# Patient Record
Sex: MALE | Race: BLACK OR AFRICAN AMERICAN | HISPANIC OR LATINO | Employment: UNEMPLOYED | URBAN - METROPOLITAN AREA
[De-identification: names, ages, dates, MRNs, and addresses within clinical notes are randomized per-mention and may not be internally consistent; named-entity substitution may affect disease eponyms.]

---

## 2014-05-24 LAB — HEP C VIRUS AB: NEGATIVE

## 2020-01-30 ENCOUNTER — OFFICE VISIT (OUTPATIENT)
Dept: FAMILY MEDICINE | Facility: CLINIC | Age: 45
End: 2020-01-30
Payer: COMMERCIAL

## 2020-01-30 VITALS
WEIGHT: 200.63 LBS | RESPIRATION RATE: 16 BRPM | SYSTOLIC BLOOD PRESSURE: 145 MMHG | HEART RATE: 90 BPM | BODY MASS INDEX: 30.41 KG/M2 | HEIGHT: 68 IN | OXYGEN SATURATION: 97 % | DIASTOLIC BLOOD PRESSURE: 82 MMHG

## 2020-01-30 DIAGNOSIS — E66.09 CLASS 1 OBESITY DUE TO EXCESS CALORIES WITH BODY MASS INDEX (BMI) OF 30.0 TO 30.9 IN ADULT, UNSPECIFIED WHETHER SERIOUS COMORBIDITY PRESENT: ICD-10-CM

## 2020-01-30 DIAGNOSIS — Z13.6 SCREENING FOR CARDIOVASCULAR CONDITION: ICD-10-CM

## 2020-01-30 DIAGNOSIS — K21.9 GASTROESOPHAGEAL REFLUX DISEASE, ESOPHAGITIS PRESENCE NOT SPECIFIED: ICD-10-CM

## 2020-01-30 DIAGNOSIS — Z00.01 ENCOUNTER FOR GENERAL ADULT MEDICAL EXAMINATION WITH ABNORMAL FINDINGS: Primary | ICD-10-CM

## 2020-01-30 DIAGNOSIS — Z11.4 SCREENING FOR HIV (HUMAN IMMUNODEFICIENCY VIRUS): ICD-10-CM

## 2020-01-30 DIAGNOSIS — N41.9 PROSTATITIS, UNSPECIFIED PROSTATITIS TYPE: ICD-10-CM

## 2020-01-30 DIAGNOSIS — M54.16 LUMBAR RADICULOPATHY: ICD-10-CM

## 2020-01-30 DIAGNOSIS — R03.0 TRANSIENT ELEVATED BLOOD PRESSURE: ICD-10-CM

## 2020-01-30 DIAGNOSIS — Z13.1 SCREENING FOR DIABETES MELLITUS: ICD-10-CM

## 2020-01-30 DIAGNOSIS — N50.89 TESTICULAR MASS: ICD-10-CM

## 2020-01-30 DIAGNOSIS — Z11.3 ROUTINE SCREENING FOR STI (SEXUALLY TRANSMITTED INFECTION): ICD-10-CM

## 2020-01-30 PROCEDURE — 99386 PREV VISIT NEW AGE 40-64: CPT | Mod: S$GLB,,, | Performed by: FAMILY MEDICINE

## 2020-01-30 PROCEDURE — 99999 PR PBB SHADOW E&M-EST. PATIENT-LVL III: CPT | Mod: PBBFAC,,, | Performed by: FAMILY MEDICINE

## 2020-01-30 PROCEDURE — 99386 PR PREVENTIVE VISIT,NEW,40-64: ICD-10-PCS | Mod: S$GLB,,, | Performed by: FAMILY MEDICINE

## 2020-01-30 PROCEDURE — 99999 PR PBB SHADOW E&M-EST. PATIENT-LVL III: ICD-10-PCS | Mod: PBBFAC,,, | Performed by: FAMILY MEDICINE

## 2020-01-30 RX ORDER — SULFAMETHOXAZOLE AND TRIMETHOPRIM 800; 160 MG/1; MG/1
1 TABLET ORAL 2 TIMES DAILY
Qty: 28 TABLET | Refills: 0 | Status: SHIPPED | OUTPATIENT
Start: 2020-01-30 | End: 2020-02-13

## 2020-01-30 RX ORDER — PANTOPRAZOLE SODIUM 40 MG/1
40 TABLET, DELAYED RELEASE ORAL DAILY
Qty: 30 TABLET | Refills: 1 | Status: SHIPPED | OUTPATIENT
Start: 2020-01-30 | End: 2020-02-17

## 2020-02-05 ENCOUNTER — HOSPITAL ENCOUNTER (OUTPATIENT)
Dept: RADIOLOGY | Facility: HOSPITAL | Age: 45
Discharge: HOME | End: 2020-02-05
Attending: FAMILY MEDICINE
Payer: COMMERCIAL

## 2020-02-05 DIAGNOSIS — N50.89 TESTICULAR MASS: ICD-10-CM

## 2020-02-05 PROCEDURE — 76870 US SCROTUM AND TESTICLES: ICD-10-PCS | Mod: 26,,, | Performed by: RADIOLOGY

## 2020-02-05 PROCEDURE — 76870 US EXAM SCROTUM: CPT | Mod: TC

## 2020-02-05 PROCEDURE — 76870 US EXAM SCROTUM: CPT | Mod: 26,,, | Performed by: RADIOLOGY

## 2020-02-11 NOTE — PROGRESS NOTES
Subjective:       Patient ID: Emeka Caballero is a 44 y.o. male.    Chief Complaint: Annual Exam    HPI   44 year old male originally comes in for wellness check and states it just for routine check up and then changes that he has multiple concerns. He is aware that the multiple concerns are not part of a routine check up and may cause a bill.  He is present with his wife who interrupts questioning and patient's answers throughout the interview. He states that for some time now, a few, he gets abdominal pain after eating. It is epigastric and feels like a burning sensation. The wife reports it has actually been going on for years and that she knows it is ulcers despite never having had an EGD done. He also reports a weaker urinary stream for the last few weeks as well as a perianal pain. There has been no trauma. Next, he also reports that for months, he has had low back pain. It radiates into the legs. He also reports weakness in legs. He recently had MRIs done according to him but did not follow up with the provider who ordered them. He states he was told it was fine.    Review of Systems   Constitutional: Negative for unexpected weight change.   HENT: Negative for ear pain and sore throat.    Eyes: Negative for visual disturbance.   Respiratory: Negative for shortness of breath.    Cardiovascular: Negative for chest pain.   Gastrointestinal: Positive for abdominal pain. Negative for blood in stool, diarrhea, nausea and rectal pain.   Endocrine: Negative for cold intolerance and heat intolerance.   Genitourinary: Positive for difficulty urinating, testicular pain and urgency. Negative for dysuria, frequency and genital sores.   Musculoskeletal: Positive for back pain.   Skin: Negative for rash.   Neurological: Positive for weakness and numbness (legs). Negative for headaches.   Hematological: Negative for adenopathy.   Psychiatric/Behavioral: Negative for suicidal ideas.       Objective:      Physical Exam    Constitutional: He is oriented to person, place, and time. He appears well-developed and well-nourished. No distress.   HENT:   Head: Normocephalic and atraumatic.   Right Ear: Tympanic membrane, external ear and ear canal normal.   Left Ear: Tympanic membrane, external ear and ear canal normal.   Nose: Nose normal.   Mouth/Throat: Oropharynx is clear and moist.   Eyes: Pupils are equal, round, and reactive to light. Conjunctivae and EOM are normal. Right eye exhibits no discharge. Left eye exhibits no discharge.   Neck: Normal range of motion. Neck supple. No tracheal deviation present. No thyromegaly present.   Cardiovascular: Normal rate, regular rhythm, S1 normal, S2 normal, normal heart sounds and intact distal pulses.   No murmur heard.  Pulses:       Radial pulses are 2+ on the right side, and 2+ on the left side.   Pulmonary/Chest: Effort normal and breath sounds normal. No respiratory distress. He has no wheezes. He has no rhonchi. He has no rales.   Abdominal: Soft. Bowel sounds are normal. He exhibits no distension and no mass. There is no tenderness. There is no rigidity, no guarding and no CVA tenderness.   Genitourinary: Penis normal. Rectal exam shows no fissure. Prostate is tender. Right testis shows mass (in area of vas deferans). Right testis shows no tenderness. Left testis shows no tenderness. No penile erythema.   Lymphadenopathy:     He has no cervical adenopathy.   Neurological: He is alert and oriented to person, place, and time. He has normal strength. He displays no atrophy. No cranial nerve deficit or sensory deficit. He exhibits normal muscle tone.   Reflex Scores:       Patellar reflexes are 2+ on the right side and 2+ on the left side.  Skin: Skin is warm and dry. Capillary refill takes less than 2 seconds. No rash noted. He is not diaphoretic.   Psychiatric: He has a normal mood and affect. His behavior is normal.   Vitals reviewed.      Assessment:       1. Encounter for general  adult medical examination with abnormal findings    2. Gastroesophageal reflux disease, esophagitis presence not specified    3. Prostatitis, unspecified prostatitis type    4. Testicular mass    5. Screening for HIV (human immunodeficiency virus)    6. Routine screening for STI (sexually transmitted infection)    7. Screening for cardiovascular condition    8. Screening for diabetes mellitus    9. Transient elevated blood pressure    10. Class 1 obesity due to excess calories with body mass index (BMI) of 30.0 to 30.9 in adult, unspecified whether serious comorbidity present    11. Lumbar radiculopathy        Plan:       Emeka was seen today for annual exam.    Diagnoses and all orders for this visit:    Encounter for general adult medical examination with abnormal findings  -     Comprehensive metabolic panel; Future  -     CBC auto differential; Future  -     Lipid panel; Future  -     Hemoglobin A1c; Future  -     HIV 1/2 Ag/Ab (4th Gen); Future  -     RPR; Future  -     Cancel: C. trachomatis/N. gonorrhoeae by AMP DNA; Future  -     TSH; Future  Age appropriate recommendations reviewed.  Screening labs ordered.    Gastroesophageal reflux disease, esophagitis presence not specified  -     pantoprazole (PROTONIX) 40 MG tablet; Take 1 tablet (40 mg total) by mouth once daily.  Discussed GERD and causes, as well as dietary changes.  Will start on Protonix.    Prostatitis, unspecified prostatitis type  -     PSA, total and free; Future  -     Urinalysis, Reflex to Urine Culture Urine, Clean Catch  -     C. trachomatis/N. gonorrhoeae by AMP DNA; Future  -     sulfamethoxazole-trimethoprim 800-160mg (BACTRIM DS) 800-160 mg Tab; Take 1 tablet by mouth 2 (two) times daily. for 14 days  Course of bactrim prescribed for prostatitis.  Check PSA    Testicular mass  -     US Scrotum And Testicles; Future  Will check US    Screening for HIV (human immunodeficiency virus)  -     HIV 1/2 Ag/Ab (4th Gen); Future    Routine  screening for STI (sexually transmitted infection)  -     HIV 1/2 Ag/Ab (4th Gen); Future  -     RPR; Future  -     Cancel: C. trachomatis/N. gonorrhoeae by AMP DNA; Future    Screening for cardiovascular condition  -     Lipid panel; Future    Screening for diabetes mellitus  -     Hemoglobin A1c; Future    Transient elevated blood pressure  Importance of blood pressure control discussed.   If still elevated on follow up, may need medication    Class 1 obesity due to excess calories with body mass index (BMI) of 30.0 to 30.9 in adult, unspecified whether serious comorbidity present  -     Comprehensive metabolic panel; Future  -     CBC auto differential; Future  -     Lipid panel; Future  -     Hemoglobin A1c; Future  -     TSH; Future    Lumbar radiculopathy  -     X-Ray Lumbar Complete With Flex And Ext; Future

## 2020-02-12 ENCOUNTER — OFFICE VISIT (OUTPATIENT)
Dept: FAMILY MEDICINE | Facility: CLINIC | Age: 45
End: 2020-02-12
Payer: COMMERCIAL

## 2020-02-12 VITALS
WEIGHT: 204.38 LBS | BODY MASS INDEX: 31.07 KG/M2 | TEMPERATURE: 98 F | SYSTOLIC BLOOD PRESSURE: 118 MMHG | RESPIRATION RATE: 16 BRPM | OXYGEN SATURATION: 96 % | DIASTOLIC BLOOD PRESSURE: 72 MMHG | HEART RATE: 97 BPM

## 2020-02-12 DIAGNOSIS — M43.17 SPONDYLOLISTHESIS OF LUMBOSACRAL REGION: ICD-10-CM

## 2020-02-12 DIAGNOSIS — N52.9 ERECTILE DYSFUNCTION, UNSPECIFIED ERECTILE DYSFUNCTION TYPE: ICD-10-CM

## 2020-02-12 DIAGNOSIS — I86.1 VARICOCELE: Primary | ICD-10-CM

## 2020-02-12 DIAGNOSIS — N41.9 PROSTATITIS, UNSPECIFIED PROSTATITIS TYPE: ICD-10-CM

## 2020-02-12 DIAGNOSIS — E78.2 MIXED DYSLIPIDEMIA: ICD-10-CM

## 2020-02-12 PROCEDURE — 99214 OFFICE O/P EST MOD 30 MIN: CPT | Mod: S$GLB,,, | Performed by: FAMILY MEDICINE

## 2020-02-12 PROCEDURE — 3008F BODY MASS INDEX DOCD: CPT | Mod: CPTII,S$GLB,, | Performed by: FAMILY MEDICINE

## 2020-02-12 PROCEDURE — 99214 PR OFFICE/OUTPT VISIT, EST, LEVL IV, 30-39 MIN: ICD-10-PCS | Mod: S$GLB,,, | Performed by: FAMILY MEDICINE

## 2020-02-12 PROCEDURE — 99999 PR PBB SHADOW E&M-EST. PATIENT-LVL IV: CPT | Mod: PBBFAC,,, | Performed by: FAMILY MEDICINE

## 2020-02-12 PROCEDURE — 3008F PR BODY MASS INDEX (BMI) DOCUMENTED: ICD-10-PCS | Mod: CPTII,S$GLB,, | Performed by: FAMILY MEDICINE

## 2020-02-12 PROCEDURE — 99999 PR PBB SHADOW E&M-EST. PATIENT-LVL IV: ICD-10-PCS | Mod: PBBFAC,,, | Performed by: FAMILY MEDICINE

## 2020-02-12 RX ORDER — TADALAFIL 20 MG/1
TABLET ORAL
Qty: 10 TABLET | Refills: 11 | Status: SHIPPED | OUTPATIENT
Start: 2020-02-12 | End: 2020-02-14 | Stop reason: SDUPTHER

## 2020-02-12 NOTE — PROGRESS NOTES
"Subjective:       Emeka Caballero is a 44 y.o. male who is a new patient who was referred by Dr Zarate for evaluation of "varicocele".      Referred for varicocele. This was noted on recent SANDEEP (b/l varicocele R>L on US report). SANDEEP was done because of b/l testicular pain and increased swelling on R. Noted for 1-2 years. Swelling impedes on walking per report.     Reported weak stream to PCP. Also recently treated for prostatitis by PCP with Bactrim x 14d. Not on BPH medication. Intermittent stream with hesitancy. Nocturia x 5. +straining.     AUASS - 29, 6    PVR (bladder scan) today - 98cc (not true PVR, last void 2.5 hours ago)    Known back issues - seeing pain mgmt and planned for scans.     Also reports issue with ED. Viagra 100mg on MAR.       The following portions of the patient's history were reviewed and updated as appropriate: allergies, current medications, past family history, past medical history, past social history, past surgical history and problem list.    Review of Systems  Constitutional: no fever or chills  ENT: no nasal congestion or sore throat  Respiratory: no cough or shortness of breath  Cardiovascular: no chest pain or palpitations  Gastrointestinal: no nausea or vomiting, tolerating diet  Genitourinary: as per HPI  Hematologic/Lymphatic: no easy bruising or lymphadenopathy  Musculoskeletal: no arthralgias or myalgias  Skin: no rashes or lesions  Neurological: no seizures or tremors  Behavioral/Psych: no auditory or visual hallucinations       Objective:    Vitals: /64   Ht 5' 8" (1.727 m)   Wt 93.5 kg (206 lb 0.3 oz)   BMI 31.33 kg/m²     Physical Exam   General: well developed, well nourished in no acute distress  Head: normocephalic, atraumatic  Neck: supple, trachea midline, no obvious enlargement of thyroid  HEENT: EOMI, mucus membranes moist, sclera anicteric, no hearing impairment  Lungs: symmetric expansion, non-labored breathing  Cardiovascular: regular rate and " rhythm, normal pulses  Abdomen: soft, non tender, non distended, no palpable masses, no hepatosplenomegaly, no hernias, bladder nonpalpable. No CVA tenderness.  Musculoskeletal: no peripheral edema, normal ROM in bilateral upper and lower extremities  Lymphatics: no cervical or inguinal lymphadenopathy  Skin: no rashes or lesions  Neuro: alert and oriented x 3, no gross deficits  Psych: normal judgment and insight, normal mood/affect and non-anxious  Genitourinary:   Penis -  circumcised penis without plaques, lesions, or scarring.  Urethra - orthotopic location without stenosis.  Scrotum  - no lesions or rashes, no hydrocele or hernia.  Testes - descended bilaterally, symmetric without masses, non tender.  Epididymides - no cysts or lesions, no spermatocele, symmetric  ++varicocele on L, minimal varicocele R. R tender to palpation in superior testicle.    GUILLERMINA: patient declined exam      Lab Review   Urine analysis today in clinic shows - no urine     Lab Results   Component Value Date    WBC 7.66 01/30/2020    HGB 15.0 01/30/2020    HCT 46.7 01/30/2020    MCV 87 01/30/2020     01/30/2020     Lab Results   Component Value Date    CREATININE 1.0 01/30/2020    BUN 16 01/30/2020     No results found for: PSA  No results found for: PSADIAG    Imaging  SANDEEP reviewed       Assessment/Plan:      1. Varicocele    - L>>R on exam. L not bothersome.   - Discussed possible surgical intervention - he is not interested. Would not expect R varicocele to be cause of pain.      2. Erectile dysfunction, unspecified erectile dysfunction type   - Viagra given by PCP a few days ago   - Will trial      3. Weak urinary stream    - Significant LUTS. AUASS high   - Start Flomax now   - May need further workup (cysto)     Scans pending for w/u of back pain    Follow up in 6 weeks

## 2020-02-14 ENCOUNTER — TELEPHONE (OUTPATIENT)
Dept: FAMILY MEDICINE | Facility: CLINIC | Age: 45
End: 2020-02-14

## 2020-02-14 DIAGNOSIS — N52.9 ERECTILE DYSFUNCTION, UNSPECIFIED ERECTILE DYSFUNCTION TYPE: ICD-10-CM

## 2020-02-14 DIAGNOSIS — N52.9 ERECTILE DYSFUNCTION, UNSPECIFIED ERECTILE DYSFUNCTION TYPE: Primary | ICD-10-CM

## 2020-02-14 RX ORDER — TADALAFIL 20 MG/1
TABLET ORAL
Qty: 8 TABLET | Refills: 11 | Status: SHIPPED | OUTPATIENT
Start: 2020-02-14 | End: 2020-02-14

## 2020-02-14 RX ORDER — SILDENAFIL 100 MG/1
TABLET, FILM COATED ORAL
Qty: 10 TABLET | Refills: 11 | Status: SHIPPED | OUTPATIENT
Start: 2020-02-14 | End: 2022-01-11

## 2020-02-14 NOTE — TELEPHONE ENCOUNTER
I spoke to the pt wife and he would like the prescription sent to [facility] for Viagra.

## 2020-02-14 NOTE — TELEPHONE ENCOUNTER
"----- Message from Beth Cordoba MA sent at 2/14/2020  1:19 PM CST -----  Contact: Alana "wife"  [phone]      ----- Message -----  From: Tarah Galeas  Sent: 2/14/2020  12:43 PM CST  To: [facility] Whatron Staff    .Type: Patient Call Back    Who called alana "wife"    What is the request in detail: Requesting for the tadalafil (CIALIS) 20 MG Tab to be changed to 8 pills and reasoning for why the pt needs the medication    Can the clinic reply by [facility]? Call back     Would the patient rather a call back or a response via [facility]? Call back     Best call back number: [phone]        "

## 2020-02-14 NOTE — TELEPHONE ENCOUNTER
----- Message from Sherri Roman sent at 2/14/2020  9:19 AM CST -----  Contact: Self :[phone]  Type: Patient Call Back    Who called:Self    What is the request in detail: calling in regards to getting a PA done for tadalafil (CIALIS) 20 MG Tab, and the insurance will only cover 8 tablets instead of 10    Can the clinic reply by [facility]? Call back    Would the patient rather a call back or a response via [facility]? Call back    Best call back number:[phone]

## 2020-02-14 NOTE — TELEPHONE ENCOUNTER
I spoke to pharmacist directly. The medication is not covered by patient's insurance plan. They can pay out of pocket if they'd like.     I can also send to mail order [facility] pharmacy. 8 pills of Cialis is $40, or 10 pills of Viagra is $20. With [employer] I can send a prescription electronically and they call to set up payment and delivery.

## 2020-02-14 NOTE — TELEPHONE ENCOUNTER
"----- Message from Tarah Galeas sent at 2/14/2020  2:55 PM CST -----  Contact: emily "wife"  [phone]  .Type:  Patient Returning Call    Who Called: emily "wife"    Who Left Message for Patient: Kirstie Myersws    Does the patient know what this is regarding?:med     Would the patient rather a call back or a response via [facility]? Call back     Best Call Back Number: [phone]        "

## 2020-02-17 ENCOUNTER — PATIENT OUTREACH (OUTPATIENT)
Dept: ADMINISTRATIVE | Facility: OTHER | Age: 45
End: 2020-02-17

## 2020-02-17 ENCOUNTER — CLINICAL SUPPORT (OUTPATIENT)
Dept: FAMILY MEDICINE | Facility: CLINIC | Age: 45
End: 2020-02-17
Payer: COMMERCIAL

## 2020-02-17 ENCOUNTER — OFFICE VISIT (OUTPATIENT)
Dept: PAIN MEDICINE | Facility: CLINIC | Age: 45
End: 2020-02-17
Payer: COMMERCIAL

## 2020-02-17 ENCOUNTER — OFFICE VISIT (OUTPATIENT)
Dept: UROLOGY | Facility: CLINIC | Age: 45
End: 2020-02-17
Payer: COMMERCIAL

## 2020-02-17 VITALS
DIASTOLIC BLOOD PRESSURE: 64 MMHG | BODY MASS INDEX: 31.22 KG/M2 | WEIGHT: 206 LBS | SYSTOLIC BLOOD PRESSURE: 112 MMHG | HEIGHT: 68 IN

## 2020-02-17 VITALS
WEIGHT: 203.19 LBS | HEIGHT: 68 IN | OXYGEN SATURATION: 98 % | SYSTOLIC BLOOD PRESSURE: 123 MMHG | BODY MASS INDEX: 30.8 KG/M2 | DIASTOLIC BLOOD PRESSURE: 75 MMHG | TEMPERATURE: 99 F | HEART RATE: 88 BPM

## 2020-02-17 DIAGNOSIS — M48.061 SPINAL STENOSIS OF LUMBAR REGION, UNSPECIFIED WHETHER NEUROGENIC CLAUDICATION PRESENT: ICD-10-CM

## 2020-02-17 DIAGNOSIS — M43.17 SPONDYLOLISTHESIS OF LUMBOSACRAL REGION: ICD-10-CM

## 2020-02-17 DIAGNOSIS — M47.816 LUMBAR SPONDYLOSIS: ICD-10-CM

## 2020-02-17 DIAGNOSIS — Z00.00 HEALTHCARE MAINTENANCE: ICD-10-CM

## 2020-02-17 DIAGNOSIS — M54.16 LUMBAR RADICULOPATHY: ICD-10-CM

## 2020-02-17 DIAGNOSIS — Z00.00 HEALTHCARE MAINTENANCE: Primary | ICD-10-CM

## 2020-02-17 DIAGNOSIS — M51.36 DDD (DEGENERATIVE DISC DISEASE), LUMBAR: Primary | ICD-10-CM

## 2020-02-17 DIAGNOSIS — N52.9 ERECTILE DYSFUNCTION, UNSPECIFIED ERECTILE DYSFUNCTION TYPE: ICD-10-CM

## 2020-02-17 DIAGNOSIS — I86.1 VARICOCELE: ICD-10-CM

## 2020-02-17 DIAGNOSIS — R39.12 WEAK URINARY STREAM: Primary | ICD-10-CM

## 2020-02-17 PROBLEM — M51.369 DDD (DEGENERATIVE DISC DISEASE), LUMBAR: Status: ACTIVE | Noted: 2020-02-17

## 2020-02-17 PROCEDURE — 99204 PR OFFICE/OUTPT VISIT, NEW, LEVL IV, 45-59 MIN: ICD-10-PCS | Mod: 25,S$GLB,, | Performed by: PAIN MEDICINE

## 2020-02-17 PROCEDURE — 90686 FLU VACCINE (QUAD) GREATER THAN OR EQUAL TO 3YO PRESERVATIVE FREE IM: ICD-10-PCS | Mod: S$GLB,,, | Performed by: PAIN MEDICINE

## 2020-02-17 PROCEDURE — 99999 PR PBB SHADOW E&M-EST. PATIENT-LVL IV: CPT | Mod: PBBFAC,,, | Performed by: PAIN MEDICINE

## 2020-02-17 PROCEDURE — 99499 NO LOS: ICD-10-PCS | Mod: S$GLB,,, | Performed by: PAIN MEDICINE

## 2020-02-17 PROCEDURE — 99999 PR PBB SHADOW E&M-EST. PATIENT-LVL III: CPT | Mod: PBBFAC,,, | Performed by: UROLOGY

## 2020-02-17 PROCEDURE — 90471 FLU VACCINE (QUAD) GREATER THAN OR EQUAL TO 3YO PRESERVATIVE FREE IM: ICD-10-PCS | Mod: S$GLB,,, | Performed by: PAIN MEDICINE

## 2020-02-17 PROCEDURE — 99499 UNLISTED E&M SERVICE: CPT | Mod: S$GLB,,, | Performed by: PAIN MEDICINE

## 2020-02-17 PROCEDURE — 99204 OFFICE O/P NEW MOD 45 MIN: CPT | Mod: 25,S$GLB,, | Performed by: PAIN MEDICINE

## 2020-02-17 PROCEDURE — 51798 POCT BLADDER SCAN: ICD-10-PCS | Mod: S$GLB,,, | Performed by: UROLOGY

## 2020-02-17 PROCEDURE — 99999 PR PBB SHADOW E&M-EST. PATIENT-LVL III: ICD-10-PCS | Mod: PBBFAC,,, | Performed by: UROLOGY

## 2020-02-17 PROCEDURE — 99204 PR OFFICE/OUTPT VISIT, NEW, LEVL IV, 45-59 MIN: ICD-10-PCS | Mod: 25,S$GLB,, | Performed by: UROLOGY

## 2020-02-17 PROCEDURE — 90471 IMMUNIZATION ADMIN: CPT | Mod: S$GLB,,, | Performed by: PAIN MEDICINE

## 2020-02-17 PROCEDURE — 51798 US URINE CAPACITY MEASURE: CPT | Mod: S$GLB,,, | Performed by: UROLOGY

## 2020-02-17 PROCEDURE — 90686 IIV4 VACC NO PRSV 0.5 ML IM: CPT | Mod: S$GLB,,, | Performed by: PAIN MEDICINE

## 2020-02-17 PROCEDURE — 99204 OFFICE O/P NEW MOD 45 MIN: CPT | Mod: 25,S$GLB,, | Performed by: UROLOGY

## 2020-02-17 PROCEDURE — 99999 PR PBB SHADOW E&M-EST. PATIENT-LVL IV: ICD-10-PCS | Mod: PBBFAC,,, | Performed by: PAIN MEDICINE

## 2020-02-17 RX ORDER — CYCLOBENZAPRINE HCL 10 MG
10 TABLET ORAL
COMMUNITY
End: 2020-02-17

## 2020-02-17 RX ORDER — TAMSULOSIN HYDROCHLORIDE 0.4 MG/1
0.4 CAPSULE ORAL DAILY
Qty: 30 CAPSULE | Refills: 11 | Status: SHIPPED | OUTPATIENT
Start: 2020-02-17 | End: 2021-03-03

## 2020-02-17 RX ORDER — NAPROXEN SODIUM 550 MG/1
550 TABLET ORAL
COMMUNITY
End: 2020-02-17

## 2020-02-17 NOTE — LETTER
February 17, 2020      Dio Zarate Jr., MD  [address] [place] [address]           [facility]  [address] [place] [address]  Phone: [phone]  Fax: [phone]          Patient: Emeka Caballeor   MR Number: [id number]   YOB: 1975   Date of Visit: 2/17/2020       Dear Dr. Dio Zarate Jr.:    Thank you for referring Emeka Caballero to me for evaluation. Attached you will find relevant portions of my assessment and plan of care.    If you have questions, please do not hesitate to call me. I look forward to following Emeka Caballero along with you.    Sincerely,    Vipul Nixon Jr., MD    Enclosure  CC:  No Recipients    If you would like to receive this communication electronically, please contact [email or web address] or [phone] to request more information on [facility] access.    For providers and/or their staff who would like to refer a patient to [facility], please contact us through our one-stop-shop provider referral line, [facility] [place], at [phone].    If you feel you have received this communication in error or would no longer like to receive these types of communications, please e-mail [email or web address]

## 2020-02-17 NOTE — LETTER
February 17, 2020      Dio Zarate Jr., MD  [address] [facility]  [place] [address]           [facility]  [address]  [facility] [address]  Phone: [phone]  Fax: [phone]          Patient: Emeka Caballero   MR Number: [id number]   YOB: 1975   Date of Visit: 2/17/2020       Dear Dr. Dio Zarate Jr.:    Thank you for referring Emeka Caballero to me for evaluation. Attached you will find relevant portions of my assessment and plan of care.    If you have questions, please do not hesitate to call me. I look forward to following Emeka Caballero along with you.    Sincerely,    Cydney Rosas MD    Enclosure  CC:  No Recipients    If you would like to receive this communication electronically, please contact [email or web address] or [phone] to request more information on [facility] access.    For providers and/or their staff who would like to refer a patient to [facility], please contact us through our one-stop-shop provider referral line, [facility], at [phone].    If you feel you have received this communication in error or would no longer like to receive these types of communications, please e-mail [email or web address]

## 2020-02-17 NOTE — PROGRESS NOTES
Subjective:     Patient ID: Emeka Caballero is a 44 y.o. male    Chief Complaint: Low-back Pain (pt takes OTC medication and no PT . No injections ) and Leg Pain      Referred by: Dio Zarate Jr., MD      HPI:    Initial Encounter (2/17/20):  Emeka Caballero is a 44 y.o. male who presents today with chronic midline low back pain. This pain has been present for 2-3 years.  No specific inciting event or injury noted.  Patient localizes pain to the midline lower lumbar region.  The pain will radiate to the bilateral lower extremities in a nondermatomal pattern.  Patient does report numbness and tingling also in a nondermatomal distribution that includes his toes bilaterally.  He denies any focal weakness but does feel that his legs will give out while walking.  He also states that the pain limits his ability to walk for prolonged distances/times.  He does have some difficulty urinating but states that this is from a issue unrelated to his low back.  He denies any bowel dysfunction.  He states that he takes Aleve for pills at a time and that this helps his pain somewhat.   This pain is described in detail below.    Physical Therapy:  No.    Non-pharmacologic Treatment:  Rest helps         · TENS?  No    Pain Medications:         · Currently taking:  OTC NSAIDs, Flexeril    · Has tried in the past:      · Has not tried:  Opioids, TCAs, SNRIs, anticonvulsants, topical creams    Blood thinners:  None    Interventional Therapies:  None    Relevant Surgeries:  None    Affecting sleep?  Yes    Affecting daily activities? yes    Depressive symptoms? no          · SI/HI? No    Work status: Employed    Pain Scores:    Best:       7/10  Worst:     10/10  Usually:   7/10  Today:    9/10    Review of Systems   Constitutional: Negative for activity change, appetite change, chills, fatigue, fever and unexpected weight change.   HENT: Negative for hearing loss.    Eyes: Negative for visual disturbance.    Respiratory: Negative for chest tightness and shortness of breath.    Cardiovascular: Negative for chest pain.   Gastrointestinal: Negative for abdominal pain, constipation, diarrhea, nausea and vomiting.   Genitourinary: Positive for difficulty urinating.   Musculoskeletal: Positive for back pain, gait problem and myalgias. Negative for neck pain.   Skin: Negative for rash.   Neurological: Positive for weakness and numbness. Negative for dizziness, light-headedness and headaches.   Psychiatric/Behavioral: Positive for sleep disturbance. Negative for hallucinations and suicidal ideas. The patient is not nervous/anxious.        No past medical history on file.    No past surgical history on file.    Social History     Socioeconomic History    Marital status: Single     Spouse name: Not on file    Number of children: Not on file    Years of education: Not on file    Highest education level: Not on file   Occupational History    Not on file   Social Needs    Financial resource strain: Not on file    Food insecurity:     Worry: Not on file     Inability: Not on file    Transportation needs:     Medical: Not on file     Non-medical: Not on file   Tobacco Use    Smoking status: Former Smoker   Substance and Sexual Activity    Alcohol use: Yes     Comment: socially    Drug use: No    Sexual activity: Not on file   Lifestyle    Physical activity:     Days per week: Not on file     Minutes per session: Not on file    Stress: Not on file   Relationships    Social connections:     Talks on phone: Not on file     Gets together: Not on file     Attends Confucianism service: Not on file     Active member of club or organization: Not on file     Attends meetings of clubs or organizations: Not on file     Relationship status: Not on file   Other Topics Concern    Not on file   Social History Narrative    Not on file       Review of patient's allergies indicates:  No Known Allergies    Current Outpatient Medications  "on File Prior to Visit   Medication Sig Dispense Refill    cyclobenzaprine (FLEXERIL) 10 MG tablet Take 10 mg by mouth.      naproxen sodium (ANAPROX) 550 MG tablet Take 550 mg by mouth.      pantoprazole (PROTONIX) 40 MG tablet Take 1 tablet (40 mg total) by mouth once daily. 30 tablet 1    sildenafil (VIAGRA) 100 MG tablet 1 tablet PO when needed, 1 hour prior to sex, maximum of 1 tablet in 24 hours 10 tablet 11    triamcinolone acetonide 0.1% (KENALOG) 0.1 % cream Apply topically 2 (two) times daily. Apply to rash. DO NOT USE ON FACE. 45 g 1     No current facility-administered medications on file prior to visit.        Objective:      Ht 5' 8" (1.727 m)   Wt 92.2 kg (203 lb 3.2 oz)   BMI 30.90 kg/m²     Exam:  GEN:  Well developed, well nourished.  No acute distress.  Normal pain behavior.  HEENT:  No trauma.  Mucous membranes moist.  Nares patent bilaterally.  PSYCH: Normal affect. Thought content appropriate.  CHEST:  Breathing symmetric.  No audible wheezing.  ABD: Soft, non-distended.  SKIN:  Warm, pink, dry.  No rash on exposed areas.    EXT:  No cyanosis, clubbing, or edema.  No color change or changes in nail or hair growth.  NEURO/MUSCULOSKELETAL:  Fully alert, oriented, and appropriate. Speech normal taz. No cranial nerve deficits.   Gait:  Normal.  No trendelenburg sign bilaterally.   Motor Strength: 5/5 motor strength throughout lower extremities.   Sensory:  No sensory deficit in the lower extremities.   Reflexes:  2 + and symmetric throughout.  Downgoing Babinski's bilaterally.  No clonus or spasticity.  L-Spine:  Slightly ROM with pain on flexion and extension. Positive pain with axial/facet loading bilaterally.  Positive SLR on the left.   Positive TTP over lumbar paraspinals, midline lower lumbar spine          Imaging:  Narrative     EXAMINATION:  XR LUMBAR SPINE 5 VIEW WITH FLEX AND EXT    CLINICAL HISTORY:  Low back pain, >6wks conservative tx, persistent-progressive sx, surgical " candidate;  Radiculopathy, lumbar region    TECHNIQUE:  Five views of the lumbar spine plus flexion extension views were performed.    COMPARISON:  None.    FINDINGS:  Vertebral bodies are intact.  There is a spondylolisthesis probably second-degree at the L5-S1 level with forward subluxation of L5 on S1.  Minimal narrowing of the L3-L4 disc space is noted no bony collapse or destruction is seen.   Impression       See above      Electronically signed by: Vipul Posadas MD  Date: 01/30/2020  Time: 12:46    Encounter     View Encounter                   Assessment:       Encounter Diagnosis   Name Primary?    Spondylolisthesis of lumbosacral region          Plan:       Emeka was seen today for low-back pain and leg pain.    Diagnoses and all orders for this visit:    Spondylolisthesis of lumbosacral region  -     Ambulatory referral/consult to Pain Clinic        Emeka Caballero is a 44 y.o. male with chronic midline low back pain that radiates the bilateral lower extremities left worse than right.  Pain associated with nondermatomal numbness and nonfocal weakness.  Also difficulty urinating.  Patient states that this is from unknown issue unrelated to his low back however may be related to insult to the cauda equina.  Lumbar x-rays do show grade 2 spondylolisthesis of L5 on S1.  Likely has axial pain directly related to this deformity but also may have pain from neurogenic claudication or radiculopathy as well.    1.  Pertinent imaging studies reviewed by me. Imaging results were discussed with patient.  2.  Lumbar MRI to evaluate for spinal stenosis, neural foraminal stenosis, radiculopathy.  3.  Return to clinic after MRI to discuss results.  May consider epidural steroid injection versus physical therapy referral versus neurosurgery referral.

## 2020-02-17 NOTE — PROGRESS NOTES
Chart reviewed.   Requested updates from Care Everywhere.  Immunizations reconciled.   HM updated.    Patient not in links

## 2020-02-17 NOTE — PROGRESS NOTES
Subjective:       Patient ID: Emeka Caballero is a 44 y.o. male.    Chief Complaint: Results    HPI   44 year old male presents for follow up on proatatitis and for review of abnormal labs and testicular ultrasound. He is present with his wife. He reports that since starting antibiotic, pain in testicles and perineum is much improved, and almost all gone. He wife is requesting medication for erectile dysfunction. Patient reports inability to get an erection. This has been for over a year.    Review of Systems   Constitutional: Negative for unexpected weight change.   HENT: Negative for ear pain and sore throat.    Eyes: Negative for visual disturbance.   Respiratory: Negative for shortness of breath.    Cardiovascular: Negative for chest pain.   Gastrointestinal: Negative for abdominal pain, blood in stool, diarrhea, nausea and rectal pain.   Genitourinary: Negative for difficulty urinating, dysuria, frequency, genital sores, testicular pain and urgency.   Musculoskeletal: Positive for back pain.   Skin: Negative for rash.   Neurological: Positive for weakness and numbness (legs). Negative for headaches.   Hematological: Negative for adenopathy.   Psychiatric/Behavioral: Negative for suicidal ideas.       Objective:     /72 (BP Location: Left arm, Patient Position: Sitting, BP Method: Medium (Automatic))   Pulse 97   Temp 97.8 °F (36.6 °C) (Oral)   Resp 16   Wt 92.7 kg (204 lb 5.9 oz)   SpO2 96%   BMI 31.07 kg/m²     Physical Exam   Constitutional: He is oriented to person, place, and time. He appears well-developed and well-nourished. No distress.   HENT:   Head: Normocephalic and atraumatic.   Right Ear: Tympanic membrane, external ear and ear canal normal.   Left Ear: Tympanic membrane, external ear and ear canal normal.   Nose: Nose normal.   Mouth/Throat: Oropharynx is clear and moist.   Eyes: Pupils are equal, round, and reactive to light. Conjunctivae and EOM are normal. Right eye exhibits  no discharge. Left eye exhibits no discharge.   Neck: Normal range of motion. Neck supple. No tracheal deviation present. No thyromegaly present.   Cardiovascular: Normal rate, regular rhythm, S1 normal, S2 normal, normal heart sounds and intact distal pulses.   No murmur heard.  Pulses:       Radial pulses are 2+ on the right side, and 2+ on the left side.   Pulmonary/Chest: Effort normal and breath sounds normal. No respiratory distress. He has no wheezes. He has no rhonchi. He has no rales.   Abdominal: Soft. Bowel sounds are normal. He exhibits no distension and no mass. There is no tenderness. There is no rigidity, no guarding and no CVA tenderness.   Lymphadenopathy:     He has no cervical adenopathy.   Neurological: He is alert and oriented to person, place, and time.   Reflex Scores:       Patellar reflexes are 2+ on the left side.  Skin: Skin is warm and dry. Capillary refill takes less than 2 seconds. No rash noted. He is not diaphoretic.   Psychiatric: He has a normal mood and affect. His behavior is normal.   Vitals reviewed.      EXAMINATION:  US SCROTUM AND TESTICLES    CLINICAL HISTORY:  Other specified disorders of the male genital organs    TECHNIQUE:  Sonography of the scrotum and testes.    COMPARISON:  None.    FINDINGS:  Right Testicle:    *Size: 4.5 x 2.7 x 3 cm  *Appearance: Normal.  *Flow: Normal arterial and venous flow  *Epididymis: Normal  *Hydrocele: Small.  *Varicocele: Asymmetrical prominence of right testicular cord veins on color Doppler, largest diameter 0.26 cm..  .    Left Testicle:    *Size: 4.3 x 2.5 x 2.9 cm  *Appearance: Normal.  *Flow: Normal arterial and venous flow  *Epididymis: Normal.  *Hydrocele: None.  *Varicocele: Less overall prominence of left testicular cord veins on color Doppler with veins measuring up to 0.32 cm diameter  .    Other findings: None.      Impression       Small right hydrocele.    Evidence of bilateral varicoceles more prominent on right discussed  above.  Normal diameter testicular vein 0.5-1.5 mm and main draining vein 2 mm.      Electronically signed by: Jg Bose MD  Date: 02/05/2020  Time: 16:03          EXAMINATION:  XR LUMBAR SPINE 5 VIEW WITH FLEX AND EXT    CLINICAL HISTORY:  Low back pain, >6wks conservative tx, persistent-progressive sx, surgical candidate;  Radiculopathy, lumbar region    TECHNIQUE:  Five views of the lumbar spine plus flexion extension views were performed.    COMPARISON:  None.    FINDINGS:  Vertebral bodies are intact.  There is a spondylolisthesis probably second-degree at the L5-S1 level with forward subluxation of L5 on S1.  Minimal narrowing of the L3-L4 disc space is noted no bony collapse or destruction is seen.      Impression       See above      Electronically signed by: Vipul Posadas MD  Date: 01/30/2020  Time: 12:46       Lab Visit on 01/30/2020   Component Date Value Ref Range Status    Sodium 01/30/2020 140  136 - 145 mmol/L Final    Potassium 01/30/2020 4.0  3.5 - 5.1 mmol/L Final    Chloride 01/30/2020 107  95 - 110 mmol/L Final    CO2 01/30/2020 24  23 - 29 mmol/L Final    Glucose 01/30/2020 90  70 - 110 mg/dL Final    BUN, Bld 01/30/2020 16  6 - 20 mg/dL Final    Creatinine 01/30/2020 1.0  0.5 - 1.4 mg/dL Final    Calcium 01/30/2020 9.7  8.7 - 10.5 mg/dL Final    Total Protein 01/30/2020 7.7  6.0 - 8.4 g/dL Final    Albumin 01/30/2020 3.9  3.5 - 5.2 g/dL Final    Total Bilirubin 01/30/2020 0.3  0.1 - 1.0 mg/dL Final    Comment: For infants and newborns, interpretation of results should be based  on gestational age, weight and in agreement with clinical  observations.  Premature Infant recommended reference ranges:  Up to 24 hours.............<8.0 mg/dL  Up to 48 hours............<12.0 mg/dL  3-5 days..................<15.0 mg/dL  6-29 days.................<15.0 mg/dL      Alkaline Phosphatase 01/30/2020 78  55 - 135 U/L Final    AST 01/30/2020 24  10 - 40 U/L Final    ALT 01/30/2020 33   10 - 44 U/L Final    Anion Gap 01/30/2020 9  8 - 16 mmol/L Final    eGFR if African American 01/30/2020 >60  >60 mL/min/1.73 m^2 Final    eGFR if non African American 01/30/2020 >60  >60 mL/min/1.73 m^2 Final    Comment: Calculation used to obtain the estimated glomerular filtration  rate (eGFR) is the CKD-EPI equation.       WBC 01/30/2020 7.66  3.90 - 12.70 K/uL Final    RBC 01/30/2020 5.39  4.60 - 6.20 M/uL Final    Hemoglobin 01/30/2020 15.0  14.0 - 18.0 g/dL Final    Hematocrit 01/30/2020 46.7  40.0 - 54.0 % Final    Mean Corpuscular Volume 01/30/2020 87  82 - 98 fL Final    Mean Corpuscular Hemoglobin 01/30/2020 27.8  27.0 - 31.0 pg Final    Mean Corpuscular Hemoglobin Conc 01/30/2020 32.1  32.0 - 36.0 g/dL Final    RDW 01/30/2020 14.4  11.5 - 14.5 % Final    Platelets 01/30/2020 275  150 - 350 K/uL Final    MPV 01/30/2020 10.6  9.2 - 12.9 fL Final    Immature Granulocytes 01/30/2020 0.4  0.0 - 0.5 % Final    Gran # (ANC) 01/30/2020 3.1  1.8 - 7.7 K/uL Final    Immature Grans (Abs) 01/30/2020 0.03  0.00 - 0.04 K/uL Final    Comment: Mild elevation in immature granulocytes is non specific and   can be seen in a variety of conditions including stress response,   acute inflammation, trauma and pregnancy. Correlation with other   laboratory and clinical findings is essential.      Lymph # 01/30/2020 3.7  1.0 - 4.8 K/uL Final    Mono # 01/30/2020 0.5  0.3 - 1.0 K/uL Final    Eos # 01/30/2020 0.2  0.0 - 0.5 K/uL Final    Baso # 01/30/2020 0.05  0.00 - 0.20 K/uL Final    nRBC 01/30/2020 0  0 /100 WBC Final    Gran% 01/30/2020 40.7  38.0 - 73.0 % Final    Lymph% 01/30/2020 48.2* 18.0 - 48.0 % Final    Mono% 01/30/2020 6.9  4.0 - 15.0 % Final    Eosinophil% 01/30/2020 3.1  0.0 - 8.0 % Final    Basophil% 01/30/2020 0.7  0.0 - 1.9 % Final    Differential Method 01/30/2020 Automated   Final    Cholesterol 01/30/2020 214* 120 - 199 mg/dL Final    Comment: The National Cholesterol Education  Program (NCEP) has set the  following guidelines (reference ranges) for Cholesterol:  Optimal.....................<200 mg/dL  Borderline High.............200-239 mg/dL  High........................> or = 240 mg/dL      Triglycerides 01/30/2020 405* 30 - 150 mg/dL Final    Comment: The National Cholesterol Education Program (NCEP) has set the  following guidelines (reference values) for triglycerides:  Normal......................<150 mg/dL  Borderline High.............150-199 mg/dL  High........................200-499 mg/dL      HDL 01/30/2020 33* 40 - 75 mg/dL Final    Comment: The National Cholesterol Education Program (NCEP) has set the  following guidelines (reference values) for HDL Cholesterol:  Low...............<40 mg/dL  Optimal...........>60 mg/dL      LDL Cholesterol 01/30/2020 Invalid, Trig>400.0  63.0 - 159.0 mg/dL Final    Comment: The National Cholesterol Education Program (NCEP) has set the  following guidelines (reference values) for LDL Cholesterol:  Optimal.......................<130 mg/dL  Borderline High...............130-159 mg/dL  High..........................160-189 mg/dL  Very High.....................>190 mg/dL      Hdl/Cholesterol Ratio 01/30/2020 15.4* 20.0 - 50.0 % Final    Total Cholesterol/HDL Ratio 01/30/2020 6.5* 2.0 - 5.0 Final    Non-HDL Cholesterol 01/30/2020 181  mg/dL Final    Comment: Risk category and Non-HDL cholesterol goals:  Coronary heart disease (CHD)or equivalent (10-year risk of CHD >20%):  Non-HDL cholesterol goal     <130 mg/dL  Two or more CHD risk factors and 10-year risk of CHD <= 20%:  Non-HDL cholesterol goal     <160 mg/dL  0 to 1 CHD risk factor:  Non-HDL cholesterol goal     <190 mg/dL      Hemoglobin A1C 01/30/2020 5.9* 4.0 - 5.6 % Final    Comment: ADA Screening Guidelines:  5.7-6.4%  Consistent with prediabetes  >or=6.5%  Consistent with diabetes  High levels of fetal hemoglobin interfere with the HbA1C  assay. Heterozygous hemoglobin variants  (HbS, HgC, etc)do  not significantly interfere with this assay.   However, presence of multiple variants may affect accuracy.      Estimated Avg Glucose 01/30/2020 123  68 - 131 mg/dL Final    PSA Total 01/30/2020 0.75  0.00 - 4.00 ng/mL Final    Comment: PSA Expected levels:  Hormonal Therapy: <0.05 ng/ml  Prostatectomy: <0.01 ng/ml  Radiation Therapy: <1.00 ng/ml      PSA, Free 01/30/2020 0.23  0.01 - 1.50 ng/mL Final    PSA, Free Pct 01/30/2020 30.67  Not established % Final    HIV 1/2 Ag/Ab 01/30/2020 Negative  Negative Final    RPR 01/30/2020 Non-reactive  Non-reactive Final    TSH 01/30/2020 0.867  0.400 - 4.000 uIU/mL Final     The 10-year ASCVD risk score (Pedro DAVIS Jr., et al., 2013) is: 4.4%    Values used to calculate the score:      Age: 44 years      Sex: Male      Is Non- : Yes      Diabetic: No      Tobacco smoker: No      Systolic Blood Pressure: 118 mmHg      Is BP treated: No      HDL Cholesterol: 33 mg/dL      Total Cholesterol: 214 mg/dL    Assessment:       1. Varicocele    2. Mixed dyslipidemia    3. Erectile dysfunction, unspecified erectile dysfunction type    4. Spondylolisthesis of lumbosacral region    5. Prostatitis, unspecified prostatitis type        Plan:       Emeka was seen today for results.    Diagnoses and all orders for this visit:    Varicocele  -     Ambulatory referral/consult to Urology; Future  Will refer for urology consult.    Mixed dyslipidemia  -     Lipid panel; Future  Will schedule for a 12 hour fasting lipid panel.    Erectile dysfunction, unspecified erectile dysfunction type  -     Testosterone Panel; Future  -     tadalafil (CIALIS) 20 MG Tab; 1 tablet PO 1 hour prior to sex, maximum of 1 tablet in 3 days  Will check testosterone panel.  Discussed viagra vs cialis and patient would like to try Cialis.  Discussed it may not be covered by insurance and mail order pharmacies where it may be more affordable, however would like it sent  to regular pharmacy.    Spondylolisthesis of lumbosacral region  -     Ambulatory referral/consult to Pain Clinic; Future  Will refer to pain management provider.    Prostatitis, unspecified prostatitis type  Much improved. Finish antibiotic course.

## 2020-02-19 LAB — POC RESIDUAL URINE VOLUME: 98 ML (ref 0–100)

## 2020-02-22 ENCOUNTER — LAB VISIT (OUTPATIENT)
Dept: LAB | Facility: HOSPITAL | Age: 45
End: 2020-02-22
Attending: FAMILY MEDICINE
Payer: COMMERCIAL

## 2020-02-22 DIAGNOSIS — I86.1 VARICOCELE: ICD-10-CM

## 2020-02-22 DIAGNOSIS — E78.2 MIXED DYSLIPIDEMIA: ICD-10-CM

## 2020-02-22 DIAGNOSIS — N52.9 ERECTILE DYSFUNCTION, UNSPECIFIED ERECTILE DYSFUNCTION TYPE: ICD-10-CM

## 2020-02-22 LAB
CHOLEST SERPL-MCNC: 179 MG/DL (ref 120–199)
CHOLEST/HDLC SERPL: 5.6 {RATIO} (ref 2–5)
HDLC SERPL-MCNC: 32 MG/DL (ref 40–75)
HDLC SERPL: 17.9 % (ref 20–50)
LDLC SERPL CALC-MCNC: 109 MG/DL (ref 63–159)
NONHDLC SERPL-MCNC: 147 MG/DL
TRIGL SERPL-MCNC: 190 MG/DL (ref 30–150)

## 2020-02-22 PROCEDURE — 80061 LIPID PANEL: CPT

## 2020-02-22 PROCEDURE — 36415 COLL VENOUS BLD VENIPUNCTURE: CPT

## 2020-02-22 PROCEDURE — 82040 ASSAY OF SERUM ALBUMIN: CPT

## 2020-02-27 ENCOUNTER — PATIENT OUTREACH (OUTPATIENT)
Dept: ADMINISTRATIVE | Facility: OTHER | Age: 45
End: 2020-02-27

## 2020-02-28 LAB
ALBUMIN SERPL-MCNC: 4.3 G/DL (ref 3.6–5.1)
SHBG SERPL-SCNC: 16 NMOL/L (ref 10–50)
TESTOST FREE SERPL-MCNC: 59 PG/ML (ref 46–224)
TESTOST SERPL-MCNC: 273 NG/DL (ref 250–1100)
TESTOSTERONE.FREE+WB SERPL-MCNC: 116.2 NG/DL (ref 110–575)

## 2020-03-06 ENCOUNTER — OFFICE VISIT (OUTPATIENT)
Dept: FAMILY MEDICINE | Facility: CLINIC | Age: 45
End: 2020-03-06
Payer: COMMERCIAL

## 2020-03-06 VITALS
DIASTOLIC BLOOD PRESSURE: 82 MMHG | WEIGHT: 201.5 LBS | SYSTOLIC BLOOD PRESSURE: 126 MMHG | RESPIRATION RATE: 18 BRPM | OXYGEN SATURATION: 98 % | TEMPERATURE: 98 F | HEART RATE: 90 BPM | BODY MASS INDEX: 30.64 KG/M2

## 2020-03-06 DIAGNOSIS — N52.9 ERECTILE DYSFUNCTION, UNSPECIFIED ERECTILE DYSFUNCTION TYPE: ICD-10-CM

## 2020-03-06 DIAGNOSIS — I86.1 VARICOCELE: ICD-10-CM

## 2020-03-06 DIAGNOSIS — R73.03 PREDIABETES: ICD-10-CM

## 2020-03-06 DIAGNOSIS — E78.2 MIXED DYSLIPIDEMIA: Primary | ICD-10-CM

## 2020-03-06 PROCEDURE — 99214 PR OFFICE/OUTPT VISIT, EST, LEVL IV, 30-39 MIN: ICD-10-PCS | Mod: S$GLB,,, | Performed by: FAMILY MEDICINE

## 2020-03-06 PROCEDURE — 3008F BODY MASS INDEX DOCD: CPT | Mod: CPTII,S$GLB,, | Performed by: FAMILY MEDICINE

## 2020-03-06 PROCEDURE — 99999 PR PBB SHADOW E&M-EST. PATIENT-LVL III: ICD-10-PCS | Mod: PBBFAC,,, | Performed by: FAMILY MEDICINE

## 2020-03-06 PROCEDURE — 3008F PR BODY MASS INDEX (BMI) DOCUMENTED: ICD-10-PCS | Mod: CPTII,S$GLB,, | Performed by: FAMILY MEDICINE

## 2020-03-06 PROCEDURE — 99999 PR PBB SHADOW E&M-EST. PATIENT-LVL III: CPT | Mod: PBBFAC,,, | Performed by: FAMILY MEDICINE

## 2020-03-06 PROCEDURE — 99214 OFFICE O/P EST MOD 30 MIN: CPT | Mod: S$GLB,,, | Performed by: FAMILY MEDICINE

## 2020-03-06 NOTE — PROGRESS NOTES
The 10-year ASCVD risk score (Pedro DAVIS Jr., et al., 2013) is: 4.7%    Values used to calculate the score:      Age: 44 years      Sex: Male      Is Non- : Yes      Diabetic: No      Tobacco smoker: No      Systolic Blood Pressure: 126 mmHg      Is BP treated: No      HDL Cholesterol: 32 mg/dL      Total Cholesterol: 179 mg/dL

## 2020-03-16 NOTE — PROGRESS NOTES
Subjective:       Patient ID: Emeka Caballero is a 44 y.o. male.    Chief Complaint: Hyperlipidemia    HPI   44 year old male comes in for follow up on dyslipidemia. He had previously done non-fasting labs showsing triglycerides greater than 400, and LDL could not be calculated. He re-took tests after 12 hours fasting.   Patient has seen urology for erection issues and varicocele.    Review of Systems   Constitutional: Negative for chills and fever.   Respiratory: Negative for shortness of breath and wheezing.    Cardiovascular: Negative for chest pain and palpitations.   Gastrointestinal: Negative for abdominal pain.   Genitourinary: Negative for dysuria.       Objective:     /82 (BP Location: Left arm, Patient Position: Sitting, BP Method: Medium (Manual))   Pulse 90   Temp 98 °F (36.7 °C) (Oral)   Resp 18   Wt 91.4 kg (201 lb 8 oz)   SpO2 98%   BMI 30.64 kg/m²     Physical Exam   Constitutional: He is oriented to person, place, and time. He appears well-developed and well-nourished.   HENT:   Head: Normocephalic.   Right Ear: External ear normal.   Left Ear: External ear normal.   Nose: Nose normal.   Mouth/Throat: No oropharyngeal exudate.   Neck: Normal range of motion. Neck supple. No tracheal deviation present.   Cardiovascular: Normal rate, regular rhythm, normal heart sounds and intact distal pulses.   No murmur heard.  Pulmonary/Chest: Effort normal and breath sounds normal. He has no wheezes. He has no rales.   Abdominal: Soft. Bowel sounds are normal. He exhibits no mass. There is no tenderness. There is no rigidity, no rebound, no guarding and no CVA tenderness.   Musculoskeletal: He exhibits no edema.   Lymphadenopathy:     He has no cervical adenopathy.   Neurological: He is oriented to person, place, and time. He has normal strength. He displays no atrophy. No sensory deficit. Gait normal.   Reflex Scores:       Patellar reflexes are 2+ on the right side and 2+ on the left  side.  Skin: He is not diaphoretic.   Vitals reviewed.      Lab Visit on 02/22/2020   Component Date Value Ref Range Status    Cholesterol 02/22/2020 179  120 - 199 mg/dL Final    Comment: The National Cholesterol Education Program (NCEP) has set the  following guidelines (reference ranges) for Cholesterol:  Optimal.....................<200 mg/dL  Borderline High.............200-239 mg/dL  High........................> or = 240 mg/dL      Triglycerides 02/22/2020 190* 30 - 150 mg/dL Final    Comment: The National Cholesterol Education Program (NCEP) has set the  following guidelines (reference values) for triglycerides:  Normal......................<150 mg/dL  Borderline High.............150-199 mg/dL  High........................200-499 mg/dL      HDL 02/22/2020 32* 40 - 75 mg/dL Final    Comment: The National Cholesterol Education Program (NCEP) has set the  following guidelines (reference values) for HDL Cholesterol:  Low...............<40 mg/dL  Optimal...........>60 mg/dL      LDL Cholesterol 02/22/2020 109.0  63.0 - 159.0 mg/dL Final    Comment: The National Cholesterol Education Program (NCEP) has set the  following guidelines (reference values) for LDL Cholesterol:  Optimal.......................<130 mg/dL  Borderline High...............130-159 mg/dL  High..........................160-189 mg/dL  Very High.....................>190 mg/dL      Hdl/Cholesterol Ratio 02/22/2020 17.9* 20.0 - 50.0 % Final    Total Cholesterol/HDL Ratio 02/22/2020 5.6* 2.0 - 5.0 Final    Non-HDL Cholesterol 02/22/2020 147  mg/dL Final    Comment: Risk category and Non-HDL cholesterol goals:  Coronary heart disease (CHD)or equivalent (10-year risk of CHD >20%):  Non-HDL cholesterol goal     <130 mg/dL  Two or more CHD risk factors and 10-year risk of CHD <= 20%:  Non-HDL cholesterol goal     <160 mg/dL  0 to 1 CHD risk factor:  Non-HDL cholesterol goal     <190 mg/dL      Testosterone 02/22/2020 273  250 - 1100 ng/dL Final     Testosterone, Free 02/22/2020 59.0  46.0 - 224.0 pg/mL Final    Testosterone, Bioavailable 02/22/2020 116.2  110.0 - 575.0 ng/dL Final    Sex Hormone Binding Globulin 02/22/2020 16  10 - 50 nmol/L Final    Albumin 02/22/2020 4.3  3.6 - 5.1 g/dL Final    Comment: For additional information, please refer to   http://education.[employer].ServiceRelated/faq/DHS821 (This link is   being provided for informational/ educational purposes only.)  This test was developed and its analytical performance   characteristics have been determined by [employer]. It has not been cleared or approved by the [place]  Food and Drug Administration. This assay has been validated   pursuant to the CLIA regulations and is used for clinical   purposes.  @ Test Performed By:  [facility]  Pete Noble M.D., Ph.D.,   [address] [place] [id number]  [facility]  [id number]     Office Visit on 02/17/2020   Component Date Value Ref Range Status    POC Residual Urine Volume 02/19/2020 98  0 - 100 mL Final     The 10-year ASCVD risk score (Pedro DC Jr., et al., 2013) is: 4.7%    Values used to calculate the score:      Age: 44 years      Sex: Male      Is Non- : Yes      Diabetic: No      Tobacco smoker: No      Systolic Blood Pressure: 126 mmHg      Is BP treated: No      HDL Cholesterol: 32 mg/dL      Total Cholesterol: 179 mg/dL    Assessment:       1. Mixed dyslipidemia    2. Prediabetes    3. Erectile dysfunction, unspecified erectile dysfunction type    4. Varicocele        Plan:       Emeka was seen today for hyperlipidemia.    Diagnoses and all orders for this visit:    Mixed dyslipidemia  -     Lipid panel; Future  Dietary and lifestyle changes reviewed.  Increase fiber- oatmeal, fruits/vegetables.  Oily fish once a week or every other week.    Prediabetes  -     Hemoglobin A1c; Future  Discussed implications on prediabetes and  risk of progression to diabetes.  Dietary changes discussed and encouraged- decreased carb intake- sweets, breads, rice, pasta, potato. Also advised to stop soft drinks.  Also encouraged lifestyle changes- increased physical activity    Erectile dysfunction, unspecified erectile dysfunction type  Prescribed Viagra previously    Varicocele  Management per urology recommendations.

## 2020-05-13 ENCOUNTER — PATIENT OUTREACH (OUTPATIENT)
Dept: ADMINISTRATIVE | Facility: OTHER | Age: 45
End: 2020-05-13

## 2020-08-10 ENCOUNTER — OFFICE VISIT (OUTPATIENT)
Dept: FAMILY MEDICINE | Facility: CLINIC | Age: 45
End: 2020-08-10

## 2020-08-10 VITALS
RESPIRATION RATE: 18 BRPM | DIASTOLIC BLOOD PRESSURE: 74 MMHG | WEIGHT: 214.5 LBS | TEMPERATURE: 98 F | HEIGHT: 68 IN | SYSTOLIC BLOOD PRESSURE: 132 MMHG | BODY MASS INDEX: 32.51 KG/M2 | HEART RATE: 88 BPM | OXYGEN SATURATION: 97 %

## 2020-08-10 DIAGNOSIS — M54.16 LUMBAR RADICULOPATHY: ICD-10-CM

## 2020-08-10 DIAGNOSIS — M51.36 DDD (DEGENERATIVE DISC DISEASE), LUMBAR: Primary | ICD-10-CM

## 2020-08-10 DIAGNOSIS — M47.816 LUMBAR SPONDYLOSIS: ICD-10-CM

## 2020-08-10 PROCEDURE — 99214 PR OFFICE/OUTPT VISIT, EST, LEVL IV, 30-39 MIN: ICD-10-PCS | Mod: S$GLB,,, | Performed by: FAMILY MEDICINE

## 2020-08-10 PROCEDURE — 99999 PR PBB SHADOW E&M-EST. PATIENT-LVL IV: CPT | Mod: PBBFAC,,, | Performed by: FAMILY MEDICINE

## 2020-08-10 PROCEDURE — 3008F PR BODY MASS INDEX (BMI) DOCUMENTED: ICD-10-PCS | Mod: CPTII,S$GLB,, | Performed by: FAMILY MEDICINE

## 2020-08-10 PROCEDURE — 99999 PR PBB SHADOW E&M-EST. PATIENT-LVL IV: ICD-10-PCS | Mod: PBBFAC,,, | Performed by: FAMILY MEDICINE

## 2020-08-10 PROCEDURE — 99214 OFFICE O/P EST MOD 30 MIN: CPT | Mod: S$GLB,,, | Performed by: FAMILY MEDICINE

## 2020-08-10 PROCEDURE — 3008F BODY MASS INDEX DOCD: CPT | Mod: CPTII,S$GLB,, | Performed by: FAMILY MEDICINE

## 2020-08-10 RX ORDER — MELOXICAM 15 MG/1
15 TABLET ORAL DAILY
Qty: 30 TABLET | Refills: 1 | Status: SHIPPED | OUTPATIENT
Start: 2020-08-10 | End: 2021-11-03 | Stop reason: SDUPTHER

## 2020-08-10 RX ORDER — GABAPENTIN 300 MG/1
CAPSULE ORAL
Qty: 60 CAPSULE | Refills: 0 | Status: SHIPPED | OUTPATIENT
Start: 2020-08-10 | End: 2021-11-03

## 2020-08-10 NOTE — PATIENT INSTRUCTIONS
Please call [phone] to schedule MRI requested by Dr. Nixon in February, then schedule follow up with him as he suggested

## 2020-08-10 NOTE — PROGRESS NOTES
"Subjective:       Patient ID: Emeka Caballero is a 45 y.o. male.    Chief Complaint: Back Pain (ongoing for the last 1 week .Pt states he taking OTC meds for the pain)    HPI   45 year old male with chronic low back pain from DDD, previously seen by pain management comes in because pain has been worsening. He is not taking anything for the pain. The patient apparently did not follow up with recommended imaging studies ordered by pain management provider. He had forgotten this was ordered, and his wife, who is present did not know.   He states that the pain is low back and is daily. It shoots down mostly into the right leg.     Review of Systems   Respiratory: Negative for shortness of breath and wheezing.    Cardiovascular: Negative for chest pain, palpitations and leg swelling.   Gastrointestinal: Negative for abdominal pain, blood in stool, change in bowel habit, constipation, diarrhea and change in bowel habit.   Musculoskeletal: Positive for back pain. Negative for gait problem.         Objective:     /74 (BP Location: Left arm, Patient Position: Sitting, BP Method: Medium (Manual))   Pulse 88   Temp 97.8 °F (36.6 °C) (Oral)   Resp 18   Ht 5' 8" (1.727 m)   Wt 97.3 kg (214 lb 8.1 oz)   SpO2 97%   BMI 32.62 kg/m²     Physical Exam  Vitals signs reviewed.   Constitutional:       General: He is not in acute distress.     Appearance: He is well-developed. He is not diaphoretic.   HENT:      Head: Normocephalic and atraumatic.      Right Ear: Tympanic membrane, ear canal and external ear normal.      Left Ear: Tympanic membrane, ear canal and external ear normal.      Nose: Nose normal.   Eyes:      General:         Right eye: No discharge.         Left eye: No discharge.      Conjunctiva/sclera: Conjunctivae normal.      Pupils: Pupils are equal, round, and reactive to light.   Neck:      Musculoskeletal: Normal range of motion and neck supple.      Thyroid: No thyromegaly.      Trachea: No " tracheal deviation.   Cardiovascular:      Rate and Rhythm: Normal rate and regular rhythm.      Pulses:           Radial pulses are 2+ on the right side and 2+ on the left side.      Heart sounds: Normal heart sounds, S1 normal and S2 normal. No murmur.   Pulmonary:      Effort: Pulmonary effort is normal. No respiratory distress.      Breath sounds: Normal breath sounds. No wheezing, rhonchi or rales.   Abdominal:      General: Bowel sounds are normal. There is no distension.      Palpations: Abdomen is soft. Abdomen is not rigid. There is no mass.      Tenderness: There is no abdominal tenderness. There is no guarding.   Musculoskeletal:      Lumbar back: He exhibits tenderness. He exhibits no swelling, no deformity, no laceration and no spasm.   Lymphadenopathy:      Cervical: No cervical adenopathy.   Skin:     General: Skin is warm and dry.      Capillary Refill: Capillary refill takes less than 2 seconds.      Findings: No rash.   Neurological:      Mental Status: He is alert and oriented to person, place, and time.      Deep Tendon Reflexes:      Reflex Scores:       Patellar reflexes are 2+ on the left side.  Psychiatric:         Behavior: Behavior normal.         Assessment:       1. DDD (degenerative disc disease), lumbar    2. Lumbar radiculopathy    3. Lumbar spondylosis        Plan:       Emeka was seen today for back pain.    Diagnoses and all orders for this visit:    DDD (degenerative disc disease), lumbar  -     gabapentin (NEURONTIN) 300 MG capsule; Start 1 capsule PO QHS x 3 nights then increase to 1 capsule BID  -     meloxicam (MOBIC) 15 MG tablet; Take 1 tablet (15 mg total) by mouth once daily.    Lumbar radiculopathy  -     gabapentin (NEURONTIN) 300 MG capsule; Start 1 capsule PO QHS x 3 nights then increase to 1 capsule BID  -     meloxicam (MOBIC) 15 MG tablet; Take 1 tablet (15 mg total) by mouth once daily.    Lumbar spondylosis  -     gabapentin (NEURONTIN) 300 MG capsule; Start 1  capsule PO QHS x 3 nights then increase to 1 capsule BID  -     meloxicam (MOBIC) 15 MG tablet; Take 1 tablet (15 mg total) by mouth once daily.      Pain management note reviewed.  Explained findings to patient  Recommended getting MRI ordered by pain management scheduled and then follow up with provider.  In the meantime will start a short course of gabapentin and Mobic.  Side effects discussed.  The patient was advised that NSAID-type medications have two very important potential side effects: gastrointestinal irritation including hemorrhage and renal injuries. He was asked to take the medication with food and to stop if he experiences any GI upset. I asked him to call for vomiting, abdominal pain or black/bloody stools. The patient expresses understanding of these issues and questions were answered.

## 2020-08-14 DIAGNOSIS — Z11.59 NEED FOR HEPATITIS C SCREENING TEST: ICD-10-CM

## 2020-10-07 ENCOUNTER — TELEPHONE (OUTPATIENT)
Dept: RADIOLOGY | Facility: HOSPITAL | Age: 45
End: 2020-10-07

## 2020-10-09 ENCOUNTER — HOSPITAL ENCOUNTER (OUTPATIENT)
Dept: RADIOLOGY | Facility: HOSPITAL | Age: 45
Discharge: HOME | End: 2020-10-09
Attending: PAIN MEDICINE
Payer: COMMERCIAL

## 2020-10-09 DIAGNOSIS — M54.16 LUMBAR RADICULOPATHY: ICD-10-CM

## 2020-10-09 DIAGNOSIS — M51.36 DDD (DEGENERATIVE DISC DISEASE), LUMBAR: ICD-10-CM

## 2020-10-09 DIAGNOSIS — M47.816 LUMBAR SPONDYLOSIS: ICD-10-CM

## 2020-10-09 DIAGNOSIS — M43.17 SPONDYLOLISTHESIS OF LUMBOSACRAL REGION: ICD-10-CM

## 2020-10-09 DIAGNOSIS — M48.061 SPINAL STENOSIS OF LUMBAR REGION, UNSPECIFIED WHETHER NEUROGENIC CLAUDICATION PRESENT: ICD-10-CM

## 2020-10-09 NOTE — PROGRESS NOTES
Patient at [facility] for MRI exam ordered. Patient unable to tolerate machine at [facility]. Patient was given the number to rescheduled MRI exam at [facility] on the [facility].

## 2020-10-13 ENCOUNTER — TELEPHONE (OUTPATIENT)
Dept: PAIN MEDICINE | Facility: CLINIC | Age: 45
End: 2020-10-13

## 2020-10-13 ENCOUNTER — HOSPITAL ENCOUNTER (OUTPATIENT)
Dept: RADIOLOGY | Facility: HOSPITAL | Age: 45
Discharge: HOME | End: 2020-10-13
Attending: PAIN MEDICINE
Payer: COMMERCIAL

## 2020-10-13 PROCEDURE — 72148 MRI LUMBAR SPINE W/O DYE: CPT | Mod: TC

## 2020-10-13 PROCEDURE — 72148 MRI LUMBAR SPINE WITHOUT CONTRAST: ICD-10-PCS | Mod: 26,,, | Performed by: RADIOLOGY

## 2020-10-13 PROCEDURE — 72148 MRI LUMBAR SPINE W/O DYE: CPT | Mod: 26,,, | Performed by: RADIOLOGY

## 2020-10-13 NOTE — TELEPHONE ENCOUNTER
----- Message from Zaria Devante sent at 10/13/2020  2:02 PM CDT -----  Regarding: pt  Type: Patient Call Back    Who called:pt    What is the request in detail:pt calling to get an appt schedule before 10/31 for f/u with [facility]. Call pt    Can the clinic reply by [facility]?    Would the patient rather a call back or a response via [facility]? call    Best call back number: [phone] (fovg) [phone] wife emily        Additional Information:

## 2020-10-14 ENCOUNTER — TELEPHONE (OUTPATIENT)
Dept: PAIN MEDICINE | Facility: CLINIC | Age: 45
End: 2020-10-14

## 2020-10-14 ENCOUNTER — PATIENT OUTREACH (OUTPATIENT)
Dept: ADMINISTRATIVE | Facility: OTHER | Age: 45
End: 2020-10-14

## 2020-10-14 NOTE — PROGRESS NOTES
Health Maintenance Due   Topic Date Due    TETANUS VACCINE  [birth year]20/1993    Influenza Vaccine (1) 08/01/2020     Updates were requested from care everywhere.  Chart was reviewed for overdue [facility] Encounters ([facility]) topics (CRS, Breast Cancer Screening, Eye exam)  Health Maintenance has been updated.  LINKS immunization registry triggered.  Immunizations were reconciled.

## 2020-10-14 NOTE — TELEPHONE ENCOUNTER
Pt wife called and wanted apt with NP Shyanne to discuss MRI results . Offered 10/15/2020 , pt wife preferred 2:40 pm.

## 2020-10-14 NOTE — TELEPHONE ENCOUNTER
Called pt to schedule f/u , explained that Dr. Nixon doesn't have Saturday clinic . Offered 11/2/2020 , pt accpeted and preferred 1 pm.

## 2020-10-15 ENCOUNTER — OFFICE VISIT (OUTPATIENT)
Dept: PAIN MEDICINE | Facility: CLINIC | Age: 45
End: 2020-10-15
Payer: COMMERCIAL

## 2020-10-15 VITALS
DIASTOLIC BLOOD PRESSURE: 78 MMHG | HEART RATE: 91 BPM | WEIGHT: 205.69 LBS | BODY MASS INDEX: 31.17 KG/M2 | OXYGEN SATURATION: 97 % | SYSTOLIC BLOOD PRESSURE: 120 MMHG | HEIGHT: 68 IN

## 2020-10-15 DIAGNOSIS — Z01.818 PRE-OP TESTING: ICD-10-CM

## 2020-10-15 DIAGNOSIS — M54.16 LUMBAR RADICULOPATHY: ICD-10-CM

## 2020-10-15 DIAGNOSIS — M51.36 DDD (DEGENERATIVE DISC DISEASE), LUMBAR: ICD-10-CM

## 2020-10-15 DIAGNOSIS — M43.17 SPONDYLOLISTHESIS OF LUMBOSACRAL REGION: Primary | ICD-10-CM

## 2020-10-15 DIAGNOSIS — M47.816 LUMBAR SPONDYLOSIS: ICD-10-CM

## 2020-10-15 PROCEDURE — 99214 OFFICE O/P EST MOD 30 MIN: CPT | Mod: S$GLB,,, | Performed by: REGISTERED NURSE

## 2020-10-15 PROCEDURE — 3008F BODY MASS INDEX DOCD: CPT | Mod: CPTII,S$GLB,, | Performed by: REGISTERED NURSE

## 2020-10-15 PROCEDURE — 99214 PR OFFICE/OUTPT VISIT, EST, LEVL IV, 30-39 MIN: ICD-10-PCS | Mod: S$GLB,,, | Performed by: REGISTERED NURSE

## 2020-10-15 PROCEDURE — 99999 PR PBB SHADOW E&M-EST. PATIENT-LVL III: ICD-10-PCS | Mod: PBBFAC,,, | Performed by: REGISTERED NURSE

## 2020-10-15 PROCEDURE — 99999 PR PBB SHADOW E&M-EST. PATIENT-LVL III: CPT | Mod: PBBFAC,,, | Performed by: REGISTERED NURSE

## 2020-10-15 PROCEDURE — 3008F PR BODY MASS INDEX (BMI) DOCUMENTED: ICD-10-PCS | Mod: CPTII,S$GLB,, | Performed by: REGISTERED NURSE

## 2020-10-15 RX ORDER — TIZANIDINE 4 MG/1
16 TABLET ORAL NIGHTLY PRN
Qty: 120 TABLET | Refills: 1 | Status: SHIPPED | OUTPATIENT
Start: 2020-10-15 | End: 2021-01-13

## 2020-10-15 NOTE — PROGRESS NOTES
"Subjective:     Patient ID: Emeka Lau is a 45 y.o. male    Chief Complaint: Results      Referred by: No ref. provider found      HPI:    Interval History NP (10/16/20):    Pt returns today for follow up and MRI review. He is in a lot of pain today; reports 10/10. His wife accompanies him at visit. He states that the low back pain radiating to the bilateral lower extremities has gotten worse since his last visit. He reports not being able to walk very far without having to hold on to a wall or object; his legs do "give out". He reports numbness, tingling, and weakness to bilateral legs and feet. Denies bowel or bladder dysfunction. He takes Mobic which does not help. He is taking gabapentin 300 mg daily, but does not find this to be effective. He is having trouble sleeping at night. Has tried Flexeril in the past but was not effective.     Initial Encounter (2/17/20):  Emeka Lau is a 45 y.o. male who presents today with chronic midline low back pain. This pain has been present for 2-3 years.  No specific inciting event or injury noted.  Patient localizes pain to the midline lower lumbar region.  The pain will radiate to the bilateral lower extremities in a nondermatomal pattern.  Patient does report numbness and tingling also in a nondermatomal distribution that includes his toes bilaterally.  He denies any focal weakness but does feel that his legs will give out while walking.  He also states that the pain limits his ability to walk for prolonged distances/times.  He does have some difficulty urinating but states that this is from a issue unrelated to his low back.  He denies any bowel dysfunction.  He states that he takes Aleve for pills at a time and that this helps his pain somewhat.   This pain is described in detail below.    Physical Therapy:  No.    Non-pharmacologic Treatment:  Rest helps         · TENS?  No    Pain Medications:         · Currently taking:  OTC NSAIDs, Mobic, gabapentin    · Has " tried in the past:  flexeril    · Has not tried:  Opioids, TCAs, SNRIs, anticonvulsants, topical creams    Blood thinners:  None    Interventional Therapies:  None    Relevant Surgeries:  None    Affecting sleep?  Yes    Affecting daily activities? yes    Depressive symptoms? no          · SI/HI? No    Work status: Employed    Pain Scores:    Best:       7/10  Worst:     10/10  Usually:   7/10  Today:    10/10    Review of Systems   Constitutional: Negative for activity change, appetite change, chills, fatigue, fever and unexpected weight change.   HENT: Negative for hearing loss.    Eyes: Negative for visual disturbance.   Respiratory: Negative for chest tightness and shortness of breath.    Cardiovascular: Negative for chest pain.   Gastrointestinal: Negative for abdominal pain, constipation, diarrhea, nausea and vomiting.   Genitourinary: Negative for difficulty urinating.   Musculoskeletal: Positive for back pain, gait problem and myalgias. Negative for neck pain.   Skin: Negative for rash.   Neurological: Positive for weakness and numbness. Negative for dizziness, light-headedness and headaches.   Psychiatric/Behavioral: Positive for sleep disturbance. Negative for hallucinations and suicidal ideas. The patient is not nervous/anxious.        No past medical history on file.    No past surgical history on file.    Social History     Socioeconomic History    Marital status: Single     Spouse name: Not on file    Number of children: Not on file    Years of education: Not on file    Highest education level: Not on file   Occupational History    Not on file   Social Needs    Financial resource strain: Not on file    Food insecurity     Worry: Not on file     Inability: Not on file    Transportation needs     Medical: Not on file     Non-medical: Not on file   Tobacco Use    Smoking status: Former Smoker   Substance and Sexual Activity    Alcohol use: Yes     Comment: socially    Drug use: No    Sexual  "activity: Not on file   Lifestyle    Physical activity     Days per week: Not on file     Minutes per session: Not on file    Stress: Not on file   Relationships    Social connections     Talks on phone: Not on file     Gets together: Not on file     Attends Yarsani service: Not on file     Active member of club or organization: Not on file     Attends meetings of clubs or organizations: Not on file     Relationship status: Not on file   Other Topics Concern    Not on file   Social History Narrative    Not on file       Review of patient's allergies indicates:  No Known Allergies    Current Outpatient Medications on File Prior to Visit   Medication Sig Dispense Refill    gabapentin (NEURONTIN) 300 MG capsule Start 1 capsule PO QHS x 3 nights then increase to 1 capsule BID 60 capsule 0    meloxicam (MOBIC) 15 MG tablet Take 1 tablet (15 mg total) by mouth once daily. 30 tablet 1    sildenafil (VIAGRA) 100 MG tablet 1 tablet PO when needed, 1 hour prior to sex, maximum of 1 tablet in 24 hours 10 tablet 11    tamsulosin (FLOMAX) 0.4 mg Cap Take 1 capsule (0.4 mg total) by mouth once daily. 30 capsule 11    triamcinolone acetonide 0.1% (KENALOG) 0.1 % cream Apply topically 2 (two) times daily. Apply to rash. DO NOT USE ON FACE. 45 g 1     No current facility-administered medications on file prior to visit.        Objective:      /78 (BP Location: Left arm, Patient Position: Sitting, BP Method: Medium (Automatic))   Pulse 91   Ht 5' 8" (1.727 m)   Wt 93.3 kg (205 lb 11 oz)   SpO2 97%   BMI 31.27 kg/m²     Exam:  GEN:  Well developed, well nourished.  No acute distress.  Normal pain behavior.  HEENT:  No trauma.  Mucous membranes moist.  Nares patent bilaterally.  PSYCH: Normal affect. Thought content appropriate.  CHEST:  Breathing symmetric.  No audible wheezing.  ABD: Soft, non-distended.  SKIN:  Warm, pink, dry.  No rash on exposed areas.    EXT:  No cyanosis, clubbing, or edema.  No color " change or changes in nail or hair growth.  NEURO/MUSCULOSKELETAL:  Fully alert, oriented, and appropriate. Speech normal taz. No cranial nerve deficits.   Gait:  Antalgic.  No trendelenburg sign bilaterally.   Motor Strength: 4/5 motor strength throughout lower extremities.   Sensory:  No sensory deficit in the lower extremities.   Reflexes:  2 + and symmetric throughout. Downgoing Babinski's bilaterally.  No clonus or spasticity.  L-Spine: Significant pain on attempting flexion and extension; unable to perform. Unable to assess axial/facet loading bilaterally. Positive SLR bilaterally.   Positive TTP over lumbar paraspinals, midline lower lumbar spine.         Imaging:    Narrative & Impression     EXAMINATION:  MRI LUMBAR SPINE WITHOUT CONTRAST     CLINICAL HISTORY:  spondylolisthesis L5-S1; Spondylolisthesis, lumbosacral region     TECHNIQUE:  Multiplanar, multisequence MR images were acquired from the thoracolumbar junction to the sacrum without contrast.     COMPARISON:  Lumbar radiograph 01/30/2020     FINDINGS:  Grade 2 anterolisthesis of L5 on S1 with bilateral L5 pars defect.     Vertebral body heights are within normal limits.  No acute fracture.  Normal marrow signal.  No marrow replacement process.     Disc space height loss and disc desiccation at L4-L5 and L5-S1.     Conus ends at L1-L2.     Degenerative findings:     T12-L1: No disc herniation, spinal canal stenosis, or neural foraminal narrowing.     L1-L2: No disc herniation, spinal canal stenosis, or neural foraminal narrowing.     L2-L3: No disc herniation, spinal canal stenosis, or neural foraminal narrowing     L3-L4: No disc herniation, spinal canal stenosis, or neural foraminal narrowing.     L4-L5: Right eccentric circumferential disc bulge.  Narrowing of the right lateral recess with possible abutment of the descending right L5 nerve root.  Moderate right neural foraminal narrowing     L5-S1: Grade 2 anterolisthesis with uncovered disc  and facet arthropathy.  Severe bilateral neural foraminal narrowing.  No spinal canal stenosis.     Paraspinal muscles and soft tissues are unremarkable.     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     Impression:     1. Lumbar spondylosis most prominent at L4-L5 and L5-S1 with moderate and severe neural foraminal narrowing.  L4-L5 right eccentric disc bulge narrows the right lateral recess and likely abuts the descending right L5 nerve root.  2. Grade 2 anterolisthesis of L5 on S1 with bilateral L5 pars defects.     Electronically signed by resident: José Manuel Rogers  Date:                                            10/13/2020  Time:                                           13:32       Narrative     EXAMINATION:  XR LUMBAR SPINE 5 VIEW WITH FLEX AND EXT    CLINICAL HISTORY:  Low back pain, >6wks conservative tx, persistent-progressive sx, surgical candidate;  Radiculopathy, lumbar region    TECHNIQUE:  Five views of the lumbar spine plus flexion extension views were performed.    COMPARISON:  None.    FINDINGS:  Vertebral bodies are intact.  There is a spondylolisthesis probably second-degree at the L5-S1 level with forward subluxation of L5 on S1.  Minimal narrowing of the L3-L4 disc space is noted no bony collapse or destruction is seen.   Impression       See above      Electronically signed by: Vipul Posadas MD  Date: 01/30/2020  Time: 12:46    Encounter     View Encounter                   Assessment:       Encounter Diagnoses   Name Primary?    Spondylolisthesis of lumbosacral region Yes    DDD (degenerative disc disease), lumbar     Lumbar radiculopathy     Pre-op testing     Lumbar spondylosis          Plan:       Emeka was seen today for results.    Diagnoses and all orders for this visit:    Spondylolisthesis of lumbosacral region  -     Ambulatory referral/consult to Neurosurgery; Future  -     tiZANidine (ZANAFLEX) 4 MG tablet; Take 4 tablets (16 mg total) by mouth nightly as  needed.    DDD (degenerative disc disease), lumbar  -     Ambulatory referral/consult to Neurosurgery; Future  -     tiZANidine (ZANAFLEX) 4 MG tablet; Take 4 tablets (16 mg total) by mouth nightly as needed.  -     Case request GI: Injection, Steroid, Epidural Caudal    Lumbar radiculopathy  -     Ambulatory referral/consult to Neurosurgery; Future  -     tiZANidine (ZANAFLEX) 4 MG tablet; Take 4 tablets (16 mg total) by mouth nightly as needed.  -     Case request GI: Injection, Steroid, Epidural Caudal    Pre-op testing  -     COVID-19 Routine Screening; Future    Lumbar spondylosis        Emeka Lau is a 45 y.o. male with chronic midline low back pain that radiates the bilateral lower extremities left worse than right.  Pain associated with nondermatomal numbness and nonfocal weakness.  Also difficulty urinating.  Patient states that this is from unknown issue unrelated to his low back however may be related to insult to the cauda equina.  Lumbar x-rays do show grade 2 spondylolisthesis of L5 on S1.  Likely has axial pain directly related to this deformity but also may have pain from neurogenic claudication or radiculopathy as well.    1.  Lumbar MRI results reviewed with pt .Grade 2 anterolisthesis with uncovered disc and facet arthropathy noted at L5/S1.  Severe bilateral neural foraminal narrowing at L4-L5 and L5-S1. Large disk bulge at L4-5 with possible right nerve impingement. Results consistent with pt symptoms   2.  Schedule for Caudal KATHY. Pt does not feel he would be able to complete PT at this time; possibly in future.   3. Start Tizanidine 4mg nightly and increase as tolerated. Pt was given instructions on how to do this.   4. Message sent to Dr. Zarate about increasing gabapentin. Pt currently on 300 mg daily.   5.  Referral placed to Neurosurgery for possible surgery options.   6.  Follow-up after caudal kathy to evaluate results.     The above plan and management options were discussed at length  with patient. Patient is in agreement with the above and verbalized understanding.    MAGDALENA Shah, FNP-C  [facility]  Interventional Pain Management

## 2020-10-15 NOTE — H&P (VIEW-ONLY)
"Subjective:     Patient ID: Emeka Lau is a 45 y.o. male    Chief Complaint: Results      Referred by: No ref. provider found      HPI:    Interval History NP (10/16/20):    Pt returns today for follow up and MRI review. He is in a lot of pain today; reports 10/10. His wife accompanies him at visit. He states that the low back pain radiating to the bilateral lower extremities has gotten worse since his last visit. He reports not being able to walk very far without having to hold on to a wall or object; his legs do "give out". He reports numbness, tingling, and weakness to bilateral legs and feet. Denies bowel or bladder dysfunction. He takes Mobic which does not help. He is taking gabapentin 300 mg daily, but does not find this to be effective. He is having trouble sleeping at night. Has tried Flexeril in the past but was not effective.     Initial Encounter (2/17/20):  Emeka Lau is a 45 y.o. male who presents today with chronic midline low back pain. This pain has been present for 2-3 years.  No specific inciting event or injury noted.  Patient localizes pain to the midline lower lumbar region.  The pain will radiate to the bilateral lower extremities in a nondermatomal pattern.  Patient does report numbness and tingling also in a nondermatomal distribution that includes his toes bilaterally.  He denies any focal weakness but does feel that his legs will give out while walking.  He also states that the pain limits his ability to walk for prolonged distances/times.  He does have some difficulty urinating but states that this is from a issue unrelated to his low back.  He denies any bowel dysfunction.  He states that he takes Aleve for pills at a time and that this helps his pain somewhat.   This pain is described in detail below.    Physical Therapy:  No.    Non-pharmacologic Treatment:  Rest helps         · TENS?  No    Pain Medications:         · Currently taking:  OTC NSAIDs, Mobic, gabapentin    · Has " tried in the past:  flexeril    · Has not tried:  Opioids, TCAs, SNRIs, anticonvulsants, topical creams    Blood thinners:  None    Interventional Therapies:  None    Relevant Surgeries:  None    Affecting sleep?  Yes    Affecting daily activities? yes    Depressive symptoms? no          · SI/HI? No    Work status: Employed    Pain Scores:    Best:       7/10  Worst:     10/10  Usually:   7/10  Today:    10/10    Review of Systems   Constitutional: Negative for activity change, appetite change, chills, fatigue, fever and unexpected weight change.   HENT: Negative for hearing loss.    Eyes: Negative for visual disturbance.   Respiratory: Negative for chest tightness and shortness of breath.    Cardiovascular: Negative for chest pain.   Gastrointestinal: Negative for abdominal pain, constipation, diarrhea, nausea and vomiting.   Genitourinary: Negative for difficulty urinating.   Musculoskeletal: Positive for back pain, gait problem and myalgias. Negative for neck pain.   Skin: Negative for rash.   Neurological: Positive for weakness and numbness. Negative for dizziness, light-headedness and headaches.   Psychiatric/Behavioral: Positive for sleep disturbance. Negative for hallucinations and suicidal ideas. The patient is not nervous/anxious.        No past medical history on file.    No past surgical history on file.    Social History     Socioeconomic History    Marital status: Single     Spouse name: Not on file    Number of children: Not on file    Years of education: Not on file    Highest education level: Not on file   Occupational History    Not on file   Social Needs    Financial resource strain: Not on file    Food insecurity     Worry: Not on file     Inability: Not on file    Transportation needs     Medical: Not on file     Non-medical: Not on file   Tobacco Use    Smoking status: Former Smoker   Substance and Sexual Activity    Alcohol use: Yes     Comment: socially    Drug use: No    Sexual  "activity: Not on file   Lifestyle    Physical activity     Days per week: Not on file     Minutes per session: Not on file    Stress: Not on file   Relationships    Social connections     Talks on phone: Not on file     Gets together: Not on file     Attends Zoroastrianism service: Not on file     Active member of club or organization: Not on file     Attends meetings of clubs or organizations: Not on file     Relationship status: Not on file   Other Topics Concern    Not on file   Social History Narrative    Not on file       Review of patient's allergies indicates:  No Known Allergies    Current Outpatient Medications on File Prior to Visit   Medication Sig Dispense Refill    gabapentin (NEURONTIN) 300 MG capsule Start 1 capsule PO QHS x 3 nights then increase to 1 capsule BID 60 capsule 0    meloxicam (MOBIC) 15 MG tablet Take 1 tablet (15 mg total) by mouth once daily. 30 tablet 1    sildenafil (VIAGRA) 100 MG tablet 1 tablet PO when needed, 1 hour prior to sex, maximum of 1 tablet in 24 hours 10 tablet 11    tamsulosin (FLOMAX) 0.4 mg Cap Take 1 capsule (0.4 mg total) by mouth once daily. 30 capsule 11    triamcinolone acetonide 0.1% (KENALOG) 0.1 % cream Apply topically 2 (two) times daily. Apply to rash. DO NOT USE ON FACE. 45 g 1     No current facility-administered medications on file prior to visit.        Objective:      /78 (BP Location: Left arm, Patient Position: Sitting, BP Method: Medium (Automatic))   Pulse 91   Ht 5' 8" (1.727 m)   Wt 93.3 kg (205 lb 11 oz)   SpO2 97%   BMI 31.27 kg/m²     Exam:  GEN:  Well developed, well nourished.  No acute distress.  Normal pain behavior.  HEENT:  No trauma.  Mucous membranes moist.  Nares patent bilaterally.  PSYCH: Normal affect. Thought content appropriate.  CHEST:  Breathing symmetric.  No audible wheezing.  ABD: Soft, non-distended.  SKIN:  Warm, pink, dry.  No rash on exposed areas.    EXT:  No cyanosis, clubbing, or edema.  No color " change or changes in nail or hair growth.  NEURO/MUSCULOSKELETAL:  Fully alert, oriented, and appropriate. Speech normal taz. No cranial nerve deficits.   Gait:  Antalgic.  No trendelenburg sign bilaterally.   Motor Strength: 4/5 motor strength throughout lower extremities.   Sensory:  No sensory deficit in the lower extremities.   Reflexes:  2 + and symmetric throughout. Downgoing Babinski's bilaterally.  No clonus or spasticity.  L-Spine: Significant pain on attempting flexion and extension; unable to perform. Unable to assess axial/facet loading bilaterally. Positive SLR bilaterally.   Positive TTP over lumbar paraspinals, midline lower lumbar spine.         Imaging:    Narrative & Impression     EXAMINATION:  MRI LUMBAR SPINE WITHOUT CONTRAST     CLINICAL HISTORY:  spondylolisthesis L5-S1; Spondylolisthesis, lumbosacral region     TECHNIQUE:  Multiplanar, multisequence MR images were acquired from the thoracolumbar junction to the sacrum without contrast.     COMPARISON:  Lumbar radiograph 01/30/2020     FINDINGS:  Grade 2 anterolisthesis of L5 on S1 with bilateral L5 pars defect.     Vertebral body heights are within normal limits.  No acute fracture.  Normal marrow signal.  No marrow replacement process.     Disc space height loss and disc desiccation at L4-L5 and L5-S1.     Conus ends at L1-L2.     Degenerative findings:     T12-L1: No disc herniation, spinal canal stenosis, or neural foraminal narrowing.     L1-L2: No disc herniation, spinal canal stenosis, or neural foraminal narrowing.     L2-L3: No disc herniation, spinal canal stenosis, or neural foraminal narrowing     L3-L4: No disc herniation, spinal canal stenosis, or neural foraminal narrowing.     L4-L5: Right eccentric circumferential disc bulge.  Narrowing of the right lateral recess with possible abutment of the descending right L5 nerve root.  Moderate right neural foraminal narrowing     L5-S1: Grade 2 anterolisthesis with uncovered disc  and facet arthropathy.  Severe bilateral neural foraminal narrowing.  No spinal canal stenosis.     Paraspinal muscles and soft tissues are unremarkable.     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     Impression:     1. Lumbar spondylosis most prominent at L4-L5 and L5-S1 with moderate and severe neural foraminal narrowing.  L4-L5 right eccentric disc bulge narrows the right lateral recess and likely abuts the descending right L5 nerve root.  2. Grade 2 anterolisthesis of L5 on S1 with bilateral L5 pars defects.     Electronically signed by resident: José Manuel Rogers  Date:                                            10/13/2020  Time:                                           13:32       Narrative     EXAMINATION:  XR LUMBAR SPINE 5 VIEW WITH FLEX AND EXT    CLINICAL HISTORY:  Low back pain, >6wks conservative tx, persistent-progressive sx, surgical candidate;  Radiculopathy, lumbar region    TECHNIQUE:  Five views of the lumbar spine plus flexion extension views were performed.    COMPARISON:  None.    FINDINGS:  Vertebral bodies are intact.  There is a spondylolisthesis probably second-degree at the L5-S1 level with forward subluxation of L5 on S1.  Minimal narrowing of the L3-L4 disc space is noted no bony collapse or destruction is seen.   Impression       See above      Electronically signed by: Vipul Posadas MD  Date: 01/30/2020  Time: 12:46    Encounter     View Encounter                   Assessment:       Encounter Diagnoses   Name Primary?    Spondylolisthesis of lumbosacral region Yes    DDD (degenerative disc disease), lumbar     Lumbar radiculopathy     Pre-op testing     Lumbar spondylosis          Plan:       Emeka was seen today for results.    Diagnoses and all orders for this visit:    Spondylolisthesis of lumbosacral region  -     Ambulatory referral/consult to Neurosurgery; Future  -     tiZANidine (ZANAFLEX) 4 MG tablet; Take 4 tablets (16 mg total) by mouth nightly as  needed.    DDD (degenerative disc disease), lumbar  -     Ambulatory referral/consult to Neurosurgery; Future  -     tiZANidine (ZANAFLEX) 4 MG tablet; Take 4 tablets (16 mg total) by mouth nightly as needed.  -     Case request GI: Injection, Steroid, Epidural Caudal    Lumbar radiculopathy  -     Ambulatory referral/consult to Neurosurgery; Future  -     tiZANidine (ZANAFLEX) 4 MG tablet; Take 4 tablets (16 mg total) by mouth nightly as needed.  -     Case request GI: Injection, Steroid, Epidural Caudal    Pre-op testing  -     COVID-19 Routine Screening; Future    Lumbar spondylosis        Emeka Lau is a 45 y.o. male with chronic midline low back pain that radiates the bilateral lower extremities left worse than right.  Pain associated with nondermatomal numbness and nonfocal weakness.  Also difficulty urinating.  Patient states that this is from unknown issue unrelated to his low back however may be related to insult to the cauda equina.  Lumbar x-rays do show grade 2 spondylolisthesis of L5 on S1.  Likely has axial pain directly related to this deformity but also may have pain from neurogenic claudication or radiculopathy as well.    1.  Lumbar MRI results reviewed with pt .Grade 2 anterolisthesis with uncovered disc and facet arthropathy noted at L5/S1.  Severe bilateral neural foraminal narrowing at L4-L5 and L5-S1. Large disk bulge at L4-5 with possible right nerve impingement. Results consistent with pt symptoms   2.  Schedule for Caudal KATHY. Pt does not feel he would be able to complete PT at this time; possibly in future.   3. Start Tizanidine 4mg nightly and increase as tolerated. Pt was given instructions on how to do this.   4. Message sent to Dr. Zarate about increasing gabapentin. Pt currently on 300 mg daily.   5.  Referral placed to Neurosurgery for possible surgery options.   6.  Follow-up after caudal kathy to evaluate results.     The above plan and management options were discussed at length  with patient. Patient is in agreement with the above and verbalized understanding.    MAGDALENA Shah, FNP-C  [facility]  Interventional Pain Management

## 2020-10-15 NOTE — PATIENT INSTRUCTIONS
Start Tizanidine 4mg qhs. He is to increase dose by 4mg per night every week until taking 16mg nightly. Patient advised to stop increasing dose if side effects are too problematic. This will hopefully help as a muscle relaxer, but also to help patient sleep as this medication can be sedating.

## 2020-10-15 NOTE — PROGRESS NOTES
Mr. Lau will be scheduled for Caudal KURT on 10/28/2020.  Reviewed the pre-procedure instructions listed below with the patient- copy also provided.  Instructed patient to notify clinic if he begins feeling ill, runs a fever, is prescribed antibiotics, and/or has any outpatient procedures within the two weeks leading up to this procedure.  Instructed patient to report to [facility], 2nd Floor Endoscopy Registration Desk.  All questions answered- patient verbalized understanding.     Day of Procedure  - Ensure you have obtained an arrival time from the Pain Management Staff  o Procedure Area will call 1-3 days in advance with your arrival time.  Please check any voicemails received.  o If you arrive past your scheduled procedure time, you may be asked to reschedule your procedure.  - Ensure you have a  with you to remain present throughout your procedure for your safety.  o If you arrive without a responsible adult to stay with you and drive you home, you may be asked to reschedule your procedure.  - Take all of your prescribed medications (exceptions noted below) with a small amount of water  - This IS a fasting procedure: Please do not eat for six (6) hours before your scheduled arrival time.  o You may have clear liquids for up to three (3) hours before your arrival time.    - Clear liquids include: water, fruit juices without pulp, clear tea, and black coffee (no sugar and/or creamer/milk)  - Wear comfortable clothing (loose fitting pants).  - You may wear glasses, dentures, contact lenses, and/or hearing aids.   - Notify the nurse during the intake process if you are allergic to any medications, if you are diabetic, or if you are not feeling well  - Contact the Pain Management Clinic with the following:  o A fever greater than 100° (degrees)   o Feel ill, have any type of infection, or are taking antibiotics now or within the two (2) weeks leading up to this procedure  o Have any outpatient  procedures within the two (2) weeks leading up to this procedure (colonoscopy, major dental work, etc.)    Diabetic Patients  - Please check your blood sugar prior to coming in for your procedure.  If the value is greater than 200, contact the clinic [phone] as the procedure may need to be rescheduled.  The procedure may not be performed if your blood sugar is above 200 even after checking into the hospital.      IF you are taking blood thinners: Only upon receiving clearance and notification from the Pain Management Department  7 Days Prior to Your Procedure:  - Stop taking Plavix/Clopridogrel, Effient/Prasugel, ASA  5 Days Prior to Your Procedure:  - Stop taking Coumadin/Warfarin.  An INR may be drawn prior to your procedure.  If labs are required, you will need to arrive earlier than your scheduled arrival time.  - Stop taking Pradaxa/Dabigatra,  Brilinta/ Ticagrelor  3 Days Prior to Your Procedure:  - Stop taking Xarelto/Rivaroxaban, Eliquis/Apixaban, Aggrenox/Dipyridamole, Reopro/Abciximab

## 2020-10-16 ENCOUNTER — TELEPHONE (OUTPATIENT)
Dept: PAIN MEDICINE | Facility: CLINIC | Age: 45
End: 2020-10-16

## 2020-10-16 NOTE — TELEPHONE ENCOUNTER
Offered to move Mr. Lau to 10/21/2020 as we had a cancellation- he accepted.  Reviewed pre-procedure instructions with him, as well as provided arrival time of 1:45pm.  COVID test scheduled on 10/19/2020 at 2:20pm at the [facility].  All questions answered- patient verbalized understanding.    Neurosurgery consult scheduled on 10/19/2020 at 9:00am.

## 2020-10-19 ENCOUNTER — LAB VISIT (OUTPATIENT)
Dept: FAMILY MEDICINE | Facility: CLINIC | Age: 45
End: 2020-10-19
Payer: COMMERCIAL

## 2020-10-19 ENCOUNTER — OFFICE VISIT (OUTPATIENT)
Dept: NEUROSURGERY | Facility: CLINIC | Age: 45
End: 2020-10-19
Payer: COMMERCIAL

## 2020-10-19 VITALS
HEIGHT: 68 IN | HEART RATE: 94 BPM | SYSTOLIC BLOOD PRESSURE: 118 MMHG | WEIGHT: 203.69 LBS | OXYGEN SATURATION: 97 % | TEMPERATURE: 97 F | BODY MASS INDEX: 30.87 KG/M2 | DIASTOLIC BLOOD PRESSURE: 79 MMHG

## 2020-10-19 DIAGNOSIS — M51.36 DDD (DEGENERATIVE DISC DISEASE), LUMBAR: ICD-10-CM

## 2020-10-19 DIAGNOSIS — R25.8 CLONUS: ICD-10-CM

## 2020-10-19 DIAGNOSIS — M54.16 LUMBAR RADICULOPATHY: ICD-10-CM

## 2020-10-19 DIAGNOSIS — Z01.818 PRE-OP TESTING: ICD-10-CM

## 2020-10-19 DIAGNOSIS — Z79.899 MEDICATION DOSE INCREASED: ICD-10-CM

## 2020-10-19 DIAGNOSIS — M48.061 SPINAL STENOSIS OF LUMBAR REGION, UNSPECIFIED WHETHER NEUROGENIC CLAUDICATION PRESENT: ICD-10-CM

## 2020-10-19 DIAGNOSIS — M48.07 SPINAL STENOSIS, LUMBOSACRAL REGION: ICD-10-CM

## 2020-10-19 DIAGNOSIS — Z00.00 HEALTHCARE MAINTENANCE: Primary | ICD-10-CM

## 2020-10-19 DIAGNOSIS — M54.9 DORSALGIA, UNSPECIFIED: ICD-10-CM

## 2020-10-19 DIAGNOSIS — R26.9 GAIT DISTURBANCE: Primary | ICD-10-CM

## 2020-10-19 DIAGNOSIS — M47.816 LUMBAR SPONDYLOSIS: ICD-10-CM

## 2020-10-19 DIAGNOSIS — M43.17 SPONDYLOLISTHESIS OF LUMBOSACRAL REGION: ICD-10-CM

## 2020-10-19 DIAGNOSIS — M54.2 CERVICALGIA: ICD-10-CM

## 2020-10-19 LAB — SARS-COV-2 RNA RESP QL NAA+PROBE: NOT DETECTED

## 2020-10-19 PROCEDURE — 99999 PR PBB SHADOW E&M-EST. PATIENT-LVL IV: ICD-10-PCS | Mod: PBBFAC,,, | Performed by: PHYSICIAN ASSISTANT

## 2020-10-19 PROCEDURE — U0003 INFECTIOUS AGENT DETECTION BY NUCLEIC ACID (DNA OR RNA); SEVERE ACUTE RESPIRATORY SYNDROME CORONAVIRUS 2 (SARS-COV-2) (CORONAVIRUS DISEASE [COVID-19]), AMPLIFIED PROBE TECHNIQUE, MAKING USE OF HIGH THROUGHPUT TECHNOLOGIES AS DESCRIBED BY CMS-2020-01-R: HCPCS

## 2020-10-19 PROCEDURE — 3008F PR BODY MASS INDEX (BMI) DOCUMENTED: ICD-10-PCS | Mod: CPTII,S$GLB,, | Performed by: PHYSICIAN ASSISTANT

## 2020-10-19 PROCEDURE — 3008F BODY MASS INDEX DOCD: CPT | Mod: CPTII,S$GLB,, | Performed by: PHYSICIAN ASSISTANT

## 2020-10-19 PROCEDURE — 99205 OFFICE O/P NEW HI 60 MIN: CPT | Mod: S$GLB,,, | Performed by: PHYSICIAN ASSISTANT

## 2020-10-19 PROCEDURE — 99999 PR PBB SHADOW E&M-EST. PATIENT-LVL IV: CPT | Mod: PBBFAC,,, | Performed by: PHYSICIAN ASSISTANT

## 2020-10-19 PROCEDURE — 99205 PR OFFICE/OUTPT VISIT, NEW, LEVL V, 60-74 MIN: ICD-10-PCS | Mod: S$GLB,,, | Performed by: PHYSICIAN ASSISTANT

## 2020-10-19 RX ORDER — GABAPENTIN 300 MG/1
300 CAPSULE ORAL 3 TIMES DAILY
Qty: 90 CAPSULE | Refills: 11 | Status: SHIPPED | OUTPATIENT
Start: 2020-10-19 | End: 2021-10-19

## 2020-10-19 NOTE — PROGRESS NOTES
[facility]  Neurosurgery    SUBJECTIVE:     *  offered to patient and his wife.  Both declined for this visit but understand that  will need to be present when discussing surgical options.    History of Present Illness:  Emeka Lau is a 45 y.o. male who presents with 1 year h/o low back and right leg pain.  Low back pain radiates down his lateral right leg to his ankle.  Pain is 10/10 today.  He has numbness in the same distribution as well as numbness across all 10 toes.  He is referred by Dr. Nixno after lumbar MRI revealed grade 2 anterolisthesis of L5 on S1 with b/l L5 pars defects.  The right L5 nerve root is compressed in lateral recess at L4-5.  He and his wife understand that he will likely need surgery, but they are curious as to how urgently this needs to occur as he is scheduled to go out of town in 2 weeks for the month of November.    He also reports neck pain over the past 2 months with corresponding left arm pain and weakness.  Upon further investigation, he reports gait disturbance with several near falls, constipation, and significant urinary retention.  Further endorses erectile dysfunction.    Treatments tried:  -PT:  Has not tried  -KURT:  Scheduled for tomorrow  -Gabapentin:  Currently taking  -Muscle relaxer:  Tizanidine  -Rx pain medications:  Mobic  -Other: Tamsulosin for urinary retention, Viagra for erectile dysfunction  -Spine surgery:  Never    Blood thinners:  None per chart review    (Not in a hospital admission)      Review of patient's allergies indicates:  No Known Allergies    History reviewed. No pertinent past medical history.  History reviewed. No pertinent surgical history.  History reviewed. No pertinent family history.  Social History     Tobacco Use    Smoking status: Former Smoker   Substance Use Topics    Alcohol use: Yes     Comment: socially    Drug use: No        Review of Systems:  As noted in HPI    OBJECTIVE:      Vital Signs (Most Recent):  Temp: 97.1 °F (36.2 °C) (10/19/20 0856)  Pulse: 94 (10/19/20 0856)  BP: 118/79 (10/19/20 0856)  SpO2: 97 % (10/19/20 0856)    Physical Exam:  General: well developed, well nourished, no distress  Head: normocephalic, atraumatic  Neurologic: Alert and oriented. Thought content appropriate  GCS: Motor: 6/Verbal: 5/Eyes: 4 GCS Total: 15  Language: No aphasia  Speech: No dysarthria  Cranial nerves: face symmetric, tongue midline, CN II-XII grossly intact.   Eyes: pupils equal, round, reactive to light with accommodation, EOMI.   Pulmonary: normal respirations, not labored, no accessory muscles used  Sensory: intact to light touch throughout  Motor Strength: Moves all extremities spontaneously with good tone.  Full strength upper and lower extremities. No abnormal movements seen.     Strength  Deltoids Triceps Biceps Wrist Extension Wrist Flexion Hand    Upper: R 5/5 5/5 5/5 5/5 5/5 5/5    L 5/5 5/5 5/5 5/5 5/5 5/5     Iliopsoas Quadriceps Knee  Flexion Tibialis  anterior Gastro- cnemius EHL   Lower: R 5/5 5/5 5/5 5/5 5/5 5/5    L 5/5 5/5 5/5 5/5 5/5 5/5     DTR's - 3 + and symmetric patellar & achilles; 2+ b/l brachioradialis  Lyons: absent  Clonus:  Present bilaterally  Babinski: absent  Pulses: 2+ and symmetric radial and dorsalis pedis  Skin: warm, dry and intact, no rashes  Gait:  Antalgic and unsteady  Tandem Gait:  Unable to perform    Cervical ROM: full  Lumbar ROM: full   SI Joint tenderness: Negative     Midline Bony Tenderness:  Negative throughout   Paraspinous muscle tenderness:  Negative throughout  Spurling's:  Negative bilaterally    Diagnostic Results:  I have personally reviewed imaging and agree with the findings.     Lumbar MRI  1. Lumbar spondylosis most prominent at L4-L5 and L5-S1 with moderate and severe neural foraminal narrowing.  L4-L5 right eccentric disc bulge narrows the right lateral recess and likely abuts the descending right L5 nerve root.  2. Grade  2 anterolisthesis of L5 on S1 with bilateral L5 pars defects.    X-ray lumbar spine with flexion and extension, standing 01/30/2020  Vertebral bodies are intact.  There is a spondylolisthesis probably second-degree at the L5-S1 level with forward subluxation of L5 on S1.  Minimal narrowing of the L3-L4 disc space is noted no bony collapse or destruction is seen.  ASSESSMENT/PLAN:     Emeka Lau is a 45 y.o. male who presents with chronic low back pain and lumbar radiculopathy.  He is scheduled to undergo KURT with Dr. Nixon tomorrow.  MRI reveals compression of the right L5 nerve root at L4-5, consistent with his pain.  Also reveals unstable grade 2 anterolisthesis at L5-S1 with b/l pars defects.  I will order lumbar CT for further evaluation.    Furthermore, patient's history and exam is suggestive of cervical myelopathy.  I will order cervical MRI to be completed prior to his next follow-up.  I explained to the patient and his wife that if there is significant cervical stenosis, it will need to be addressed prior to the lumbar spine due to risk of spinal cord injury during prone lumbar spine surgery.    CT an MRI scheduled for next week.  Will call patient to discuss results, as they are planning to go out of town very shortly after new imaging.  Follow-up with Dr. Mares is currently scheduled 4 weeks from now (next available opening).  However this may be adjusted pending imaging results.      Please feel free to call with any further questions    Disclaimer: This note was dictated by speech recognition. Minor errors in transcription may be present.  Please call with any questions.      Jennifer Sol PA-C  [facility]  Department of Neurosurgery  [phone]

## 2020-10-19 NOTE — LETTER
October 19, 2020      Vipul Nixon Jr., MD  [address]  [address]  [facility] [address]           [facility]  [address]  [facility] [address]  Phone: [phone]  Fax: [phone]          Patient: Emeka Lau   MR Number: [id number]   YOB: 1975   Date of Visit: 10/19/2020       Dear Dr. Vipul Nixon Jr.:    Thank you for referring Emeka Lau to me for evaluation. Attached you will find relevant portions of my assessment and plan of care.    If you have questions, please do not hesitate to call me. I look forward to following Emeka Lau along with you.    Sincerely,    Jennifer Sol PA-C    Enclosure  CC:  No Recipients    If you would like to receive this communication electronically, please contact [email or web address] or [phone] to request more information on [employer] access.    For providers and/or their staff who would like to refer a patient to [facility], please contact us through our one-stop-shop provider referral line, [facility], at [phone].    If you feel you have received this communication in error or would no longer like to receive these types of communications, please e-mail [email or web address]

## 2020-10-20 ENCOUNTER — LAB VISIT (OUTPATIENT)
Dept: LAB | Facility: HOSPITAL | Age: 45
End: 2020-10-20
Attending: PAIN MEDICINE
Payer: COMMERCIAL

## 2020-10-20 DIAGNOSIS — Z00.00 HEALTHCARE MAINTENANCE: ICD-10-CM

## 2020-10-20 DIAGNOSIS — Z79.899 MEDICATION DOSE INCREASED: ICD-10-CM

## 2020-10-20 LAB
ALBUMIN SERPL BCP-MCNC: 3.6 G/DL (ref 3.5–5.2)
ALP SERPL-CCNC: 71 U/L (ref 55–135)
ALT SERPL W/O P-5'-P-CCNC: 25 U/L (ref 10–44)
ANION GAP SERPL CALC-SCNC: 10 MMOL/L (ref 8–16)
AST SERPL-CCNC: 16 U/L (ref 10–40)
BILIRUB SERPL-MCNC: 0.3 MG/DL (ref 0.1–1)
BUN SERPL-MCNC: 14 MG/DL (ref 6–20)
CALCIUM SERPL-MCNC: 8.8 MG/DL (ref 8.7–10.5)
CHLORIDE SERPL-SCNC: 107 MMOL/L (ref 95–110)
CO2 SERPL-SCNC: 24 MMOL/L (ref 23–29)
CREAT SERPL-MCNC: 1 MG/DL (ref 0.5–1.4)
EST. GFR  (AFRICAN AMERICAN): >60 ML/MIN/1.73 M^2
EST. GFR  (NON AFRICAN AMERICAN): >60 ML/MIN/1.73 M^2
GLUCOSE SERPL-MCNC: 166 MG/DL (ref 70–110)
POTASSIUM SERPL-SCNC: 3.8 MMOL/L (ref 3.5–5.1)
PROT SERPL-MCNC: 6.9 G/DL (ref 6–8.4)
SODIUM SERPL-SCNC: 141 MMOL/L (ref 136–145)

## 2020-10-20 PROCEDURE — 36415 COLL VENOUS BLD VENIPUNCTURE: CPT | Mod: PN

## 2020-10-20 PROCEDURE — 80053 COMPREHEN METABOLIC PANEL: CPT

## 2020-10-21 ENCOUNTER — HOSPITAL ENCOUNTER (OUTPATIENT)
Facility: HOSPITAL | Age: 45
Discharge: HOME | End: 2020-10-21
Attending: PAIN MEDICINE | Admitting: PAIN MEDICINE
Payer: COMMERCIAL

## 2020-10-21 VITALS
DIASTOLIC BLOOD PRESSURE: 86 MMHG | HEART RATE: 82 BPM | OXYGEN SATURATION: 98 % | TEMPERATURE: 98 F | SYSTOLIC BLOOD PRESSURE: 150 MMHG | RESPIRATION RATE: 18 BRPM

## 2020-10-21 DIAGNOSIS — M51.36 DDD (DEGENERATIVE DISC DISEASE), LUMBAR: Primary | ICD-10-CM

## 2020-10-21 PROCEDURE — 99152 MOD SED SAME PHYS/QHP 5/>YRS: CPT | Performed by: PAIN MEDICINE

## 2020-10-21 PROCEDURE — 25000003 PHARM REV CODE 250: Performed by: PAIN MEDICINE

## 2020-10-21 PROCEDURE — 63600175 PHARM REV CODE 636 W HCPCS: Performed by: PAIN MEDICINE

## 2020-10-21 PROCEDURE — 62327 PR INJ/INFUS, LUMBAR/SACRAL INDWELL CATH, W/GUIDANCE: ICD-10-PCS | Mod: ,,, | Performed by: PAIN MEDICINE

## 2020-10-21 PROCEDURE — 25500020 PHARM REV CODE 255: Performed by: PAIN MEDICINE

## 2020-10-21 PROCEDURE — 77003 FLUOROGUIDE FOR SPINE INJECT: CPT | Performed by: PAIN MEDICINE

## 2020-10-21 PROCEDURE — 62323 NJX INTERLAMINAR LMBR/SAC: CPT | Performed by: PAIN MEDICINE

## 2020-10-21 PROCEDURE — 62322 NJX INTERLAMINAR LMBR/SAC: CPT | Performed by: PAIN MEDICINE

## 2020-10-21 PROCEDURE — 62327 NJX INTERLAMINAR LMBR/SAC: CPT | Mod: ,,, | Performed by: PAIN MEDICINE

## 2020-10-21 RX ORDER — SODIUM CHLORIDE 9 MG/ML
INJECTION, SOLUTION INTRAVENOUS CONTINUOUS
Status: DISCONTINUED | OUTPATIENT
Start: 2020-10-21 | End: 2020-10-21 | Stop reason: HOSPADM

## 2020-10-21 RX ORDER — LIDOCAINE HYDROCHLORIDE 10 MG/ML
1 INJECTION, SOLUTION EPIDURAL; INFILTRATION; INTRACAUDAL; PERINEURAL ONCE
Status: COMPLETED | OUTPATIENT
Start: 2020-10-21 | End: 2020-10-21

## 2020-10-21 RX ORDER — LIDOCAINE HYDROCHLORIDE 20 MG/ML
10 INJECTION, SOLUTION INFILTRATION; PERINEURAL ONCE
Status: COMPLETED | OUTPATIENT
Start: 2020-10-21 | End: 2020-10-21

## 2020-10-21 RX ORDER — LIDOCAINE HYDROCHLORIDE 10 MG/ML
INJECTION, SOLUTION EPIDURAL; INFILTRATION; INTRACAUDAL; PERINEURAL
Status: DISCONTINUED
Start: 2020-10-21 | End: 2020-10-21 | Stop reason: HOSPADM

## 2020-10-21 RX ORDER — FENTANYL CITRATE 50 UG/ML
INJECTION, SOLUTION INTRAMUSCULAR; INTRAVENOUS
Status: DISCONTINUED
Start: 2020-10-21 | End: 2020-10-21 | Stop reason: HOSPADM

## 2020-10-21 RX ORDER — MIDAZOLAM HYDROCHLORIDE 1 MG/ML
5 INJECTION INTRAMUSCULAR; INTRAVENOUS
Status: DISCONTINUED | OUTPATIENT
Start: 2020-10-21 | End: 2020-10-21 | Stop reason: HOSPADM

## 2020-10-21 RX ORDER — LIDOCAINE HYDROCHLORIDE 20 MG/ML
INJECTION, SOLUTION INFILTRATION; PERINEURAL
Status: DISCONTINUED
Start: 2020-10-21 | End: 2020-10-21 | Stop reason: HOSPADM

## 2020-10-21 RX ORDER — FENTANYL CITRATE 50 UG/ML
100 INJECTION, SOLUTION INTRAMUSCULAR; INTRAVENOUS
Status: DISCONTINUED | OUTPATIENT
Start: 2020-10-21 | End: 2020-10-21 | Stop reason: HOSPADM

## 2020-10-21 RX ORDER — MIDAZOLAM HYDROCHLORIDE 1 MG/ML
INJECTION INTRAMUSCULAR; INTRAVENOUS
Status: DISCONTINUED
Start: 2020-10-21 | End: 2020-10-21 | Stop reason: HOSPADM

## 2020-10-21 RX ORDER — DEXAMETHASONE SODIUM PHOSPHATE 10 MG/ML
10 INJECTION INTRAMUSCULAR; INTRAVENOUS ONCE
Status: COMPLETED | OUTPATIENT
Start: 2020-10-21 | End: 2020-10-21

## 2020-10-21 RX ADMIN — SODIUM CHLORIDE: 0.9 INJECTION, SOLUTION INTRAVENOUS at 01:10

## 2020-10-21 NOTE — INTERVAL H&P NOTE
The patient has been examined and the H&P has been reviewed:    I concur with the findings and no changes have occurred since H&P was written.    Surgery risks, benefits and alternative options discussed and understood by patient/family.    The procedure that I am ordering/performing has been deemed time sensitive given patient's degree of pain and limitations that this pain composed this upon his/her daily life.    AAOx3 NAD  Mood and affect normal  Memory and language intact  RRR  CTAB        Active Hospital Problems    Diagnosis  POA    DDD (degenerative disc disease), lumbar [M51.36]  Yes      Resolved Hospital Problems   No resolved problems to display.

## 2020-10-21 NOTE — DISCHARGE SUMMARY
[facility]  Discharge Note  Short Stay    Procedure(s) (LRB):  Injection, Steroid, Epidural Caudal (N/A)    OUTCOME: Patient tolerated treatment/procedure well without complication and is now ready for discharge.    DISPOSITION: Home or Self Care    FINAL DIAGNOSIS:  <principal problem not specified>    FOLLOWUP: In clinic       DISCHARGE INSTRUCTIONS:  No discharge procedures on file.     Clinical Reference Documents Added to Patient Instructions       Document    INJECTION, LUMBAR EPIDURAL: RECOVERY AT HOME (ENGLISH)    INJECTION, LUMBAR EPIDURAL: RECOVERY AT HOME (Comoran)        Discharge Diagnosis:DDD (degenerative disc disease), lumbar [M51.36]  Lumbar radiculopathy [M54.16]  Condition on Discharge: Stable.  Diet on Discharge: Same as before.  Activity: as per instruction sheet.  Discharge to: Home with a responsible adult.  Follow up: as per Discharge instructions

## 2020-10-21 NOTE — DISCHARGE INSTRUCTIONS
Home Care Instructions Pain Management:    1. DIET:   You may resume your normal diet today.   2. BATHING:   You may shower with luke warm water.  3. DRESSING:   You may remove your bandage today.   4. ACTIVITY LEVEL:   You may resume your normal activities 24 hrs after your procedure.  5. MEDICATIONS:   You may resume your normal medications today.   6. SPECIAL INSTRUCTIONS:   No heat to the injection site for 24 hrs including, bath or shower, heating pad, moist heat, or hot tubs.    Use ice pack to injection site for any pain or discomfort.  Apply ice packs for 20 minute intervals as needed.   If you have received any sedatives by mouth today you may not drive for 12 hours.    If you have received any sedation through your IV, you may not drive for 24 hrs.     PLEASE CALL YOUR DOCTOR IF:  1. Redness or swelling around the injection site.  2. Fever of 101 degrees  3. Drainage (pus) from the injection site.  4. For any continuous bleeding (some dried blood over the incision is normal.)    FOR EMERGENCIES:   If any unusual problems or difficulties occur during clinic hours, call [phone] or [phone].   Fall Prevention  Millions of people fall every year and injure themselves. You may have had anesthesia or sedation which may increase your risk of falling. You may have health issues that put you at an increased risk of falling.     Here are ways to reduce your risk of falling.  ·   · Make your home safe by keeping walkways clear of objects you may trip over.  · Use non-slip pads under rugs. Do not use area rugs or small throw rugs.  · Use non-slip mats in bathtubs and showers.  · Install handrails and lights on staircases.  · Do not walk in poorly lit areas.  · Do not stand on chairs or wobbly ladders.  · Use caution when reaching overhead or looking upward. This position can cause a loss of balance.  · Be sure your shoes fit properly, have non-slip bottoms and are in good condition.   · Wear shoes both inside and  out. Avoid going barefoot or wearing slippers.  · Be cautious when going up and down stairs, curbs, and when walking on uneven sidewalks.  · If your balance is poor, consider using a cane or walker.  · If your fall was related to alcohol use, stop or limit alcohol intake.   · If your fall was related to use of sleeping medicines, talk to your doctor about this. You may need to reduce your dosage at bedtime if you awaken during the night to go to the bathroom.    · To reduce the need for nighttime bathroom trips:  ¨ Avoid drinking fluids for several hours before going to bed  ¨ Empty your bladder before going to bed  ¨ Men can keep a urinal at the bedside  · Stay as active as you can. Balance, flexibility, strength, and endurance all come from exercise. They all play a role in preventing falls. Ask your healthcare provider which types of activity are right for you.  · Get your vision checked on a regular basis.  · If you have pets, know where they are before you stand up or walk so you don't trip over them.  · Use night lights.

## 2020-10-21 NOTE — OP NOTE
Caudal Epidural Steroid Injection under Fluoroscopy  Current medications reviewed. Time-out taken to identify patient and procedure prior to starting the procedure.      Date of Service: 10/21/2020    PCP: Dio Zarate Jr, MD    Referring Physician:    I attest that I have reviewed the patient's home medications prior to the procedure and no contraindication have been identified. I  re-evaluated the patient after the patient was positioned for the procedure in the procedure room immediately before the procedural time-out. The vital signs are current and represent the current state of the patient which has not significantly changed since the preprocedure assessment.                                                   PROCEDURE:  Caudal epidural steroid injection under fluoroscopy with insertion of flexible catheter    REASON FOR PROCEDURE:  DDD (degenerative disc disease), lumbar [M51.36]  Lumbar radiculopathy [M54.16]    PHYSICIAN: Vipul Nixon MD  ASSISTANTS: None    MEDICATIONS INJECTED:  Preservative-free dexamethasone 10mg, sterile preservative free normal saline 2-4ml and preservative free sterile Lidocaine 1% 5ml.    LOCAL ANESTHETIC GIVEN:  Xylocaine 2% 2ml.   SEDATION MEDICATIONS: Versed 1 mg and fentanyl 50 mcg IV.  Conscious sedation provided by MD and monitored by RN.   (See nurse documentation and case log for sedation time)      ESTIMATED BLOOD LOSS:  None.    COMPLICATIONS:  None.    TECHNIQUE:   Laying in the prone position, the patient was prepped and draped in the usual sterile fashion using ChloraPrep and fenestrated drape.  Appropriate anatomic landmarks were determined including the superior LS-spine and sacral hiatus.  Local anesthetic was given by raising a wheel and going down to the periosteum.  A 3.5in 16-gauge spinal needle was introduced thru the sacral hiatus.  Omnipaque was injected to confirm placement in the appropriate area and that there was no vascular runoff.  The flexible  catheter threaded through to the desired levels. Omnipaque was reinjected to confirm placement in the appropriate area. The medication was then injected slowly.  The patient tolerated the procedure well.      The patient was monitored after the procedure.  Patient was given post procedure and discharge instructions to follow at home.  We will see the patient back in two weeks or the patient may call to inform of status. The patient was discharged in a stable condition.

## 2020-11-03 ENCOUNTER — TELEPHONE (OUTPATIENT)
Dept: RADIOLOGY | Facility: HOSPITAL | Age: 45
End: 2020-11-03

## 2020-11-03 ENCOUNTER — HOSPITAL ENCOUNTER (OUTPATIENT)
Dept: RADIOLOGY | Facility: HOSPITAL | Age: 45
Discharge: HOME | End: 2020-11-03
Attending: PHYSICIAN ASSISTANT
Payer: COMMERCIAL

## 2020-11-03 DIAGNOSIS — M48.07 SPINAL STENOSIS, LUMBOSACRAL REGION: ICD-10-CM

## 2020-11-03 DIAGNOSIS — M54.9 DORSALGIA, UNSPECIFIED: ICD-10-CM

## 2020-11-03 DIAGNOSIS — M51.36 DDD (DEGENERATIVE DISC DISEASE), LUMBAR: ICD-10-CM

## 2020-11-03 DIAGNOSIS — M43.17 SPONDYLOLISTHESIS OF LUMBOSACRAL REGION: ICD-10-CM

## 2020-11-03 DIAGNOSIS — M54.16 LUMBAR RADICULOPATHY: ICD-10-CM

## 2020-11-03 PROCEDURE — 72131 CT LUMBAR SPINE WITHOUT CONTRAST: ICD-10-PCS | Mod: 26,,, | Performed by: RADIOLOGY

## 2020-11-03 PROCEDURE — 72131 CT LUMBAR SPINE W/O DYE: CPT | Mod: TC

## 2020-11-03 PROCEDURE — 72131 CT LUMBAR SPINE W/O DYE: CPT | Mod: 26,,, | Performed by: RADIOLOGY

## 2020-11-03 NOTE — TELEPHONE ENCOUNTER
Patient arrived for his MRI at [facility]. Patient informed technologist that he was unable to have his MRI here for the cervical spine because he is claustrophobic and attempted his MRI of the lumbar spine and was unable to have the exam done at this location. Spoke with patient who requested that all future MRI appointments be scheduled at [facility] for the wide bore MRI at the outpatient imaging center due to his claustrophobia.

## 2020-11-13 ENCOUNTER — TELEPHONE (OUTPATIENT)
Dept: NEUROSURGERY | Facility: CLINIC | Age: 45
End: 2020-11-13

## 2020-11-13 NOTE — TELEPHONE ENCOUNTER
----- Message from Jennifer Sol PA-C sent at 11/9/2020  4:31 PM CST -----  Please contact patient to reschedule cervical MRI. Looks like they requested the wide bore MRI at main campus.     Jennifer

## 2021-11-03 ENCOUNTER — OFFICE VISIT (OUTPATIENT)
Dept: FAMILY MEDICINE | Facility: CLINIC | Age: 46
End: 2021-11-03
Payer: MEDICAID

## 2021-11-03 ENCOUNTER — LAB VISIT (OUTPATIENT)
Dept: LAB | Facility: HOSPITAL | Age: 46
End: 2021-11-03
Attending: INTERNAL MEDICINE
Payer: MEDICAID

## 2021-11-03 VITALS
SYSTOLIC BLOOD PRESSURE: 110 MMHG | BODY MASS INDEX: 31.74 KG/M2 | OXYGEN SATURATION: 98 % | TEMPERATURE: 98 F | HEIGHT: 68 IN | DIASTOLIC BLOOD PRESSURE: 74 MMHG | WEIGHT: 209.44 LBS | HEART RATE: 88 BPM

## 2021-11-03 DIAGNOSIS — M48.061 SPINAL STENOSIS OF LUMBAR REGION WITHOUT NEUROGENIC CLAUDICATION: ICD-10-CM

## 2021-11-03 DIAGNOSIS — N40.1 BENIGN PROSTATIC HYPERPLASIA (BPH) WITH STRAINING ON URINATION: ICD-10-CM

## 2021-11-03 DIAGNOSIS — R39.16 BENIGN PROSTATIC HYPERPLASIA (BPH) WITH STRAINING ON URINATION: ICD-10-CM

## 2021-11-03 DIAGNOSIS — M48.061 SPINAL STENOSIS OF LUMBAR REGION WITHOUT NEUROGENIC CLAUDICATION: Primary | ICD-10-CM

## 2021-11-03 DIAGNOSIS — M51.36 DDD (DEGENERATIVE DISC DISEASE), LUMBAR: ICD-10-CM

## 2021-11-03 DIAGNOSIS — M47.816 LUMBAR SPONDYLOSIS: ICD-10-CM

## 2021-11-03 DIAGNOSIS — M54.16 LUMBAR RADICULOPATHY: ICD-10-CM

## 2021-11-03 LAB
ALBUMIN SERPL BCP-MCNC: 3.7 G/DL (ref 3.5–5.2)
ALP SERPL-CCNC: 85 U/L (ref 55–135)
ALT SERPL W/O P-5'-P-CCNC: 31 U/L (ref 10–44)
ANION GAP SERPL CALC-SCNC: 8 MMOL/L (ref 8–16)
AST SERPL-CCNC: 19 U/L (ref 10–40)
BASOPHILS # BLD AUTO: 0.06 K/UL (ref 0–0.2)
BASOPHILS NFR BLD: 0.9 % (ref 0–1.9)
BILIRUB SERPL-MCNC: 0.4 MG/DL (ref 0.1–1)
BUN SERPL-MCNC: 13 MG/DL (ref 6–20)
CALCIUM SERPL-MCNC: 9.6 MG/DL (ref 8.7–10.5)
CHLORIDE SERPL-SCNC: 106 MMOL/L (ref 95–110)
CO2 SERPL-SCNC: 25 MMOL/L (ref 23–29)
CREAT SERPL-MCNC: 1.3 MG/DL (ref 0.5–1.4)
DIFFERENTIAL METHOD: ABNORMAL
EOSINOPHIL # BLD AUTO: 0.1 K/UL (ref 0–0.5)
EOSINOPHIL NFR BLD: 1.5 % (ref 0–8)
ERYTHROCYTE [DISTWIDTH] IN BLOOD BY AUTOMATED COUNT: 13.7 % (ref 11.5–14.5)
EST. GFR  (AFRICAN AMERICAN): >60 ML/MIN/1.73 M^2
EST. GFR  (NON AFRICAN AMERICAN): >60 ML/MIN/1.73 M^2
GLUCOSE SERPL-MCNC: 177 MG/DL (ref 70–110)
HCT VFR BLD AUTO: 45 % (ref 40–54)
HGB BLD-MCNC: 14.9 G/DL (ref 14–18)
IMM GRANULOCYTES # BLD AUTO: 0.03 K/UL (ref 0–0.04)
IMM GRANULOCYTES NFR BLD AUTO: 0.5 % (ref 0–0.5)
LYMPHOCYTES # BLD AUTO: 3.2 K/UL (ref 1–4.8)
LYMPHOCYTES NFR BLD: 48.5 % (ref 18–48)
MCH RBC QN AUTO: 28.2 PG (ref 27–31)
MCHC RBC AUTO-ENTMCNC: 33.1 G/DL (ref 32–36)
MCV RBC AUTO: 85 FL (ref 82–98)
MONOCYTES # BLD AUTO: 0.4 K/UL (ref 0.3–1)
MONOCYTES NFR BLD: 5.6 % (ref 4–15)
NEUTROPHILS # BLD AUTO: 2.8 K/UL (ref 1.8–7.7)
NEUTROPHILS NFR BLD: 43 % (ref 38–73)
NRBC BLD-RTO: 0 /100 WBC
PLATELET # BLD AUTO: 249 K/UL (ref 150–450)
PMV BLD AUTO: 10.5 FL (ref 9.2–12.9)
POTASSIUM SERPL-SCNC: 4.3 MMOL/L (ref 3.5–5.1)
PROT SERPL-MCNC: 7.7 G/DL (ref 6–8.4)
RBC # BLD AUTO: 5.28 M/UL (ref 4.6–6.2)
SODIUM SERPL-SCNC: 139 MMOL/L (ref 136–145)
WBC # BLD AUTO: 6.56 K/UL (ref 3.9–12.7)

## 2021-11-03 PROCEDURE — 80053 COMPREHEN METABOLIC PANEL: CPT | Performed by: INTERNAL MEDICINE

## 2021-11-03 PROCEDURE — 99214 OFFICE O/P EST MOD 30 MIN: CPT | Mod: PBBFAC,PN | Performed by: INTERNAL MEDICINE

## 2021-11-03 PROCEDURE — 85025 COMPLETE CBC W/AUTO DIFF WBC: CPT | Performed by: INTERNAL MEDICINE

## 2021-11-03 PROCEDURE — 99999 PR PBB SHADOW E&M-EST. PATIENT-LVL IV: CPT | Mod: PBBFAC,,, | Performed by: INTERNAL MEDICINE

## 2021-11-03 PROCEDURE — 99214 PR OFFICE/OUTPT VISIT, EST, LEVL IV, 30-39 MIN: ICD-10-PCS | Mod: S$PBB,,, | Performed by: INTERNAL MEDICINE

## 2021-11-03 PROCEDURE — 99999 PR PBB SHADOW E&M-EST. PATIENT-LVL IV: ICD-10-PCS | Mod: PBBFAC,,, | Performed by: INTERNAL MEDICINE

## 2021-11-03 PROCEDURE — 84153 ASSAY OF PSA TOTAL: CPT | Performed by: INTERNAL MEDICINE

## 2021-11-03 PROCEDURE — 99214 OFFICE O/P EST MOD 30 MIN: CPT | Mod: S$PBB,,, | Performed by: INTERNAL MEDICINE

## 2021-11-03 PROCEDURE — 36415 COLL VENOUS BLD VENIPUNCTURE: CPT | Mod: PN | Performed by: INTERNAL MEDICINE

## 2021-11-03 RX ORDER — GABAPENTIN 300 MG/1
300 CAPSULE ORAL NIGHTLY
Qty: 90 CAPSULE | Refills: 1 | Status: SHIPPED | OUTPATIENT
Start: 2021-11-03 | End: 2022-01-11

## 2021-11-03 RX ORDER — MELOXICAM 15 MG/1
15 TABLET ORAL DAILY
Qty: 30 TABLET | Refills: 1 | Status: ON HOLD | OUTPATIENT
Start: 2021-11-03 | End: 2022-02-14 | Stop reason: HOSPADM

## 2021-11-04 ENCOUNTER — OFFICE VISIT (OUTPATIENT)
Dept: NEUROSURGERY | Facility: CLINIC | Age: 46
End: 2021-11-04
Payer: MEDICAID

## 2021-11-04 VITALS — SYSTOLIC BLOOD PRESSURE: 136 MMHG | DIASTOLIC BLOOD PRESSURE: 87 MMHG | HEART RATE: 93 BPM

## 2021-11-04 DIAGNOSIS — M54.2 CERVICALGIA: ICD-10-CM

## 2021-11-04 DIAGNOSIS — M48.061 SPINAL STENOSIS OF LUMBAR REGION WITHOUT NEUROGENIC CLAUDICATION: ICD-10-CM

## 2021-11-04 DIAGNOSIS — M54.16 LUMBAR RADICULOPATHY, CHRONIC: ICD-10-CM

## 2021-11-04 DIAGNOSIS — M54.12 CERVICAL RADICULOPATHY: ICD-10-CM

## 2021-11-04 DIAGNOSIS — R25.8 CLONUS: ICD-10-CM

## 2021-11-04 DIAGNOSIS — M54.2 NECK PAIN: Primary | ICD-10-CM

## 2021-11-04 LAB — COMPLEXED PSA SERPL-MCNC: 0.79 NG/ML (ref 0–4)

## 2021-11-04 PROCEDURE — 99213 OFFICE O/P EST LOW 20 MIN: CPT | Mod: PBBFAC | Performed by: NURSE PRACTITIONER

## 2021-11-04 PROCEDURE — 99999 PR PBB SHADOW E&M-EST. PATIENT-LVL III: CPT | Mod: PBBFAC,,, | Performed by: NURSE PRACTITIONER

## 2021-11-04 PROCEDURE — 99213 OFFICE O/P EST LOW 20 MIN: CPT | Mod: S$PBB,,, | Performed by: NURSE PRACTITIONER

## 2021-11-04 PROCEDURE — 99213 PR OFFICE/OUTPT VISIT, EST, LEVL III, 20-29 MIN: ICD-10-PCS | Mod: S$PBB,,, | Performed by: NURSE PRACTITIONER

## 2021-11-04 PROCEDURE — 99999 PR PBB SHADOW E&M-EST. PATIENT-LVL III: ICD-10-PCS | Mod: PBBFAC,,, | Performed by: NURSE PRACTITIONER

## 2021-11-19 ENCOUNTER — HOSPITAL ENCOUNTER (OUTPATIENT)
Dept: RADIOLOGY | Facility: HOSPITAL | Age: 46
Discharge: HOME | End: 2021-11-19
Attending: NURSE PRACTITIONER
Payer: MEDICAID

## 2021-11-19 DIAGNOSIS — R25.8 CLONUS: ICD-10-CM

## 2021-11-19 DIAGNOSIS — M54.2 NECK PAIN: ICD-10-CM

## 2021-11-19 DIAGNOSIS — M54.16 LUMBAR RADICULOPATHY, CHRONIC: ICD-10-CM

## 2021-11-19 DIAGNOSIS — M48.061 SPINAL STENOSIS OF LUMBAR REGION WITHOUT NEUROGENIC CLAUDICATION: ICD-10-CM

## 2021-11-19 DIAGNOSIS — M54.2 CERVICALGIA: ICD-10-CM

## 2021-11-19 PROCEDURE — 72141 MRI CERVICAL SPINE WITHOUT CONTRAST: ICD-10-PCS | Mod: 26,,, | Performed by: INTERNAL MEDICINE

## 2021-11-19 PROCEDURE — 72082 X-RAY EXAM ENTIRE SPI 2/3 VW: CPT | Mod: 26,,, | Performed by: RADIOLOGY

## 2021-11-19 PROCEDURE — 72082 XR SCOLIOSIS COMPLETE: ICD-10-PCS | Mod: 26,,, | Performed by: RADIOLOGY

## 2021-11-19 PROCEDURE — 72148 MRI LUMBAR SPINE W/O DYE: CPT | Mod: 26,,, | Performed by: INTERNAL MEDICINE

## 2021-11-19 PROCEDURE — 72148 MRI LUMBAR SPINE WITHOUT CONTRAST: ICD-10-PCS | Mod: 26,,, | Performed by: INTERNAL MEDICINE

## 2021-11-19 PROCEDURE — 72148 MRI LUMBAR SPINE W/O DYE: CPT | Mod: TC

## 2021-11-19 PROCEDURE — 72110 X-RAY EXAM L-2 SPINE 4/>VWS: CPT | Mod: 26,,, | Performed by: RADIOLOGY

## 2021-11-19 PROCEDURE — 72110 X-RAY EXAM L-2 SPINE 4/>VWS: CPT | Mod: 59,TC

## 2021-11-19 PROCEDURE — 72141 MRI NECK SPINE W/O DYE: CPT | Mod: 26,,, | Performed by: INTERNAL MEDICINE

## 2021-11-19 PROCEDURE — 72110 XR LUMBAR SPINE 5 VIEW WITH FLEX AND EXT: ICD-10-PCS | Mod: 26,,, | Performed by: RADIOLOGY

## 2021-11-19 PROCEDURE — 72082 X-RAY EXAM ENTIRE SPI 2/3 VW: CPT | Mod: TC

## 2021-11-19 PROCEDURE — 72141 MRI NECK SPINE W/O DYE: CPT | Mod: TC

## 2021-11-24 ENCOUNTER — TELEPHONE (OUTPATIENT)
Dept: NEUROSURGERY | Facility: CLINIC | Age: 46
End: 2021-11-24
Payer: MEDICAID

## 2021-12-02 ENCOUNTER — OFFICE VISIT (OUTPATIENT)
Dept: NEUROSURGERY | Facility: CLINIC | Age: 46
End: 2021-12-02
Payer: MEDICAID

## 2021-12-02 VITALS — SYSTOLIC BLOOD PRESSURE: 112 MMHG | DIASTOLIC BLOOD PRESSURE: 76 MMHG | HEART RATE: 94 BPM

## 2021-12-02 DIAGNOSIS — M48.061 SPINAL STENOSIS OF LUMBAR REGION WITHOUT NEUROGENIC CLAUDICATION: ICD-10-CM

## 2021-12-02 DIAGNOSIS — R26.9 GAIT DISTURBANCE: Primary | ICD-10-CM

## 2021-12-02 DIAGNOSIS — M54.6 PAIN IN THORACIC SPINE: ICD-10-CM

## 2021-12-02 DIAGNOSIS — M43.17 SPONDYLOLISTHESIS OF LUMBOSACRAL REGION: ICD-10-CM

## 2021-12-02 DIAGNOSIS — R25.8 CLONUS: ICD-10-CM

## 2021-12-02 PROCEDURE — 99213 OFFICE O/P EST LOW 20 MIN: CPT | Mod: PBBFAC | Performed by: NURSE PRACTITIONER

## 2021-12-02 PROCEDURE — 99213 PR OFFICE/OUTPT VISIT, EST, LEVL III, 20-29 MIN: ICD-10-PCS | Mod: S$PBB,,, | Performed by: NURSE PRACTITIONER

## 2021-12-02 PROCEDURE — 99999 PR PBB SHADOW E&M-EST. PATIENT-LVL III: CPT | Mod: PBBFAC,,, | Performed by: NURSE PRACTITIONER

## 2021-12-02 PROCEDURE — 99213 OFFICE O/P EST LOW 20 MIN: CPT | Mod: S$PBB,,, | Performed by: NURSE PRACTITIONER

## 2021-12-02 PROCEDURE — 99999 PR PBB SHADOW E&M-EST. PATIENT-LVL III: ICD-10-PCS | Mod: PBBFAC,,, | Performed by: NURSE PRACTITIONER

## 2021-12-07 ENCOUNTER — TELEPHONE (OUTPATIENT)
Dept: NEUROSURGERY | Facility: CLINIC | Age: 46
End: 2021-12-07
Payer: MEDICAID

## 2021-12-07 DIAGNOSIS — M43.17 ACQUIRED SPONDYLOLISTHESIS OF LUMBOSACRAL REGION: Primary | ICD-10-CM

## 2021-12-14 ENCOUNTER — TELEPHONE (OUTPATIENT)
Dept: NEUROSURGERY | Facility: CLINIC | Age: 46
End: 2021-12-14
Payer: MEDICAID

## 2021-12-14 ENCOUNTER — TELEPHONE (OUTPATIENT)
Dept: PREADMISSION TESTING | Facility: HOSPITAL | Age: 46
End: 2021-12-14
Payer: MEDICAID

## 2021-12-14 DIAGNOSIS — Z01.818 PREOPERATIVE TESTING: Primary | ICD-10-CM

## 2021-12-14 NOTE — PRE-PROCEDURE INSTRUCTIONS
Spoke with patient with the , Shelli [phone] through the language line. He said this will be his 1st surgery. Will need medical clearance from your PCP,Dr. Dio Zarate. He will make an appt. He has a preop testing form from  to give his PCP.Will need poc appt and t&s. Our  will call to set up these appts. Preop instructions given. Hold aspirin, aspirin containing products, nsaids(aleve, advil, motrin, ibuprofen, naprosyn, naproxen, voltaren, diclofenac, mobic, meloxicam), vitamins and supplements one week prior to surgery. May take Tylenol. Verbalizes understanding.

## 2021-12-14 NOTE — ANESTHESIA PAT ROS NOTE
12/14/2021  Emeka Lau is a 46 y.o., male.      Pre-op Assessment          Review of Systems         Anesthesia Assessment: Preoperative EQUATION    Planned Procedure: Procedure(s) (LRB):  *AIRO* FUSION, SPINE, LUMBAR L4-Pelvis (N/A)  Requested Anesthesia Type:General  Surgeon: Alphonse Reed MD  Service: Neurosurgery  Known or anticipated Date of Surgery:1/18/2022, Date change 2/8/2022    Surgeon notes: reviewed    Electronic QUestionnaire Assessment completed via nurse interview with patient.        Triage considerations:         Previous anesthesia records:None   ** THIS WILL BE HIS FIRST SURGERY**    Last PCP note: within 3 months , within [facility]   Subspecialty notes: Neurosurgery    Other important co-morbidities:PER Epic:  Obesity and LUMBAR SPINAL STENOSIS, BPH      Tests already available:  Available tests,  within 3 months , within [facility] .11/19/2021 XRAY LUMBAR COMPLETE INCLUDING FLEX& EXT, MRI LUMBAR SPINE W/O CONTRAST , 11/3/2021 CMP, CBC, CT LUMBAR SPINE W/O CONTRAST, 10/28/2021 UA            Instructions given. (See in Nurse's note)    Optimization:  Anesthesia Preop Clinic Assessment  Indicated    Medical Opinion Indicated       Plan:    Testing:  PT/INR, PTT and T&S   Pre-anesthesia  visit       Visit focus: concerns in complex and/or prolonged anesthesia, position other than supine     Consultation:Patient's PCP for re-evaluation     Patient  has previously scheduled Medical Appointment:1/12 DR. VASQUEZ, 1/13 MRI, DEANNE CARPENTER( [place])    Navigation: Tests Scheduled. TBD             Consults scheduled.TBD             Results will be tracked by Preop Clinic.  1/13 Labs resulted and noted.  1/20 Medical clearance given by Dr. Dio Zarate on 1/17.  Bindu Talbert RN BSN

## 2021-12-15 ENCOUNTER — TELEPHONE (OUTPATIENT)
Dept: PREADMISSION TESTING | Facility: HOSPITAL | Age: 46
End: 2021-12-15
Payer: MEDICAID

## 2022-01-06 ENCOUNTER — OFFICE VISIT (OUTPATIENT)
Dept: FAMILY MEDICINE | Facility: CLINIC | Age: 47
End: 2022-01-06
Payer: MEDICAID

## 2022-01-06 ENCOUNTER — LAB VISIT (OUTPATIENT)
Dept: LAB | Facility: HOSPITAL | Age: 47
End: 2022-01-06
Attending: FAMILY MEDICINE
Payer: MEDICAID

## 2022-01-06 VITALS
WEIGHT: 211.88 LBS | BODY MASS INDEX: 32.11 KG/M2 | HEART RATE: 91 BPM | SYSTOLIC BLOOD PRESSURE: 124 MMHG | OXYGEN SATURATION: 99 % | DIASTOLIC BLOOD PRESSURE: 76 MMHG | RESPIRATION RATE: 18 BRPM | HEIGHT: 68 IN | TEMPERATURE: 98 F

## 2022-01-06 DIAGNOSIS — R05.9 COUGH: ICD-10-CM

## 2022-01-06 DIAGNOSIS — Z12.11 SCREENING FOR COLORECTAL CANCER: ICD-10-CM

## 2022-01-06 DIAGNOSIS — Z00.00 GENERAL MEDICAL EXAM: Primary | ICD-10-CM

## 2022-01-06 DIAGNOSIS — E78.1 HYPERTRIGLYCERIDEMIA: ICD-10-CM

## 2022-01-06 DIAGNOSIS — Z12.12 SCREENING FOR COLORECTAL CANCER: ICD-10-CM

## 2022-01-06 DIAGNOSIS — Z01.818 PREOP EXAMINATION: ICD-10-CM

## 2022-01-06 DIAGNOSIS — M48.061 SPINAL STENOSIS, LUMBAR REGION, WITHOUT NEUROGENIC CLAUDICATION: ICD-10-CM

## 2022-01-06 DIAGNOSIS — R73.03 PREDIABETES: ICD-10-CM

## 2022-01-06 DIAGNOSIS — R39.12 WEAK URINARY STREAM: ICD-10-CM

## 2022-01-06 LAB
CHOLEST SERPL-MCNC: 190 MG/DL (ref 120–199)
CHOLEST/HDLC SERPL: 7.6 {RATIO} (ref 2–5)
HDLC SERPL-MCNC: 25 MG/DL (ref 40–75)
HDLC SERPL: 13.2 % (ref 20–50)
LDLC SERPL CALC-MCNC: ABNORMAL MG/DL (ref 63–159)
NONHDLC SERPL-MCNC: 165 MG/DL
TRIGL SERPL-MCNC: 690 MG/DL (ref 30–150)

## 2022-01-06 PROCEDURE — 3078F DIAST BP <80 MM HG: CPT | Mod: CPTII,,, | Performed by: FAMILY MEDICINE

## 2022-01-06 PROCEDURE — 99213 OFFICE O/P EST LOW 20 MIN: CPT | Mod: 25,S$PBB,, | Performed by: FAMILY MEDICINE

## 2022-01-06 PROCEDURE — 3051F PR MOST RECENT HEMOGLOBIN A1C LEVEL 7.0 - < 8.0%: ICD-10-PCS | Mod: CPTII,,, | Performed by: FAMILY MEDICINE

## 2022-01-06 PROCEDURE — 83036 HEMOGLOBIN GLYCOSYLATED A1C: CPT | Performed by: FAMILY MEDICINE

## 2022-01-06 PROCEDURE — 3008F BODY MASS INDEX DOCD: CPT | Mod: CPTII,,, | Performed by: FAMILY MEDICINE

## 2022-01-06 PROCEDURE — 36415 COLL VENOUS BLD VENIPUNCTURE: CPT | Mod: PN | Performed by: FAMILY MEDICINE

## 2022-01-06 PROCEDURE — 3078F PR MOST RECENT DIASTOLIC BLOOD PRESSURE < 80 MM HG: ICD-10-PCS | Mod: CPTII,,, | Performed by: FAMILY MEDICINE

## 2022-01-06 PROCEDURE — 99999 PR PBB SHADOW E&M-EST. PATIENT-LVL IV: CPT | Mod: PBBFAC,,, | Performed by: FAMILY MEDICINE

## 2022-01-06 PROCEDURE — 99396 PREV VISIT EST AGE 40-64: CPT | Mod: S$PBB,,, | Performed by: FAMILY MEDICINE

## 2022-01-06 PROCEDURE — 80061 LIPID PANEL: CPT | Performed by: FAMILY MEDICINE

## 2022-01-06 PROCEDURE — 3008F PR BODY MASS INDEX (BMI) DOCUMENTED: ICD-10-PCS | Mod: CPTII,,, | Performed by: FAMILY MEDICINE

## 2022-01-06 PROCEDURE — 99396 PR PREVENTIVE VISIT,EST,40-64: ICD-10-PCS | Mod: S$PBB,,, | Performed by: FAMILY MEDICINE

## 2022-01-06 PROCEDURE — 99999 PR PBB SHADOW E&M-EST. PATIENT-LVL IV: ICD-10-PCS | Mod: PBBFAC,,, | Performed by: FAMILY MEDICINE

## 2022-01-06 PROCEDURE — 3051F HG A1C>EQUAL 7.0%<8.0%: CPT | Mod: CPTII,,, | Performed by: FAMILY MEDICINE

## 2022-01-06 PROCEDURE — 99213 PR OFFICE/OUTPT VISIT, EST, LEVL III, 20-29 MIN: ICD-10-PCS | Mod: 25,S$PBB,, | Performed by: FAMILY MEDICINE

## 2022-01-06 PROCEDURE — 3074F SYST BP LT 130 MM HG: CPT | Mod: CPTII,,, | Performed by: FAMILY MEDICINE

## 2022-01-06 PROCEDURE — 3074F PR MOST RECENT SYSTOLIC BLOOD PRESSURE < 130 MM HG: ICD-10-PCS | Mod: CPTII,,, | Performed by: FAMILY MEDICINE

## 2022-01-06 PROCEDURE — 99214 OFFICE O/P EST MOD 30 MIN: CPT | Mod: PBBFAC,25,PN | Performed by: FAMILY MEDICINE

## 2022-01-06 RX ORDER — TAMSULOSIN HYDROCHLORIDE 0.4 MG/1
1 CAPSULE ORAL DAILY
Qty: 90 CAPSULE | Refills: 1 | Status: SHIPPED | OUTPATIENT
Start: 2022-01-06 | End: 2022-04-07

## 2022-01-06 RX ORDER — DEXAMETHASONE SODIUM PHOSPHATE 4 MG/ML
8 INJECTION, SOLUTION INTRA-ARTICULAR; INTRALESIONAL; INTRAMUSCULAR; INTRAVENOUS; SOFT TISSUE
Status: COMPLETED | OUTPATIENT
Start: 2022-01-06 | End: 2022-01-06

## 2022-01-06 RX ADMIN — DEXAMETHASONE SODIUM PHOSPHATE 8 MG: 4 INJECTION INTRA-ARTICULAR; INTRALESIONAL; INTRAMUSCULAR; INTRAVENOUS; SOFT TISSUE at 11:01

## 2022-01-06 NOTE — PROGRESS NOTES
Subjective:       Patient ID: Emeka Lau is a 46 y.o. male.    Chief Complaint: Annual Exam    HPI   46 year old male comes in with wife for routine check up. He is requesting a fill on tamsulosin prescribed by urology for weak urine stream. States that it helps. He also has  A history of prediabetes and hypertriglyceridemia.    Review of Systems   Constitutional: Negative for unexpected weight change.   HENT: Negative for ear pain and sore throat.    Eyes: Negative for visual disturbance.   Gastrointestinal: Negative for abdominal pain and blood in stool.   Endocrine: Negative for cold intolerance and heat intolerance.   Genitourinary: Negative for dysuria and frequency.   Integumentary:  Negative for rash.   Neurological: Negative for weakness, numbness and headaches.   Hematological: Negative for adenopathy.   Psychiatric/Behavioral: Negative for suicidal ideas.         Objective:      Physical Exam  Vitals reviewed.   Constitutional:       General: He is not in acute distress.  HENT:      Head: Normocephalic and atraumatic.      Right Ear: Ear canal and external ear normal.      Left Ear: Ear canal and external ear normal.      Nose: Nose normal.      Mouth/Throat:      Mouth: Mucous membranes are moist.      Pharynx: No oropharyngeal exudate or posterior oropharyngeal erythema.   Eyes:      Extraocular Movements: Extraocular movements intact.      Conjunctiva/sclera: Conjunctivae normal.      Pupils: Pupils are equal, round, and reactive to light.   Abdominal:      General: Abdomen is flat. Bowel sounds are normal. There is no distension.      Palpations: Abdomen is soft.      Tenderness: There is no abdominal tenderness. There is no guarding.   Skin:     General: Skin is warm.      Capillary Refill: Capillary refill takes less than 2 seconds.   Neurological:      Mental Status: He is alert and oriented to person, place, and time.      Cranial Nerves: No cranial nerve deficit.      Sensory: No sensory  deficit.   Psychiatric:         Mood and Affect: Mood normal.         Behavior: Behavior normal.             Assessment:       Problem List Items Addressed This Visit    None     Visit Diagnoses     General medical exam    -  Primary    Prediabetes        Relevant Orders    Hemoglobin A1C (Completed)    Hypertriglyceridemia        Relevant Orders    Lipid Panel (Completed)    Weak urinary stream        Relevant Medications    tamsulosin (FLOMAX) 0.4 mg Cap    Screening for colorectal cancer        Relevant Orders    Fecal Immunochemical Test (iFOBT)    Cough        Relevant Medications    dexamethasone injection 8 mg (Completed)    Other Relevant Orders    X-Ray Chest PA And Lateral (Completed)    Spinal stenosis, lumbar region, without neurogenic claudication        Preop examination              Plan:       Emeka was seen today for annual exam.    Diagnoses and all orders for this visit:    General medical exam  Age appropriate recommendations reviewed.  Screening labs ordered.    Prediabetes  -     Hemoglobin A1C; Future  Discussed implications on prediabetes and risk of progression to diabetes.  Dietary changes discussed and encouraged- decreased carb intake- sweets, breads, rice, pasta, potato. Also advised to stop soft drinks.  Also encouraged lifestyle changes- increased physical activity    Hypertriglyceridemia  -     Lipid Panel; Future  Check lipids    Weak urinary stream  -     tamsulosin (FLOMAX) 0.4 mg Cap; Take 1 capsule (0.4 mg total) by mouth once daily.  Flomax refilled    Screening for colorectal cancer  -     Fecal Immunochemical Test (iFOBT); Future          _____________________________________________________________    In addition to above preventative exam, patient had a separate concern requiring further evaluation and management beyond the scope of a preventative exam.      CC: Cough; Pre-Op    HPI  Patient's wife states that patient was recently treated for an URI. However, patient  still has a dry cough. No shortness of breath or wheezing. Wife is requesting a chest xray.    Patient is scheduled for lumbar spinal fusion for spinal stenosis and lumbar radiculopathy. He is able to walk up a full flight of stairs without chest pain.    ROS  Cardio- negative for chest pain at rest or with exertion  Pulmonary-  Negative for shortness of breath, positive for cough    Physical-  Caridio- RRR, normal s1s2, no murmur  Pulmonary- CTA B/L no wheezing.       Assessment/Plan  Cough  -     X-Ray Chest PA And Lateral; Future  -     dexamethasone injection 8 mg  Advised that cxr not needed but wife insisted on it.  Will do a 1 time IM steroid injection for cough control    Spinal stenosis, lumbar region, without neurogenic claudication/ Preop examination  Patient is a low risk patient for an intermediate risk procedure and is stable to proceed with surgery as scheduled.

## 2022-01-07 DIAGNOSIS — Z01.818 PREOPERATIVE TESTING: Primary | ICD-10-CM

## 2022-01-07 LAB
ESTIMATED AVG GLUCOSE: 163 MG/DL (ref 68–131)
HBA1C MFR BLD: 7.3 % (ref 4–5.6)

## 2022-01-10 ENCOUNTER — TELEPHONE (OUTPATIENT)
Dept: NEUROSURGERY | Facility: CLINIC | Age: 47
End: 2022-01-10
Payer: MEDICAID

## 2022-01-11 ENCOUNTER — ANESTHESIA EVENT (OUTPATIENT)
Dept: SURGERY | Facility: HOSPITAL | Age: 47
DRG: 453 | End: 2022-01-11
Payer: MEDICAID

## 2022-01-11 ENCOUNTER — HOSPITAL ENCOUNTER (OUTPATIENT)
Dept: PREADMISSION TESTING | Facility: HOSPITAL | Age: 47
Discharge: HOME | End: 2022-01-11
Attending: NEUROLOGICAL SURGERY
Payer: MEDICAID

## 2022-01-11 ENCOUNTER — TELEPHONE (OUTPATIENT)
Dept: NEUROSURGERY | Facility: CLINIC | Age: 47
End: 2022-01-11
Payer: MEDICAID

## 2022-01-11 VITALS
HEIGHT: 68 IN | SYSTOLIC BLOOD PRESSURE: 127 MMHG | OXYGEN SATURATION: 96 % | BODY MASS INDEX: 31.09 KG/M2 | DIASTOLIC BLOOD PRESSURE: 83 MMHG | WEIGHT: 205.13 LBS | HEART RATE: 90 BPM | TEMPERATURE: 99 F

## 2022-01-11 NOTE — PRE-PROCEDURE INSTRUCTIONS
PreOp Instructions given:    -- Medication information (what to hold and what to take)   -- NPO guidelines as follows: (or as per your Surgeon)  1. Stop ALL solid food, gum, candy (including vitamins) 8 hours before surgery/procedure time.  2. Stop all CLOUDY liquids: coffee with creamer, cloudy juices, 6 hours prior to surgery/procedure  time.  3. The patient should be ENCOURAGED to drink carbohydrate-rich clear liquids (sports drinks, clear juices) until 2 hours prior to surgery/procedure  time.  4. CLEAR liquids include only water, black coffee NO creamer, clear oral rehydration drinks, clear sports drinks or clear fruit juices (no orange juice, no pulpy juices, no apple cider).   5. IF IN DOUBT, drink water instead.   6. NOTHING TO DRINK 2 hours before to surgery/procedure  time. If you are told to take medication on the morning of surgery, it may be taken with a sip of water.   -- Arrival place and directions given; time to be given the day before procedure by the Surgeon's Office   -- Bathing with antibacterial soap   -- Don't wear any jewelry or bring any valuables AM of surgery   -- No makeup or moisturizer to face   -- No perfume/cologne, powder, lotions or aftershave     Pt verbalized understanding via  Jose

## 2022-01-11 NOTE — ANESTHESIA PREPROCEDURE EVALUATION
[facility]  Anesthesia Pre-Operative Evaluation         Patient Name: Emeka Lau  YOB: 1975  MRN: [id number]    SUBJECTIVE:     Pre-operative evaluation for Procedure(s) (LRB):  *AIRO* FUSION, SPINE, LUMBAR L4-Pelvis (N/A)     02/07/2022    Emeka Lau is a 46 y.o. male w/ a significant PMHx of DM2 (A1c 7.3), BPH, cervical radiculopathy and low back pain secondary to spinal stenosis and DDD. He has tried PT but symptoms continue to worsen. Endorses weakness in the right leg associated with numbness and tingling    MRI 11/4/2021  Multilevel degenerative change, most advanced at L5-S1 where there is grade 1 spondylolytic spondylolisthesis contributing to severe bilateral foraminal steno    presents for the above procedure(s).      Patient Active Problem List   Diagnosis    Viral syndrome    Lumbar radiculopathy    Spinal stenosis of lumbar region    Lumbar spondylosis    DDD (degenerative disc disease), lumbar    Spondylolisthesis of lumbosacral region       Review of patient's allergies indicates:  No Known Allergies    Current Medications:  Current Outpatient Medications   Medication Instructions    blood sugar diagnostic Strp To check BG 3 times daily, to use with insurance preferred meter    blood-glucose meter kit To check BG 3 times daily, to use with insurance preferred meter    gemfibroziL (LOPID) 600 mg, Oral, 2 times daily before meals    lancets Misc To check BG 3 times daily, to use with insurance preferred meter    meloxicam (MOBIC) 15 mg, Oral, Daily    metFORMIN (GLUCOPHAGE-XR) 1,000 mg, Oral, 2 times daily with meals    tamsulosin (FLOMAX) 0.4 mg Cap Take 1 capsule by mouth once daily    tamsulosin (FLOMAX) 0.4 mg, Oral, Daily    triamcinolone acetonide 0.1% (KENALOG) 0.1 % cream Topical, 2 times daily, Apply to rash. DO NOT USE ON FACE.    UNABLE TO FIND Bleaching cream       Past Surgical History:   Procedure Laterality Date    EPIDURAL STEROID  INJECTION N/A 10/21/2020    Procedure: Injection, Steroid, Epidural Caudal;  Surgeon: Vipul Nixon Jr., MD;  Location: [facility] ENDO;  Service: Pain Management;  Laterality: N/A;  Caudal KURT  Arrive @ 1315; No ATC or DM; Needs MD Signature     Social History  -Former smoker      OBJECTIVE:     Vital Signs Range (Last 24H):         Significant Labs:  Lab Results   Component Value Date    WBC 6.56 11/03/2021    HGB 14.9 11/03/2021    HCT 45.0 11/03/2021     11/03/2021    CHOL 190 01/06/2022    TRIG 690 (H) 01/06/2022    HDL 25 (L) 01/06/2022    ALT 31 11/03/2021    AST 19 11/03/2021     11/03/2021    K 4.3 11/03/2021     11/03/2021    CREATININE 1.3 11/03/2021    BUN 13 11/03/2021    CO2 25 11/03/2021    TSH 0.867 01/30/2020    INR 0.9 01/11/2022    HGBA1C 7.3 (H) 01/06/2022         EKG:   Results for orders placed or performed during the hospital encounter of 02/13/15   EKG 12-lead (Reason: Chest Pain)    Collection Time: 02/13/15 11:17 AM    Narrative    Test Reason : Chest Pain 786.50  Vent. Rate : 098 BPM     Atrial Rate : 098 BPM     P-R Int : 152 ms          QRS Dur : 096 ms      QT Int : 340 ms       P-R-T Axes : 056 -33 054 degrees     QTc Int : 434 ms    Normal sinus rhythm  Possible Left atrial enlargement  Left axis deviation  LVH  Abnormal ECG  No previous ECGs available  Confirmed by Inder Bustamante MD ([id number]) on 2/16/2015 3:45:45 PM    Referred By: SELF REFERRAL           Confirmed By:Inder Bustamante MD     ASSESSMENT/PLAN:       Anesthesia Evaluation    I have reviewed the Patient Summary Reports.    I have reviewed the Nursing Notes. I have reviewed the NPO Status.   I have reviewed the Medications.     Review of Systems  Anesthesia Hx:  No previous Anesthesia  Denies Family Hx of Anesthesia complications.   Denies Personal Hx of Anesthesia complications.   Social:  Former Smoker    Hematology/Oncology:  Hematology Normal   Oncology Normal     EENT/Dental:EENT/Dental Normal    Cardiovascular:  Cardiovascular Normal     Pulmonary:  Pulmonary Normal    Renal/:   BPH    Hepatic/GI:  Hepatic/GI Normal    Musculoskeletal:   Arthritis     Neurological:   Neuromuscular Disease,    Endocrine:  Endocrine Normal    Dermatological:  Skin Normal    Psych:  Psychiatric Normal           Physical Exam  General:  Well nourished    Airway/Jaw/Neck:  Airway Findings: Mouth Opening: Normal General Airway Assessment: Adult  Mallampati: II  Improves to II, I with phonation.  Jaw/Neck Findings:  Limited Ability to Prognath  Neck ROM: Normal ROM     Eyes/Ears/Nose:  Eyes/Ears/Nose Findings:    Dental:  Dental Findings: In tact   Chest/Lungs:  Chest/Lungs Findings: Clear to auscultation, Normal Respiratory Rate     Heart/Vascular:  Heart Findings: Rate: Normal  Rhythm: Regular Rhythm  Sounds: Normal     Abdomen:  Abdomen Findings:  Normal     Musculoskeletal:  Musculoskeletal Findings:    Skin:  Skin Findings:     Mental Status:  Mental Status Findings:  Cooperative, Alert and Oriented         Anesthesia Plan  Type of Anesthesia, risks & benefits discussed:  Anesthesia Type:  general    Patient's Preference: General  Plan Factors:          Intra-op Monitoring Plan: standard ASA monitors and arterial line  Intra-op Monitoring Plan Comments:   Post Op Pain Control Plan: IV/PO Opioids PRN, multimodal analgesia and per primary service following discharge from PACU  Post Op Pain Control Plan Comments:     Induction:   IV  Beta Blocker:         Informed Consent: Patient understands risks and agrees with Anesthesia plan.  Questions answered. Anesthesia consent signed with patient.  ASA Score: 2     Day of Surgery Review of History & Physical:    H&P update referred to the surgeon.     Anesthesia Plan Notes: Discussed plan for general endotracheal anesthesia, pt understands and agrees with plan        Ready For Surgery From Anesthesia Perspective.

## 2022-01-11 NOTE — TELEPHONE ENCOUNTER
----- Message from Charlotte Gibson sent at 1/11/2022 11:33 AM CST -----  Regarding: Justine Wife [phone]  Type: Patient Call Back    Who called: Wife Justine    What is the request in detail: Patients wife called in wanting the results to the surgery    Can the clinic reply by [facility]? no    Would the patient rather a call back or a response via [facility]? Call back    Best call back number: [phone]

## 2022-01-12 DIAGNOSIS — Z01.818 PREOPERATIVE TESTING: Primary | ICD-10-CM

## 2022-01-13 ENCOUNTER — OFFICE VISIT (OUTPATIENT)
Dept: NEUROSURGERY | Facility: CLINIC | Age: 47
End: 2022-01-13
Payer: MEDICAID

## 2022-01-13 ENCOUNTER — HOSPITAL ENCOUNTER (OUTPATIENT)
Dept: RADIOLOGY | Facility: HOSPITAL | Age: 47
Discharge: HOME | End: 2022-01-13
Attending: NURSE PRACTITIONER
Payer: MEDICAID

## 2022-01-13 VITALS — HEART RATE: 91 BPM | SYSTOLIC BLOOD PRESSURE: 127 MMHG | DIASTOLIC BLOOD PRESSURE: 78 MMHG

## 2022-01-13 DIAGNOSIS — R26.9 GAIT DISTURBANCE: ICD-10-CM

## 2022-01-13 DIAGNOSIS — M48.061 SPINAL STENOSIS OF LUMBAR REGION WITHOUT NEUROGENIC CLAUDICATION: ICD-10-CM

## 2022-01-13 DIAGNOSIS — M54.6 PAIN IN THORACIC SPINE: ICD-10-CM

## 2022-01-13 DIAGNOSIS — M43.17 ACQUIRED SPONDYLOLISTHESIS OF LUMBOSACRAL REGION: Primary | ICD-10-CM

## 2022-01-13 PROCEDURE — 3074F PR MOST RECENT SYSTOLIC BLOOD PRESSURE < 130 MM HG: ICD-10-PCS | Mod: CPTII,,, | Performed by: NURSE PRACTITIONER

## 2022-01-13 PROCEDURE — 1159F MED LIST DOCD IN RCRD: CPT | Mod: CPTII,,, | Performed by: NURSE PRACTITIONER

## 2022-01-13 PROCEDURE — 99212 OFFICE O/P EST SF 10 MIN: CPT | Mod: PBBFAC,25 | Performed by: NURSE PRACTITIONER

## 2022-01-13 PROCEDURE — 72146 MRI CHEST SPINE W/O DYE: CPT | Mod: 26,,, | Performed by: RADIOLOGY

## 2022-01-13 PROCEDURE — 99213 PR OFFICE/OUTPT VISIT, EST, LEVL III, 20-29 MIN: ICD-10-PCS | Mod: S$PBB,,, | Performed by: NURSE PRACTITIONER

## 2022-01-13 PROCEDURE — 99999 PR PBB SHADOW E&M-EST. PATIENT-LVL II: ICD-10-PCS | Mod: PBBFAC,,, | Performed by: NURSE PRACTITIONER

## 2022-01-13 PROCEDURE — 99213 OFFICE O/P EST LOW 20 MIN: CPT | Mod: S$PBB,,, | Performed by: NURSE PRACTITIONER

## 2022-01-13 PROCEDURE — 1159F PR MEDICATION LIST DOCUMENTED IN MEDICAL RECORD: ICD-10-PCS | Mod: CPTII,,, | Performed by: NURSE PRACTITIONER

## 2022-01-13 PROCEDURE — 1160F PR REVIEW ALL MEDS BY PRESCRIBER/CLIN PHARMACIST DOCUMENTED: ICD-10-PCS | Mod: CPTII,,, | Performed by: NURSE PRACTITIONER

## 2022-01-13 PROCEDURE — 99999 PR PBB SHADOW E&M-EST. PATIENT-LVL II: CPT | Mod: PBBFAC,,, | Performed by: NURSE PRACTITIONER

## 2022-01-13 PROCEDURE — 3078F DIAST BP <80 MM HG: CPT | Mod: CPTII,,, | Performed by: NURSE PRACTITIONER

## 2022-01-13 PROCEDURE — 3051F HG A1C>EQUAL 7.0%<8.0%: CPT | Mod: CPTII,,, | Performed by: NURSE PRACTITIONER

## 2022-01-13 PROCEDURE — 3074F SYST BP LT 130 MM HG: CPT | Mod: CPTII,,, | Performed by: NURSE PRACTITIONER

## 2022-01-13 PROCEDURE — 1160F RVW MEDS BY RX/DR IN RCRD: CPT | Mod: CPTII,,, | Performed by: NURSE PRACTITIONER

## 2022-01-13 PROCEDURE — 3078F PR MOST RECENT DIASTOLIC BLOOD PRESSURE < 80 MM HG: ICD-10-PCS | Mod: CPTII,,, | Performed by: NURSE PRACTITIONER

## 2022-01-13 PROCEDURE — 72146 MRI CHEST SPINE W/O DYE: CPT | Mod: TC

## 2022-01-13 PROCEDURE — 3051F PR MOST RECENT HEMOGLOBIN A1C LEVEL 7.0 - < 8.0%: ICD-10-PCS | Mod: CPTII,,, | Performed by: NURSE PRACTITIONER

## 2022-01-13 PROCEDURE — 72146 MRI THORACIC SPINE WITHOUT CONTRAST: ICD-10-PCS | Mod: 26,,, | Performed by: RADIOLOGY

## 2022-01-18 NOTE — PROGRESS NOTES
Neurosurgery  Established Patient    SUBJECTIVE:     History of Present Illness: Emeka Lau is a 46 y.o. male with PMH of BPH.  offered, but patient declined as he and his wife are English speaking. The patient was last seen in clinic on 10/19/20 with Jennifer Sol PA-C to discuss concerns with worsening low back pain despite conservative therapy. At that appointment, it was discussed that the patient could benefit from surgery. The patient was apprehensive about proceeding with surgery and wanted to obtain more conservative therapy.     He is being seen today to discuss worsening of his symptoms despite additional PT that was obtained in [facility]. Describes the pain as constant, aching, and stabbing across the low back with radiation down the R> L leg. Rates the pain as a 9/10. Aggravating factors include walking and prolonged standing. Alleviating factors include laying down. Denies b/b dysfunction, saddle anesthesia, or gait instability. Endorses weakness in the right leg associated with numbness and tingling.      Regarding his neck pain, the patient states that the pain is midline and radiates down the right arm stopping at the shoulder. Describes the pain as constant aching with numbness and tingling. Rates the pain as 5-6/10. Reports balance difficulties and weakness in his hands when grabbing objects.      Interval Hx 12/2/21: After the last appointment, it was recommended that the patient obtain updated imaging to evaluate his radicular complaints. He is being seen in clinic today with his wife to discuss the image findings. States that his symptoms have not changed since his last appointment. He continues to struggle with low back and right leg pain affecting his ambulation. The patient is interested in surgery.     Interval Hx 1/13/22: The patient is being seen in clinic today to review his recent MRI and answer any additional questions regarding his upcoming surgery. States  that his symptoms have not changed since his last appointment. He is eager to proceed with surgery. Dr. Reed is recommending a posterior fusion L4-pelvis due to the grade 2 isthmic spondylolisthesis contributing to severe bilateral foraminal stenosis.    Review of patient's allergies indicates:  No Known Allergies    Current Outpatient Medications   Medication Sig Dispense Refill    meloxicam (MOBIC) 15 MG tablet Take 1 tablet (15 mg total) by mouth once daily. 30 tablet 1    tamsulosin (FLOMAX) 0.4 mg Cap Take 1 capsule by mouth once daily 30 capsule 11    tamsulosin (FLOMAX) 0.4 mg Cap Take 1 capsule (0.4 mg total) by mouth once daily. 90 capsule 1    UNABLE TO FIND Bleaching cream      triamcinolone acetonide 0.1% (KENALOG) 0.1 % cream Apply topically 2 (two) times daily. Apply to rash. DO NOT USE ON FACE. 45 g 1     No current facility-administered medications for this visit.       No past medical history on file.  Past Surgical History:   Procedure Laterality Date    EPIDURAL STEROID INJECTION N/A 10/21/2020    Procedure: Injection, Steroid, Epidural Caudal;  Surgeon: Vipul Nixon Jr., MD;  Location: [facility];  Service: Pain Management;  Laterality: N/A;  Caudal KURT  Arrive @ 1315; No ATC or DM; Needs MD Signature     Family History    None       Social History     Socioeconomic History    Marital status: Single   Tobacco Use    Smoking status: Former Smoker    Smokeless tobacco: Never Used   Substance and Sexual Activity    Alcohol use: Yes     Comment: socially    Drug use: No       Review of Systems   Constitutional: Negative for activity change, appetite change and fever.   HENT: Negative for hearing loss and postnasal drip.    Eyes: Negative for pain and visual disturbance.   Respiratory: Negative for shortness of breath.    Cardiovascular: Negative for chest pain and palpitations.   Gastrointestinal: Negative for abdominal pain.   Endocrine: Negative for polydipsia, polyphagia and  polyuria.   Genitourinary: Negative for difficulty urinating, frequency and urgency.   Musculoskeletal: Positive for arthralgias, back pain and myalgias. Negative for gait problem.   Skin: Negative for color change and wound.   Neurological: Positive for weakness and numbness. Negative for dizziness and headaches.   Psychiatric/Behavioral: Negative for confusion and dysphoric mood.       OBJECTIVE:     Vital Signs  Pulse: 91  BP: 127/78  Pain Score:   8  There is no height or weight on file to calculate BMI.    Neurosurgery Physical Exam  General: well developed, well nourished, no distress.   Head: normocephalic, atraumatic  Neurologic: Alert and oriented. Thought content appropriate.  GCS: Motor: 6/Verbal: 5/Eyes: 4 GCS Total: 15  Mental Status: Awake, Alert, Oriented x 4  Language: No aphasia  Speech: No dysarthria  Cranial nerves: face symmetric, tongue midline, CN II-XII grossly intact.   Eyes: pupils equal, round, reactive to light with accomodation, EOMI.   Pulmonary: normal respirations, no signs of respiratory distress  Abdomen: soft, non-distended  Skin: Skin is warm, dry and intact.  Sensory: intact to light touch throughout  Motor Strength:Moves all extremities spontaneously with good tone.  No abnormal movements seen.     Strength  Deltoids Triceps Biceps Wrist Extension Wrist Flexion Hand    Upper: R 5/5 5/5 5/5 5/5 5/5 5/5    L 5/5 5/5 5/5 5/5 5/5 5/5     HF KE KF DF PF EHL   Lower: R 5/5 5/5 5/5 5/5 4/5 4/5    L 5/5 5/5 5/5 5/5 5/5 5/5     Reflexes:   DTR: 2+ symmetrically throughout.  Lyons's: Negative.  Babinski's: Negative.  Clonus: Negative.     Cerebellar:   Finger-to-nose: intact bilaterally   Pronator drift: absent bilaterally  Gait stable, antalgic     Cervical:   ROM: Full with flexion, extension, lateral rotation and ear-to-shoulder bend.   Midline TTP: Negative.     Thoracic:  Midline TTP: Negative.     Lumbar:  Midline TTP: Negative.  Straight Leg Test: Negative.    Other:  SI joint  TTP: Negative.  Greater trochanter TTP: Negative.  Tenderness with external/internal hip rotation: Negative.    Diagnostic Results:  I have personally reviewed the thoracic MRI dated 1/13/22 with Dr. Reed. No spinal canal stenosis or significant neural foraminal narrowing. T2 hyperintense lesion in the right posterior T5 vertebral body, possibly atypical hemangioma.    ASSESSMENT/PLAN:   Emeka Lau is a 46 y.o. male with PMH of BPH and smoker. The patient was seen in clinic today to review his recent MRI and answer any additional questions regarding his upcoming surgery. He is eager to proceed with surgery and denies additional questions or concerns. Dr. Reed is recommending a posterior fusion L4-pelvis due to the grade 2 isthmic spondylolisthesis contributing to severe bilateral foraminal stenosis. I have encouraged him to contact the clinic with any questions, concerns, or adverse clinical changes. He verbalized understanding.     KELLY David-AMELIA  Neurosurgery  [facility] [place].     Note dictated with voice recognition software, please excuse any grammatical errors.

## 2022-01-24 ENCOUNTER — TELEPHONE (OUTPATIENT)
Dept: OPHTHALMOLOGY | Facility: CLINIC | Age: 47
End: 2022-01-24
Payer: MEDICAID

## 2022-01-24 ENCOUNTER — OFFICE VISIT (OUTPATIENT)
Dept: FAMILY MEDICINE | Facility: CLINIC | Age: 47
End: 2022-01-24
Payer: MEDICAID

## 2022-01-24 VITALS
SYSTOLIC BLOOD PRESSURE: 136 MMHG | HEIGHT: 68 IN | BODY MASS INDEX: 32.71 KG/M2 | DIASTOLIC BLOOD PRESSURE: 84 MMHG | OXYGEN SATURATION: 99 % | HEART RATE: 92 BPM | RESPIRATION RATE: 18 BRPM | TEMPERATURE: 98 F | WEIGHT: 215.81 LBS

## 2022-01-24 DIAGNOSIS — E78.2 MIXED DYSLIPIDEMIA: ICD-10-CM

## 2022-01-24 DIAGNOSIS — Z13.5 SCREENING FOR DIABETIC RETINOPATHY: ICD-10-CM

## 2022-01-24 DIAGNOSIS — M48.061 SPINAL STENOSIS, LUMBAR REGION, WITHOUT NEUROGENIC CLAUDICATION: ICD-10-CM

## 2022-01-24 DIAGNOSIS — E11.9 NEWLY DIAGNOSED DIABETES: Primary | ICD-10-CM

## 2022-01-24 PROCEDURE — 1159F MED LIST DOCD IN RCRD: CPT | Mod: CPTII,,, | Performed by: FAMILY MEDICINE

## 2022-01-24 PROCEDURE — 3008F PR BODY MASS INDEX (BMI) DOCUMENTED: ICD-10-PCS | Mod: CPTII,,, | Performed by: FAMILY MEDICINE

## 2022-01-24 PROCEDURE — 1159F PR MEDICATION LIST DOCUMENTED IN MEDICAL RECORD: ICD-10-PCS | Mod: CPTII,,, | Performed by: FAMILY MEDICINE

## 2022-01-24 PROCEDURE — 99999 PR PBB SHADOW E&M-EST. PATIENT-LVL V: ICD-10-PCS | Mod: PBBFAC,,, | Performed by: FAMILY MEDICINE

## 2022-01-24 PROCEDURE — 3075F SYST BP GE 130 - 139MM HG: CPT | Mod: CPTII,,, | Performed by: FAMILY MEDICINE

## 2022-01-24 PROCEDURE — 3079F DIAST BP 80-89 MM HG: CPT | Mod: CPTII,,, | Performed by: FAMILY MEDICINE

## 2022-01-24 PROCEDURE — 3075F PR MOST RECENT SYSTOLIC BLOOD PRESS GE 130-139MM HG: ICD-10-PCS | Mod: CPTII,,, | Performed by: FAMILY MEDICINE

## 2022-01-24 PROCEDURE — 1160F PR REVIEW ALL MEDS BY PRESCRIBER/CLIN PHARMACIST DOCUMENTED: ICD-10-PCS | Mod: CPTII,,, | Performed by: FAMILY MEDICINE

## 2022-01-24 PROCEDURE — 1160F RVW MEDS BY RX/DR IN RCRD: CPT | Mod: CPTII,,, | Performed by: FAMILY MEDICINE

## 2022-01-24 PROCEDURE — 99999 PR PBB SHADOW E&M-EST. PATIENT-LVL V: CPT | Mod: PBBFAC,,, | Performed by: FAMILY MEDICINE

## 2022-01-24 PROCEDURE — 3051F HG A1C>EQUAL 7.0%<8.0%: CPT | Mod: CPTII,,, | Performed by: FAMILY MEDICINE

## 2022-01-24 PROCEDURE — 99215 OFFICE O/P EST HI 40 MIN: CPT | Mod: PBBFAC,PN | Performed by: FAMILY MEDICINE

## 2022-01-24 PROCEDURE — 99214 OFFICE O/P EST MOD 30 MIN: CPT | Mod: S$PBB,,, | Performed by: FAMILY MEDICINE

## 2022-01-24 PROCEDURE — 3008F BODY MASS INDEX DOCD: CPT | Mod: CPTII,,, | Performed by: FAMILY MEDICINE

## 2022-01-24 PROCEDURE — 3051F PR MOST RECENT HEMOGLOBIN A1C LEVEL 7.0 - < 8.0%: ICD-10-PCS | Mod: CPTII,,, | Performed by: FAMILY MEDICINE

## 2022-01-24 PROCEDURE — 99214 PR OFFICE/OUTPT VISIT, EST, LEVL IV, 30-39 MIN: ICD-10-PCS | Mod: S$PBB,,, | Performed by: FAMILY MEDICINE

## 2022-01-24 PROCEDURE — 3079F PR MOST RECENT DIASTOLIC BLOOD PRESSURE 80-89 MM HG: ICD-10-PCS | Mod: CPTII,,, | Performed by: FAMILY MEDICINE

## 2022-01-24 RX ORDER — INSULIN PUMP SYRINGE, 3 ML
EACH MISCELLANEOUS
Qty: 1 EACH | Refills: 0 | Status: SHIPPED | OUTPATIENT
Start: 2022-01-24 | End: 2023-10-24

## 2022-01-24 RX ORDER — LANCETS
EACH MISCELLANEOUS
Qty: 100 EACH | Refills: 11 | Status: SHIPPED | OUTPATIENT
Start: 2022-01-24

## 2022-01-24 RX ORDER — METFORMIN HYDROCHLORIDE 500 MG/1
1000 TABLET, EXTENDED RELEASE ORAL 2 TIMES DAILY WITH MEALS
Qty: 360 TABLET | Refills: 3 | Status: SHIPPED | OUTPATIENT
Start: 2022-01-24 | End: 2022-05-10 | Stop reason: SDUPTHER

## 2022-01-24 RX ORDER — OMEGA-3-ACID ETHYL ESTERS 1 G/1
2 CAPSULE, LIQUID FILLED ORAL 2 TIMES DAILY
Qty: 360 CAPSULE | Refills: 3 | Status: SHIPPED | OUTPATIENT
Start: 2022-01-24 | End: 2022-01-26

## 2022-01-24 NOTE — PATIENT INSTRUCTIONS
Metformin 500mg  Week one- 1 tablet with breakfast and 1 tablet with dinner  Week two- 2 tablets with breakfast and 1 tablet with dinner  Week three- 2 tablets with breakfast and 2 tablets with dinner    Lovaza (fish oil)  2 pills with breakfast and 2 pills with dinner    Check sugars at least every morning prior to eating    Bring glucometer to next office visit

## 2022-01-24 NOTE — TELEPHONE ENCOUNTER
----- Message from Jet Velez sent at 1/24/2022  2:08 PM CST -----  Regarding: Appt  Contact: Emeka  Calling to schedule appt from referral on file for diabetes.    [phone]

## 2022-01-24 NOTE — PROGRESS NOTES
01/24/22 1311   Depression Patient Health Questionnaire (PHQ-2)   Over the last two weeks how often have you been bothered by little interest or pleasure in doing things 0   Over the last two weeks how often have you been bothered by feeling down, depressed or hopeless 0   PHQ-2 Total Score 0

## 2022-01-25 NOTE — PROGRESS NOTES
Subjective:       Patient ID: Emeka Lau is a 46 y.o. male.    Chief Complaint: No chief complaint on file.    HPI   46-year-old male comes in to start care for diabetes.  His recent lab testing has shown an A1c of 7.3.  Last year his A1c was in prediabetic range.  Patient reports an extensive family history of diabetes on both sides of his family.  The patient is present with his wife.  The wife reports that patient does eating very poorly including a lot of bread products and sweets.  He also drinks a ton of juice and as sweeping activated as well.  His labs also showed a triglyceride over 600. He is not taking any antihyperlipidemic medications.    Wife reports that patient is scheduled for spine surgery in a few weeks and surgeon wanted patient to start treatment for diabetes prior to surgery.  The patient's wife is diabetic and knows how to do fingersticks.    Review of Systems   Constitutional: Negative for unexpected weight change.   HENT: Negative for ear pain and sore throat.    Eyes: Negative for visual disturbance.   Gastrointestinal: Negative for abdominal pain and blood in stool.   Genitourinary: Negative for dysuria and frequency.   Integumentary:  Negative for rash.   Neurological: Negative for weakness, numbness and headaches.   Hematological: Negative for adenopathy.   Psychiatric/Behavioral: Negative for suicidal ideas.         Objective:      Physical Exam  Vitals reviewed.   Constitutional:       General: He is not in acute distress.  HENT:      Head: Normocephalic and atraumatic.      Right Ear: Ear canal and external ear normal.      Left Ear: Ear canal and external ear normal.      Nose: Nose normal.      Mouth/Throat:      Mouth: Mucous membranes are moist.      Pharynx: No oropharyngeal exudate or posterior oropharyngeal erythema.   Eyes:      Extraocular Movements: Extraocular movements intact.      Conjunctiva/sclera: Conjunctivae normal.      Pupils: Pupils are equal, round, and  reactive to light.   Cardiovascular:      Rate and Rhythm: Normal rate and regular rhythm.      Pulses: Normal pulses.   Abdominal:      General: Abdomen is flat. Bowel sounds are normal. There is no distension.      Palpations: Abdomen is soft.      Tenderness: There is no abdominal tenderness. There is no guarding.   Skin:     General: Skin is warm.      Capillary Refill: Capillary refill takes less than 2 seconds.   Neurological:      Mental Status: He is alert and oriented to person, place, and time.      Cranial Nerves: No cranial nerve deficit.      Sensory: No sensory deficit.   Psychiatric:         Mood and Affect: Mood normal.         Behavior: Behavior normal.       Protective Sensation (w/ 10 gram monofilament):  Right: Intact  Left: Intact    Visual Inspection:  Normal -  Bilateral    Pedal Pulses:   Right: Present  Left: Present    Posterior tibialis:   Right:Present  Left: Present      Assessment:       Problem List Items Addressed This Visit    None     Visit Diagnoses     Newly diagnosed diabetes    -  Primary    Relevant Medications    metFORMIN (GLUCOPHAGE-XR) 500 MG ER 24hr tablet    blood-glucose meter kit    lancets Misc    blood sugar diagnostic Strp    Other Relevant Orders    Ambulatory referral/consult to Optometry    Ambulatory referral/consult to Diabetes Education    Comprehensive Metabolic Panel    Lipid Panel    Hemoglobin A1C    Microalbumin/Creatinine Ratio, Urine    Mixed dyslipidemia        Relevant Medications    omega-3 acid ethyl esters (LOVAZA) 1 gram capsule    Other Relevant Orders    Lipid Panel    Spinal stenosis, lumbar region, without neurogenic claudication        Screening for diabetic retinopathy        Relevant Orders    Ambulatory referral/consult to Optometry          Plan:       Diagnoses and all orders for this visit:    Newly diagnosed diabetes  -     metFORMIN (GLUCOPHAGE-XR) 500 MG ER 24hr tablet; Take 2 tablets (1,000 mg total) by mouth 2 (two) times daily with  meals.  -     Ambulatory referral/consult to Optometry; Future  -     blood-glucose meter kit; To check BG 3 times daily, to use with insurance preferred meter  -     lancets Misc; To check BG 3 times daily, to use with insurance preferred meter  -     blood sugar diagnostic Strp; To check BG 3 times daily, to use with insurance preferred meter  -     Ambulatory referral/consult to Diabetes Education; Future  -     Comprehensive Metabolic Panel; Future  -     Lipid Panel; Future  -     Hemoglobin A1C; Future  -     Microalbumin/Creatinine Ratio, Urine; Future  New diagnosis diabetes discussed with patient and wife.  Encouraged to start checking fingerstick glucose.  Will start patient on metformin.  Instructions on how to titrate given to patient.  Follow-up in 4 to 8 weeks.  Importance of yearly eye exam and routine home foot checks explained.  Foot exam done in office today was normal.  Encourage patient to also schedule appointment with diabetic Education and referral for such was placed.    Mixed dyslipidemia  -     omega-3 acid ethyl esters (LOVAZA) 1 gram capsule; Take 2 capsules (2 g total) by mouth 2 (two) times daily.  -     Lipid Panel; Future  Will need to decrease triglycerides and recheck lipids.  Will start patient elevated maximum dose of 4 grams daily.  Advised patient and wife that if medication is not covered they are to let me know so we can send in for an alternate treatment.    Spinal stenosis, lumbar region, without neurogenic claudication  Management as per spine surgeon    Screening for diabetic retinopathy  -     Ambulatory referral/consult to Optometry; Future

## 2022-01-26 ENCOUNTER — TELEPHONE (OUTPATIENT)
Dept: FAMILY MEDICINE | Facility: CLINIC | Age: 47
End: 2022-01-26
Payer: MEDICAID

## 2022-01-26 DIAGNOSIS — E78.2 MIXED DYSLIPIDEMIA: Primary | ICD-10-CM

## 2022-01-26 RX ORDER — GEMFIBROZIL 600 MG/1
600 TABLET, FILM COATED ORAL
Qty: 180 TABLET | Refills: 3 | Status: SHIPPED | OUTPATIENT
Start: 2022-01-26 | End: 2022-05-10 | Stop reason: SDUPTHER

## 2022-01-26 NOTE — TELEPHONE ENCOUNTER
"----- Message from Kasey Chaudhry MA sent at 1/25/2022  2:21 PM CST -----  Regarding: FW: Alana "wife" [phone]  Please Advise   ----- Message -----  From: Tarah Galeas  Sent: 1/25/2022   2:20 PM CST  To: [facility] Staff  Subject: Alana "wife" [phone]                        .Type: Patient Call Back    Who called: Alana "wife"    What is the request in detail: Requesting a change in omega-3 acid ethyl esters (LOVAZA) 1 gram capsule due to the high cost. Please send a new rx to .  [id number]  [address] [address] - [address] [address] [facility]  [place] [address]  Phone: [phone] Fax: [phone]    Can the clinic reply by [facility]? Call back    Would the patient rather a call back or a response via [facility]? Call back    Best call back number: .[phone]            "

## 2022-01-26 NOTE — TELEPHONE ENCOUNTER
Please let them know I sent a prescription for a different called gemfibrozil. He takes it 30 minutes before breakfast and 30 minutes before dinner

## 2022-02-08 ENCOUNTER — ANESTHESIA (OUTPATIENT)
Dept: SURGERY | Facility: HOSPITAL | Age: 47
DRG: 453 | End: 2022-02-08
Payer: MEDICAID

## 2022-02-08 ENCOUNTER — HOSPITAL ENCOUNTER (INPATIENT)
Facility: HOSPITAL | Age: 47
LOS: 6 days | Discharge: HOME | DRG: 453 | End: 2022-02-14
Attending: NEUROLOGICAL SURGERY | Admitting: NEUROLOGICAL SURGERY
Payer: MEDICAID

## 2022-02-08 DIAGNOSIS — Z01.818 PREOPERATIVE TESTING: ICD-10-CM

## 2022-02-08 DIAGNOSIS — M43.17 SPONDYLOLISTHESIS OF LUMBOSACRAL REGION: Primary | ICD-10-CM

## 2022-02-08 DIAGNOSIS — R00.0 TACHYCARDIA: ICD-10-CM

## 2022-02-08 DIAGNOSIS — M43.16 SPONDYLOLISTHESIS OF LUMBAR REGION: ICD-10-CM

## 2022-02-08 LAB
ABO + RH BLD: NORMAL
ABO + RH BLD: NORMAL
ANION GAP SERPL CALC-SCNC: 3 MMOL/L (ref 8–16)
ANION GAP SERPL CALC-SCNC: 7 MMOL/L (ref 8–16)
APTT BLDCRRT: 25.2 SEC (ref 21–32)
BASOPHILS # BLD AUTO: 0.02 K/UL (ref 0–0.2)
BASOPHILS # BLD AUTO: 0.04 K/UL (ref 0–0.2)
BASOPHILS NFR BLD: 0.3 % (ref 0–1.9)
BASOPHILS NFR BLD: 0.7 % (ref 0–1.9)
BLD GP AB SCN CELLS X3 SERPL QL: NORMAL
BLD GP AB SCN CELLS X3 SERPL QL: NORMAL
BUN SERPL-MCNC: 13 MG/DL (ref 6–20)
BUN SERPL-MCNC: 13 MG/DL (ref 6–20)
CALCIUM SERPL-MCNC: 8.3 MG/DL (ref 8.7–10.5)
CALCIUM SERPL-MCNC: 9.6 MG/DL (ref 8.7–10.5)
CHLORIDE SERPL-SCNC: 107 MMOL/L (ref 95–110)
CHLORIDE SERPL-SCNC: 111 MMOL/L (ref 95–110)
CO2 SERPL-SCNC: 25 MMOL/L (ref 23–29)
CO2 SERPL-SCNC: 26 MMOL/L (ref 23–29)
CREAT SERPL-MCNC: 0.8 MG/DL (ref 0.5–1.4)
CREAT SERPL-MCNC: 1 MG/DL (ref 0.5–1.4)
CTP QC/QA: YES
DIFFERENTIAL METHOD: ABNORMAL
DIFFERENTIAL METHOD: ABNORMAL
EOSINOPHIL # BLD AUTO: 0 K/UL (ref 0–0.5)
EOSINOPHIL # BLD AUTO: 0.1 K/UL (ref 0–0.5)
EOSINOPHIL NFR BLD: 0.1 % (ref 0–8)
EOSINOPHIL NFR BLD: 2.5 % (ref 0–8)
ERYTHROCYTE [DISTWIDTH] IN BLOOD BY AUTOMATED COUNT: 13.5 % (ref 11.5–14.5)
ERYTHROCYTE [DISTWIDTH] IN BLOOD BY AUTOMATED COUNT: 13.8 % (ref 11.5–14.5)
EST. GFR  (AFRICAN AMERICAN): >60 ML/MIN/1.73 M^2
EST. GFR  (AFRICAN AMERICAN): >60 ML/MIN/1.73 M^2
EST. GFR  (NON AFRICAN AMERICAN): >60 ML/MIN/1.73 M^2
EST. GFR  (NON AFRICAN AMERICAN): >60 ML/MIN/1.73 M^2
GLUCOSE SERPL-MCNC: 142 MG/DL (ref 70–110)
GLUCOSE SERPL-MCNC: 189 MG/DL (ref 70–110)
HCT VFR BLD AUTO: 36 % (ref 40–54)
HCT VFR BLD AUTO: 46.9 % (ref 40–54)
HGB BLD-MCNC: 11.7 G/DL (ref 14–18)
HGB BLD-MCNC: 15.1 G/DL (ref 14–18)
IMM GRANULOCYTES # BLD AUTO: 0.01 K/UL (ref 0–0.04)
IMM GRANULOCYTES # BLD AUTO: 0.11 K/UL (ref 0–0.04)
IMM GRANULOCYTES NFR BLD AUTO: 0.2 % (ref 0–0.5)
IMM GRANULOCYTES NFR BLD AUTO: 1.6 % (ref 0–0.5)
INR PPP: 1 (ref 0.8–1.2)
LYMPHOCYTES # BLD AUTO: 1.2 K/UL (ref 1–4.8)
LYMPHOCYTES # BLD AUTO: 3.1 K/UL (ref 1–4.8)
LYMPHOCYTES NFR BLD: 17.5 % (ref 18–48)
LYMPHOCYTES NFR BLD: 54.2 % (ref 18–48)
MCH RBC QN AUTO: 27.4 PG (ref 27–31)
MCH RBC QN AUTO: 27.8 PG (ref 27–31)
MCHC RBC AUTO-ENTMCNC: 32.2 G/DL (ref 32–36)
MCHC RBC AUTO-ENTMCNC: 32.5 G/DL (ref 32–36)
MCV RBC AUTO: 84 FL (ref 82–98)
MCV RBC AUTO: 86 FL (ref 82–98)
MONOCYTES # BLD AUTO: 0.1 K/UL (ref 0.3–1)
MONOCYTES # BLD AUTO: 0.5 K/UL (ref 0.3–1)
MONOCYTES NFR BLD: 1.9 % (ref 4–15)
MONOCYTES NFR BLD: 8 % (ref 4–15)
NEUTROPHILS # BLD AUTO: 1.9 K/UL (ref 1.8–7.7)
NEUTROPHILS # BLD AUTO: 5.4 K/UL (ref 1.8–7.7)
NEUTROPHILS NFR BLD: 34.4 % (ref 38–73)
NEUTROPHILS NFR BLD: 78.6 % (ref 38–73)
NRBC BLD-RTO: 0 /100 WBC
NRBC BLD-RTO: 0 /100 WBC
PLATELET # BLD AUTO: 239 K/UL (ref 150–450)
PLATELET # BLD AUTO: 242 K/UL (ref 150–450)
PMV BLD AUTO: 10.4 FL (ref 9.2–12.9)
PMV BLD AUTO: 10.7 FL (ref 9.2–12.9)
POCT GLUCOSE: 152 MG/DL (ref 70–110)
POCT GLUCOSE: 172 MG/DL (ref 70–110)
POCT GLUCOSE: 203 MG/DL (ref 70–110)
POTASSIUM SERPL-SCNC: 4 MMOL/L (ref 3.5–5.1)
POTASSIUM SERPL-SCNC: 4.3 MMOL/L (ref 3.5–5.1)
PROTHROMBIN TIME: 10.2 SEC (ref 9–12.5)
RBC # BLD AUTO: 4.27 M/UL (ref 4.6–6.2)
RBC # BLD AUTO: 5.43 M/UL (ref 4.6–6.2)
SARS-COV-2 AG RESP QL IA.RAPID: NEGATIVE
SODIUM SERPL-SCNC: 139 MMOL/L (ref 136–145)
SODIUM SERPL-SCNC: 140 MMOL/L (ref 136–145)
WBC # BLD AUTO: 5.63 K/UL (ref 3.9–12.7)
WBC # BLD AUTO: 6.81 K/UL (ref 3.9–12.7)

## 2022-02-08 PROCEDURE — 63600175 PHARM REV CODE 636 W HCPCS: Performed by: STUDENT IN AN ORGANIZED HEALTH CARE EDUCATION/TRAINING PROGRAM

## 2022-02-08 PROCEDURE — 37000009 HC ANESTHESIA EA ADD 15 MINS: Performed by: NEUROLOGICAL SURGERY

## 2022-02-08 PROCEDURE — 22633 ARTHRD CMBN 1NTRSPC LUMBAR: CPT | Mod: 62,,, | Performed by: ORTHOPAEDIC SURGERY

## 2022-02-08 PROCEDURE — 20936 SP BONE AGRFT LOCAL ADD-ON: CPT | Mod: ,,, | Performed by: NEUROLOGICAL SURGERY

## 2022-02-08 PROCEDURE — 11000001 HC ACUTE MED/SURG PRIVATE ROOM

## 2022-02-08 PROCEDURE — 80048 BASIC METABOLIC PNL TOTAL CA: CPT | Mod: 91 | Performed by: NEUROLOGICAL SURGERY

## 2022-02-08 PROCEDURE — 25000003 PHARM REV CODE 250: Performed by: NEUROLOGICAL SURGERY

## 2022-02-08 PROCEDURE — 82962 GLUCOSE BLOOD TEST: CPT | Performed by: NEUROLOGICAL SURGERY

## 2022-02-08 PROCEDURE — 22842 PR POSTERIOR SEGMENTAL INSTRUMENTATION 3-6 VRT SEG: ICD-10-PCS | Mod: ,,, | Performed by: NEUROLOGICAL SURGERY

## 2022-02-08 PROCEDURE — 22842 INSERT SPINE FIXATION DEVICE: CPT | Mod: 80,,, | Performed by: ORTHOPAEDIC SURGERY

## 2022-02-08 PROCEDURE — C1713 ANCHOR/SCREW BN/BN,TIS/BN: HCPCS | Performed by: NEUROLOGICAL SURGERY

## 2022-02-08 PROCEDURE — 25000003 PHARM REV CODE 250: Performed by: STUDENT IN AN ORGANIZED HEALTH CARE EDUCATION/TRAINING PROGRAM

## 2022-02-08 PROCEDURE — 22853 PR INSERT BIOMECH DEV W/INTERBODY ARTHRODESIS, EA CONTIGUOUS DEFECT: ICD-10-PCS | Mod: 80,,, | Performed by: ORTHOPAEDIC SURGERY

## 2022-02-08 PROCEDURE — 61783 SCAN PROC SPINAL: CPT | Mod: ,,, | Performed by: NEUROLOGICAL SURGERY

## 2022-02-08 PROCEDURE — 22634 PR ARTHRODESIS, COMBINED TECHN, SNGL INTERSPACE, EA ADDTL: ICD-10-PCS | Mod: 62,,, | Performed by: ORTHOPAEDIC SURGERY

## 2022-02-08 PROCEDURE — 94761 N-INVAS EAR/PLS OXIMETRY MLT: CPT

## 2022-02-08 PROCEDURE — 85730 THROMBOPLASTIN TIME PARTIAL: CPT | Performed by: STUDENT IN AN ORGANIZED HEALTH CARE EDUCATION/TRAINING PROGRAM

## 2022-02-08 PROCEDURE — D9220A PRA ANESTHESIA: Mod: ,,, | Performed by: ANESTHESIOLOGY

## 2022-02-08 PROCEDURE — 22848 PR PELVIC FIXATION OTHER THAN SACRUM: ICD-10-PCS | Mod: 80,,, | Performed by: ORTHOPAEDIC SURGERY

## 2022-02-08 PROCEDURE — 63600175 PHARM REV CODE 636 W HCPCS: Performed by: NEUROLOGICAL SURGERY

## 2022-02-08 PROCEDURE — 27800903 OPTIME MED/SURG SUP & DEVICES OTHER IMPLANTS: Performed by: NEUROLOGICAL SURGERY

## 2022-02-08 PROCEDURE — 71000016 HC POSTOP RECOV ADDL HR: Performed by: NEUROLOGICAL SURGERY

## 2022-02-08 PROCEDURE — 22634 ARTHRD CMBN 1NTRSPC EA ADDL: CPT | Mod: 62,,, | Performed by: NEUROLOGICAL SURGERY

## 2022-02-08 PROCEDURE — 20936 PR AUTOGRAFT SPINE SURGERY LOCAL FROM SAME INCISION: ICD-10-PCS | Mod: ,,, | Performed by: NEUROLOGICAL SURGERY

## 2022-02-08 PROCEDURE — 36000711: Performed by: NEUROLOGICAL SURGERY

## 2022-02-08 PROCEDURE — D9220A PRA ANESTHESIA: ICD-10-PCS | Mod: ,,, | Performed by: ANESTHESIOLOGY

## 2022-02-08 PROCEDURE — 22842 PR POSTERIOR SEGMENTAL INSTRUMENTATION 3-6 VRT SEG: ICD-10-PCS | Mod: 80,,, | Performed by: ORTHOPAEDIC SURGERY

## 2022-02-08 PROCEDURE — 86850 RBC ANTIBODY SCREEN: CPT | Mod: 91 | Performed by: STUDENT IN AN ORGANIZED HEALTH CARE EDUCATION/TRAINING PROGRAM

## 2022-02-08 PROCEDURE — 36415 COLL VENOUS BLD VENIPUNCTURE: CPT | Performed by: STUDENT IN AN ORGANIZED HEALTH CARE EDUCATION/TRAINING PROGRAM

## 2022-02-08 PROCEDURE — C1729 CATH, DRAINAGE: HCPCS | Performed by: NEUROLOGICAL SURGERY

## 2022-02-08 PROCEDURE — 71000039 HC RECOVERY, EACH ADD'L HOUR: Performed by: NEUROLOGICAL SURGERY

## 2022-02-08 PROCEDURE — 22633 PR ARTHRODESIS, COMBINED TECHN, SNGL INTERSPACE, LUMBAR: ICD-10-PCS | Mod: 62,,, | Performed by: ORTHOPAEDIC SURGERY

## 2022-02-08 PROCEDURE — 22842 INSERT SPINE FIXATION DEVICE: CPT | Mod: ,,, | Performed by: NEUROLOGICAL SURGERY

## 2022-02-08 PROCEDURE — 22633 ARTHRD CMBN 1NTRSPC LUMBAR: CPT | Mod: 62,,, | Performed by: NEUROLOGICAL SURGERY

## 2022-02-08 PROCEDURE — 71000015 HC POSTOP RECOV 1ST HR: Performed by: NEUROLOGICAL SURGERY

## 2022-02-08 PROCEDURE — 22853 PR INSERT BIOMECH DEV W/INTERBODY ARTHRODESIS, EA CONTIGUOUS DEFECT: ICD-10-PCS | Mod: ,,, | Performed by: NEUROLOGICAL SURGERY

## 2022-02-08 PROCEDURE — 61783 PR STEREOTACTIC COMP ASSIST PROC,SPINAL: ICD-10-PCS | Mod: ,,, | Performed by: NEUROLOGICAL SURGERY

## 2022-02-08 PROCEDURE — 27201423 OPTIME MED/SURG SUP & DEVICES STERILE SUPPLY: Performed by: NEUROLOGICAL SURGERY

## 2022-02-08 PROCEDURE — 22848 INSERT PELV FIXATION DEVICE: CPT | Mod: ,,, | Performed by: NEUROLOGICAL SURGERY

## 2022-02-08 PROCEDURE — 85610 PROTHROMBIN TIME: CPT | Performed by: STUDENT IN AN ORGANIZED HEALTH CARE EDUCATION/TRAINING PROGRAM

## 2022-02-08 PROCEDURE — 22633 PR ARTHRODESIS, COMBINED TECHN, SNGL INTERSPACE, LUMBAR: ICD-10-PCS | Mod: 62,,, | Performed by: NEUROLOGICAL SURGERY

## 2022-02-08 PROCEDURE — 22848 INSERT PELV FIXATION DEVICE: CPT | Mod: 80,,, | Performed by: ORTHOPAEDIC SURGERY

## 2022-02-08 PROCEDURE — 37000008 HC ANESTHESIA 1ST 15 MINUTES: Performed by: NEUROLOGICAL SURGERY

## 2022-02-08 PROCEDURE — 22853 INSJ BIOMECHANICAL DEVICE: CPT | Mod: 80,,, | Performed by: ORTHOPAEDIC SURGERY

## 2022-02-08 PROCEDURE — 86901 BLOOD TYPING SEROLOGIC RH(D): CPT | Performed by: ANESTHESIOLOGY

## 2022-02-08 PROCEDURE — 22853 INSJ BIOMECHANICAL DEVICE: CPT | Mod: ,,, | Performed by: NEUROLOGICAL SURGERY

## 2022-02-08 PROCEDURE — 22634 ARTHRD CMBN 1NTRSPC EA ADDL: CPT | Mod: 62,,, | Performed by: ORTHOPAEDIC SURGERY

## 2022-02-08 PROCEDURE — 85025 COMPLETE CBC W/AUTO DIFF WBC: CPT | Mod: 91 | Performed by: STUDENT IN AN ORGANIZED HEALTH CARE EDUCATION/TRAINING PROGRAM

## 2022-02-08 PROCEDURE — 80048 BASIC METABOLIC PNL TOTAL CA: CPT | Performed by: STUDENT IN AN ORGANIZED HEALTH CARE EDUCATION/TRAINING PROGRAM

## 2022-02-08 PROCEDURE — 94799 UNLISTED PULMONARY SVC/PX: CPT

## 2022-02-08 PROCEDURE — 71000033 HC RECOVERY, INTIAL HOUR: Performed by: NEUROLOGICAL SURGERY

## 2022-02-08 PROCEDURE — 22634 PR ARTHRODESIS, COMBINED TECHN, SNGL INTERSPACE, EA ADDTL: ICD-10-PCS | Mod: 62,,, | Performed by: NEUROLOGICAL SURGERY

## 2022-02-08 PROCEDURE — 27201037 HC PRESSURE MONITORING SET UP

## 2022-02-08 PROCEDURE — 36000710: Performed by: NEUROLOGICAL SURGERY

## 2022-02-08 PROCEDURE — 99900035 HC TECH TIME PER 15 MIN (STAT)

## 2022-02-08 PROCEDURE — 63600175 PHARM REV CODE 636 W HCPCS

## 2022-02-08 PROCEDURE — 22848 PR PELVIC FIXATION OTHER THAN SACRUM: ICD-10-PCS | Mod: ,,, | Performed by: NEUROLOGICAL SURGERY

## 2022-02-08 DEVICE — CAGE POST LUM LORDTC 9MM 9/22: Type: IMPLANTABLE DEVICE | Site: SPINE LUMBAR | Status: FUNCTIONAL

## 2022-02-08 DEVICE — SCREW EXPEDIUM VERSE PA 8X80MM: Type: IMPLANTABLE DEVICE | Site: SPINE LUMBAR | Status: FUNCTIONAL

## 2022-02-08 DEVICE — SCREW EXPEDIUM FEN STRL 6X40MM: Type: IMPLANTABLE DEVICE | Site: SPINE LUMBAR | Status: FUNCTIONAL

## 2022-02-08 DEVICE — SET SCREW EXPEDIUM VERSE UNITZ: Type: IMPLANTABLE DEVICE | Site: SPINE LUMBAR | Status: FUNCTIONAL

## 2022-02-08 DEVICE — SCREW INNER SINGLE SET TITANIU: Type: IMPLANTABLE DEVICE | Site: SPINE LUMBAR | Status: FUNCTIONAL

## 2022-02-08 DEVICE — SCREW BONE SPINAL 5.5 6 X 40MM: Type: IMPLANTABLE DEVICE | Site: SPINE LUMBAR | Status: FUNCTIONAL

## 2022-02-08 DEVICE — SCREW BONE SPINAL 5.5 6 X 45MM: Type: IMPLANTABLE DEVICE | Site: SPINE LUMBAR | Status: FUNCTIONAL

## 2022-02-08 DEVICE — IMPLANTABLE DEVICE: Type: IMPLANTABLE DEVICE | Site: SPINE LUMBAR | Status: FUNCTIONAL

## 2022-02-08 DEVICE — CAGE POST LUM LORDTC 12MM 9X26: Type: IMPLANTABLE DEVICE | Site: SPINE LUMBAR | Status: FUNCTIONAL

## 2022-02-08 DEVICE — BONE CHIP CANC 1.7-10MM 15ML: Type: IMPLANTABLE DEVICE | Site: SPINE LUMBAR | Status: FUNCTIONAL

## 2022-02-08 DEVICE — KIT BONE GRFT BMP SM: Type: IMPLANTABLE DEVICE | Site: SPINE LUMBAR | Status: FUNCTIONAL

## 2022-02-08 RX ORDER — DEXAMETHASONE SODIUM PHOSPHATE 4 MG/ML
INJECTION, SOLUTION INTRA-ARTICULAR; INTRALESIONAL; INTRAMUSCULAR; INTRAVENOUS; SOFT TISSUE
Status: DISCONTINUED | OUTPATIENT
Start: 2022-02-08 | End: 2022-02-08

## 2022-02-08 RX ORDER — PROPOFOL 10 MG/ML
VIAL (ML) INTRAVENOUS CONTINUOUS PRN
Status: DISCONTINUED | OUTPATIENT
Start: 2022-02-08 | End: 2022-02-08

## 2022-02-08 RX ORDER — BISACODYL 10 MG
10 SUPPOSITORY, RECTAL RECTAL DAILY
Status: DISCONTINUED | OUTPATIENT
Start: 2022-02-08 | End: 2022-02-11

## 2022-02-08 RX ORDER — OXYCODONE HYDROCHLORIDE 10 MG/1
10 TABLET ORAL EVERY 4 HOURS PRN
Status: DISCONTINUED | OUTPATIENT
Start: 2022-02-08 | End: 2022-02-14 | Stop reason: HOSPADM

## 2022-02-08 RX ORDER — MIDAZOLAM HYDROCHLORIDE 1 MG/ML
INJECTION, SOLUTION INTRAMUSCULAR; INTRAVENOUS
Status: DISCONTINUED | OUTPATIENT
Start: 2022-02-08 | End: 2022-02-08

## 2022-02-08 RX ORDER — HYDROMORPHONE HYDROCHLORIDE 2 MG/ML
INJECTION, SOLUTION INTRAMUSCULAR; INTRAVENOUS; SUBCUTANEOUS
Status: DISCONTINUED | OUTPATIENT
Start: 2022-02-08 | End: 2022-02-08

## 2022-02-08 RX ORDER — CEFAZOLIN SODIUM 1 G/3ML
INJECTION, POWDER, FOR SOLUTION INTRAMUSCULAR; INTRAVENOUS
Status: DISCONTINUED | OUTPATIENT
Start: 2022-02-08 | End: 2022-02-08

## 2022-02-08 RX ORDER — ROCURONIUM BROMIDE 10 MG/ML
INJECTION, SOLUTION INTRAVENOUS
Status: DISCONTINUED | OUTPATIENT
Start: 2022-02-08 | End: 2022-02-08

## 2022-02-08 RX ORDER — PROCHLORPERAZINE EDISYLATE 5 MG/ML
5 INJECTION INTRAMUSCULAR; INTRAVENOUS EVERY 6 HOURS PRN
Status: DISCONTINUED | OUTPATIENT
Start: 2022-02-08 | End: 2022-02-14 | Stop reason: HOSPADM

## 2022-02-08 RX ORDER — MAG HYDROX/ALUMINUM HYD/SIMETH 200-200-20
30 SUSPENSION, ORAL (FINAL DOSE FORM) ORAL EVERY 4 HOURS PRN
Status: DISCONTINUED | OUTPATIENT
Start: 2022-02-08 | End: 2022-02-14 | Stop reason: HOSPADM

## 2022-02-08 RX ORDER — ACETAMINOPHEN 10 MG/ML
INJECTION, SOLUTION INTRAVENOUS
Status: DISCONTINUED | OUTPATIENT
Start: 2022-02-08 | End: 2022-02-08

## 2022-02-08 RX ORDER — INSULIN ASPART 100 [IU]/ML
1-10 INJECTION, SOLUTION INTRAVENOUS; SUBCUTANEOUS
Status: DISCONTINUED | OUTPATIENT
Start: 2022-02-08 | End: 2022-02-14 | Stop reason: HOSPADM

## 2022-02-08 RX ORDER — FENTANYL CITRATE 50 UG/ML
25 INJECTION, SOLUTION INTRAMUSCULAR; INTRAVENOUS EVERY 5 MIN PRN
Status: DISCONTINUED | OUTPATIENT
Start: 2022-02-08 | End: 2022-02-08 | Stop reason: HOSPADM

## 2022-02-08 RX ORDER — MUPIROCIN 20 MG/G
OINTMENT TOPICAL
Status: DISCONTINUED | OUTPATIENT
Start: 2022-02-08 | End: 2022-02-08

## 2022-02-08 RX ORDER — PHENYLEPHRINE HCL IN 0.9% NACL 1 MG/10 ML
SYRINGE (ML) INTRAVENOUS
Status: DISPENSED
Start: 2022-02-08 | End: 2022-02-09

## 2022-02-08 RX ORDER — CEFAZOLIN SODIUM/WATER 2 G/20 ML
2 SYRINGE (ML) INTRAVENOUS
Status: DISCONTINUED | OUTPATIENT
Start: 2022-02-08 | End: 2022-02-08

## 2022-02-08 RX ORDER — HYDROMORPHONE HYDROCHLORIDE 1 MG/ML
INJECTION, SOLUTION INTRAMUSCULAR; INTRAVENOUS; SUBCUTANEOUS
Status: COMPLETED
Start: 2022-02-08 | End: 2022-02-08

## 2022-02-08 RX ORDER — AMOXICILLIN 250 MG
2 CAPSULE ORAL NIGHTLY PRN
Status: DISCONTINUED | OUTPATIENT
Start: 2022-02-08 | End: 2022-02-12

## 2022-02-08 RX ORDER — PROCHLORPERAZINE EDISYLATE 5 MG/ML
5 INJECTION INTRAMUSCULAR; INTRAVENOUS EVERY 30 MIN PRN
Status: DISCONTINUED | OUTPATIENT
Start: 2022-02-08 | End: 2022-02-08 | Stop reason: HOSPADM

## 2022-02-08 RX ORDER — PHENYLEPHRINE HYDROCHLORIDE 10 MG/ML
INJECTION INTRAVENOUS
Status: DISCONTINUED | OUTPATIENT
Start: 2022-02-08 | End: 2022-02-08

## 2022-02-08 RX ORDER — ONDANSETRON 2 MG/ML
4 INJECTION INTRAMUSCULAR; INTRAVENOUS DAILY PRN
Status: DISCONTINUED | OUTPATIENT
Start: 2022-02-08 | End: 2022-02-08 | Stop reason: HOSPADM

## 2022-02-08 RX ORDER — MUPIROCIN 20 MG/G
1 OINTMENT TOPICAL 2 TIMES DAILY
Status: DISCONTINUED | OUTPATIENT
Start: 2022-02-08 | End: 2022-02-08

## 2022-02-08 RX ORDER — IBUPROFEN 200 MG
24 TABLET ORAL
Status: DISCONTINUED | OUTPATIENT
Start: 2022-02-08 | End: 2022-02-14 | Stop reason: HOSPADM

## 2022-02-08 RX ORDER — VANCOMYCIN HYDROCHLORIDE 1 G/20ML
INJECTION, POWDER, LYOPHILIZED, FOR SOLUTION INTRAVENOUS
Status: DISCONTINUED | OUTPATIENT
Start: 2022-02-08 | End: 2022-02-08 | Stop reason: HOSPADM

## 2022-02-08 RX ORDER — ONDANSETRON 8 MG/1
8 TABLET, ORALLY DISINTEGRATING ORAL EVERY 6 HOURS PRN
Status: DISCONTINUED | OUTPATIENT
Start: 2022-02-08 | End: 2022-02-14 | Stop reason: HOSPADM

## 2022-02-08 RX ORDER — SUCCINYLCHOLINE CHLORIDE 20 MG/ML
INJECTION INTRAMUSCULAR; INTRAVENOUS
Status: DISCONTINUED | OUTPATIENT
Start: 2022-02-08 | End: 2022-02-08

## 2022-02-08 RX ORDER — KETAMINE HCL IN 0.9 % NACL 50 MG/5 ML
SYRINGE (ML) INTRAVENOUS
Status: DISCONTINUED | OUTPATIENT
Start: 2022-02-08 | End: 2022-02-08

## 2022-02-08 RX ORDER — LIDOCAINE HYDROCHLORIDE 20 MG/ML
INJECTION INTRAVENOUS
Status: DISCONTINUED | OUTPATIENT
Start: 2022-02-08 | End: 2022-02-08

## 2022-02-08 RX ORDER — ONDANSETRON 2 MG/ML
INJECTION INTRAMUSCULAR; INTRAVENOUS
Status: DISCONTINUED | OUTPATIENT
Start: 2022-02-08 | End: 2022-02-08

## 2022-02-08 RX ORDER — TAMSULOSIN HYDROCHLORIDE 0.4 MG/1
1 CAPSULE ORAL DAILY
Status: DISCONTINUED | OUTPATIENT
Start: 2022-02-08 | End: 2022-02-14 | Stop reason: HOSPADM

## 2022-02-08 RX ORDER — HYDROMORPHONE HYDROCHLORIDE 1 MG/ML
1 INJECTION, SOLUTION INTRAMUSCULAR; INTRAVENOUS; SUBCUTANEOUS
Status: DISCONTINUED | OUTPATIENT
Start: 2022-02-08 | End: 2022-02-12

## 2022-02-08 RX ORDER — GLUCAGON 1 MG
1 KIT INJECTION
Status: DISCONTINUED | OUTPATIENT
Start: 2022-02-08 | End: 2022-02-14 | Stop reason: HOSPADM

## 2022-02-08 RX ORDER — HYDROMORPHONE HYDROCHLORIDE 1 MG/ML
0.2 INJECTION, SOLUTION INTRAMUSCULAR; INTRAVENOUS; SUBCUTANEOUS EVERY 5 MIN PRN
Status: DISCONTINUED | OUTPATIENT
Start: 2022-02-08 | End: 2022-02-08 | Stop reason: HOSPADM

## 2022-02-08 RX ORDER — DEXMEDETOMIDINE HYDROCHLORIDE 100 UG/ML
INJECTION, SOLUTION INTRAVENOUS
Status: DISCONTINUED | OUTPATIENT
Start: 2022-02-08 | End: 2022-02-08

## 2022-02-08 RX ORDER — FENTANYL CITRATE 50 UG/ML
INJECTION, SOLUTION INTRAMUSCULAR; INTRAVENOUS
Status: DISCONTINUED | OUTPATIENT
Start: 2022-02-08 | End: 2022-02-08

## 2022-02-08 RX ORDER — MUPIROCIN 20 MG/G
OINTMENT TOPICAL 2 TIMES DAILY
Status: DISPENSED | OUTPATIENT
Start: 2022-02-08 | End: 2022-02-13

## 2022-02-08 RX ORDER — IBUPROFEN 200 MG
16 TABLET ORAL
Status: DISCONTINUED | OUTPATIENT
Start: 2022-02-08 | End: 2022-02-14 | Stop reason: HOSPADM

## 2022-02-08 RX ORDER — DIPHENHYDRAMINE HYDROCHLORIDE 50 MG/ML
25 INJECTION INTRAMUSCULAR; INTRAVENOUS EVERY 6 HOURS PRN
Status: DISCONTINUED | OUTPATIENT
Start: 2022-02-08 | End: 2022-02-08 | Stop reason: HOSPADM

## 2022-02-08 RX ORDER — SODIUM CHLORIDE 0.9 % (FLUSH) 0.9 %
10 SYRINGE (ML) INJECTION
Status: DISCONTINUED | OUTPATIENT
Start: 2022-02-08 | End: 2022-02-14 | Stop reason: HOSPADM

## 2022-02-08 RX ADMIN — PHENYLEPHRINE HYDROCHLORIDE 100 MCG: 10 INJECTION INTRAVENOUS at 12:02

## 2022-02-08 RX ADMIN — DEXMEDETOMIDINE HYDROCHLORIDE 12 MCG: 100 INJECTION, SOLUTION INTRAVENOUS at 11:02

## 2022-02-08 RX ADMIN — ROCURONIUM BROMIDE 5 MG: 10 INJECTION, SOLUTION INTRAVENOUS at 07:02

## 2022-02-08 RX ADMIN — KETAMINE HYDROCHLORIDE 2 MCG/KG/MIN: 50 INJECTION INTRAMUSCULAR; INTRAVENOUS at 07:02

## 2022-02-08 RX ADMIN — ACETAMINOPHEN 1000 MG: 10 INJECTION INTRAVENOUS at 10:02

## 2022-02-08 RX ADMIN — REMIFENTANIL HYDROCHLORIDE 0.2 MCG/KG/MIN: 1 INJECTION, POWDER, LYOPHILIZED, FOR SOLUTION INTRAVENOUS at 07:02

## 2022-02-08 RX ADMIN — PROPOFOL 150 MG: 10 INJECTION, EMULSION INTRAVENOUS at 07:02

## 2022-02-08 RX ADMIN — DEXMEDETOMIDINE HYDROCHLORIDE 8 MCG: 100 INJECTION, SOLUTION INTRAVENOUS at 11:02

## 2022-02-08 RX ADMIN — PROPOFOL 125 MCG/KG/MIN: 10 INJECTION, EMULSION INTRAVENOUS at 10:02

## 2022-02-08 RX ADMIN — OXYCODONE HYDROCHLORIDE 10 MG: 10 TABLET ORAL at 01:02

## 2022-02-08 RX ADMIN — HYDROMORPHONE HYDROCHLORIDE 0.2 MG: 1 INJECTION, SOLUTION INTRAMUSCULAR; INTRAVENOUS; SUBCUTANEOUS at 01:02

## 2022-02-08 RX ADMIN — CEFAZOLIN 2 G: 330 INJECTION, POWDER, FOR SOLUTION INTRAMUSCULAR; INTRAVENOUS at 11:02

## 2022-02-08 RX ADMIN — ROCURONIUM BROMIDE 30 MG: 10 INJECTION, SOLUTION INTRAVENOUS at 08:02

## 2022-02-08 RX ADMIN — HYDROMORPHONE HYDROCHLORIDE 0.2 MG: 1 INJECTION, SOLUTION INTRAMUSCULAR; INTRAVENOUS; SUBCUTANEOUS at 12:02

## 2022-02-08 RX ADMIN — HYDROMORPHONE HYDROCHLORIDE 0.4 MG: 2 INJECTION INTRAMUSCULAR; INTRAVENOUS; SUBCUTANEOUS at 11:02

## 2022-02-08 RX ADMIN — PHENYLEPHRINE HYDROCHLORIDE 100 MCG: 10 INJECTION INTRAVENOUS at 07:02

## 2022-02-08 RX ADMIN — PHENYLEPHRINE HYDROCHLORIDE 100 MCG: 10 INJECTION INTRAVENOUS at 08:02

## 2022-02-08 RX ADMIN — SODIUM CHLORIDE: 0.9 INJECTION, SOLUTION INTRAVENOUS at 06:02

## 2022-02-08 RX ADMIN — LIDOCAINE HYDROCHLORIDE 100 MG: 20 INJECTION, SOLUTION INTRAVENOUS at 07:02

## 2022-02-08 RX ADMIN — MUPIROCIN: 20 OINTMENT TOPICAL at 06:02

## 2022-02-08 RX ADMIN — TAMSULOSIN HYDROCHLORIDE 0.4 MG: 0.4 CAPSULE ORAL at 03:02

## 2022-02-08 RX ADMIN — FENTANYL CITRATE 25 MCG: 50 INJECTION INTRAMUSCULAR; INTRAVENOUS at 11:02

## 2022-02-08 RX ADMIN — DEXAMETHASONE SODIUM PHOSPHATE 8 MG: 4 INJECTION, SOLUTION INTRAMUSCULAR; INTRAVENOUS at 07:02

## 2022-02-08 RX ADMIN — CEFAZOLIN 2 G: 330 INJECTION, POWDER, FOR SOLUTION INTRAMUSCULAR; INTRAVENOUS at 07:02

## 2022-02-08 RX ADMIN — ONDANSETRON HYDROCHLORIDE 4 MG: 2 INJECTION INTRAMUSCULAR; INTRAVENOUS at 11:02

## 2022-02-08 RX ADMIN — FENTANYL CITRATE 100 MCG: 50 INJECTION INTRAMUSCULAR; INTRAVENOUS at 07:02

## 2022-02-08 RX ADMIN — MUPIROCIN: 20 OINTMENT TOPICAL at 08:02

## 2022-02-08 RX ADMIN — HYDROMORPHONE HYDROCHLORIDE 0.4 MG: 2 INJECTION INTRAMUSCULAR; INTRAVENOUS; SUBCUTANEOUS at 10:02

## 2022-02-08 RX ADMIN — SUCCINYLCHOLINE CHLORIDE 120 MG: 20 INJECTION, SOLUTION INTRAMUSCULAR; INTRAVENOUS at 07:02

## 2022-02-08 RX ADMIN — Medication 35 MG: at 08:02

## 2022-02-08 RX ADMIN — OXYCODONE HYDROCHLORIDE 10 MG: 10 TABLET ORAL at 08:02

## 2022-02-08 RX ADMIN — MIDAZOLAM HYDROCHLORIDE 2 MG: 1 INJECTION, SOLUTION INTRAMUSCULAR; INTRAVENOUS at 07:02

## 2022-02-08 RX ADMIN — PHENYLEPHRINE HYDROCHLORIDE 0.2 MCG/KG/MIN: 10 INJECTION INTRAVENOUS at 08:02

## 2022-02-08 NOTE — TRANSFER OF CARE
"Anesthesia Transfer of Care Note    Patient: Emeka Caballero    Procedure(s) Performed: Procedure(s) (LRB):  FUSION, SPINE, LUMBAR (N/A)    Patient location: PACU    Anesthesia Type: general    Transport from OR: Transported from OR on 6-10 L/min O2 by face mask with adequate spontaneous ventilation    Post pain: adequate analgesia    Post assessment: no apparent anesthetic complications    Post vital signs: unstable (MAP 58 to be addressed by PACU resident)    Level of consciousness: awake and sedated    Nausea/Vomiting: no nausea/vomiting    Complications: none    Transfer of care protocol was followed      Last vitals:   Visit Vitals  BP (!) 76/44 (BP Location: Right arm, Patient Position: Lying)   Pulse 92   Temp 36.4 °C (97.5 °F) (Temporal)   Resp 18   Ht 5' 8" (1.727 m)   Wt 95.3 kg (210 lb)   SpO2 96%   BMI 31.93 kg/m²     "

## 2022-02-08 NOTE — BRIEF OP NOTE
[facility] - Surgery (2nd Fl)  Brief Operative Note    SUMMARY     Surgery Date: 2/8/2022     Surgeon(s) and Role:     * Alphonse Reed MD - Primary     * Tello Cunningham MD - Assisting     * Mateusz Lunsford MD - Resident - Assisting        Pre-op Diagnosis:  Acquired spondylolisthesis of lumbosacral region [M43.17]    Post-op Diagnosis:  Post-Op Diagnosis Codes:     * Acquired spondylolisthesis of lumbosacral region [M43.17]    Procedure(s) (LRB):  FUSION, SPINE, LUMBAR (N/A)    Anesthesia: General    Operative Findings: L4-iliac fusion, L4-5, L5-S1 TLIF    Estimated Blood Loss: 500 mL    Estimated Blood Loss has been documented.         Specimens:   Specimen (24h ago, onward)            None          [id number]

## 2022-02-08 NOTE — NURSING TRANSFER
Nursing Transfer Note      2/8/2022     Reason patient is being transferred: post procedure     Transfer To: [address]    Transfer via stretcher    Transfer with home wound vac supplies     Transported by PCTx1    Medicines sent: none     Any special needs or follow-up needed: routine     Chart send with patient: Yes    Notified: spouse    Patient reassessed at: on 2/8 at 1610

## 2022-02-08 NOTE — ANESTHESIA PROCEDURE NOTES
Intubation    Date/Time: 2/8/2022 7:33 AM  Performed by: Deejay Castillo DO  Authorized by: Rahul Wellington MD     Intubation:     Intubated:  Postinduction    Mask Ventilation:  Easy with oral airway    Attempts:  1    Attempted By:  Resident anesthesiologist    Method of Intubation:  Direct    Blade:  Jaylon 3    Laryngeal View Grade: Grade I - full view of cords      Difficult Airway Encountered?: No      Complications:  None    Airway Device Size:  8.0    Style/Cuff Inflation:  Cuffed (inflated to minimal occlusive pressure)    Tube secured:  24    Secured at:  The lips    Placement Verified By:  Capnometry    Complicating Factors:  None    Findings Post-Intubation:  BS equal bilateral and atraumatic/condition of teeth unchanged

## 2022-02-08 NOTE — SUBJECTIVE & OBJECTIVE
Medications Prior to Admission   Medication Sig Dispense Refill Last Dose    blood sugar diagnostic Strp To check BG 3 times daily, to use with insurance preferred meter 100 each 11     blood-glucose meter kit To check BG 3 times daily, to use with insurance preferred meter 1 each 0     gemfibroziL (LOPID) 600 MG tablet Take 1 tablet (600 mg total) by mouth 2 (two) times daily before meals. 180 tablet 3     lancets Misc To check BG 3 times daily, to use with insurance preferred meter 100 each 11     meloxicam (MOBIC) 15 MG tablet Take 1 tablet (15 mg total) by mouth once daily. 30 tablet 1     metFORMIN (GLUCOPHAGE-XR) 500 MG ER 24hr tablet Take 2 tablets (1,000 mg total) by mouth 2 (two) times daily with meals. 360 tablet 3     tamsulosin (FLOMAX) 0.4 mg Cap Take 1 capsule by mouth once daily 30 capsule 11     tamsulosin (FLOMAX) 0.4 mg Cap Take 1 capsule (0.4 mg total) by mouth once daily. 90 capsule 1     triamcinolone acetonide 0.1% (KENALOG) 0.1 % cream Apply topically 2 (two) times daily. Apply to rash. DO NOT USE ON FACE. 45 g 1     UNABLE TO FIND Bleaching cream          Review of patient's allergies indicates:  No Known Allergies    No past medical history on file.  Past Surgical History:   Procedure Laterality Date    EPIDURAL STEROID INJECTION N/A 10/21/2020    Procedure: Injection, Steroid, Epidural Caudal;  Surgeon: Vipul Nixon Jr., MD;  Location: [facility];  Service: Pain Management;  Laterality: N/A;  Caudal KURT  Arrive @ 1315; No ATC or DM; Needs MD Signature     Family History    None       Tobacco Use    Smoking status: Former Smoker    Smokeless tobacco: Never Used   Substance and Sexual Activity    Alcohol use: Yes     Comment: socially    Drug use: No    Sexual activity: Not on file     Review of Systems   Constitutional: Negative for activity change and appetite change.   HENT: Negative for congestion and dental problem.    Eyes: Negative for discharge and itching.    Respiratory: Negative for apnea and chest tightness.    Cardiovascular: Negative for chest pain and leg swelling.   Gastrointestinal: Negative for abdominal distention and abdominal pain.   Endocrine: Negative for cold intolerance and heat intolerance.   Genitourinary: Negative for difficulty urinating and dysuria.   Musculoskeletal: Negative for arthralgias and gait problem.   Neurological: Negative for dizziness and facial asymmetry.   Hematological: Negative for adenopathy.   Psychiatric/Behavioral: Negative for agitation and behavioral problems.     Objective:        There is no height or weight on file to calculate BMI.  Vital Signs (Most Recent):    Vital Signs (24h Range):                             Physical Exam  Neurosurgery Physical Exam  General: well developed, well nourished, no distress.   Head: normocephalic, atraumatic  Neurologic: Alert and oriented. Thought content appropriate.  GCS: Motor: 6/Verbal: 5/Eyes: 4 GCS Total: 15  Mental Status: Awake, Alert, Oriented x 4  Language: No aphasia  Speech: No dysarthria  Cranial nerves: face symmetric, tongue midline, CN II-XII grossly intact.   Eyes: pupils equal, round, reactive to light with accomodation, EOMI.   Pulmonary: normal respirations, no signs of respiratory distress  Abdomen: soft, non-distended  Skin: Skin is warm, dry and intact.  Sensory: intact to light touch throughout  Motor Strength:Moves all extremities spontaneously with good tone.  No abnormal movements seen.      Strength   Deltoids Triceps Biceps Wrist Extension Wrist Flexion Hand    Upper: R 5/5 5/5 5/5 5/5 5/5 5/5     L 5/5 5/5 5/5 5/5 5/5 5/5       HF KE KF DF PF EHL   Lower: R 5/5 5/5 5/5 5/5 4/5 4/5     L 5/5 5/5 5/5 5/5 5/5 5/5      Reflexes:   DTR: 2+ symmetrically throughout.  Lyons's: Negative.  Babinski's: Negative.  Clonus: Negative.     Cerebellar:   Finger-to-nose: intact bilaterally   Pronator drift: absent bilaterally  Gait stable, antalgic     Cervical:    ROM: Full with flexion, extension, lateral rotation and ear-to-shoulder bend.   Midline TTP: Negative.     Thoracic:  Midline TTP: Negative.     Lumbar:  Midline TTP: Negative.  Straight Leg Test: Negative.     Other:  SI joint TTP: Negative.  Greater trochanter TTP: Negative.  Tenderness with external/internal hip rotation: Negative    Neurosurgery Physical Exam    Significant Labs:  No results for input(s): GLU, NA, K, CL, CO2, BUN, CREATININE, CALCIUM, MG in the last 48 hours.  No results for input(s): WBC, HGB, HCT, PLT in the last 48 hours.  No results for input(s): LABPT, INR, APTT in the last 48 hours.  Microbiology Results (last 7 days)     ** No results found for the last 168 hours. **        All pertinent labs from the last 24 hours have been reviewed.    Significant Diagnostics:  I have reviewed all pertinent imaging results/findings within the past 24 hours.

## 2022-02-08 NOTE — H&P
[facility] - Surgery ([facility])  Neuorsurgery  History and Physical     Patient Name: Emeka Caballero  MRN: [id number]  Admission Date: 2/8/2022  Attending Physician: Alphonse Reed MD   Primary Care Physician: Dio Zarate Jr, MD    Patient information was obtained from patient.     Subjective:     Chief Complaint/Reason for Admission: lumbar fusion     HPI:  Emeka Lau is a 46 y.o. male with PMH of BPH.  offered, but patient declined as he and his wife are English speaking. The patient was last seen in clinic on 10/19/20 with Jennifer Sol PA-C to discuss concerns with worsening low back pain despite conservative therapy. At that appointment, it was discussed that the patient could benefit from surgery. The patient was apprehensive about proceeding with surgery and wanted to obtain more conservative therapy.     He is being seen today to discuss worsening of his symptoms despite additional PT that was obtained in [facility]. Describes the pain as constant, aching, and stabbing across the low back with radiation down the R> L leg. Rates the pain as a 9/10. Aggravating factors include walking and prolonged standing. Alleviating factors include laying down. Denies b/b dysfunction, saddle anesthesia, or gait instability. Endorses weakness in the right leg associated with numbness and tingling.      Regarding his neck pain, the patient states that the pain is midline and radiates down the right arm stopping at the shoulder. Describes the pain as constant aching with numbness and tingling. Rates the pain as 5-6/10. Reports balance difficulties and weakness in his hands when grabbing objects.      Interval Hx 12/2/21: After the last appointment, it was recommended that the patient obtain updated imaging to evaluate his radicular complaints. He is being seen in clinic today with his wife to discuss the image findings. States that his symptoms have not changed since his last appointment. He  continues to struggle with low back and right leg pain affecting his ambulation. The patient is interested in surgery.      Interval Hx 1/13/22: The patient is being seen in clinic today to review his recent MRI and answer any additional questions regarding his upcoming surgery. States that his symptoms have not changed since his last appointment. He is eager to proceed with surgery. Dr. Reed is recommending a posterior fusion L4-pelvis due to the grade 2 isthmic spondylolisthesis contributing to severe bilateral foraminal stenosis.      Medications Prior to Admission   Medication Sig Dispense Refill Last Dose    blood sugar diagnostic Strp To check BG 3 times daily, to use with insurance preferred meter 100 each 11     blood-glucose meter kit To check BG 3 times daily, to use with insurance preferred meter 1 each 0     gemfibroziL (LOPID) 600 MG tablet Take 1 tablet (600 mg total) by mouth 2 (two) times daily before meals. 180 tablet 3     lancets Misc To check BG 3 times daily, to use with insurance preferred meter 100 each 11     meloxicam (MOBIC) 15 MG tablet Take 1 tablet (15 mg total) by mouth once daily. 30 tablet 1     metFORMIN (GLUCOPHAGE-XR) 500 MG ER 24hr tablet Take 2 tablets (1,000 mg total) by mouth 2 (two) times daily with meals. 360 tablet 3     tamsulosin (FLOMAX) 0.4 mg Cap Take 1 capsule by mouth once daily 30 capsule 11     tamsulosin (FLOMAX) 0.4 mg Cap Take 1 capsule (0.4 mg total) by mouth once daily. 90 capsule 1     triamcinolone acetonide 0.1% (KENALOG) 0.1 % cream Apply topically 2 (two) times daily. Apply to rash. DO NOT USE ON FACE. 45 g 1     UNABLE TO FIND Bleaching cream          Review of patient's allergies indicates:  No Known Allergies    No past medical history on file.  Past Surgical History:   Procedure Laterality Date    EPIDURAL STEROID INJECTION N/A 10/21/2020    Procedure: Injection, Steroid, Epidural Caudal;  Surgeon: Vipul Nixon Jr., MD;  Location:  [facility] [place] ENDO;  Service: Pain Management;  Laterality: N/A;  Caudal KURT  Arrive @ 1315; No ATC or DM; Needs MD Signature     Family History    None       Tobacco Use    Smoking status: Former Smoker    Smokeless tobacco: Never Used   Substance and Sexual Activity    Alcohol use: Yes     Comment: socially    Drug use: No    Sexual activity: Not on file     Review of Systems   Constitutional: Negative for activity change and appetite change.   HENT: Negative for congestion and dental problem.    Eyes: Negative for discharge and itching.   Respiratory: Negative for apnea and chest tightness.    Cardiovascular: Negative for chest pain and leg swelling.   Gastrointestinal: Negative for abdominal distention and abdominal pain.   Endocrine: Negative for cold intolerance and heat intolerance.   Genitourinary: Negative for difficulty urinating and dysuria.   Musculoskeletal: Negative for arthralgias and gait problem.   Neurological: Negative for dizziness and facial asymmetry.   Hematological: Negative for adenopathy.   Psychiatric/Behavioral: Negative for agitation and behavioral problems.     Objective:        There is no height or weight on file to calculate BMI.  Vital Signs (Most Recent):    Vital Signs (24h Range):                             Physical Exam  Neurosurgery Physical Exam  General: well developed, well nourished, no distress.   Head: normocephalic, atraumatic  Neurologic: Alert and oriented. Thought content appropriate.  GCS: Motor: 6/Verbal: 5/Eyes: 4 GCS Total: 15  Mental Status: Awake, Alert, Oriented x 4  Language: No aphasia  Speech: No dysarthria  Cranial nerves: face symmetric, tongue midline, CN II-XII grossly intact.   Eyes: pupils equal, round, reactive to light with accomodation, EOMI.   Pulmonary: normal respirations, no signs of respiratory distress  Abdomen: soft, non-distended  Skin: Skin is warm, dry and intact.  Sensory: intact to light touch throughout  Motor Strength:Moves all  extremities spontaneously with good tone.  No abnormal movements seen.      Strength   Deltoids Triceps Biceps Wrist Extension Wrist Flexion Hand    Upper: R 5/5 5/5 5/5 5/5 5/5 5/5     L 5/5 5/5 5/5 5/5 5/5 5/5       HF KE KF DF PF EHL   Lower: R 5/5 5/5 5/5 5/5 4/5 4/5     L 5/5 5/5 5/5 5/5 5/5 5/5      Reflexes:   DTR: 2+ symmetrically throughout.  Lyons's: Negative.  Babinski's: Negative.  Clonus: Negative.     Cerebellar:   Finger-to-nose: intact bilaterally   Pronator drift: absent bilaterally  Gait stable, antalgic     Cervical:   ROM: Full with flexion, extension, lateral rotation and ear-to-shoulder bend.   Midline TTP: Negative.     Thoracic:  Midline TTP: Negative.     Lumbar:  Midline TTP: Negative.  Straight Leg Test: Negative.     Other:  SI joint TTP: Negative.  Greater trochanter TTP: Negative.  Tenderness with external/internal hip rotation: Negative    Neurosurgery Physical Exam    Significant Labs:  No results for input(s): GLU, NA, K, CL, CO2, BUN, CREATININE, CALCIUM, MG in the last 48 hours.  No results for input(s): WBC, HGB, HCT, PLT in the last 48 hours.  No results for input(s): LABPT, INR, APTT in the last 48 hours.  Microbiology Results (last 7 days)     ** No results found for the last 168 hours. **        All pertinent labs from the last 24 hours have been reviewed.    Significant Diagnostics:  I have reviewed all pertinent imaging results/findings within the past 24 hours.    Assessment and Plan:     Spondylolisthesis of lumbosacral region  Emeka Lau is a 46 y.o. male with PMH of BPH and smoker. The patient was seen in clinic today to review his recent MRI and answer any additional questions regarding his upcoming surgery. He is eager to proceed with surgery and denies additional questions or concerns. Dr. Reed is recommending a posterior fusion L4-pelvis due to the grade 2 isthmic spondylolisthesis contributing to severe bilateral foraminal stenosis. I have encouraged  him to contact the clinic with any questions, concerns, or adverse clinical changes. He verbalized understanding.         Mateusz Lunsford MD  Neurosurgery  [facility] - Surgery ([facility])

## 2022-02-08 NOTE — HPI
Emeka Lau is a 46 y.o. male with PMH of BPH.  offered, but patient declined as he and his wife are English speaking. The patient was last seen in clinic on 10/19/20 with Jennifer Sol PA-C to discuss concerns with worsening low back pain despite conservative therapy. At that appointment, it was discussed that the patient could benefit from surgery. The patient was apprehensive about proceeding with surgery and wanted to obtain more conservative therapy.     He is being seen today to discuss worsening of his symptoms despite additional PT that was obtained in [facility]. Describes the pain as constant, aching, and stabbing across the low back with radiation down the R> L leg. Rates the pain as a 9/10. Aggravating factors include walking and prolonged standing. Alleviating factors include laying down. Denies b/b dysfunction, saddle anesthesia, or gait instability. Endorses weakness in the right leg associated with numbness and tingling.      Regarding his neck pain, the patient states that the pain is midline and radiates down the right arm stopping at the shoulder. Describes the pain as constant aching with numbness and tingling. Rates the pain as 5-6/10. Reports balance difficulties and weakness in his hands when grabbing objects.      Interval Hx 12/2/21: After the last appointment, it was recommended that the patient obtain updated imaging to evaluate his radicular complaints. He is being seen in clinic today with his wife to discuss the image findings. States that his symptoms have not changed since his last appointment. He continues to struggle with low back and right leg pain affecting his ambulation. The patient is interested in surgery.      Interval Hx 1/13/22: The patient is being seen in clinic today to review his recent MRI and answer any additional questions regarding his upcoming surgery. States that his symptoms have not changed since his last appointment. He is eager to  proceed with surgery. Dr. Reed is recommending a posterior fusion L4-pelvis due to the grade 2 isthmic spondylolisthesis contributing to severe bilateral foraminal stenosis.

## 2022-02-08 NOTE — ASSESSMENT & PLAN NOTE
Emeka Lau is a 46 y.o. male with PMH of BPH and smoker. The patient was seen in clinic today to review his recent MRI and answer any additional questions regarding his upcoming surgery. He is eager to proceed with surgery and denies additional questions or concerns. Dr. Reed is recommending a posterior fusion L4-pelvis due to the grade 2 isthmic spondylolisthesis contributing to severe bilateral foraminal stenosis. I have encouraged him to contact the clinic with any questions, concerns, or adverse clinical changes. He verbalized understanding.

## 2022-02-09 LAB
BASOPHILS # BLD AUTO: 0.03 K/UL (ref 0–0.2)
BASOPHILS NFR BLD: 0.2 % (ref 0–1.9)
DIFFERENTIAL METHOD: ABNORMAL
EOSINOPHIL # BLD AUTO: 0 K/UL (ref 0–0.5)
EOSINOPHIL NFR BLD: 0.3 % (ref 0–8)
ERYTHROCYTE [DISTWIDTH] IN BLOOD BY AUTOMATED COUNT: 13.4 % (ref 11.5–14.5)
HCT VFR BLD AUTO: 33.9 % (ref 40–54)
HGB BLD-MCNC: 11.1 G/DL (ref 14–18)
IMM GRANULOCYTES # BLD AUTO: 0.06 K/UL (ref 0–0.04)
IMM GRANULOCYTES NFR BLD AUTO: 0.4 % (ref 0–0.5)
LYMPHOCYTES # BLD AUTO: 4.8 K/UL (ref 1–4.8)
LYMPHOCYTES NFR BLD: 34.7 % (ref 18–48)
MCH RBC QN AUTO: 27.3 PG (ref 27–31)
MCHC RBC AUTO-ENTMCNC: 32.7 G/DL (ref 32–36)
MCV RBC AUTO: 84 FL (ref 82–98)
MONOCYTES # BLD AUTO: 0.8 K/UL (ref 0.3–1)
MONOCYTES NFR BLD: 6.1 % (ref 4–15)
NEUTROPHILS # BLD AUTO: 8.1 K/UL (ref 1.8–7.7)
NEUTROPHILS NFR BLD: 58.3 % (ref 38–73)
NRBC BLD-RTO: 0 /100 WBC
PLATELET # BLD AUTO: 214 K/UL (ref 150–450)
PMV BLD AUTO: 10.4 FL (ref 9.2–12.9)
POCT GLUCOSE: 149 MG/DL (ref 70–110)
POCT GLUCOSE: 152 MG/DL (ref 70–110)
POCT GLUCOSE: 175 MG/DL (ref 70–110)
RBC # BLD AUTO: 4.06 M/UL (ref 4.6–6.2)
WBC # BLD AUTO: 13.86 K/UL (ref 3.9–12.7)

## 2022-02-09 PROCEDURE — 25000003 PHARM REV CODE 250: Performed by: PHYSICIAN ASSISTANT

## 2022-02-09 PROCEDURE — 85025 COMPLETE CBC W/AUTO DIFF WBC: CPT | Performed by: PHYSICIAN ASSISTANT

## 2022-02-09 PROCEDURE — 81001 URINALYSIS AUTO W/SCOPE: CPT | Performed by: NEUROLOGICAL SURGERY

## 2022-02-09 PROCEDURE — 97161 PT EVAL LOW COMPLEX 20 MIN: CPT

## 2022-02-09 PROCEDURE — 11000001 HC ACUTE MED/SURG PRIVATE ROOM

## 2022-02-09 PROCEDURE — 97116 GAIT TRAINING THERAPY: CPT

## 2022-02-09 PROCEDURE — 36415 COLL VENOUS BLD VENIPUNCTURE: CPT | Performed by: PHYSICIAN ASSISTANT

## 2022-02-09 PROCEDURE — 63600175 PHARM REV CODE 636 W HCPCS: Performed by: STUDENT IN AN ORGANIZED HEALTH CARE EDUCATION/TRAINING PROGRAM

## 2022-02-09 PROCEDURE — 25000003 PHARM REV CODE 250: Performed by: STUDENT IN AN ORGANIZED HEALTH CARE EDUCATION/TRAINING PROGRAM

## 2022-02-09 PROCEDURE — 63600175 PHARM REV CODE 636 W HCPCS: Performed by: PHYSICIAN ASSISTANT

## 2022-02-09 PROCEDURE — 97530 THERAPEUTIC ACTIVITIES: CPT

## 2022-02-09 PROCEDURE — 97165 OT EVAL LOW COMPLEX 30 MIN: CPT

## 2022-02-09 RX ORDER — HEPARIN SODIUM 5000 [USP'U]/ML
5000 INJECTION, SOLUTION INTRAVENOUS; SUBCUTANEOUS EVERY 8 HOURS
Status: DISCONTINUED | OUTPATIENT
Start: 2022-02-09 | End: 2022-02-14 | Stop reason: HOSPADM

## 2022-02-09 RX ORDER — CEFAZOLIN SODIUM 1 G/50ML
1 SOLUTION INTRAVENOUS
Status: DISCONTINUED | OUTPATIENT
Start: 2022-02-09 | End: 2022-02-11

## 2022-02-09 RX ORDER — METHOCARBAMOL 750 MG/1
750 TABLET, FILM COATED ORAL 4 TIMES DAILY
Status: DISCONTINUED | OUTPATIENT
Start: 2022-02-09 | End: 2022-02-14 | Stop reason: HOSPADM

## 2022-02-09 RX ORDER — METHOCARBAMOL 500 MG/1
500 TABLET, FILM COATED ORAL 4 TIMES DAILY PRN
Status: DISCONTINUED | OUTPATIENT
Start: 2022-02-09 | End: 2022-02-09

## 2022-02-09 RX ORDER — ACETAMINOPHEN 325 MG/1
650 TABLET ORAL EVERY 6 HOURS
Status: DISCONTINUED | OUTPATIENT
Start: 2022-02-09 | End: 2022-02-14 | Stop reason: HOSPADM

## 2022-02-09 RX ADMIN — METHOCARBAMOL 750 MG: 750 TABLET ORAL at 08:02

## 2022-02-09 RX ADMIN — OXYCODONE HYDROCHLORIDE 10 MG: 10 TABLET ORAL at 12:02

## 2022-02-09 RX ADMIN — INSULIN ASPART 2 UNITS: 100 INJECTION, SOLUTION INTRAVENOUS; SUBCUTANEOUS at 12:02

## 2022-02-09 RX ADMIN — MUPIROCIN: 20 OINTMENT TOPICAL at 09:02

## 2022-02-09 RX ADMIN — OXYCODONE HYDROCHLORIDE 10 MG: 10 TABLET ORAL at 04:02

## 2022-02-09 RX ADMIN — METHOCARBAMOL 750 MG: 750 TABLET ORAL at 02:02

## 2022-02-09 RX ADMIN — CEFAZOLIN SODIUM 1 G: 1 SOLUTION INTRAVENOUS at 10:02

## 2022-02-09 RX ADMIN — ACETAMINOPHEN 650 MG: 325 TABLET ORAL at 11:02

## 2022-02-09 RX ADMIN — METHOCARBAMOL 750 MG: 750 TABLET ORAL at 10:02

## 2022-02-09 RX ADMIN — OXYCODONE HYDROCHLORIDE 10 MG: 10 TABLET ORAL at 05:02

## 2022-02-09 RX ADMIN — CEFAZOLIN SODIUM 1 G: 1 SOLUTION INTRAVENOUS at 05:02

## 2022-02-09 RX ADMIN — ACETAMINOPHEN 650 MG: 325 TABLET ORAL at 05:02

## 2022-02-09 RX ADMIN — METHOCARBAMOL 750 MG: 750 TABLET ORAL at 05:02

## 2022-02-09 RX ADMIN — OXYCODONE HYDROCHLORIDE 10 MG: 10 TABLET ORAL at 08:02

## 2022-02-09 RX ADMIN — HEPARIN SODIUM 5000 UNITS: 5000 INJECTION INTRAVENOUS; SUBCUTANEOUS at 02:02

## 2022-02-09 RX ADMIN — MUPIROCIN: 20 OINTMENT TOPICAL at 08:02

## 2022-02-09 RX ADMIN — TAMSULOSIN HYDROCHLORIDE 0.4 MG: 0.4 CAPSULE ORAL at 08:02

## 2022-02-09 RX ADMIN — HEPARIN SODIUM 5000 UNITS: 5000 INJECTION INTRAVENOUS; SUBCUTANEOUS at 08:02

## 2022-02-09 RX ADMIN — SENNOSIDES AND DOCUSATE SODIUM 2 TABLET: 50; 8.6 TABLET ORAL at 08:02

## 2022-02-09 RX ADMIN — HYDROMORPHONE HYDROCHLORIDE 1 MG: 1 INJECTION, SOLUTION INTRAMUSCULAR; INTRAVENOUS; SUBCUTANEOUS at 08:02

## 2022-02-09 NOTE — ANESTHESIA POSTPROCEDURE EVALUATION
Anesthesia Post Evaluation    Patient: Emeka Caballero    Procedure(s) Performed: Procedure(s) (LRB):  FUSION, SPINE, LUMBAR (N/A)    Final Anesthesia Type: general      Patient location during evaluation: PACU  Patient participation: Yes- Able to Participate  Level of consciousness: awake and alert  Post-procedure vital signs: reviewed and stable  Pain management: adequate  Airway patency: patent    PONV status at discharge: No PONV  Anesthetic complications: no      Cardiovascular status: blood pressure returned to baseline  Respiratory status: unassisted  Hydration status: euvolemic  Follow-up not needed.          Vitals Value Taken Time   /60 02/09/22 1135   Temp 35.7 °C (96.2 °F) 02/09/22 1135   Pulse 106 02/09/22 1135   Resp 18 02/09/22 0816   SpO2 98 % 02/09/22 1135         Event Time   Out of Recovery 13:45:00         Pain/[facility] Score: Pain Rating Prior to Med Admin: 9 (2/9/2022 11:58 AM)  Pain Rating Post Med Admin: 4 (2/9/2022  1:12 AM)  [facility] Score: 9 (2/8/2022  1:45 PM)

## 2022-02-09 NOTE — HOSPITAL COURSE
2/9: NAEON. AFVSS. Reports low back pain and RLE pain. Noonan removed this morning. Also c/o lower abdominal and scrotal pain. Pain medication adjusted. HV intact with high output, H/H stable. Denies headache, weakness, worsening numbness. PT/OT today, XR today.  2/10: Fever to 100.7 and tachycardic to 124 last night. T 101.4 and  this am. CXR negative, BLE US negative, urinalysis pending. EKG pending. Patient denies shortness of breath, chest pain, infectious symptoms, nausea, vomiting. He denies abdominal and scrotal pain today and states he was able to urinate a lot more. 700mL in last 3 hours. He denies dysuria. Scrotal US negative. His back pain is also well controlled. HV to full suction with output of 20 & 25 cc. WV lost its seal overnight, will attempt to fortify today with tegaderm.  2/11: NAEON. Febrile overnight, Tmax 102.2. Continued tachycardia. Patient sitting in the chair in no acute distress. Pain better controlled. Reports RLE pain has improved. Denies groin pain today. Voiding spontaneously but reports dysuria. + flatus, mild abdominal tenderness. Denies chest pain, SOB, n/v. Tolerating PO. Fever workup so far unrevealing, additional studies ordered. HV drains removed for low output.  consulted for assistance with fever workup.  2/12: NAEON. Continued fevers overnight, Tmax 101.8. Patient reports diaphoresis overnight. Sitting on the side of the bed in no acute distress. Intermittently requiring oxygen via nasal cannula. Denies chest pain, SOB. Endorses nasal congestion and increased back pain after ambulating more yesterday. Reports abdominal pain has improved some. + flatus, small BM yesterday. Voiding spontaneously.   2/13: NAEON. No fevers. Eating, drinking, voiding, had BM yesterday, has been out of bed and cleared for home health. Medicine eval and CT Chest without PE and no known source of fever. Will repeat UA due to concern for prostatitis but will consider discharge today vs  tomorrow after infectious work-up complete.   2/14: NATACHA. AFVSS. Afebrile > 48 hours. Significant other at bedside, reports patient has not had a large BM since admission. Patient reports many small BMs 2/11 and one this morning. Will order enema today. Reports + flatus, denies abdominal pain. Ambulating around the room. Plan for discharge home today on levofloxacin and follow-up with his urologist in 1 week.

## 2022-02-09 NOTE — PLAN OF CARE
Problem: Physical Therapy Goal  Goal: Physical Therapy Goal  Description: Goals to be met by: 2022     Patient will increase functional independence with mobility by performin. Supine to sit with Set-up [place]  2. Sit to supine with Set-up [place]  3. Sit to stand transfer with Supervision  4. Bed to chair transfer with Supervision using Rolling Walker  5. Gait  x 150 feet with Supervision using Rolling Walker.   6. Lower extremity exercise program x15 reps per handout, with supervision    Outcome: Ongoing, Progressing

## 2022-02-09 NOTE — PLAN OF CARE
[facility] - Neurosurgery (Hospital)  Discharge Assessment    Primary Care Provider: Dio Zarate Jr, MD     Discharge Assessment (most recent)     BRIEF DISCHARGE ASSESSMENT - 02/09/22 1254        Discharge Planning    Assessment Type Discharge Planning Brief Assessment     Resource/Environmental Concerns none     Support Systems Spouse/significant other     Current Living Arrangements home/apartment/condo     Patient/Family Anticipates Transition to home     Patient/Family Anticipated Services at Transition none     DME Needed Upon Discharge  none     Discharge Plan A Home     Discharge Plan B Home

## 2022-02-09 NOTE — ASSESSMENT & PLAN NOTE
Emeka Lau is a 46 y.o. male with PMH of BPH and smoker with grade 2 isthmic spondylolisthesis contributing to severe bilateral foraminal stenosis. Now s/p L4-pelvis fusion with L4-5, L5-S1 TLIF on 2/8.     - Neurologically stable post-op  - US scrotum/testes for scrotal pain  - Pain control: scheduled tylenol 650 mg q6h, robaxin 750 qid. Oxycodone 10 mg q4h prn, IV morphine for breakthrough pain.   - Continue WV to incision.   - Drains: Continue HV x 2 to full suction. Empty and record drain output every 6 hours or when full. Abx while drains in place.  - Post op imaging pending.   - LSO brace when up/OOB.  - Continue PT/OT/OOB. OOB > 6hrs/day  - GI: Diet as tolerated. Continue bowel regimen.   - Noonan removed this morning. F/u voiding. Bladder scan q6h, last scan <350cc.   - DVT ppx: SQH, Compression stockings, SCDs  - Atelectasis ppx: IS at bedside. Encourage use every hour while awake.  - BPH: continue home dose flomax    - Discussed with Dr. Reed.     - Dispo: Pending PT/OT eval, drain removal, pain control

## 2022-02-09 NOTE — PLAN OF CARE
Problem: Adult Inpatient Plan of Care  Goal: Plan of Care Review  Outcome: Ongoing, Progressing  Goal: Optimal Comfort and Wellbeing  Outcome: Ongoing, Progressing     Plan of care discussed with patient and son. All questions answered. Noonan draining clear yellow urine. Hemovac drains self suctioning. Wound vac continuous suction at 125. Pain controlled with PRN medications.

## 2022-02-09 NOTE — PROGRESS NOTES
[facility] - Neurosurgery ([facility])  Neurosurgery  Progress Note    Subjective:     History of Present Illness: Emeka Lau is a 46 y.o. male with PMH of BPH.  offered, but patient declined as he and his wife are English speaking. The patient was last seen in clinic on 10/19/20 with Jennifer Sol PA-C to discuss concerns with worsening low back pain despite conservative therapy. At that appointment, it was discussed that the patient could benefit from surgery. The patient was apprehensive about proceeding with surgery and wanted to obtain more conservative therapy.     He is being seen today to discuss worsening of his symptoms despite additional PT that was obtained in [facility]. Describes the pain as constant, aching, and stabbing across the low back with radiation down the R> L leg. Rates the pain as a 9/10. Aggravating factors include walking and prolonged standing. Alleviating factors include laying down. Denies b/b dysfunction, saddle anesthesia, or gait instability. Endorses weakness in the right leg associated with numbness and tingling.      Regarding his neck pain, the patient states that the pain is midline and radiates down the right arm stopping at the shoulder. Describes the pain as constant aching with numbness and tingling. Rates the pain as 5-6/10. Reports balance difficulties and weakness in his hands when grabbing objects.      Interval Hx 12/2/21: After the last appointment, it was recommended that the patient obtain updated imaging to evaluate his radicular complaints. He is being seen in clinic today with his wife to discuss the image findings. States that his symptoms have not changed since his last appointment. He continues to struggle with low back and right leg pain affecting his ambulation. The patient is interested in surgery.      Interval Hx 1/13/22: The patient is being seen in clinic today to review his recent MRI and answer any additional questions regarding  his upcoming surgery. States that his symptoms have not changed since his last appointment. He is eager to proceed with surgery. Dr. Reed is recommending a posterior fusion L4-pelvis due to the grade 2 isthmic spondylolisthesis contributing to severe bilateral foraminal stenosis.      Post-Op Info:  Procedure(s) (LRB):  FUSION, SPINE, LUMBAR (N/A)   1 Day Post-Op     Interval History: NAEON. AFVSS. Reports low back pain and RLE pain. Noonan removed this morning. Also c/o lower abdominal and scrotal pain. Pain medication adjusted. HV intact with high output, H/H stable. Denies headache, weakness, worsening numbness. PT/OT today, XR today.    Medications:  Continuous Infusions:  Scheduled Meds:   acetaminophen  650 mg Oral Q6H    bisacodyL  10 mg Rectal Daily    ceFAZolin (ANCEF) IVPB  1 g Intravenous Q8H    heparin (porcine)  5,000 Units Subcutaneous Q8H    methocarbamoL  750 mg Oral QID    mupirocin   Nasal BID    tamsulosin  1 capsule Oral Daily     PRN Meds:aluminum-magnesium hydroxide-simethicone, dextrose 10%, dextrose 10%, glucagon (human recombinant), glucose, glucose, HYDROmorphone, insulin aspart U-100, ondansetron, oxyCODONE, prochlorperazine, senna-docusate 8.6-50 mg, sodium chloride 0.9%       Objective:     Weight: 95.2 kg (209 lb 15.8 oz)  Body mass index is 31.93 kg/m².  Vital Signs (Most Recent):  Temp: 96.2 °F (35.7 °C) (02/09/22 1135)  Pulse: 106 (02/09/22 1135)  Resp: 18 (02/09/22 1247)  BP: (!) 141/60 (02/09/22 1135)  SpO2: 98 % (02/09/22 1135) Vital Signs (24h Range):  Temp:  [96.2 °F (35.7 °C)-99.2 °F (37.3 °C)] 96.2 °F (35.7 °C)  Pulse:  [] 106  Resp:  [14-19] 18  SpO2:  [92 %-99 %] 98 %  BP: (105-141)/(60-74) 141/60     Date 02/09/22 0700 - 02/10/22 0659   Shift 7974-9615 2589-2985 8614-2758 24 Hour Total   INTAKE   Shift Total(mL/kg)       OUTPUT   Drains 75   75   Shift Total(mL/kg) 75(0.8)   75(0.8)   Weight (kg) 95.2 95.2 95.2 95.2                        Closed/Suction Drain  02/08/22 1130 Left Back Accordion 10 Fr. (Active)   Site Description Healing 02/08/22 2030   Dressing Type Transparent (Tegaderm) 02/08/22 2030   Dressing Status Clean;Intact;Dry 02/08/22 2030   Dressing Intervention Integrity maintained 02/08/22 2030   Drainage Bloody 02/09/22 0751   Status To bulb suction 02/09/22 0751   Output (mL) 45 mL 02/09/22 0751            Closed/Suction Drain 02/08/22 1131 Right Back Accordion 10 Fr. (Active)   Site Description Healing 02/08/22 2030   Dressing Type Transparent (Tegaderm) 02/08/22 2030   Dressing Status Clean;Dry;Intact 02/08/22 2030   Dressing Intervention Integrity maintained 02/08/22 2030   Drainage Bloody 02/09/22 0751   Status To bulb suction 02/09/22 0751   Output (mL) 30 mL 02/09/22 0751     Neurosurgery Physical Exam    General: well developed, well nourished, no distress.   Head: normocephalic, atraumatic  Neck: No tracheal deviation.   Neurologic: Alert and oriented. Thought content appropriate.  GCS: Motor: 6/Verbal: 5/Eyes: 4 GCS Total: 15  Mental Status: Awake, Alert, Oriented x 4  Language: No aphasia  Speech: No dysarthria  Cranial nerves: face symmetric, tongue midline, CN II-XII grossly intact.   Eyes: pupils equal, round, reactive to light with accomodation, EOMI.   Pulmonary: normal respirations, no signs of respiratory distress  Abdomen: soft, non-distended, not tender to palpation  Sensory: intact to light touch throughout  Motor Strength: Moves all extremities spontaneously with good tone.  Proximal LE pain limited weakness otherwise full strength upper and lower extremities. No abnormal movements seen.     Strength  Deltoids Triceps Biceps Wrist Extension Wrist Flexion Hand    Upper: R 5/5 5/5 5/5 5/5 5/5 5/5    L 5/5 5/5 5/5 5/5 5/5 5/5     Iliopsoas Quadriceps Knee  Flexion Tibialis  anterior Gastro- cnemius EHL   Lower: R 4+/5 4+/5 4+/5 5/5 5/5 5/5    L 4+/5 4+/5 4+/5 5/5 5/5 5/5     Vascular: No LE edema.   Skin: Skin is warm, dry and  intact.    Incisional WV in place with good seal. HV x 2 to full suction.     Significant Labs:  Recent Labs   Lab 02/08/22  0650 02/08/22  1508   * 189*    139   K 4.0 4.3    111*   CO2 26 25   BUN 13 13   CREATININE 1.0 0.8   CALCIUM 9.6 8.3*     Recent Labs   Lab 02/08/22  0650 02/08/22  1257 02/09/22  0927   WBC 5.63 6.81 13.86*   HGB 15.1 11.7* 11.1*   HCT 46.9 36.0* 33.9*    242 214     Recent Labs   Lab 02/08/22  0650   INR 1.0   APTT 25.2     Microbiology Results (last 7 days)     ** No results found for the last 168 hours. **        Recent Lab Results       02/09/22  1204   02/09/22  0927   02/08/22  2041   02/08/22  1715        Baso #   0.03           Basophil %   0.2           Differential Method   Automated           Eos #   0.0           Eosinophil %   0.3           Gran # (ANC)   8.1           Gran %   58.3           Group & Rh       B POS       Hematocrit   33.9           Hemoglobin   11.1           Immature Grans (Abs)   0.06  Comment: Mild elevation in immature granulocytes is non specific and   can be seen in a variety of conditions including stress response,   acute inflammation, trauma and pregnancy. Correlation with other   laboratory and clinical findings is essential.             Immature Granulocytes   0.4           INDIRECT ROBERTO       NEG       Lymph #   4.8           Lymph %   34.7           MCH   27.3           MCHC   32.7           MCV   84           Mono #   0.8           Mono %   6.1           MPV   10.4           nRBC   0           Platelets   214           POCT Glucose 175     203         RBC   4.06           RDW   13.4           WBC   13.86               All pertinent labs from the last 24 hours have been reviewed.    Significant Diagnostics:  I have reviewed all pertinent imaging results/findings within the past 24 hours.    Assessment/Plan:     * Spondylolisthesis of lumbosacral region  Emeka Lau is a 46 y.o. male with PMH of BPH and smoker with  grade 2 isthmic spondylolisthesis contributing to severe bilateral foraminal stenosis. Now s/p L4-pelvis fusion with L4-5, L5-S1 TLIF on 2/8.     - Neurologically stable post-op  - US scrotum/testes for scrotal pain  - Pain control: scheduled tylenol 650 mg q6h, robaxin 750 qid. Oxycodone 10 mg q4h prn, IV morphine for breakthrough pain.   - Continue WV to incision.   - Drains: Continue HV x 2 to full suction. Empty and record drain output every 6 hours or when full. Abx while drains in place.  - Post op imaging pending.   - LSO brace when up/OOB.  - Continue PT/OT/OOB. OOB > 6hrs/day  - GI: Diet as tolerated. Continue bowel regimen.   - Noonan removed this morning. F/u voiding. Bladder scan q6h, last scan <350cc.   - DVT ppx: SQH, Compression stockings, SCDs  - Atelectasis ppx: IS at bedside. Encourage use every hour while awake.  - BPH: continue home dose flomax    - Discussed with Dr. Reed.     - Dispo: Pending PT/OT eval, drain removal, pain control         Ling Malloy PA-C  Neurosurgery  [facility] - Neurosurgery ([facility])

## 2022-02-09 NOTE — SUBJECTIVE & OBJECTIVE
Interval History: NAEON. AFVSS. Reports low back pain and RLE pain. Noonan removed this morning. Also c/o lower abdominal and scrotal pain. Pain medication adjusted. HV intact with high output, H/H stable. Denies headache, weakness, worsening numbness. PT/OT today, XR today.    Medications:  Continuous Infusions:  Scheduled Meds:   acetaminophen  650 mg Oral Q6H    bisacodyL  10 mg Rectal Daily    ceFAZolin (ANCEF) IVPB  1 g Intravenous Q8H    heparin (porcine)  5,000 Units Subcutaneous Q8H    methocarbamoL  750 mg Oral QID    mupirocin   Nasal BID    tamsulosin  1 capsule Oral Daily     PRN Meds:aluminum-magnesium hydroxide-simethicone, dextrose 10%, dextrose 10%, glucagon (human recombinant), glucose, glucose, HYDROmorphone, insulin aspart U-100, ondansetron, oxyCODONE, prochlorperazine, senna-docusate 8.6-50 mg, sodium chloride 0.9%       Objective:     Weight: 95.2 kg (209 lb 15.8 oz)  Body mass index is 31.93 kg/m².  Vital Signs (Most Recent):  Temp: 96.2 °F (35.7 °C) (02/09/22 1135)  Pulse: 106 (02/09/22 1135)  Resp: 18 (02/09/22 1247)  BP: (!) 141/60 (02/09/22 1135)  SpO2: 98 % (02/09/22 1135) Vital Signs (24h Range):  Temp:  [96.2 °F (35.7 °C)-99.2 °F (37.3 °C)] 96.2 °F (35.7 °C)  Pulse:  [] 106  Resp:  [14-19] 18  SpO2:  [92 %-99 %] 98 %  BP: (105-141)/(60-74) 141/60     Date 02/09/22 0700 - 02/10/22 0659   Shift 7789-2549 5470-4357 5325-5922 24 Hour Total   INTAKE   Shift Total(mL/kg)       OUTPUT   Drains 75   75   Shift Total(mL/kg) 75(0.8)   75(0.8)   Weight (kg) 95.2 95.2 95.2 95.2                        Closed/Suction Drain 02/08/22 1130 Left Back Accordion 10 Fr. (Active)   Site Description Healing 02/08/22 2030   Dressing Type Transparent (Tegaderm) 02/08/22 2030   Dressing Status Clean;Intact;Dry 02/08/22 2030   Dressing Intervention Integrity maintained 02/08/22 2030   Drainage Bloody 02/09/22 0751   Status To bulb suction 02/09/22 0751   Output (mL) 45 mL 02/09/22 0751             Closed/Suction Drain 02/08/22 1131 Right Back Accordion 10 Fr. (Active)   Site Description Healing 02/08/22 2030   Dressing Type Transparent (Tegaderm) 02/08/22 2030   Dressing Status Clean;Dry;Intact 02/08/22 2030   Dressing Intervention Integrity maintained 02/08/22 2030   Drainage Bloody 02/09/22 0751   Status To bulb suction 02/09/22 0751   Output (mL) 30 mL 02/09/22 0751     Neurosurgery Physical Exam    General: well developed, well nourished, no distress.   Head: normocephalic, atraumatic  Neck: No tracheal deviation.   Neurologic: Alert and oriented. Thought content appropriate.  GCS: Motor: 6/Verbal: 5/Eyes: 4 GCS Total: 15  Mental Status: Awake, Alert, Oriented x 4  Language: No aphasia  Speech: No dysarthria  Cranial nerves: face symmetric, tongue midline, CN II-XII grossly intact.   Eyes: pupils equal, round, reactive to light with accomodation, EOMI.   Pulmonary: normal respirations, no signs of respiratory distress  Abdomen: soft, non-distended, not tender to palpation  Sensory: intact to light touch throughout  Motor Strength: Moves all extremities spontaneously with good tone.  Proximal LE pain limited weakness otherwise full strength upper and lower extremities. No abnormal movements seen.     Strength  Deltoids Triceps Biceps Wrist Extension Wrist Flexion Hand    Upper: R 5/5 5/5 5/5 5/5 5/5 5/5    L 5/5 5/5 5/5 5/5 5/5 5/5     Iliopsoas Quadriceps Knee  Flexion Tibialis  anterior Gastro- cnemius EHL   Lower: R 4+/5 4+/5 4+/5 5/5 5/5 5/5    L 4+/5 4+/5 4+/5 5/5 5/5 5/5     Vascular: No LE edema.   Skin: Skin is warm, dry and intact.    Incisional WV in place with good seal. HV x 2 to full suction.     Significant Labs:  Recent Labs   Lab 02/08/22  0650 02/08/22  1508   * 189*    139   K 4.0 4.3    111*   CO2 26 25   BUN 13 13   CREATININE 1.0 0.8   CALCIUM 9.6 8.3*     Recent Labs   Lab 02/08/22  0650 02/08/22  1257 02/09/22  0927   WBC 5.63 6.81 13.86*   HGB 15.1 11.7* 11.1*    HCT 46.9 36.0* 33.9*    242 214     Recent Labs   Lab 02/08/22  0650   INR 1.0   APTT 25.2     Microbiology Results (last 7 days)     ** No results found for the last 168 hours. **        Recent Lab Results       02/09/22  1204   02/09/22  0927   02/08/22  2041   02/08/22  1715        Baso #   0.03           Basophil %   0.2           Differential Method   Automated           Eos #   0.0           Eosinophil %   0.3           Gran # (ANC)   8.1           Gran %   58.3           Group & Rh       B POS       Hematocrit   33.9           Hemoglobin   11.1           Immature Grans (Abs)   0.06  Comment: Mild elevation in immature granulocytes is non specific and   can be seen in a variety of conditions including stress response,   acute inflammation, trauma and pregnancy. Correlation with other   laboratory and clinical findings is essential.             Immature Granulocytes   0.4           INDIRECT ROBERTO       NEG       Lymph #   4.8           Lymph %   34.7           MCH   27.3           MCHC   32.7           MCV   84           Mono #   0.8           Mono %   6.1           MPV   10.4           nRBC   0           Platelets   214           POCT Glucose 175     203         RBC   4.06           RDW   13.4           WBC   13.86               All pertinent labs from the last 24 hours have been reviewed.    Significant Diagnostics:  I have reviewed all pertinent imaging results/findings within the past 24 hours.

## 2022-02-09 NOTE — PT/OT/SLP EVAL
Occupational Therapy   Co-Evaluation    Name: Emeka Caballero  MRN: [id number]  Admitting Diagnosis:  Spondylolisthesis of lumbosacral region  Recent Surgery: Procedure(s) (LRB):  FUSION, SPINE, LUMBAR (N/A) 1 Day Post-Op    Recommendations:     Discharge Recommendations: home health OT  Discharge Equipment Recommendations:  walker, rolling,bedside commode  Barriers to discharge:  None    Assessment:     Emeka Caballero is a 46 y.o. male with a medical diagnosis of Spondylolisthesis of lumbosacral region.  He presents with decreased independence with ADL's. Performance deficits affecting function: weakness,impaired endurance,impaired self care skills,impaired functional mobilty,impaired balance,pain,decreased safety awareness,decreased upper extremity function,decreased lower extremity function.  Co-evaluation/treatment performed due to patient's multiple deficits requiring two skilled therapists to safely assess patient's ability to complete ADLs and functional mobility in addition to accommodating for patient's activity tolerance while in acute care setting.    Rehab Prognosis: Good; patient would benefit from acute skilled OT services to address these deficits and reach maximum level of function.       Plan:     Patient to be seen 4 x/week to address the above listed problems via self-care/home management,therapeutic activities,therapeutic exercises,neuromuscular re-education  · Plan of Care Expires: 03/11/22  · Plan of Care Reviewed with: patient,spouse    Subjective   Pt reported that his pain medication was not working well for him.  Chief Complaint: pain  Patient/Family Comments/goals: to have decreased pain    Occupational Profile:  Living Environment: Pt resides with spouse & children (ages 3, 5, 7 & 12) in 1 story house with no steps to enter.  Pt was independent with ADL's & ambulation PTA.  Pt has a walk-in shower for bathing. Pt is an active  & is unable to work due to back pain.  Pt  enjoys landscaping.  Equipment Used at Home:   (owns however does not use: TTB)  Assistance upon Discharge: unknown    Pain/Comfort:  · Pain Rating 1:  (did not rate however appeared to be significant pain)  · Location 1: back  · Pain Addressed 1: Reposition,Distraction,Cessation of Activity,Nurse notified  · Pain Rating Post-Intervention 1:  (did not rate)    Patients cultural, spiritual, [employer] conflicts given the current situation: no    Objective:     Communicated with: RN prior to session.  Patient found supine with telemetry,hemovac,wound vac (spouse present in room) upon OT entry to room.    General Precautions: Standard, fall   Orthopedic Precautions:spinal precautions   Braces: TLSO (no orders in chart however per secure chat with Jonn pt does need TLSO for OOB activities)    Occupational Performance:    Bed Mobility:    · Patient completed Supine to Sit with minimum assistance    Functional Mobility/Transfers:  · Patient completed Sit <> Stand Transfer with minimum assistance  with  rolling walker   · Functional Mobility: Min (A) 1-2 person assist with taking steps in room using RW (~ 10 ft total) including pivoting 3 times with mobility. Min (A) with stand to sit to chair using RW    Activities of Daily Living:  · Upper Body Dressing: maximal assistance donning gown around back while seated EOB; Total (A) donning brace while seated EOB  · Lower Body Dressing: total assistance donning socks seated EOB    Cognitive/Visual Perceptual:  Cognitive/Psychosocial Skills:     -       Oriented to: Person, Place, Time and Situation   -       Follows Commands/attention:Follows multistep  commands  -       Safety awareness/insight to disability: intact     Physical Exam:  Sensation:    -       Intact  Dominant hand:    -       right  Upper Extremity Range of Motion:  BUE WFL except ~ 80* shoulder flexion  Upper Extremity Strength: NT due to spinal precaution   Strength: BUE WFL    [facility] 6 Click ADL:  [facility]  Total Score: 13    Treatment & Education:  Provided education regarding spinal precautions during all mobility.  Discussed limited weight lifting at this time in regards to lifting his young children with pt verbalizing understanding. Provided cues for step taking process during mobility.  Provided education regarding role of OT, POC, & discharge recommendations with pt & family verbalizing understanding.  Pt had no further questions & when asked whether there were any concerns pt reported none.    Education:  Patient left up in chair with all lines intact, call button in reach, RN notified and spouse present    GOALS:   Multidisciplinary Problems     Occupational Therapy Goals        Problem: Occupational Therapy Goal    Goal Priority Disciplines Outcome Interventions   Occupational Therapy Goal     OT, PT/OT Ongoing, Progressing    Description: Goals to be met by: 2/19     Patient will increase functional independence with ADLs by performing:    UE Dressing with Minimal Assistance.  LE Dressing with Moderate Assistance.  Grooming while standing with Stand-by Assistance.  Toileting from bedside commode with Minimal Assistance for hygiene and clothing management.   Rolling to Bilateral with Stand-by Assistance.   Supine to sit with Stand-by Assistance.  Step transfer with Stand-by Assistance using RW                     History:     History reviewed. No pertinent past medical history.    Past Surgical History:   Procedure Laterality Date    EPIDURAL STEROID INJECTION N/A 10/21/2020    Procedure: Injection, Steroid, Epidural Caudal;  Surgeon: Vipul Nixon Jr., MD;  Location: [facility];  Service: Pain Management;  Laterality: N/A;  Caudal KURT  Arrive @ 1315; No ATC or DM; Needs MD Signature    LUMBAR FUSION N/A 2/8/2022    Procedure: FUSION, SPINE, LUMBAR;  Surgeon: Alphonse Reed MD;  Location: [address];  Service: Neurosurgery;  Laterality: N/A;  AIRO, L4-S1       Time Tracking:     OT Date of  Treatment: 02/09/22  OT Start Time: 1350  OT Stop Time: 1418  OT Total Time (min): 28 min    Billable Minutes:Evaluation 10  Therapeutic Activity 10    2/9/2022

## 2022-02-09 NOTE — PT/OT/SLP EVAL
Physical Therapy Co-Evaluation    Patient Name:  Emeka Caballero   MRN:  [id number]     OT for cotx due to pt's multiple medical comorbidities and functional deficits req'ing two skilled therapists to appropriately maximize functional potential by progressing pt's musculoskeletal strength and endurance, facilitating neuromuscular postural balance and control ,and accommodating for patient's impaired cardiopulmonary tolerance to activities.     Recommendations:     Discharge Recommendations:  home health PT   Discharge Equipment Recommendations: bedside commode,walker, rolling   Barriers to discharge: None    Assessment:     Emeka Caballero is a 46 y.o. male admitted with a medical diagnosis of Spondylolisthesis of lumbosacral region.  He presents with the following impairments/functional limitations:  weakness,impaired endurance,impaired self care skills,impaired functional mobilty,gait instability,impaired balance,pain,decreased ROM Pt. cooperative and limited tolerance to treatment due to back pain. Pt. progressing with mobility despite severe back pain.    Rehab Prognosis: Good; patient would benefit from acute skilled PT services to address these deficits and reach maximum level of function.    Recent Surgery: Procedure(s) (LRB):  FUSION, SPINE, LUMBAR (N/A) 1 Day Post-Op    Plan:     During this hospitalization, patient to be seen 4 x/week to address the identified rehab impairments via gait training,therapeutic activities,therapeutic exercises and progress toward the following goals:    · Plan of Care Expires:  03/11/22    Subjective     Chief Complaint: back discomfort with mobility  Patient/Family Comments/goals: pt. Agreeable to PT  Pain/Comfort:  · Pain Rating 1:  (pt. did not rate, but appeared to have severe back pain)  · Location 1: back  · Pain Addressed 1: Pre-medicate for activity,Reposition,Distraction,Cessation of Activity    Patients cultural, spiritual, Judaism conflicts given the  current situation: no    Living Environment:  Pt. Lives with spouse and children in [facility] with no [place]  Prior to admission, patients level of function was indep.  Equipment used at home:  none.  Upon discharge, patient will have assistance from spouse.    Objective:     Communicated with nursing prior to session.  Patient found supine with hemovac,wound vac,telemetry,peripheral IV  upon PT entry to room.    General Precautions: Standard, fall   Orthopedic Precautions:spinal precautions   Braces: TLSO (when OOB per MD)  Respiratory Status: Room air    Exams:  · RLE ROM: WFL  · RLE Strength: WFL  · LLE ROM: WFL  · LLE Strength: WFL    Functional Mobility:  · Bed Mobility:     · Rolling Left:  minimum assistance  · Scooting: minimum assistance  · Supine to Sit: minimum assistance  · Transfers:     · Sit to Stand:  minimum assistance with rolling walker  · Gait: 10' with RW and Min A for weight shifting/RW management. Pt. amb. with decreased step length/taz/floor clearance  · Balance: fair    Therapeutic Activities and Exercises:   Discussed therapy needs, goals, safety with mobility, and POC.    AM-PAC 6 CLICK MOBILITY  Total Score:17     Patient left up in chair with all lines intact and call button in reach.    GOALS:   Multidisciplinary Problems     Physical Therapy Goals        Problem: Physical Therapy Goal    Goal Priority Disciplines Outcome Goal Variances Interventions   Physical Therapy Goal     PT, PT/OT Ongoing, Progressing     Description: Goals to be met by: 2022     Patient will increase functional independence with mobility by performin. Supine to sit with Set-up [place]  2. Sit to supine with Set-up [place]  3. Sit to stand transfer with Supervision  4. Bed to chair transfer with Supervision using Rolling Walker  5. Gait  x 150 feet with Supervision using Rolling Walker.   6. Lower extremity exercise program x15 reps per handout, with supervision                     History:      History reviewed. No pertinent past medical history.    Past Surgical History:   Procedure Laterality Date    EPIDURAL STEROID INJECTION N/A 10/21/2020    Procedure: Injection, Steroid, Epidural Caudal;  Surgeon: Vipul Nixon Jr., MD;  Location: [facility];  Service: Pain Management;  Laterality: N/A;  Caudal KURT  Arrive @ 1315; No ATC or DM; Needs MD Signature    LUMBAR FUSION N/A 2/8/2022    Procedure: FUSION, SPINE, LUMBAR;  Surgeon: Alphonse Reed MD;  Location: [address];  Service: Neurosurgery;  Laterality: N/A;  AIRO, L4-S1       Time Tracking:     PT Received On: 02/09/22  PT Start Time: 1350     PT Stop Time: 1419  PT Total Time (min): 29 min     Billable Minutes: Evaluation 15 and Gait Training 14      02/09/2022

## 2022-02-09 NOTE — PLAN OF CARE
POC reviewed and updated with the patient/caregiver. Questions regarding POC were encouraged and addressed with the patient/caregiver.  VSS, see flow-sheets. Patient is AO X 4 at this time. 2 hemovacs remain in place to full suction, see flowsheets for output. Noonan removed at 0500 per provider order. Pt DTV by 1100. Pain management.  Fall/safety precautions maintained, no signs of injury noted during shift. Patient was repositioned for comfort, bed locked in low position with side rails X 2, bed alarm set, with call light within reach. Patient instructed to call staff for mobility, verbalized understanding.  No acute signs of distress noted at this time.       Problem: Adult Inpatient Plan of Care  Goal: Plan of Care Review  Outcome: Ongoing, Progressing  Flowsheets (Taken 2/9/2022 0521)  Plan of Care Reviewed With:   patient   son  Goal: Optimal Comfort and Wellbeing  Outcome: Ongoing, Progressing     Problem: Fall Injury Risk  Goal: Absence of Fall and Fall-Related Injury  Outcome: Ongoing, Progressing     Problem: Skin Injury Risk Increased  Goal: Skin Health and Integrity  Outcome: Ongoing, Progressing

## 2022-02-09 NOTE — PLAN OF CARE
Problem: Occupational Therapy Goal  Goal: Occupational Therapy Goal  Description: Goals to be met by: 2/19     Patient will increase functional independence with ADLs by performing:    UE Dressing with Minimal Assistance.  LE Dressing with Moderate Assistance.  Grooming while standing with Stand-by Assistance.  Toileting from bedside commode with Minimal Assistance for hygiene and clothing management.   Rolling to Bilateral with Stand-by Assistance.   Supine to sit with Stand-by Assistance.  Step transfer with Stand-by Assistance using RW    Outcome: Ongoing, Progressing     OT eval completed.

## 2022-02-10 LAB
BACTERIA #/AREA URNS AUTO: ABNORMAL /HPF
BACTERIA #/AREA URNS AUTO: ABNORMAL /HPF
BASOPHILS # BLD AUTO: 0.04 K/UL (ref 0–0.2)
BASOPHILS NFR BLD: 0.3 % (ref 0–1.9)
BILIRUB UR QL STRIP: NEGATIVE
BILIRUB UR QL STRIP: NEGATIVE
CLARITY UR REFRACT.AUTO: CLEAR
CLARITY UR REFRACT.AUTO: CLEAR
COLOR UR AUTO: ABNORMAL
COLOR UR AUTO: YELLOW
D DIMER PPP IA.FEU-MCNC: 1.72 MG/L FEU
DIFFERENTIAL METHOD: ABNORMAL
EOSINOPHIL # BLD AUTO: 0 K/UL (ref 0–0.5)
EOSINOPHIL NFR BLD: 0.3 % (ref 0–8)
ERYTHROCYTE [DISTWIDTH] IN BLOOD BY AUTOMATED COUNT: 13.4 % (ref 11.5–14.5)
GLUCOSE UR QL STRIP: NEGATIVE
GLUCOSE UR QL STRIP: NEGATIVE
HCT VFR BLD AUTO: 31.4 % (ref 40–54)
HGB BLD-MCNC: 10.4 G/DL (ref 14–18)
HGB UR QL STRIP: ABNORMAL
HGB UR QL STRIP: ABNORMAL
HYALINE CASTS UR QL AUTO: 3 /LPF
IMM GRANULOCYTES # BLD AUTO: 0.07 K/UL (ref 0–0.04)
IMM GRANULOCYTES NFR BLD AUTO: 0.6 % (ref 0–0.5)
KETONES UR QL STRIP: ABNORMAL
KETONES UR QL STRIP: NEGATIVE
LEUKOCYTE ESTERASE UR QL STRIP: NEGATIVE
LEUKOCYTE ESTERASE UR QL STRIP: NEGATIVE
LYMPHOCYTES # BLD AUTO: 3.8 K/UL (ref 1–4.8)
LYMPHOCYTES NFR BLD: 30.4 % (ref 18–48)
MCH RBC QN AUTO: 27.9 PG (ref 27–31)
MCHC RBC AUTO-ENTMCNC: 33.1 G/DL (ref 32–36)
MCV RBC AUTO: 84 FL (ref 82–98)
MICROSCOPIC COMMENT: ABNORMAL
MICROSCOPIC COMMENT: ABNORMAL
MONOCYTES # BLD AUTO: 1 K/UL (ref 0.3–1)
MONOCYTES NFR BLD: 8.1 % (ref 4–15)
NEUTROPHILS # BLD AUTO: 7.6 K/UL (ref 1.8–7.7)
NEUTROPHILS NFR BLD: 60.3 % (ref 38–73)
NITRITE UR QL STRIP: NEGATIVE
NITRITE UR QL STRIP: NEGATIVE
NRBC BLD-RTO: 0 /100 WBC
PH UR STRIP: 5 [PH] (ref 5–8)
PH UR STRIP: 6 [PH] (ref 5–8)
PLATELET # BLD AUTO: 211 K/UL (ref 150–450)
PMV BLD AUTO: 10.4 FL (ref 9.2–12.9)
POCT GLUCOSE: 145 MG/DL (ref 70–110)
POCT GLUCOSE: 148 MG/DL (ref 70–110)
PROT UR QL STRIP: NEGATIVE
PROT UR QL STRIP: NEGATIVE
RBC # BLD AUTO: 3.73 M/UL (ref 4.6–6.2)
RBC #/AREA URNS AUTO: 25 /HPF (ref 0–4)
RBC #/AREA URNS AUTO: 5 /HPF (ref 0–4)
SARS-COV-2 RNA RESP QL NAA+PROBE: NOT DETECTED
SP GR UR STRIP: 1 (ref 1–1.03)
SP GR UR STRIP: 1.03 (ref 1–1.03)
SQUAMOUS #/AREA URNS AUTO: 0 /HPF
SQUAMOUS #/AREA URNS AUTO: 1 /HPF
URN SPEC COLLECT METH UR: ABNORMAL
URN SPEC COLLECT METH UR: ABNORMAL
WBC # BLD AUTO: 12.63 K/UL (ref 3.9–12.7)
WBC #/AREA URNS AUTO: 2 /HPF (ref 0–5)
WBC #/AREA URNS AUTO: 3 /HPF (ref 0–5)

## 2022-02-10 PROCEDURE — U0005 INFEC AGEN DETEC AMPLI PROBE: HCPCS

## 2022-02-10 PROCEDURE — 25000003 PHARM REV CODE 250: Performed by: PHYSICIAN ASSISTANT

## 2022-02-10 PROCEDURE — 36415 COLL VENOUS BLD VENIPUNCTURE: CPT

## 2022-02-10 PROCEDURE — 11000001 HC ACUTE MED/SURG PRIVATE ROOM

## 2022-02-10 PROCEDURE — 63600175 PHARM REV CODE 636 W HCPCS: Performed by: STUDENT IN AN ORGANIZED HEALTH CARE EDUCATION/TRAINING PROGRAM

## 2022-02-10 PROCEDURE — 25000003 PHARM REV CODE 250: Performed by: STUDENT IN AN ORGANIZED HEALTH CARE EDUCATION/TRAINING PROGRAM

## 2022-02-10 PROCEDURE — 85379 FIBRIN DEGRADATION QUANT: CPT

## 2022-02-10 PROCEDURE — 93010 EKG 12-LEAD: ICD-10-PCS | Mod: ,,, | Performed by: INTERNAL MEDICINE

## 2022-02-10 PROCEDURE — 85025 COMPLETE CBC W/AUTO DIFF WBC: CPT

## 2022-02-10 PROCEDURE — 81001 URINALYSIS AUTO W/SCOPE: CPT

## 2022-02-10 PROCEDURE — 93005 ELECTROCARDIOGRAM TRACING: CPT

## 2022-02-10 PROCEDURE — 25000003 PHARM REV CODE 250

## 2022-02-10 PROCEDURE — 93010 ELECTROCARDIOGRAM REPORT: CPT | Mod: ,,, | Performed by: INTERNAL MEDICINE

## 2022-02-10 PROCEDURE — 63600175 PHARM REV CODE 636 W HCPCS: Performed by: PHYSICIAN ASSISTANT

## 2022-02-10 PROCEDURE — U0003 INFECTIOUS AGENT DETECTION BY NUCLEIC ACID (DNA OR RNA); SEVERE ACUTE RESPIRATORY SYNDROME CORONAVIRUS 2 (SARS-COV-2) (CORONAVIRUS DISEASE [COVID-19]), AMPLIFIED PROBE TECHNIQUE, MAKING USE OF HIGH THROUGHPUT TECHNOLOGIES AS DESCRIBED BY CMS-2020-01-R: HCPCS

## 2022-02-10 RX ORDER — SYRING-NEEDL,DISP,INSUL,0.3 ML 29 G X1/2"
296 SYRINGE, EMPTY DISPOSABLE MISCELLANEOUS ONCE
Status: COMPLETED | OUTPATIENT
Start: 2022-02-10 | End: 2022-02-10

## 2022-02-10 RX ADMIN — HEPARIN SODIUM 5000 UNITS: 5000 INJECTION INTRAVENOUS; SUBCUTANEOUS at 06:02

## 2022-02-10 RX ADMIN — HEPARIN SODIUM 5000 UNITS: 5000 INJECTION INTRAVENOUS; SUBCUTANEOUS at 02:02

## 2022-02-10 RX ADMIN — OXYCODONE HYDROCHLORIDE 10 MG: 10 TABLET ORAL at 10:02

## 2022-02-10 RX ADMIN — ACETAMINOPHEN 650 MG: 325 TABLET ORAL at 12:02

## 2022-02-10 RX ADMIN — CEFAZOLIN SODIUM 1 G: 1 SOLUTION INTRAVENOUS at 08:02

## 2022-02-10 RX ADMIN — OXYCODONE HYDROCHLORIDE 10 MG: 10 TABLET ORAL at 06:02

## 2022-02-10 RX ADMIN — MAGNESIUM CITRATE 296 ML: 1.75 LIQUID ORAL at 12:02

## 2022-02-10 RX ADMIN — HYDROMORPHONE HYDROCHLORIDE 1 MG: 1 INJECTION, SOLUTION INTRAMUSCULAR; INTRAVENOUS; SUBCUTANEOUS at 02:02

## 2022-02-10 RX ADMIN — SODIUM CHLORIDE 1000 ML: 0.9 INJECTION, SOLUTION INTRAVENOUS at 09:02

## 2022-02-10 RX ADMIN — ACETAMINOPHEN 650 MG: 325 TABLET ORAL at 05:02

## 2022-02-10 RX ADMIN — METHOCARBAMOL 750 MG: 750 TABLET ORAL at 12:02

## 2022-02-10 RX ADMIN — HEPARIN SODIUM 5000 UNITS: 5000 INJECTION INTRAVENOUS; SUBCUTANEOUS at 09:02

## 2022-02-10 RX ADMIN — CEFAZOLIN SODIUM 1 G: 1 SOLUTION INTRAVENOUS at 12:02

## 2022-02-10 RX ADMIN — MUPIROCIN: 20 OINTMENT TOPICAL at 08:02

## 2022-02-10 RX ADMIN — OXYCODONE HYDROCHLORIDE 10 MG: 10 TABLET ORAL at 12:02

## 2022-02-10 RX ADMIN — METHOCARBAMOL 750 MG: 750 TABLET ORAL at 09:02

## 2022-02-10 RX ADMIN — ACETAMINOPHEN 650 MG: 325 TABLET ORAL at 06:02

## 2022-02-10 RX ADMIN — TAMSULOSIN HYDROCHLORIDE 0.4 MG: 0.4 CAPSULE ORAL at 08:02

## 2022-02-10 RX ADMIN — SENNOSIDES AND DOCUSATE SODIUM 2 TABLET: 50; 8.6 TABLET ORAL at 09:02

## 2022-02-10 RX ADMIN — HYDROMORPHONE HYDROCHLORIDE 1 MG: 1 INJECTION, SOLUTION INTRAMUSCULAR; INTRAVENOUS; SUBCUTANEOUS at 09:02

## 2022-02-10 RX ADMIN — METHOCARBAMOL 750 MG: 750 TABLET ORAL at 05:02

## 2022-02-10 RX ADMIN — OXYCODONE HYDROCHLORIDE 10 MG: 10 TABLET ORAL at 03:02

## 2022-02-10 RX ADMIN — CEFAZOLIN SODIUM 1 G: 1 SOLUTION INTRAVENOUS at 05:02

## 2022-02-10 NOTE — PROGRESS NOTES
[facility] - Neurosurgery ([facility])  Neurosurgery  Progress Note    Subjective:     History of Present Illness: Emeka Lau is a 46 y.o. male with PMH of BPH.  offered, but patient declined as he and his wife are English speaking. The patient was last seen in clinic on 10/19/20 with Jennifer Sol PA-C to discuss concerns with worsening low back pain despite conservative therapy. At that appointment, it was discussed that the patient could benefit from surgery. The patient was apprehensive about proceeding with surgery and wanted to obtain more conservative therapy.     He is being seen today to discuss worsening of his symptoms despite additional PT that was obtained in [facility]. Describes the pain as constant, aching, and stabbing across the low back with radiation down the R> L leg. Rates the pain as a 9/10. Aggravating factors include walking and prolonged standing. Alleviating factors include laying down. Denies b/b dysfunction, saddle anesthesia, or gait instability. Endorses weakness in the right leg associated with numbness and tingling.      Regarding his neck pain, the patient states that the pain is midline and radiates down the right arm stopping at the shoulder. Describes the pain as constant aching with numbness and tingling. Rates the pain as 5-6/10. Reports balance difficulties and weakness in his hands when grabbing objects.      Interval Hx 12/2/21: After the last appointment, it was recommended that the patient obtain updated imaging to evaluate his radicular complaints. He is being seen in clinic today with his wife to discuss the image findings. States that his symptoms have not changed since his last appointment. He continues to struggle with low back and right leg pain affecting his ambulation. The patient is interested in surgery.      Interval Hx 1/13/22: The patient is being seen in clinic today to review his recent MRI and answer any additional questions regarding  his upcoming surgery. States that his symptoms have not changed since his last appointment. He is eager to proceed with surgery. Dr. Reed is recommending a posterior fusion L4-pelvis due to the grade 2 isthmic spondylolisthesis contributing to severe bilateral foraminal stenosis.      Post-Op Info:  Procedure(s) (LRB):  FUSION, SPINE, LUMBAR (N/A)   2 Days Post-Op     Interval History: Fever to 100.7 and tachycardic to 124 last night. T 101.4 and  this am. CXR negative, BLE US negative, urinalysis pending. EKG pending. Patient denies shortness of breath, chest pain, infectious symptoms, nausea, vomiting. He denies abdominal and scrotal pain today and states he was able to urinate a lot more. 700mL in last 3 hours. He denies dysuria. Scrotal US negative. His back pain is also well controlled. HV to full suction with output of 20 & 25 cc. WV lost its seal overnight, will attempt to fortify today with tegaderm.    Medications:  Continuous Infusions:  Scheduled Meds:   acetaminophen  650 mg Oral Q6H    bisacodyL  10 mg Rectal Daily    ceFAZolin (ANCEF) IVPB  1 g Intravenous Q8H    heparin (porcine)  5,000 Units Subcutaneous Q8H    magnesium citrate  296 mL Oral Once    methocarbamoL  750 mg Oral QID    mupirocin   Nasal BID    sodium chloride 0.9%  1,000 mL Intravenous Once    tamsulosin  1 capsule Oral Daily     PRN Meds:aluminum-magnesium hydroxide-simethicone, dextrose 10%, dextrose 10%, glucagon (human recombinant), glucose, glucose, HYDROmorphone, insulin aspart U-100, ondansetron, oxyCODONE, prochlorperazine, senna-docusate 8.6-50 mg, sodium chloride 0.9%       Objective:     Weight: 95.2 kg (209 lb 15.8 oz)  Body mass index is 31.93 kg/m².  Vital Signs (Most Recent):  Temp: 98 °F (36.7 °C) (02/10/22 0751)  Pulse: (!) 115 (02/10/22 0751)  Resp: 14 (02/10/22 1019)  BP: 137/74 (02/10/22 0751)  SpO2: 97 % (02/10/22 0751) Vital Signs (24h Range):  Temp:  [98 °F (36.7 °C)-100.7 °F (38.2 °C)] 98 °F  (36.7 °C)  Pulse:  [] 115  Resp:  [14-19] 14  SpO2:  [94 %-97 %] 97 %  BP: (123-159)/(67-90) 137/74     Date 02/10/22 0700 - 02/11/22 0659   Shift 2807-0700 7750-4786 3545-2621 24 Hour Total   INTAKE   Shift Total(mL/kg)       OUTPUT   Urine(mL/kg/hr) 700   700   Drains 45   45   Shift Total(mL/kg) 745(7.8)   745(7.8)   Weight (kg) 95.2 95.2 95.2 95.2                        Closed/Suction Drain 02/08/22 1130 Left Back Accordion 10 Fr. (Active)   Site Description Unable to view 02/09/22 1940   Dressing Type Gauze;Transparent (Tegaderm) 02/09/22 1940   Dressing Status Clean;Intact;Dry 02/09/22 1940   Dressing Intervention Integrity maintained 02/10/22 0800   Drainage Serosanguineous 02/10/22 0800   Status To bulb suction 02/10/22 0800   Output (mL) 25 mL 02/10/22 0800            Closed/Suction Drain 02/08/22 1131 Right Back Accordion 10 Fr. (Active)   Site Description Unable to view 02/09/22 1940   Dressing Type Gauze;Transparent (Tegaderm) 02/09/22 1940   Dressing Status Clean;Dry;Intact 02/09/22 1940   Dressing Intervention Integrity maintained 02/10/22 0800   Drainage Serosanguineous 02/10/22 0800   Status To bulb suction 02/10/22 0800   Output (mL) 20 mL 02/10/22 0800     Neurosurgery Physical Exam  General: Well developed, well nourished, not in acute distress.   Head: Normocephalic, atraumatic.  Neurologic: Alert and oriented x 4. Thought content appropriate.  GCS: Motor:6  Verbal:5  Eyes:4   GCS Total: 15  Language: No aphasia.  Speech: No dysarthria.  Cranial nerves: Face symmetric, tongue midline, CN II-XII grossly intact.   Eyes: Pupils equal, round, reactive to light with accomodation, EOMI.   Pulmonary: Normal respirations, no signs of respiratory distress.  Abdomen: Soft, non-distended, non-tender to palpation.  Sensory: Intact to light touch throughout.  Motor Strength: Moves all extremities spontaneously with good tone. No abnormal movements seen.     Strength  Deltoids Triceps Biceps Wrist  Extension Wrist Flexion Hand    Upper: R 5/5 5/5 5/5 5/5 5/5 5/5    L 5/5 5/5 5/5 5/5 5/5 5/5     Hip Flexion Knee Extension Knee  Flexion Dorsiflexion Plantar flexion EHL   Lower: R 4/5 4/5 4/5 5/5 5/5 5/5    L 4/5 4/5 4/5 5/5 5/5 5/5   Pain limited effort.    Vascular: No LE edema.   Skin: Skin is warm, dry and intact.    Incision c/d/i with wound vac in place. No surrounding erythema or edema. No drainage from incision. No palpable hematoma or underlying fluid collection.    HV drains x 2 to full suction.    Significant Labs:  Recent Labs   Lab 02/08/22  1508   *      K 4.3   *   CO2 25   BUN 13   CREATININE 0.8   CALCIUM 8.3*     Recent Labs   Lab 02/08/22  1257 02/09/22  0927   WBC 6.81 13.86*   HGB 11.7* 11.1*   HCT 36.0* 33.9*    214     No results for input(s): LABPT, INR, APTT in the last 48 hours.  Microbiology Results (last 7 days)     ** No results found for the last 168 hours. **        All pertinent labs from the last 24 hours have been reviewed.    Significant Diagnostics:  I have reviewed and interpreted all pertinent imaging results/findings within the past 24 hours.    Assessment/Plan:     * Spondylolisthesis of lumbosacral region  Emeka Lau is a 46 y.o. male with PMH of BPH and smoker with grade 2 isthmic spondylolisthesis contributing to severe bilateral foraminal stenosis. Now s/p L4-pelvis fusion with L4-5, L5-S1 TLIF on 2/8.     - Neurologically stable post-op. Q4hr neurochecks.  - US scrotum/testes for scrotal pain negative.  - Fever & tachycardia: CXR negative, BLE US negative, UA pending. Will obtain EKG.  - Urine output 0.6 mL/kg/hr. 1L bolus administered given low PO intake and tachycardia. Encouraged increased PO fluid intake.  - Pain control: scheduled tylenol 650 mg q6h, robaxin 750 qid. Oxycodone 10 mg q4h prn, IV morphine for breakthrough pain.   - Continue WV to incision. Will attempt to reinforce today.  - Drains: Continue HV x 2 to full suction.  Empty and record drain output every 6 hours or when full. Abx while drains in place.  - Post op imaging pending.   - LSO brace when up/OOB.  - Continue PT/OT/OOB. OOB > 6hrs/day  - GI: Diet as tolerated. Continue bowel regimen.   - Noonan removed. Bladder scan q6h. Voiding spontaneously.   - DVT ppx: SQH, compression stockings, SCDs  - Atelectasis ppx: IS at bedside. Encourage use every hour while awake.  - BPH: continue home dose flomax    - Discussed with Dr. Reed.     - Dispo: PT/OT rec is home health. Pending drain removal, pain control.        Janina Denson PA-C  Neurosurgery  [facility] - Neurosurgery ([facility])

## 2022-02-10 NOTE — PLAN OF CARE
Problem: Adult Inpatient Plan of Care  Goal: Plan of Care Review  Outcome: Ongoing, Progressing  Goal: Optimal Comfort and Wellbeing  Outcome: Ongoing, Progressing  Goal: Readiness for Transition of Care  Outcome: Ongoing, Progressing     Problem: Infection  Goal: Absence of Infection Signs and Symptoms  Outcome: Ongoing, Progressing     Problem: Fall Injury Risk  Goal: Absence of Fall and Fall-Related Injury  Outcome: Ongoing, Progressing     PoC reviewed with patient and spouse. Patient voided spontaneously. Patient stood with PT. Patient pain controlled with scheduled tylenol, robaxin, and 10 mg Oxycodone IR. Patient states slight tingling in lower extremities, but reports no numbness. Safety measures maintained. Patient bed in low position. Bed alarm set. Patient call bell with in reach. Patient belongings with in reach. VSS. Full assessment in chart. ILAN

## 2022-02-10 NOTE — NURSING
I noticed patient was having difficulty urinating. Patient voided 125 ml of concentrated urine . I scanned patient bladder and found a volume of 189 ml. Patient complained of burning sensation with urination . Neurosurgery is aware and ordered UA. Encouraged patient to drink more fluids. Will continue to monitor urine output.

## 2022-02-10 NOTE — SUBJECTIVE & OBJECTIVE
Interval History: Fever to tmax 100.7 and tachycardic to 124 last night. CXR negative, BLE US negative, urinalysis pending. Patient denies shortness of breath, chest pain, infectious symptoms, nausea, vomiting. He denies abdominal and scrotal pain today and states he was able to urinate a lot more. Scrotal US negative. His back pain is also well controlled. HV to full suction with output of 20 & 25 cc. WV lost its seal overnight, will attempt to fortify today with tegaderm.    Medications:  Continuous Infusions:  Scheduled Meds:   acetaminophen  650 mg Oral Q6H    bisacodyL  10 mg Rectal Daily    ceFAZolin (ANCEF) IVPB  1 g Intravenous Q8H    heparin (porcine)  5,000 Units Subcutaneous Q8H    magnesium citrate  296 mL Oral Once    methocarbamoL  750 mg Oral QID    mupirocin   Nasal BID    sodium chloride 0.9%  1,000 mL Intravenous Once    tamsulosin  1 capsule Oral Daily     PRN Meds:aluminum-magnesium hydroxide-simethicone, dextrose 10%, dextrose 10%, glucagon (human recombinant), glucose, glucose, HYDROmorphone, insulin aspart U-100, ondansetron, oxyCODONE, prochlorperazine, senna-docusate 8.6-50 mg, sodium chloride 0.9%       Objective:     Weight: 95.2 kg (209 lb 15.8 oz)  Body mass index is 31.93 kg/m².  Vital Signs (Most Recent):  Temp: 98 °F (36.7 °C) (02/10/22 0751)  Pulse: (!) 115 (02/10/22 0751)  Resp: 14 (02/10/22 1019)  BP: 137/74 (02/10/22 0751)  SpO2: 97 % (02/10/22 0751) Vital Signs (24h Range):  Temp:  [98 °F (36.7 °C)-100.7 °F (38.2 °C)] 98 °F (36.7 °C)  Pulse:  [] 115  Resp:  [14-19] 14  SpO2:  [94 %-97 %] 97 %  BP: (123-159)/(67-90) 137/74     Date 02/10/22 0700 - 02/11/22 0659   Shift 8806-8016 5858-5053 5380-5923 24 Hour Total   INTAKE   Shift Total(mL/kg)       OUTPUT   Urine(mL/kg/hr) 700   700   Drains 45   45   Shift Total(mL/kg) 745(7.8)   745(7.8)   Weight (kg) 95.2 95.2 95.2 95.2                        Closed/Suction Drain 02/08/22 1130 Left Back Accordion 10 Fr. (Active)    Site Description Unable to view 02/09/22 1940   Dressing Type Gauze;Transparent (Tegaderm) 02/09/22 1940   Dressing Status Clean;Intact;Dry 02/09/22 1940   Dressing Intervention Integrity maintained 02/10/22 0800   Drainage Serosanguineous 02/10/22 0800   Status To bulb suction 02/10/22 0800   Output (mL) 25 mL 02/10/22 0800            Closed/Suction Drain 02/08/22 1131 Right Back Accordion 10 Fr. (Active)   Site Description Unable to view 02/09/22 1940   Dressing Type Gauze;Transparent (Tegaderm) 02/09/22 1940   Dressing Status Clean;Dry;Intact 02/09/22 1940   Dressing Intervention Integrity maintained 02/10/22 0800   Drainage Serosanguineous 02/10/22 0800   Status To bulb suction 02/10/22 0800   Output (mL) 20 mL 02/10/22 0800     Neurosurgery Physical Exam  General: Well developed, well nourished, not in acute distress.   Head: Normocephalic, atraumatic.  Neurologic: Alert and oriented x 4. Thought content appropriate.  GCS: Motor:6  Verbal:5  Eyes:4   GCS Total: 15  Language: No aphasia.  Speech: No dysarthria.  Cranial nerves: Face symmetric, tongue midline, CN II-XII grossly intact.   Eyes: Pupils equal, round, reactive to light with accomodation, EOMI.   Pulmonary: Normal respirations, no signs of respiratory distress.  Abdomen: Soft, non-distended, non-tender to palpation.  Sensory: Intact to light touch throughout.  Motor Strength: Moves all extremities spontaneously with good tone. No abnormal movements seen.     Strength  Deltoids Triceps Biceps Wrist Extension Wrist Flexion Hand    Upper: R 5/5 5/5 5/5 5/5 5/5 5/5    L 5/5 5/5 5/5 5/5 5/5 5/5     Hip Flexion Knee Extension Knee  Flexion Dorsiflexion Plantar flexion EHL   Lower: R 4/5 4/5 4/5 5/5 5/5 5/5    L 4/5 4/5 4/5 5/5 5/5 5/5   Pain limited effort.    Vascular: No LE edema.   Skin: Skin is warm, dry and intact.    Incision c/d/i with wound vac in place. No surrounding erythema or edema. No drainage from incision. No palpable hematoma or  underlying fluid collection.    HV drains x 2 to full suction.    Significant Labs:  Recent Labs   Lab 02/08/22  1508   *      K 4.3   *   CO2 25   BUN 13   CREATININE 0.8   CALCIUM 8.3*     Recent Labs   Lab 02/08/22  1257 02/09/22  0927   WBC 6.81 13.86*   HGB 11.7* 11.1*   HCT 36.0* 33.9*    214     No results for input(s): LABPT, INR, APTT in the last 48 hours.  Microbiology Results (last 7 days)     ** No results found for the last 168 hours. **        All pertinent labs from the last 24 hours have been reviewed.    Significant Diagnostics:  I have reviewed and interpreted all pertinent imaging results/findings within the past 24 hours.

## 2022-02-10 NOTE — PLAN OF CARE
Plan of care is reviewed  with patient and spouse. All questions and concerns are addressed. Pain is managed. Pt had difficulty urinating. Had to bladder scan patient twice and inserted straight catheter twice. Wound Vac came out in the process of patient's effort to get up to urinate. MD was made aware of the whole situation. They will re-evaluate during rounding. Will continue to monitor patient, Bed at the lowest postion and call light is within reach.

## 2022-02-10 NOTE — ASSESSMENT & PLAN NOTE
Emeka Lau is a 46 y.o. male with PMH of BPH and smoker with grade 2 isthmic spondylolisthesis contributing to severe bilateral foraminal stenosis. Now s/p L4-pelvis fusion with L4-5, L5-S1 TLIF on 2/8.     - Neurologically stable post-op. Q4hr neurochecks.  - US scrotum/testes for scrotal pain negative.  - Fever & tachycardia: CXR negative, BLE US negative, UA pending. Will obtain EKG.  - Urine output 0.6 mL/kg/hr. 1L bolus administered given low PO intake and tachycardia. Encouraged increased PO fluid intake.  - Pain control: scheduled tylenol 650 mg q6h, robaxin 750 qid. Oxycodone 10 mg q4h prn, IV morphine for breakthrough pain.   - Continue WV to incision. Will attempt to reinforce today.  - Drains: Continue HV x 2 to full suction. Empty and record drain output every 6 hours or when full. Abx while drains in place.  - Post op imaging pending.   - LSO brace when up/OOB.  - Continue PT/OT/OOB. OOB > 6hrs/day  - GI: Diet as tolerated. Continue bowel regimen.   - Noonan removed. Bladder scan q6h. Voiding spontaneously.   - DVT ppx: SQH, compression stockings, SCDs  - Atelectasis ppx: IS at bedside. Encourage use every hour while awake.  - BPH: continue home dose flomax    - Discussed with Dr. Reed.     - Dispo: PT/OT rec is home health. Pending drain removal, pain control.

## 2022-02-11 PROBLEM — K59.00 CONSTIPATION: Status: ACTIVE | Noted: 2022-02-11

## 2022-02-11 PROBLEM — E11.9 DIABETES MELLITUS, TYPE 2: Status: ACTIVE | Noted: 2022-02-11

## 2022-02-11 PROBLEM — R50.9 FEVER: Status: ACTIVE | Noted: 2022-02-11

## 2022-02-11 PROBLEM — N40.0 BPH (BENIGN PROSTATIC HYPERPLASIA): Status: ACTIVE | Noted: 2022-02-11

## 2022-02-11 PROBLEM — E78.5 HLD (HYPERLIPIDEMIA): Status: ACTIVE | Noted: 2022-02-11

## 2022-02-11 LAB
ALBUMIN SERPL BCP-MCNC: 2.9 G/DL (ref 3.5–5.2)
ALP SERPL-CCNC: 54 U/L (ref 55–135)
ALT SERPL W/O P-5'-P-CCNC: 35 U/L (ref 10–44)
ANION GAP SERPL CALC-SCNC: 11 MMOL/L (ref 8–16)
AST SERPL-CCNC: 25 U/L (ref 10–40)
BASOPHILS # BLD AUTO: 0.04 K/UL (ref 0–0.2)
BASOPHILS NFR BLD: 0.3 % (ref 0–1.9)
BILIRUB SERPL-MCNC: 0.6 MG/DL (ref 0.1–1)
BUN SERPL-MCNC: 10 MG/DL (ref 6–20)
CALCIUM SERPL-MCNC: 8.8 MG/DL (ref 8.7–10.5)
CHLORIDE SERPL-SCNC: 99 MMOL/L (ref 95–110)
CO2 SERPL-SCNC: 26 MMOL/L (ref 23–29)
CREAT SERPL-MCNC: 0.9 MG/DL (ref 0.5–1.4)
CRP SERPL-MCNC: 256.8 MG/L (ref 0–8.2)
D DIMER PPP IA.FEU-MCNC: 2.42 MG/L FEU
DIFFERENTIAL METHOD: ABNORMAL
EOSINOPHIL # BLD AUTO: 0.1 K/UL (ref 0–0.5)
EOSINOPHIL NFR BLD: 0.5 % (ref 0–8)
ERYTHROCYTE [DISTWIDTH] IN BLOOD BY AUTOMATED COUNT: 13.1 % (ref 11.5–14.5)
ERYTHROCYTE [SEDIMENTATION RATE] IN BLOOD BY WESTERGREN METHOD: 84 MM/HR (ref 0–23)
EST. GFR  (AFRICAN AMERICAN): >60 ML/MIN/1.73 M^2
EST. GFR  (NON AFRICAN AMERICAN): >60 ML/MIN/1.73 M^2
GLUCOSE SERPL-MCNC: 210 MG/DL (ref 70–110)
HCT VFR BLD AUTO: 29.4 % (ref 40–54)
HGB BLD-MCNC: 9.5 G/DL (ref 14–18)
IMM GRANULOCYTES # BLD AUTO: 0.11 K/UL (ref 0–0.04)
IMM GRANULOCYTES NFR BLD AUTO: 0.9 % (ref 0–0.5)
LACTATE SERPL-SCNC: 1.3 MMOL/L (ref 0.5–2.2)
LYMPHOCYTES # BLD AUTO: 3 K/UL (ref 1–4.8)
LYMPHOCYTES NFR BLD: 23.9 % (ref 18–48)
MCH RBC QN AUTO: 27.4 PG (ref 27–31)
MCHC RBC AUTO-ENTMCNC: 32.3 G/DL (ref 32–36)
MCV RBC AUTO: 85 FL (ref 82–98)
MONOCYTES # BLD AUTO: 0.9 K/UL (ref 0.3–1)
MONOCYTES NFR BLD: 7.5 % (ref 4–15)
NEUTROPHILS # BLD AUTO: 8.3 K/UL (ref 1.8–7.7)
NEUTROPHILS NFR BLD: 66.9 % (ref 38–73)
NRBC BLD-RTO: 0 /100 WBC
PLATELET # BLD AUTO: 195 K/UL (ref 150–450)
PMV BLD AUTO: 10.8 FL (ref 9.2–12.9)
POCT GLUCOSE: 141 MG/DL (ref 70–110)
POCT GLUCOSE: 141 MG/DL (ref 70–110)
POCT GLUCOSE: 144 MG/DL (ref 70–110)
POCT GLUCOSE: 183 MG/DL (ref 70–110)
POTASSIUM SERPL-SCNC: 3.9 MMOL/L (ref 3.5–5.1)
PROCALCITONIN SERPL IA-MCNC: 0.25 NG/ML
PROT SERPL-MCNC: 6.5 G/DL (ref 6–8.4)
RBC # BLD AUTO: 3.47 M/UL (ref 4.6–6.2)
SODIUM SERPL-SCNC: 136 MMOL/L (ref 136–145)
TSH SERPL DL<=0.005 MIU/L-ACNC: 1.38 UIU/ML (ref 0.4–4)
WBC # BLD AUTO: 12.43 K/UL (ref 3.9–12.7)

## 2022-02-11 PROCEDURE — 36415 COLL VENOUS BLD VENIPUNCTURE: CPT

## 2022-02-11 PROCEDURE — 25000003 PHARM REV CODE 250

## 2022-02-11 PROCEDURE — 97530 THERAPEUTIC ACTIVITIES: CPT

## 2022-02-11 PROCEDURE — 85025 COMPLETE CBC W/AUTO DIFF WBC: CPT | Performed by: PHYSICIAN ASSISTANT

## 2022-02-11 PROCEDURE — 84443 ASSAY THYROID STIM HORMONE: CPT | Performed by: PHYSICIAN ASSISTANT

## 2022-02-11 PROCEDURE — 87040 BLOOD CULTURE FOR BACTERIA: CPT | Mod: 59 | Performed by: PHYSICIAN ASSISTANT

## 2022-02-11 PROCEDURE — 36415 COLL VENOUS BLD VENIPUNCTURE: CPT | Performed by: PHYSICIAN ASSISTANT

## 2022-02-11 PROCEDURE — 97535 SELF CARE MNGMENT TRAINING: CPT

## 2022-02-11 PROCEDURE — 85652 RBC SED RATE AUTOMATED: CPT | Performed by: PHYSICIAN ASSISTANT

## 2022-02-11 PROCEDURE — 86140 C-REACTIVE PROTEIN: CPT | Performed by: PHYSICIAN ASSISTANT

## 2022-02-11 PROCEDURE — 84145 PROCALCITONIN (PCT): CPT | Performed by: PHYSICIAN ASSISTANT

## 2022-02-11 PROCEDURE — 80053 COMPREHEN METABOLIC PANEL: CPT | Performed by: PHYSICIAN ASSISTANT

## 2022-02-11 PROCEDURE — 99024 POSTOP FOLLOW-UP VISIT: CPT | Mod: ,,, | Performed by: PHYSICIAN ASSISTANT

## 2022-02-11 PROCEDURE — 99024 PR POST-OP FOLLOW-UP VISIT: ICD-10-PCS | Mod: ,,, | Performed by: PHYSICIAN ASSISTANT

## 2022-02-11 PROCEDURE — 85379 FIBRIN DEGRADATION QUANT: CPT | Performed by: PHYSICIAN ASSISTANT

## 2022-02-11 PROCEDURE — 11000001 HC ACUTE MED/SURG PRIVATE ROOM

## 2022-02-11 PROCEDURE — 25000003 PHARM REV CODE 250: Performed by: STUDENT IN AN ORGANIZED HEALTH CARE EDUCATION/TRAINING PROGRAM

## 2022-02-11 PROCEDURE — 97116 GAIT TRAINING THERAPY: CPT

## 2022-02-11 PROCEDURE — 86480 TB TEST CELL IMMUN MEASURE: CPT

## 2022-02-11 PROCEDURE — 63600175 PHARM REV CODE 636 W HCPCS: Performed by: PHYSICIAN ASSISTANT

## 2022-02-11 PROCEDURE — 25000003 PHARM REV CODE 250: Performed by: PHYSICIAN ASSISTANT

## 2022-02-11 PROCEDURE — 99222 PR INITIAL HOSPITAL CARE,LEVL II: ICD-10-PCS | Mod: ,,, | Performed by: HOSPITALIST

## 2022-02-11 PROCEDURE — 99222 1ST HOSP IP/OBS MODERATE 55: CPT | Mod: ,,, | Performed by: HOSPITALIST

## 2022-02-11 PROCEDURE — 83605 ASSAY OF LACTIC ACID: CPT | Performed by: PHYSICIAN ASSISTANT

## 2022-02-11 RX ORDER — GEMFIBROZIL 600 MG/1
600 TABLET, FILM COATED ORAL
Status: DISCONTINUED | OUTPATIENT
Start: 2022-02-11 | End: 2022-02-14 | Stop reason: HOSPADM

## 2022-02-11 RX ORDER — POLYETHYLENE GLYCOL 3350 17 G/17G
17 POWDER, FOR SOLUTION ORAL DAILY
Status: DISCONTINUED | OUTPATIENT
Start: 2022-02-11 | End: 2022-02-12

## 2022-02-11 RX ORDER — BISACODYL 10 MG
10 SUPPOSITORY, RECTAL RECTAL DAILY PRN
Status: DISCONTINUED | OUTPATIENT
Start: 2022-02-11 | End: 2022-02-14 | Stop reason: HOSPADM

## 2022-02-11 RX ORDER — CEFAZOLIN SODIUM 1 G/50ML
1 SOLUTION INTRAVENOUS
Status: DISCONTINUED | OUTPATIENT
Start: 2022-02-11 | End: 2022-02-13

## 2022-02-11 RX ORDER — LACTULOSE 10 G/15ML
20 SOLUTION ORAL ONCE
Status: COMPLETED | OUTPATIENT
Start: 2022-02-11 | End: 2022-02-11

## 2022-02-11 RX ADMIN — GEMFIBROZIL 600 MG: 600 TABLET ORAL at 04:02

## 2022-02-11 RX ADMIN — METHOCARBAMOL 750 MG: 750 TABLET ORAL at 08:02

## 2022-02-11 RX ADMIN — CEFAZOLIN SODIUM 1 G: 1 SOLUTION INTRAVENOUS at 08:02

## 2022-02-11 RX ADMIN — ACETAMINOPHEN 650 MG: 325 TABLET ORAL at 04:02

## 2022-02-11 RX ADMIN — LACTULOSE 20 G: 20 SOLUTION ORAL at 10:02

## 2022-02-11 RX ADMIN — METHOCARBAMOL 750 MG: 750 TABLET ORAL at 12:02

## 2022-02-11 RX ADMIN — ACETAMINOPHEN 650 MG: 325 TABLET ORAL at 05:02

## 2022-02-11 RX ADMIN — CEFAZOLIN SODIUM 1 G: 1 SOLUTION INTRAVENOUS at 05:02

## 2022-02-11 RX ADMIN — POLYETHYLENE GLYCOL 3350 17 G: 17 POWDER, FOR SOLUTION ORAL at 10:02

## 2022-02-11 RX ADMIN — ACETAMINOPHEN 325 MG: 325 TABLET ORAL at 12:02

## 2022-02-11 RX ADMIN — HEPARIN SODIUM 5000 UNITS: 5000 INJECTION INTRAVENOUS; SUBCUTANEOUS at 04:02

## 2022-02-11 RX ADMIN — HEPARIN SODIUM 5000 UNITS: 5000 INJECTION INTRAVENOUS; SUBCUTANEOUS at 08:02

## 2022-02-11 RX ADMIN — MUPIROCIN: 20 OINTMENT TOPICAL at 08:02

## 2022-02-11 RX ADMIN — METHOCARBAMOL 750 MG: 750 TABLET ORAL at 04:02

## 2022-02-11 RX ADMIN — OXYCODONE HYDROCHLORIDE 10 MG: 10 TABLET ORAL at 03:02

## 2022-02-11 RX ADMIN — ACETAMINOPHEN 650 MG: 325 TABLET ORAL at 12:02

## 2022-02-11 RX ADMIN — OXYCODONE HYDROCHLORIDE 10 MG: 10 TABLET ORAL at 12:02

## 2022-02-11 RX ADMIN — SODIUM CHLORIDE 1000 ML: 0.9 INJECTION, SOLUTION INTRAVENOUS at 04:02

## 2022-02-11 RX ADMIN — CEFAZOLIN SODIUM 1 G: 1 SOLUTION INTRAVENOUS at 11:02

## 2022-02-11 RX ADMIN — OXYCODONE HYDROCHLORIDE 10 MG: 10 TABLET ORAL at 04:02

## 2022-02-11 RX ADMIN — OXYCODONE HYDROCHLORIDE 10 MG: 10 TABLET ORAL at 08:02

## 2022-02-11 RX ADMIN — OXYCODONE HYDROCHLORIDE 10 MG: 10 TABLET ORAL at 10:02

## 2022-02-11 RX ADMIN — HEPARIN SODIUM 5000 UNITS: 5000 INJECTION INTRAVENOUS; SUBCUTANEOUS at 03:02

## 2022-02-11 RX ADMIN — BISACODYL 10 MG: 10 SUPPOSITORY RECTAL at 08:02

## 2022-02-11 RX ADMIN — TAMSULOSIN HYDROCHLORIDE 0.4 MG: 0.4 CAPSULE ORAL at 08:02

## 2022-02-11 RX ADMIN — CEFAZOLIN SODIUM 1 G: 1 SOLUTION INTRAVENOUS at 12:02

## 2022-02-11 RX ADMIN — ACETAMINOPHEN 650 MG: 325 TABLET ORAL at 11:02

## 2022-02-11 NOTE — ASSESSMENT & PLAN NOTE
Consulted for post-op fevers. Tmax of 102, responding to tylenol. Infectious workup thus far has been unremarkable. D-dimer elevated but no hypoxia, b/l LE US negative. ESR/CRP elevated, lactate and procal wnl. CXR, UA, COVID screen negative. US of scrotum and testes showed right sided varicocele and enlarged lymph node. Differential at this stage is broad but would need to keep malignancy in mind given history of substantial tobacco use and night sweats. Patient denies any family history of cancer. No leukocytosis or other infectious sxs. Fevers may be 2/2 post-op atelectasis, CXR unremarkable. Will need to r/o TB given pt is from endemic region. KUB ordered, will see if enema helps constipation, if no improvement by tomorrow, may need CT A/P.    - Quantiferon gold ordered   - Saline enema ordered  - Continue Ancef per primary  - F/U KUB  - F/U blood cultures  - Monitor WBC count

## 2022-02-11 NOTE — PROGRESS NOTES
[facility] - Neurosurgery ([facility])  Neurosurgery  Progress Note    Subjective:     History of Present Illness: Emeka Lau is a 46 y.o. male with PMH of BPH.  offered, but patient declined as he and his wife are English speaking. The patient was last seen in clinic on 10/19/20 with Jennifer Sol PA-C to discuss concerns with worsening low back pain despite conservative therapy. At that appointment, it was discussed that the patient could benefit from surgery. The patient was apprehensive about proceeding with surgery and wanted to obtain more conservative therapy.     He is being seen today to discuss worsening of his symptoms despite additional PT that was obtained in [facility]. Describes the pain as constant, aching, and stabbing across the low back with radiation down the R> L leg. Rates the pain as a 9/10. Aggravating factors include walking and prolonged standing. Alleviating factors include laying down. Denies b/b dysfunction, saddle anesthesia, or gait instability. Endorses weakness in the right leg associated with numbness and tingling.      Regarding his neck pain, the patient states that the pain is midline and radiates down the right arm stopping at the shoulder. Describes the pain as constant aching with numbness and tingling. Rates the pain as 5-6/10. Reports balance difficulties and weakness in his hands when grabbing objects.      Interval Hx 12/2/21: After the last appointment, it was recommended that the patient obtain updated imaging to evaluate his radicular complaints. He is being seen in clinic today with his wife to discuss the image findings. States that his symptoms have not changed since his last appointment. He continues to struggle with low back and right leg pain affecting his ambulation. The patient is interested in surgery.      Interval Hx 1/13/22: The patient is being seen in clinic today to review his recent MRI and answer any additional questions regarding  his upcoming surgery. States that his symptoms have not changed since his last appointment. He is eager to proceed with surgery. Dr. Reed is recommending a posterior fusion L4-pelvis due to the grade 2 isthmic spondylolisthesis contributing to severe bilateral foraminal stenosis.      Post-Op Info:  Procedure(s) (LRB):  FUSION, SPINE, LUMBAR (N/A)   3 Days Post-Op     Interval History: NAEON. Febrile overnight, Tmax 102.2. Continued tachycardia. Patient sitting in the chair in no acute distress. Pain better controlled. Reports RLE pain has improved. Denies groin pain today. Voiding spontaneously but reports dysuria. + flatus, mild abdominal tenderness. Denies chest pain, SOB, n/v. Tolerating PO. Fever workup so far unrevealing, additional studies ordered. HV drains removed for low output.  consulted for assistance with fever workup.    Medications:  Continuous Infusions:  Scheduled Meds:   acetaminophen  650 mg Oral Q6H    heparin (porcine)  5,000 Units Subcutaneous Q8H    methocarbamoL  750 mg Oral QID    mupirocin   Nasal BID    polyethylene glycol  17 g Oral Daily    tamsulosin  1 capsule Oral Daily     PRN Meds:aluminum-magnesium hydroxide-simethicone, bisacodyL, dextrose 10%, dextrose 10%, glucagon (human recombinant), glucose, glucose, HYDROmorphone, insulin aspart U-100, ondansetron, oxyCODONE, prochlorperazine, senna-docusate 8.6-50 mg, sodium chloride 0.9%       Objective:     Weight: 95.2 kg (209 lb 15.8 oz)  Body mass index is 31.93 kg/m².  Vital Signs (Most Recent):  Temp: 97.6 °F (36.4 °C) (02/11/22 0746)  Pulse: (!) 112 (02/11/22 1120)  Resp: 18 (02/11/22 1120)  BP: 124/70 (02/11/22 1120)  SpO2: 96 % (02/11/22 1120) Vital Signs (24h Range):  Temp:  [97.6 °F (36.4 °C)-102.2 °F (39 °C)] 97.6 °F (36.4 °C)  Pulse:  [109-118] 112  Resp:  [15-22] 18  SpO2:  [93 %-97 %] 96 %  BP: (119-126)/(64-70) 124/70     Date 02/11/22 0700 - 02/12/22 0659   Shift 8369-0485 9204-8492 5905-7715 24 Hour Total    INTAKE   Shift Total(mL/kg)       OUTPUT   Urine(mL/kg/hr) 925   925   Shift Total(mL/kg) 925(9.7)   925(9.7)   Weight (kg) 95.2 95.2 95.2 95.2                        Closed/Suction Drain 02/08/22 1130 Left Back Accordion 10 Fr. (Active)   Site Description Unable to view 02/09/22 1940   Dressing Type Gauze;Transparent (Tegaderm) 02/09/22 1940   Dressing Status Clean;Intact;Dry 02/09/22 1940   Dressing Intervention Integrity maintained 02/10/22 0800   Drainage Serosanguineous 02/10/22 0800   Status To bulb suction 02/10/22 0800   Output (mL) 25 mL 02/10/22 0800            Closed/Suction Drain 02/08/22 1131 Right Back Accordion 10 Fr. (Active)   Site Description Unable to view 02/09/22 1940   Dressing Type Gauze;Transparent (Tegaderm) 02/09/22 1940   Dressing Status Clean;Dry;Intact 02/09/22 1940   Dressing Intervention Integrity maintained 02/10/22 0800   Drainage Serosanguineous 02/10/22 0800   Status To bulb suction 02/10/22 0800   Output (mL) 20 mL 02/10/22 0800     Neurosurgery Physical Exam    General: Well developed, well nourished, not in acute distress.   Head: Normocephalic, atraumatic.  Neurologic: Alert and oriented x 4. Thought content appropriate.  GCS: Motor:6  Verbal:5  Eyes:4   GCS Total: 15  Language: No aphasia.  Speech: No dysarthria.  Cranial nerves: Face symmetric, tongue midline, CN II-XII grossly intact.   Eyes: Pupils equal, round, reactive to light with accomodation, EOMI.   Pulmonary: Normal respirations, no signs of respiratory distress.  Abdomen: slightly tense, non-distended, mild diffuse TTP  Sensory: Intact to light touch throughout.  Motor Strength: Moves all extremities spontaneously with good tone. No abnormal movements seen.     Strength  Deltoids Triceps Biceps Wrist Extension Wrist Flexion Hand    Upper: R 5/5 5/5 5/5 5/5 5/5 5/5    L 5/5 5/5 5/5 5/5 5/5 5/5     Hip Flexion Knee Extension Knee  Flexion Dorsiflexion Plantar flexion EHL   Lower: R 4/5 4/5 4/5 5/5 5/5 5/5     L 4/5 4/5 4/5 5/5 5/5 5/5   Pain limited effort.    Vascular: No LE edema.   Skin: Skin is warm, dry and intact.    Incision c/d/i with wound vac in place. No surrounding erythema or edema. No drainage from incision. No palpable hematoma or underlying fluid collection.    Significant Labs:  Recent Labs   Lab 02/11/22 0917   *      K 3.9   CL 99   CO2 26   BUN 10   CREATININE 0.9   CALCIUM 8.8     Recent Labs   Lab 02/10/22  1249 02/11/22 0917   WBC 12.63 12.43   HGB 10.4* 9.5*   HCT 31.4* 29.4*    195     No results for input(s): LABPT, INR, APTT in the last 48 hours.  Microbiology Results (last 7 days)     Procedure Component Value Units Date/Time    Blood culture [[id number]] Collected: 02/11/22 0916    Order Status: Sent Specimen: Blood Updated: 02/11/22 1014    Blood culture [[id number]] Collected: 02/11/22 0917    Order Status: Sent Specimen: Blood Updated: 02/11/22 1014        All pertinent labs from the last 24 hours have been reviewed.    Significant Diagnostics:  I have reviewed and interpreted all pertinent imaging results/findings within the past 24 hours.        Assessment/Plan:     * Spondylolisthesis of lumbosacral region  Emeka Lau is a 46 y.o. male with PMH of BPH and smoker with grade 2 isthmic spondylolisthesis contributing to severe bilateral foraminal stenosis. Now s/p L4-pelvis fusion with L4-5, L5-S1 TLIF on 2/8.     - Neurologically stable post-op. Q4hr neurochecks.  - US scrotum/testes for scrotal pain negative.  - Fever & tachycardia: CXR negative, BLE US negative, UA negative for infection. EKG with sinus tachycardia. ESR, CRP, Procal, BCx, lactate ordered. D-dimer mildly elevated at 1.72, likely due to recent surgery. Will repeat.  consulted for assistance, f/u recs.  - Pain control: scheduled tylenol 650 mg q6h, robaxin 750 qid. Oxycodone 10 mg q4h prn, IV morphine for breakthrough pain.   - Continue WV to incision.   - Drains: HV drains removed 2/11  - Post op  imaging pending, obtain today.   - LSO brace when up/OOB.  - Continue PT/OT/OOB. OOB > 6hrs/day  - GI: Diet as tolerated. Continue bowel regimen. KUB ordered. Lactulose if needed.  - Noonan removed. Bladder scan q6h. Voiding spontaneously. Adequate urine output.  - DVT ppx: SQH, compression stockings, SCDs  - Atelectasis ppx: IS at bedside. Encourage use every hour while awake.  - BPH: continue home dose flomax    - Discussed with Dr. Reed.     - Dispo: PT/OT rec is home health. Pending fever workup.        Ling Malloy PA-C  Neurosurgery  [facility] - Neurosurgery ([facility])

## 2022-02-11 NOTE — SUBJECTIVE & OBJECTIVE
History reviewed. No pertinent past medical history.    Past Surgical History:   Procedure Laterality Date    EPIDURAL STEROID INJECTION N/A 10/21/2020    Procedure: Injection, Steroid, Epidural Caudal;  Surgeon: Vipul Nixon Jr., MD;  Location: [facility];  Service: Pain Management;  Laterality: N/A;  Caudal KURT  Arrive @ 1315; No ATC or DM; Needs MD Signature    LUMBAR FUSION N/A 2/8/2022    Procedure: FUSION, SPINE, LUMBAR;  Surgeon: Alphonse Reed MD;  Location: [address];  Service: Neurosurgery;  Laterality: N/A;  AIRO, L4-S1       Review of patient's allergies indicates:  No Known Allergies    No current facility-administered medications on file prior to encounter.     Current Outpatient Medications on File Prior to Encounter   Medication Sig    meloxicam (MOBIC) 15 MG tablet Take 1 tablet (15 mg total) by mouth once daily.    triamcinolone acetonide 0.1% (KENALOG) 0.1 % cream Apply topically 2 (two) times daily. Apply to rash. DO NOT USE ON FACE.     Family History    None       Tobacco Use    Smoking status: Former Smoker    Smokeless tobacco: Never Used   Substance and Sexual Activity    Alcohol use: Yes     Comment: socially    Drug use: No    Sexual activity: Not on file     Review of Systems   Constitutional: Positive for diaphoresis (night sweats) and fever. Negative for fatigue.   HENT: Negative for congestion, rhinorrhea and sore throat.    Respiratory: Negative for chest tightness and shortness of breath.    Cardiovascular: Negative for chest pain.   Gastrointestinal: Positive for abdominal distention and abdominal pain. Negative for blood in stool, constipation, diarrhea, nausea and vomiting.   Genitourinary: Positive for difficulty urinating. Negative for dysuria, flank pain, frequency, hematuria and urgency.   Musculoskeletal: Positive for back pain. Negative for arthralgias and neck pain.   Neurological: Negative for dizziness, weakness, numbness and headaches.     Objective:      Vital Signs (Most Recent):  Temp: 97.6 °F (36.4 °C) (02/11/22 0746)  Pulse: (!) 112 (02/11/22 1120)  Resp: 18 (02/11/22 1120)  BP: 124/70 (02/11/22 1120)  SpO2: 96 % (02/11/22 1120) Vital Signs (24h Range):  Temp:  [97.6 °F (36.4 °C)-102.2 °F (39 °C)] 97.6 °F (36.4 °C)  Pulse:  [109-118] 112  Resp:  [15-22] 18  SpO2:  [93 %-97 %] 96 %  BP: (119-126)/(64-70) 124/70     Weight: 95.2 kg (209 lb 15.8 oz)  Body mass index is 31.93 kg/m².    Physical Exam  Vitals and nursing note reviewed.   Constitutional:       General: He is not in acute distress.     Appearance: Normal appearance. He is normal weight. He is not ill-appearing or toxic-appearing.   HENT:      Head: Normocephalic and atraumatic.      Nose: Nose normal. No congestion or rhinorrhea.      Mouth/Throat:      Mouth: Mucous membranes are moist.      Pharynx: Oropharynx is clear. No oropharyngeal exudate or posterior oropharyngeal erythema.   Eyes:      Extraocular Movements: Extraocular movements intact.      Conjunctiva/sclera: Conjunctivae normal.      Pupils: Pupils are equal, round, and reactive to light.   Cardiovascular:      Rate and Rhythm: Regular rhythm. Tachycardia present.      Pulses: Normal pulses.      Heart sounds: Normal heart sounds. No murmur heard.  No friction rub. No gallop.    Pulmonary:      Effort: Pulmonary effort is normal. No respiratory distress.      Breath sounds: Normal breath sounds. No wheezing, rhonchi or rales.   Abdominal:      General: Abdomen is protuberant. Bowel sounds are increased. There is distension.      Palpations: Abdomen is soft. There is no mass.      Tenderness: There is generalized abdominal tenderness. There is no guarding or rebound.   Musculoskeletal:         General: No swelling or tenderness. Normal range of motion.      Cervical back: Normal range of motion.      Right lower leg: No edema.      Left lower leg: No edema.   Skin:     General: Skin is warm and dry.      Capillary Refill: Capillary  refill takes less than 2 seconds.   Neurological:      General: No focal deficit present.      Mental Status: He is alert and oriented to person, place, and time. Mental status is at baseline.   Psychiatric:         Mood and Affect: Mood normal.         Behavior: Behavior normal.         Significant Labs:   All pertinent labs within the past 24 hours have been reviewed.  Recent Lab Results       02/11/22  1135   02/11/22  1119   02/11/22  0917   02/11/22  0453   02/10/22  2126        Procalcitonin     0.25  Comment: A concentration < 0.25 ng/mL represents a low risk of bacterial   infection.  Procalcitonin may not be accurate among patients with localized   infection, recent trauma or major surgery, immunosuppressed state,   invasive fungal infection, renal dysfunction. Decisions regarding   initiation or continuation of antibiotic therapy should not be based   solely on procalcitonin levels.             Albumin     2.9           Alkaline Phosphatase     54           ALT     35           Anion Gap     11           AST     25           Baso #     0.04           Basophil %     0.3           BILIRUBIN TOTAL     0.6  Comment: For infants and newborns, interpretation of results should be based  on gestational age, weight and in agreement with clinical  observations.    Premature Infant recommended reference ranges:  Up to 24 hours.............<8.0 mg/dL  Up to 48 hours............<12.0 mg/dL  3-5 days..................<15.0 mg/dL  6-29 days.................<15.0 mg/dL             BUN     10           Calcium     8.8           Chloride     99           CO2     26           Creatinine     0.9           CRP     256.8           D-Dimer 2.42  Comment: The quantitative D-dimer assay should be used as an aid in   the diagnosis of deep vein thrombosis and pulmonary embolism  in patients with the appropriate presentation and clinical  history. The upper limit of the reference interval and the clinical   cut off   point are  identical. Causes of a positive (>0.50 mg/L FEU) D-Dimer   test  include, but are not limited to: DVT, PE, DIC, thrombolytic   therapy, anticoagulant therapy, recent surgery, trauma, or   pregnancy, disseminated malignancy, aortic aneurysm, cirrhosis,  and severe infection. False negative results may occur in   patients with distal DVT.                 Differential Method     Automated           eGFR if      >60.0           eGFR if non      >60.0  Comment: Calculation used to obtain the estimated glomerular filtration  rate (eGFR) is the CKD-EPI equation.              Eos #     0.1           Eosinophil %     0.5           Glucose     210           Gran # (ANC)     8.3           Gran %     66.9           Hematocrit     29.4           Hemoglobin     9.5           Immature Grans (Abs)     0.11  Comment: Mild elevation in immature granulocytes is non specific and   can be seen in a variety of conditions including stress response,   acute inflammation, trauma and pregnancy. Correlation with other   laboratory and clinical findings is essential.             Immature Granulocytes     0.9           Lactate, Momo     1.3  Comment: Falsely low lactic acid results can be found in samples   containing >=13.0 mg/dL total bilirubin and/or >=3.5 mg/dL   direct bilirubin.             Lymph #     3.0           Lymph %     23.9           MCH     27.4           MCHC     32.3           MCV     85           Mono #     0.9           Mono %     7.5           MPV     10.8           nRBC     0           Platelets     195           POCT Glucose   144     141   148       Potassium     3.9           PROTEIN TOTAL     6.5           RBC     3.47           RDW     13.1           SARS-CoV2 (COVID-19) Qualitative PCR               Sed Rate     84           Sodium     136           TSH     1.384           WBC     12.43                            02/10/22  1718        Procalcitonin       Albumin       Alkaline  Phosphatase       ALT       Anion Gap       AST       Baso #       Basophil %       BILIRUBIN TOTAL       BUN       Calcium       Chloride       CO2       Creatinine       CRP       D-Dimer       Differential Method       eGFR if        eGFR if non        Eos #       Eosinophil %       Glucose       Gran # (ANC)       Gran %       Hematocrit       Hemoglobin       Immature Grans (Abs)       Immature Granulocytes       Lactate, Momo       Lymph #       Lymph %       MCH       MCHC       MCV       Mono #       Mono %       MPV       nRBC       Platelets       POCT Glucose       Potassium       PROTEIN TOTAL       RBC       RDW       SARS-CoV2 (COVID-19) Qualitative PCR Not Detected  Comment: This test utilizes a real-time reverse transcription  polymerase chain reaction procedure to amplify and   detect the SARS-CoV-2 and detect the SARS-CoV-2 N2 and E nucleic  acid targets. The analytical sensitivity (limit of detection) of   this assay is 250 copies/mL.    A Detected result implies that the patient is infected with the  SARS-CoV-2 virus and is presumed to be contagious.    A Not Detected result implies that the SARS-CoV-2 target nucleic  acids are not present above the limit of detection.  It does not  rule out the possibility of COVID-19 and should not be the sole  basis for treatment decisions. If COVID-19 is strongly suspected  based on clinical and epidemiological history, re-testing should  be considered.    This test is only for use under Food and Drug   Administration s Emergency Use Authorization (EUA).   Commercial reagents are provided by [email or web address].  Performance characteristics of the EUA have been   independently verified by [facility]   Department of Pathology and Laboratory Medicine.           Sed Rate       Sodium       TSH       WBC             Significant Imaging:         I have reviewed all pertinent imaging results/findings within the past 24 hours.

## 2022-02-11 NOTE — ASSESSMENT & PLAN NOTE
Patient reports no BM since Monday 2/7, passing gas. Did not respond to laxatives and suppositories. Hyperactive bowel sounds on exam. Appears bloated. KUB pending.    - FLEET enema ordered  - Monitor for BM, if no improvement will obtain CT A/P  - F/U KUB  - PT/OT, OOB

## 2022-02-11 NOTE — ASSESSMENT & PLAN NOTE
Recently diagnosed, hypertriglyceridemia (>600). Started on gemfibrozil by PCP.    - Continue home gemfibrozil  - Would recommend statin on outpatient basis    The 10-year ASCVD risk score (Pedro DAVIS Jr., et al., 2013) is: 5.5%    Values used to calculate the score:      Age: 46 years      Sex: Male      Is Non- : Yes      Diabetic: No      Tobacco smoker: No      Systolic Blood Pressure: 124 mmHg      Is BP treated: No      HDL Cholesterol: 25 mg/dL      Total Cholesterol: 190 mg/dL

## 2022-02-11 NOTE — PT/OT/SLP PROGRESS
"Physical Therapy Treatment    Patient Name:  Emeka Caballero   MRN:  [id number]    Recommendations:     Discharge Recommendations:  home health PT   Discharge Equipment Recommendations: walker, rolling   Barriers to discharge: None    Assessment:     Emeka Caballero is a 46 y.o. male admitted with a medical diagnosis of Spondylolisthesis of lumbosacral region.  He presents with the following impairments/functional limitations:  weakness,impaired functional mobilty,decreased safety awareness,pain,impaired endurance,impaired balance,gait instability.  Pt participated in functional mobility training, able to ambulate further distances on this date and requiring less assistance for all mobility performed. Pt able to ambulate ~100 ft with RW and CGA/min assist. Pt continues to present below their independent prior functional baseline. Pt would benefit from continued, skilled PT while in house to address the above listed impairments, further progress mobility as able, return towards highest level of function.      Rehab Prognosis: Good; patient continues to benefit from acute skilled PT services to address these deficits and reach maximum level of function.  Patient remains most appropriate to discharge to home health PT  Recent Surgery: Procedure(s) (LRB):  FUSION, SPINE, LUMBAR (N/A) 3 Days Post-Op    Plan:     During this hospitalization, patient to be seen 4 x/week to address the identified rehab impairments via gait training,therapeutic activities,therapeutic exercises,neuromuscular re-education and progress toward the following goals:    · Plan of Care Expires:  03/11/22    Subjective     Subjective: "I feel pretty good"   Pain/Comfort:   · Pain Rating 1: 3/10 (pt c/o incisional pain)  · Pain Addressed 1: Reposition,Distraction      Objective:     Communicated with RN prior to session.  Patient found supine with wound vac,peripheral IV upon PT entry to room.      General Precautions: Standard, fall "   Orthopedic Precautions:spinal precautions   Braces: TLSO     Functional Mobility:  · Bed Mobility: CGA for supine<>sit with HOB elevated ~30 degrees, cueing for log rolling technique throughout.  · Transfers: CGA for sit<>stand from EOB, cueing for hand placement with RW anteriorly  · Gait: Patient ambulated ~100 ft with RW and CGA, however regressing to min asisst for last ~20 ft with breakdown of gait mechanics. Pt demo's decreased taz, decreased foot clearance, and absent heel strike. With onset of fatigue, pt demo's decreasing step length and foot clearance, slight onset of trunk sway. Cueing for RW management to maintain appropriate closeness to RW.   · Balance:   · Sitting: supervision at EOB, min assist to don/doff TLSO  · Standing: CGA/min assist when standing with RW, no overt LOB noted      AM-PAC 6 CLICK MOBILITY  Turning over in bed (including adjusting bedclothes, sheets and blankets)?: 3  Sitting down on and standing up from a chair with arms (e.g., wheelchair, bedside commode, etc.): 3  Moving from lying on back to sitting on the side of the bed?: 3  Moving to and from a bed to a chair (including a wheelchair)?: 3  Need to walk in hospital room?: 3  Climbing 3-5 steps with a railing?: 2  Basic Mobility Total Score: 17       Education:  Education provided re: d/c planning, technique to don/doff TLSO, PT POC, benefits of mobility, spine precautions, log rolling. Pt endorsed understanding.     Patient left supine with all lines intact and call button in reach, spouse present.    GOALS:   Multidisciplinary Problems     Physical Therapy Goals        Problem: Physical Therapy Goal    Goal Priority Disciplines Outcome Goal Variances Interventions   Physical Therapy Goal     PT, PT/OT Ongoing, Progressing     Description: Goals to be met by: 2022     Patient will increase functional independence with mobility by performin. Supine to sit with Set-up [place]  2. Sit to supine with Set-up  [place]  3. Sit to stand transfer with Supervision  4. Bed to chair transfer with Supervision using Rolling Walker  5. Gait  x 150 feet with Supervision using Rolling Walker.   6. Lower extremity exercise program x15 reps per handout, with supervision                     Time Tracking:     PT Received On: 02/11/22  PT Start Time: 1310     PT Stop Time: 1335  PT Total Time (min): 25 min     Billable Minutes: Gait Training 15 min and Therapeutic Activity 10 min    Treatment Type: Treatment  PT/PTA: PT           Antoinette Roth PT, DPT  2/11/2022

## 2022-02-11 NOTE — CONSULTS
[facility] - Neurosurgery ([facility])  [facility] Medicine  Consult Note    Patient Name: Emeka Caballero  MRN: [id number]  Admission Date: 2/8/2022  Hospital Length of Stay: 3 days  Attending Physician: Alphonse Reed MD   Primary Care Provider: Dio Zarate Jr, MD           Patient information was obtained from patient, past medical records and primary team.     Inpatient consult to [facility] Medicine-General  Consult performed by: Javad Leon MD  Consult ordered by: Ling Malloy PA-C  Reason for consult: Fevers, tachycardia        Subjective:     Principal Problem: Spondylolisthesis of lumbosacral region    Chief Complaint: No chief complaint on file.       HPI: 45 y/o M with PMHx BPH, T2DM, HLD, and chronic back pain admitted by NSGY for L4 spinal fusion. Post-op complicated by fevers and tachycardia. Tmax has been 102, responding to tylenol. EKG showed sinus tachycardia. Infectious workup has been unremarkable. CRP/ESR elevated, CXR negative, b/l LE US negative, urinalysis with 3+occult blood. D-dimer elevated. Lactate and procal wnl. Patient denies any shortness of breath, chest pain, nausea, vomiting. He complains of abdominal pain and distention, has not had a BM since Monday 2/7 but passing gas. Scrotal US significant for enlarged lymph node on the right with right sided varicocele. His back pain is well controlled. He is a chronic smoker since he was 17 years old, smoking 3-4 cigarettes per day, previously smoked 1/2 PPD. He quit right before his spinal surgery. His wife at bedside also reports that he has had severe night sweats for years requiring the sheets to be changed. No unintentional weight loss. He is originally from [place].     IM6 consulted for workup of fevers and tachycardia      History reviewed. No pertinent past medical history.    Past Surgical History:   Procedure Laterality Date    EPIDURAL STEROID INJECTION N/A 10/21/2020    Procedure: Injection, Steroid, Epidural Caudal;   Surgeon: Vipul Nixon Jr., MD;  Location: [facility];  Service: Pain Management;  Laterality: N/A;  Caudal KURT  Arrive @ 1315; No ATC or DM; Needs MD Signature    LUMBAR FUSION N/A 2/8/2022    Procedure: FUSION, SPINE, LUMBAR;  Surgeon: Alphonse Reed MD;  [facility];  Service: Neurosurgery;  Laterality: N/A;  AIRO, L4-S1       Review of patient's allergies indicates:  No Known Allergies    No current facility-administered medications on file prior to encounter.     Current Outpatient Medications on File Prior to Encounter   Medication Sig    meloxicam (MOBIC) 15 MG tablet Take 1 tablet (15 mg total) by mouth once daily.    triamcinolone acetonide 0.1% (KENALOG) 0.1 % cream Apply topically 2 (two) times daily. Apply to rash. DO NOT USE ON FACE.     Family History    None       Tobacco Use    Smoking status: Former Smoker    Smokeless tobacco: Never Used   Substance and Sexual Activity    Alcohol use: Yes     Comment: socially    Drug use: No    Sexual activity: Not on file     Review of Systems   Constitutional: Positive for diaphoresis (night sweats) and fever. Negative for fatigue.   HENT: Negative for congestion, rhinorrhea and sore throat.    Respiratory: Negative for chest tightness and shortness of breath.    Cardiovascular: Negative for chest pain.   Gastrointestinal: Positive for abdominal distention and abdominal pain. Negative for blood in stool, constipation, diarrhea, nausea and vomiting.   Genitourinary: Positive for difficulty urinating. Negative for dysuria, flank pain, frequency, hematuria and urgency.   Musculoskeletal: Positive for back pain. Negative for arthralgias and neck pain.   Neurological: Negative for dizziness, weakness, numbness and headaches.     Objective:     Vital Signs (Most Recent):  Temp: 97.6 °F (36.4 °C) (02/11/22 0746)  Pulse: (!) 112 (02/11/22 1120)  Resp: 18 (02/11/22 1120)  BP: 124/70 (02/11/22 1120)  SpO2: 96 % (02/11/22 1120) Vital Signs (24h  Range):  Temp:  [97.6 °F (36.4 °C)-102.2 °F (39 °C)] 97.6 °F (36.4 °C)  Pulse:  [109-118] 112  Resp:  [15-22] 18  SpO2:  [93 %-97 %] 96 %  BP: (119-126)/(64-70) 124/70     Weight: 95.2 kg (209 lb 15.8 oz)  Body mass index is 31.93 kg/m².    Physical Exam  Vitals and nursing note reviewed.   Constitutional:       General: He is not in acute distress.     Appearance: Normal appearance. He is normal weight. He is not ill-appearing or toxic-appearing.   HENT:      Head: Normocephalic and atraumatic.      Nose: Nose normal. No congestion or rhinorrhea.      Mouth/Throat:      Mouth: Mucous membranes are moist.      Pharynx: Oropharynx is clear. No oropharyngeal exudate or posterior oropharyngeal erythema.   Eyes:      Extraocular Movements: Extraocular movements intact.      Conjunctiva/sclera: Conjunctivae normal.      Pupils: Pupils are equal, round, and reactive to light.   Cardiovascular:      Rate and Rhythm: Regular rhythm. Tachycardia present.      Pulses: Normal pulses.      Heart sounds: Normal heart sounds. No murmur heard.  No friction rub. No gallop.    Pulmonary:      Effort: Pulmonary effort is normal. No respiratory distress.      Breath sounds: Normal breath sounds. No wheezing, rhonchi or rales.   Abdominal:      General: Abdomen is protuberant. Bowel sounds are increased. There is distension.      Palpations: Abdomen is soft. There is no mass.      Tenderness: There is generalized abdominal tenderness. There is no guarding or rebound.   Musculoskeletal:         General: No swelling or tenderness. Normal range of motion.      Cervical back: Normal range of motion.      Right lower leg: No edema.      Left lower leg: No edema.   Skin:     General: Skin is warm and dry.      Capillary Refill: Capillary refill takes less than 2 seconds.   Neurological:      General: No focal deficit present.      Mental Status: He is alert and oriented to person, place, and time. Mental status is at baseline.   Psychiatric:          Mood and Affect: Mood normal.         Behavior: Behavior normal.         Significant Labs:   All pertinent labs within the past 24 hours have been reviewed.  Recent Lab Results       02/11/22  1135   02/11/22  1119   02/11/22  0917   02/11/22  0453   02/10/22  2126        Procalcitonin     0.25  Comment: A concentration < 0.25 ng/mL represents a low risk of bacterial   infection.  Procalcitonin may not be accurate among patients with localized   infection, recent trauma or major surgery, immunosuppressed state,   invasive fungal infection, renal dysfunction. Decisions regarding   initiation or continuation of antibiotic therapy should not be based   solely on procalcitonin levels.             Albumin     2.9           Alkaline Phosphatase     54           ALT     35           Anion Gap     11           AST     25           Baso #     0.04           Basophil %     0.3           BILIRUBIN TOTAL     0.6  Comment: For infants and newborns, interpretation of results should be based  on gestational age, weight and in agreement with clinical  observations.    Premature Infant recommended reference ranges:  Up to 24 hours.............<8.0 mg/dL  Up to 48 hours............<12.0 mg/dL  3-5 days..................<15.0 mg/dL  6-29 days.................<15.0 mg/dL             BUN     10           Calcium     8.8           Chloride     99           CO2     26           Creatinine     0.9           CRP     256.8           D-Dimer 2.42  Comment: The quantitative D-dimer assay should be used as an aid in   the diagnosis of deep vein thrombosis and pulmonary embolism  in patients with the appropriate presentation and clinical  history. The upper limit of the reference interval and the clinical   cut off   point are identical. Causes of a positive (>0.50 mg/L FEU) D-Dimer   test  include, but are not limited to: DVT, PE, DIC, thrombolytic   therapy, anticoagulant therapy, recent surgery, trauma, or   pregnancy, disseminated  malignancy, aortic aneurysm, cirrhosis,  and severe infection. False negative results may occur in   patients with distal DVT.                 Differential Method     Automated           eGFR if      >60.0           eGFR if non      >60.0  Comment: Calculation used to obtain the estimated glomerular filtration  rate (eGFR) is the CKD-EPI equation.              Eos #     0.1           Eosinophil %     0.5           Glucose     210           Gran # (ANC)     8.3           Gran %     66.9           Hematocrit     29.4           Hemoglobin     9.5           Immature Grans (Abs)     0.11  Comment: Mild elevation in immature granulocytes is non specific and   can be seen in a variety of conditions including stress response,   acute inflammation, trauma and pregnancy. Correlation with other   laboratory and clinical findings is essential.             Immature Granulocytes     0.9           Lactate, Momo     1.3  Comment: Falsely low lactic acid results can be found in samples   containing >=13.0 mg/dL total bilirubin and/or >=3.5 mg/dL   direct bilirubin.             Lymph #     3.0           Lymph %     23.9           MCH     27.4           MCHC     32.3           MCV     85           Mono #     0.9           Mono %     7.5           MPV     10.8           nRBC     0           Platelets     195           POCT Glucose   144     141   148       Potassium     3.9           PROTEIN TOTAL     6.5           RBC     3.47           RDW     13.1           SARS-CoV2 (COVID-19) Qualitative PCR               Sed Rate     84           Sodium     136           TSH     1.384           WBC     12.43                            02/10/22  1718        Procalcitonin       Albumin       Alkaline Phosphatase       ALT       Anion Gap       AST       Baso #       Basophil %       BILIRUBIN TOTAL       BUN       Calcium       Chloride       CO2       Creatinine       CRP       D-Dimer       Differential Method        eGFR if        eGFR if non        Eos #       Eosinophil %       Glucose       Gran # (ANC)       Gran %       Hematocrit       Hemoglobin       Immature Grans (Abs)       Immature Granulocytes       Lactate, Momo       Lymph #       Lymph %       MCH       MCHC       MCV       Mono #       Mono %       MPV       nRBC       Platelets       POCT Glucose       Potassium       PROTEIN TOTAL       RBC       RDW       SARS-CoV2 (COVID-19) Qualitative PCR Not Detected  Comment: This test utilizes a real-time reverse transcription  polymerase chain reaction procedure to amplify and   detect the SARS-CoV-2 and detect the SARS-CoV-2 N2 and E nucleic  acid targets. The analytical sensitivity (limit of detection) of   this assay is 250 copies/mL.    A Detected result implies that the patient is infected with the  SARS-CoV-2 virus and is presumed to be contagious.    A Not Detected result implies that the SARS-CoV-2 target nucleic  acids are not present above the limit of detection.  It does not  rule out the possibility of COVID-19 and should not be the sole  basis for treatment decisions. If COVID-19 is strongly suspected  based on clinical and epidemiological history, re-testing should  be considered.    This test is only for use under Food and Drug   Administration s Emergency Use Authorization (EUA).   Commercial reagents are provided by [employer].  Performance characteristics of the EUA have been   independently verified by [facility]   Department of Pathology and Laboratory Medicine.           Sed Rate       Sodium       TSH       WBC             Significant Imaging:         I have reviewed all pertinent imaging results/findings within the past 24 hours.    Assessment/Plan:     * Spondylolisthesis of lumbosacral region  S/p L4 spinal fusion with NSGY. Undergoing PT/OT    - Management per primary      Diabetes mellitus, type 2  Chronic, stable. Last A1c 7.3 (1/6/22). On Metformin  500 mg BID at home.    - BG goal 140-180  - Diabetic diet  - MDSSI  - Accuchecks achs      HLD (hyperlipidemia)  Recently diagnosed, hypertriglyceridemia (>600). Started on gemfibrozil by PCP.    - Continue home gemfibrozil  - Would recommend statin on outpatient basis    The 10-year ASCVD risk score (Pedro DAVIS Jr., et al., 2013) is: 5.5%    Values used to calculate the score:      Age: 46 years      Sex: Male      Is Non- : Yes      Diabetic: No      Tobacco smoker: No      Systolic Blood Pressure: 124 mmHg      Is BP treated: No      HDL Cholesterol: 25 mg/dL      Total Cholesterol: 190 mg/dL        BPH (benign prostatic hyperplasia)  Chronic, stable. On flomax at home.    - Continue home Flomax      Constipation  Patient reports no BM since Monday 2/7, passing gas. Did not respond to laxatives and suppositories. Hyperactive bowel sounds on exam. Appears bloated. KUB pending.    - FLEET enema ordered  - Monitor for BM, if no improvement will obtain CT A/P  - F/U KUB  - PT/OT, OOB      Fever  Consulted for post-op fevers. Tmax of 102, responding to tylenol. Infectious workup thus far has been unremarkable. D-dimer elevated but no hypoxia, b/l LE US negative. ESR/CRP elevated, lactate and procal wnl. CXR, UA, COVID screen negative. US of scrotum and testes showed right sided varicocele and enlarged lymph node. Differential at this stage is broad but would need to keep malignancy in mind given history of substantial tobacco use and night sweats. Patient denies any family history of cancer. No leukocytosis or other infectious sxs. Fevers may be 2/2 post-op atelectasis, CXR unremarkable. Will need to r/o TB given pt is from endemic region. KUB ordered, will see if enema helps constipation, if no improvement by tomorrow, may need CT A/P.    - Quantiferon gold ordered   - Saline enema ordered  - Continue Ancef per primary  - F/U KUB  - F/U blood cultures  - Monitor WBC count        VTE Risk  "Mitigation (From admission, onward)         Ordered     heparin (porcine) injection 5,000 Units  Every 8 hours         02/09/22 0810                    Thank you for your consult. I will follow-up with patient. Please contact us if you have any additional questions. Please secure chat [facility] Medicine  ("Medicine Consult Only") or contact me directly for any additional questions or concerns.      Javad Leon MD  Department of Hospital Medicine   [facility] - Neurosurgery (Hospital)    "

## 2022-02-11 NOTE — PLAN OF CARE
Problem: Occupational Therapy Goal  Goal: Occupational Therapy Goal  Description: Goals to be met by: 2/19     Patient will increase functional independence with ADLs by performing:    UE Dressing with Minimal Assistance.  LE Dressing with Stand by Assistance.  Grooming while standing with Stand-by Assistance.  Toileting from bedside commode with Minimal Assistance for hygiene and clothing management.   Rolling to Bilateral with Stand-by Assistance.   Supine to sit with Stand-by Assistance.  Step transfer with Stand-by Assistance using RW    Outcome: Ongoing, Progressing     Goals updated.

## 2022-02-11 NOTE — ASSESSMENT & PLAN NOTE
Emeka Lau is a 46 y.o. male with PMH of BPH and smoker with grade 2 isthmic spondylolisthesis contributing to severe bilateral foraminal stenosis. Now s/p L4-pelvis fusion with L4-5, L5-S1 TLIF on 2/8.     - Neurologically stable post-op. Q4hr neurochecks.  - US scrotum/testes for scrotal pain negative.  - Fever & tachycardia: CXR negative, BLE US negative, UA negative for infection. EKG with sinus tachycardia. ESR, CRP, Procal, BCx, lactate ordered. D-dimer mildly elevated at 1.72, likely due to recent surgery. Will repeat.   - Pain control: scheduled tylenol 650 mg q6h, robaxin 750 qid. Oxycodone 10 mg q4h prn, IV morphine for breakthrough pain.   - Continue WV to incision.   - Drains: HV drains removed 2/11  - Post op imaging pending, obtain today.   - LSO brace when up/OOB.  - Continue PT/OT/OOB. OOB > 6hrs/day  - GI: Diet as tolerated. Continue bowel regimen. KUB ordered. Lactulose if needed.  - Noonan removed. Bladder scan q6h. Voiding spontaneously. Adequate urine output.  - DVT ppx: SQH, compression stockings, SCDs  - Atelectasis ppx: IS at bedside. Encourage use every hour while awake.  - BPH: continue home dose flomax    - Discussed with Dr. Reed.     - Dispo: PT/OT rec is home health. Pending fever workup.

## 2022-02-11 NOTE — SUBJECTIVE & OBJECTIVE
Interval History: NAEON. Febrile overnight, Tmax 102.2. Continued tachycardia. Patient sitting in the chair in no acute distress. Pain better controlled. Reports RLE pain has improved. Denies groin pain today. Voiding spontaneously but reports dysuria. + flatus, mild abdominal tenderness. Denies chest pain, SOB, n/v. Tolerating PO. Fever workup so far unrevealing, additional studies ordered. HV drains removed for low output. HM consulted for assistance with fever workup.    Medications:  Continuous Infusions:  Scheduled Meds:   acetaminophen  650 mg Oral Q6H    heparin (porcine)  5,000 Units Subcutaneous Q8H    methocarbamoL  750 mg Oral QID    mupirocin   Nasal BID    polyethylene glycol  17 g Oral Daily    tamsulosin  1 capsule Oral Daily     PRN Meds:aluminum-magnesium hydroxide-simethicone, bisacodyL, dextrose 10%, dextrose 10%, glucagon (human recombinant), glucose, glucose, HYDROmorphone, insulin aspart U-100, ondansetron, oxyCODONE, prochlorperazine, senna-docusate 8.6-50 mg, sodium chloride 0.9%       Objective:     Weight: 95.2 kg (209 lb 15.8 oz)  Body mass index is 31.93 kg/m².  Vital Signs (Most Recent):  Temp: 97.6 °F (36.4 °C) (02/11/22 0746)  Pulse: (!) 112 (02/11/22 1120)  Resp: 18 (02/11/22 1120)  BP: 124/70 (02/11/22 1120)  SpO2: 96 % (02/11/22 1120) Vital Signs (24h Range):  Temp:  [97.6 °F (36.4 °C)-102.2 °F (39 °C)] 97.6 °F (36.4 °C)  Pulse:  [109-118] 112  Resp:  [15-22] 18  SpO2:  [93 %-97 %] 96 %  BP: (119-126)/(64-70) 124/70     Date 02/11/22 0700 - 02/12/22 0659   Shift 5153-6698 5231-5222 9822-9835 24 Hour Total   INTAKE   Shift Total(mL/kg)       OUTPUT   Urine(mL/kg/hr) 925   925   Shift Total(mL/kg) 925(9.7)   925(9.7)   Weight (kg) 95.2 95.2 95.2 95.2                        Closed/Suction Drain 02/08/22 1130 Left Back Accordion 10 Fr. (Active)   Site Description Unable to view 02/09/22 1940   Dressing Type Gauze;Transparent (Tegaderm) 02/09/22 1940   Dressing Status  Clean;Intact;Dry 02/09/22 1940   Dressing Intervention Integrity maintained 02/10/22 0800   Drainage Serosanguineous 02/10/22 0800   Status To bulb suction 02/10/22 0800   Output (mL) 25 mL 02/10/22 0800            Closed/Suction Drain 02/08/22 1131 Right Back Accordion 10 Fr. (Active)   Site Description Unable to view 02/09/22 1940   Dressing Type Gauze;Transparent (Tegaderm) 02/09/22 1940   Dressing Status Clean;Dry;Intact 02/09/22 1940   Dressing Intervention Integrity maintained 02/10/22 0800   Drainage Serosanguineous 02/10/22 0800   Status To bulb suction 02/10/22 0800   Output (mL) 20 mL 02/10/22 0800     Neurosurgery Physical Exam    General: Well developed, well nourished, not in acute distress.   Head: Normocephalic, atraumatic.  Neurologic: Alert and oriented x 4. Thought content appropriate.  GCS: Motor:6  Verbal:5  Eyes:4   GCS Total: 15  Language: No aphasia.  Speech: No dysarthria.  Cranial nerves: Face symmetric, tongue midline, CN II-XII grossly intact.   Eyes: Pupils equal, round, reactive to light with accomodation, EOMI.   Pulmonary: Normal respirations, no signs of respiratory distress.  Abdomen: slightly tense, non-distended, mild diffuse TTP  Sensory: Intact to light touch throughout.  Motor Strength: Moves all extremities spontaneously with good tone. No abnormal movements seen.     Strength  Deltoids Triceps Biceps Wrist Extension Wrist Flexion Hand    Upper: R 5/5 5/5 5/5 5/5 5/5 5/5    L 5/5 5/5 5/5 5/5 5/5 5/5     Hip Flexion Knee Extension Knee  Flexion Dorsiflexion Plantar flexion EHL   Lower: R 4/5 4/5 4/5 5/5 5/5 5/5    L 4/5 4/5 4/5 5/5 5/5 5/5   Pain limited effort.    Vascular: No LE edema.   Skin: Skin is warm, dry and intact.    Incision c/d/i with wound vac in place. No surrounding erythema or edema. No drainage from incision. No palpable hematoma or underlying fluid collection.    Significant Labs:  Recent Labs   Lab 02/11/22  0917   *      K 3.9   CL 99    CO2 26   BUN 10   CREATININE 0.9   CALCIUM 8.8     Recent Labs   Lab 02/10/22  1249 02/11/22 0917   WBC 12.63 12.43   HGB 10.4* 9.5*   HCT 31.4* 29.4*    195     No results for input(s): LABPT, INR, APTT in the last 48 hours.  Microbiology Results (last 7 days)     Procedure Component Value Units Date/Time    Blood culture [[id number]] Collected: 02/11/22 0916    Order Status: Sent Specimen: Blood Updated: 02/11/22 1014    Blood culture [[id number]] Collected: 02/11/22 0917    Order Status: Sent Specimen: Blood Updated: 02/11/22 1014        All pertinent labs from the last 24 hours have been reviewed.    Significant Diagnostics:  I have reviewed and interpreted all pertinent imaging results/findings within the past 24 hours.

## 2022-02-11 NOTE — ASSESSMENT & PLAN NOTE
Emeka Lau is a 46 y.o. male with PMH of BPH and smoker with grade 2 isthmic spondylolisthesis contributing to severe bilateral foraminal stenosis. Now s/p L4-pelvis fusion with L4-5, L5-S1 TLIF on 2/8.     - Neurologically stable post-op. Q4hr neurochecks.  - All labs and imaging reviewed   - Postop XR with satisfactory hardware placement and spinal alignment   - US BLE 2/10 without evidence of DVT   - CXR 2/10 without acute abnormality   - US scrotum/testes for scrotal pain negative.  - Fever & tachycardia: CXR negative, BLE US negative, UA negative for infection. EKG with sinus tachycardia. ESR, CRP, Procal, D-dimer elevated. BCx 2/11 NGTD. Lactate normal. HM consulted for assistance, appreciate assistance. Quantiferon gold pending. CTA chest to r/o PE and CT a/p with contrast pending. Will discuss broadening antibiotics. Continue ancef.  - Pain control: scheduled tylenol 650 mg q6h, robaxin 750 qid. Oxycodone 10 mg q4h prn, IV morphine for breakthrough pain.   - WV removed. Bacitracin to incision BID.   - Drains: HV drains removed 2/11  - LSO brace when up/OOB.  - Continue PT/OT/OOB. OOB > 6hrs/day  - GI: Diet as tolerated. Continue bowel regimen. Patient reports small BM yesterday. KUB with mildly distended bowel loops and feces. Fleet enema today.  - Noonan removed. Bladder scan q6h. Voiding spontaneously. Adequate urine output.  - DVT ppx: SQH, compression stockings, SCDs. US BLE 2/10 negative for DVT.  - Atelectasis ppx: IS at bedside. Encourage use every hour while awake.  - BPH: continue home dose flomax    - Discussed with Dr. Reed.     - Dispo: PT/OT rec is home health. Pending fever workup.

## 2022-02-11 NOTE — HPI
47 y/o M with PMHx BPH, T2DM, HLD, and chronic back pain admitted by NSGY for L4 spinal fusion. Post-op complicated by fevers and tachycardia. Tmax has been 102, responding to tylenol. EKG showed sinus tachycardia. Infectious workup has been unremarkable. CRP/ESR elevated, CXR negative, b/l LE US negative, urinalysis with 3+occult blood. D-dimer elevated. Lactate and procal wnl. Patient denies any shortness of breath, chest pain, nausea, vomiting. He complains of abdominal pain and distention, has not had a BM since Monday 2/7 but passing gas. Scrotal US significant for enlarged lymph node on the right with right sided varicocele. His back pain is well controlled. He is a chronic smoker since he was 17 years old, smoking 3-4 cigarettes per day, previously smoked 1/2 PPD. He quit right before his spinal surgery. His wife at bedside also reports that he has had severe night sweats for years requiring the sheets to be changed. No unintentional weight loss. He is originally from [place].     IM6 consulted for workup of fevers and tachycardia

## 2022-02-12 LAB
POCT GLUCOSE: 111 MG/DL (ref 70–110)
POCT GLUCOSE: 136 MG/DL (ref 70–110)
POCT GLUCOSE: 189 MG/DL (ref 70–110)
POCT GLUCOSE: 220 MG/DL (ref 70–110)

## 2022-02-12 PROCEDURE — 63600175 PHARM REV CODE 636 W HCPCS: Performed by: PHYSICIAN ASSISTANT

## 2022-02-12 PROCEDURE — 99900035 HC TECH TIME PER 15 MIN (STAT)

## 2022-02-12 PROCEDURE — 97530 THERAPEUTIC ACTIVITIES: CPT | Mod: CQ

## 2022-02-12 PROCEDURE — 97116 GAIT TRAINING THERAPY: CPT | Mod: CQ

## 2022-02-12 PROCEDURE — 99024 POSTOP FOLLOW-UP VISIT: CPT | Mod: ,,, | Performed by: PHYSICIAN ASSISTANT

## 2022-02-12 PROCEDURE — 25000003 PHARM REV CODE 250: Performed by: STUDENT IN AN ORGANIZED HEALTH CARE EDUCATION/TRAINING PROGRAM

## 2022-02-12 PROCEDURE — 11000001 HC ACUTE MED/SURG PRIVATE ROOM

## 2022-02-12 PROCEDURE — 99231 PR SUBSEQUENT HOSPITAL CARE,LEVL I: ICD-10-PCS | Mod: ,,, | Performed by: HOSPITALIST

## 2022-02-12 PROCEDURE — 25000003 PHARM REV CODE 250: Performed by: PHYSICIAN ASSISTANT

## 2022-02-12 PROCEDURE — 25000003 PHARM REV CODE 250

## 2022-02-12 PROCEDURE — 99024 PR POST-OP FOLLOW-UP VISIT: ICD-10-PCS | Mod: ,,, | Performed by: PHYSICIAN ASSISTANT

## 2022-02-12 PROCEDURE — 94761 N-INVAS EAR/PLS OXIMETRY MLT: CPT

## 2022-02-12 PROCEDURE — 63600175 PHARM REV CODE 636 W HCPCS: Performed by: STUDENT IN AN ORGANIZED HEALTH CARE EDUCATION/TRAINING PROGRAM

## 2022-02-12 PROCEDURE — 25500020 PHARM REV CODE 255: Performed by: NEUROLOGICAL SURGERY

## 2022-02-12 PROCEDURE — 99231 SBSQ HOSP IP/OBS SF/LOW 25: CPT | Mod: ,,, | Performed by: HOSPITALIST

## 2022-02-12 RX ORDER — POLYETHYLENE GLYCOL 3350 17 G/17G
17 POWDER, FOR SOLUTION ORAL 2 TIMES DAILY
Status: DISCONTINUED | OUTPATIENT
Start: 2022-02-12 | End: 2022-02-14 | Stop reason: HOSPADM

## 2022-02-12 RX ORDER — AMOXICILLIN 250 MG
1 CAPSULE ORAL 2 TIMES DAILY
Status: DISCONTINUED | OUTPATIENT
Start: 2022-02-12 | End: 2022-02-14 | Stop reason: HOSPADM

## 2022-02-12 RX ORDER — LACTULOSE 10 G/15ML
20 SOLUTION ORAL ONCE
Status: DISCONTINUED | OUTPATIENT
Start: 2022-02-12 | End: 2022-02-12

## 2022-02-12 RX ORDER — HYDROMORPHONE HYDROCHLORIDE 1 MG/ML
1 INJECTION, SOLUTION INTRAMUSCULAR; INTRAVENOUS; SUBCUTANEOUS EVERY 6 HOURS PRN
Status: DISCONTINUED | OUTPATIENT
Start: 2022-02-12 | End: 2022-02-14 | Stop reason: HOSPADM

## 2022-02-12 RX ADMIN — MUPIROCIN: 20 OINTMENT TOPICAL at 08:02

## 2022-02-12 RX ADMIN — INSULIN ASPART 2 UNITS: 100 INJECTION, SOLUTION INTRAVENOUS; SUBCUTANEOUS at 05:02

## 2022-02-12 RX ADMIN — HEPARIN SODIUM 5000 UNITS: 5000 INJECTION INTRAVENOUS; SUBCUTANEOUS at 10:02

## 2022-02-12 RX ADMIN — METHOCARBAMOL 750 MG: 750 TABLET ORAL at 08:02

## 2022-02-12 RX ADMIN — OXYCODONE HYDROCHLORIDE 10 MG: 10 TABLET ORAL at 01:02

## 2022-02-12 RX ADMIN — GEMFIBROZIL 600 MG: 600 TABLET ORAL at 04:02

## 2022-02-12 RX ADMIN — MUPIROCIN: 20 OINTMENT TOPICAL at 09:02

## 2022-02-12 RX ADMIN — METHOCARBAMOL 750 MG: 750 TABLET ORAL at 05:02

## 2022-02-12 RX ADMIN — OXYCODONE HYDROCHLORIDE 10 MG: 10 TABLET ORAL at 06:02

## 2022-02-12 RX ADMIN — HEPARIN SODIUM 5000 UNITS: 5000 INJECTION INTRAVENOUS; SUBCUTANEOUS at 04:02

## 2022-02-12 RX ADMIN — CEFAZOLIN SODIUM 1 G: 1 SOLUTION INTRAVENOUS at 05:02

## 2022-02-12 RX ADMIN — CEFAZOLIN SODIUM 1 G: 1 SOLUTION INTRAVENOUS at 09:02

## 2022-02-12 RX ADMIN — HYDROMORPHONE HYDROCHLORIDE 1 MG: 1 INJECTION, SOLUTION INTRAMUSCULAR; INTRAVENOUS; SUBCUTANEOUS at 08:02

## 2022-02-12 RX ADMIN — GEMFIBROZIL 600 MG: 600 TABLET ORAL at 05:02

## 2022-02-12 RX ADMIN — ACETAMINOPHEN 650 MG: 325 TABLET ORAL at 06:02

## 2022-02-12 RX ADMIN — TAMSULOSIN HYDROCHLORIDE 0.4 MG: 0.4 CAPSULE ORAL at 09:02

## 2022-02-12 RX ADMIN — METHOCARBAMOL 750 MG: 750 TABLET ORAL at 09:02

## 2022-02-12 RX ADMIN — HEPARIN SODIUM 5000 UNITS: 5000 INJECTION INTRAVENOUS; SUBCUTANEOUS at 01:02

## 2022-02-12 RX ADMIN — HYDROMORPHONE HYDROCHLORIDE 1 MG: 1 INJECTION, SOLUTION INTRAMUSCULAR; INTRAVENOUS; SUBCUTANEOUS at 11:02

## 2022-02-12 RX ADMIN — INSULIN ASPART 2 UNITS: 100 INJECTION, SOLUTION INTRAVENOUS; SUBCUTANEOUS at 10:02

## 2022-02-12 RX ADMIN — METHOCARBAMOL 750 MG: 750 TABLET ORAL at 01:02

## 2022-02-12 RX ADMIN — SENNOSIDES AND DOCUSATE SODIUM 1 TABLET: 50; 8.6 TABLET ORAL at 08:02

## 2022-02-12 RX ADMIN — OXYCODONE HYDROCHLORIDE 10 MG: 10 TABLET ORAL at 09:02

## 2022-02-12 RX ADMIN — POLYETHYLENE GLYCOL 3350 17 G: 17 POWDER, FOR SOLUTION ORAL at 08:02

## 2022-02-12 RX ADMIN — ACETAMINOPHEN 650 MG: 325 TABLET ORAL at 04:02

## 2022-02-12 RX ADMIN — IOHEXOL 100 ML: 350 INJECTION, SOLUTION INTRAVENOUS at 01:02

## 2022-02-12 RX ADMIN — ACETAMINOPHEN 650 MG: 325 TABLET ORAL at 01:02

## 2022-02-12 NOTE — ASSESSMENT & PLAN NOTE
Emeka Lau is a 46 y.o. male with PMH of BPH and smoker with grade 2 isthmic spondylolisthesis contributing to severe bilateral foraminal stenosis. Now s/p L4-pelvis fusion with L4-5, L5-S1 TLIF on 2/8.     - Neurologically stable post-op. Q4hr neurochecks.  - All labs and imaging reviewed   - Postop XR with satisfactory hardware placement and spinal alignment   - US BLE 2/10 without evidence of DVT   - CXR 2/10 without acute abnormality   - US scrotum/testes for scrotal pain negative.  - Fever & tachycardia: CXR negative, BLE US negative, UA negative for infection. EKG with sinus tachycardia. ESR, CRP, Procal, D-dimer elevated. BCx 2/11 NGTD. Lactate normal. HM consulted for assistance, appreciate assistance. Quantiferon gold pending. CTA chest to r/o PE and CT a/p with contrast pending. Will discuss broadening antibiotics. Continue ancef.  - Pain control: scheduled tylenol 650 mg q6h, robaxin 750 qid. Oxycodone 10 mg q4h prn, IV morphine for breakthrough pain.   - WV removed. Bacitracin to incision BID.   - Drains: HV drains removed 2/11  - LSO brace when up/OOB.  - Continue PT/OT/OOB. OOB > 6hrs/day  - GI: Diet as tolerated. Continue bowel regimen. KUB with mildly distended bowel loops and feces. Multiple BMs yesterday.  - Noonan removed. Bladder scan q6h. Voiding spontaneously. Adequate urine output.  - DVT ppx: SQH, compression stockings, SCDs. US BLE 2/10 negative for DVT.  - Atelectasis ppx: IS at bedside. Encourage use every hour while awake.  - BPH: continue home dose flomax    - Discussed with Dr. Reed.     - Dispo: PT/OT rec is home health. Pending fever workup.

## 2022-02-12 NOTE — NURSING
Informed MD that the patient temp is not going down. I also informed the patient's complaint of headache. Placed ice packs under patient's arms and wiped patient body with some wipes.  No new orders at this time. Will continue to monitor patient

## 2022-02-12 NOTE — PROGRESS NOTES
[facility] - Neurosurgery ([facility])  Neurosurgery  Progress Note    Subjective:     History of Present Illness: Emeka Lau is a 46 y.o. male with PMH of BPH.  offered, but patient declined as he and his wife are English speaking. The patient was last seen in clinic on 10/19/20 with Jennifer Sol PA-C to discuss concerns with worsening low back pain despite conservative therapy. At that appointment, it was discussed that the patient could benefit from surgery. The patient was apprehensive about proceeding with surgery and wanted to obtain more conservative therapy.     He is being seen today to discuss worsening of his symptoms despite additional PT that was obtained in [facility]. Describes the pain as constant, aching, and stabbing across the low back with radiation down the R> L leg. Rates the pain as a 9/10. Aggravating factors include walking and prolonged standing. Alleviating factors include laying down. Denies b/b dysfunction, saddle anesthesia, or gait instability. Endorses weakness in the right leg associated with numbness and tingling.      Regarding his neck pain, the patient states that the pain is midline and radiates down the right arm stopping at the shoulder. Describes the pain as constant aching with numbness and tingling. Rates the pain as 5-6/10. Reports balance difficulties and weakness in his hands when grabbing objects.      Interval Hx 12/2/21: After the last appointment, it was recommended that the patient obtain updated imaging to evaluate his radicular complaints. He is being seen in clinic today with his wife to discuss the image findings. States that his symptoms have not changed since his last appointment. He continues to struggle with low back and right leg pain affecting his ambulation. The patient is interested in surgery.      Interval Hx 1/13/22: The patient is being seen in clinic today to review his recent MRI and answer any additional questions regarding  his upcoming surgery. States that his symptoms have not changed since his last appointment. He is eager to proceed with surgery. Dr. Reed is recommending a posterior fusion L4-pelvis due to the grade 2 isthmic spondylolisthesis contributing to severe bilateral foraminal stenosis.      Post-Op Info:  Procedure(s) (LRB):  FUSION, SPINE, LUMBAR (N/A)   4 Days Post-Op     Interval History: NAEON. Continued fevers overnight, Tmax 101.8. Patient reports diaphoresis overnight. Sitting on the side of the bed in no acute distress. Intermittently requiring oxygen via nasal cannula. Denies chest pain, SOB. Endorses nasal congestion and increased back pain after ambulating more yesterday. Reports abdominal pain has improved some. + flatus, small BM yesterday. Voiding spontaneously.     Medications:  Continuous Infusions:  Scheduled Meds:   acetaminophen  650 mg Oral Q6H    ceFAZolin (ANCEF) IVPB  1 g Intravenous Q8H    gemfibroziL  600 mg Oral BID AC    heparin (porcine)  5,000 Units Subcutaneous Q8H    methocarbamoL  750 mg Oral QID    mupirocin   Nasal BID    polyethylene glycol  17 g Oral Daily    sodium phosphates  1 enema Rectal Once    tamsulosin  1 capsule Oral Daily     PRN Meds:aluminum-magnesium hydroxide-simethicone, bisacodyL, dextrose 10%, dextrose 10%, glucagon (human recombinant), glucose, glucose, HYDROmorphone, insulin aspart U-100, ondansetron, oxyCODONE, prochlorperazine, senna-docusate 8.6-50 mg, sodium chloride 0.9%       Objective:     Weight: 95.2 kg (209 lb 15.8 oz)  Body mass index is 31.93 kg/m².  Vital Signs (Most Recent):  Temp: 99.3 °F (37.4 °C) (02/12/22 0822)  Pulse: (!) 111 (02/12/22 0822)  Resp: 18 (02/12/22 0905)  BP: (!) 128/59 (02/12/22 0822)  SpO2: 98 % (02/12/22 0822) Vital Signs (24h Range):  Temp:  [99.3 °F (37.4 °C)-101.8 °F (38.8 °C)] 99.3 °F (37.4 °C)  Pulse:  [107-119] 111  Resp:  [16-20] 18  SpO2:  [94 %-100 %] 98 %  BP: (113-132)/(58-80) 128/59                           Closed/Suction Drain 02/08/22 1130 Left Back Accordion 10 Fr. (Active)   Site Description Unable to view 02/09/22 1940   Dressing Type Gauze;Transparent (Tegaderm) 02/09/22 1940   Dressing Status Clean;Intact;Dry 02/09/22 1940   Dressing Intervention Integrity maintained 02/10/22 0800   Drainage Serosanguineous 02/10/22 0800   Status To bulb suction 02/10/22 0800   Output (mL) 25 mL 02/10/22 0800            Closed/Suction Drain 02/08/22 1131 Right Back Accordion 10 Fr. (Active)   Site Description Unable to view 02/09/22 1940   Dressing Type Gauze;Transparent (Tegaderm) 02/09/22 1940   Dressing Status Clean;Dry;Intact 02/09/22 1940   Dressing Intervention Integrity maintained 02/10/22 0800   Drainage Serosanguineous 02/10/22 0800   Status To bulb suction 02/10/22 0800   Output (mL) 20 mL 02/10/22 0800     Neurosurgery Physical Exam    General: Well developed, well nourished, not in acute distress.   Head: Normocephalic, atraumatic.  Neurologic: Alert and oriented x 4. Thought content appropriate.  GCS: Motor:6  Verbal:5  Eyes:4   GCS Total: 15  Language: No aphasia.  Speech: No dysarthria.  Cranial nerves: Face symmetric, tongue midline, CN II-XII grossly intact.   Eyes: Pupils equal, round, reactive to light with accomodation, EOMI.   Pulmonary: Normal respirations, no signs of respiratory distress.  Abdomen: slightly tense, non-distended, mild diffuse TTP  Sensory: Intact to light touch throughout.  Motor Strength: Moves all extremities spontaneously with good tone. No abnormal movements seen.     Strength  Deltoids Triceps Biceps Wrist Extension Wrist Flexion Hand    Upper: R 5/5 5/5 5/5 5/5 5/5 5/5    L 5/5 5/5 5/5 5/5 5/5 5/5     Hip Flexion Knee Extension Knee  Flexion Dorsiflexion Plantar flexion EHL   Lower: R 4/5 4/5 4/5 5/5 5/5 5/5    L 4/5 4/5 4/5 5/5 5/5 5/5   Pain limited effort.    Vascular: No LE edema.   Skin: Skin is warm, dry and intact.    Incision c/d/i with staples. No surrounding erythema  or edema. No drainage from incision. No palpable hematoma or underlying fluid collection.    Significant Labs:  Recent Labs   Lab 02/11/22 0917   *      K 3.9   CL 99   CO2 26   BUN 10   CREATININE 0.9   CALCIUM 8.8     Recent Labs   Lab 02/10/22  1249 02/11/22 0917   WBC 12.63 12.43   HGB 10.4* 9.5*   HCT 31.4* 29.4*    195     No results for input(s): LABPT, INR, APTT in the last 48 hours.  Microbiology Results (last 7 days)     Procedure Component Value Units Date/Time    Blood culture [[id number]] Collected: 02/11/22 0916    Order Status: Completed Specimen: Blood Updated: 02/11/22 1715     Blood Culture, Routine No Growth to date    Blood culture [[id number]] Collected: 02/11/22 0917    Order Status: Completed Specimen: Blood Updated: 02/11/22 1715     Blood Culture, Routine No Growth to date    Influenza A & B by Molecular [[id number]] Collected: 02/11/22 1438    Order Status: Canceled         All pertinent labs from the last 24 hours have been reviewed.    Significant Diagnostics:  I have reviewed and interpreted all pertinent imaging results/findings within the past 24 hours.        Assessment/Plan:     * Spondylolisthesis of lumbosacral region  Emeka Lau is a 46 y.o. male with PMH of BPH and smoker with grade 2 isthmic spondylolisthesis contributing to severe bilateral foraminal stenosis. Now s/p L4-pelvis fusion with L4-5, L5-S1 TLIF on 2/8.     - Neurologically stable post-op. Q4hr neurochecks.  - All labs and imaging reviewed   - Postop XR with satisfactory hardware placement and spinal alignment   - US BLE 2/10 without evidence of DVT   - CXR 2/10 without acute abnormality   - US scrotum/testes for scrotal pain negative.  - Fever & tachycardia: CXR negative, BLE US negative, UA negative for infection. EKG with sinus tachycardia. ESR, CRP, Procal, D-dimer elevated. BCx 2/11 NGTD. Lactate normal. HM consulted for assistance, appreciate assistance. Quantiferon gold pending. CTA  chest to r/o PE and CT a/p with contrast pending. Will discuss broadening antibiotics. Continue ancef.  - Pain control: scheduled tylenol 650 mg q6h, robaxin 750 qid. Oxycodone 10 mg q4h prn, IV morphine for breakthrough pain.   - WV removed. Bacitracin to incision BID.   - Drains: HV drains removed 2/11  - LSO brace when up/OOB.  - Continue PT/OT/OOB. OOB > 6hrs/day  - GI: Diet as tolerated. Continue bowel regimen. KUB with mildly distended bowel loops and feces. Multiple BMs yesterday.  - Noonan removed. Bladder scan q6h. Voiding spontaneously. Adequate urine output.  - DVT ppx: SQH, compression stockings, SCDs. US BLE 2/10 negative for DVT.  - Atelectasis ppx: IS at bedside. Encourage use every hour while awake.  - BPH: continue home dose flomax    - Discussed with Dr. Reed.     - Dispo: PT/OT rec is home health. Pending fever workup.        Ling Malloy PA-C  Neurosurgery  [facility] - Neurosurgery ([facility]

## 2022-02-12 NOTE — ASSESSMENT & PLAN NOTE
Chronic, stable. Last A1c 7.3 (1/6/22). On Metformin 500 mg BID at home.    - BG goal 140-180  - Diabetic diet  - MDSSI  - Accuchecks achs

## 2022-02-12 NOTE — ASSESSMENT & PLAN NOTE
Patient reports no BM since Monday 2/7, passing gas.     Had a bowel movement on 2/11 and reports significant improvement in abdominal pain. Still passing gas without difficulty. KUB without evidence of ileus or obstruction.     Recommendations:  - Continue current bowel regimen with Miralax and senna.   - Encourage ambulation as much as possible.

## 2022-02-12 NOTE — NURSING
Contacted Neurosurgery and informed him that the patient had a sepsis alert. T 101.8  /58 . Scheduled tylenol given and the schedule ABX is running. No orders were given. Will continue to monitor patient

## 2022-02-12 NOTE — ASSESSMENT & PLAN NOTE
Consulted for post-op fevers. Tmax of 102, responding to tylenol. Infectious workup thus far has been unremarkable. D-dimer elevated but no hypoxia, b/l LE US negative. ESR/CRP elevated, lactate and procal wnl. CXR, UA, COVID screen negative. US of scrotum and testes showed right sided varicocele and enlarged lymph node. Patient denies any family history of cancer. No leukocytosis or other infectious sxs. CXR unremarkable. KUB without ileus or obstruction. CTA chest and CTAP ordered by primary team.     Overall, there is no identified source of infection. The most likely source of infection is the lumbar spine and surrounding tissues in light of recent surgery and given that fevers started afterwards. Low suspicion for intraabdominal or pulmonary etiology. No active symptoms of TB but will rule out given frequent travel to [place].     Recommendations:   - Quantiferon gold in process.   - Blood cx with NGTD.   - Would defer broadening antibiotics at this time given no identified source of infection, negative infectious workup so far and hemodynamic stability.   - Would consider further imaging of the lumbar spine given recent surgery and significant tenderness on exam, as this could be the source of developing infection.   - If fevers are still persistent, would consider consulting infectious disease.

## 2022-02-12 NOTE — PROGRESS NOTES
[facility] - Neurosurgery ([facility])  [facility]  Progress Note    Patient Name: Emeka Caballero  MRN: [id number]  Patient Class: IP- Inpatient   Admission Date: 2/8/2022  Length of Stay: 4 days  Attending Physician: Alphonse Reed MD  Primary Care Provider: Dio Zarate Jr, MD    Subjective:     Principal Problem:Spondylolisthesis of lumbosacral region    HPI:  47 y/o M with PMHx BPH, T2DM, HLD, and chronic back pain admitted by NSGY for L4 spinal fusion. Post-op complicated by fevers and tachycardia. Tmax has been 102, responding to tylenol. EKG showed sinus tachycardia. Infectious workup has been unremarkable. CRP/ESR elevated, CXR negative, b/l LE US negative, urinalysis with 3+occult blood. D-dimer elevated. Lactate and procal wnl. Patient denies any shortness of breath, chest pain, nausea, vomiting. He complains of abdominal pain and distention, has not had a BM since Monday 2/7 but passing gas. Scrotal US significant for enlarged lymph node on the right with right sided varicocele. His back pain is well controlled. He is a chronic smoker since he was 17 years old, smoking 3-4 cigarettes per day, previously smoked 1/2 PPD. He quit right before his spinal surgery. His wife at bedside also reports that he has had severe night sweats for years requiring the sheets to be changed. No unintentional weight loss. He is originally from [place].     IM6 consulted for workup of fevers and tachycardia    Interval History: Continues to spike fevers overnight with T max of 101.2 F. Workup for infectious process so far has been unrevealing. Quant gold in process although he has no active symptoms of TB. Given no other obvious source of infection, fevers could be 2/2 to recent spinal surgery and developing infection of the spine/harware.     Review of Systems   Constitutional: Positive for fever. Negative for fatigue.   HENT: Negative for congestion, rhinorrhea and sore throat.    Respiratory: Negative for chest  tightness and shortness of breath.    Cardiovascular: Negative for chest pain.   Gastrointestinal: Negative for abdominal distention, abdominal pain, blood in stool, constipation, diarrhea, nausea and vomiting.   Genitourinary: Negative for difficulty urinating, dysuria, flank pain, frequency, hematuria and urgency.   Musculoskeletal: Positive for back pain. Negative for arthralgias and neck pain.   Neurological: Negative for dizziness, weakness, numbness and headaches.     Objective:     Vital Signs (Most Recent):  Temp: 99.2 °F (37.3 °C) (02/12/22 1148)  Pulse: 106 (02/12/22 1148)  Resp: 18 (02/12/22 1148)  BP: (!) 125/58 (02/12/22 1148)  SpO2: 96 % (02/12/22 1148) Vital Signs (24h Range):  Temp:  [99.2 °F (37.3 °C)-101.8 °F (38.8 °C)] 99.2 °F (37.3 °C)  Pulse:  [106-119] 106  Resp:  [16-20] 18  SpO2:  [94 %-100 %] 96 %  BP: (113-132)/(58-80) 125/58     Weight: 95.2 kg (209 lb 15.8 oz)  Body mass index is 31.93 kg/m².    Intake/Output Summary (Last 24 hours) at 2/12/2022 1219  Last data filed at 2/12/2022 0400  Gross per 24 hour   Intake --   Output 1000 ml   Net -1000 ml      Physical Exam  Vitals and nursing note reviewed.   Constitutional:       General: He is not in acute distress.     Appearance: Normal appearance. He is obese. He is not ill-appearing or toxic-appearing.   HENT:      Head: Normocephalic and atraumatic.      Nose: Nose normal. No congestion or rhinorrhea.      Mouth/Throat:      Mouth: Mucous membranes are moist.      Pharynx: Oropharynx is clear. No oropharyngeal exudate or posterior oropharyngeal erythema.   Eyes:      Extraocular Movements: Extraocular movements intact.      Conjunctiva/sclera: Conjunctivae normal.      Pupils: Pupils are equal, round, and reactive to light.   Cardiovascular:      Rate and Rhythm: Regular rhythm. Tachycardia present.      Pulses: Normal pulses.      Heart sounds: Normal heart sounds. No murmur heard.  No friction rub. No gallop.    Pulmonary:      Effort:  Pulmonary effort is normal. No respiratory distress.      Breath sounds: Normal breath sounds. No wheezing, rhonchi or rales.   Abdominal:      General: Abdomen is protuberant. Bowel sounds are normal. There is distension.      Palpations: Abdomen is soft. There is no mass.      Tenderness: There is no abdominal tenderness. There is no guarding or rebound.   Musculoskeletal:         General: Tenderness present. No swelling. Normal range of motion.      Cervical back: Normal range of motion.      Right lower leg: No edema.      Left lower leg: No edema.      Comments: Incision sites appear well healed without obvious fluctuance and erythema. He does have significant tenderness to palpation to the right lower back, just lateral to the incision.    Skin:     General: Skin is warm and dry.      Capillary Refill: Capillary refill takes less than 2 seconds.   Neurological:      General: No focal deficit present.      Mental Status: He is alert and oriented to person, place, and time. Mental status is at baseline.   Psychiatric:         Mood and Affect: Mood normal.         Behavior: Behavior normal.       Significant Labs: All pertinent labs within the past 24 hours have been reviewed.    Significant Imaging: I have reviewed all pertinent imaging results/findings within the past 24 hours.      Assessment/Plan:      * Spondylolisthesis of lumbosacral region  S/p L4 spinal fusion with NSGY. Undergoing PT/OT    - Management per primary      Diabetes mellitus, type 2  Chronic, stable. Last A1c 7.3 (1/6/22). On Metformin 500 mg BID at home.    - BG goal 140-180  - Diabetic diet  - MDSSI  - Accuchecks achs      HLD (hyperlipidemia)  Recently diagnosed, hypertriglyceridemia (>600). Started on gemfibrozil by PCP.    - Continue home gemfibrozil  - Would recommend statin on outpatient basis    The 10-year ASCVD risk score (Grayslake RYAN Jr., et al., 2013) is: 10.4%    Values used to calculate the score:      Age: 46 years      Sex:  Male      Is Non- : Yes      Diabetic: Yes      Tobacco smoker: No      Systolic Blood Pressure: 125 mmHg      Is BP treated: No      HDL Cholesterol: 25 mg/dL      Total Cholesterol: 190 mg/dL        BPH (benign prostatic hyperplasia)  Chronic, stable. On flomax at home.    - Continue home Flomax      Constipation  Patient reports no BM since Monday 2/7, passing gas.     Had a bowel movement on 2/11 and reports significant improvement in abdominal pain. Still passing gas without difficulty. KUB without evidence of ileus or obstruction.     Recommendations:  - Continue current bowel regimen with Miralax and senna.   - Encourage ambulation as much as possible.       Fever  Consulted for post-op fevers. Tmax of 102, responding to tylenol. Infectious workup thus far has been unremarkable. D-dimer elevated but no hypoxia, b/l LE US negative. ESR/CRP elevated, lactate and procal wnl. CXR, UA, COVID screen negative. US of scrotum and testes showed right sided varicocele and enlarged lymph node. Patient denies any family history of cancer. No leukocytosis or other infectious sxs. CXR unremarkable. KUB without ileus or obstruction. CTA chest and CTAP ordered by primary team.     Overall, there is no identified source of infection. The most likely source of infection is the lumbar spine and surrounding tissues in light of recent surgery and given that fevers started afterwards. Low suspicion for intraabdominal or pulmonary etiology. No active symptoms of TB but will rule out given frequent travel to [place].     Recommendations:   - Quantiferon gold in process.   - Blood cx with NGTD.   - Would defer broadening antibiotics at this time given no identified source of infection, negative infectious workup so far and hemodynamic stability.   - Would consider further imaging of the lumbar spine given recent surgery and significant tenderness on exam, as this could be the source of developing infection.    - If fevers are still persistent, would consider consulting infectious disease.       VTE Risk Mitigation (From admission, onward)         Ordered     heparin (porcine) injection 5,000 Units  Every 8 hours         02/09/22 0850                Discharge Planning   KELLEY: 2/14/2022     Code Status: Full Code   Is the patient medically ready for discharge?:     Reason for patient still in hospital (select all that apply): Patient trending condition and Pending disposition  Discharge Plan A: Home        Please contact us through secure chat with any questions or concerns.       Lynette Sandra MD  Department of Hospital Medicine   [facility] - Neurosurgery ([facility])

## 2022-02-12 NOTE — ASSESSMENT & PLAN NOTE
Recently diagnosed, hypertriglyceridemia (>600). Started on gemfibrozil by PCP.    - Continue home gemfibrozil  - Would recommend statin on outpatient basis    The 10-year ASCVD risk score (Pedro DAVIS Jr., et al., 2013) is: 10.4%    Values used to calculate the score:      Age: 46 years      Sex: Male      Is Non- : Yes      Diabetic: Yes      Tobacco smoker: No      Systolic Blood Pressure: 125 mmHg      Is BP treated: No      HDL Cholesterol: 25 mg/dL      Total Cholesterol: 190 mg/dL

## 2022-02-12 NOTE — SUBJECTIVE & OBJECTIVE
Interval History: NAEON. Continued fevers overnight, Tmax 101.8. Patient reports diaphoresis overnight. Sitting on the side of the bed in no acute distress. Intermittently requiring oxygen via nasal cannula. Denies chest pain, SOB. Endorses nasal congestion and increased back pain after ambulating more yesterday. Reports abdominal pain has improved some. + flatus, small BM yesterday. Voiding spontaneously.     Medications:  Continuous Infusions:  Scheduled Meds:   acetaminophen  650 mg Oral Q6H    ceFAZolin (ANCEF) IVPB  1 g Intravenous Q8H    gemfibroziL  600 mg Oral BID AC    heparin (porcine)  5,000 Units Subcutaneous Q8H    methocarbamoL  750 mg Oral QID    mupirocin   Nasal BID    polyethylene glycol  17 g Oral Daily    sodium phosphates  1 enema Rectal Once    tamsulosin  1 capsule Oral Daily     PRN Meds:aluminum-magnesium hydroxide-simethicone, bisacodyL, dextrose 10%, dextrose 10%, glucagon (human recombinant), glucose, glucose, HYDROmorphone, insulin aspart U-100, ondansetron, oxyCODONE, prochlorperazine, senna-docusate 8.6-50 mg, sodium chloride 0.9%       Objective:     Weight: 95.2 kg (209 lb 15.8 oz)  Body mass index is 31.93 kg/m².  Vital Signs (Most Recent):  Temp: 99.3 °F (37.4 °C) (02/12/22 0822)  Pulse: (!) 111 (02/12/22 0822)  Resp: 18 (02/12/22 0905)  BP: (!) 128/59 (02/12/22 0822)  SpO2: 98 % (02/12/22 0822) Vital Signs (24h Range):  Temp:  [99.3 °F (37.4 °C)-101.8 °F (38.8 °C)] 99.3 °F (37.4 °C)  Pulse:  [107-119] 111  Resp:  [16-20] 18  SpO2:  [94 %-100 %] 98 %  BP: (113-132)/(58-80) 128/59                          Closed/Suction Drain 02/08/22 1130 Left Back Accordion 10 Fr. (Active)   Site Description Unable to view 02/09/22 1940   Dressing Type Gauze;Transparent (Tegaderm) 02/09/22 1940   Dressing Status Clean;Intact;Dry 02/09/22 1940   Dressing Intervention Integrity maintained 02/10/22 0800   Drainage Serosanguineous 02/10/22 0800   Status To bulb suction 02/10/22 0800   Output  (mL) 25 mL 02/10/22 0800            Closed/Suction Drain 02/08/22 1131 Right Back Accordion 10 Fr. (Active)   Site Description Unable to view 02/09/22 1940   Dressing Type Gauze;Transparent (Tegaderm) 02/09/22 1940   Dressing Status Clean;Dry;Intact 02/09/22 1940   Dressing Intervention Integrity maintained 02/10/22 0800   Drainage Serosanguineous 02/10/22 0800   Status To bulb suction 02/10/22 0800   Output (mL) 20 mL 02/10/22 0800     Neurosurgery Physical Exam    General: Well developed, well nourished, not in acute distress.   Head: Normocephalic, atraumatic.  Neurologic: Alert and oriented x 4. Thought content appropriate.  GCS: Motor:6  Verbal:5  Eyes:4   GCS Total: 15  Language: No aphasia.  Speech: No dysarthria.  Cranial nerves: Face symmetric, tongue midline, CN II-XII grossly intact.   Eyes: Pupils equal, round, reactive to light with accomodation, EOMI.   Pulmonary: Normal respirations, no signs of respiratory distress.  Abdomen: slightly tense, non-distended, mild diffuse TTP  Sensory: Intact to light touch throughout.  Motor Strength: Moves all extremities spontaneously with good tone. No abnormal movements seen.     Strength  Deltoids Triceps Biceps Wrist Extension Wrist Flexion Hand    Upper: R 5/5 5/5 5/5 5/5 5/5 5/5    L 5/5 5/5 5/5 5/5 5/5 5/5     Hip Flexion Knee Extension Knee  Flexion Dorsiflexion Plantar flexion EHL   Lower: R 4/5 4/5 4/5 5/5 5/5 5/5    L 4/5 4/5 4/5 5/5 5/5 5/5   Pain limited effort.    Vascular: No LE edema.   Skin: Skin is warm, dry and intact.    Incision c/d/i with staples. No surrounding erythema or edema. No drainage from incision. No palpable hematoma or underlying fluid collection.    Significant Labs:  Recent Labs   Lab 02/11/22  0917   *      K 3.9   CL 99   CO2 26   BUN 10   CREATININE 0.9   CALCIUM 8.8     Recent Labs   Lab 02/10/22  1249 02/11/22  0917   WBC 12.63 12.43   HGB 10.4* 9.5*   HCT 31.4* 29.4*    195     No results for  input(s): LABPT, INR, APTT in the last 48 hours.  Microbiology Results (last 7 days)     Procedure Component Value Units Date/Time    Blood culture [[id number]] Collected: 02/11/22 0916    Order Status: Completed Specimen: Blood Updated: 02/11/22 1715     Blood Culture, Routine No Growth to date    Blood culture [[id number]] Collected: 02/11/22 0917    Order Status: Completed Specimen: Blood Updated: 02/11/22 1715     Blood Culture, Routine No Growth to date    Influenza A & B by Molecular [[id number]] Collected: 02/11/22 1438    Order Status: Canceled         All pertinent labs from the last 24 hours have been reviewed.    Significant Diagnostics:  I have reviewed and interpreted all pertinent imaging results/findings within the past 24 hours.

## 2022-02-12 NOTE — PT/OT/SLP PROGRESS
Physical Therapy Treatment    Patient Name:  Emeka Caballero   MRN:  [id number]    Recommendations:     Discharge Recommendations:  home health PT   Discharge Equipment Recommendations: bedside commode,walker, rolling   Barriers to discharge: None    Assessment:     Emeka Caballero is a 46 y.o. male admitted with a medical diagnosis of Spondylolisthesis of lumbosacral region.  He presents with the following impairments/functional limitations:  weakness,impaired endurance,impaired self care skills,impaired functional mobilty,gait instability,impaired balance,pain,decreased ROM,decreased coordination,orthopedic precautions,impaired skin . Patient ambulates with decreased taz, step length, clearance and base of support. Also noted decreased B Terminal knee extension in stance.    Rehab Prognosis: Good; patient would benefit from acute skilled PT services to address these deficits and reach maximum level of function.    Recent Surgery: Procedure(s) (LRB):  FUSION, SPINE, LUMBAR (N/A) 4 Days Post-Op    Plan:     During this hospitalization, patient to be seen 4 x/week to address the identified rehab impairments via gait training,therapeutic activities,therapeutic exercises,neuromuscular re-education and progress toward the following goals:    · Plan of Care Expires:  03/11/22    Subjective     Chief Complaint: a little tired  Patient/Family Comments/goals: to heal well and to have no more pain like He had prior to surgery.  Pain/Comfort:  · Pain Rating 1: 0/10  · Location - Side 1: Bilateral  · Location - Orientation 1: generalized  · Location 1: back  · Pain Addressed 1: Pre-medicate for activity,Reposition  · Pain Rating Post-Intervention 1: 3/10      Objective:     Communicated with NSG prior to session.  Patient found supine with hemovac upon PT entry to room.     General Precautions: Standard, fall   Orthopedic Precautions:spinal precautions   Braces: TLSO  Respiratory Status: Room air      Functional Mobility:  · Bed Mobility:     · Rolling Left:  contact guard assistance  · Scooting: stand by assistance  · Supine to Sit: contact guard assistance  · Transfers:     · Sit to Stand:  contact guard assistance with rolling walker  · Bed to Chair: contact guard assistance with  rolling walker  using  Stand Pivot  · Gait: 96 ft and 38 ft with RW and CGA, with PTA following with bedside chair and cues to increase base of support and to correct posture.      AM-PAC 6 CLICK MOBILITY  Turning over in bed (including adjusting bedclothes, sheets and blankets)?: 4  Sitting down on and standing up from a chair with arms (e.g., wheelchair, bedside commode, etc.): 3  Moving from lying on back to sitting on the side of the bed?: 3  Moving to and from a bed to a chair (including a wheelchair)?: 3  Need to walk in hospital room?: 3  Climbing 3-5 steps with a railing?: 2  Basic Mobility Total Score: 18       Therapeutic Activities and Exercises:   Donned a second gown and TLSO. Educated on the importance of using Log roll technique to transfer from supine<>Sit and how the different gait deviations affect his balance and gait quality.    Patient left up in chair with all lines intact, call button in reach and spouse present..    GOALS:   Multidisciplinary Problems     Physical Therapy Goals        Problem: Physical Therapy Goal    Goal Priority Disciplines Outcome Goal Variances Interventions   Physical Therapy Goal     PT, PT/OT Ongoing, Progressing     Description: Goals to be met by: 2022     Patient will increase functional independence with mobility by performin. Supine to sit with Set-up Chaves  2. Sit to supine with Set-up Chaves  3. Sit to stand transfer with Supervision  4. Bed to chair transfer with Supervision using Rolling Walker  5. Gait  x 150 feet with Supervision using Rolling Walker.   6. Lower extremity exercise program x15 reps per handout, with supervision                      Time Tracking:     PT Received On: 02/12/22  PT Start Time: 1428     PT Stop Time: 1456  PT Total Time (min): 28 min     Billable Minutes: Gait Training 14 and Therapeutic Activity 14    Treatment Type: Treatment  PT/PTA: PTA     PTA Visit Number: 1     02/12/2022

## 2022-02-12 NOTE — PLAN OF CARE
Problem: Adult Inpatient Plan of Care  Goal: Plan of Care Review  Outcome: Ongoing, Progressing  Goal: Readiness for Transition of Care  Outcome: Ongoing, Progressing     Problem: Infection  Goal: Absence of Infection Signs and Symptoms  Outcome: Ongoing, Progressing     Problem: Fall Injury Risk  Goal: Absence of Fall and Fall-Related Injury  Outcome: Ongoing, Progressing     Problem: Skin Injury Risk Increased  Goal: Skin Health and Integrity  Outcome: Ongoing, Progressing     PoC reviewed with patient and spouse. Patient mobility stronger today. Ambulated often to restroom. Patient urinated 1500 mL on shift. Patient had bowel movement, loose brown stool. Enema not given. Patient X-ray completed. Blood sugars checks ACHS without need for coverage. Safety measures maintained. Patient bed in low position. Bed alarm set. Patient call bell with in reach. Patient belongings with in reach. VSS. Full assessment in chart. ANALY.

## 2022-02-12 NOTE — SUBJECTIVE & OBJECTIVE
Interval History: Continues to spike fevers overnight with T max of 101.2 F. Workup for infectious process so far has been unrevealing. Quant gold in process although he has no active symptoms of TB. Given no other obvious source of infection, fevers could be 2/2 to recent spinal surgery and developing infection of the spine/harware.     Review of Systems   Constitutional: Positive for fever. Negative for fatigue.   HENT: Negative for congestion, rhinorrhea and sore throat.    Respiratory: Negative for chest tightness and shortness of breath.    Cardiovascular: Negative for chest pain.   Gastrointestinal: Negative for abdominal distention, abdominal pain, blood in stool, constipation, diarrhea, nausea and vomiting.   Genitourinary: Negative for difficulty urinating, dysuria, flank pain, frequency, hematuria and urgency.   Musculoskeletal: Positive for back pain. Negative for arthralgias and neck pain.   Neurological: Negative for dizziness, weakness, numbness and headaches.     Objective:     Vital Signs (Most Recent):  Temp: 99.2 °F (37.3 °C) (02/12/22 1148)  Pulse: 106 (02/12/22 1148)  Resp: 18 (02/12/22 1148)  BP: (!) 125/58 (02/12/22 1148)  SpO2: 96 % (02/12/22 1148) Vital Signs (24h Range):  Temp:  [99.2 °F (37.3 °C)-101.8 °F (38.8 °C)] 99.2 °F (37.3 °C)  Pulse:  [106-119] 106  Resp:  [16-20] 18  SpO2:  [94 %-100 %] 96 %  BP: (113-132)/(58-80) 125/58     Weight: 95.2 kg (209 lb 15.8 oz)  Body mass index is 31.93 kg/m².    Intake/Output Summary (Last 24 hours) at 2/12/2022 1219  Last data filed at 2/12/2022 0400  Gross per 24 hour   Intake --   Output 1000 ml   Net -1000 ml      Physical Exam  Vitals and nursing note reviewed.   Constitutional:       General: He is not in acute distress.     Appearance: Normal appearance. He is obese. He is not ill-appearing or toxic-appearing.   HENT:      Head: Normocephalic and atraumatic.      Nose: Nose normal. No congestion or rhinorrhea.      Mouth/Throat:      Mouth:  Mucous membranes are moist.      Pharynx: Oropharynx is clear. No oropharyngeal exudate or posterior oropharyngeal erythema.   Eyes:      Extraocular Movements: Extraocular movements intact.      Conjunctiva/sclera: Conjunctivae normal.      Pupils: Pupils are equal, round, and reactive to light.   Cardiovascular:      Rate and Rhythm: Regular rhythm. Tachycardia present.      Pulses: Normal pulses.      Heart sounds: Normal heart sounds. No murmur heard.  No friction rub. No gallop.    Pulmonary:      Effort: Pulmonary effort is normal. No respiratory distress.      Breath sounds: Normal breath sounds. No wheezing, rhonchi or rales.   Abdominal:      General: Abdomen is protuberant. Bowel sounds are normal. There is distension.      Palpations: Abdomen is soft. There is no mass.      Tenderness: There is no abdominal tenderness. There is no guarding or rebound.   Musculoskeletal:         General: Tenderness present. No swelling. Normal range of motion.      Cervical back: Normal range of motion.      Right lower leg: No edema.      Left lower leg: No edema.      Comments: Incision sites appear well healed without obvious fluctuance and erythema. He does have significant tenderness to palpation to the right lower back, just lateral to the incision.    Skin:     General: Skin is warm and dry.      Capillary Refill: Capillary refill takes less than 2 seconds.   Neurological:      General: No focal deficit present.      Mental Status: He is alert and oriented to person, place, and time. Mental status is at baseline.   Psychiatric:         Mood and Affect: Mood normal.         Behavior: Behavior normal.       Significant Labs: All pertinent labs within the past 24 hours have been reviewed.    Significant Imaging: I have reviewed all pertinent imaging results/findings within the past 24 hours.

## 2022-02-13 PROBLEM — N41.9 PROSTATITIS: Status: ACTIVE | Noted: 2022-02-13

## 2022-02-13 LAB
BASOPHILS # BLD AUTO: 0.02 K/UL (ref 0–0.2)
BASOPHILS NFR BLD: 0.2 % (ref 0–1.9)
DIFFERENTIAL METHOD: ABNORMAL
EOSINOPHIL # BLD AUTO: 0.2 K/UL (ref 0–0.5)
EOSINOPHIL NFR BLD: 2.2 % (ref 0–8)
ERYTHROCYTE [DISTWIDTH] IN BLOOD BY AUTOMATED COUNT: 12.9 % (ref 11.5–14.5)
HCT VFR BLD AUTO: 29.3 % (ref 40–54)
HGB BLD-MCNC: 9.4 G/DL (ref 14–18)
IMM GRANULOCYTES # BLD AUTO: 0.06 K/UL (ref 0–0.04)
IMM GRANULOCYTES NFR BLD AUTO: 0.7 % (ref 0–0.5)
LYMPHOCYTES # BLD AUTO: 2.6 K/UL (ref 1–4.8)
LYMPHOCYTES NFR BLD: 30 % (ref 18–48)
MCH RBC QN AUTO: 27.2 PG (ref 27–31)
MCHC RBC AUTO-ENTMCNC: 32.1 G/DL (ref 32–36)
MCV RBC AUTO: 85 FL (ref 82–98)
MONOCYTES # BLD AUTO: 0.7 K/UL (ref 0.3–1)
MONOCYTES NFR BLD: 8.2 % (ref 4–15)
NEUTROPHILS # BLD AUTO: 5.1 K/UL (ref 1.8–7.7)
NEUTROPHILS NFR BLD: 58.7 % (ref 38–73)
NRBC BLD-RTO: 0 /100 WBC
PLATELET # BLD AUTO: 293 K/UL (ref 150–450)
PMV BLD AUTO: 9.7 FL (ref 9.2–12.9)
POCT GLUCOSE: 108 MG/DL (ref 70–110)
POCT GLUCOSE: 109 MG/DL (ref 70–110)
POCT GLUCOSE: 171 MG/DL (ref 70–110)
POCT GLUCOSE: 171 MG/DL (ref 70–110)
RBC # BLD AUTO: 3.46 M/UL (ref 4.6–6.2)
WBC # BLD AUTO: 8.66 K/UL (ref 3.9–12.7)

## 2022-02-13 PROCEDURE — 25000003 PHARM REV CODE 250: Performed by: PHYSICIAN ASSISTANT

## 2022-02-13 PROCEDURE — 97530 THERAPEUTIC ACTIVITIES: CPT | Mod: CQ

## 2022-02-13 PROCEDURE — 85025 COMPLETE CBC W/AUTO DIFF WBC: CPT | Performed by: PHYSICIAN ASSISTANT

## 2022-02-13 PROCEDURE — 63600175 PHARM REV CODE 636 W HCPCS: Performed by: PHYSICIAN ASSISTANT

## 2022-02-13 PROCEDURE — 11000001 HC ACUTE MED/SURG PRIVATE ROOM

## 2022-02-13 PROCEDURE — 25000003 PHARM REV CODE 250: Performed by: STUDENT IN AN ORGANIZED HEALTH CARE EDUCATION/TRAINING PROGRAM

## 2022-02-13 PROCEDURE — 99232 PR SUBSEQUENT HOSPITAL CARE,LEVL II: ICD-10-PCS | Mod: ,,, | Performed by: HOSPITALIST

## 2022-02-13 PROCEDURE — 97535 SELF CARE MNGMENT TRAINING: CPT

## 2022-02-13 PROCEDURE — 99232 SBSQ HOSP IP/OBS MODERATE 35: CPT | Mod: ,,, | Performed by: HOSPITALIST

## 2022-02-13 PROCEDURE — 94760 N-INVAS EAR/PLS OXIMETRY 1: CPT

## 2022-02-13 PROCEDURE — 97116 GAIT TRAINING THERAPY: CPT | Mod: CQ

## 2022-02-13 PROCEDURE — 36415 COLL VENOUS BLD VENIPUNCTURE: CPT | Performed by: PHYSICIAN ASSISTANT

## 2022-02-13 PROCEDURE — 97110 THERAPEUTIC EXERCISES: CPT | Mod: CQ

## 2022-02-13 PROCEDURE — 25000003 PHARM REV CODE 250

## 2022-02-13 RX ORDER — LEVOFLOXACIN 500 MG/1
500 TABLET, FILM COATED ORAL DAILY
Status: DISCONTINUED | OUTPATIENT
Start: 2022-02-13 | End: 2022-02-14

## 2022-02-13 RX ORDER — AMOXICILLIN AND CLAVULANATE POTASSIUM 500; 125 MG/1; MG/1
1 TABLET, FILM COATED ORAL 3 TIMES DAILY
Status: DISCONTINUED | OUTPATIENT
Start: 2022-02-13 | End: 2022-02-13

## 2022-02-13 RX ADMIN — MUPIROCIN: 20 OINTMENT TOPICAL at 09:02

## 2022-02-13 RX ADMIN — ACETAMINOPHEN 650 MG: 325 TABLET ORAL at 05:02

## 2022-02-13 RX ADMIN — SENNOSIDES AND DOCUSATE SODIUM 1 TABLET: 50; 8.6 TABLET ORAL at 08:02

## 2022-02-13 RX ADMIN — ACETAMINOPHEN 650 MG: 325 TABLET ORAL at 11:02

## 2022-02-13 RX ADMIN — ACETAMINOPHEN 650 MG: 325 TABLET ORAL at 12:02

## 2022-02-13 RX ADMIN — LEVOFLOXACIN 500 MG: 500 TABLET, FILM COATED ORAL at 12:02

## 2022-02-13 RX ADMIN — METHOCARBAMOL 750 MG: 750 TABLET ORAL at 08:02

## 2022-02-13 RX ADMIN — HEPARIN SODIUM 5000 UNITS: 5000 INJECTION INTRAVENOUS; SUBCUTANEOUS at 05:02

## 2022-02-13 RX ADMIN — METHOCARBAMOL 750 MG: 750 TABLET ORAL at 09:02

## 2022-02-13 RX ADMIN — OXYCODONE HYDROCHLORIDE 10 MG: 10 TABLET ORAL at 02:02

## 2022-02-13 RX ADMIN — HYDROMORPHONE HYDROCHLORIDE 1 MG: 1 INJECTION, SOLUTION INTRAMUSCULAR; INTRAVENOUS; SUBCUTANEOUS at 05:02

## 2022-02-13 RX ADMIN — METHOCARBAMOL 750 MG: 750 TABLET ORAL at 05:02

## 2022-02-13 RX ADMIN — CEFAZOLIN SODIUM 1 G: 1 SOLUTION INTRAVENOUS at 12:02

## 2022-02-13 RX ADMIN — HEPARIN SODIUM 5000 UNITS: 5000 INJECTION INTRAVENOUS; SUBCUTANEOUS at 02:02

## 2022-02-13 RX ADMIN — TAMSULOSIN HYDROCHLORIDE 0.4 MG: 0.4 CAPSULE ORAL at 09:02

## 2022-02-13 RX ADMIN — OXYCODONE HYDROCHLORIDE 10 MG: 10 TABLET ORAL at 09:02

## 2022-02-13 RX ADMIN — OXYCODONE HYDROCHLORIDE 10 MG: 10 TABLET ORAL at 11:02

## 2022-02-13 RX ADMIN — METHOCARBAMOL 750 MG: 750 TABLET ORAL at 12:02

## 2022-02-13 RX ADMIN — POLYETHYLENE GLYCOL 3350 17 G: 17 POWDER, FOR SOLUTION ORAL at 08:02

## 2022-02-13 RX ADMIN — GEMFIBROZIL 600 MG: 600 TABLET ORAL at 09:02

## 2022-02-13 RX ADMIN — OXYCODONE HYDROCHLORIDE 10 MG: 10 TABLET ORAL at 06:02

## 2022-02-13 RX ADMIN — HEPARIN SODIUM 5000 UNITS: 5000 INJECTION INTRAVENOUS; SUBCUTANEOUS at 09:02

## 2022-02-13 RX ADMIN — GEMFIBROZIL 600 MG: 600 TABLET ORAL at 05:02

## 2022-02-13 RX ADMIN — OXYCODONE HYDROCHLORIDE 10 MG: 10 TABLET ORAL at 12:02

## 2022-02-13 NOTE — SUBJECTIVE & OBJECTIVE
Interval History: NAEO. AF VSS. Patient denies anymore fevers since yesterday. Only complaint is back pain butt seems well controlled. Having BMs but still some distention. Reports improvement of constipation with PRN laxatives. CT with RLL infiltrate, no increase in O2 req or cough. NSGY planning on discharge today or tomorrow pending repeat UA.    Review of Systems   Constitutional: Negative for fatigue and fever.   HENT: Negative for congestion, rhinorrhea and sore throat.    Respiratory: Negative for chest tightness and shortness of breath.    Cardiovascular: Negative for chest pain.   Gastrointestinal: Negative for abdominal distention, abdominal pain, blood in stool, constipation, diarrhea, nausea and vomiting.   Genitourinary: Negative for difficulty urinating, dysuria, flank pain, frequency, hematuria and urgency.   Musculoskeletal: Positive for back pain. Negative for arthralgias and neck pain.   Neurological: Negative for dizziness, weakness, numbness and headaches.     Objective:     Vital Signs (Most Recent):  Temp: 98.6 °F (37 °C) (02/13/22 0811)  Pulse: 96 (02/13/22 0811)  Resp: 16 (02/13/22 0928)  BP: 123/70 (02/13/22 0811)  SpO2: 99 % (02/13/22 0811) Vital Signs (24h Range):  Temp:  [98.6 °F (37 °C)-99.6 °F (37.6 °C)] 98.6 °F (37 °C)  Pulse:  [] 96  Resp:  [16-18] 16  SpO2:  [93 %-100 %] 99 %  BP: (112-130)/(58-77) 123/70     Weight: 95.2 kg (209 lb 15.8 oz)  Body mass index is 31.93 kg/m².    Intake/Output Summary (Last 24 hours) at 2/13/2022 1042  Last data filed at 2/13/2022 0100  Gross per 24 hour   Intake --   Output 750 ml   Net -750 ml      Physical Exam  Vitals and nursing note reviewed.   Constitutional:       General: He is not in acute distress.     Appearance: Normal appearance. He is obese. He is not ill-appearing or toxic-appearing.   HENT:      Head: Normocephalic and atraumatic.      Nose: Nose normal. No congestion or rhinorrhea.      Mouth/Throat:      Mouth: Mucous membranes  are moist.      Pharynx: Oropharynx is clear. No oropharyngeal exudate or posterior oropharyngeal erythema.   Eyes:      Extraocular Movements: Extraocular movements intact.      Conjunctiva/sclera: Conjunctivae normal.      Pupils: Pupils are equal, round, and reactive to light.   Cardiovascular:      Rate and Rhythm: Normal rate and regular rhythm.      Pulses: Normal pulses.      Heart sounds: Normal heart sounds. No murmur heard.  No friction rub. No gallop.    Pulmonary:      Effort: Pulmonary effort is normal. No respiratory distress.      Breath sounds: Normal breath sounds. No wheezing, rhonchi or rales.   Abdominal:      General: Abdomen is protuberant. Bowel sounds are normal. There is distension.      Palpations: Abdomen is soft. There is no mass.      Tenderness: There is no abdominal tenderness. There is no guarding or rebound.   Musculoskeletal:         General: Tenderness present. No swelling. Normal range of motion.      Cervical back: Normal range of motion.      Right lower leg: No edema.      Left lower leg: No edema.      Comments: Incision sites appear well healed without obvious fluctuance and erythema. He does have significant tenderness to palpation to the right lower back, just lateral to the incision.    Skin:     General: Skin is warm and dry.      Capillary Refill: Capillary refill takes less than 2 seconds.   Neurological:      General: No focal deficit present.      Mental Status: He is alert and oriented to person, place, and time. Mental status is at baseline.   Psychiatric:         Mood and Affect: Mood normal.         Behavior: Behavior normal.         Significant Labs:   All pertinent labs within the past 24 hours have been reviewed.  Recent Lab Results       02/13/22  1003   02/13/22  0844   02/12/22  2214   02/12/22  1630   02/12/22  1209        Baso # 0.02               Basophil % 0.2               Differential Method Automated               Eos # 0.2               Eosinophil %  2.2               Gran # (ANC) 5.1               Gran % 58.7               Hematocrit 29.3               Hemoglobin 9.4               Immature Grans (Abs) 0.06  Comment: Mild elevation in immature granulocytes is non specific and   can be seen in a variety of conditions including stress response,   acute inflammation, trauma and pregnancy. Correlation with other   laboratory and clinical findings is essential.                 Immature Granulocytes 0.7               Lymph # 2.6               Lymph % 30.0               MCH 27.2               MCHC 32.1               MCV 85               Mono # 0.7               Mono % 8.2               MPV 9.7               nRBC 0               Platelets 293               POCT Glucose   171   220   189   111       RBC 3.46               RDW 12.9               WBC 8.66                                    Significant Imaging:         I have reviewed all pertinent imaging results/findings within the past 24 hours.

## 2022-02-13 NOTE — PROGRESS NOTES
[facility] - Neurosurgery ([facility])  Neurosurgery  Progress Note    Subjective:     History of Present Illness: Emeka Lau is a 46 y.o. male with PMH of BPH.  offered, but patient declined as he and his wife are English speaking. The patient was last seen in clinic on 10/19/20 with Jennifer Sol PA-C to discuss concerns with worsening low back pain despite conservative therapy. At that appointment, it was discussed that the patient could benefit from surgery. The patient was apprehensive about proceeding with surgery and wanted to obtain more conservative therapy.     He is being seen today to discuss worsening of his symptoms despite additional PT that was obtained in [facility]. Describes the pain as constant, aching, and stabbing across the low back with radiation down the R> L leg. Rates the pain as a 9/10. Aggravating factors include walking and prolonged standing. Alleviating factors include laying down. Denies b/b dysfunction, saddle anesthesia, or gait instability. Endorses weakness in the right leg associated with numbness and tingling.      Regarding his neck pain, the patient states that the pain is midline and radiates down the right arm stopping at the shoulder. Describes the pain as constant aching with numbness and tingling. Rates the pain as 5-6/10. Reports balance difficulties and weakness in his hands when grabbing objects.      Interval Hx 12/2/21: After the last appointment, it was recommended that the patient obtain updated imaging to evaluate his radicular complaints. He is being seen in clinic today with his wife to discuss the image findings. States that his symptoms have not changed since his last appointment. He continues to struggle with low back and right leg pain affecting his ambulation. The patient is interested in surgery.      Interval Hx 1/13/22: The patient is being seen in clinic today to review his recent MRI and answer any additional questions regarding  his upcoming surgery. States that his symptoms have not changed since his last appointment. He is eager to proceed with surgery. Dr. Reed is recommending a posterior fusion L4-pelvis due to the grade 2 isthmic spondylolisthesis contributing to severe bilateral foraminal stenosis.      Post-Op Info:  Procedure(s) (LRB):  FUSION, SPINE, LUMBAR (N/A)   5 Days Post-Op     Interval History:     2/13: NAEON. No fevers. Eating, drinking, voiding, had BM yesterday, has been out of bed and cleared for home health. Medicine eval and CT Chest without PE and no known source of fever. Will repeat UA due to concern for prostatitis but will consider discharge today vs tomorrow after infectious work-up complete.     Medications:  Continuous Infusions:  Scheduled Meds:   acetaminophen  650 mg Oral Q6H    ceFAZolin (ANCEF) IVPB  1 g Intravenous Q8H    gemfibroziL  600 mg Oral BID AC    heparin (porcine)  5,000 Units Subcutaneous Q8H    methocarbamoL  750 mg Oral QID    mupirocin   Nasal BID    polyethylene glycol  17 g Oral BID    senna-docusate 8.6-50 mg  1 tablet Oral BID    tamsulosin  1 capsule Oral Daily     PRN Meds:aluminum-magnesium hydroxide-simethicone, bisacodyL, dextrose 10%, dextrose 10%, glucagon (human recombinant), glucose, glucose, HYDROmorphone, insulin aspart U-100, ondansetron, oxyCODONE, prochlorperazine, sodium chloride 0.9%     Review of Systems  Objective:     Weight: 95.2 kg (209 lb 15.8 oz)  Body mass index is 31.93 kg/m².  Vital Signs (Most Recent):  Temp: 98.8 °F (37.1 °C) (02/13/22 0434)  Pulse: 98 (02/13/22 0434)  Resp: 18 (02/13/22 0534)  BP: 130/77 (02/13/22 0434)  SpO2: 100 % (02/13/22 0434) Vital Signs (24h Range):  Temp:  [98.6 °F (37 °C)-99.6 °F (37.6 °C)] 98.8 °F (37.1 °C)  Pulse:  [] 98  Resp:  [16-18] 18  SpO2:  [93 %-100 %] 100 %  BP: (112-130)/(58-77) 130/77                          Physical Exam    Neurosurgery Physical Exam    Head: Normocephalic, atraumatic.  Neurologic:  Alert and oriented x 4. Thought content appropriate.  GCS: Motor:6  Verbal:5  Eyes:4   GCS Total: 15  Language: No aphasia.  Speech: No dysarthria.  Cranial nerves: Face symmetric, tongue midline, CN II-XII grossly intact.   Eyes: Pupils equal, round, reactive to light with accomodation, EOMI.   Pulmonary: Normal respirations, no signs of respiratory distress.  Abdomen: slightly tense, non-distended, mild diffuse TTP  Sensory: Intact to light touch throughout.  Motor Strength: Moves all extremities spontaneously with good tone. No abnormal movements seen.      Strength   Deltoids Triceps Biceps Wrist Extension Wrist Flexion Hand    Upper: R 5/5 5/5 5/5 5/5 5/5 5/5     L 5/5 5/5 5/5 5/5 5/5 5/5       Hip Flexion Knee Extension Knee  Flexion Dorsiflexion Plantar flexion EHL   Lower: R 5/5 4/5 4/5 5/5 5/5 5/5     L 5/5 4/5 4/5 5/5 5/5 5/5   Pain limited effort.     Vascular: No LE edema.   Skin: Skin is warm, dry and intact.     Incision c/d/i with staples. No surrounding erythema or edema. No drainage from incision. No palpable hematoma or underlying fluid collection.            Significant Labs:  Recent Labs   Lab 02/11/22 0917   *      K 3.9   CL 99   CO2 26   BUN 10   CREATININE 0.9   CALCIUM 8.8     Recent Labs   Lab 02/11/22 0917   WBC 12.43   HGB 9.5*   HCT 29.4*        No results for input(s): LABPT, INR, APTT in the last 48 hours.  Microbiology Results (last 7 days)     Procedure Component Value Units Date/Time    Blood culture [[id number]] Collected: 02/11/22 0916    Order Status: Completed Specimen: Blood Updated: 02/12/22 1212     Blood Culture, Routine No Growth to date      No Growth to date    Blood culture [[id number]] Collected: 02/11/22 0917    Order Status: Completed Specimen: Blood Updated: 02/12/22 1212     Blood Culture, Routine No Growth to date      No Growth to date    Influenza A & B by Molecular [[id number]] Collected: 02/11/22 1438    Order Status: Canceled          All pertinent labs from the last 24 hours have been reviewed.    Significant Diagnostics:  I have reviewed all pertinent imaging results/findings within the past 24 hours.    Assessment/Plan:     * Spondylolisthesis of lumbosacral region  Emeka Lau is a 46 y.o. male with PMH of BPH and smoker with grade 2 isthmic spondylolisthesis contributing to severe bilateral foraminal stenosis. Now s/p L4-pelvis fusion with L4-5, L5-S1 TLIF on 2/8.     - Neurologically stable post-op. Q4hr neurochecks.  - All labs and imaging reviewed   - Postop XR with satisfactory hardware placement and spinal alignment   - US BLE 2/10 without evidence of DVT   - CXR 2/10 without acute abnormality   - US scrotum/testes for scrotal pain negative.  - Fever & tachycardia: CXR negative, BLE US negative, UA negative for infection. EKG with sinus tachycardia. ESR, CRP, Procal, D-dimer elevated. BCx 2/11 NGTD. Lactate normal. HM consulted for assistance, appreciate assistance. Quantiferon gold pending. CTA chest to r/o PE and CT a/p with contrast pending. Will discuss broadening antibiotics. Continue ancef.   No fever 2/13; repeat UA today. Pending results will consider discharge tomorrow 2/14 vs broadening antibiotics.   - Pain control: scheduled tylenol 650 mg q6h, robaxin 750 qid. Oxycodone 10 mg q4h prn, IV morphine for breakthrough pain.   - WV removed. Bacitracin to incision BID.   - Drains: HV drains removed 2/11  - LSO brace when up/OOB.  - Continue PT/OT/OOB. OOB > 6hrs/day  - GI: Diet as tolerated. Continue bowel regimen. KUB with mildly distended bowel loops and feces. Multiple BMs 2/12.  - Noonan removed. Bladder scan q6h. Voiding spontaneously. Adequate urine output.  - DVT ppx: SQH, compression stockings, SCDs. US BLE 2/10 negative for DVT.  - Atelectasis ppx: IS at bedside. Encourage use every hour while awake.  - BPH: continue home dose flomax    - Discussed with Dr. Reed.     - Dispo: PT/OT rec is home health. Pending  fever workup.        Nolberto Leone MD  Neurosurgery  [facility] - Neurosurgery ([facility])

## 2022-02-13 NOTE — ASSESSMENT & PLAN NOTE
Recently diagnosed, hypertriglyceridemia (>600). Started on gemfibrozil by PCP.    - Continue home gemfibrozil  - Would recommend statin on outpatient basis    The 10-year ASCVD risk score (Pedro DAVIS Jr., et al., 2013) is: 10.1%    Values used to calculate the score:      Age: 46 years      Sex: Male      Is Non- : Yes      Diabetic: Yes      Tobacco smoker: No      Systolic Blood Pressure: 123 mmHg      Is BP treated: No      HDL Cholesterol: 25 mg/dL      Total Cholesterol: 190 mg/dL

## 2022-02-13 NOTE — ASSESSMENT & PLAN NOTE
Consulted for post-op fevers. Tmax of 102, responding to tylenol. Infectious workup thus far has been unremarkable. D-dimer elevated but no hypoxia, b/l LE US negative. ESR/CRP elevated, lactate and procal wnl. CXR, UA, COVID screen negative. US of scrotum and testes showed right sided varicocele and enlarged lymph node. Patient denies any family history of cancer. No leukocytosis or other infectious sxs. CXR unremarkable. KUB without ileus or obstruction. CTA chest and CTAP ordered by primary team.     Overall, there is no identified source of infection. The most likely source of infection is the lumbar spine and surrounding tissues in light of recent surgery and given that fevers started afterwards. Low suspicion for intraabdominal or pulmonary etiology. No active symptoms of TB but will rule out given frequent travel to [place].     Recommendations:   - Quantiferon gold in process.   - Blood cx with NGTD; no leukocytosis  - Would start Levofloxacin and treat for 14 days for new lung infiltrate (concern for aspiration) and prostatitis.  - Please make sure patient has close follow up with Urology within the next 2 weeks  - Repeat UA pending

## 2022-02-13 NOTE — PT/OT/SLP PROGRESS
Physical Therapy Treatment    Patient Name:  Emeka Caballero   MRN:  [id number]    Recommendations:     Discharge Recommendations:  home health PT   Discharge Equipment Recommendations: bedside commode,walker, rolling   Barriers to discharge: None    Assessment:     Emeka Caballero is a 46 y.o. male admitted with a medical diagnosis of Spondylolisthesis of lumbosacral region.  He presents with the following impairments/functional limitations:  weakness,impaired endurance,impaired self care skills,impaired functional mobilty,gait instability,impaired balance,pain,decreased ROM,decreased coordination,impaired skin,orthopedic precautions . Patient demonstrated improved postural awareness, taz and base of support today, despite reporting pain being higher than last visit.    Rehab Prognosis: Good; patient would benefit from acute skilled PT services to address these deficits and reach maximum level of function.    Recent Surgery: Procedure(s) (LRB):  FUSION, SPINE, LUMBAR (N/A) 5 Days Post-Op    Plan:     During this hospitalization, patient to be seen 4 x/week to address the identified rehab impairments via gait training,therapeutic activities,therapeutic exercises,neuromuscular re-education and progress toward the following goals:    · Plan of Care Expires:  03/11/22    Subjective     Chief Complaint: unable to have a BM  Patient/Family Comments/goals: to heal well and to have no more pain.  Pain/Comfort:  · Pain Rating 1: 8/10  · Location - Side 1: Bilateral  · Location - Orientation 1: generalized  · Location 1: back  · Pain Addressed 1: Reposition,Distraction,Nurse notified  · Pain Rating Post-Intervention 1: 9/10      Objective:     Communicated with NSG prior to session.  Patient found HOB elevated with TLSO,telemetry upon PT entry to room.     General Precautions: Standard, fall   Orthopedic Precautions:spinal precautions   Braces: TLSO  Respiratory Status: Room air     Functional  Mobility:  · Bed Mobility:     · Rolling Left:  contact guard assistance  · Scooting: stand by assistance  · Supine to Sit: contact guard assistance  · Transfers:     · Sit to Stand:  contact guard assistance with rolling walker  · Bed to Chair: contact guard assistance with  rolling walker  using  Stand Pivot  · Gait: ~142 ft with RW and CGA, with chair follow by PTA and occasional cues to increase step length.      AM-PAC 6 CLICK MOBILITY  Turning over in bed (including adjusting bedclothes, sheets and blankets)?: 3  Sitting down on and standing up from a chair with arms (e.g., wheelchair, bedside commode, etc.): 3  Moving from lying on back to sitting on the side of the bed?: 3  Moving to and from a bed to a chair (including a wheelchair)?: 3  Need to walk in hospital room?: 3  Climbing 3-5 steps with a railing?: 2  Basic Mobility Total Score: 17       Therapeutic Activities and Exercises:   Reviewed Supine<>Sit Log Roll Technique to maintain spinal precautions. Donned a own, TLSO and gripping socks. Patient assisted to BSC. Treatment interrupted for a few minutes by RN to provide patient with pain medication. There ex in sitting: LAQ, HIP FLEX, HIP ABD AND HEEL/TOE RAISES 2X15 REPS B LE.    Patient left up in chair with all lines intact, call button in reach and RN notified..    GOALS:   Multidisciplinary Problems     Physical Therapy Goals        Problem: Physical Therapy Goal    Goal Priority Disciplines Outcome Goal Variances Interventions   Physical Therapy Goal     PT, PT/OT Ongoing, Progressing     Description: Goals to be met by: 2022     Patient will increase functional independence with mobility by performin. Supine to sit with Set-up [place]  2. Sit to supine with Set-up [place]  3. Sit to stand transfer with Supervision  4. Bed to chair transfer with Supervision using Rolling Walker  5. Gait  x 150 feet with Supervision using Rolling Walker.   6. Lower extremity exercise program  x15 reps per handout, with supervision                     Time Tracking:     PT Received On: 02/13/22  PT Start Time: 1415     PT Stop Time: 1458  PT Total Time (min): 43 min     Billable Minutes: Gait Training 15, Therapeutic Activity 15 and Therapeutic Exercise 13    Treatment Type: Treatment  PT/PTA: PTA     PTA Visit Number: 2     02/13/2022

## 2022-02-13 NOTE — PLAN OF CARE
Problem: Occupational Therapy Goal  Goal: Occupational Therapy Goal  Description: Goals to be met by: 2/19     Patient will increase functional independence with ADLs by performing:    UE Dressing with Minimal Assistance.  LE Dressing with Stand by Assistance.  Grooming while standing with Stand-by Assistance. MET  Toileting from bedside commode with Minimal Assistance for hygiene and clothing management.   Rolling to Bilateral with Stand-by Assistance.   Supine to sit with Stand-by Assistance.  Step transfer with Stand-by Assistance using RW MET     Outcome: Ongoing, Progressing

## 2022-02-13 NOTE — PT/OT/SLP PROGRESS
"Occupational Therapy   Treatment    Name: Emeka Caballero  MRN: [id number]  Admitting Diagnosis:  Spondylolisthesis of lumbosacral region  5 Days Post-Op    Recommendations:     Discharge Recommendations: home health OT  Discharge Equipment Recommendations:  walker, rolling,bedside commode  Barriers to discharge:  None    Assessment:     Emeka Caballero is a 46 y.o. male with a medical diagnosis of Spondylolisthesis of lumbosacral region.  He presents with performance deficits affecting function are weakness,impaired endurance,impaired self care skills,impaired functional mobilty,gait instability,impaired balance,pain,orthopedic precautions,impaired cardiopulmonary response to activity.   Pt agreeable to OT session on this day, tolerating session fair due to increased back pain. NSG notified and pt pre-medicated for activity. Pt is progressing well towards functional goals completing step transfers with close SBA using RW on this day and standing grooming activities with close SBA. Pt is limited by pain affecting performance and endurance in self care tasks. Pt would benefit from continued skilled OT services to address functional deficits for improved independence in self care and other functional tasks.     Rehab Prognosis:  Good    Plan:     Patient to be seen 4 x/week to address the above listed problems via self-care/home management,therapeutic activities,therapeutic exercises  · Plan of Care Expires: 03/11/22  · Plan of Care Reviewed with: patient    Subjective   "I want this process to speed up." Stated pt in regard to healing process following surgery.   Pain/Comfort:  · Pain Rating 1: 10/10  · Location - Side 1: Bilateral  · Location - Orientation 1: generalized  · Location 1: back  · Pain Addressed 1: Pre-medicate for activity,Reposition  · Pain Rating Post-Intervention 1: 10/10    Objective:     Communicated with: BERNIE prior to session.  Patient found up in chair with SCD,TLSO upon OT entry to " room.    General Precautions: Standard, fall   Orthopedic Precautions:spinal precautions   Braces: TLSO  Respiratory Status: Room air     Occupational Performance:     Bed Mobility:    · Patient completed Sit to Supine with minimum assistance at LLE due to increased pain at end of session      Functional Mobility/Transfers:  · Patient completed Sit <> Stand Transfer with stand by assistance  with  rolling walker   · Pt completed step transfer to EOB with SBA using RW.   · Functional Mobility: Pt completed 12 ft x 2 of functional mobility to and from in room bathroom using RW with CGA to engage in self care tasks.     Activities of Daily Living:  · Grooming: stand by assistance to wash face and brush teeth standing at sink   · Toileting: stand by assistance to urinate and manage clothing in standing     OT Exercises:   Pt performed BUE exercises seated unsupported at EOB for increased UE strength/functional use for functional tasks. 15 reps shoulder flexion, shoulder abduction, alternating forward punches; rest breaks provided in between exercises .     [facility] 6 Click ADL: 17    Treatment & Education:  Pt educated on:   - POC/OT role   - repositioning and body mechanics for improved pain management   - safety when performing standing aspects of self care tasks  - spinal precautions  Pt receptive to education providing verbal understanding on this day.     Patient left HOB elevated with all lines intact and call button in reachEducation:      GOALS:   Multidisciplinary Problems     Occupational Therapy Goals        Problem: Occupational Therapy Goal    Goal Priority Disciplines Outcome Interventions   Occupational Therapy Goal     OT, PT/OT Ongoing, Progressing    Description: Goals to be met by: 2/19     Patient will increase functional independence with ADLs by performing:    UE Dressing with Minimal Assistance.  LE Dressing with Stand by Assistance.  Grooming while standing with Stand-by Assistance. MET  Toileting  from bedside commode with Minimal Assistance for hygiene and clothing management.   Rolling to Bilateral with Stand-by Assistance.   Supine to sit with Stand-by Assistance.  Step transfer with Stand-by Assistance using RW MET                      Time Tracking:     OT Date of Treatment: 02/13/22  OT Start Time: 1500  OT Stop Time: 1518  OT Total Time (min): 18 min    Billable Minutes:Self Care/Home Management 18    OT/MASON: OT     MASON Visit Number: 0    2/13/2022

## 2022-02-13 NOTE — SUBJECTIVE & OBJECTIVE
Interval History:     2/13: NAEON. No fevers. Eating, drinking, voiding, had BM yesterday, has been out of bed and cleared for home health. Medicine eval and CT Chest without PE and no known source of fever. Will repeat UA due to concern for prostatitis but will consider discharge today vs tomorrow after infectious work-up complete.     Medications:  Continuous Infusions:  Scheduled Meds:   acetaminophen  650 mg Oral Q6H    ceFAZolin (ANCEF) IVPB  1 g Intravenous Q8H    gemfibroziL  600 mg Oral BID AC    heparin (porcine)  5,000 Units Subcutaneous Q8H    methocarbamoL  750 mg Oral QID    mupirocin   Nasal BID    polyethylene glycol  17 g Oral BID    senna-docusate 8.6-50 mg  1 tablet Oral BID    tamsulosin  1 capsule Oral Daily     PRN Meds:aluminum-magnesium hydroxide-simethicone, bisacodyL, dextrose 10%, dextrose 10%, glucagon (human recombinant), glucose, glucose, HYDROmorphone, insulin aspart U-100, ondansetron, oxyCODONE, prochlorperazine, sodium chloride 0.9%     Review of Systems  Objective:     Weight: 95.2 kg (209 lb 15.8 oz)  Body mass index is 31.93 kg/m².  Vital Signs (Most Recent):  Temp: 98.8 °F (37.1 °C) (02/13/22 0434)  Pulse: 98 (02/13/22 0434)  Resp: 18 (02/13/22 0534)  BP: 130/77 (02/13/22 0434)  SpO2: 100 % (02/13/22 0434) Vital Signs (24h Range):  Temp:  [98.6 °F (37 °C)-99.6 °F (37.6 °C)] 98.8 °F (37.1 °C)  Pulse:  [] 98  Resp:  [16-18] 18  SpO2:  [93 %-100 %] 100 %  BP: (112-130)/(58-77) 130/77                          Physical Exam    Neurosurgery Physical Exam    Head: Normocephalic, atraumatic.  Neurologic: Alert and oriented x 4. Thought content appropriate.  GCS: Motor:6  Verbal:5  Eyes:4   GCS Total: 15  Language: No aphasia.  Speech: No dysarthria.  Cranial nerves: Face symmetric, tongue midline, CN II-XII grossly intact.   Eyes: Pupils equal, round, reactive to light with accomodation, EOMI.   Pulmonary: Normal respirations, no signs of respiratory distress.  Abdomen:  slightly tense, non-distended, mild diffuse TTP  Sensory: Intact to light touch throughout.  Motor Strength: Moves all extremities spontaneously with good tone. No abnormal movements seen.      Strength   Deltoids Triceps Biceps Wrist Extension Wrist Flexion Hand    Upper: R 5/5 5/5 5/5 5/5 5/5 5/5     L 5/5 5/5 5/5 5/5 5/5 5/5       Hip Flexion Knee Extension Knee  Flexion Dorsiflexion Plantar flexion EHL   Lower: R 5/5 4/5 4/5 5/5 5/5 5/5     L 5/5 4/5 4/5 5/5 5/5 5/5   Pain limited effort.     Vascular: No LE edema.   Skin: Skin is warm, dry and intact.     Incision c/d/i with staples. No surrounding erythema or edema. No drainage from incision. No palpable hematoma or underlying fluid collection.            Significant Labs:  Recent Labs   Lab 02/11/22 0917   *      K 3.9   CL 99   CO2 26   BUN 10   CREATININE 0.9   CALCIUM 8.8     Recent Labs   Lab 02/11/22 0917   WBC 12.43   HGB 9.5*   HCT 29.4*        No results for input(s): LABPT, INR, APTT in the last 48 hours.  Microbiology Results (last 7 days)     Procedure Component Value Units Date/Time    Blood culture [[id number]] Collected: 02/11/22 0916    Order Status: Completed Specimen: Blood Updated: 02/12/22 1212     Blood Culture, Routine No Growth to date      No Growth to date    Blood culture [[id number]] Collected: 02/11/22 0917    Order Status: Completed Specimen: Blood Updated: 02/12/22 1212     Blood Culture, Routine No Growth to date      No Growth to date    Influenza A & B by Molecular [[id number]] Collected: 02/11/22 1438    Order Status: Canceled         All pertinent labs from the last 24 hours have been reviewed.    Significant Diagnostics:  I have reviewed all pertinent imaging results/findings within the past 24 hours.

## 2022-02-13 NOTE — ASSESSMENT & PLAN NOTE
Consulted for post-op fevers. Tmax of 102, responding to tylenol. Infectious workup thus far has been unremarkable. D-dimer elevated but no hypoxia, b/l LE US negative. ESR/CRP elevated, lactate and procal wnl. CXR, UA, COVID screen negative. US of scrotum and testes showed right sided varicocele and enlarged lymph node. Patient denies any family history of cancer. No leukocytosis or other infectious sxs. CXR unremarkable. KUB without ileus or obstruction. CTA chest and CTAP ordered by primary team.     Overall, there is no identified source of infection. The most likely source of infection is the lumbar spine and surrounding tissues in light of recent surgery and given that fevers started afterwards. Low suspicion for intraabdominal or pulmonary etiology. No active symptoms of TB but will rule out given frequent travel to [place].     Recommendations:   - Quantiferon gold in process.   - Blood cx with NGTD; no leukocytosis  - Continue Levofloxacin 750 mg for 3 days, 500 mg for 10 days, to complete 2 week course for new lung infiltrate (concern for aspiration) and prostatitis.  - Please make sure patient has close follow up with Urology within the next 2 weeks  - Repeat UA within normal limits

## 2022-02-13 NOTE — ASSESSMENT & PLAN NOTE
Patient reports no BM since Monday 2/7, passing gas.     Had a bowel movement on 2/11 and reports significant improvement in abdominal pain. Still passing gas without difficulty. KUB without evidence of ileus or obstruction. Improving with laxatives    Recommendations:  - Continue current bowel regimen with Miralax and senna.   - Encourage ambulation as much as possible.

## 2022-02-13 NOTE — ASSESSMENT & PLAN NOTE
Chronic, stable. Last A1c 7.3 (1/6/22). On Metformin 500 mg BID at home.    - BG goal 140-180  - Diabetic diet  - MDSSI  - Accuchecks achs  - Can restart Metformin at discharge

## 2022-02-13 NOTE — PROGRESS NOTES
[facility] - Neurosurgery ([facility])  [facility] Medicine  Progress Note    Patient Name: Emeka Caballero  MRN: [id number]  Patient Class: IP- Inpatient   Admission Date: 2/8/2022  Length of Stay: 5 days  Attending Physician: Alphonse Reed MD  Primary Care Provider: Dio Zarate Jr, MD        Subjective:     Principal Problem:Spondylolisthesis of lumbosacral region        HPI:  47 y/o M with PMHx BPH, T2DM, HLD, and chronic back pain admitted by NSGY for L4 spinal fusion. Post-op complicated by fevers and tachycardia. Tmax has been 102, responding to tylenol. EKG showed sinus tachycardia. Infectious workup has been unremarkable. CRP/ESR elevated, CXR negative, b/l LE US negative, urinalysis with 3+occult blood. D-dimer elevated. Lactate and procal wnl. Patient denies any shortness of breath, chest pain, nausea, vomiting. He complains of abdominal pain and distention, has not had a BM since Monday 2/7 but passing gas. Scrotal US significant for enlarged lymph node on the right with right sided varicocele. His back pain is well controlled. He is a chronic smoker since he was 17 years old, smoking 3-4 cigarettes per day, previously smoked 1/2 PPD. He quit right before his spinal surgery. His wife at bedside also reports that he has had severe night sweats for years requiring the sheets to be changed. No unintentional weight loss. He is originally from [place].     IM6 consulted for workup of fevers and tachycardia      Overview/Hospital Course:  No notes on file    Interval History: NAEO. AF VSS. Patient denies anymore fevers since yesterday. Only complaint is back pain butt seems well controlled. Having BMs but still some distention. Reports improvement of constipation with PRN laxatives. CT with RLL infiltrate, no increase in O2 req or cough. NSGY planning on discharge today or tomorrow pending repeat UA.    Review of Systems   Constitutional: Negative for fatigue and fever.   HENT: Negative for congestion,  rhinorrhea and sore throat.    Respiratory: Negative for chest tightness and shortness of breath.    Cardiovascular: Negative for chest pain.   Gastrointestinal: Negative for abdominal distention, abdominal pain, blood in stool, constipation, diarrhea, nausea and vomiting.   Genitourinary: Negative for difficulty urinating, dysuria, flank pain, frequency, hematuria and urgency.   Musculoskeletal: Positive for back pain. Negative for arthralgias and neck pain.   Neurological: Negative for dizziness, weakness, numbness and headaches.     Objective:     Vital Signs (Most Recent):  Temp: 98.6 °F (37 °C) (02/13/22 0811)  Pulse: 96 (02/13/22 0811)  Resp: 16 (02/13/22 0928)  BP: 123/70 (02/13/22 0811)  SpO2: 99 % (02/13/22 0811) Vital Signs (24h Range):  Temp:  [98.6 °F (37 °C)-99.6 °F (37.6 °C)] 98.6 °F (37 °C)  Pulse:  [] 96  Resp:  [16-18] 16  SpO2:  [93 %-100 %] 99 %  BP: (112-130)/(58-77) 123/70     Weight: 95.2 kg (209 lb 15.8 oz)  Body mass index is 31.93 kg/m².    Intake/Output Summary (Last 24 hours) at 2/13/2022 1042  Last data filed at 2/13/2022 0100  Gross per 24 hour   Intake --   Output 750 ml   Net -750 ml      Physical Exam  Vitals and nursing note reviewed.   Constitutional:       General: He is not in acute distress.     Appearance: Normal appearance. He is obese. He is not ill-appearing or toxic-appearing.   HENT:      Head: Normocephalic and atraumatic.      Nose: Nose normal. No congestion or rhinorrhea.      Mouth/Throat:      Mouth: Mucous membranes are moist.      Pharynx: Oropharynx is clear. No oropharyngeal exudate or posterior oropharyngeal erythema.   Eyes:      Extraocular Movements: Extraocular movements intact.      Conjunctiva/sclera: Conjunctivae normal.      Pupils: Pupils are equal, round, and reactive to light.   Cardiovascular:      Rate and Rhythm: Normal rate and regular rhythm.      Pulses: Normal pulses.      Heart sounds: Normal heart sounds. No murmur heard.  No friction  rub. No gallop.    Pulmonary:      Effort: Pulmonary effort is normal. No respiratory distress.      Breath sounds: Normal breath sounds. No wheezing, rhonchi or rales.   Abdominal:      General: Abdomen is protuberant. Bowel sounds are normal. There is distension.      Palpations: Abdomen is soft. There is no mass.      Tenderness: There is no abdominal tenderness. There is no guarding or rebound.   Musculoskeletal:         General: Tenderness present. No swelling. Normal range of motion.      Cervical back: Normal range of motion.      Right lower leg: No edema.      Left lower leg: No edema.      Comments: Incision sites appear well healed without obvious fluctuance and erythema. He does have significant tenderness to palpation to the right lower back, just lateral to the incision.    Skin:     General: Skin is warm and dry.      Capillary Refill: Capillary refill takes less than 2 seconds.   Neurological:      General: No focal deficit present.      Mental Status: He is alert and oriented to person, place, and time. Mental status is at baseline.   Psychiatric:         Mood and Affect: Mood normal.         Behavior: Behavior normal.         Significant Labs:   All pertinent labs within the past 24 hours have been reviewed.  Recent Lab Results       02/13/22  1003   02/13/22  0844   02/12/22  2214   02/12/22  1630   02/12/22  1209        Baso # 0.02               Basophil % 0.2               Differential Method Automated               Eos # 0.2               Eosinophil % 2.2               Gran # (ANC) 5.1               Gran % 58.7               Hematocrit 29.3               Hemoglobin 9.4               Immature Grans (Abs) 0.06  Comment: Mild elevation in immature granulocytes is non specific and   can be seen in a variety of conditions including stress response,   acute inflammation, trauma and pregnancy. Correlation with other   laboratory and clinical findings is essential.                 Immature Granulocytes  0.7               Lymph # 2.6               Lymph % 30.0               MCH 27.2               MCHC 32.1               MCV 85               Mono # 0.7               Mono % 8.2               MPV 9.7               nRBC 0               Platelets 293               POCT Glucose   171   220   189   111       RBC 3.46               RDW 12.9               WBC 8.66                                    Significant Imaging:         I have reviewed all pertinent imaging results/findings within the past 24 hours.      Assessment/Plan:      * Spondylolisthesis of lumbosacral region  S/p L4 spinal fusion with NSGY. Undergoing PT/OT    - Management per primary      Prostatitis  See fevers      Diabetes mellitus, type 2  Chronic, stable. Last A1c 7.3 (1/6/22). On Metformin 500 mg BID at home.    - BG goal 140-180  - Diabetic diet  - MDSSI  - Accuchecks achs  - Can restart Metformin at discharge      HLD (hyperlipidemia)  Recently diagnosed, hypertriglyceridemia (>600). Started on gemfibrozil by PCP.    - Continue home gemfibrozil  - Would recommend statin on outpatient basis    The 10-year ASCVD risk score (Pedro RYAN Jr., et al., 2013) is: 10.1%    Values used to calculate the score:      Age: 46 years      Sex: Male      Is Non- : Yes      Diabetic: Yes      Tobacco smoker: No      Systolic Blood Pressure: 123 mmHg      Is BP treated: No      HDL Cholesterol: 25 mg/dL      Total Cholesterol: 190 mg/dL        BPH (benign prostatic hyperplasia)  Chronic, stable. On flomax at home.    - Continue home Flomax      Constipation  Patient reports no BM since Monday 2/7, passing gas.     Had a bowel movement on 2/11 and reports significant improvement in abdominal pain. Still passing gas without difficulty. KUB without evidence of ileus or obstruction. Improving with laxatives    Recommendations:  - Continue current bowel regimen with Miralax and senna.   - Encourage ambulation as much as possible.       Fever  Consulted  "for post-op fevers. Tmax of 102, responding to tylenol. Infectious workup thus far has been unremarkable. D-dimer elevated but no hypoxia, b/l LE US negative. ESR/CRP elevated, lactate and procal wnl. CXR, UA, COVID screen negative. US of scrotum and testes showed right sided varicocele and enlarged lymph node. Patient denies any family history of cancer. No leukocytosis or other infectious sxs. CXR unremarkable. KUB without ileus or obstruction. CTA chest and CTAP ordered by primary team.     Overall, there is no identified source of infection. The most likely source of infection is the lumbar spine and surrounding tissues in light of recent surgery and given that fevers started afterwards. Low suspicion for intraabdominal or pulmonary etiology. No active symptoms of TB but will rule out given frequent travel to [place].     Recommendations:   - Quantiferon gold in process.   - Blood cx with NGTD; no leukocytosis  - Would start Levofloxacin 500 mg PO daily and treat for 14 days for new lung infiltrate (concern for aspiration) and prostatitis.  - Please make sure patient has close follow up with Urology within the next 2 weeks  - Repeat UA pending    VTE Risk Mitigation (From admission, onward)         Ordered     heparin (porcine) injection 5,000 Units  Every 8 hours         02/09/22 0850                Discharge Planning   KELLEY: 2/14/2022     Code Status: Full Code   Is the patient medically ready for discharge?:     Reason for patient still in hospital (select all that apply): Consult recommendations and Pending disposition  Discharge Plan A: Home        Please secure chat Hospital Medicine M ("Medicine Consult Only") or contact me directly for any additional questions or concerns.        Javad Leon MD  Department of Hospital Medicine   [facility] - Neurosurgery (Hospital)    "

## 2022-02-14 VITALS
RESPIRATION RATE: 16 BRPM | OXYGEN SATURATION: 96 % | HEART RATE: 101 BPM | TEMPERATURE: 97 F | SYSTOLIC BLOOD PRESSURE: 117 MMHG | DIASTOLIC BLOOD PRESSURE: 69 MMHG | BODY MASS INDEX: 31.83 KG/M2 | WEIGHT: 210 LBS | HEIGHT: 68 IN

## 2022-02-14 LAB
BILIRUB UR QL STRIP: NEGATIVE
CLARITY UR REFRACT.AUTO: CLEAR
COLOR UR AUTO: YELLOW
GAMMA INTERFERON BACKGROUND BLD IA-ACNC: 0.22 IU/ML
GLUCOSE UR QL STRIP: NEGATIVE
HGB UR QL STRIP: NEGATIVE
KETONES UR QL STRIP: NEGATIVE
LEUKOCYTE ESTERASE UR QL STRIP: NEGATIVE
M TB IFN-G CD4+ BCKGRND COR BLD-ACNC: 2.31 IU/ML
MITOGEN IGNF BCKGRD COR BLD-ACNC: >10 IU/ML
NITRITE UR QL STRIP: NEGATIVE
PH UR STRIP: 6 [PH] (ref 5–8)
POCT GLUCOSE: 124 MG/DL (ref 70–110)
PROT UR QL STRIP: NEGATIVE
SP GR UR STRIP: 1.02 (ref 1–1.03)
TB GOLD PLUS: POSITIVE
TB2 - NIL: 0.23 IU/ML
URN SPEC COLLECT METH UR: NORMAL

## 2022-02-14 PROCEDURE — 81003 URINALYSIS AUTO W/O SCOPE: CPT | Performed by: STUDENT IN AN ORGANIZED HEALTH CARE EDUCATION/TRAINING PROGRAM

## 2022-02-14 PROCEDURE — 99233 PR SUBSEQUENT HOSPITAL CARE,LEVL III: ICD-10-PCS | Mod: ,,, | Performed by: HOSPITALIST

## 2022-02-14 PROCEDURE — 63600175 PHARM REV CODE 636 W HCPCS: Performed by: PHYSICIAN ASSISTANT

## 2022-02-14 PROCEDURE — 25000003 PHARM REV CODE 250

## 2022-02-14 PROCEDURE — 25000003 PHARM REV CODE 250: Performed by: STUDENT IN AN ORGANIZED HEALTH CARE EDUCATION/TRAINING PROGRAM

## 2022-02-14 PROCEDURE — 99024 PR POST-OP FOLLOW-UP VISIT: ICD-10-PCS | Mod: ,,, | Performed by: PHYSICIAN ASSISTANT

## 2022-02-14 PROCEDURE — 99024 POSTOP FOLLOW-UP VISIT: CPT | Mod: ,,, | Performed by: PHYSICIAN ASSISTANT

## 2022-02-14 PROCEDURE — 99233 SBSQ HOSP IP/OBS HIGH 50: CPT | Mod: ,,, | Performed by: HOSPITALIST

## 2022-02-14 PROCEDURE — 25000003 PHARM REV CODE 250: Performed by: PHYSICIAN ASSISTANT

## 2022-02-14 RX ORDER — OXYCODONE HYDROCHLORIDE 10 MG/1
10 TABLET ORAL EVERY 4 HOURS PRN
Qty: 60 TABLET | Refills: 0 | Status: SHIPPED | OUTPATIENT
Start: 2022-02-14 | End: 2022-02-25

## 2022-02-14 RX ORDER — LEVOFLOXACIN 500 MG/1
500 TABLET, FILM COATED ORAL DAILY
Qty: 10 TABLET | Refills: 0 | Status: SHIPPED | OUTPATIENT
Start: 2022-02-18 | End: 2022-02-28

## 2022-02-14 RX ORDER — LEVOFLOXACIN 750 MG/1
750 TABLET ORAL DAILY
Qty: 3 TABLET | Refills: 0 | Status: SHIPPED | OUTPATIENT
Start: 2022-02-15 | End: 2022-04-07

## 2022-02-14 RX ORDER — ACETAMINOPHEN 325 MG/1
650 TABLET ORAL EVERY 6 HOURS PRN
Refills: 0 | COMMUNITY
Start: 2022-02-14 | End: 2022-10-20

## 2022-02-14 RX ORDER — METHOCARBAMOL 750 MG/1
750 TABLET, FILM COATED ORAL 4 TIMES DAILY
Qty: 56 TABLET | Refills: 0 | Status: SHIPPED | OUTPATIENT
Start: 2022-02-14 | End: 2022-02-25

## 2022-02-14 RX ORDER — LEVOFLOXACIN 500 MG/1
500 TABLET, FILM COATED ORAL DAILY
Status: DISCONTINUED | OUTPATIENT
Start: 2022-02-13 | End: 2022-02-14 | Stop reason: HOSPADM

## 2022-02-14 RX ORDER — LEVOFLOXACIN 500 MG/1
500 TABLET, FILM COATED ORAL DAILY
Qty: 13 TABLET | Refills: 0 | Status: SHIPPED | OUTPATIENT
Start: 2022-02-15 | End: 2022-02-28

## 2022-02-14 RX ADMIN — HYDROMORPHONE HYDROCHLORIDE 1 MG: 1 INJECTION, SOLUTION INTRAMUSCULAR; INTRAVENOUS; SUBCUTANEOUS at 01:02

## 2022-02-14 RX ADMIN — METHOCARBAMOL 750 MG: 750 TABLET ORAL at 12:02

## 2022-02-14 RX ADMIN — LEVOFLOXACIN 500 MG: 500 TABLET, FILM COATED ORAL at 08:02

## 2022-02-14 RX ADMIN — Medication 1 ENEMA: at 12:02

## 2022-02-14 RX ADMIN — SENNOSIDES AND DOCUSATE SODIUM 1 TABLET: 50; 8.6 TABLET ORAL at 08:02

## 2022-02-14 RX ADMIN — OXYCODONE HYDROCHLORIDE 10 MG: 10 TABLET ORAL at 06:02

## 2022-02-14 RX ADMIN — POLYETHYLENE GLYCOL 3350 17 G: 17 POWDER, FOR SOLUTION ORAL at 08:02

## 2022-02-14 RX ADMIN — ACETAMINOPHEN 650 MG: 325 TABLET ORAL at 12:02

## 2022-02-14 RX ADMIN — ACETAMINOPHEN 650 MG: 325 TABLET ORAL at 06:02

## 2022-02-14 RX ADMIN — METHOCARBAMOL 750 MG: 750 TABLET ORAL at 08:02

## 2022-02-14 RX ADMIN — TAMSULOSIN HYDROCHLORIDE 0.4 MG: 0.4 CAPSULE ORAL at 08:02

## 2022-02-14 RX ADMIN — GEMFIBROZIL 600 MG: 600 TABLET ORAL at 06:02

## 2022-02-14 RX ADMIN — OXYCODONE HYDROCHLORIDE 10 MG: 10 TABLET ORAL at 12:02

## 2022-02-14 RX ADMIN — HEPARIN SODIUM 5000 UNITS: 5000 INJECTION INTRAVENOUS; SUBCUTANEOUS at 06:02

## 2022-02-14 NOTE — PROGRESS NOTES
[facility] - Neurosurgery ([facility])  [facility] Medicine  Progress Note    Patient Name: Emeka Caballero  MRN: [id number]  Patient Class: IP- Inpatient   Admission Date: 2/8/2022  Length of Stay: 6 days  Attending Physician: Alphonse Reed MD  Primary Care Provider: Dio Zarate Jr, MD        Subjective:     Principal Problem:Spondylolisthesis of lumbosacral region        HPI:  47 y/o M with PMHx BPH, T2DM, HLD, and chronic back pain admitted by NSGY for L4 spinal fusion. Post-op complicated by fevers and tachycardia. Tmax has been 102, responding to tylenol. EKG showed sinus tachycardia. Infectious workup has been unremarkable. CRP/ESR elevated, CXR negative, b/l LE US negative, urinalysis with 3+occult blood. D-dimer elevated. Lactate and procal wnl. Patient denies any shortness of breath, chest pain, nausea, vomiting. He complains of abdominal pain and distention, has not had a BM since Monday 2/7 but passing gas. Scrotal US significant for enlarged lymph node on the right with right sided varicocele. His back pain is well controlled. He is a chronic smoker since he was 17 years old, smoking 3-4 cigarettes per day, previously smoked 1/2 PPD. He quit right before his spinal surgery. His wife at bedside also reports that he has had severe night sweats for years requiring the sheets to be changed. No unintentional weight loss. He is originally from [place].     IM6 consulted for workup of fevers and tachycardia      Overview/Hospital Course:  No notes on file    Interval History: Patient remains afebrile, reports dysuria is improving. Repeat UA within normal limits.     Review of Systems   Constitutional: Negative for fatigue and fever.   HENT: Negative for congestion, rhinorrhea and sore throat.    Respiratory: Negative for chest tightness and shortness of breath.    Cardiovascular: Negative for chest pain.   Gastrointestinal: Negative for abdominal distention, abdominal pain, blood in stool, constipation,  diarrhea, nausea and vomiting.   Genitourinary: Negative for difficulty urinating, dysuria, flank pain, frequency, hematuria and urgency.   Musculoskeletal: Positive for back pain. Negative for arthralgias and neck pain.   Neurological: Negative for dizziness, weakness, numbness and headaches.     Objective:     Vital Signs (Most Recent):  Temp: 96.6 °F (35.9 °C) (02/14/22 1106)  Pulse: 101 (02/14/22 1106)  Resp: 16 (02/14/22 1238)  BP: 117/69 (02/14/22 1106)  SpO2: 96 % (02/14/22 1106) Vital Signs (24h Range):  Temp:  [96.6 °F (35.9 °C)-99.3 °F (37.4 °C)] 96.6 °F (35.9 °C)  Pulse:  [] 101  Resp:  [16-20] 16  SpO2:  [93 %-98 %] 96 %  BP: (112-135)/(63-74) 117/69     Weight: 95.2 kg (209 lb 15.8 oz)  Body mass index is 31.93 kg/m².    Intake/Output Summary (Last 24 hours) at 2/14/2022 1350  Last data filed at 2/14/2022 0500  Gross per 24 hour   Intake --   Output 600 ml   Net -600 ml      Physical Exam  Vitals and nursing note reviewed.   Constitutional:       General: He is not in acute distress.     Appearance: Normal appearance. He is obese. He is not ill-appearing or toxic-appearing.   HENT:      Head: Normocephalic and atraumatic.      Nose: Nose normal. No congestion or rhinorrhea.      Mouth/Throat:      Mouth: Mucous membranes are moist.      Pharynx: Oropharynx is clear. No oropharyngeal exudate or posterior oropharyngeal erythema.   Eyes:      Extraocular Movements: Extraocular movements intact.      Conjunctiva/sclera: Conjunctivae normal.      Pupils: Pupils are equal, round, and reactive to light.   Cardiovascular:      Rate and Rhythm: Normal rate and regular rhythm.      Pulses: Normal pulses.      Heart sounds: Normal heart sounds. No murmur heard.  No friction rub. No gallop.    Pulmonary:      Effort: Pulmonary effort is normal. No respiratory distress.      Breath sounds: Normal breath sounds. No wheezing, rhonchi or rales.   Abdominal:      General: Abdomen is protuberant. Bowel sounds are  normal. There is distension.      Palpations: Abdomen is soft. There is no mass.      Tenderness: There is no abdominal tenderness. There is no guarding or rebound.   Musculoskeletal:         General: Tenderness present. No swelling. Normal range of motion.      Cervical back: Normal range of motion.      Right lower leg: No edema.      Left lower leg: No edema.      Comments: Incision sites appear well healed without obvious fluctuance and erythema. He does have significant tenderness to palpation to the right lower back, just lateral to the incision.    Skin:     General: Skin is warm and dry.      Capillary Refill: Capillary refill takes less than 2 seconds.   Neurological:      General: No focal deficit present.      Mental Status: He is alert and oriented to person, place, and time. Mental status is at baseline.   Psychiatric:         Mood and Affect: Mood normal.         Behavior: Behavior normal.         Significant Labs:   All pertinent labs within the past 24 hours have been reviewed.  Recent Lab Results       02/14/22  0906   02/14/22  0724   02/13/22  2203   02/13/22  1654        Appearance, UA Clear             Bilirubin (UA) Negative             Color, UA Yellow             Glucose, UA Negative             Ketones, UA Negative             Leukocytes, UA Negative             NITRITE UA Negative             Occult Blood UA Negative             pH, UA 6.0             POCT Glucose   124   108   171       Protein, UA Negative  Comment: Recommend a 24 hour urine protein or a urine   protein/creatinine ratio if globulin induced proteinuria is  clinically suspected.               Specific Gravity, UA 1.020             Specimen UA Urine, Clean Catch                   Significant Imaging:         I have reviewed all pertinent imaging results/findings within the past 24 hours.      Assessment/Plan:      * Spondylolisthesis of lumbosacral region  S/p L4 spinal fusion with NSGY. Undergoing PT/OT    - Management per  primary      Prostatitis  See fevers      Diabetes mellitus, type 2  Chronic, stable. Last A1c 7.3 (1/6/22). On Metformin 500 mg BID at home.    - BG goal 140-180  - Diabetic diet  - MDSSI  - Accuchecks achs  - Can restart Metformin at discharge      HLD (hyperlipidemia)  Recently diagnosed, hypertriglyceridemia (>600). Started on gemfibrozil by PCP.    - Continue home gemfibrozil  - Would recommend statin on outpatient basis    The 10-year ASCVD risk score (Pedro DAVIS JrThom, et al., 2013) is: 10.1%    Values used to calculate the score:      Age: 46 years      Sex: Male      Is Non- : Yes      Diabetic: Yes      Tobacco smoker: No      Systolic Blood Pressure: 123 mmHg      Is BP treated: No      HDL Cholesterol: 25 mg/dL      Total Cholesterol: 190 mg/dL        BPH (benign prostatic hyperplasia)  Chronic, stable. On flomax at home.    - Continue home Flomax      Constipation  Patient reports no BM since Monday 2/7, passing gas.     Had a bowel movement on 2/11 and reports significant improvement in abdominal pain. Still passing gas without difficulty. KUB without evidence of ileus or obstruction. Improving with laxatives    Recommendations:  - Continue current bowel regimen with Miralax and senna.   - Encourage ambulation as much as possible.       Fever  Consulted for post-op fevers. Tmax of 102, responding to tylenol. Infectious workup thus far has been unremarkable. D-dimer elevated but no hypoxia, b/l LE US negative. ESR/CRP elevated, lactate and procal wnl. CXR, UA, COVID screen negative. US of scrotum and testes showed right sided varicocele and enlarged lymph node. Patient denies any family history of cancer. No leukocytosis or other infectious sxs. CXR unremarkable. KUB without ileus or obstruction. CTA chest and CTAP ordered by primary team.     Overall, there is no identified source of infection. The most likely source of infection is the lumbar spine and surrounding tissues in light  "of recent surgery and given that fevers started afterwards. Low suspicion for intraabdominal or pulmonary etiology. No active symptoms of TB but will rule out given frequent travel to [place].     Recommendations:   - Quantiferon gold in process.   - Blood cx with NGTD; no leukocytosis  - Continue Levofloxacin 750 mg for 3 days, 500 mg for 10 days, to complete 2 week course for new lung infiltrate (concern for aspiration) and prostatitis.  - Please make sure patient has close follow up with Urology within the next 2 weeks  - Repeat UA within normal limits      VTE Risk Mitigation (From admission, onward)         Ordered     heparin (porcine) injection 5,000 Units  Every 8 hours         02/09/22 0850                Discharge Planning   KELLEY: 2/14/2022     Code Status: Full Code   Is the patient medically ready for discharge?:     Reason for patient still in hospital (select all that apply): Treatment  Discharge Plan A: Home          Please secure chat Hospital Medicine M ("Medicine Consult Only") or contact me directly for any additional questions or concerns.          Martha Riddle, DO  Department of Hospital Medicine   [facility] - Neurosurgery (Hospital)    "

## 2022-02-14 NOTE — DISCHARGE SUMMARY
[facility] Her[facility] - Neurosurgery ([facility])  Neurosurgery  Discharge Summary      Patient Name: Emeka Caballero  MRN: [id number]  Admission Date: 2/8/2022  Hospital Length of Stay: 6 days  Discharge Date and Time:  02/14/2022 1:00 PM  Attending Physician: Alphonse Reed MD   Discharging Provider: Ling Malloy PA-C  Primary Care Provider: Dio Zarate Jr, MD    HPI:   Emeka Lau is a 46 y.o. male with PMH of BPH.  offered, but patient declined as he and his wife are English speaking. The patient was last seen in clinic on 10/19/20 with Jennifer Sol PA-C to discuss concerns with worsening low back pain despite conservative therapy. At that appointment, it was discussed that the patient could benefit from surgery. The patient was apprehensive about proceeding with surgery and wanted to obtain more conservative therapy.     He is being seen today to discuss worsening of his symptoms despite additional PT that was obtained in [facility]. Describes the pain as constant, aching, and stabbing across the low back with radiation down the R> L leg. Rates the pain as a 9/10. Aggravating factors include walking and prolonged standing. Alleviating factors include laying down. Denies b/b dysfunction, saddle anesthesia, or gait instability. Endorses weakness in the right leg associated with numbness and tingling.      Regarding his neck pain, the patient states that the pain is midline and radiates down the right arm stopping at the shoulder. Describes the pain as constant aching with numbness and tingling. Rates the pain as 5-6/10. Reports balance difficulties and weakness in his hands when grabbing objects.      Interval Hx 12/2/21: After the last appointment, it was recommended that the patient obtain updated imaging to evaluate his radicular complaints. He is being seen in clinic today with his wife to discuss the image findings. States that his symptoms have not changed since his last appointment. He  continues to struggle with low back and right leg pain affecting his ambulation. The patient is interested in surgery.      Interval Hx 1/13/22: The patient is being seen in clinic today to review his recent MRI and answer any additional questions regarding his upcoming surgery. States that his symptoms have not changed since his last appointment. He is eager to proceed with surgery. Dr. Reed is recommending a posterior fusion L4-pelvis due to the grade 2 isthmic spondylolisthesis contributing to severe bilateral foraminal stenosis.      Procedure(s) (LRB):  FUSION, SPINE, LUMBAR (N/A)     Hospital Course: 2/9: NAEON. AFVSS. Reports low back pain and RLE pain. Noonan removed this morning. Also c/o lower abdominal and scrotal pain. Pain medication adjusted. HV intact with high output, H/H stable. Denies headache, weakness, worsening numbness. PT/OT today, XR today.  2/10: Fever to 100.7 and tachycardic to 124 last night. T 101.4 and  this am. CXR negative, BLE US negative, urinalysis pending. EKG pending. Patient denies shortness of breath, chest pain, infectious symptoms, nausea, vomiting. He denies abdominal and scrotal pain today and states he was able to urinate a lot more. 700mL in last 3 hours. He denies dysuria. Scrotal US negative. His back pain is also well controlled. HV to full suction with output of 20 & 25 cc. WV lost its seal overnight, will attempt to fortify today with tegaderm.  2/11: NAEON. Febrile overnight, Tmax 102.2. Continued tachycardia. Patient sitting in the chair in no acute distress. Pain better controlled. Reports RLE pain has improved. Denies groin pain today. Voiding spontaneously but reports dysuria. + flatus, mild abdominal tenderness. Denies chest pain, SOB, n/v. Tolerating PO. Fever workup so far unrevealing, additional studies ordered. HV drains removed for low output.  consulted for assistance with fever workup.  2/12: NAEON. Continued fevers overnight, Tmax 101.8.  Patient reports diaphoresis overnight. Sitting on the side of the bed in no acute distress. Intermittently requiring oxygen via nasal cannula. Denies chest pain, SOB. Endorses nasal congestion and increased back pain after ambulating more yesterday. Reports abdominal pain has improved some. + flatus, small BM yesterday. Voiding spontaneously.   2/13: NAEON. No fevers. Eating, drinking, voiding, had BM yesterday, has been out of bed and cleared for home health. Medicine eval and CT Chest without PE and no known source of fever. Will repeat UA due to concern for prostatitis but will consider discharge today vs tomorrow after infectious work-up complete.   2/14: NAEON. AFVSS. Afebrile > 48 hours. Significant other at bedside, reports patient has not had a large BM since admission. Patient reports many small BMs 2/11 and one this morning. Will order enema today. Reports + flatus, denies abdominal pain. Ambulating around the room. Plan for discharge home today on levofloxacin and follow-up with his urologist in 1 week.       Goals of Care Treatment Preferences:  Code Status: Full Code      Consults:   Consults (From admission, onward)        Status Ordering Provider     Inpatient consult to [facility] Medicine-General  Once        Provider:  (Not yet assigned)    Completed HARDEEP DURAN     IP consult to case management  Once        Provider:  (Not yet assigned)    Acknowledged KAY HENRIQUEZ          Significant Diagnostic Studies: Labs:   BMP: No results for input(s): GLU, NA, K, CL, CO2, BUN, CREATININE, CALCIUM, MG in the last 48 hours., CBC   Recent Labs   Lab 02/13/22  1003   WBC 8.66   HGB 9.4*   HCT 29.3*       and All labs within the past 24 hours have been reviewed  Radiology:     XR lumbar spine  US scrotum and testicles  CXR  US BLE  KUB  CTA chest, abdomen, pelvis      Pending Diagnostic Studies:     Procedure Component Value Units Date/Time    CT Interoperative Limited [[id number]] Resulted: 02/08/22  "0846    Order Status: Sent Lab Status: In process Updated: 02/08/22 1314    CTA Chest Non Coronary [[id number]] Resulted: 02/12/22 1305    Order Status: Sent Lab Status: No result Updated: 02/12/22 1310    Influenza antigen Nasopharyngeal Swab [[id number]] Collected: 02/11/22 1438    Order Status: Sent Lab Status: No result     Quantiferon Gold TB [[id number]] Collected: 02/11/22 1621    Order Status: Sent Lab Status: In process Updated: 02/11/22 1630    Specimen: Blood         Final Active Diagnoses:    Diagnosis Date Noted POA    PRINCIPAL PROBLEM:  Spondylolisthesis of lumbosacral region [M43.17] 02/17/2020 Yes    Prostatitis [N41.9] 02/13/2022 No    Fever [R50.9] 02/11/2022 No    Constipation [K59.00] 02/11/2022 No    BPH (benign prostatic hyperplasia) [N40.0] 02/11/2022 Yes    HLD (hyperlipidemia) [E78.5] 02/11/2022 Yes    Diabetes mellitus, type 2 [E11.9] 02/11/2022 Yes      Problems Resolved During this Admission:      Discharged Condition: good     Disposition: Home or Self Care    Follow Up: NSGY in 2 weeks for wound check, Urology in 2 week    Patient Instructions:      WALKER FOR HOME USE     Order Specific Question Answer Comments   Type of Walker: Adult (5'4"-6'6")    With wheels? Yes    Height: 5' 8" (1.727 m)    Weight: 95.2 kg (209 lb 15.8 oz)    Length of need (1-99 months): 3    Does patient have medical equipment at home? none    Please check all that apply: Patient needs help to get in and out of chair.    Please check all that apply: Walker will be used for gait training.    Please check all that apply: Patient is unable to safely ambulate without equipment.      Ambulatory referral/consult to Physical/Occupational Therapy   Standing Status: Future   Referral Priority: Routine Referral Type: Physical Medicine   Referral Reason: Specialty Services Required   Requested Specialty: Physical Therapy   Number of Visits Requested: 1     Ambulatory referral/consult to Urology   Standing Status: " Future   Referral Priority: Urgent Referral Type: Consultation   Referral Reason: Specialty Services Required   Requested Specialty: Urology   Number of Visits Requested: 1     Notify your health care provider if you experience any of the following:  temperature >100.4     Notify your health care provider if you experience any of the following:  persistent nausea and vomiting or diarrhea     Notify your health care provider if you experience any of the following:  severe uncontrolled pain     Notify your health care provider if you experience any of the following:  redness, tenderness, or signs of infection (pain, swelling, redness, odor or green/yellow discharge around incision site)     Notify your health care provider if you experience any of the following:  difficulty breathing or increased cough     Notify your health care provider if you experience any of the following:  severe persistent headache     Notify your health care provider if you experience any of the following:  worsening rash     Notify your health care provider if you experience any of the following:  persistent dizziness, light-headedness, or visual disturbances     Notify your health care provider if you experience any of the following:  increased confusion or weakness     Activity as tolerated     Medications:  Reconciled Home Medications:      Medication List      START taking these medications    acetaminophen 325 MG tablet  Commonly known as: TYLENOL  Take 2 tablets (650 mg total) by mouth every 6 (six) hours as needed for Pain.     levoFLOXacin 500 MG tablet  Commonly known as: LEVAQUIN  Take 1 tablet (500 mg total) by mouth once daily. for 13 days  Start taking on: February 15, 2022     methocarbamoL 750 MG Tab  Commonly known as: ROBAXIN  Take 1 tablet (750 mg total) by mouth 4 (four) times daily. for 14 days     oxyCODONE 10 mg Tab immediate release tablet  Commonly known as: ROXICODONE  Take 1 tablet (10 mg total) by mouth every 4  (four) hours as needed (pain 6-7/10).        CONTINUE taking these medications    blood sugar diagnostic Strp  To check BG 3 times daily, to use with insurance preferred meter     blood-glucose meter kit  To check BG 3 times daily, to use with insurance preferred meter     gemfibroziL 600 MG tablet  Commonly known as: LOPID  Take 1 tablet (600 mg total) by mouth 2 (two) times daily before meals.     lancets Misc  To check BG 3 times daily, to use with insurance preferred meter     metFORMIN 500 MG ER 24hr tablet  Commonly known as: GLUCOPHAGE-XR  Take 2 tablets (1,000 mg total) by mouth 2 (two) times daily with meals.     * tamsulosin 0.4 mg Cap  Commonly known as: FLOMAX  Take 1 capsule by mouth once daily     * tamsulosin 0.4 mg Cap  Commonly known as: FLOMAX  Take 1 capsule (0.4 mg total) by mouth once daily.     triamcinolone acetonide 0.1% 0.1 % cream  Commonly known as: KENALOG  Apply topically 2 (two) times daily. Apply to rash. DO NOT USE ON FACE.     UNABLE TO FIND  Bleaching cream         * This list has 2 medication(s) that are the same as other medications prescribed for you. Read the directions carefully, and ask your doctor or other care provider to review them with you.            STOP taking these medications    meloxicam 15 MG tablet  Commonly known as: GIOVANNY Malloy PA-C  Neurosurgery  [facility] Herrera - Neurosurgery ([facility])

## 2022-02-14 NOTE — PLAN OF CARE
[facility] - Neurosurgery (Hospital)  Discharge Final Note    Primary Care Provider: Dio Zarate Jr, MD    Expected Discharge Date: 2/14/2022    Final Discharge Note (most recent)     Final Note - 02/14/22 1726        Final Note    Assessment Type Final Discharge Note     Anticipated Discharge Disposition Home or Self Care     Hospital Resources/Appts/Education Provided Appointments scheduled and added to AVS        Post-Acute Status    Post-Acute Authorization Other     Other Status No Post-Acute Service Needs                 Important Message from Medicare      Future Appointments   Date Time Provider Department Center   2/21/2022  1:00 PM Dio Zarate Jr., MD [facility] - B   2/23/2022 11:00 AM RN, NEUROSURGERY [facility] NEUROS8 [facility]   3/17/2022  1:00 PM NOM OIC-XRAY [facility]   3/17/2022  2:30 PM Alphonse Reed MD [facility] NEUROS8 [facility]   3/21/2022  9:15 AM Clara Garcia OD [facility]   5/10/2022 10:20 AM Dio Zarate Jr., MD [facility]

## 2022-02-14 NOTE — PROGRESS NOTES
[facility] Herrera - Neurosurgery ([facility])  Neurosurgery  Progress Note    Subjective:     History of Present Illness: Emeka Lau is a 46 y.o. male with PMH of BPH.  offered, but patient declined as he and his wife are English speaking. The patient was last seen in clinic on 10/19/20 with Jennifer Sol PA-C to discuss concerns with worsening low back pain despite conservative therapy. At that appointment, it was discussed that the patient could benefit from surgery. The patient was apprehensive about proceeding with surgery and wanted to obtain more conservative therapy.     He is being seen today to discuss worsening of his symptoms despite additional PT that was obtained in [facility]. Describes the pain as constant, aching, and stabbing across the low back with radiation down the R> L leg. Rates the pain as a 9/10. Aggravating factors include walking and prolonged standing. Alleviating factors include laying down. Denies b/b dysfunction, saddle anesthesia, or gait instability. Endorses weakness in the right leg associated with numbness and tingling.      Regarding his neck pain, the patient states that the pain is midline and radiates down the right arm stopping at the shoulder. Describes the pain as constant aching with numbness and tingling. Rates the pain as 5-6/10. Reports balance difficulties and weakness in his hands when grabbing objects.      Interval Hx 12/2/21: After the last appointment, it was recommended that the patient obtain updated imaging to evaluate his radicular complaints. He is being seen in clinic today with his wife to discuss the image findings. States that his symptoms have not changed since his last appointment. He continues to struggle with low back and right leg pain affecting his ambulation. The patient is interested in surgery.      Interval Hx 1/13/22: The patient is being seen in clinic today to review his recent MRI and answer any additional questions regarding  his upcoming surgery. States that his symptoms have not changed since his last appointment. He is eager to proceed with surgery. Dr. Reed is recommending a posterior fusion L4-pelvis due to the grade 2 isthmic spondylolisthesis contributing to severe bilateral foraminal stenosis.      Post-Op Info:  Procedure(s) (LRB):  FUSION, SPINE, LUMBAR (N/A)   6 Days Post-Op     Interval History: NAEON. AFVSS. Afebrile > 48 hours. Significant other at bedside, reports patient has not had a large BM since admission. Patient reports many small BMs 2/11 and one this morning. Will order enema today. Reports + flatus, denies abdominal pain. Ambulating around the room. Plan for discharge home today on levofloxacin and follow-up with his urologist in 1 week.     Medications:  Continuous Infusions:  Scheduled Meds:   acetaminophen  650 mg Oral Q6H    gemfibroziL  600 mg Oral BID AC    heparin (porcine)  5,000 Units Subcutaneous Q8H    levoFLOXacin  500 mg Oral Daily    methocarbamoL  750 mg Oral QID    polyethylene glycol  17 g Oral BID    senna-docusate 8.6-50 mg  1 tablet Oral BID    tamsulosin  1 capsule Oral Daily     PRN Meds:aluminum-magnesium hydroxide-simethicone, bisacodyL, dextrose 10%, dextrose 10%, glucagon (human recombinant), glucose, glucose, HYDROmorphone, insulin aspart U-100, ondansetron, oxyCODONE, prochlorperazine, sodium chloride 0.9%       Objective:     Weight: 95.2 kg (209 lb 15.8 oz)  Body mass index is 31.93 kg/m².  Vital Signs (Most Recent):  Temp: 96.6 °F (35.9 °C) (02/14/22 1106)  Pulse: 101 (02/14/22 1106)  Resp: 16 (02/14/22 1238)  BP: 117/69 (02/14/22 1106)  SpO2: 96 % (02/14/22 1106) Vital Signs (24h Range):  Temp:  [96.6 °F (35.9 °C)-99.3 °F (37.4 °C)] 96.6 °F (35.9 °C)  Pulse:  [] 101  Resp:  [16-20] 16  SpO2:  [93 %-98 %] 96 %  BP: (112-135)/(63-74) 117/69                          Physical Exam      Neurosurgery Physical Exam      Head: Normocephalic, atraumatic.  Neurologic: Alert  and oriented x 4. Thought content appropriate.  GCS: Motor:6  Verbal:5  Eyes:4   GCS Total: 15  Language: No aphasia.  Speech: No dysarthria.  Cranial nerves: Face symmetric, tongue midline, CN II-XII grossly intact.   Eyes: Pupils equal, round, reactive to light with accomodation, EOMI.   Pulmonary: Normal respirations, no signs of respiratory distress.  Abdomen: soft, non-distended. No tenderness to palpation  Sensory: Intact to light touch throughout.  Motor Strength: Moves all extremities spontaneously with good tone. No abnormal movements seen.      Strength   Deltoids Triceps Biceps Wrist Extension Wrist Flexion Hand    Upper: R 5/5 5/5 5/5 5/5 5/5 5/5     L 5/5 5/5 5/5 5/5 5/5 5/5       Hip Flexion Knee Extension Knee  Flexion Dorsiflexion Plantar flexion EHL   Lower: R 5/5 4/5 4/5 5/5 5/5 5/5     L 5/5 4/5 4/5 5/5 5/5 5/5   Pain limited effort.     Vascular: No LE edema.   Skin: Skin is warm, dry and intact.     Incision c/d/i with staples. No surrounding erythema or edema. No drainage from incision. No palpable hematoma or underlying fluid collection.            Significant Labs:  No results for input(s): GLU, NA, K, CL, CO2, BUN, CREATININE, CALCIUM, MG in the last 48 hours.  Recent Labs   Lab 02/13/22  1003   WBC 8.66   HGB 9.4*   HCT 29.3*        No results for input(s): LABPT, INR, APTT in the last 48 hours.  Microbiology Results (last 7 days)     Procedure Component Value Units Date/Time    Blood culture [[id number]] Collected: 02/11/22 0917    Order Status: Completed Specimen: Blood Updated: 02/14/22 1212     Blood Culture, Routine No Growth to date      No Growth to date      No Growth to date      No Growth to date    Blood culture [[id number]] Collected: 02/11/22 0916    Order Status: Completed Specimen: Blood Updated: 02/14/22 1212     Blood Culture, Routine No Growth to date      No Growth to date      No Growth to date      No Growth to date    Influenza A & B by Molecular [[id number]]  Collected: 02/11/22 1438    Order Status: Canceled         All pertinent labs from the last 24 hours have been reviewed.    Significant Diagnostics:  I have reviewed all pertinent imaging results/findings within the past 24 hours.    Assessment/Plan:     * Spondylolisthesis of lumbosacral region  Emeka Lau is a 46 y.o. male with PMH of BPH and smoker with grade 2 isthmic spondylolisthesis contributing to severe bilateral foraminal stenosis. Now s/p L4-pelvis fusion with L4-5, L5-S1 TLIF on 2/8.     - Neurologically stable post-op. Q4hr neurochecks.  - All labs and imaging reviewed   - Postop XR with satisfactory hardware placement and spinal alignment   - US BLE 2/10 without evidence of DVT   - CXR 2/10 without acute abnormality   - US scrotum/testes for scrotal pain negative.  - Fever & tachycardia: CXR negative, BLE US negative, UA negative for infection. EKG with sinus tachycardia. ESR, CRP, Procal, D-dimer elevated. BCx 2/11 NGTD. Lactate normal. HM consulted for assistance, appreciate assistance. Quantiferon gold pending. CTA c/a/p showed lung infiltrate and prostatitis. Afebrile > 48 hours. Will plan to discharge home on levofloxacin for aspiration pneumonia and prostatitis.   - Pain control: scheduled tylenol 650 mg q6h, robaxin 750 qid. Oxycodone 10 mg q4h prn  - Bacitracin to incision BID.   - Drains: HV drains removed 2/11  - LSO brace when up/OOB.  - Continue PT/OT/OOB. OOB > 6hrs/day  - GI: Diet as tolerated. Continue bowel regimen. KUB with mildly distended bowel loops and feces. Multiple BMs yesterday.  - Noonan removed. Bladder scan q6h. Voiding spontaneously. Adequate urine output.  - DVT ppx: SQH, compression stockings, SCDs. US BLE 2/10 negative for DVT.  - Atelectasis ppx: IS at bedside. Encourage use every hour while awake.  - BPH: continue home dose flomax    - Discussed with Dr. Reed.     - Dispo: PT/OT rec is home health, will discharge with outpatient therapy. F/u with urology within 2  weeks, message sent to Dr. Rosas. F/u with NSGY in 2 weeks.         Ling Malloy PA-C  Neurosurgery  [facility] - Neurosurgery ([facility])

## 2022-02-14 NOTE — ASSESSMENT & PLAN NOTE
Emeka Lau is a 46 y.o. male with PMH of BPH and smoker with grade 2 isthmic spondylolisthesis contributing to severe bilateral foraminal stenosis. Now s/p L4-pelvis fusion with L4-5, L5-S1 TLIF on 2/8.     - Neurologically stable post-op. Q4hr neurochecks.  - All labs and imaging reviewed   - Postop XR with satisfactory hardware placement and spinal alignment   - US BLE 2/10 without evidence of DVT   - CXR 2/10 without acute abnormality   - US scrotum/testes for scrotal pain negative.  - Fever & tachycardia: CXR negative, BLE US negative, UA negative for infection. EKG with sinus tachycardia. ESR, CRP, Procal, D-dimer elevated. BCx 2/11 NGTD. Lactate normal. HM consulted for assistance, appreciate assistance. Quantiferon gold pending. CTA c/a/p showed lung infiltrate and prostatitis. Afebrile > 48 hours. Will plan to discharge home on levofloxacin for aspiration pneumonia and prostatitis.   - Pain control: scheduled tylenol 650 mg q6h, robaxin 750 qid. Oxycodone 10 mg q4h prn  - Bacitracin to incision BID.   - Drains: HV drains removed 2/11  - LSO brace when up/OOB.  - Continue PT/OT/OOB. OOB > 6hrs/day  - GI: Diet as tolerated. Continue bowel regimen. KUB with mildly distended bowel loops and feces. Multiple BMs yesterday.  - Noonan removed. Bladder scan q6h. Voiding spontaneously. Adequate urine output.  - DVT ppx: SQH, compression stockings, SCDs. US BLE 2/10 negative for DVT.  - Atelectasis ppx: IS at bedside. Encourage use every hour while awake.  - BPH: continue home dose flomax    - Discussed with Dr. Reed.     - Dispo: PT/OT rec is home health, will discharge with outpatient therapy. F/u with urology within 2 weeks, message sent to Dr. Rosas. F/u with NSGY in 2 weeks.

## 2022-02-14 NOTE — SUBJECTIVE & OBJECTIVE
Interval History: NAEON. AFVSS. Afebrile > 48 hours. Significant other at bedside, reports patient has not had a large BM since admission. Patient reports many small BMs 2/11 and one this morning. Will order enema today. Reports + flatus, denies abdominal pain. Ambulating around the room. Plan for discharge home today on levofloxacin and follow-up with his urologist in 1 week.     Medications:  Continuous Infusions:  Scheduled Meds:   acetaminophen  650 mg Oral Q6H    gemfibroziL  600 mg Oral BID AC    heparin (porcine)  5,000 Units Subcutaneous Q8H    levoFLOXacin  500 mg Oral Daily    methocarbamoL  750 mg Oral QID    polyethylene glycol  17 g Oral BID    senna-docusate 8.6-50 mg  1 tablet Oral BID    tamsulosin  1 capsule Oral Daily     PRN Meds:aluminum-magnesium hydroxide-simethicone, bisacodyL, dextrose 10%, dextrose 10%, glucagon (human recombinant), glucose, glucose, HYDROmorphone, insulin aspart U-100, ondansetron, oxyCODONE, prochlorperazine, sodium chloride 0.9%       Objective:     Weight: 95.2 kg (209 lb 15.8 oz)  Body mass index is 31.93 kg/m².  Vital Signs (Most Recent):  Temp: 96.6 °F (35.9 °C) (02/14/22 1106)  Pulse: 101 (02/14/22 1106)  Resp: 16 (02/14/22 1238)  BP: 117/69 (02/14/22 1106)  SpO2: 96 % (02/14/22 1106) Vital Signs (24h Range):  Temp:  [96.6 °F (35.9 °C)-99.3 °F (37.4 °C)] 96.6 °F (35.9 °C)  Pulse:  [] 101  Resp:  [16-20] 16  SpO2:  [93 %-98 %] 96 %  BP: (112-135)/(63-74) 117/69                          Physical Exam      Neurosurgery Physical Exam      Head: Normocephalic, atraumatic.  Neurologic: Alert and oriented x 4. Thought content appropriate.  GCS: Motor:6  Verbal:5  Eyes:4   GCS Total: 15  Language: No aphasia.  Speech: No dysarthria.  Cranial nerves: Face symmetric, tongue midline, CN II-XII grossly intact.   Eyes: Pupils equal, round, reactive to light with accomodation, EOMI.   Pulmonary: Normal respirations, no signs of respiratory distress.  Abdomen: soft,  non-distended. No tenderness to palpation  Sensory: Intact to light touch throughout.  Motor Strength: Moves all extremities spontaneously with good tone. No abnormal movements seen.      Strength   Deltoids Triceps Biceps Wrist Extension Wrist Flexion Hand    Upper: R 5/5 5/5 5/5 5/5 5/5 5/5     L 5/5 5/5 5/5 5/5 5/5 5/5       Hip Flexion Knee Extension Knee  Flexion Dorsiflexion Plantar flexion EHL   Lower: R 5/5 4/5 4/5 5/5 5/5 5/5     L 5/5 4/5 4/5 5/5 5/5 5/5   Pain limited effort.     Vascular: No LE edema.   Skin: Skin is warm, dry and intact.     Incision c/d/i with staples. No surrounding erythema or edema. No drainage from incision. No palpable hematoma or underlying fluid collection.            Significant Labs:  No results for input(s): GLU, NA, K, CL, CO2, BUN, CREATININE, CALCIUM, MG in the last 48 hours.  Recent Labs   Lab 02/13/22  1003   WBC 8.66   HGB 9.4*   HCT 29.3*        No results for input(s): LABPT, INR, APTT in the last 48 hours.  Microbiology Results (last 7 days)     Procedure Component Value Units Date/Time    Blood culture [[id number]] Collected: 02/11/22 0917    Order Status: Completed Specimen: Blood Updated: 02/14/22 1212     Blood Culture, Routine No Growth to date      No Growth to date      No Growth to date      No Growth to date    Blood culture [[id number]] Collected: 02/11/22 0916    Order Status: Completed Specimen: Blood Updated: 02/14/22 1212     Blood Culture, Routine No Growth to date      No Growth to date      No Growth to date      No Growth to date    Influenza A & B by Molecular [[id number]] Collected: 02/11/22 1438    Order Status: Canceled         All pertinent labs from the last 24 hours have been reviewed.    Significant Diagnostics:  I have reviewed all pertinent imaging results/findings within the past 24 hours.

## 2022-02-14 NOTE — CARE UPDATE
"RAPID RESPONSE NURSE CHART REVIEW        Chart Reviewed: 02/13/2022, 7:38 PM    MRN: [id number]  Bed: 922/922 A    Dx: Spondylolisthesis of lumbosacral region    Emeka Caballero has a past medical history of Diabetes mellitus, type 2.    Last VS: /66 (Patient Position: Sitting)   Pulse (!) 197   Temp 99.3 °F (37.4 °C) (Oral)   Resp 19   Ht 5' 8" (1.727 m)   Wt 95.2 kg (209 lb 15.8 oz)   SpO2 (!) 83%   BMI 31.93 kg/m²     24H Vital Sign Range:  Temp:  [98.3 °F (36.8 °C)-99.9 °F (37.7 °C)]   Pulse:  []   Resp:  [16-19]   BP: (119-135)/(66-77)   SpO2:  [83 %-100 %]     Level of Consciousness (AVPU): alert    Recent Labs     02/11/22  0917 02/13/22  1003   WBC 12.43 8.66   HGB 9.5* 9.4*   HCT 29.4* 29.3*    293       Recent Labs     02/11/22  0917      K 3.9   CL 99   CO2 26   CREATININE 0.9   *        No results for input(s): PH, PCO2, PO2, HCO3, POCSATURATED, BE in the last 72 hours.     OXYGEN:  Flow (L/min): 2     O2 Device (Oxygen Therapy): room air    MEWS score: 4    bedside [facility] contacted. No concerns verbalized at this time. Will double check HR, SpO2. Instructed to call [phone] for further concerns or assistance.    Sarah Lemos RN      "

## 2022-02-14 NOTE — SUBJECTIVE & OBJECTIVE
Interval History: Patient remains afebrile, reports dysuria is improving. Repeat UA within normal limits.     Review of Systems   Constitutional: Negative for fatigue and fever.   HENT: Negative for congestion, rhinorrhea and sore throat.    Respiratory: Negative for chest tightness and shortness of breath.    Cardiovascular: Negative for chest pain.   Gastrointestinal: Negative for abdominal distention, abdominal pain, blood in stool, constipation, diarrhea, nausea and vomiting.   Genitourinary: Negative for difficulty urinating, dysuria, flank pain, frequency, hematuria and urgency.   Musculoskeletal: Positive for back pain. Negative for arthralgias and neck pain.   Neurological: Negative for dizziness, weakness, numbness and headaches.     Objective:     Vital Signs (Most Recent):  Temp: 96.6 °F (35.9 °C) (02/14/22 1106)  Pulse: 101 (02/14/22 1106)  Resp: 16 (02/14/22 1238)  BP: 117/69 (02/14/22 1106)  SpO2: 96 % (02/14/22 1106) Vital Signs (24h Range):  Temp:  [96.6 °F (35.9 °C)-99.3 °F (37.4 °C)] 96.6 °F (35.9 °C)  Pulse:  [] 101  Resp:  [16-20] 16  SpO2:  [93 %-98 %] 96 %  BP: (112-135)/(63-74) 117/69     Weight: 95.2 kg (209 lb 15.8 oz)  Body mass index is 31.93 kg/m².    Intake/Output Summary (Last 24 hours) at 2/14/2022 1350  Last data filed at 2/14/2022 0500  Gross per 24 hour   Intake --   Output 600 ml   Net -600 ml      Physical Exam  Vitals and nursing note reviewed.   Constitutional:       General: He is not in acute distress.     Appearance: Normal appearance. He is obese. He is not ill-appearing or toxic-appearing.   HENT:      Head: Normocephalic and atraumatic.      Nose: Nose normal. No congestion or rhinorrhea.      Mouth/Throat:      Mouth: Mucous membranes are moist.      Pharynx: Oropharynx is clear. No oropharyngeal exudate or posterior oropharyngeal erythema.   Eyes:      Extraocular Movements: Extraocular movements intact.      Conjunctiva/sclera: Conjunctivae normal.      Pupils:  Pupils are equal, round, and reactive to light.   Cardiovascular:      Rate and Rhythm: Normal rate and regular rhythm.      Pulses: Normal pulses.      Heart sounds: Normal heart sounds. No murmur heard.  No friction rub. No gallop.    Pulmonary:      Effort: Pulmonary effort is normal. No respiratory distress.      Breath sounds: Normal breath sounds. No wheezing, rhonchi or rales.   Abdominal:      General: Abdomen is protuberant. Bowel sounds are normal. There is distension.      Palpations: Abdomen is soft. There is no mass.      Tenderness: There is no abdominal tenderness. There is no guarding or rebound.   Musculoskeletal:         General: Tenderness present. No swelling. Normal range of motion.      Cervical back: Normal range of motion.      Right lower leg: No edema.      Left lower leg: No edema.      Comments: Incision sites appear well healed without obvious fluctuance and erythema. He does have significant tenderness to palpation to the right lower back, just lateral to the incision.    Skin:     General: Skin is warm and dry.      Capillary Refill: Capillary refill takes less than 2 seconds.   Neurological:      General: No focal deficit present.      Mental Status: He is alert and oriented to person, place, and time. Mental status is at baseline.   Psychiatric:         Mood and Affect: Mood normal.         Behavior: Behavior normal.         Significant Labs:   All pertinent labs within the past 24 hours have been reviewed.  Recent Lab Results       02/14/22  0906   02/14/22  0724   02/13/22  2203   02/13/22  1654        Appearance, UA Clear             Bilirubin (UA) Negative             Color, UA Yellow             Glucose, UA Negative             Ketones, UA Negative             Leukocytes, UA Negative             NITRITE UA Negative             Occult Blood UA Negative             pH, UA 6.0             POCT Glucose   124   108   171       Protein, UA Negative  Comment: Recommend a 24 hour urine  protein or a urine   protein/creatinine ratio if globulin induced proteinuria is  clinically suspected.               Specific Gravity, UA 1.020             Specimen UA Urine, Clean Catch                   Significant Imaging:         I have reviewed all pertinent imaging results/findings within the past 24 hours.

## 2022-02-14 NOTE — DISCHARGE INSTRUCTIONS
Take levofloxacin 750mg for 3 days, then start taking levofloxacin 500mg for 10 days. Follow up with urologist.    Post op Spine Patient Instructions    Activity Restrictions:  [x]  Return to work will be determined on an individual basis.  [x]  No lifting greater than 5-10 pounds.  [x]  Avoid bending and twisting the area of your surgery more than 45 degrees from neutral position in any direction.  [x]  No driving or operating machinery:  [x]  until cleared by your surgeon.  [x]  while taking narcotic pain medications or muscle relaxants.  [x]  Wear your brace at all times except when lying in bed or showering.   [x]  Increase ambulation over the next 2 weeks so that you are walking 2 miles per day at 2 weeks post-operatively.  [x]  Walk on paved surfaces only. It is okay to walk up and down stairs while holding onto a side rail.    Discharge Medication/Follow-up:  [x]  Please refer to discharge medication reconciliation form.  [x]  Take Tylenol (acetaminophen) 650 mg every 6 hours as needed for pain control.  [x]  Do not take ANY Aspirin or Aspirin containing products for 2 weeks after surgery.   [x]  Do not take ANY herbal supplements for 2 weeks after surgery.    [x]  Do not take ANY non-steroidal anti-inflammatory drugs (NSAIDS), including the following: ibuprofen, naprosyn, Aleve, Advil, Indocin, Mobic, or Celebrex for 12 weeks after surgery.   [x]  Prescriptions for appropriate medication will be given upon discharge.  [x]  Take docusate (Colace 100 mg): take one capsule a day as needed for constipation. You can get this over the counter.  [x]  Follow-up appointment:  [x]  10-14 days post-op for wound check by physician assistant/nurse  [x]  4-6 weeks with MD:  [x]  with x-rays  [x]  An appointment will be mailed to you.    Wound Care:  [x]  No bandage required. Keep your incision open to the air.   [x]  You may shower on the 2nd day after your surgery. Keep the incision clean and dry at all times. Do not  allow the force of water to hit the incision. If the incision gets damp, pat it dry. Do not rub or scrub the incision.  [x]  You cannot take a bath until 8 weeks after surgery.  [x]  Apply bacitracin to incision twice a day for 2 weeks.    Call your doctor or go to the Emergency Room for any signs of infection, including: increased redness, drainage, pain, or fever (temperature ?101.5 for 24 hours). Call your doctor or go to the Emergency Room if there are any localized neurological changes; problems with speech, vision, numbness, tingling, weakness, or severe headache; inability to control urination or bowel movements; inability to urinate; or for other concerns.      Special Instructions:  [x]  No use of tobacco products.  [x]  Diet: Please eat a regular diet as tolerated.      Physicians need 3 days' notice for pain medicine to be refilled. Pain medicine will only be refilled between 8 AM and 5 PM, Monday through Friday, due to Food and Drug Administration regulation of documentation.    If you have any questions about this form, please call [phone].    Form No. 87825 (Revised 10/31/2013)

## 2022-02-16 ENCOUNTER — PATIENT OUTREACH (OUTPATIENT)
Dept: ADMINISTRATIVE | Facility: OTHER | Age: 47
End: 2022-02-16
Payer: MEDICAID

## 2022-02-16 DIAGNOSIS — E11.9 TYPE 2 DIABETES MELLITUS WITHOUT COMPLICATION, UNSPECIFIED WHETHER LONG TERM INSULIN USE: ICD-10-CM

## 2022-02-16 LAB
BACTERIA BLD CULT: NORMAL
BACTERIA BLD CULT: NORMAL

## 2022-02-17 ENCOUNTER — CLINICAL SUPPORT (OUTPATIENT)
Dept: REHABILITATION | Facility: HOSPITAL | Age: 47
End: 2022-02-17
Attending: PHYSICIAN ASSISTANT
Payer: MEDICAID

## 2022-02-17 ENCOUNTER — OFFICE VISIT (OUTPATIENT)
Dept: UROLOGY | Facility: CLINIC | Age: 47
End: 2022-02-17
Payer: MEDICAID

## 2022-02-17 VITALS — WEIGHT: 209.88 LBS | BODY MASS INDEX: 31.91 KG/M2

## 2022-02-17 DIAGNOSIS — R30.0 DYSURIA: ICD-10-CM

## 2022-02-17 DIAGNOSIS — N13.8 BPH WITH OBSTRUCTION/LOWER URINARY TRACT SYMPTOMS: ICD-10-CM

## 2022-02-17 DIAGNOSIS — N40.1 BPH WITH OBSTRUCTION/LOWER URINARY TRACT SYMPTOMS: ICD-10-CM

## 2022-02-17 DIAGNOSIS — M54.16 LUMBAR RADICULOPATHY: Primary | ICD-10-CM

## 2022-02-17 DIAGNOSIS — M43.17 SPONDYLOLISTHESIS OF LUMBOSACRAL REGION: ICD-10-CM

## 2022-02-17 DIAGNOSIS — N41.0 ACUTE PROSTATITIS: Primary | ICD-10-CM

## 2022-02-17 LAB
BILIRUB SERPL-MCNC: NEGATIVE MG/DL
BLOOD URINE, POC: NEGATIVE
COLOR, POC UA: YELLOW
GLUCOSE UR QL STRIP: NORMAL
KETONES UR QL STRIP: NEGATIVE
LEUKOCYTE ESTERASE URINE, POC: NEGATIVE
NITRITE, POC UA: NEGATIVE
PH, POC UA: 5
PROTEIN, POC: NEGATIVE
SPECIFIC GRAVITY, POC UA: 1020
UROBILINOGEN, POC UA: NORMAL

## 2022-02-17 PROCEDURE — 3008F BODY MASS INDEX DOCD: CPT | Mod: CPTII,,, | Performed by: NURSE PRACTITIONER

## 2022-02-17 PROCEDURE — 99214 PR OFFICE/OUTPT VISIT, EST, LEVL IV, 30-39 MIN: ICD-10-PCS | Mod: S$PBB,,, | Performed by: NURSE PRACTITIONER

## 2022-02-17 PROCEDURE — 81001 URINALYSIS AUTO W/SCOPE: CPT | Mod: PBBFAC | Performed by: NURSE PRACTITIONER

## 2022-02-17 PROCEDURE — 97162 PT EVAL MOD COMPLEX 30 MIN: CPT | Mod: PN

## 2022-02-17 PROCEDURE — 3008F PR BODY MASS INDEX (BMI) DOCUMENTED: ICD-10-PCS | Mod: CPTII,,, | Performed by: NURSE PRACTITIONER

## 2022-02-17 PROCEDURE — 1111F DSCHRG MED/CURRENT MED MERGE: CPT | Mod: CPTII,,, | Performed by: NURSE PRACTITIONER

## 2022-02-17 PROCEDURE — 1160F PR REVIEW ALL MEDS BY PRESCRIBER/CLIN PHARMACIST DOCUMENTED: ICD-10-PCS | Mod: CPTII,,, | Performed by: NURSE PRACTITIONER

## 2022-02-17 PROCEDURE — 1160F RVW MEDS BY RX/DR IN RCRD: CPT | Mod: CPTII,,, | Performed by: NURSE PRACTITIONER

## 2022-02-17 PROCEDURE — 97110 THERAPEUTIC EXERCISES: CPT | Mod: PN

## 2022-02-17 PROCEDURE — 1159F PR MEDICATION LIST DOCUMENTED IN MEDICAL RECORD: ICD-10-PCS | Mod: CPTII,,, | Performed by: NURSE PRACTITIONER

## 2022-02-17 PROCEDURE — 99213 OFFICE O/P EST LOW 20 MIN: CPT | Mod: PBBFAC | Performed by: NURSE PRACTITIONER

## 2022-02-17 PROCEDURE — 3051F PR MOST RECENT HEMOGLOBIN A1C LEVEL 7.0 - < 8.0%: ICD-10-PCS | Mod: CPTII,,, | Performed by: NURSE PRACTITIONER

## 2022-02-17 PROCEDURE — 87086 URINE CULTURE/COLONY COUNT: CPT | Performed by: NURSE PRACTITIONER

## 2022-02-17 PROCEDURE — 99999 PR PBB SHADOW E&M-EST. PATIENT-LVL III: CPT | Mod: PBBFAC,,, | Performed by: NURSE PRACTITIONER

## 2022-02-17 PROCEDURE — 3051F HG A1C>EQUAL 7.0%<8.0%: CPT | Mod: CPTII,,, | Performed by: NURSE PRACTITIONER

## 2022-02-17 PROCEDURE — 1159F MED LIST DOCD IN RCRD: CPT | Mod: CPTII,,, | Performed by: NURSE PRACTITIONER

## 2022-02-17 PROCEDURE — 99214 OFFICE O/P EST MOD 30 MIN: CPT | Mod: S$PBB,,, | Performed by: NURSE PRACTITIONER

## 2022-02-17 PROCEDURE — 97530 THERAPEUTIC ACTIVITIES: CPT | Mod: PN

## 2022-02-17 PROCEDURE — 99999 PR PBB SHADOW E&M-EST. PATIENT-LVL III: ICD-10-PCS | Mod: PBBFAC,,, | Performed by: NURSE PRACTITIONER

## 2022-02-17 PROCEDURE — 1111F PR DISCHARGE MEDS RECONCILED W/ CURRENT OUTPATIENT MED LIST: ICD-10-PCS | Mod: CPTII,,, | Performed by: NURSE PRACTITIONER

## 2022-02-17 NOTE — PLAN OF CARE
[facility]   Physical Therapy Initial Evaluation     Date: 2/17/2022   Name: Emeka Caballero  Clinic Number: [id number]    Therapy Diagnosis:   Encounter Diagnoses   Name Primary?    Spondylolisthesis of lumbosacral region     Lumbar radiculopathy Yes     Physician: Ling Malloy PA-C    Physician Orders: PT Eval and Treat   Medical Diagnosis from Referral: Lumbar Spondylolisthesis  Evaluation Date: 2/17/2022  Authorization Period Expiration: 2/14/2023  Plan of Care Expiration: 4/17/2022  Progress Note Due: 4/17/2022  Visit # / Visits authorized: 1/ 1   FOTO: 1/3    Precautions: Diabetes     Time In: 1:02 PM  Time Out: 1:50 PM  Total Appointment Time (timed & untimed codes): 48 minutes      SUBJECTIVE     Date of onset: 2/08/2022    History of current condition - Emeka reports: progressively worsening history of leg pain and weakness associated w lumbar spondylolisthesis / stenosis w neurogenic claudication requiring lumbar decompression / fusion surgery on 2/8/2022    Falls: none    Imaging, MRI studies: demo evidence of lumbar stenosis associated w spondylolithesis.    Prior Therapy: None noted.  Social History:  lives with their family  Occupation: Self-employed as   Prior Level of Function: Independent community ambulator w/o AD.  Current Level of Function: Limited community ambulator w FWW.    Pain:  Current 3/10, worst 5/10, best 3/10   Location: bilateral back  bilateral leg(s)  Description: Aching  Aggravating Factors: Standing  Easing Factors: rest    Patients goals: Pt desires to return to baseline activity / work as a .     Medical History:   Past Medical History:   Diagnosis Date    Diabetes mellitus, type 2 2/11/2022       Surgical History:   Emeka Caballero  has a past surgical history that includes Epidural steroid injection (N/A, 10/21/2020) and Lumbar fusion (N/A, 2/8/2022).    Medications:   Emeka has a current medication  list which includes the following prescription(s): acetaminophen, blood sugar diagnostic, blood-glucose meter, gemfibrozil, lancets, levofloxacin, [START ON 2/18/2022] levofloxacin, levofloxacin, metformin, methocarbamol, oxycodone, tamsulosin, tamsulosin, triamcinolone acetonide 0.1%, and UNABLE TO FIND.    Allergies:   Review of patient's allergies indicates:  No Known Allergies       OBJECTIVE     Posture: Pt stands in TLSO  With slightly forward flexed posture.  Deferred Active Lumbar motion due to post-op pre-cautions.  Hip A/PROM: B WNL.    Sensation : IT  To LT BLEs  LE Strength: Grossly 4+/5 to 5/5.  TrA; fair.  Gait: Pt ambulating w FWW w TLSO in place w slight forward flexed trunk and evidence of proximal LE instability. Pt ambulating up to 200 ft prior to needing to sit.                 TREATMENT     Total Treatment time (time-based codes) separate from Evaluation: 25 minutes      Emeka received the treatments listed below:      therapeutic exercises to develop strength and core stabilization for 10 minutes including:  Sit to stand chair rises w BUE  Support 3 x 5.  Hooklying Tra  Activation w 5 sec hold 2 x 10.      therapeutic activities to improve functional performance for 15  minutes, including:  Instruction in log roll technique for bed mobility and functional sit to stand transfers from lower surfaces w BUE  Support and TrA  Activation w hip hinge.        PATIENT EDUCATION AND HOME EXERCISES     Education provided:   - Pt provided education on proper body mechanics w transitional movements and post-op lifting and truck movement pre-cautions.    Written Home Exercises Provided: Patient instructed to cont prior HEP. Exercises were reviewed and Emeka was able to demonstrate them prior to the end of the session.  Emeka demonstrated fair  understanding of the education provided. See EMR under Patient Instructions for exercises provided during therapy sessions.    ASSESSMENT     Emeka is  a 46 y.o. male referred to outpatient Physical Therapy with a medical diagnosis of lumbar spondylolisthesis s/p lumbar decompression/fusion. Patient presents with lumbar pain trunk tightness, core and proximal LE  Weakness which contributes to post-op decline in overall functional transfers and ambulation.    Patient prognosis is Good.   Patientt will benefit from skilled outpatient Physical Therapy to address the deficits stated above and in the chart below, provide patient /family education, and to maximize patientt's level of independence.     Plan of care discussed with patient: Yes  Patient's spiritual, cultural and educational needs considered and patient is agreeable to the plan of care and goals as stated below:     Anticipated Barriers for therapy: language    Medical Necessity is demonstrated by the following  History  Co-morbidities and personal factors that may impact the plan of care Co-morbidities:   level of undertstanding of current condition    Personal Factors:   no deficits     moderate   Examination  Body Structures and Functions, activity limitations and participation restrictions that may impact the plan of care Body Regions:   back    Body Systems:    ROM  strength  gait  transfers    Participation Restrictions:   5# lift restrictions, wear TLSO until cleared by suregeon    Activity limitations:   Learning and applying knowledge  no deficits    General Tasks and Commands  undertaking multiple tasks    Communication  communicating with/receiving spoken language    Mobility  no deficits    Self care  no deficits    Domestic Life  doing house work (cleaning house, washing dishes, laundry)    Interactions/Relationships  no deficits    Life Areas  no deficits    Community and Social Life  no deficits         moderate   Clinical Presentation evolving clinical presentation with changing clinical characteristics moderate   Decision Making/ Complexity Score: moderate     Goals:  Short Term Goals: 4  weeks   1)  Pt will be independent with post-op restriction compliance.  2) Pt will tolerate progression of core stabilization and LE  Strengthening therex without LBP nor radicular symptom exacerbation.    Long Term Goals: 8 weeks   1)  Pt will demo normalized gait pattern on all surfaces without an AD.  2) Pt will be able to perform all functional mobility tasks and work duties with lift restrictions as merited with back / LE pain < 3/10.  3) Pt will be independent w HEP  and  awareness to promote self management.  PLAN   Plan of care Certification: 2/17/2022 to 4/17/2022.    Outpatient Physical Therapy 2 times weekly for 8 weeks to include the following interventions: Gait Training, Manual Therapy, Moist Heat/ Ice, Neuromuscular Re-ed, Patient Education, Therapeutic Activities and Therapeutic Exercise.     Harrison Mojica, PT      I CERTIFY THE NEED FOR THESE SERVICES FURNISHED UNDER THIS PLAN OF TREATMENT AND WHILE UNDER MY CARE   Physician's comments:     Physician's Signature: ___________________________________________________

## 2022-02-17 NOTE — PROGRESS NOTES
Subjective:       Patient ID: Emeka Caballero is a 46 y.o. male who is an established patient of Dr Rosas though new to me was last seen in this office 2/17/20    Chief Complaint:   Chief Complaint   Patient presents with    Prostatitis    Testicle Pain     Patient previously seen by Dr Rosas in February 2020 for evaluation of varicocele/ED/LUTS. He was started on Flomax at that time. He has been lost to urology follow up until now.    He is s/p lumbar fusion on 2/8/22 at [facility]. In review of records, patient had alegria catheter placed during his surgery. Alegria was removed on POD #1 and patient noted to be voiding without difficulty. He later became febrile during his admission. CT scan showed heterogeneous appearance of prostate, ?prostatitis.  No urine culture obtained. Blood cx--negative. Recommended patient follow up with urology as outpatient    He reports dysuria, urinary frequency, pelvic pressure since alegria removal. He was discharged home with 2 week course of Levaquin which he is currently taking. He has noted improvement in his urinary symptoms with antibiotics. There have been no further occurrences of fever. Denies gross hematuria.  He remains on Flomax      ACTIVE MEDICAL ISSUES:  Patient Active Problem List   Diagnosis    Viral syndrome    Lumbar radiculopathy    Spinal stenosis of lumbar region    Lumbar spondylosis    DDD (degenerative disc disease), lumbar    Spondylolisthesis of lumbosacral region    Fever    Constipation    BPH (benign prostatic hyperplasia)    HLD (hyperlipidemia)    Diabetes mellitus, type 2    Prostatitis       ALLERGIES AND MEDICATIONS: updated and reviewed.  Review of patient's allergies indicates:  No Known Allergies  Current Outpatient Medications   Medication Sig    acetaminophen (TYLENOL) 325 MG tablet Take 2 tablets (650 mg total) by mouth every 6 (six) hours as needed for Pain.    blood sugar diagnostic Strp To check BG 3 times daily,  to use with insurance preferred meter    blood-glucose meter kit To check BG 3 times daily, to use with insurance preferred meter    gemfibroziL (LOPID) 600 MG tablet Take 1 tablet (600 mg total) by mouth 2 (two) times daily before meals.    lancets Misc To check BG 3 times daily, to use with insurance preferred meter    levoFLOXacin (LEVAQUIN) 500 MG tablet Take 1 tablet (500 mg total) by mouth once daily. for 13 days    [START ON 2/18/2022] levoFLOXacin (LEVAQUIN) 500 MG tablet Take 1 tablet (500 mg total) by mouth once daily. Start on 2/18/2022 for 10 days    levoFLOXacin (LEVAQUIN) 750 MG tablet Take 1 tablet (750 mg total) by mouth once daily.    metFORMIN (GLUCOPHAGE-XR) 500 MG ER 24hr tablet Take 2 tablets (1,000 mg total) by mouth 2 (two) times daily with meals.    methocarbamoL (ROBAXIN) 750 MG Tab Take 1 tablet (750 mg total) by mouth 4 (four) times daily. for 14 days    oxyCODONE (ROXICODONE) 10 mg Tab immediate release tablet Take 1 tablet (10 mg total) by mouth every 4 (four) hours as needed (pain 6-7/10).    tamsulosin (FLOMAX) 0.4 mg Cap Take 1 capsule by mouth once daily    tamsulosin (FLOMAX) 0.4 mg Cap Take 1 capsule (0.4 mg total) by mouth once daily.    UNABLE TO FIND Bleaching cream    triamcinolone acetonide 0.1% (KENALOG) 0.1 % cream Apply topically 2 (two) times daily. Apply to rash. DO NOT USE ON FACE.     No current facility-administered medications for this visit.       Review of Systems   Constitutional: Negative for activity change, chills, fatigue, fever and unexpected weight change.   Eyes: Negative for discharge, redness and visual disturbance.   Respiratory: Negative for cough, shortness of breath and wheezing.    Cardiovascular: Negative for chest pain and leg swelling.   Gastrointestinal: Negative for abdominal distention, abdominal pain, constipation, diarrhea, nausea and vomiting.   Genitourinary: Positive for dysuria and frequency. Negative for decreased urine  volume, difficulty urinating, flank pain, hematuria, penile discharge, penile pain, penile swelling, scrotal swelling, testicular pain and urgency.   Musculoskeletal: Negative for arthralgias, joint swelling and myalgias.   Skin: Negative for color change and rash.   Neurological: Negative for dizziness and light-headedness.   Psychiatric/Behavioral: Negative for behavioral problems and confusion. The patient is not nervous/anxious.        Objective:      Vitals:    02/17/22 1436   Weight: 95.2 kg (209 lb 14.1 oz)     Physical Exam  Constitutional:       Appearance: He is well-developed.   HENT:      Head: Normocephalic and atraumatic.      Nose: Nose normal.   Eyes:      General:         Right eye: No discharge.         Left eye: No discharge.      Conjunctiva/sclera: Conjunctivae normal.   Neck:      Thyroid: No thyromegaly.      Trachea: No tracheal deviation.   Cardiovascular:      Rate and Rhythm: Normal rate and regular rhythm.   Pulmonary:      Effort: Pulmonary effort is normal. No respiratory distress.      Breath sounds: No wheezing.   Abdominal:      General: There is no distension.      Palpations: Abdomen is soft. There is no hepatosplenomegaly.      Tenderness: There is no abdominal tenderness. There is no CVA tenderness.      Hernia: No hernia is present.   Genitourinary:     Comments: Patient declined exam  Musculoskeletal:         General: No edema.      Cervical back: Normal range of motion and neck supple.      Comments: Back brace noted   Skin:     General: Skin is warm and dry.      Findings: No erythema or rash.   Neurological:      Mental Status: He is alert and oriented to person, place, and time.   Psychiatric:         Mood and Affect: Mood and affect normal.         Behavior: Behavior normal.         Judgment: Judgment normal.         Urine dipstick shows negative for all components.  Micro exam: negative for WBC's or RBC's.    Assessment:       1. Acute prostatitis    2. Dysuria    3. BPH  with obstruction/lower urinary tract symptoms          Plan:       1. Acute prostatitis  -CT scan reviewed with Dr Alison Lorenzana to continue Levaquin  -Discussed RTC/ER precautions with patient  - POCT urinalysis, dipstick or tablet reag    2. Dysuria  -Improving with antibiotics  - Urine culture    3. BPH with obstruction/lower urinary tract symptoms  -Continue Flomax            Follow up in about 2 weeks (around 3/3/2022) for Follow up.

## 2022-02-17 NOTE — PROGRESS NOTES
Health Maintenance Due   Topic Date Due    Colorectal Cancer Screening  Never done    Diabetes Urine Screening  Never done    Pneumococcal Vaccines (Age 0-64) (1 of 2 - PPSV23) Never done    Eye Exam  Never done    TETANUS VACCINE  Never done    Low Dose Statin  Never done     Updates were requested from care everywhere.  Chart was reviewed for overdue Proactive [facility] Encounters ([facility]) topics (CRS, Breast Cancer Screening, Eye exam)  Health Maintenance has been updated.  LINKS immunization registry triggered.  Immunizations were reconciled.

## 2022-02-18 NOTE — PHYSICIAN QUERY
PT Name: Emeka Caballero  MR #: [id number]    DOCUMENTATION CLARIFICATION      CDS/: EUSEBIA Rodrigues, RN               Contact information:[email or web address]    This form is a permanent document in the medical record.      Query Date: February 18, 2022    By submitting this query, we are merely seeking further clarification of documentation. Please utilize your independent clinical judgment when addressing the question(s) below.    The Medical Record contains the following:   Indicators  Supporting Clinical Findings Location in Medical Record    Anemia documented      x H&H   15.1 & 46.9 on 02//08/2022 @0650    11.7 & 36.0 on 02/08/2022 @1257    11.1 & 33.9 on 02/09/2022    10.4 & 31.4 on 02/10/2022     9.5 & 29.4 on 02/11/2022     9.4 & 29.3 on 02/13/2022   Lab Results 02/08, 02/09, 02/10, 02/11 & 02/13/2022    x BP                    HR BP: (105-141)/(60-74)            Pulse:  []    BP: (123-159)/(67-90)            Pulse:  []   BP: (119-126)/(64-70)            Pulse:  [109-118]   BP: (113-132)/(58-80)            Pulse:  [107-119]  BP: (112-130)/(58-77)            Pulse:  [] Neurosurgery Progress Notes 02/09, 02/10, 02/11,   02/12 & 02/13/2022    GI bleeding documented      Acute bleeding (Non GI site)      Transfusion(s)      x Acute/Chronic illness  Spondylolisthesis of lumbosacral region, Constipation, Fever, PMHx BPH, T2DM, HLD, and chronic back pain    Hospital Medicine Consults Note 02/11/2022    x Treatments - Drains: Continue HV x 2 to full suction. Empty and record drain output every 6 hours or when full.    Neurosurgery Progress Notes 02/09/2022    x Other  02/08/22: FUSION, SPINE, LUMBAR (N/A Spine Lumbar)     EBL: 500 mL Anesthesia Information 02/08/2022     Provider, please specify diagnosis or diagnoses associated with above clinical findings.   [  x ] Acute blood loss anemia expected post-operatively    [   ] Anemia, unspecified    [   ] Other Hematological Diagnosis  (please specify): _________________   [   ] Clinically Undetermined              Please document in your progress notes daily for the duration of treatment, until resolved, and include in your discharge summary.    Form No. [id number]

## 2022-02-19 LAB — BACTERIA UR CULT: NO GROWTH

## 2022-02-20 NOTE — OP NOTE
DATE OF SURGERY: 2/8/22     PREOPERATIVE DIAGNOSIS:  1. High grade isthmic spondylolisthesis, L5-S1, with severe neuroforaminal stenosis and lumbar instability  2. Right L4-5 disc herniation with severe lateral recess stenosis     POSTOPERATIVE DIAGNOSIS:  Same.     PROCEDURE PERFORMED:  1. Posterior spinal fusion, L4 to pelvis   2. Posterior segmental spinal fixation, L4 to S1 (DePuy Synthes)  3. Pelvic fixation with S2AI screws (Depuy Synthes)  4. Transforaminal lumbar interbody fusion, L4-5 and L5-S1  5.  Application of titanium interbody spacer, L4-5 and L5-S1 (DePuy Synthes)  6.  Use of intraoperative fluoroscopy  7.  Use of intraoperative neuro-monitoring with triggered EMG  8. Use of intraoperative CT neuronavigation  9. Infuse and local bone grafting     SURGEON: Alphonse Reed M.D.     CO-SURGEON: Tello Cunningham M.D. -- Dr. Cunningham assisted with the placement of all spinal instrumentation because a qualified resident was not available for this portion of the procedure.     ANESTHESIA: GETA     ESTIMATED BLOOD LOSS: 300mL     COMPLICATIONS: None     DRAINS: One superficial, one deep     SPECIMENS SENT: None     FINDINGS: None     INDICATIONS:     46m with debilitating axial low back pain and radicular leg pain that failed conservative measures. Imaging revealed a high grade isthmic spondylolisthesis at L5-S1. I recommended the above procedure. R/B/A/I/M were reviewed in detail and he wished to proceed.  .  PROCEDURE:     The patient was brought into the operating room where he was intubated and placed under general anesthesia without difficulty.  All lines were placed. He was repositioned prone onto the operating room table with appropriate padding all pressure points. The region of interest was localized with AP and lateral x-ray.  The skin was marked and the area was prepped and draped in the usual sterile fashion.  A timeout was performed prior to procedure.  20 mL's of lidocaine with epinephrine were  injected in the skin.     A midline linear incision was made with a 10 blade from approximately L4 to S2.  Supra and subfascial midline dissection was carried out with Bovie electrocautery.  Subperiosteal dissection was carried out with Bovie electrocautery and Goodman elevators to expose the posterior elements from L4 to S2.  A pedicle finder was placed into the presumed S1 pedicle and confirmed on lateral x-ray. Medial facetectomies were performed bilaterally at L4-5 through L5-S1 with the osteotome.      The CT neuro navigation array was docked onto the right PSIS throught a separate stab incision and a CT spin was acquired.  The intraoperative CT confirmed levels.  Using neuro-navigation pedicle screws were placed bilaterally from L4 through S1. All lumbar screws were stimulated with the neuromonitoring probe and found to stimulate above 20milliamperes. Bilateral pelvic fixation was achieved with S2AI screws, using navigation.Xrays showed good hardware position.    Bilateral L5 screws were augmented with cement under flouroscopy. No significant cement extravasation was seen.     Attention was turned to performance of a transforaminal lumbar interbody fusion at L5-S1 from a right-sided approach.  An L5 Kate laminectomy was peroformed for severe stenosis. A nerve root retractor was used to retract the thecal sac medially and expose the L5-S1 disc space.  Epidural veins were cauterized and divided.  An annulotomy was performed with a 15 blade.  A pituitary rongeur was used to remove disc material.  The endplates were prepared with disc santos, rasps, and curettes. Local bone was packed into the L5-S1 disc space for anterior arthrodesis and a  titanium cage was countersunk into the disc space.     An L4-5 TLIF was performed in identical fashion, again from the right side. During the disc prep we removed the herniated disc fragment on the right side. A titanium cage was used at this level and local bone was used for  L4-5 arthrodesis. Xrays showed good cage position.    Bilateral titanium rods were sized, contoured and reduced into the tulip heads.  Set screws were tightened. . Final xrays showed excellent reduction of the deformity and excellent position of all hardware.  The wound was copiously irrigated with sterile normal saline and a dilute Betadyne solution. Exposed bony surfaces from L4 to the sacrum were decorticated bilaterally with a high-speed drill.  Infuse and local bone were placed posteriorly for arthrodesis from L4 to the sacrum. Two deep Hemovacs were placed.  A watertight fascial closure was achieved with interrupted 0 Vicryl sutures and a running Stratafix suture.  The soft tissues were closed in layers. Staples were placed at the skin and a Prevena dressing was applied.     The patient appeared to tolerate the procedure well from a hemodynamic and neuromonitoring standpoint. Dr. Cunningham and I were present for all critical portions of the case, and at the end of the case all counts were correct. The patient was repositioned supine onto the hospital bed where he was extubated and allowed to emerge from anesthesia. He was sent to the PACU in stable condition for recovery.    JUSTIFICATION OF CO-SURGEON: This was a complex spinal deformity operation. Two attending surgeons were indicated to reduce operative times, blood loss, and improve patient outcomes.

## 2022-02-22 ENCOUNTER — CLINICAL SUPPORT (OUTPATIENT)
Dept: REHABILITATION | Facility: HOSPITAL | Age: 47
End: 2022-02-22
Attending: PHYSICIAN ASSISTANT
Payer: MEDICAID

## 2022-02-22 DIAGNOSIS — M54.16 LUMBAR RADICULOPATHY: Primary | ICD-10-CM

## 2022-02-22 DIAGNOSIS — M48.062 SPINAL STENOSIS OF LUMBAR REGION WITH NEUROGENIC CLAUDICATION: ICD-10-CM

## 2022-02-22 DIAGNOSIS — M43.17 SPONDYLOLISTHESIS OF LUMBOSACRAL REGION: ICD-10-CM

## 2022-02-22 DIAGNOSIS — M47.816 LUMBAR SPONDYLOSIS: ICD-10-CM

## 2022-02-22 DIAGNOSIS — E11.9 TYPE 2 DIABETES MELLITUS WITHOUT COMPLICATION, WITHOUT LONG-TERM CURRENT USE OF INSULIN: ICD-10-CM

## 2022-02-22 PROCEDURE — 97110 THERAPEUTIC EXERCISES: CPT | Mod: PN

## 2022-02-22 PROCEDURE — 97530 THERAPEUTIC ACTIVITIES: CPT | Mod: PN

## 2022-02-22 NOTE — PROGRESS NOTES
[facility]   Physical Therapy Treatment Note     Name: Emeka [facility] Number: [id number]    Therapy Diagnosis:   Encounter Diagnoses   Name Primary?    Lumbar radiculopathy Yes    Spinal stenosis of lumbar region with neurogenic claudication     Lumbar spondylosis     Spondylolisthesis of lumbosacral region     Type 2 diabetes mellitus without complication, without long-term current use of insulin      Physician: Ling Malloy PA-C    Visit Date: 2/22/2022       Physician Orders: PT Eval and Treat   Medical Diagnosis from Referral: Lumbar Spondylolisthesis  Evaluation Date: 2/17/2022  Authorization Period Expiration: 2/14/2023  Plan of Care Expiration: 4/17/2022  Progress Note Due: 4/17/2022  Visit # / Visits authorized: 1/ 1   FOTO: 1/3     Precautions: Diabetes     PTA Visit #: 1/5     Time In: 10:00A  Time Out: 10:44A  Total Billable Time: 44 minutes    SUBJECTIVE     Pt reports: I am walking better and getting in and out of bed beter now..  He was compliant with home exercise program.  Response to previous treatment: Good  Functional change: improved functional mobility    Pain: 3/10  Location: bilateral back      OBJECTIVE     Objective Measures updated at progress report unless specified.     Treatment     Emeka received the treatments listed below:      therapeutic exercises to develop strength, ROM and core stabilization for 30 minutes including:  Nu-step @ L2 x 6 min.  Glut sets w TrA Activation 2 x 10.  KTC w towel 15 sec x 4 / side  Hooklying pelvic waggle x 10 / side.  Sit to stand w BUE support 2 x 5.    therapeutic activities to improve functional performance for 14  minutes, including:  Body mechanics w log roll for bed mobility and hip hinge w UE  Support for sit to stand from chair.        Patient Education and Home Exercises     Home Exercises Provided and Patient Education Provided     Education provided:   - Body mechanics with transitional  movements.    Written Home Exercises Provided: Patient instructed to cont prior HEP. Exercises were reviewed and Emeka was able to demonstrate them prior to the end of the session.  Emeka demonstrated fair  understanding of the education provided. See EMR under Patient Instructions for exercises provided during therapy sessions    ASSESSMENT     Pt demos improving functional mobility w transitional movements and ambulation but needs  reminders. Reasonable tolerance of HEP progression to date.    Emeka Is progressing well towards his goals.   Pt prognosis is Good.     Pt will continue to benefit from skilled outpatient physical therapy to address the deficits listed in the problem list box on initial evaluation, provide pt/family education and to maximize pt's level of independence in the home and community environment.     Pt's spiritual, cultural and educational needs considered and pt agreeable to plan of care and goals.     Anticipated barriers to physical therapy: none    Goals: Goals:  Short Term Goals: 4 weeks   1)  Pt will be independent with post-op restriction compliance.  2) Pt will tolerate progression of core stabilization and LE  Strengthening therex without LBP nor radicular symptom exacerbation.     Long Term Goals: 8 weeks   1)  Pt will demo normalized gait pattern on all surfaces without an AD.  2) Pt will be able to perform all functional mobility tasks and work duties with lift restrictions as merited with back / LE pain < 3/10.  3) Pt will be independent w HEP  and  awareness to promote self management.    PLAN     Con't w HEP progression and functional mobility training per POC.    Harrison Mojica, PT

## 2022-02-23 ENCOUNTER — CLINICAL SUPPORT (OUTPATIENT)
Dept: NEUROSURGERY | Facility: CLINIC | Age: 47
End: 2022-02-23
Payer: MEDICAID

## 2022-02-25 ENCOUNTER — CLINICAL SUPPORT (OUTPATIENT)
Dept: REHABILITATION | Facility: HOSPITAL | Age: 47
End: 2022-02-25
Attending: PHYSICIAN ASSISTANT
Payer: MEDICAID

## 2022-02-25 ENCOUNTER — PATIENT MESSAGE (OUTPATIENT)
Dept: NEUROSURGERY | Facility: CLINIC | Age: 47
End: 2022-02-25
Payer: MEDICAID

## 2022-02-25 DIAGNOSIS — M43.17 SPONDYLOLISTHESIS OF LUMBOSACRAL REGION: ICD-10-CM

## 2022-02-25 DIAGNOSIS — Z98.1 S/P LUMBAR FUSION: Primary | ICD-10-CM

## 2022-02-25 DIAGNOSIS — M47.816 LUMBAR SPONDYLOSIS: ICD-10-CM

## 2022-02-25 DIAGNOSIS — M48.062 SPINAL STENOSIS OF LUMBAR REGION WITH NEUROGENIC CLAUDICATION: ICD-10-CM

## 2022-02-25 DIAGNOSIS — E11.9 TYPE 2 DIABETES MELLITUS WITHOUT COMPLICATION, WITHOUT LONG-TERM CURRENT USE OF INSULIN: ICD-10-CM

## 2022-02-25 DIAGNOSIS — M54.16 LUMBAR RADICULOPATHY: Primary | ICD-10-CM

## 2022-02-25 PROCEDURE — 97110 THERAPEUTIC EXERCISES: CPT | Mod: PN

## 2022-02-25 PROCEDURE — 97116 GAIT TRAINING THERAPY: CPT | Mod: PN

## 2022-02-25 RX ORDER — METHOCARBAMOL 750 MG/1
750 TABLET, FILM COATED ORAL 4 TIMES DAILY
Qty: 56 TABLET | Refills: 0 | OUTPATIENT
Start: 2022-02-25 | End: 2022-03-10

## 2022-02-25 RX ORDER — OXYCODONE HYDROCHLORIDE 10 MG/1
10 TABLET ORAL EVERY 6 HOURS PRN
Qty: 56 TABLET | Refills: 0 | Status: SHIPPED | OUTPATIENT
Start: 2022-02-25 | End: 2022-03-11

## 2022-02-25 NOTE — PROGRESS NOTES
[facility]   Physical Therapy Treatment Note     [facility] [id number]    Therapy Diagnosis:   Encounter Diagnoses   Name Primary?    Lumbar radiculopathy Yes    Spinal stenosis of lumbar region with neurogenic claudication     Lumbar spondylosis     Spondylolisthesis of lumbosacral region     Type 2 diabetes mellitus without complication, without long-term current use of insulin      Physician: Ling Malloy PA-C    Visit Date: 2/25/2022       Physician Orders: PT Eval and Treat   Medical Diagnosis from Referral: Lumbar Spondylolisthesis  Evaluation Date: 2/17/2022  Authorization Period Expiration: 2/14/2023  Plan of Care Expiration: 4/17/2022  Progress Note Due: 4/17/2022  Visit # / Visits authorized: 3/ 1   FOTO: 3/3     Precautions: Diabetes     PTA Visit #: 1/5     Time In: 9:00A  Time Out: 9:42A  Total Billable Time: 42 minutes    SUBJECTIVE     Pt reports: I am walking better and getting in and out of bed beter now..  He was compliant with home exercise program.  Response to previous treatment: Good  Functional change: improved functional mobility    Pain: 3/10  Location: bilateral back      OBJECTIVE     Objective Measures updated at progress report unless specified.     Treatment     Emeka received the treatments listed below:      therapeutic exercises to develop strength, ROM and core stabilization for 30 minutes including:  Nu-step @ L2 x 6 min.  +heel Bridges w RTB resistance 2 x 10.  + SL Clam Shells w RTB x10 / side  KTC w towel 15 sec x 4 / side  Hooklying pelvic waggle x 10 / side.  Sit to stand w BUE support 2 x 10  + Standing 3 Way Hip w UE  Support 2 x 5/side..    Gait training to improve functional performance for 12  minutes, including:  Progression to ambulation w/o AD on level surfaces w emphasis on upright posture w TrA  Activation on level surfaces.        Patient Education and Home Exercises     Home Exercises Provided  and Patient Education Provided     Education provided:   - Weaning FWW w indoor ambulation on level surfaces.    Written Home Exercises Provided: Patient instructed to cont prior HEP. Exercises were reviewed and Emeka was able to demonstrate them prior to the end of the session.  Emeka demonstrated good understanding of the education provided. See EMR under Patient Instructions for exercises provided during therapy sessions    ASSESSMENT     Pt demos improving functional mobility w transitional movements and is transitioning off FWW w indoor ambulation. However, needs to use FWW for all unlevel surface ambulation to date. Reasonable tolerance of HEP progression to date.    Emeka Is progressing well towards his goals.   Pt prognosis is Good.     Pt will continue to benefit from skilled outpatient physical therapy to address the deficits listed in the problem list box on initial evaluation, provide pt/family education and to maximize pt's level of independence in the home and community environment.     Pt's spiritual, cultural and educational needs considered and pt agreeable to plan of care and goals.     Anticipated barriers to physical therapy: none    Goals: Goals:  Short Term Goals: 4 weeks   1)  Pt will be independent with post-op restriction compliance.  2) Pt will tolerate progression of core stabilization and LE  Strengthening therex without LBP nor radicular symptom exacerbation.     Long Term Goals: 8 weeks   1)  Pt will demo normalized gait pattern on all surfaces without an AD.  2) Pt will be able to perform all functional mobility tasks and work duties with lift restrictions as merited with back / LE pain < 3/10.  3) Pt will be independent w HEP  and  awareness to promote self management.    PLAN     Con't w HEP progression and functional mobility training per POC.    Harrison Mojica, PT

## 2022-02-27 NOTE — OP NOTE
DATE OF SURGERY: 2/8/22     SURGEON: Tello Cunningham M.D.     CO-SURGEON: Alphonse Reed M.D., Neurosurgery     PREOPERATIVE DIAGNOSIS:  1. High grade isthmic spondylolisthesis, L5-S1, with severe neuroforaminal stenosis and lumbar instability  2. Right L4-5 disc herniation with severe lateral recess stenosis     POSTOPERATIVE DIAGNOSIS:  1. High grade isthmic spondylolisthesis, L5-S1, with severe neuroforaminal stenosis and lumbar instability  2. Right L4-5 disc herniation with severe lateral recess stenosis       PROCEDURE PERFORMED:  1. Posterior spinal fusion, L4 to pelvis   2. Posterior segmental spinal fixation, L4 to S1 (DePuy Synthes)  3. Pelvic fixation with S2AI screws (Depuy Synthes)  4. Transforaminal lumbar interbody fusion, L4-5 and L5-S1  5.  Application of titanium interbody spacer, L4-5 and L5-S1 (DePuy Synthes)  6. Use of intraoperative CT neuronavigation  7. Infuse and local bone grafting.     ANESTHESIA: GETA     ESTIMATED BLOOD LOSS: 300mL     COMPLICATIONS: None     DRAINS: One superficial, one deep     SPECIMENS SENT: None     FINDINGS: None     INDICATIONS:     Please see Dr. Reed' note for a description of the indications for this procedure.    PROCEDURE:     The patient was brought into the operating room where he was intubated and placed under general anesthesia without difficulty.  All lines were placed. He was repositioned prone onto the operating room table with appropriate padding all pressure points. The region of interest was localized with AP and lateral x-ray.  The skin was marked and the area was prepped and draped in the usual sterile fashion.  A timeout was performed prior to procedure.  20 mL's of lidocaine with epinephrine were injected in the skin.     We made a midline linear incision with a 10 blade from approximately L4 to S2.  Supra and subfascial midline dissection was carried out with Bovie electrocautery.  Subperiosteal dissection was carried out with Bovie  electrocautery and Goodman elevators to expose the posterior elements from L4 to S2.  A pedicle finder was placed into the presumed S1 pedicle and confirmed on lateral x-ray. Medial facetectomies were performed bilaterally at L4-5 through L5-S1 with the osteotome.      The CT neuro navigation array was docked onto the right PSIS throught a separate stab incision and a CT spin was acquired.  The intraoperative CT confirmed levels.  Using neuro-navigation pedicle screws were placed bilaterally from L4 through S1. All lumbar screws were stimulated with the neuromonitoring probe and found to stimulate above 20milliamperes. Bilateral pelvic fixation was achieved with S2AI screws, using navigation.Xrays showed good hardware position.    Bilateral L5 screws were augmented with cement under flouroscopy. No significant cement extravasation was seen.     Attention was turned to performance of a transforaminal lumbar interbody fusion at L5-S1 from a right-sided approach.  An L5 Kate laminectomy was peroformed for severe stenosis. A nerve root retractor was used to retract the thecal sac medially and expose the L5-S1 disc space.  Epidural veins were cauterized and divided.  An annulotomy was performed with a 15 blade.  A pituitary rongeur was used to remove disc material.  The endplates were prepared with disc santos, rasps, and curettes. Local bone was packed into the L5-S1 disc space for anterior arthrodesis and a  titanium cage was countersunk into the disc space.     An L4-5 TLIF was performed in identical fashion, again from the right side. During the disc prep we removed the herniated disc fragment on the right side. A titanium cage was used at this level and local bone was used for L4-5 arthrodesis. Xrays showed good cage position.    Bilateral titanium rods were sized, contoured and reduced into the tulip heads.  Set screws were tightened. . Final xrays showed excellent reduction of the deformity and excellent position of  all hardware.  The wound was copiously irrigated with sterile normal saline and a dilute Betadyne solution. Exposed bony surfaces from L4 to the sacrum were decorticated bilaterally with a high-speed drill.  Infuse and local bone were placed posteriorly for arthrodesis from L4 to the sacrum. Two deep Hemovacs were placed, and the wound was closed by the neurosurgery service.    JUSTIFICATION OF CO-SURGEON: This was a complex spinal deformity operation. Two attending surgeons were indicated to reduce operative times, blood loss, and improve patient outcomes.

## 2022-03-02 ENCOUNTER — CLINICAL SUPPORT (OUTPATIENT)
Dept: REHABILITATION | Facility: HOSPITAL | Age: 47
End: 2022-03-02
Attending: PHYSICIAN ASSISTANT
Payer: MEDICAID

## 2022-03-02 DIAGNOSIS — M43.17 SPONDYLOLISTHESIS OF LUMBOSACRAL REGION: ICD-10-CM

## 2022-03-02 DIAGNOSIS — E11.9 TYPE 2 DIABETES MELLITUS WITHOUT COMPLICATION, WITHOUT LONG-TERM CURRENT USE OF INSULIN: ICD-10-CM

## 2022-03-02 DIAGNOSIS — M47.816 LUMBAR SPONDYLOSIS: ICD-10-CM

## 2022-03-02 DIAGNOSIS — M54.16 LUMBAR RADICULOPATHY: Primary | ICD-10-CM

## 2022-03-02 DIAGNOSIS — M48.062 SPINAL STENOSIS OF LUMBAR REGION WITH NEUROGENIC CLAUDICATION: ICD-10-CM

## 2022-03-02 PROCEDURE — 97140 MANUAL THERAPY 1/> REGIONS: CPT | Mod: PN

## 2022-03-02 PROCEDURE — 97110 THERAPEUTIC EXERCISES: CPT | Mod: PN

## 2022-03-02 NOTE — PROGRESS NOTES
[facility]   Physical Therapy Treatment Note     [id number]    Therapy Diagnosis:   Encounter Diagnoses   Name Primary?    Lumbar radiculopathy Yes    Spinal stenosis of lumbar region with neurogenic claudication     Lumbar spondylosis     Spondylolisthesis of lumbosacral region     Type 2 diabetes mellitus without complication, without long-term current use of insulin      Physician: Ling Malloy PA-C    Visit Date: 3/2/2022       Physician Orders: PT Eval and Treat   Medical Diagnosis from Referral: Lumbar Spondylolisthesis  Evaluation Date: 2/17/2022  Authorization Period Expiration: 2/14/2023  Plan of Care Expiration: 4/17/2022  Progress Note Due: 4/17/2022  Visit # / Visits authorized: 4/ 1   FOTO: 3/3     Precautions: Diabetes     PTA Visit #: 0/5     Time In: 12:15 P  Time Out: 12:58 P  Total Billable Time: 43 minutes    SUBJECTIVE     Pt reports: I am having more LBP over past 2 days.  He was compliant with home exercise program.  Response to previous treatment: Good  Functional change: Mobility limited by LBP.    Pain: 6/10  Location: bilateral back      OBJECTIVE     Objective Measures updated at progress report unless specified.     Treatment     Emeka received the treatments listed below:      therapeutic exercises to develop strength, ROM and core stabilization for 33 minutes including:  Nu-step @ L2 x 7 min.  KTC w towel 15 sec x 4 / side  Hooklying pelvic waggle x 10 / side.  + Hooklying Glut sets w TrA x 10  + R SL manual assisted SB  Stretch w 10 sec hold x 5    Manual therapy x 15 min for STM of R para-lumbars w scar massage.         Patient Education and Home Exercises     Home Exercises Provided and Patient Education Provided     Education provided:   - Instructed on  awareness w transitional movements w emphasis on TrA activation and BUE  Support.  Written Home Exercises Provided: Patient instructed  to cont prior HEP with modifications as noted. Exercises were reviewed and Emeka was able to demonstrate them prior to the end of the session.  Emeka demonstrated good understanding of the education provided. See EMR under Patient Instructions for exercises provided during therapy sessions    ASSESSMENT     Pt demos increased LBP  Associated w R para-lumbar muscle guarding. Demo favorable response to manual therapy and gentle stretching. However, limited tolerance of core stab therex at this time. Advise to perform icing og low back to decrease muscle guarding. Will re-assess next visit.    Emeka Is progressing well towards his goals.   Pt prognosis is Good.     Pt will continue to benefit from skilled outpatient physical therapy to address the deficits listed in the problem list box on initial evaluation, provide pt/family education and to maximize pt's level of independence in the home and community environment.     Pt's spiritual, cultural and educational needs considered and pt agreeable to plan of care and goals.     Anticipated barriers to physical therapy: none    Goals: Goals:  Short Term Goals: 4 weeks   1)  Pt will be independent with post-op restriction compliance.  2) Pt will tolerate progression of core stabilization and LE  Strengthening therex without LBP nor radicular symptom exacerbation.     Long Term Goals: 8 weeks   1)  Pt will demo normalized gait pattern on all surfaces without an AD.  2) Pt will be able to perform all functional mobility tasks and work duties with lift restrictions as merited with back / LE pain < 3/10.  3) Pt will be independent w HEP  and  awareness to promote self management.    PLAN     Con't w HEP progression and functional mobility training per POC.    Harrison Mojica, PT

## 2022-03-02 NOTE — PHYSICIAN QUERY
PT Name: Emeka Caballero  MR #: [id number]     DOCUMENTATION CLARIFICATION      CDS/: EUSEBIA Rodrigues, RN               Contact information:[email or web address]    This form is a permanent document in the medical record.     Query Date: March 2, 2022    Dear Provider,  By submitting this query, we are merely seeking further clarification of documentation.  Please utilize your independent clinical judgment when addressing the question(s) below.     The Medical Record contains the following:    Supporting Clinical Findings Location in Medical Record   2/11: NAEON. Febrile overnight, Tmax 102.2. Continued tachycardia. Fever workup so far unrevealing, additional studies ordered.  consulted for assistance with fever workup.   Neurosurgery Discharge Summary 02/14/2022 2/12: NAEON. Continued fevers overnight, Tmax 101.8. Patient reports diaphoresis overnight. Sitting on the side of the bed in no acute distress. Intermittently requiring oxygen via nasal cannula. Denies chest pain, SOB. Endorses nasal congestion and increased back pain after ambulating more yesterday.   Neurosurgery Discharge Summary 02/14/2022 2/13: NAEON. No fevers. Eating, drinking, voiding, had BM yesterday, has been out of bed and cleared for home health. Medicine eval and CT Chest without PE and no known source of fever. Will repeat UA due to concern for prostatitis but will consider discharge today vs tomorrow after infectious work-up complete.    Neurosurgery Discharge Summary 02/14/2022     Fever  Consulted for post-op fevers. Tmax of 102, responding to Tylenol. Infectious workup thus far has been unremarkable.  US of scrotum and testes showed right sided varicocele and enlarged lymph node. No leukocytosis or other infectious sxs. CXR unremarkable.   Overall, there is no identified source of infection. The most likely source of infection is the lumbar spine and surrounding tissues in light of recent surgery and given fevers started  afterwards. Low suspicion for intraabdominal or pulmonary etiology.    -Blood cx with NGTD; no leukocytosis  -Continue Levofloxacin 750 mg for 3 days, 500 mg for 10 days, to complete 2 week course for new lung infiltrate (concern for aspiration) and prostatitis.       Hospital Medicine Progress Notes 02/14//2022   Fever/SIRS-resolved-Working Dx is probable acute prostatitis (dysuria improved on ABx) and possible Aspiration pneumonia vs pneumonitis -Levaquin high dose 750 for possible pneumonia, then change dose for prostatitis 500 to complete the course of 14 d total, with very close follow up with urology for prostatitis, as ABx may need to be extended beyond 14 days Hospital Medicine Progress Notes Attestation  2/14/20224:42 PM (Updated)     Please clarify if the _Aspiration Pneumonia__vs pneumonitis_____________________ diagnosis has been:    [ x ] Ruled In   [  ] Ruled In, Now Resolved   [  ] Ruled Out   [  ] Other/Clarification of findings (please specify): _______________    [   ] Clinically undetermined           Please document in your progress notes daily for the duration of treatment, until resolved, and include in your discharge summary.    Form No. [id number]

## 2022-03-04 ENCOUNTER — CLINICAL SUPPORT (OUTPATIENT)
Dept: REHABILITATION | Facility: HOSPITAL | Age: 47
End: 2022-03-04
Attending: PHYSICIAN ASSISTANT
Payer: MEDICAID

## 2022-03-04 DIAGNOSIS — M43.17 SPONDYLOLISTHESIS OF LUMBOSACRAL REGION: ICD-10-CM

## 2022-03-04 DIAGNOSIS — M47.816 LUMBAR SPONDYLOSIS: ICD-10-CM

## 2022-03-04 DIAGNOSIS — M48.061 SPINAL STENOSIS OF LUMBAR REGION WITHOUT NEUROGENIC CLAUDICATION: ICD-10-CM

## 2022-03-04 DIAGNOSIS — E11.9 TYPE 2 DIABETES MELLITUS WITHOUT COMPLICATION, WITH LONG-TERM CURRENT USE OF INSULIN: ICD-10-CM

## 2022-03-04 DIAGNOSIS — Z79.4 TYPE 2 DIABETES MELLITUS WITHOUT COMPLICATION, WITH LONG-TERM CURRENT USE OF INSULIN: ICD-10-CM

## 2022-03-04 DIAGNOSIS — M54.16 LUMBAR RADICULOPATHY: Primary | ICD-10-CM

## 2022-03-04 PROCEDURE — 97140 MANUAL THERAPY 1/> REGIONS: CPT | Mod: PN

## 2022-03-04 PROCEDURE — 97110 THERAPEUTIC EXERCISES: CPT | Mod: PN

## 2022-03-04 NOTE — PROGRESS NOTES
[facility]   Physical Therapy Treatment Note     [id number]    Therapy Diagnosis:   Encounter Diagnoses   Name Primary?    Lumbar radiculopathy Yes    Spinal stenosis of lumbar region without neurogenic claudication     Lumbar spondylosis     Spondylolisthesis of lumbosacral region     Type 2 diabetes mellitus without complication, with long-term current use of insulin      Physician: Ling Malloy PA-C    Visit Date: 3/4/2022       Physician Orders: PT Eval and Treat   Medical Diagnosis from Referral: Lumbar Spondylolisthesis  Evaluation Date: 2/17/2022  Authorization Period Expiration: 2/14/2023  Plan of Care Expiration: 4/17/2022  Progress Note Due: 4/17/2022  Visit # / Visits authorized: 5/ 1   FOTO: 3/3     Precautions: Diabetes     PTA Visit #: 0/5     Time In: 8:20 A  Time Out: 9:00 A  Total Billable Time: 40 minutes    SUBJECTIVE     Pt reports: I am less LBP since last visit..  He was compliant with home exercise program.  Response to previous treatment: Good  Functional change: Improved mobility w decreased LBP..    Pain: 4/10  Location: bilateral back      OBJECTIVE     Objective Measures updated at progress report unless specified.     Treatment     Emeka received the treatments listed below:      therapeutic exercises to develop strength, ROM and core stabilization for 33 minutes including:  Nu-step @ L2 x 7 min.  KTC w towel 15 sec x 4 / side  Hooklying pelvic waggle x 10 / side.   Hooklying Glut sets w TrA x 10   R SL manual assisted SB  Stretch w 10 sec hold x 5  + SL Clam Shells x 10 / side    Manual therapy x 10 min for STM of R para-lumbars w scar massage.         Patient Education and Home Exercises     Home Exercises Provided and Patient Education Provided     Education provided:   - Instructed on  awareness w transitional movements w emphasis on TrA activation and BUE  Support.  Written Home  Exercises Provided: Patient instructed to cont prior HEP with modifications as noted. Exercises were reviewed and Emeka was able to demonstrate them prior to the end of the session.  Emeka demonstrated good understanding of the education provided. See EMR under Patient Instructions for exercises provided during therapy sessions    ASSESSMENT     Pt demos improving LBP w decreasing R para-lumbar muscle guarding noted. Able to progress core stab therex but not tolerating resumption of bridges yet. Demo less pain w transitional movements today w improved  awareness.    Emeka Is progressing well towards his goals.   Pt prognosis is Good.     Pt will continue to benefit from skilled outpatient physical therapy to address the deficits listed in the problem list box on initial evaluation, provide pt/family education and to maximize pt's level of independence in the home and community environment.     Pt's spiritual, cultural and educational needs considered and pt agreeable to plan of care and goals.     Anticipated barriers to physical therapy: none    Goals: Goals:  Short Term Goals: 4 weeks   1)  Pt will be independent with post-op restriction compliance.  2) Pt will tolerate progression of core stabilization and LE  Strengthening therex without LBP nor radicular symptom exacerbation.     Long Term Goals: 8 weeks   1)  Pt will demo normalized gait pattern on all surfaces without an AD.  2) Pt will be able to perform all functional mobility tasks and work duties with lift restrictions as merited with back / LE pain < 3/10.  3) Pt will be independent w HEP  and  awareness to promote self management.    PLAN     Con't w HEP progression and functional mobility training per POC.    Harrison Mojica, PT

## 2022-03-09 ENCOUNTER — CLINICAL SUPPORT (OUTPATIENT)
Dept: REHABILITATION | Facility: HOSPITAL | Age: 47
End: 2022-03-09
Attending: PHYSICIAN ASSISTANT
Payer: MEDICAID

## 2022-03-09 DIAGNOSIS — M43.17 SPONDYLOLISTHESIS OF LUMBOSACRAL REGION: ICD-10-CM

## 2022-03-09 DIAGNOSIS — M54.16 LUMBAR RADICULOPATHY: Primary | ICD-10-CM

## 2022-03-09 DIAGNOSIS — M48.062 SPINAL STENOSIS OF LUMBAR REGION WITH NEUROGENIC CLAUDICATION: ICD-10-CM

## 2022-03-09 DIAGNOSIS — M47.816 LUMBAR SPONDYLOSIS: ICD-10-CM

## 2022-03-09 DIAGNOSIS — E11.9 TYPE 2 DIABETES MELLITUS WITHOUT COMPLICATION, WITHOUT LONG-TERM CURRENT USE OF INSULIN: ICD-10-CM

## 2022-03-09 PROCEDURE — 97140 MANUAL THERAPY 1/> REGIONS: CPT | Mod: PN

## 2022-03-09 PROCEDURE — 97110 THERAPEUTIC EXERCISES: CPT | Mod: PN

## 2022-03-09 NOTE — PROGRESS NOTES
[facility]   Physical Therapy Treatment Note     Name: [facility] Number: [id number]    Therapy Diagnosis:   Encounter Diagnoses   Name Primary?    Lumbar radiculopathy Yes    Spinal stenosis of lumbar region with neurogenic claudication     Lumbar spondylosis     Spondylolisthesis of lumbosacral region     Type 2 diabetes mellitus without complication, without long-term current use of insulin      Physician: Ling Malloy PA-C    Visit Date: 3/9/2022       Physician Orders: PT Eval and Treat   Medical Diagnosis from Referral: Lumbar Spondylolisthesis  Evaluation Date: 2/17/2022  Authorization Period Expiration: 2/14/2023  Plan of Care Expiration: 4/17/2022  Progress Note Due: 4/17/2022  Visit # / Visits authorized: 6/ 1   FOTO: 3/3     Precautions: Diabetes     PTA Visit #: 0/5     Time In: 9:15 A  Time Out: 9:55 A  Total Billable Time: 40 minutes    SUBJECTIVE     Pt reports: Falling in shower over weekend and has had increased R sided LBP since fall  He was compliant with home exercise program.Held HEP  Since fall.  Response to previous treatment: Good  Functional change: Decline in functional mobility since fall..    Pain: 7/10  Location: R Lower back back      OBJECTIVE     (-) Dural tension sign B.    Treatment     Emeka received the treatments listed below:      therapeutic exercises to develop strength, ROM and core stabilization for 20 minutes including:  Nu-step @ L2 x 7 min.( Held )  KTC w physio ball  15 sec x 4 / side   Hooklying pelvic waggle x 10 / side.   Hooklying  TrA x 10   R SL manual assisted SB  Stretch w 10 sec hold x 5   + SL Clam Shells x 10 / side ( Held )    Mosit heat to R para-lumbar in L SL x 10 min    Manual therapy x 15 min for STM of R para-lumbars w scar massage.         Patient Education and Home Exercises     Home Exercises Provided and Patient Education Provided     Education provided:   - Instructed on   awareness w transitional movements w emphasis on TrA activation and BUE  Support w use of FWW.  Written Home Exercises Provided: Pt instructed  with modifications as noted. Exercises were reviewed and Emeka was able to demonstrate them prior to the end of the session.  Emeka demonstrated good understanding of the education provided. See EMR under Patient Instructions for exercises provided during therapy sessions    ASSESSMENT     Pt demos re-current R para-lumbar tightness and pain after fall in shower at home.However, no evidence of radicular symptom. Demo favorable response to STM and gentle stretching. Recommend holding core stab until LBP improves. Advised pt that if LE radicular symptoms return to follow up w surgeon prior to scheduled appt on March 17.    Emeka Is progressing well towards his goals.   Pt prognosis is Good.     Pt will continue to benefit from skilled outpatient physical therapy to address the deficits listed in the problem list box on initial evaluation, provide pt/family education and to maximize pt's level of independence in the home and community environment.     Pt's spiritual, cultural and educational needs considered and pt agreeable to plan of care and goals.     Anticipated barriers to physical therapy: none    Goals: Goals:  Short Term Goals: 4 weeks   1)  Pt will be independent with post-op restriction compliance.  2) Pt will tolerate progression of core stabilization and LE  Strengthening therex without LBP nor radicular symptom exacerbation.     Long Term Goals: 8 weeks   1)  Pt will demo normalized gait pattern on all surfaces without an AD.  2) Pt will be able to perform all functional mobility tasks and work duties with lift restrictions as merited with back / LE pain < 3/10.  3) Pt will be independent w HEP  and  awareness to promote self management.    PLAN     Con't w HEP progression and functional mobility training per POC.    Harrison Mojica, PT

## 2022-03-10 ENCOUNTER — HOSPITAL ENCOUNTER (EMERGENCY)
Facility: HOSPITAL | Age: 47
Discharge: HOME | End: 2022-03-10
Attending: EMERGENCY MEDICINE
Payer: MEDICAID

## 2022-03-10 VITALS
OXYGEN SATURATION: 99 % | DIASTOLIC BLOOD PRESSURE: 78 MMHG | SYSTOLIC BLOOD PRESSURE: 122 MMHG | BODY MASS INDEX: 28.89 KG/M2 | HEART RATE: 92 BPM | RESPIRATION RATE: 16 BRPM | WEIGHT: 190 LBS | TEMPERATURE: 99 F
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
## 2022-03-10 LAB
ALBUMIN SERPL BCP-MCNC: 3.2 G/DL (ref 3.5–5.2)
ALP SERPL-CCNC: 81 U/L (ref 55–135)
ALT SERPL W/O P-5'-P-CCNC: 13 U/L (ref 10–44)
ANION GAP SERPL CALC-SCNC: 12 MMOL/L (ref 8–16)
AST SERPL-CCNC: 14 U/L (ref 10–40)
BASOPHILS # BLD AUTO: 0.03 K/UL (ref 0–0.2)
BASOPHILS NFR BLD: 0.5 % (ref 0–1.9)
BILIRUB SERPL-MCNC: 0.1 MG/DL (ref 0.1–1)
BILIRUB UR QL STRIP: NEGATIVE
BUN SERPL-MCNC: 13 MG/DL (ref 6–20)
CALCIUM SERPL-MCNC: 9.4 MG/DL (ref 8.7–10.5)
CHLORIDE SERPL-SCNC: 106 MMOL/L (ref 95–110)
CK SERPL-CCNC: 70 U/L (ref 20–200)
CLARITY UR REFRACT.AUTO: CLEAR
CO2 SERPL-SCNC: 23 MMOL/L (ref 23–29)
COLOR UR AUTO: YELLOW
CREAT SERPL-MCNC: 0.8 MG/DL (ref 0.5–1.4)
DIFFERENTIAL METHOD: ABNORMAL
EOSINOPHIL # BLD AUTO: 0.1 K/UL (ref 0–0.5)
EOSINOPHIL NFR BLD: 1.9 % (ref 0–8)
ERYTHROCYTE [DISTWIDTH] IN BLOOD BY AUTOMATED COUNT: 14.1 % (ref 11.5–14.5)
ERYTHROCYTE [SEDIMENTATION RATE] IN BLOOD BY WESTERGREN METHOD: >120 MM/HR (ref 0–23)
EST. GFR  (AFRICAN AMERICAN): >60 ML/MIN/1.73 M^2
EST. GFR  (NON AFRICAN AMERICAN): >60 ML/MIN/1.73 M^2
GLUCOSE SERPL-MCNC: 117 MG/DL (ref 70–110)
GLUCOSE UR QL STRIP: NEGATIVE
HCT VFR BLD AUTO: 35.1 % (ref 40–54)
HGB BLD-MCNC: 11.1 G/DL (ref 14–18)
HGB UR QL STRIP: NEGATIVE
IMM GRANULOCYTES # BLD AUTO: 0.02 K/UL (ref 0–0.04)
IMM GRANULOCYTES NFR BLD AUTO: 0.4 % (ref 0–0.5)
KETONES UR QL STRIP: NEGATIVE
LEUKOCYTE ESTERASE UR QL STRIP: NEGATIVE
LYMPHOCYTES # BLD AUTO: 2.5 K/UL (ref 1–4.8)
LYMPHOCYTES NFR BLD: 43.6 % (ref 18–48)
MCH RBC QN AUTO: 26.1 PG (ref 27–31)
MCHC RBC AUTO-ENTMCNC: 31.6 G/DL (ref 32–36)
MCV RBC AUTO: 82 FL (ref 82–98)
MONOCYTES # BLD AUTO: 0.4 K/UL (ref 0.3–1)
MONOCYTES NFR BLD: 6.5 % (ref 4–15)
NEUTROPHILS # BLD AUTO: 2.7 K/UL (ref 1.8–7.7)
NEUTROPHILS NFR BLD: 47.1 % (ref 38–73)
NITRITE UR QL STRIP: NEGATIVE
NRBC BLD-RTO: 0 /100 WBC
PH UR STRIP: 6 [PH] (ref 5–8)
PLATELET # BLD AUTO: 319 K/UL (ref 150–450)
PMV BLD AUTO: 9.9 FL (ref 9.2–12.9)
POTASSIUM SERPL-SCNC: 3.9 MMOL/L (ref 3.5–5.1)
PROT SERPL-MCNC: 7.7 G/DL (ref 6–8.4)
PROT UR QL STRIP: NEGATIVE
RBC # BLD AUTO: 4.26 M/UL (ref 4.6–6.2)
SODIUM SERPL-SCNC: 141 MMOL/L (ref 136–145)
SP GR UR STRIP: 1.02 (ref 1–1.03)
URN SPEC COLLECT METH UR: NORMAL
WBC # BLD AUTO: 5.71 K/UL (ref 3.9–12.7)

## 2022-03-10 PROCEDURE — 63600175 PHARM REV CODE 636 W HCPCS

## 2022-03-10 PROCEDURE — 81003 URINALYSIS AUTO W/O SCOPE: CPT

## 2022-03-10 PROCEDURE — 99284 PR EMERGENCY DEPT VISIT,LEVEL IV: ICD-10-PCS | Mod: ,,, | Performed by: EMERGENCY MEDICINE

## 2022-03-10 PROCEDURE — 99284 EMERGENCY DEPT VISIT MOD MDM: CPT | Mod: ,,, | Performed by: EMERGENCY MEDICINE

## 2022-03-10 PROCEDURE — 82550 ASSAY OF CK (CPK): CPT

## 2022-03-10 PROCEDURE — 85025 COMPLETE CBC W/AUTO DIFF WBC: CPT

## 2022-03-10 PROCEDURE — 80053 COMPREHEN METABOLIC PANEL: CPT

## 2022-03-10 PROCEDURE — 85652 RBC SED RATE AUTOMATED: CPT

## 2022-03-10 PROCEDURE — 99284 EMERGENCY DEPT VISIT MOD MDM: CPT | Mod: 25

## 2022-03-10 PROCEDURE — 96374 THER/PROPH/DIAG INJ IV PUSH: CPT

## 2022-03-10 RX ORDER — METHOCARBAMOL 500 MG/1
500 TABLET, FILM COATED ORAL NIGHTLY PRN
Qty: 20 TABLET | Refills: 0 | Status: SHIPPED | OUTPATIENT
Start: 2022-03-10 | End: 2022-10-20 | Stop reason: ALTCHOICE

## 2022-03-10 RX ORDER — DIAZEPAM 5 MG/1
2.5 TABLET ORAL EVERY 6 HOURS PRN
Qty: 10 TABLET | Refills: 0 | Status: SHIPPED | OUTPATIENT
Start: 2022-03-10 | End: 2022-10-20 | Stop reason: ALTCHOICE

## 2022-03-10 RX ORDER — GABAPENTIN 100 MG/1
100 CAPSULE ORAL 2 TIMES DAILY
Qty: 60 CAPSULE | Refills: 0 | Status: SHIPPED | OUTPATIENT
Start: 2022-03-10 | End: 2022-04-27 | Stop reason: DRUGHIGH

## 2022-03-10 RX ORDER — MORPHINE SULFATE 2 MG/ML
6 INJECTION, SOLUTION INTRAMUSCULAR; INTRAVENOUS
Status: COMPLETED | OUTPATIENT
Start: 2022-03-10 | End: 2022-03-10

## 2022-03-10 RX ADMIN — MORPHINE SULFATE 6 MG: 2 INJECTION, SOLUTION INTRAMUSCULAR; INTRAVENOUS at 10:03

## 2022-03-10 NOTE — ASSESSMENT & PLAN NOTE
46 M PMHx L5-S1 high grade spondylolisthesis s/p L4-Pelvis PSIF with L4-5 and L5-S1 TLIF with Dr. Reed on 2/8/2022, presenting with worsening buttock pain and non dermatomal BLE pain, spasming in quality since fall on buttocks 4 days ago.     Xray L spine reviewed with no breakage or movement of hardware and stability of deformity correction compared to previous post op films    -- Pain is likely due to muscle spasms given quality and non dermatomal nature.   -- No acute neurosurgical intervention required  -- No further imaging or workup required  -- Recommend discharge home with valium PRN for muscle spasms and gabapentin.   -- Will set up follow up in clinic to monitor progression of symptoms.   -- NSGY will sign off at this time.

## 2022-03-10 NOTE — ED NOTES
Patient resting on stretcher, AAOX4, in NAD.  States pain is much better and rates it 6/10 after IV medication administered.  Side rails up X 2.  Lights dimmed and blanket provided upon request.

## 2022-03-10 NOTE — SUBJECTIVE & OBJECTIVE
Past Medical History:   Diagnosis Date    Diabetes mellitus, type 2 2/11/2022       Past Surgical History:   Procedure Laterality Date    EPIDURAL STEROID INJECTION N/A 10/21/2020    Procedure: Injection, Steroid, Epidural Caudal;  Surgeon: Vipul Nixon Jr., MD;  Location: [facility];  Service: Pain Management;  Laterality: N/A;  Caudal KURT  Arrive @ 1315; No ATC or DM; Needs MD Signature    LUMBAR FUSION N/A 2/8/2022    Procedure: FUSION, SPINE, LUMBAR;  Surgeon: Alphonse Reed MD;  Location: [facility] OR [address] [place] [address];  Service: Neurosurgery;  Laterality: N/A;  AIRO, L4-S1       Social History     Socioeconomic History    Marital status: Single   Tobacco Use    Smoking status: Former Smoker    Smokeless tobacco: Never Used   Substance and Sexual Activity    Alcohol use: Yes     Comment: socially    Drug use: No       History reviewed. No pertinent family history.    Review of patient's allergies indicates:  No Known Allergies      Medications:  Continuous Infusions:  Scheduled Meds:  PRN Meds:     Review of Systems   Constitutional:  Negative for fatigue.   Respiratory:  Negative for shortness of breath.    Cardiovascular:  Negative for chest pain.   Genitourinary:  Negative for difficulty urinating.   Musculoskeletal:  Positive for back pain, gait problem and myalgias.   Neurological:  Negative for weakness and numbness.   Objective:     Weight: 86.2 kg (190 lb)  Body mass index is 28.89 kg/m².  Vital Signs (Most Recent):  Temp: 98.9 °F (37.2 °C) (03/10/22 1158)  Pulse: 92 (03/10/22 1158)  Resp: 16 (03/10/22 1158)  BP: 122/78 (03/10/22 1158)  SpO2: 99 % (03/10/22 1158) Vital Signs (24h Range):  Temp:  [98.4 °F (36.9 °C)-98.9 °F (37.2 °C)] 98.9 °F (37.2 °C)  Pulse:  [] 92  Resp:  [16-20] 16  SpO2:  [99 %-100 %] 99 %  BP: (122-141)/(78-84) 122/78     Date 03/10/22 0700 - 03/11/22 0659   Shift 3429-7238 6240-0236 4006-8220 24 Hour Total   INTAKE   Shift Total(mL/kg)       OUTPUT   Urine(mL/kg/hr) 50   50   Shift  Total(mL/kg) 50(0.6)   50(0.6)   Weight (kg) 86.2 86.2 86.2 86.2                   Physical Exam:    Constitutional: No distress.     Eyes: Pupils are equal, round, and reactive to light. EOM are normal.     Cardiovascular: Normal rate and regular rhythm.     Abdominal: Soft.     RUE: 5/5 delt, 5/5 bi, 5/5 tri, 5/5 hg, 5/5 io  LUE: 5/5 delt, 5/5 bi, 5/5 tri, 5/5 hg, 5/5 io  RLE: 5/5 hf, 5/5 quad, 5/5 hamstring, 5/5 df, 5/5 pf  LLE: 5/5 hf, 5/5 quad, 5/5 hamstring, 5/5 df, 5/5 pf    SILT    No hoffmans  No clonus  No babinski      Significant Labs:  Recent Labs   Lab 03/10/22  1050   *      K 3.9      CO2 23   BUN 13   CREATININE 0.8   CALCIUM 9.4     Recent Labs   Lab 03/10/22  1050   WBC 5.71   HGB 11.1*   HCT 35.1*        No results for input(s): LABPT, INR, APTT in the last 48 hours.  Microbiology Results (last 7 days)       ** No results found for the last 168 hours. **          All pertinent labs from the last 24 hours have been reviewed.    Significant Diagnostics:  I have reviewed all pertinent imaging results/findings within the past 24 hours.

## 2022-03-10 NOTE — DISCHARGE INSTRUCTIONS
You were seen in the ED for increasing lower back pain after a fall. Labs and imaging did not reveal anything dangerous. You were evaluated by neurosurgery who reassured you are safe for discharge.

## 2022-03-10 NOTE — CONSULTS
[facility] - Emergency Dept  Neurosurgery  Consult Note    Subjective:     History of Present Illness: 46 M PMHx L5-S1 high grade spondylolisthesis s/p L4-Pelvis PSIF with L4-5 and L5-S1 TLIF with Dr. Reed on 2/8/2022, progressing well with PT after discharge, but had fall in bathroom 4 days ago, landed on buttocks, and since then he has had worsening buttock pain and non dermatomal BLE pain spasm type pain making it difficult ambulate. Pain is severe and has not improved with oral medication prompting ED visit today. He has no new weakness, no upper extremity symptoms, and no bowel bladder symptoms.       Post-Op Info:  * No surgery found *         Past Medical History:   Diagnosis Date    Diabetes mellitus, type 2 2/11/2022       Past Surgical History:   Procedure Laterality Date    EPIDURAL STEROID INJECTION N/A 10/21/2020    Procedure: Injection, Steroid, Epidural Caudal;  Surgeon: Vipul Nixon Jr., MD;  Location: [facility];  Service: Pain Management;  Laterality: N/A;  Caudal KURT  Arrive @ 1315; No ATC or DM; Needs MD Signature    LUMBAR FUSION N/A 2/8/2022    Procedure: FUSION, SPINE, LUMBAR;  Surgeon: Alphonse Reed MD;  Location: [facility] OR [address] [place] [address];  Service: Neurosurgery;  Laterality: N/A;  AIRO, L4-S1       Social History     Socioeconomic History    Marital status: Single   Tobacco Use    Smoking status: Former Smoker    Smokeless tobacco: Never Used   Substance and Sexual Activity    Alcohol use: Yes     Comment: socially    Drug use: No       History reviewed. No pertinent family history.    Review of patient's allergies indicates:  No Known Allergies      Medications:  Continuous Infusions:  Scheduled Meds:  PRN Meds:     Review of Systems   Constitutional:  Negative for fatigue.   Respiratory:  Negative for shortness of breath.    Cardiovascular:  Negative for chest pain.   Genitourinary:  Negative for difficulty urinating.   Musculoskeletal:  Positive for back pain, gait problem and  myalgias.   Neurological:  Negative for weakness and numbness.   Objective:     Weight: 86.2 kg (190 lb)  Body mass index is 28.89 kg/m².  Vital Signs (Most Recent):  Temp: 98.9 °F (37.2 °C) (03/10/22 1158)  Pulse: 92 (03/10/22 1158)  Resp: 16 (03/10/22 1158)  BP: 122/78 (03/10/22 1158)  SpO2: 99 % (03/10/22 1158) Vital Signs (24h Range):  Temp:  [98.4 °F (36.9 °C)-98.9 °F (37.2 °C)] 98.9 °F (37.2 °C)  Pulse:  [] 92  Resp:  [16-20] 16  SpO2:  [99 %-100 %] 99 %  BP: (122-141)/(78-84) 122/78     Date 03/10/22 0700 - 03/11/22 0659   Shift 1615-9757 7545-2971 1078-4022 24 Hour Total   INTAKE   Shift Total(mL/kg)       OUTPUT   Urine(mL/kg/hr) 50   50   Shift Total(mL/kg) 50(0.6)   50(0.6)   Weight (kg) 86.2 86.2 86.2 86.2                   Physical Exam:    Constitutional: No distress.     Eyes: Pupils are equal, round, and reactive to light. EOM are normal.     Cardiovascular: Normal rate and regular rhythm.     Abdominal: Soft.     RUE: 5/5 delt, 5/5 bi, 5/5 tri, 5/5 hg, 5/5 io  LUE: 5/5 delt, 5/5 bi, 5/5 tri, 5/5 hg, 5/5 io  RLE: 5/5 hf, 5/5 quad, 5/5 hamstring, 5/5 df, 5/5 pf  LLE: 5/5 hf, 5/5 quad, 5/5 hamstring, 5/5 df, 5/5 pf    SILT    No hoffmans  No clonus  No babinski      Significant Labs:  Recent Labs   Lab 03/10/22  1050   *      K 3.9      CO2 23   BUN 13   CREATININE 0.8   CALCIUM 9.4     Recent Labs   Lab 03/10/22  1050   WBC 5.71   HGB 11.1*   HCT 35.1*        No results for input(s): LABPT, INR, APTT in the last 48 hours.  Microbiology Results (last 7 days)       ** No results found for the last 168 hours. **          All pertinent labs from the last 24 hours have been reviewed.    Significant Diagnostics:  I have reviewed all pertinent imaging results/findings within the past 24 hours.    Assessment/Plan:     Spinal stenosis of lumbar region  46 M PMHx L5-S1 high grade spondylolisthesis s/p L4-Pelvis PSIF with L4-5 and L5-S1 TLIF with Dr. Reed on 2/8/2022,  presenting with worsening buttock pain and non dermatomal BLE pain, spasming in quality since fall on buttocks 4 days ago.     Xray L spine reviewed with no breakage or movement of hardware and stability of deformity correction compared to previous post op films    -- Pain is likely due to muscle spasms given quality and non dermatomal nature.   -- No acute neurosurgical intervention required  -- No further imaging or workup required  -- Recommend discharge home with valium PRN for muscle spasms and gabapentin.   -- Will set up follow up in clinic to monitor progression of symptoms.   -- NSGY will sign off at this time.         Alka Taylor MD  Neurosurgery  [facility] - Emergency Dept

## 2022-03-10 NOTE — HPI
46 M PMHx L5-S1 high grade spondylolisthesis s/p L4-Pelvis PSIF with L4-5 and L5-S1 TLIF with Dr. Reed on 2/8/2022, progressing well with PT after discharge, but had fall in bathroom 4 days ago, landed on buttocks, and since then he has had worsening buttock pain and non dermatomal BLE pain spasm type pain making it difficult ambulate. Pain is severe and has not improved with oral medication prompting ED visit today. He has no new weakness, no upper extremity symptoms, and no bowel bladder symptoms.

## 2022-03-10 NOTE — ED NOTES
Fell in bathtub on butt Saturday.  Back surgery on 2/2/22.  Blood in urine past two days.  Worsening back pain and sweating at night.  Denies fever, chills, n/v/d. Arrived with back brace on.

## 2022-03-10 NOTE — ED PROVIDER NOTES
"Encounter Date: 3/10/2022       History     Chief Complaint   Patient presents with    Fall     Pt had back surgery a month ago. Had a fall in the bathtub. Hit his butt, denies hitting head or LOC. Complaining of worsening pain, with slight relief with prescribed pain meds. Pt able to ambulate in triage. Also complaining of blood in urine.     47 yo male with history of lumbar spinal stenosis and spondylosis s/p spinal fusion, prostatitis, and diabetes who presents with worsening lower back pain. Patient states he fell 5 days ago while getting into the shower. He landed on his tail bone, and was able to catch himself with his arms. Since his fall he's experienced worsening lower back pain with shooting pains which radiate into his right lower leg, and muscle spasms. Patient underwent a lumbar spinal fusion recently and regularly works with physical therapy. He states that since his fall he's experienced more pain during his therapy sessions. He takes oxycodone 10 mg since post-surgery and this has only minimally alleviated his symptoms. Patient also complains of hematuria and dysuria without urinary urgency. He has a history of prostatitis, and per chart review his dysuria has improved with antibiotics in the past. He also mentions suprapubic tenderness and what he describes as a "knot" in this region.         Review of patient's allergies indicates:  No Known Allergies  Past Medical History:   Diagnosis Date    Diabetes mellitus, type 2 2/11/2022     Past Surgical History:   Procedure Laterality Date    EPIDURAL STEROID INJECTION N/A 10/21/2020    Procedure: Injection, Steroid, Epidural Caudal;  Surgeon: Vipul Nixon Jr., MD;  Location: [facility];  Service: Pain Management;  Laterality: N/A;  Caudal KURT  Arrive @ 1315; No ATC or DM; Needs MD Signature    LUMBAR FUSION N/A 2/8/2022    Procedure: FUSION, SPINE, LUMBAR;  Surgeon: Alphonse Reed MD;  Location: [facility] OR [address] [place] [address];  Service: Neurosurgery;  " Laterality: N/A;  AIRO, L4-S1     History reviewed. No pertinent family history.  Social History     Tobacco Use    Smoking status: Former Smoker    Smokeless tobacco: Never Used   Substance Use Topics    Alcohol use: Yes     Comment: socially    Drug use: No     Review of Systems   Constitutional: Positive for chills. Negative for activity change, appetite change, diaphoresis, fatigue and fever.   HENT: Negative for congestion, rhinorrhea, sore throat and trouble swallowing.    Eyes: Negative for photophobia, redness and visual disturbance.   Respiratory: Negative for cough, chest tightness, shortness of breath and wheezing.    Cardiovascular: Negative for chest pain, palpitations and leg swelling.   Gastrointestinal: Positive for constipation. Negative for abdominal pain, nausea and vomiting.   Endocrine: Negative for polydipsia, polyphagia and polyuria.   Genitourinary: Positive for difficulty urinating, dysuria, hematuria, penile discharge and testicular pain. Negative for flank pain, frequency, scrotal swelling and urgency.   Musculoskeletal: Positive for back pain and gait problem. Negative for arthralgias, joint swelling, myalgias and neck pain.   Skin: Negative for color change, rash and wound.   Neurological: Positive for headaches. Negative for dizziness, weakness and numbness.   Psychiatric/Behavioral: Negative for agitation and confusion.       Physical Exam     Initial Vitals [03/10/22 0920]   BP Pulse Resp Temp SpO2   (!) 141/84 103 16 98.4 °F (36.9 °C) 100 %      MAP       --         Physical Exam    Constitutional: He appears well-nourished. No distress.   HENT:   Head: Normocephalic and atraumatic.   Mouth/Throat: No oropharyngeal exudate.   Eyes: Conjunctivae and EOM are normal. Right eye exhibits no discharge. Left eye exhibits no discharge.   Neck: No JVD present.   Normal range of motion.  Cardiovascular: Normal rate, regular rhythm and intact distal pulses.   Pulmonary/Chest: Breath sounds  normal. No respiratory distress. He has no wheezes. He has no rales.   Abdominal: Abdomen is soft. He exhibits no distension. There is no abdominal tenderness.   Genitourinary:    Penis normal.   No discharge found.   Musculoskeletal:         General: No edema.      Cervical back: Normal range of motion.      Comments: On standing, patient stays flexed at hip with pain on extension     Neurological: He is alert and oriented to person, place, and time. He displays no atrophy and no tremor. No cranial nerve deficit or sensory deficit. He exhibits normal muscle tone.   Reflex Scores:       Patellar reflexes are 2+ on the right side and 2+ on the left side.       Achilles reflexes are 2+ on the right side and 2+ on the left side.  No clonus  Sensation intact   Skin: Skin is warm. No rash noted.   Midline lower back incision C/D/I with significant surrounding tenderness to palpation         ED Course   Procedures  Labs Reviewed   COMPREHENSIVE METABOLIC PANEL - Abnormal; Notable for the following components:       Result Value    Glucose 117 (*)     Albumin 3.2 (*)     All other components within normal limits   CBC W/ AUTO DIFFERENTIAL - Abnormal; Notable for the following components:    RBC 4.26 (*)     Hemoglobin 11.1 (*)     Hematocrit 35.1 (*)     MCH 26.1 (*)     MCHC 31.6 (*)     All other components within normal limits   SEDIMENTATION RATE - Abnormal; Notable for the following components:    Sed Rate >120 (*)     All other components within normal limits   URINALYSIS, REFLEX TO URINE CULTURE    Narrative:     Specimen Source->Urine   CK          Imaging Results          X-Ray Lumbar Spine Ap And Lateral (Final result)  Result time 03/10/22 11:37:45    Final result by Bao Saeed MD (03/10/22 11:37:45)                 Impression:      No significant interval change      Electronically signed by: Bao Saeed MD  Date:    03/10/2022  Time:    11:37             Narrative:    EXAMINATION:  XR LUMBAR SPINE AP AND  LATERAL    CLINICAL HISTORY:  Fall;    TECHNIQUE:  AP, lateral and spot images were performed of the lumbar spine.    COMPARISON:  02/11/2022    FINDINGS:  Postoperative changes of posterior instrumented fusion are again identified extending from L4 down to the sacrum.  Hardware appears intact.  Disc spacers again seen at the L4-5 and L5-S1 levels.  Position and alignment appears satisfactory and similar to the previous study.  Vertebral body heights are well maintained.  Remainder of the intervertebral disc spaces appear unremarkable.  No acute fracture or osseous destruction.  Pedicles appear intact.                                 Medications   morphine injection 6 mg (6 mg Intravenous Given 3/10/22 1057)     Medical Decision Making:   History:   Old Medical Records: I decided to obtain old medical records.  Initial Assessment:   45 yo male with history of lumbar spinal stenosis and spondylosis s/p spinal fusion, prostatitis, and diabetes who presents with worsening lower back pain. Patient presents afebrile, HDS, no acute distress.  Differential Diagnosis:   DDx: surgical hardware displacement, bone fracture, spinal abscess, post-surgical infection  Clinical Tests:   Lab Tests: Ordered and Reviewed  Radiological Study: Ordered and Reviewed  ED Management:  Pain control with morphine. Will obtain basic labs with inflammatory markers and CPK. Low concern for infectious process at this time. Will obtain X-ray lumbar spine to evaluate surgical hardware. X-ray shows stable hardware. Bladder scan completed with post-void residual 190s mL. Spoke with neurosurgery concerning patient's symptoms in the setting of recent surgery. They evaluated patient and okay with discharge. Recommended to send patient with valium, Robaxin, and gabapentin. Neurosurgery states that ESR likely not related to infectious cause as it's insensitive and exam/history does not point towards infection. Patient is stable for discharge with close  follow-up with PCP and neurosurgery. Patient agreeable to discharge plan. Strict ED precautions and return instructions discussed at length and patient verbalized understanding. All questions were answered and ample time was given for questions.     Other:   I discussed test(s) with the performing physician.                      Clinical Impression:   Final diagnoses:  [W19.XXXA] Fall, initial encounter (Primary)          ED Disposition Condition    Discharge Stable        ED Prescriptions     Medication Sig Dispense Start Date End Date Auth. Provider    diazePAM (VALIUM) 5 MG tablet Take 0.5 tablets (2.5 mg total) by mouth every 6 (six) hours as needed (Pain). 10 tablet 3/10/2022 3/30/2022 Дмитрий Howard MD    methocarbamoL (ROBAXIN) 500 MG Tab Take 1 tablet (500 mg total) by mouth nightly as needed (Back pain). 20 tablet 3/10/2022  Дмитрий Howard MD    gabapentin (NEURONTIN) 100 MG capsule Take 1 capsule (100 mg total) by mouth 2 (two) times daily. 60 capsule 3/10/2022 4/9/2022 Дмитрий Howard MD        Follow-up Information     Follow up With Specialties Details Why Contact Info    Dio Zarate Jr., MD Family Medicine In 1 week If symptoms worsen, As needed [address] [facility] [address]  [phone]             Дмитрий Howard MD  Resident  03/10/22 1283

## 2022-03-11 ENCOUNTER — CLINICAL SUPPORT (OUTPATIENT)
Dept: REHABILITATION | Facility: HOSPITAL | Age: 47
End: 2022-03-11
Attending: PHYSICIAN ASSISTANT
Payer: MEDICAID

## 2022-03-11 DIAGNOSIS — M47.816 LUMBAR SPONDYLOSIS: ICD-10-CM

## 2022-03-11 DIAGNOSIS — M48.062 SPINAL STENOSIS OF LUMBAR REGION WITH NEUROGENIC CLAUDICATION: ICD-10-CM

## 2022-03-11 DIAGNOSIS — M43.17 SPONDYLOLISTHESIS OF LUMBOSACRAL REGION: ICD-10-CM

## 2022-03-11 DIAGNOSIS — M54.16 LUMBAR RADICULOPATHY: Primary | ICD-10-CM

## 2022-03-11 DIAGNOSIS — E11.9 TYPE 2 DIABETES MELLITUS WITHOUT COMPLICATION, WITHOUT LONG-TERM CURRENT USE OF INSULIN: ICD-10-CM

## 2022-03-11 PROCEDURE — 97110 THERAPEUTIC EXERCISES: CPT | Mod: PN

## 2022-03-11 NOTE — PROGRESS NOTES
[facility]   Physical Therapy Treatment Note     Name: Emeka [facility] Number: [id number]    Therapy Diagnosis:   Encounter Diagnoses   Name Primary?    Lumbar radiculopathy Yes    Spinal stenosis of lumbar region with neurogenic claudication     Lumbar spondylosis     Spondylolisthesis of lumbosacral region     Type 2 diabetes mellitus without complication, without long-term current use of insulin      Physician: Ling Malloy PA-C    Visit Date: 3/11/2022       Physician Orders: PT Eval and Treat   Medical Diagnosis from Referral: Lumbar Spondylolisthesis  Evaluation Date: 2/17/2022  Authorization Period Expiration: 2/14/2023  Plan of Care Expiration: 4/17/2022  Progress Note Due: 4/17/2022  Visit # / Visits authorized: 7/ 1   FOTO: 3/3     Precautions: Diabetes     PTA Visit #: 0/5     Time In: 8:23 A ( late )  Time Out: 9:00 A  Total Billable Time: 37 minutes    SUBJECTIVE     Pt reports: seeing Surgeon yesterday and no hardware damage from fall just swelling  He was compliant with home exercise program for stretching only.  Response to previous treatment: Decreased back pain  Functional change: improved functional transfers.    Pain: 4/10  Location: R Lower back back      OBJECTIVE     (-) Dural tension sign B.    Treatment     Emeka received the treatments listed below:      therapeutic exercises to develop strength, ROM and core stabilization for 20 minutes including:  Nu-step @ L2 x 7 min.  KTC w physio ball  15 sec x 4 / side  Hooklying pelvic waggle x 10 / side.   Hooklying  TrA/ Glut set x 10  Hooklying Heel Bridge x 10. ( Resumed )     + SL Clam Shells x 10 / side ( Resumed )      Patient Education and Home Exercises     Home Exercises Provided and Patient Education Provided     Education provided:   - Reviewed  awareness w transitional movements w emphasis on TrA activation and BUE  Support w use of FWW.  Written Home Exercises Provided:  Pt instructed  With resumption of core stab therex  as noted. Exercises were reviewed and Emeka was able to demonstrate them prior to the end of the session.  Emeka demonstrated good understanding of the education provided. See EMR under Patient Instructions for exercises provided during therapy sessions    ASSESSMENT     Pt demos improving R para-lumbar tightness and LBP with performance of therex. Cleared by Ortho to resume therex to toleance. Reasonable tolerance of core stab today. Recommend icing after HEP  For edema and muscle guarding management.    Emeka Is progressing well towards his goals.   Pt prognosis is Good.     Pt will continue to benefit from skilled outpatient physical therapy to address the deficits listed in the problem list box on initial evaluation, provide pt/family education and to maximize pt's level of independence in the home and community environment.     Pt's spiritual, cultural and educational needs considered and pt agreeable to plan of care and goals.     Anticipated barriers to physical therapy: none    Goals: Goals:  Short Term Goals: 4 weeks   1)  Pt will be independent with post-op restriction compliance.  2) Pt will tolerate progression of core stabilization and LE  Strengthening therex without LBP nor radicular symptom exacerbation.     Long Term Goals: 8 weeks   1)  Pt will demo normalized gait pattern on all surfaces without an AD.  2) Pt will be able to perform all functional mobility tasks and work duties with lift restrictions as merited with back / LE pain < 3/10.  3) Pt will be independent w HEP  and  awareness to promote self management.    PLAN     Con't w HEP progression and functional mobility training per POC.    Harrison Mojica, PT

## 2022-03-17 ENCOUNTER — HOSPITAL ENCOUNTER (OUTPATIENT)
Dept: RADIOLOGY | Facility: HOSPITAL | Age: 47
Discharge: HOME | End: 2022-03-17
Attending: NEUROLOGICAL SURGERY
Payer: MEDICAID

## 2022-03-17 ENCOUNTER — OFFICE VISIT (OUTPATIENT)
Dept: NEUROSURGERY | Facility: CLINIC | Age: 47
End: 2022-03-17
Payer: MEDICAID

## 2022-03-17 DIAGNOSIS — M43.17 ACQUIRED SPONDYLOLISTHESIS OF LUMBOSACRAL REGION: ICD-10-CM

## 2022-03-17 DIAGNOSIS — Z98.1 HISTORY OF LUMBAR FUSION: Primary | ICD-10-CM

## 2022-03-17 PROCEDURE — 99999 PR PBB SHADOW E&M-EST. PATIENT-LVL I: CPT | Mod: PBBFAC,,, | Performed by: NEUROLOGICAL SURGERY

## 2022-03-17 PROCEDURE — 3051F HG A1C>EQUAL 7.0%<8.0%: CPT | Mod: CPTII,,, | Performed by: NEUROLOGICAL SURGERY

## 2022-03-17 PROCEDURE — 99999 PR PBB SHADOW E&M-EST. PATIENT-LVL I: ICD-10-PCS | Mod: PBBFAC,,, | Performed by: NEUROLOGICAL SURGERY

## 2022-03-17 PROCEDURE — 72082 X-RAY EXAM ENTIRE SPI 2/3 VW: CPT | Mod: TC

## 2022-03-17 PROCEDURE — 72082 XR SCOLIOSIS COMPLETE: ICD-10-PCS | Mod: 26,,, | Performed by: RADIOLOGY

## 2022-03-17 PROCEDURE — 72082 X-RAY EXAM ENTIRE SPI 2/3 VW: CPT | Mod: 26,,, | Performed by: RADIOLOGY

## 2022-03-17 PROCEDURE — 3051F PR MOST RECENT HEMOGLOBIN A1C LEVEL 7.0 - < 8.0%: ICD-10-PCS | Mod: CPTII,,, | Performed by: NEUROLOGICAL SURGERY

## 2022-03-17 PROCEDURE — 99024 PR POST-OP FOLLOW-UP VISIT: ICD-10-PCS | Mod: ,,, | Performed by: NEUROLOGICAL SURGERY

## 2022-03-17 PROCEDURE — 99211 OFF/OP EST MAY X REQ PHY/QHP: CPT | Mod: PBBFAC | Performed by: NEUROLOGICAL SURGERY

## 2022-03-17 PROCEDURE — 99024 POSTOP FOLLOW-UP VISIT: CPT | Mod: ,,, | Performed by: NEUROLOGICAL SURGERY

## 2022-03-17 RX ORDER — OXYCODONE AND ACETAMINOPHEN 5; 325 MG/1; MG/1
1 TABLET ORAL EVERY 4 HOURS PRN
Qty: 60 TABLET | Refills: 0 | Status: SHIPPED | OUTPATIENT
Start: 2022-03-17 | End: 2022-04-06 | Stop reason: SDUPTHER

## 2022-03-17 RX ORDER — METHYLPREDNISOLONE 4 MG/1
TABLET ORAL
Qty: 21 EACH | Refills: 0 | Status: SHIPPED | OUTPATIENT
Start: 2022-03-17 | End: 2022-04-07

## 2022-03-17 NOTE — PROGRESS NOTES
CHIEF COMPLAINT:  6 week post op with new imaging    I, Nate Sapp, attest that this documentation has been prepared under the direction and in the presence of Alphonse Reed MD.    HPI:  Emeka Caballero is a 46 y.o.  male with PMHx of BPH, who presents to clinic s/p L4-pelvis fusion and L4-5, L5-S1 TLIF on 02/08/2022 for 6 week post op with new imaging.    The pt reports having right sided pain from the chest and midback to the right leg. He reports that the gabapentin is helping reduce the pain. Endorses muscle spasms in his right leg and right low back. Endorses numbness in his right foot that is the same before surgery.These symptoms are making him unable to sleep. He fell on his tailbone last week which started these symptoms. States that he is walking better compared to before surgery. The pt presents to clinic ambulating with a walker.    Review of patient's allergies indicates:  No Known Allergies    Past Medical History:   Diagnosis Date    Diabetes mellitus, type 2 2/11/2022     Past Surgical History:   Procedure Laterality Date    EPIDURAL STEROID INJECTION N/A 10/21/2020    Procedure: Injection, Steroid, Epidural Caudal;  Surgeon: Vipul Nixon Jr., MD;  Location: [facility];  Service: Pain Management;  Laterality: N/A;  Caudal KURT  Arrive @ 1315; No ATC or DM; Needs MD Signature    LUMBAR FUSION N/A 2/8/2022    Procedure: FUSION, SPINE, LUMBAR;  Surgeon: Alphonse Reed MD;  Location: [address];  Service: Neurosurgery;  Laterality: N/A;  AIRO, L4-S1     No family history on file.  Social History     Tobacco Use    Smoking status: Former Smoker    Smokeless tobacco: Never Used   Substance Use Topics    Alcohol use: Yes     Comment: socially    Drug use: No        Review of Systems   Constitutional: Negative.    HENT: Negative.    Eyes: Negative.    Respiratory: Negative.    Cardiovascular: Negative.    Gastrointestinal: Negative.    Endocrine: Negative.    Genitourinary:  Negative.    Musculoskeletal: Positive for back pain and myalgias. Negative for gait problem and neck pain.   Skin: Negative.    Allergic/Immunologic: Negative.    Neurological: Positive for numbness. Negative for weakness, light-headedness and headaches.   Hematological: Negative.    Psychiatric/Behavioral: Negative.        OBJECTIVE:   Vital Signs:       Physical Exam:    Vital signs: All nursing notes and vital signs reviewed -- afebrile, vital signs stable.  Constitutional: Patient sitting comfortably in chair. Appears well developed and well nourished.  Skin: Exposed areas are intact without abnormal markings, rashes or other lesions. Well healed incision  HEENT: Normocephalic. Normal conjunctivae.  Cardiovascular: Normal rate and regular rhythm.  Respiratory: Chest wall rises and falls symmetrically, without signs of respiratory distress.  Abdomen: Soft and non-tender.  Extremities: Warm and without edema. Calves supple, non-tender.  Psych/Behavior: Normal affect.    Neurological:    Mental status: Alert and oriented. Conversational and appropriate.       Cranial Nerves: VFF to confrontation. PERRL. EOMI without nystagmus. Facial STLT normal and symmetric. Strong, symmetric muscles of mastication. Facial strength full and symmetric. Hearing equal bilaterally to finger rub. Palate and uvula rise and fall normally in midline. Shoulder shrug 5/5 strength. Tongue midline.     Motor:    Sensory: Intact sensation to light touch in all extremities. Romberg negative.    No TTP at incision    Reflexes:          DTR: 2+ symmetrically throughout.     Lyons's: Negative.     Babinski's: Negative.     Clonus: Negative.    Cerebellar: Finger-to-nose and rapid alternating movements normal. Gait stable, fluid.    Spine:    Posture: Head well aligned over pelvis in front and side views.  No focal or global spinal deformity visible on inspection. Shoulders and hips even. No obvious leg length discrepancy. No scapula  winging.    Bending: Full ROM with forward, back and lateral bending. No rib prominence with forward bend.    Cervical:      ROM: Full with flexion, extension, lateral rotation and ear-to-shoulder bend.      Midline TTP: Negative.     Spurling's test: Negative.     Lhermitte's: Negative.    Thoracic:     Midline TTP: Negative    Lumbar:     Midline TTP: Negative     Straight Leg Test: Negative     Crossed Straight Leg Test: Negative     Sciatic notch tenderness: Negative.    Other:     SI joint TTP: Negative.     Greater trochanter TTP: Negative.     Tenderness with external/internal hip rotation: Negative.    Diagnostic Results:  All imaging was independently reviewed by me.    Scoliosis standing AP and Lateral X-ray, dated 03/17/2022:  1. Shows good spinal alignment and hardware position    ASSESSMENT/PLAN:     Emeka Caballero is 5 weeks s/p lumbosacral fusion for spondylolisthesis. He had been doing well but had a fall about a week ago and since that time he has had debilitating pain and muscle spasm over the right buttock and along the lateral and medial aspects of the thigh. He went to the ED and they ruled out fracture. His xrays today look great. He does report that he's walking better versus preop but he is concerned about his buttock pain. It isn't clear the source given how good everything looks but I will order him Percocet and a Medrol dose pack and see him back in 6 weeks with a CT L spine.    The patient understands and agrees with the plan of care. All questions were answered.     1. CT L spine in 6 weeks  2. Percocet and Medrol dose pack      I, Dr. Alphonse Reed personally performed the services described in this documentation. All medical record entries made by the scribe, Nate Sapp, were at my direction and in my presence.  I have reviewed the chart and agree that the record reflects my personal performance and is accurate and complete.      Alphonse Reed M.D.  Department of  Neurosurgery  [facility]

## 2022-03-18 ENCOUNTER — CLINICAL SUPPORT (OUTPATIENT)
Dept: REHABILITATION | Facility: HOSPITAL | Age: 47
End: 2022-03-18
Attending: PHYSICIAN ASSISTANT
Payer: MEDICAID

## 2022-03-18 DIAGNOSIS — E11.9 TYPE 2 DIABETES MELLITUS WITHOUT COMPLICATION, WITHOUT LONG-TERM CURRENT USE OF INSULIN: ICD-10-CM

## 2022-03-18 DIAGNOSIS — M54.16 LUMBAR RADICULOPATHY: Primary | ICD-10-CM

## 2022-03-18 DIAGNOSIS — M48.061 SPINAL STENOSIS OF LUMBAR REGION WITHOUT NEUROGENIC CLAUDICATION: ICD-10-CM

## 2022-03-18 DIAGNOSIS — M47.816 LUMBAR SPONDYLOSIS: ICD-10-CM

## 2022-03-18 DIAGNOSIS — M43.17 SPONDYLOLISTHESIS OF LUMBOSACRAL REGION: ICD-10-CM

## 2022-03-18 PROCEDURE — 97110 THERAPEUTIC EXERCISES: CPT | Mod: PN

## 2022-03-18 PROCEDURE — 97116 GAIT TRAINING THERAPY: CPT | Mod: PN

## 2022-03-18 NOTE — PROGRESS NOTES
[facility]   Physical Therapy Treatment Note     Name: Emeka [facility] Number: [id number]    Therapy Diagnosis:   Encounter Diagnoses   Name Primary?    Lumbar radiculopathy Yes    Spinal stenosis of lumbar region without neurogenic claudication     Lumbar spondylosis     Spondylolisthesis of lumbosacral region     Type 2 diabetes mellitus without complication, without long-term current use of insulin      Physician: Ling Malloy PA-C    Visit Date: 3/18/2022       Physician Orders: PT Eval and Treat   Medical Diagnosis from Referral: Lumbar Spondylolisthesis  Evaluation Date: 2/17/2022  Authorization Period Expiration: 2/14/2023  Plan of Care Expiration: 4/17/2022  Progress Note Due: 4/17/2022  Visit # / Visits authorized: 8/ 1   FOTO: 3/3     Precautions: Diabetes     PTA Visit #: 0/5     Time In: 8:15 A   Time Out: 9:00 A  Total Billable Time: 45 minutes    SUBJECTIVE     Pt reports: less LBP moving better  He was compliant with home exercise program for stretching only.  Response to previous treatment: Decreased back pain  Functional change: improved functional transfers.and ambulation.    Pain: 3/10  Location: R Lower back back      OBJECTIVE     (-) Dural tension sign B.    Treatment     Emeka received the treatments listed below:      therapeutic exercises to develop strength, ROM and core stabilization for 35 minutes including:  Nu-step @ L2 x 10 min.  KTC w physio ball  15 sec x 4   Hooklying pelvic wagglew Physio Ball x 10 / side.  Hooklying Heel Bridge 2 x 10. ( Resumed )   SL Clam Shells x 10 / side ( Resumed )  + Sit to stand from 18 inch surface 2 x 10 ( Resumed )    Gait training w SPC in L hand w cuing for gait sequencing w step thru gait pattern x 10 min.      Patient Education and Home Exercises     Home Exercises Provided and Patient Education Provided     Education provided:   - Reviewed  awareness w transitional movements w  emphasis on TrA activation and BUE support and progression to gait training w SPC.   Written Home Exercises Provided: Pt instructed  With resumption of core stab therex  as noted. Exercises were reviewed and Emeka was able to demonstrate them prior to the end of the session.  Emeka demonstrated good understanding of the education provided. See EMR under Patient Instructions for exercises provided during therapy sessions    ASSESSMENT     Pt demos resolving LBP s/p fall with improving tolerance of core stabilization and transitional movements. Pt able to transition to SPC for ambulation w reasonable gait stability but will need additional training and family supervision on unlevel surfaces. Family present for session and instructed in the above.        Emeka Is progressing well towards his goals.   Pt prognosis is Good.     Pt will continue to benefit from skilled outpatient physical therapy to address the deficits listed in the problem list box on initial evaluation, provide pt/family education and to maximize pt's level of independence in the home and community environment.     Pt's spiritual, cultural and educational needs considered and pt agreeable to plan of care and goals.     Anticipated barriers to physical therapy: none    Goals: Goals:  Short Term Goals: 4 weeks   1)  Pt will be independent with post-op restriction compliance.  2) Pt will tolerate progression of core stabilization and LE  Strengthening therex without LBP nor radicular symptom exacerbation.     Long Term Goals: 8 weeks   1)  Pt will demo normalized gait pattern on all surfaces without an AD.  2) Pt will be able to perform all functional mobility tasks and work duties with lift restrictions as merited with back / LE pain < 3/10.  3) Pt will be independent w HEP  and  awareness to promote self management.    PLAN     Con't w HEP progression and functional mobility training per POC.    Harrison Mojica, PT

## 2022-03-21 ENCOUNTER — OFFICE VISIT (OUTPATIENT)
Dept: OPTOMETRY | Facility: CLINIC | Age: 47
End: 2022-03-21
Payer: MEDICAID

## 2022-03-21 ENCOUNTER — PATIENT OUTREACH (OUTPATIENT)
Dept: ADMINISTRATIVE | Facility: OTHER | Age: 47
End: 2022-03-21
Payer: MEDICAID

## 2022-03-21 DIAGNOSIS — H52.7 REFRACTIVE ERROR: ICD-10-CM

## 2022-03-21 DIAGNOSIS — E11.9 TYPE 2 DIABETES MELLITUS WITHOUT RETINOPATHY: Primary | ICD-10-CM

## 2022-03-21 PROCEDURE — 92015 PR REFRACTION: ICD-10-PCS | Mod: ,,, | Performed by: OPTOMETRIST

## 2022-03-21 PROCEDURE — 1160F RVW MEDS BY RX/DR IN RCRD: CPT | Mod: CPTII,,, | Performed by: OPTOMETRIST

## 2022-03-21 PROCEDURE — 99213 OFFICE O/P EST LOW 20 MIN: CPT | Mod: PBBFAC,PO | Performed by: OPTOMETRIST

## 2022-03-21 PROCEDURE — 1159F PR MEDICATION LIST DOCUMENTED IN MEDICAL RECORD: ICD-10-PCS | Mod: CPTII,,, | Performed by: OPTOMETRIST

## 2022-03-21 PROCEDURE — 92015 DETERMINE REFRACTIVE STATE: CPT | Mod: ,,, | Performed by: OPTOMETRIST

## 2022-03-21 PROCEDURE — 92004 COMPRE OPH EXAM NEW PT 1/>: CPT | Mod: S$PBB,,, | Performed by: OPTOMETRIST

## 2022-03-21 PROCEDURE — 3051F PR MOST RECENT HEMOGLOBIN A1C LEVEL 7.0 - < 8.0%: ICD-10-PCS | Mod: CPTII,,, | Performed by: OPTOMETRIST

## 2022-03-21 PROCEDURE — 3051F HG A1C>EQUAL 7.0%<8.0%: CPT | Mod: CPTII,,, | Performed by: OPTOMETRIST

## 2022-03-21 PROCEDURE — 1159F MED LIST DOCD IN RCRD: CPT | Mod: CPTII,,, | Performed by: OPTOMETRIST

## 2022-03-21 PROCEDURE — 92004 PR EYE EXAM, NEW PATIENT,COMPREHESV: ICD-10-PCS | Mod: S$PBB,,, | Performed by: OPTOMETRIST

## 2022-03-21 PROCEDURE — 1160F PR REVIEW ALL MEDS BY PRESCRIBER/CLIN PHARMACIST DOCUMENTED: ICD-10-PCS | Mod: CPTII,,, | Performed by: OPTOMETRIST

## 2022-03-21 PROCEDURE — 99999 PR PBB SHADOW E&M-EST. PATIENT-LVL III: ICD-10-PCS | Mod: PBBFAC,,, | Performed by: OPTOMETRIST

## 2022-03-21 PROCEDURE — 99999 PR PBB SHADOW E&M-EST. PATIENT-LVL III: CPT | Mod: PBBFAC,,, | Performed by: OPTOMETRIST

## 2022-03-21 PROCEDURE — 2023F DILAT RTA XM W/O RTNOPTHY: CPT | Mod: CPTII,,, | Performed by: OPTOMETRIST

## 2022-03-21 PROCEDURE — 2023F PR DILATED RETINAL EXAM W/O EVID OF RETINOPATHY: ICD-10-PCS | Mod: CPTII,,, | Performed by: OPTOMETRIST

## 2022-03-21 NOTE — PROGRESS NOTES
Health Maintenance Due   Topic Date Due    Diabetes Urine Screening  Never done    Pneumococcal Vaccines (Age 0-64) (1 of 2 - PPSV23) Never done    TETANUS VACCINE  Never done    Low Dose Statin  Never done    Colorectal Cancer Screening  Never done    COVID-19 Vaccine (3 - Booster for Moderna series) 03/04/2022     Updates were requested from care everywhere.  Chart was reviewed for overdue Proactive [facility] Encounters ([facility]) topics (CRS, Breast Cancer Screening, Eye exam)  Health Maintenance has been updated.  LINKS immunization registry triggered.  Immunizations were reconciled.

## 2022-03-21 NOTE — PROGRESS NOTES
Subjective:       Patient ID: Emeka Caballero is a 46 y.o. male      Chief Complaint   Patient presents with    Concerns About Ocular Health    Diabetic Eye Exam     History of Present Illness  Dls: 2 yrs     47 y/o male presents today for diabetic eye exam.   Pt c/o blurry vision at near ou.      x 1 day     No tearing  No itching   No burning  No pain  No ha's  No floaters  No flashes    Eye meds  Otc gtts ou prn    Hemoglobin A1C       Date                     Value               Ref Range             Status                01/06/2022               7.3 (H)             4.0 - 5.6 %           Final                  01/30/2020               5.9 (H)             4.0 - 5.6 %           Final                 Assessment/Plan:     1. Type 2 diabetes mellitus without retinopathy  No diabetic retinopathy. Discussed with pt the effects of diabetes on vision, importance of good blood sugar control, compliance with meds, and follow up care with PCP. Return in 1 year for dilated eye exam, sooner PRN.    2. Refractive error  Optional Rx. Readers if preferred.     Follow up in about 1 year (around 3/21/2023) for Diabetic Eye Exam.

## 2022-03-23 ENCOUNTER — CLINICAL SUPPORT (OUTPATIENT)
Dept: REHABILITATION | Facility: HOSPITAL | Age: 47
End: 2022-03-23
Attending: PHYSICIAN ASSISTANT
Payer: MEDICAID

## 2022-03-23 DIAGNOSIS — M54.16 LUMBAR RADICULOPATHY: Primary | ICD-10-CM

## 2022-03-23 DIAGNOSIS — M48.062 SPINAL STENOSIS OF LUMBAR REGION WITH NEUROGENIC CLAUDICATION: ICD-10-CM

## 2022-03-23 DIAGNOSIS — M47.816 LUMBAR SPONDYLOSIS: ICD-10-CM

## 2022-03-23 DIAGNOSIS — E11.9 TYPE 2 DIABETES MELLITUS WITHOUT COMPLICATION, WITHOUT LONG-TERM CURRENT USE OF INSULIN: ICD-10-CM

## 2022-03-23 DIAGNOSIS — M43.17 SPONDYLOLISTHESIS OF LUMBOSACRAL REGION: ICD-10-CM

## 2022-03-23 PROCEDURE — 97110 THERAPEUTIC EXERCISES: CPT | Mod: PN

## 2022-03-23 NOTE — PROGRESS NOTES
[facility]   Physical Therapy Treatment Note     Name: Emeka [facility] Number: [id number]    Therapy Diagnosis:   Encounter Diagnoses   Name Primary?    Lumbar radiculopathy Yes    Spinal stenosis of lumbar region with neurogenic claudication     Lumbar spondylosis     Spondylolisthesis of lumbosacral region     Type 2 diabetes mellitus without complication, without long-term current use of insulin      Physician: Ling Malloy PA-C    Visit Date: 3/23/2022       Physician Orders: PT Eval and Treat   Medical Diagnosis from Referral: Lumbar Spondylolisthesis  Evaluation Date: 2/17/2022  Authorization Period Expiration: 2/14/2023  Plan of Care Expiration: 4/17/2022  Progress Note Due: 4/17/2022  Visit # / Visits authorized: 9/ 1   FOTO: 3/3     Precautions: Diabetes     PTA Visit #: 0/5     Time In: 2:30 P   Time Out: 3:15 P  Total Billable Time: 45 minutes    SUBJECTIVE     Pt reports: less LBP moving better. However, still have some LBP intermittently  He was compliant with home exercise program for stretching only.  Response to previous treatment: Decreased back pain  Functional change: improved functional transfers.and ambulation.    Pain: 3/10  Location: R Lower back back      OBJECTIVE     (-) Dural tension sign B.    Treatment     Emeka received the treatments listed below:      therapeutic exercises to develop strength, ROM and core stabilization for 40 minutes including:  Nu-step @ L3 x 8 min.  KTC w physio ball  5 sec x 10   Hooklying pelvic wagglew Physio Ball 5 sec x 10 / side.  Hooklying Heel Bridge w GTB 2 x 10. ( Resumed )   SL Clam Shells  w GTB 2 x 10 / side ( Resumed )   Sit to stand from 18 inch surface 2 x 10 ( Resumed )  + Standing Scapular Retraction / Shoulder Ext BUE x 10, Alt UE x 10    Gait training w SPC in L hand w cuing for gait sequencing w step thru gait pattern x 5 min.      Patient Education and Home Exercises     Home Exercises  Provided and Patient Education Provided     Education provided:   - Reviewed  awareness w transitional movements w emphasis on TrA activation and BUE support and  gait training w SPC.   Written Home Exercises Provided: Pt instructed  With resumption of core stab therex  as noted. Exercises were reviewed and Emeka was able to demonstrate them prior to the end of the session.  Emeka demonstrated good understanding of the education provided. See EMR under Patient Instructions for exercises provided during therapy sessions    ASSESSMENT     Pt demos reasonable tolerance of core stab progression but reports intermittent R para-lumbar muscle guarding with transitional movements and core stab especially in standing. Relief noted w stetching. Pt continues to require moderate cuing w therex performance and employment of proper body mechanics.      Emeka Is progressing well towards his goals.   Pt prognosis is Good.     Pt will continue to benefit from skilled outpatient physical therapy to address the deficits listed in the problem list box on initial evaluation, provide pt/family education and to maximize pt's level of independence in the home and community environment.     Pt's spiritual, cultural and educational needs considered and pt agreeable to plan of care and goals.     Anticipated barriers to physical therapy: none    Goals: Goals:  Short Term Goals: 4 weeks   1)  Pt will be independent with post-op restriction compliance. (Ongoing)  2) Pt will tolerate progression of core stabilization and LE  Strengthening therex without LBP nor radicular symptom exacerbation. (ONGOING)     Long Term Goals: 8 weeks   1)  Pt will demo normalized gait pattern on all surfaces without an AD.  2) Pt will be able to perform all functional mobility tasks and work duties with lift restrictions as merited with back / LE pain < 3/10.  3) Pt will be independent w HEP  and  awareness to promote self  management.    PLAN     Con't w HEP progression and functional mobility training per POC.    Harrison Mojica, PT

## 2022-03-28 NOTE — PROGRESS NOTES
"[facility]   Physical Therapy Treatment Note     [facility] [id number]    Therapy Diagnosis:   Encounter Diagnoses   Name Primary?    Lumbar radiculopathy Yes    Lumbar spondylosis     Spondylolisthesis of lumbosacral region      Physician: Ling Malloy PA-C    Visit Date: 3/29/2022       Physician Orders: PT Eval and Treat   Medical Diagnosis from Referral: Lumbar Spondylolisthesis  Evaluation Date: 2/17/2022  Authorization Period Expiration: 2/14/2023  Plan of Care Expiration: 4/17/2022  Progress Note Due: 4/17/2022  Visit # / Visits authorized: 9/ 20  FOTO: 3/3     Precautions: Diabetes     PTA Visit #: 1/5     Time In: 0915AM  Time Out: 1005AM  Total Billable Time: 50 minutes    SUBJECTIVE     Pt reports: in just a little pain today.     He was compliant with home exercise program    Response to previous treatment: pain   Functional change: improved functional transfers.and ambulation.    Pain: 7/10  Location: R Lower back back      OBJECTIVE     (-) Dural tension sign B.    Treatment     Emeka received the treatments listed below:      therapeutic exercises to develop strength, ROM and core stabilization for 38 minutes including:    Nu-step @ L3 x 6 min.  Sit to stand from 24 inch surface (w/o UE support) 2 x 10  Standing Scapular Retraction w/ RTB 20x  Shoulder Ext BUE x 10, Alt UE x 10 w/ RTB  +Paloff Press RTB 20x/ea   +Hip Add w/ ball 10x3"  +Supine Clamshells w/ GTB 10x  +Abdom. Bracing w/ ball 20x3"  Hooklying Bridge w/ GTB x 10    NP: KTC w physio ball  5 sec x 10   Hooklying pelvic wagglew Physio Ball 5 sec x 10 / side.  SL Clam Shells  w GTB 2 x 10 / side ( Resumed )       Emeka participated in gait training to improve functional mobility and safety for 05 minutes, including:    Gait training w SPC in L hand w cuing for gait sequencing w step thru gait pattern    Emeka participated in neuromuscular re-education activities to " "improve: Balance, Coordination, Proprioception and Posture for 7 minutes. The following activities were included:  NBOS w/ EC in // bars 3x30"   NBOS w/ EO, EC on foam in // bars 2x30"/ea  Marching on foam w/ single limb UE support 20x/ea        Patient Education and Home Exercises     Home Exercises Provided and Patient Education Provided     Education provided:   - Reviewed  awareness w transitional movements w emphasis on TrA activation and BUE support and  gait training w SPC.   Written Home Exercises Provided: Pt instructed  With resumption of core stab therex  as noted. Exercises were reviewed and Emeka was able to demonstrate them prior to the end of the session.  Emeka demonstrated good understanding of the education provided. See EMR under Patient Instructions for exercises provided during therapy sessions    ASSESSMENT     Emeka tolerated the above tx session well with minimal c/o pain. Continued with core strengthening as well as BLE strengthening this date. Pt reported exacerbation of low back pain with bridges. [employer] standing therex including balance activities this date, and pt's standing tolerance is fair at this time. Pt was visibly fatigued during and following. Rest breaks, demonstration, VCs, and SBA required throughout session.       Emeka Is progressing well towards his goals.   Pt prognosis is Good.     Pt will continue to benefit from skilled outpatient physical therapy to address the deficits listed in the problem list box on initial evaluation, provide pt/family education and to maximize pt's level of independence in the home and community environment.     Pt's spiritual, cultural and educational needs considered and pt agreeable to plan of care and goals.     Anticipated barriers to physical therapy: none    Goals: Goals:  Short Term Goals: 4 weeks   1)  Pt will be independent with post-op restriction compliance. (Ongoing)  2) Pt will tolerate progression " of core stabilization and LE  Strengthening therex without LBP nor radicular symptom exacerbation. (ONGOING)     Long Term Goals: 8 weeks   1)  Pt will demo normalized gait pattern on all surfaces without an AD.  2) Pt will be able to perform all functional mobility tasks and work duties with lift restrictions as merited with back / LE pain < 3/10.  3) Pt will be independent w HEP  and  awareness to promote self management.    PLAN     Con't w HEP progression and functional mobility training per POC.    Zoila Oconnell, PTA   03/29/2022

## 2022-03-29 ENCOUNTER — CLINICAL SUPPORT (OUTPATIENT)
Dept: REHABILITATION | Facility: HOSPITAL | Age: 47
End: 2022-03-29
Attending: PHYSICIAN ASSISTANT
Payer: MEDICAID

## 2022-03-29 DIAGNOSIS — M54.16 LUMBAR RADICULOPATHY: Primary | ICD-10-CM

## 2022-03-29 DIAGNOSIS — M47.816 LUMBAR SPONDYLOSIS: ICD-10-CM

## 2022-03-29 DIAGNOSIS — M43.17 SPONDYLOLISTHESIS OF LUMBOSACRAL REGION: ICD-10-CM

## 2022-03-29 PROCEDURE — 97110 THERAPEUTIC EXERCISES: CPT | Mod: PN,CQ

## 2022-03-29 PROCEDURE — 97112 NEUROMUSCULAR REEDUCATION: CPT | Mod: PN,CQ

## 2022-04-01 ENCOUNTER — TELEPHONE (OUTPATIENT)
Dept: UROLOGY | Facility: CLINIC | Age: 47
End: 2022-04-01
Payer: MEDICAID

## 2022-04-01 ENCOUNTER — CLINICAL SUPPORT (OUTPATIENT)
Dept: REHABILITATION | Facility: HOSPITAL | Age: 47
End: 2022-04-01
Attending: PHYSICIAN ASSISTANT
Payer: MEDICAID

## 2022-04-01 DIAGNOSIS — M43.17 SPONDYLOLISTHESIS OF LUMBOSACRAL REGION: ICD-10-CM

## 2022-04-01 DIAGNOSIS — M54.16 LUMBAR RADICULOPATHY: Primary | ICD-10-CM

## 2022-04-01 DIAGNOSIS — M47.816 LUMBAR SPONDYLOSIS: ICD-10-CM

## 2022-04-01 DIAGNOSIS — M48.061 SPINAL STENOSIS OF LUMBAR REGION, UNSPECIFIED WHETHER NEUROGENIC CLAUDICATION PRESENT: ICD-10-CM

## 2022-04-01 PROCEDURE — 97110 THERAPEUTIC EXERCISES: CPT | Mod: PN

## 2022-04-01 PROCEDURE — 97112 NEUROMUSCULAR REEDUCATION: CPT | Mod: PN

## 2022-04-01 NOTE — PROGRESS NOTES
"[facility]   Physical Therapy Treatment Note     Name: Emeka [facility]  Clinic Number: [id number]    Therapy Diagnosis:   Encounter Diagnoses   Name Primary?    Lumbar radiculopathy Yes    Spinal stenosis of lumbar region, unspecified whether neurogenic claudication present     Lumbar spondylosis     Spondylolisthesis of lumbosacral region      Physician: Ling Malloy PA-C    Visit Date: 4/1/2022       Physician Orders: PT Eval and Treat   Medical Diagnosis from Referral: Lumbar Spondylolisthesis  Evaluation Date: 2/17/2022  Authorization Period Expiration: 2/14/2023  Plan of Care Expiration: 4/17/2022  Progress Note Due: 4/17/2022  Visit # / Visits authorized: 10/ 20  FOTO: 3/3     Precautions: Diabetes     PTA Visit #: 1/5     Time In: 0150  Time Out: 254  Total Billable Time: 64  minutes    SUBJECTIVE     Pt reports: Patient reports improvement of symptoms. Continues reporting difficulty sleeping at night due to pain and tightness in low back and LEs R>L . No other complaints     He was compliant with home exercise program    Response to previous treatment: Good  Functional change: Patient is able to walk at home without cane. Reports more mobility in the lower back with ADLs     Pain: 6/10  Location: R Lower back back      OBJECTIVE   None at this time.      Treatment     Emeka received the treatments listed below:      therapeutic exercises to develop strength, ROM and core stabilization for 49 minutes including:    Nu-step @ L3 x 6 min.  Sit to stand from 24 inch surface (w/o UE support) 2 x 10  Standing Scapular Retraction w/ RTB 20x  Shoulder Ext BUE x 10, Alt UE x 10 w/ RTB  +Paloff Press RTB 20x/ea   +Hip Add w/ ball 10x3"  +Supine Clamshells w/ GTB 10x  +Abdom. Bracing w/ ball 20x3"  Hooklying Bridge 2x10    + Lower Trunk rotations 3 minutes  + Bilateral knees to chest w/ swiss ball 2x15  + Book openings 2x15  + Trunk flexion seated ww/ swiss ball 3 " "minutes  +Supported squats 2x10  +Hamstring stretch    NP: KTC w physio ball  5 sec x 10   Hooklying pelvic wagglew Physio Ball 5 sec x 10 / side.  SL Clam Shells  w GTB 2 x 10 / side ( Resumed )       Emeka participated in gait training to improve functional mobility and safety for 05 minutes, including:    Gait training w SPC in L hand w cuing for gait sequencing w step thru gait pattern    Emeka participated in neuromuscular re-education activities to improve: Balance, Coordination, Proprioception and Posture for 10 minutes. The following activities were included:  NBOS w/ EC in // bars 3x30"   NBOS w/ EO, EC on foam in // bars 2x30"/ea  Marching on foam w/ single limb UE support 20x/ea        Patient Education and Home Exercises     Home Exercises Provided and Patient Education Provided     Education provided:   - Reviewed  awareness w transitional movements w emphasis on TrA activation and BUE support and  gait training w SPC.   Written Home Exercises Provided: Pt instructed  With resumption of core stab therex  as noted. Exercises were reviewed and Emeka was able to demonstrate them prior to the end of the session.  Emeka demonstrated good understanding of the education provided. See EMR under Patient Instructions for exercises provided during therapy sessions    ASSESSMENT     Emeka had good tolerance to treatment today with no adverse effects. Patient introduce to book openings in supine  and reported relief of symptoms. Verbal and tactile cues used during bridges to engage abdominals and promote pelvic rotation. Continued w/ neuromuscular re-ed to improve balance and gait mechanics. Post-treatment patient reported improvement and more mobility of spine pain rated as 4/10.       Emeka Is progressing well towards his goals.   Pt prognosis is Good.     Pt will continue to benefit from skilled outpatient physical therapy to address the deficits listed in the problem list box " on initial evaluation, provide pt/family education and to maximize pt's level of independence in the home and community environment.     Pt's spiritual, cultural and educational needs considered and pt agreeable to plan of care and goals.     Anticipated barriers to physical therapy: none    Goals: Goals:  Short Term Goals: 4 weeks   1)  Pt will be independent with post-op restriction compliance. (Ongoing)  2) Pt will tolerate progression of core stabilization and LE  Strengthening therex without LBP nor radicular symptom exacerbation. (ONGOING)     Long Term Goals: 8 weeks   1)  Pt will demo normalized gait pattern on all surfaces without an AD.  2) Pt will be able to perform all functional mobility tasks and work duties with lift restrictions as merited with back / LE pain < 3/10.  3) Pt will be independent w HEP  and  awareness to promote self management.    PLAN     Con't w HEP progression and functional mobility training per POC.    Vickey Ramirez, PT   04/01/2022

## 2022-04-05 ENCOUNTER — TELEPHONE (OUTPATIENT)
Dept: FAMILY MEDICINE | Facility: CLINIC | Age: 47
End: 2022-04-05
Payer: MEDICAID

## 2022-04-05 ENCOUNTER — TELEPHONE (OUTPATIENT)
Dept: UROLOGY | Facility: CLINIC | Age: 47
End: 2022-04-05
Payer: MEDICAID

## 2022-04-05 NOTE — TELEPHONE ENCOUNTER
Returned pt's called about frequent urinating and pain in the bladder area. Spoke with pt's spouse to reschedule appt sooner to 4/7/22@10:30am. They confirmed and appt is set, nothing further dicussed.  4/5/22 RW

## 2022-04-05 NOTE — TELEPHONE ENCOUNTER
Patient states he is urinating frequently, and has pain to bladder area, asking for a sooner  appointment

## 2022-04-05 NOTE — TELEPHONE ENCOUNTER
----- Message from Zoila Perez sent at 4/5/2022  9:41 AM CDT -----  Type:  Sooner Apoointment Request    Caller is requesting a sooner appointment.  Caller declined first available appointment listed below.  Caller will not accept being placed on the waitlist and is requesting a message be sent to doctor.  Name of Caller: wife   When is the first available appointment? 5/19  Symptoms: pt is in pain and need to be seen asap  Would the patient rather a call back or a [facility]? Call   Best Call Back Number:[phone]

## 2022-04-06 NOTE — PROGRESS NOTES
Pt seen in clinic today for 2 week post op wound check. Discussed post op instructions in detail. Written copy given to pt. All staples removed. Incision clean dry, intact, and open to air.

## 2022-04-07 ENCOUNTER — OFFICE VISIT (OUTPATIENT)
Dept: UROLOGY | Facility: CLINIC | Age: 47
End: 2022-04-07
Payer: MEDICAID

## 2022-04-07 VITALS — WEIGHT: 191.81 LBS | BODY MASS INDEX: 29.07 KG/M2 | HEIGHT: 68 IN

## 2022-04-07 DIAGNOSIS — R39.89 SUSPECTED UTI: Primary | ICD-10-CM

## 2022-04-07 DIAGNOSIS — R30.0 DYSURIA: ICD-10-CM

## 2022-04-07 DIAGNOSIS — R35.0 URINARY FREQUENCY: ICD-10-CM

## 2022-04-07 LAB — POC RESIDUAL URINE VOLUME: 130 ML (ref 0–100)

## 2022-04-07 PROCEDURE — 3008F BODY MASS INDEX DOCD: CPT | Mod: CPTII,,, | Performed by: NURSE PRACTITIONER

## 2022-04-07 PROCEDURE — 1159F PR MEDICATION LIST DOCUMENTED IN MEDICAL RECORD: ICD-10-PCS | Mod: CPTII,,, | Performed by: NURSE PRACTITIONER

## 2022-04-07 PROCEDURE — 3051F PR MOST RECENT HEMOGLOBIN A1C LEVEL 7.0 - < 8.0%: ICD-10-PCS | Mod: CPTII,,, | Performed by: NURSE PRACTITIONER

## 2022-04-07 PROCEDURE — 99999 PR PBB SHADOW E&M-EST. PATIENT-LVL III: ICD-10-PCS | Mod: PBBFAC,,, | Performed by: NURSE PRACTITIONER

## 2022-04-07 PROCEDURE — 99999 PR PBB SHADOW E&M-EST. PATIENT-LVL III: CPT | Mod: PBBFAC,,, | Performed by: NURSE PRACTITIONER

## 2022-04-07 PROCEDURE — 99214 PR OFFICE/OUTPT VISIT, EST, LEVL IV, 30-39 MIN: ICD-10-PCS | Mod: S$PBB,,, | Performed by: NURSE PRACTITIONER

## 2022-04-07 PROCEDURE — 1159F MED LIST DOCD IN RCRD: CPT | Mod: CPTII,,, | Performed by: NURSE PRACTITIONER

## 2022-04-07 PROCEDURE — 99214 OFFICE O/P EST MOD 30 MIN: CPT | Mod: S$PBB,,, | Performed by: NURSE PRACTITIONER

## 2022-04-07 PROCEDURE — 3008F PR BODY MASS INDEX (BMI) DOCUMENTED: ICD-10-PCS | Mod: CPTII,,, | Performed by: NURSE PRACTITIONER

## 2022-04-07 PROCEDURE — 51798 US URINE CAPACITY MEASURE: CPT | Mod: PBBFAC | Performed by: NURSE PRACTITIONER

## 2022-04-07 PROCEDURE — 3051F HG A1C>EQUAL 7.0%<8.0%: CPT | Mod: CPTII,,, | Performed by: NURSE PRACTITIONER

## 2022-04-07 PROCEDURE — 99213 OFFICE O/P EST LOW 20 MIN: CPT | Mod: PBBFAC | Performed by: NURSE PRACTITIONER

## 2022-04-07 RX ORDER — CIPROFLOXACIN 500 MG/1
500 TABLET ORAL 2 TIMES DAILY
Qty: 14 TABLET | Refills: 0 | Status: SHIPPED | OUTPATIENT
Start: 2022-04-07 | End: 2022-04-14

## 2022-04-07 RX ORDER — TAMSULOSIN HYDROCHLORIDE 0.4 MG/1
0.8 CAPSULE ORAL DAILY
Qty: 60 CAPSULE | Refills: 11 | Status: SHIPPED | OUTPATIENT
Start: 2022-04-07 | End: 2022-05-10 | Stop reason: SDUPTHER

## 2022-04-07 NOTE — PROGRESS NOTES
Subjective:       Patient ID: Emeka Caballero is a 46 y.o. male who was last seen in this office 2/17/2022    Chief Complaint:   Chief Complaint   Patient presents with    Other     Poss uti        Patient previously seen by Dr Rosas in February 2020 for evaluation of varicocele/ED/LUTS. He was started on Flomax at that time. He has been lost to urology follow up until now.    He is s/p lumbar fusion on 2/8/22 at [facility]. In review of records, patient had alegria catheter placed during his surgery. Alegria was removed on POD #1 and patient noted to be voiding without difficulty. He later became febrile during his admission. CT scan showed heterogeneous appearance of prostate, ?prostatitis.  No urine culture obtained. Blood cx--negative. Recommended patient follow up with urology as outpatient    He reports dysuria, urinary frequency, pelvic pressure since alegria removal. He was discharged home with 2 week course of Levaquin which he is currently taking. He has noted improvement in his urinary symptoms with antibiotics. There have been no further occurrences of fever. Denies gross hematuria.  He remains on Flomax    4/7/22  Patient reports dysuria and urinary frequency for the past month. He is concerned regarding a UTI.  He is currently taking Flomax. He is not having gross hematuria or flank pain. Denies penile discharge or fever    PVR (bladder scan) today - 108 ml (not true PVR)    ACTIVE MEDICAL ISSUES:  Patient Active Problem List   Diagnosis    Viral syndrome    Lumbar radiculopathy    Spinal stenosis of lumbar region    Lumbar spondylosis    DDD (degenerative disc disease), lumbar    Spondylolisthesis of lumbosacral region    Fever    Constipation    BPH (benign prostatic hyperplasia)    HLD (hyperlipidemia)    Diabetes mellitus, type 2    Prostatitis       ALLERGIES AND MEDICATIONS: updated and reviewed.  Review of patient's allergies indicates:  No Known Allergies  Current Outpatient  Medications   Medication Sig    acetaminophen (TYLENOL) 325 MG tablet Take 2 tablets (650 mg total) by mouth every 6 (six) hours as needed for Pain.    blood sugar diagnostic Strp To check BG 3 times daily, to use with insurance preferred meter    blood-glucose meter kit To check BG 3 times daily, to use with insurance preferred meter    gabapentin (NEURONTIN) 100 MG capsule Take 1 capsule (100 mg total) by mouth 2 (two) times daily.    gemfibroziL (LOPID) 600 MG tablet Take 1 tablet (600 mg total) by mouth 2 (two) times daily before meals.    lancets Misc To check BG 3 times daily, to use with insurance preferred meter    metFORMIN (GLUCOPHAGE-XR) 500 MG ER 24hr tablet Take 2 tablets (1,000 mg total) by mouth 2 (two) times daily with meals.    methocarbamoL (ROBAXIN) 500 MG Tab Take 1 tablet (500 mg total) by mouth nightly as needed (Back pain).    methylPREDNISolone (MEDROL DOSEPACK) 4 mg tablet use as directed    oxyCODONE-acetaminophen (PERCOCET) 5-325 mg per tablet Take 1 tablet by mouth every 6 (six) hours as needed for Pain.    ciprofloxacin HCl (CIPRO) 500 MG tablet Take 1 tablet (500 mg total) by mouth 2 (two) times daily. for 7 days    diazePAM (VALIUM) 5 MG tablet Take 0.5 tablets (2.5 mg total) by mouth every 6 (six) hours as needed (Pain).    tamsulosin (FLOMAX) 0.4 mg Cap Take 2 capsules (0.8 mg total) by mouth once daily.    triamcinolone acetonide 0.1% (KENALOG) 0.1 % cream Apply topically 2 (two) times daily. Apply to rash. DO NOT USE ON FACE.    UNABLE TO FIND Bleaching cream     No current facility-administered medications for this visit.       Review of Systems   Constitutional: Negative for activity change, chills, fatigue, fever and unexpected weight change.   Eyes: Negative for discharge, redness and visual disturbance.   Respiratory: Negative for cough, shortness of breath and wheezing.    Cardiovascular: Negative for chest pain and leg swelling.   Gastrointestinal: Negative  "for abdominal distention, abdominal pain, constipation, diarrhea, nausea and vomiting.   Genitourinary: Positive for dysuria and frequency. Negative for difficulty urinating, flank pain, hematuria, penile discharge, penile pain, penile swelling, scrotal swelling, testicular pain and urgency.   Musculoskeletal: Negative for arthralgias, joint swelling and myalgias.   Skin: Negative for color change and rash.   Neurological: Negative for dizziness and light-headedness.   Psychiatric/Behavioral: Negative for behavioral problems and confusion. The patient is not nervous/anxious.        Objective:      Vitals:    04/07/22 1139   Weight: 87 kg (191 lb 12.8 oz)   Height: 5' 8" (1.727 m)     Physical Exam  Constitutional:       Appearance: He is well-developed.   HENT:      Head: Normocephalic and atraumatic.      Nose: Nose normal.   Eyes:      General:         Right eye: No discharge.         Left eye: No discharge.      Conjunctiva/sclera: Conjunctivae normal.   Neck:      Thyroid: No thyromegaly.      Trachea: No tracheal deviation.   Cardiovascular:      Rate and Rhythm: Normal rate and regular rhythm.   Pulmonary:      Effort: Pulmonary effort is normal. No respiratory distress.      Breath sounds: No wheezing.   Abdominal:      General: There is no distension.      Palpations: Abdomen is soft.      Tenderness: There is no abdominal tenderness.      Hernia: No hernia is present.   Genitourinary:     Comments: Patient declined exam  Musculoskeletal:      Cervical back: Normal range of motion and neck supple.      Comments: Back brace noted   Skin:     General: Skin is warm and dry.      Findings: No erythema or rash.   Neurological:      Mental Status: He is alert and oriented to person, place, and time.   Psychiatric:         Behavior: Behavior normal.         Judgment: Judgment normal.         Urine dipstick shows patient unable to produce specimen.    Assessment:       1. Suspected UTI    2. Urinary frequency    3. " Dysuria          Plan:       1. Suspected UTI  -Empiric Cipro. Patient instructed to drop off urine specimen at lab prior to starting antibiotics  - Urine culture; Future  - ciprofloxacin HCl (CIPRO) 500 MG tablet; Take 1 tablet (500 mg total) by mouth 2 (two) times daily. for 7 days  Dispense: 14 tablet; Refill: 0    2. Urinary frequency  -Increase Flomax BID  - POCT Bladder Scan  - Urine culture; Future  - tamsulosin (FLOMAX) 0.4 mg Cap; Take 2 capsules (0.8 mg total) by mouth once daily.  Dispense: 60 capsule; Refill: 11    3. Dysuria    - Urine culture; Future  - ciprofloxacin HCl (CIPRO) 500 MG tablet; Take 1 tablet (500 mg total) by mouth 2 (two) times daily. for 7 days  Dispense: 14 tablet; Refill: 0            Follow up in about 4 weeks (around 5/5/2022) for Follow up.

## 2022-04-11 NOTE — PROGRESS NOTES
"[facility]   Physical Therapy Treatment Note     Name: Emeka Caballero  Clinic Number: [id number]    Therapy Diagnosis:   Encounter Diagnoses   Name Primary?    Lumbar radiculopathy Yes    Spinal stenosis of lumbar region, unspecified whether neurogenic claudication present     Lumbar spondylosis     Spondylolisthesis of lumbosacral region      Physician: Ling Malloy PA-C    Visit Date: 4/12/2022       Physician Orders: PT Eval and Treat   Medical Diagnosis from Referral: Lumbar Spondylolisthesis  Evaluation Date: 2/17/2022  Authorization Period Expiration: 2/14/2023  Plan of Care Expiration: 4/17/2022  Progress Note Due: 4/17/2022  Visit # / Visits authorized: 11/ 20  FOTO: 3/3     Precautions: Diabetes     PTA Visit #: 1/5     Time In: 1200  Time Out: 1255  Total Billable Time: 55  minutes    SUBJECTIVE     Pt reports: Pt states he continues to have tightness in lower back and B legs.     He was compliant with home exercise program    Response to previous treatment: Good  Functional change: Patient is able to walk at home without cane. Reports more mobility in the lower back with ADLs     Pain: 6/10  Location: R Lower back back      OBJECTIVE   None at this time.      Treatment     Emeka received the treatments listed below:      therapeutic exercises to develop strength, ROM and core stabilization for 30 minutes including: bolded=performed    Nu-step @ L3 x 6 min.  Sit to stand from 18 inch surface+airex (w/o UE support) 2 x 10  Standing Scapular Retraction w/ RTB 20x  Shoulder Ext BUE 20x w/ RTB  Paloff Press RTB 20x/ea   Hip Add w/ ball 10x3"  Supine Clamshells w/ GTB 2x10  Abdom. Bracing w/ ball 20x3"  Hooklying Bridge x10     Lower Trunk rotations 3 minutes   Bilateral knees to chest w/ swiss ball 2x15   Book openings 2x15  Trunk flexion seated ww/ swiss ball 3 minutes  Supported squats 2x10  Hamstring stretch    NP: KTC w physio ball  5 sec x 10   Hooklying " "pelvic wagglew Physio Ball 5 sec x 10 / side.  SL Clam Shells  w GTB 2 x 10 / side ( Resumed )       Emeka participated in gait training to improve functional mobility and safety for 00 minutes, including:    Gait training w SPC in L hand w cuing for gait sequencing w step thru gait pattern    Emeka participated in neuromuscular re-education activities to improve: Balance, Coordination, Proprioception and Posture for 00 minutes. The following activities were included:  NBOS w/ EC in // bars 3x30"   NBOS w/ EO, EC on foam in // bars 2x30"/ea  Marching on foam w/ single limb UE support 20x/ea      Manual intervention: 25 minutes  - STM and cupping to B lumbar paraspinals and QL  -B piriformis release with passive hip IR/ER  -STM B glutes      Moist heat to lumbar spine at end of session 10min (not billed)    Patient Education and Home Exercises     Home Exercises Provided and Patient Education Provided     Education provided:   -   Written Home Exercises Provided: Pt instructed to continue HEP. Exercises were reviewed and Emeka was able to demonstrate them prior to the end of the session.  Emeka demonstrated good understanding of the education provided. See EMR under Patient Instructions for exercises provided during therapy sessions    ASSESSMENT     Manual intervention performed this date secondary to pt's main complaint of increased mm tightness across lower back and into BLE. He had good tolerance and positive response to cupping and STM with improved symptoms reported post tx techniques. Limited time for exercises secondary to time spent on manual therefore some exercises were held. Will benefit from resumed exercises next visit as time allows. Pt had difficulty performing bridges this date but was instructed to perform in modified ROM as tolerated. Pt able to perform STS from 18"box with 1 airex pad today. He had difficulty initially but demonstrated improvement after cues for improved glute " recruitment. Heat utilized at end of session today to help decrease mm tightness in lower back. Pt reports overall decreased tightness and pain symptoms at end of session today. Pt will be re-assessed by primary PT next visit.     Emeka Is progressing well towards his goals.   Pt prognosis is Good.     Pt will continue to benefit from skilled outpatient physical therapy to address the deficits listed in the problem list box on initial evaluation, provide pt/family education and to maximize pt's level of independence in the home and community environment.     Pt's spiritual, cultural and educational needs considered and pt agreeable to plan of care and goals.     Anticipated barriers to physical therapy: none    Goals: Goals:  Short Term Goals: 4 weeks   1)  Pt will be independent with post-op restriction compliance. (Ongoing)  2) Pt will tolerate progression of core stabilization and LE  Strengthening therex without LBP nor radicular symptom exacerbation. (ONGOING)     Long Term Goals: 8 weeks   1)  Pt will demo normalized gait pattern on all surfaces without an AD.  2) Pt will be able to perform all functional mobility tasks and work duties with lift restrictions as merited with back / LE pain < 3/10.  3) Pt will be independent w HEP  and  awareness to promote self management.    PLAN     Con't w HEP progression and functional mobility training per POC.    Catarino Katz, PTA   04/12/2022

## 2022-04-12 ENCOUNTER — TELEPHONE (OUTPATIENT)
Dept: FAMILY MEDICINE | Facility: CLINIC | Age: 47
End: 2022-04-12
Payer: MEDICAID

## 2022-04-12 ENCOUNTER — TELEPHONE (OUTPATIENT)
Dept: UROLOGY | Facility: CLINIC | Age: 47
End: 2022-04-12
Payer: MEDICAID

## 2022-04-12 ENCOUNTER — CLINICAL SUPPORT (OUTPATIENT)
Dept: REHABILITATION | Facility: HOSPITAL | Age: 47
End: 2022-04-12
Attending: PHYSICIAN ASSISTANT
Payer: MEDICAID

## 2022-04-12 DIAGNOSIS — M47.816 LUMBAR SPONDYLOSIS: ICD-10-CM

## 2022-04-12 DIAGNOSIS — M43.17 SPONDYLOLISTHESIS OF LUMBOSACRAL REGION: ICD-10-CM

## 2022-04-12 DIAGNOSIS — M54.16 LUMBAR RADICULOPATHY: Primary | ICD-10-CM

## 2022-04-12 DIAGNOSIS — M48.061 SPINAL STENOSIS OF LUMBAR REGION, UNSPECIFIED WHETHER NEUROGENIC CLAUDICATION PRESENT: ICD-10-CM

## 2022-04-12 PROCEDURE — 97110 THERAPEUTIC EXERCISES: CPT | Mod: PN,CQ

## 2022-04-12 PROCEDURE — 97140 MANUAL THERAPY 1/> REGIONS: CPT | Mod: PN,CQ

## 2022-04-12 NOTE — TELEPHONE ENCOUNTER
Recent urine culture was negative for a UTI. Ok to stop Cipro. No antibiotics are needed at this time

## 2022-04-12 NOTE — TELEPHONE ENCOUNTER
----- Message from Lorrie Martinez sent at 4/12/2022 11:04 AM CDT -----  Type:  Patient Returning Call    Who Called: Justine - Wife    Who Left Message for Patient: Heather PURVIS    Does the patient know what this is regarding?: no     Would the patient rather a call back or a response via [facility]?  Call     Best Call Back Number:.[phone]

## 2022-04-13 NOTE — PROGRESS NOTES
"[facility]   Physical Therapy Treatment Note     [facility]  [facility] [id number]    Therapy Diagnosis:   No diagnosis found.  Physician: Ling Malloy PA-C    Visit Date: 4/14/2022       Physician Orders: PT Eval and Treat   Medical Diagnosis from Referral: Lumbar Spondylolisthesis  Evaluation Date: 2/17/2022  Authorization Period Expiration: 2/14/2023  Plan of Care Expiration: 4/17/2022  Progress Note Due: 4/17/2022  Visit # / Visits authorized: 12/ 20  FOTO: 3/3     Precautions: Diabetes     PTA Visit #: 2/5     Time In: 1200  Time Out: 1255  Total Billable Time: 55  minutes    SUBJECTIVE     Pt reports: Pt states he continues to have tightness in lower back and B legs.     He was compliant with home exercise program    Response to previous treatment: Good  Functional change: Patient is able to walk at home without cane. Reports more mobility in the lower back with ADLs     Pain: 6/10  Location: R Lower back back      OBJECTIVE   None at this time.      Treatment     Emeka received the treatments listed below:      therapeutic exercises to develop strength, ROM and core stabilization for 30 minutes including: bolded=performed    Nu-step @ L3 x 6 min.  Sit to stand from 18 inch surface+airex (w/o UE support) 2 x 10  Standing Scapular Retraction w/ RTB 20x  Shoulder Ext BUE 20x w/ RTB  Paloff Press RTB 20x/ea   Hip Add w/ ball 10x3"  Supine Clamshells w/ GTB 2x10  Abdom. Bracing w/ ball 20x3"  Hooklying Bridge x10     Lower Trunk rotations 3 minutes   Bilateral knees to chest w/ swiss ball 2x15   Book openings 2x15  Trunk flexion seated ww/ swiss ball 3 minutes  Supported squats 2x10  Hamstring stretch    NP: KTC w physio ball  5 sec x 10   Hooklying pelvic wagglew Physio Ball 5 sec x 10 / side.  SL Clam Shells  w GTB 2 x 10 / side ( Resumed )       Emeka participated in gait training to improve functional mobility and safety for 00 minutes, " "including:    Gait training w SPC in L hand w cuing for gait sequencing w step thru gait pattern    Emeka participated in neuromuscular re-education activities to improve: Balance, Coordination, Proprioception and Posture for 00 minutes. The following activities were included:  NBOS w/ EC in // bars 3x30"   NBOS w/ EO, EC on foam in // bars 2x30"/ea  Marching on foam w/ single limb UE support 20x/ea      Manual intervention: 25 minutes  - STM and cupping to B lumbar paraspinals and QL  -B piriformis release with passive hip IR/ER  -STM B glutes      Moist heat to lumbar spine at end of session 10min (not billed)    Patient Education and Home Exercises     Home Exercises Provided and Patient Education Provided     Education provided:   -   Written Home Exercises Provided: Pt instructed to continue HEP. Exercises were reviewed and Emeka was able to demonstrate them prior to the end of the session.  Emeka demonstrated good understanding of the education provided. See EMR under Patient Instructions for exercises provided during therapy sessions    ASSESSMENT     Manual intervention performed this date secondary to pt's main complaint of increased mm tightness across lower back and into BLE. He had good tolerance and positive response to cupping and STM with improved symptoms reported post tx techniques. Limited time for exercises secondary to time spent on manual therefore some exercises were held. Will benefit from resumed exercises next visit as time allows. Pt had difficulty performing bridges this date but was instructed to perform in modified ROM as tolerated. Pt able to perform STS from 18"box with 1 airex pad today. He had difficulty initially but demonstrated improvement after cues for improved glute recruitment. Heat utilized at end of session today to help decrease mm tightness in lower back. Pt reports overall decreased tightness and pain symptoms at end of session today. Pt will be re-assessed by " primary PT next visit.     Emeka Is progressing well towards his goals.   Pt prognosis is Good.     Pt will continue to benefit from skilled outpatient physical therapy to address the deficits listed in the problem list box on initial evaluation, provide pt/family education and to maximize pt's level of independence in the home and community environment.     Pt's spiritual, cultural and educational needs considered and pt agreeable to plan of care and goals.     Anticipated barriers to physical therapy: none    Goals: Goals:  Short Term Goals: 4 weeks   1)  Pt will be independent with post-op restriction compliance. (Ongoing)  2) Pt will tolerate progression of core stabilization and LE  Strengthening therex without LBP nor radicular symptom exacerbation. (ONGOING)     Long Term Goals: 8 weeks   1)  Pt will demo normalized gait pattern on all surfaces without an AD.  2) Pt will be able to perform all functional mobility tasks and work duties with lift restrictions as merited with back / LE pain < 3/10.  3) Pt will be independent w HEP  and  awareness to promote self management.    PLAN     Con't w HEP progression and functional mobility training per POC.    Catarino Katz, PTA   04/13/2022

## 2022-04-14 ENCOUNTER — CLINICAL SUPPORT (OUTPATIENT)
Dept: REHABILITATION | Facility: HOSPITAL | Age: 47
End: 2022-04-14
Attending: PHYSICIAN ASSISTANT
Payer: MEDICAID

## 2022-04-14 DIAGNOSIS — M47.816 LUMBAR SPONDYLOSIS: ICD-10-CM

## 2022-04-14 DIAGNOSIS — M43.17 SPONDYLOLISTHESIS OF LUMBOSACRAL REGION: ICD-10-CM

## 2022-04-14 DIAGNOSIS — M48.062 SPINAL STENOSIS OF LUMBAR REGION WITH NEUROGENIC CLAUDICATION: ICD-10-CM

## 2022-04-14 DIAGNOSIS — M54.16 LUMBAR RADICULOPATHY: Primary | ICD-10-CM

## 2022-04-14 DIAGNOSIS — E11.9 TYPE 2 DIABETES MELLITUS WITHOUT COMPLICATION, WITHOUT LONG-TERM CURRENT USE OF INSULIN: ICD-10-CM

## 2022-04-14 PROCEDURE — 97110 THERAPEUTIC EXERCISES: CPT | Mod: PN

## 2022-04-14 NOTE — PROGRESS NOTES
[facility]   Physical Therapy Treatment Note     [id number]    Therapy Diagnosis:   Encounter Diagnoses   Name Primary?    Lumbar radiculopathy Yes    Spinal stenosis of lumbar region with neurogenic claudication     Lumbar spondylosis     Spondylolisthesis of lumbosacral region     Type 2 diabetes mellitus without complication, without long-term current use of insulin      Physician: Ling Malloy PA-C    Visit Date: 4/14/2022       Physician Orders: PT Eval and Treat   Medical Diagnosis from Referral: Lumbar Spondylolisthesis  Evaluation Date: 2/17/2022  Authorization Period Expiration: 2/14/2023  Plan of Care Expiration: 5/31/2022  Progress Note Due: 5/31/2022  Visit # / Visits authorized: 12/ 1   FOTO: 3/3     Precautions: Diabetes     PTA Visit #: 0/5     Time In: 11:30A  Time Out: 12:15 P  Total Billable Time: 45 minutes    SUBJECTIVE     Pt reports: l am still have some LBP intermittently and leg pain especially at night.  He was compliant with home exercise program for stretching only.  Response to previous treatment: Decreased back pain and stiffness after exercise  Functional change: improved functional transfers.and ambulation.    Pain: 4/10  Location:  Lower back back  R > L LE    OBJECTIVE   Active Lumbar motion decreased 25-50% for FB and B SB with end range para-lumbar tightness.    LE Strength 5/5 except R Hip Abd / Ext 4/5    Gait: Pt continues to demo proximal LE  Instability especially w R stance phase requiring SPC.    Functional lift: Pt unable to lift object > 5# from below knee level without increased LBP.    Treatment     Emeka received the treatments listed below:      therapeutic exercises to develop strength, ROM and core stabilization for 40 minutes including:  Nu-step @ L3 x 8 min.  Seated forward and lateral reach stretch w physio ball 10 sec x 5 / direction  Supine HS Curls w physio ball  5 sec 2 x 10    Hooklying pelvic rotational stab with Physio Ball 5 sec x 10 / side.  Hooklying Heel Bridge w GTB 2 x 10. ( Resumed )   SL Clam Shells  w GTB 2 x 10 / side   Sit to stand from 18 inch surface 2 x 10  + Standing Scapular Retraction / Shoulder Ext w  GTB BUE x 10, Alt UE x 10  .      Patient Education and Home Exercises     Home Exercises Provided and Patient Education Provided     Education provided:   - Reviewed body mechanics w transitional movements and need to stretch regularly when lower back tightness is noted.   Written Home Exercises Provided: Pt instructed  With resumption of core stab therex  as noted. Exercises were reviewed and Emeka was able to demonstrate them prior to the end of the session.  Emeka demonstrated good understanding of the education provided. See EMR under Patient Instructions for exercises provided during therapy sessions    ASSESSMENT     Pt continues to demo lumbar tightness and pain with transitional movements and standing activities which hinders overall functional mobility. Also continues to require SPC  For gait stability on level and unlevel surfaces. Pt has not met his LTG's of ambulating without SPC and performing functional and lifting tasks needed to return to work. Recommend continuing with PT at this time to address his functional deficits associated w residual LBP / tightness and R > L proximal LE weakness.      Emeka Is progressing slowly towards his goals.   Pt prognosis is Fair.    Pt will continue to benefit from skilled outpatient physical therapy to address the deficits listed in the problem list box on initial evaluation, provide pt/family education and to maximize pt's level of independence in the home and community environment.     Pt's spiritual, cultural and educational needs considered and pt agreeable to plan of care and goals.     Anticipated barriers to physical therapy: none    Goals: Goals:  Short Term Goals: 4 weeks   1)  Pt will be  independent with post-op restriction compliance. (MET)  2) Pt will tolerate progression of core stabilization and LE  Strengthening therex without LBP nor radicular symptom exacerbation. (MET)     Long Term Goals: 8 weeks   1)  Pt will demo normalized gait pattern on all surfaces without an AD. (Not Met to date)  2) Pt will be able to perform all functional mobility tasks and work duties with lift restrictions as merited with back / LE pain < 3/10. (Not Met to Date)  3) Pt will be independent w HEP  and  awareness to promote self management. ( On Going).    PLAN     Con't w PT 2x/wk x 6 weeks for progressive therex, manual therapy, gait training and functional activity training.    Harrison Mojica, PT

## 2022-04-14 NOTE — PLAN OF CARE
[facility]   Physical Therapy Treatment Note     [id number]    Therapy Diagnosis:   Encounter Diagnoses   Name Primary?    Lumbar radiculopathy Yes    Spinal stenosis of lumbar region with neurogenic claudication     Lumbar spondylosis     Spondylolisthesis of lumbosacral region     Type 2 diabetes mellitus without complication, without long-term current use of insulin      Physician: Ling Malloy PA-C    Visit Date: 4/14/2022       Physician Orders: PT Eval and Treat   Medical Diagnosis from Referral: Lumbar Spondylolisthesis  Evaluation Date: 2/17/2022  Authorization Period Expiration: 2/14/2023  Plan of Care Expiration: 5/31/2022  Progress Note Due: 5/31/2022  Visit # / Visits authorized: 12/ 1   FOTO: 3/3     Precautions: Diabetes     PTA Visit #: 0/5     Time In: 11:30A  Time Out: 12:15 P  Total Billable Time: 45 minutes    SUBJECTIVE     Pt reports: l am still have some LBP intermittently and leg pain especially at night.  He was compliant with home exercise program for stretching only.  Response to previous treatment: Decreased back pain and stiffness after exercise  Functional change: improved functional transfers.and ambulation.    Pain: 4/10  Location:  Lower back back  R > L LE    OBJECTIVE   Active Lumbar motion decreased 25-50% for FB and B SB with end range para-lumbar tightness.    LE Strength 5/5 except R Hip Abd / Ext 4/5    Gait: Pt continues to demo proximal LE  Instability especially w R stance phase requiring SPC.    Functional lift: Pt unable to lift object > 5# from below knee level without increased LBP.    Treatment     Emeka received the treatments listed below:      therapeutic exercises to develop strength, ROM and core stabilization for 40 minutes including:  Nu-step @ L3 x 8 min.  Seated forward and lateral reach stretch w physio ball 10 sec x 5 / direction  Supine HS Curls w physio ball  5 sec 2 x 10    Hooklying pelvic rotational stab with Physio Ball 5 sec x 10 / side.  Hooklying Heel Bridge w GTB 2 x 10. ( Resumed )   SL Clam Shells  w GTB 2 x 10 / side   Sit to stand from 18 inch surface 2 x 10  + Standing Scapular Retraction / Shoulder Ext w  GTB BUE x 10, Alt UE x 10  .      Patient Education and Home Exercises     Home Exercises Provided and Patient Education Provided     Education provided:   - Reviewed body mechanics w transitional movements and need to stretch regularly when lower back tightness is noted.   Written Home Exercises Provided: Pt instructed  With resumption of core stab therex  as noted. Exercises were reviewed and Emeka was able to demonstrate them prior to the end of the session.  Emeka demonstrated good understanding of the education provided. See EMR under Patient Instructions for exercises provided during therapy sessions    ASSESSMENT     Pt continues to demo lumbar tightness and pain with transitional movements and standing activities which hinders overall functional mobility. Also continues to require SPC  For gait stability on level and unlevel surfaces. Pt has not met his LTG's of ambulating without SPC and performing functional and lifting tasks needed to return to work. Recommend continuing with PT at this time to address his functional deficits associated w residual LBP / tightness and R > L proximal LE weakness.      Emeka Is progressing slowly towards his goals.   Pt prognosis is Fair.    Pt will continue to benefit from skilled outpatient physical therapy to address the deficits listed in the problem list box on initial evaluation, provide pt/family education and to maximize pt's level of independence in the home and community environment.     Pt's spiritual, cultural and educational needs considered and pt agreeable to plan of care and goals.     Anticipated barriers to physical therapy: none    Goals: Goals:  Short Term Goals: 4 weeks   1)  Pt will be  independent with post-op restriction compliance. (MET)  2) Pt will tolerate progression of core stabilization and LE  Strengthening therex without LBP nor radicular symptom exacerbation. (MET)     Long Term Goals: 8 weeks   1)  Pt will demo normalized gait pattern on all surfaces without an AD. (Not Met to date)  2) Pt will be able to perform all functional mobility tasks and work duties with lift restrictions as merited with back / LE pain < 3/10. (Not Met to Date)  3) Pt will be independent w HEP  and  awareness to promote self management. ( On Going).    PLAN     Con't w PT 2x/wk x 6 weeks for progressive therex, manual therapy, gait training and functional activity training.    Harrison Mojica, PT

## 2022-04-20 NOTE — PROGRESS NOTES
[facility]   Physical Therapy Treatment Note     Name: Emeka [facility] Number: [id number]    Therapy Diagnosis:   Encounter Diagnoses   Name Primary?    Lumbar radiculopathy Yes    Lumbar spondylosis     Spondylolisthesis of lumbosacral region      Physician: Ling Malloy PA-C    Visit Date: 4/21/2022       Physician Orders: PT Eval and Treat   Medical Diagnosis from Referral: Lumbar Spondylolisthesis  Evaluation Date: 2/17/2022  Authorization Period Expiration: 2/14/2023  Plan of Care Expiration: 5/31/2022  Progress Note Due: 5/14/2022  Visit # / Visits authorized: 13/ 20  FOTO: 3/3     Precautions: Diabetes     PTA Visit #: 1/5     Time In: 8:15 (late)  Time Out: 0900  Total Billable Time: 20 minutes 1:1 with PTA    SUBJECTIVE     Pt reports: Pt states he continues to feel tightness and pain. He feels temporary relief with PT but pain/tightness eventually returns.  He was compliant with home exercise program for stretching only.  Response to previous treatment: Decreased back pain and stiffness after exercise  Functional change: improved functional transfers.and ambulation.    Pain: 7/10  Location:  Lower back back  R > L LE    OBJECTIVE       Treatment     Emeka received the treatments listed below:      therapeutic exercises to develop strength, ROM and core stabilization for 40 minutes including:  Nu-step @ L3 x 8 min.  Seated forward and lateral reach stretch w physio ball 10 sec x 5 / direction  Supine HS Curls w physio ball  5 sec 2 x 10   Hooklying pelvic rotational stab with Physio Ball 5 sec x 10 / side.  Hooklying Heel Bridge w GTB 2 x 10. ( Resumed )   SL Clam Shells  w GTB 2 x 10 / side   Sit to stand from 18 inch surface 2 x 10   Standing Scapular Retraction / Shoulder Ext w  GTB 2x10 rows, 10x alt extension, 10 B extension    Manual intervention: 15 minutes  - STM and cupping to B lumbar paraspinals and QL  -B piriformis release with passive hip  IR/ER  -STM B glutes      Patient Education and Home Exercises     Home Exercises Provided and Patient Education Provided     Education provided:   -    Written Home Exercises Provided: Pt instructed  With resumption of core stab therex  as noted. Exercises were reviewed and Emeka was able to demonstrate them prior to the end of the session.  Emeka demonstrated good understanding of the education provided. See EMR under Patient Instructions for exercises provided during therapy sessions    ASSESSMENT     Pt continues with antalgic gait pattern with SPC today. He continues with high pain levels but is able to complete exercises without significant increase. Continued manual techniques today to decrease mm tightness with positive report from patient. Will continue progressing functional mobility and strength as tolerated.       Emeka Is progressing slowly towards his goals.   Pt prognosis is Fair.    Pt will continue to benefit from skilled outpatient physical therapy to address the deficits listed in the problem list box on initial evaluation, provide pt/family education and to maximize pt's level of independence in the home and community environment.     Pt's spiritual, cultural and educational needs considered and pt agreeable to plan of care and goals.     Anticipated barriers to physical therapy: none    Goals: Goals:  Short Term Goals: 4 weeks   1)  Pt will be independent with post-op restriction compliance. (MET)  2) Pt will tolerate progression of core stabilization and LE  Strengthening therex without LBP nor radicular symptom exacerbation. (MET)     Long Term Goals: 8 weeks   1)  Pt will demo normalized gait pattern on all surfaces without an AD. (Not Met to date)  2) Pt will be able to perform all functional mobility tasks and work duties with lift restrictions as merited with back / LE pain < 3/10. (Not Met to Date)  3) Pt will be independent w HEP  and  awareness to promote self  management. ( On Going).    PLAN     Con't w PT 2x/wk x 6 weeks for progressive therex, manual therapy, gait training and functional activity training.    Catarino Katz, PTA

## 2022-04-21 ENCOUNTER — CLINICAL SUPPORT (OUTPATIENT)
Dept: REHABILITATION | Facility: HOSPITAL | Age: 47
End: 2022-04-21
Attending: PHYSICIAN ASSISTANT
Payer: MEDICAID

## 2022-04-21 DIAGNOSIS — M54.16 LUMBAR RADICULOPATHY: Primary | ICD-10-CM

## 2022-04-21 DIAGNOSIS — M43.17 SPONDYLOLISTHESIS OF LUMBOSACRAL REGION: ICD-10-CM

## 2022-04-21 DIAGNOSIS — M47.816 LUMBAR SPONDYLOSIS: ICD-10-CM

## 2022-04-21 PROCEDURE — 97140 MANUAL THERAPY 1/> REGIONS: CPT | Mod: PN,CQ

## 2022-04-26 ENCOUNTER — HOSPITAL ENCOUNTER (EMERGENCY)
Facility: HOSPITAL | Age: 47
Discharge: HOME | End: 2022-04-26
Attending: EMERGENCY MEDICINE
Payer: MEDICAID

## 2022-04-26 VITALS
WEIGHT: 210 LBS | DIASTOLIC BLOOD PRESSURE: 76 MMHG | SYSTOLIC BLOOD PRESSURE: 147 MMHG | BODY MASS INDEX: 31.83 KG/M2 | OXYGEN SATURATION: 99 % | HEART RATE: 112 BPM | HEIGHT: 68 IN | TEMPERATURE: 99 F | RESPIRATION RATE: 18 BRPM

## 2022-04-26 DIAGNOSIS — M54.32 SCIATICA OF LEFT SIDE: Primary | ICD-10-CM

## 2022-04-26 PROCEDURE — 99284 EMERGENCY DEPT VISIT MOD MDM: CPT | Mod: 25

## 2022-04-26 PROCEDURE — 25000003 PHARM REV CODE 250: Performed by: EMERGENCY MEDICINE

## 2022-04-26 PROCEDURE — 99285 EMERGENCY DEPT VISIT HI MDM: CPT | Mod: ,,, | Performed by: EMERGENCY MEDICINE

## 2022-04-26 PROCEDURE — 99285 PR EMERGENCY DEPT VISIT,LEVEL V: ICD-10-PCS | Mod: ,,, | Performed by: EMERGENCY MEDICINE

## 2022-04-26 PROCEDURE — 96372 THER/PROPH/DIAG INJ SC/IM: CPT | Performed by: EMERGENCY MEDICINE

## 2022-04-26 PROCEDURE — 63600175 PHARM REV CODE 636 W HCPCS: Performed by: EMERGENCY MEDICINE

## 2022-04-26 RX ORDER — CYCLOBENZAPRINE HCL 10 MG
10 TABLET ORAL
Status: COMPLETED | OUTPATIENT
Start: 2022-04-26 | End: 2022-04-26

## 2022-04-26 RX ORDER — NAPROXEN 500 MG/1
500 TABLET ORAL 2 TIMES DAILY WITH MEALS
Qty: 20 TABLET | Refills: 0 | Status: SHIPPED | OUTPATIENT
Start: 2022-04-26 | End: 2022-05-10 | Stop reason: SDUPTHER

## 2022-04-26 RX ORDER — CYCLOBENZAPRINE HCL 10 MG
10 TABLET ORAL 3 TIMES DAILY PRN
Qty: 30 TABLET | Refills: 0 | Status: SHIPPED | OUTPATIENT
Start: 2022-04-26 | End: 2022-05-06

## 2022-04-26 RX ORDER — MORPHINE SULFATE 4 MG/ML
4 INJECTION, SOLUTION INTRAMUSCULAR; INTRAVENOUS
Status: COMPLETED | OUTPATIENT
Start: 2022-04-26 | End: 2022-04-26

## 2022-04-26 RX ORDER — HYDROCODONE BITARTRATE AND ACETAMINOPHEN 7.5; 325 MG/1; MG/1
1 TABLET ORAL EVERY 6 HOURS PRN
Qty: 12 TABLET | Refills: 0 | Status: SHIPPED | OUTPATIENT
Start: 2022-04-26 | End: 2022-04-29

## 2022-04-26 RX ORDER — DOCUSATE SODIUM 100 MG/1
100 CAPSULE, LIQUID FILLED ORAL 2 TIMES DAILY PRN
Qty: 60 CAPSULE | Refills: 0 | Status: SHIPPED | OUTPATIENT
Start: 2022-04-26 | End: 2022-10-20

## 2022-04-26 RX ADMIN — MORPHINE SULFATE 4 MG: 4 INJECTION INTRAVENOUS at 04:04

## 2022-04-26 RX ADMIN — CYCLOBENZAPRINE 10 MG: 10 TABLET, FILM COATED ORAL at 04:04

## 2022-04-26 NOTE — ED PROVIDER NOTES
Encounter Date: 4/26/2022       History     Chief Complaint   Patient presents with    Post-op Problem     Back surg over 2 months ago, having more weak in both legs,making me fall,  burning to L leg, denies bowel/bladder incontinence     46yo male status post lumbar fusion February 8, 2022 presents complaining of sudden onset of left buttock pain that radiates into his thigh since yesterday.  Patient reports that the the pain has become more intense and is causing him to fall.  Patient denies any weakness of his lower extremities.  Patient denies any paresthesias.  Patient denies any bladder or bowel incontinence.  Patient denies any fevers or chills or recent trauma.    The history is provided by the patient.     Review of patient's allergies indicates:  No Known Allergies  Past Medical History:   Diagnosis Date    Arthritis     Diabetes mellitus, type 2 2/11/2022     Past Surgical History:   Procedure Laterality Date    CATARACT EXTRACTION Right     EPIDURAL STEROID INJECTION N/A 10/21/2020    Procedure: Injection, Steroid, Epidural Caudal;  Surgeon: Vipul Nixon Jr., MD;  Location: [facility];  Service: Pain Management;  Laterality: N/A;  Caudal KURT  Arrive @ 1315; No ATC or DM; Needs MD Signature    LUMBAR FUSION N/A 2/8/2022    Procedure: FUSION, SPINE, LUMBAR;  Surgeon: Alphonse Reed MD;  Location: [facility] OR [address] [place] [address];  Service: Neurosurgery;  Laterality: N/A;  AIRO, L4-S1     Family History   Problem Relation Age of Onset    Cataracts Mother     No Known Problems Father     No Known Problems Sister     No Known Problems Brother     No Known Problems Maternal Aunt     No Known Problems Maternal Uncle     No Known Problems Paternal Aunt     No Known Problems Paternal Uncle     No Known Problems Maternal Grandmother     No Known Problems Maternal Grandfather     No Known Problems Paternal Grandmother     No Known Problems Paternal Grandfather      Social History     Tobacco Use    Smoking  status: Former Smoker    Smokeless tobacco: Never Used   Substance Use Topics    Alcohol use: Yes     Comment: socially    Drug use: No     Review of Systems   Constitutional: Negative for fever.   HENT: Negative for sore throat.    Respiratory: Negative for shortness of breath.    Cardiovascular: Negative for chest pain.   Gastrointestinal: Negative for nausea.   Genitourinary: Negative for difficulty urinating and dysuria.   Musculoskeletal: Positive for back pain and myalgias.   Skin: Negative for rash.   Neurological: Negative for weakness.   Hematological: Does not bruise/bleed easily.   All other systems reviewed and are negative.      Physical Exam     Initial Vitals [04/26/22 1419]   BP Pulse Resp Temp SpO2   (!) 147/76 (!) 112 18 99.2 °F (37.3 °C) 99 %      MAP       --         Physical Exam    Nursing note and vitals reviewed.  Constitutional: He appears well-developed and well-nourished. He is not diaphoretic. No distress.   HENT:   Head: Normocephalic and atraumatic.   Right Ear: External ear normal.   Left Ear: External ear normal.   Eyes: Conjunctivae and EOM are normal. Pupils are equal, round, and reactive to light.   Neck: No tracheal deviation present.   Normal range of motion.  Cardiovascular: Normal rate, regular rhythm, normal heart sounds and intact distal pulses. Exam reveals no gallop and no friction rub.    No murmur heard.  Pulmonary/Chest: Breath sounds normal. No respiratory distress. He has no wheezes. He has no rhonchi. He has no rales. He exhibits no tenderness.   Abdominal: Abdomen is soft. Bowel sounds are normal. He exhibits no distension and no mass. There is no abdominal tenderness. There is no rebound and no guarding.   Musculoskeletal:         General: Normal range of motion.      Cervical back: Normal range of motion.     Neurological: He is alert and oriented to person, place, and time. He has normal strength. He displays normal reflexes. No cranial nerve deficit or sensory  deficit.   No T/L-spine tenderness to palpation or percussion    Straight leg raise positive   Skin: Skin is warm and dry. Capillary refill takes less than 2 seconds.   Psychiatric: He has a normal mood and affect. Thought content normal.         ED Course   Procedures  Labs Reviewed   HIV 1 / 2 ANTIBODY   HEPATITIS C ANTIBODY          Imaging Results          X-Ray Lumbar Spine Ap And Lateral (Final result)  Result time 04/26/22 17:09:19    Final result by Rahul Montano MD (04/26/22 17:09:19)                 Impression:      1. Stable appearance of spinal fusion hardware and spinal alignment, no convincing acute displaced fracture or dislocation.      Electronically signed by: Rahul Montano MD  Date:    04/26/2022  Time:    17:09             Narrative:    EXAMINATION:  XR LUMBAR SPINE AP AND LATERAL    CLINICAL HISTORY:  pain;    TECHNIQUE:  AP, lateral and spot images were performed of the lumbar spine.    COMPARISON:  03/10/2022    FINDINGS:  Three views lumbar spine.    Lateral imaging demonstrates adequate alignment of the lumbar spine noting stable configuration of posterior fusion spanning L4 through S2.  There is stable anterolisthesis of L5 on S1 noting disc spacing material at L4-L5 and L5-S1.  The sacral segments are aligned.  AP spinal alignment is unremarkable.  The sacroiliac joints are intact.                              X-Rays:   Independently Interpreted Readings:   Other Readings:  Lumbar x-ray:  No fracture, no dislocation, hardware in place    Medications   morphine injection 4 mg (4 mg Intramuscular Given 4/26/22 1636)   cyclobenzaprine tablet 10 mg (10 mg Oral Given 4/26/22 1637)     Medical Decision Making:   History:   Old Medical Records: I decided to obtain old medical records.  Differential Diagnosis:   Cauda equina syndrome, diskitis/osteomyelitis, epidural/paraspinal abscess, AAA, aortic dissection, post-op/hardware infection, trauma/vertebral fracture, spinal cord injury, disc  herniation, spinal stenosis, sciatica, radiculopathy, neoplasm, lumbar muscle strain, muscle spasm, neuropathic pain  Independently Interpreted Test(s):   I have ordered and independently interpreted X-rays - see prior notes.  Clinical Tests:   Radiological Study: Ordered and Reviewed  ED Management:  After complete evaluation, including thorough history and physical exam, the patient's symptoms are most likely due to radicular back pain. There are no concerning features of bilateral weakness, bowel/bladder incontinence, significant new motor/sensory deficits, or saddle anesthesia to suggest acute cauda equina syndrome. On physical exam, there is no focal midline tenderness or evidence of significant trauma to suggest fracture or injury. There is no fever, immunocompromise, history of recent surgery, or erythema/fluctuance to suggest epidural hematoma, infection, or abscess. The patient was treated with supportive care and improved.  Patient discharged home in stable condition. Diagnosis and treatment plan explained to patient. No further workup indicated based on their complaints or examination today. Discussed results with the patient. I educated the patient/guardian on the warning signs and symptoms for which they must seek immediate medical attention. All questions addressed and patient/guardian were given discharge instructions and followup information.                ED Course as of 04/26/22 1738 Tue Apr 26, 2022 1731 Patient reports significant improvement in his pain.  Patient has follow-up tomorrow in neurosurgery clinic, will discharge to home. [MA]      ED Course User Index  [MA] Clay Emery MD             Clinical Impression:   Final diagnoses:  [M54.32] Sciatica of left side (Primary)          ED Disposition Condition    Discharge Stable        ED Prescriptions     Medication Sig Dispense Start Date End Date Auth. Provider    naproxen (NAPROSYN) 500 MG tablet Take 1 tablet (500 mg total) by  mouth 2 (two) times daily with meals. For pain 20 tablet 4/26/2022  Clay Emery MD    HYDROcodone-acetaminophen (NORCO) 7.5-325 mg per tablet Take 1 tablet by mouth every 6 (six) hours as needed for Pain. 12 tablet 4/26/2022 4/29/2022 Clay Emery MD    docusate sodium (COLACE) 100 MG capsule Take 1 capsule (100 mg total) by mouth 2 (two) times daily as needed for Constipation. 60 capsule 4/26/2022  Clay Emery MD    cyclobenzaprine (FLEXERIL) 10 MG tablet Take 1 tablet (10 mg total) by mouth 3 (three) times daily as needed for Muscle spasms. 30 tablet 4/26/2022 5/6/2022 lCay Emery MD        Follow-up Information     Follow up With Specialties Details Why Contact Info    Yolie Vickers NP Neurosurgery Go in 1 day For follow-up and re-evaluation [address]  [facility] [id number]  [phone]      [facility] - Emergency Dept Emergency Medicine  As needed, for any new or worsening symptoms [address]  [facility] [id number] [phone]           Clay Emery MD  04/26/22 9692

## 2022-04-26 NOTE — FIRST PROVIDER EVALUATION
Emergency Department TeleTriage Encounter Note      CHIEF COMPLAINT    Chief Complaint   Patient presents with    Post-op Problem     Back surg over 2 months ago, having more weak in both legs,making me fall,  burning to L leg, denies bowel/bladder incontinence       VITAL SIGNS   Initial Vitals [04/26/22 1419]   BP Pulse Resp Temp SpO2   (!) 147/76 (!) 112 18 99.2 °F (37.3 °C) 99 %      MAP       --            ALLERGIES    Review of patient's allergies indicates:  No Known Allergies    PROVIDER TRIAGE NOTE  TeleTriage Note: Emeka Caballero, a nontoxic/well appearing, 47 y.o. male, presented to the ED with c/o patient complains of pain and weakness to his legs since his surgery 2/8/22. In one sentence he states that the weakness is the same and the next sentence is says its worse. Unable to obtain accurate history. Patient will need an interpretor for correct HPI.     All ED beds are full at present; patient notified of this status.  Patient seen and medically screened by Nurse Practitioner via teletriage. Orders initiated at triage to expedite care.  Patient is stable to return to the waiting room and will be placed in an ED bed when available.  Care will be transferred to an alternate provider when patient has been placed in an Exam Room from the [facility] for physical exam, additional orders, and disposition.  2:57 PM Antoinette Whalen DNP, FNP-C        ORDERS  Labs Reviewed   HIV 1 / 2 ANTIBODY   HEPATITIS C ANTIBODY       ED Orders (720h ago, onward)    Start Ordered     Status Ordering Provider    04/26/22 1421 04/26/22 1421  HIV 1/2 Ag/Ab (4th Gen)  STAT         Ordered LORI DALTON    04/26/22 1421 04/26/22 1421  Hepatitis C Antibody  STAT         Ordered LORI DALTON            Virtual Visit Note: The provider triage portion of this emergency department evaluation and documentation was performed via [employer], a HIPAA-compliant telemedicine application, in concert with a tele-presenter in the  room. A face to face patient evaluation with one of my colleagues will occur once the patient is placed in an emergency department room.      DISCLAIMER: This note was prepared with [employer] voice recognition transcription software. Garbled syntax, mangled pronouns, and other bizarre constructions may be attributed to that software system.

## 2022-04-26 NOTE — ED NOTES
"Left sided back and leg pain; "feels hot inside" with "shocking pains".  Having difficulty walking.  Surgery was on 2/8.  Arrived with back brace on.  Denies urinary or bowel incontinence.  "

## 2022-04-27 ENCOUNTER — HOSPITAL ENCOUNTER (OUTPATIENT)
Dept: RADIOLOGY | Facility: HOSPITAL | Age: 47
Discharge: HOME | End: 2022-04-27
Attending: NEUROLOGICAL SURGERY
Payer: MEDICAID

## 2022-04-27 ENCOUNTER — OFFICE VISIT (OUTPATIENT)
Dept: NEUROSURGERY | Facility: CLINIC | Age: 47
End: 2022-04-27
Payer: MEDICAID

## 2022-04-27 VITALS — DIASTOLIC BLOOD PRESSURE: 85 MMHG | SYSTOLIC BLOOD PRESSURE: 120 MMHG | HEART RATE: 94 BPM

## 2022-04-27 DIAGNOSIS — Z98.1 S/P LUMBAR FUSION: Primary | ICD-10-CM

## 2022-04-27 DIAGNOSIS — Z98.1 HISTORY OF LUMBAR FUSION: ICD-10-CM

## 2022-04-27 DIAGNOSIS — M54.16 LUMBAR RADICULOPATHY, CHRONIC: ICD-10-CM

## 2022-04-27 DIAGNOSIS — R26.9 GAIT DISTURBANCE: ICD-10-CM

## 2022-04-27 DIAGNOSIS — M54.9 DORSALGIA, UNSPECIFIED: ICD-10-CM

## 2022-04-27 PROCEDURE — 3079F PR MOST RECENT DIASTOLIC BLOOD PRESSURE 80-89 MM HG: ICD-10-PCS | Mod: CPTII,,, | Performed by: NURSE PRACTITIONER

## 2022-04-27 PROCEDURE — 72131 CT LUMBAR SPINE WITHOUT CONTRAST: ICD-10-PCS | Mod: 26,,, | Performed by: RADIOLOGY

## 2022-04-27 PROCEDURE — 1159F MED LIST DOCD IN RCRD: CPT | Mod: CPTII,,, | Performed by: NURSE PRACTITIONER

## 2022-04-27 PROCEDURE — 99999 PR PBB SHADOW E&M-EST. PATIENT-LVL III: CPT | Mod: PBBFAC,,, | Performed by: NURSE PRACTITIONER

## 2022-04-27 PROCEDURE — 3074F SYST BP LT 130 MM HG: CPT | Mod: CPTII,,, | Performed by: NURSE PRACTITIONER

## 2022-04-27 PROCEDURE — 3079F DIAST BP 80-89 MM HG: CPT | Mod: CPTII,,, | Performed by: NURSE PRACTITIONER

## 2022-04-27 PROCEDURE — 72131 CT LUMBAR SPINE W/O DYE: CPT | Mod: 26,,, | Performed by: RADIOLOGY

## 2022-04-27 PROCEDURE — 99213 OFFICE O/P EST LOW 20 MIN: CPT | Mod: PBBFAC,25 | Performed by: NURSE PRACTITIONER

## 2022-04-27 PROCEDURE — 99999 PR PBB SHADOW E&M-EST. PATIENT-LVL III: ICD-10-PCS | Mod: PBBFAC,,, | Performed by: NURSE PRACTITIONER

## 2022-04-27 PROCEDURE — 3051F HG A1C>EQUAL 7.0%<8.0%: CPT | Mod: CPTII,,, | Performed by: NURSE PRACTITIONER

## 2022-04-27 PROCEDURE — 3051F PR MOST RECENT HEMOGLOBIN A1C LEVEL 7.0 - < 8.0%: ICD-10-PCS | Mod: CPTII,,, | Performed by: NURSE PRACTITIONER

## 2022-04-27 PROCEDURE — 1159F PR MEDICATION LIST DOCUMENTED IN MEDICAL RECORD: ICD-10-PCS | Mod: CPTII,,, | Performed by: NURSE PRACTITIONER

## 2022-04-27 PROCEDURE — 1160F RVW MEDS BY RX/DR IN RCRD: CPT | Mod: CPTII,,, | Performed by: NURSE PRACTITIONER

## 2022-04-27 PROCEDURE — 1160F PR REVIEW ALL MEDS BY PRESCRIBER/CLIN PHARMACIST DOCUMENTED: ICD-10-PCS | Mod: CPTII,,, | Performed by: NURSE PRACTITIONER

## 2022-04-27 PROCEDURE — 72131 CT LUMBAR SPINE W/O DYE: CPT | Mod: TC

## 2022-04-27 PROCEDURE — 99024 PR POST-OP FOLLOW-UP VISIT: ICD-10-PCS | Mod: ,,, | Performed by: NURSE PRACTITIONER

## 2022-04-27 PROCEDURE — 3074F PR MOST RECENT SYSTOLIC BLOOD PRESSURE < 130 MM HG: ICD-10-PCS | Mod: CPTII,,, | Performed by: NURSE PRACTITIONER

## 2022-04-27 PROCEDURE — 99024 POSTOP FOLLOW-UP VISIT: CPT | Mod: ,,, | Performed by: NURSE PRACTITIONER

## 2022-04-27 RX ORDER — GABAPENTIN 300 MG/1
300 CAPSULE ORAL 3 TIMES DAILY
Qty: 90 CAPSULE | Refills: 0 | Status: SHIPPED | OUTPATIENT
Start: 2022-04-27 | End: 2022-10-20 | Stop reason: ALTCHOICE

## 2022-04-27 RX ORDER — METHYLPREDNISOLONE 4 MG/1
TABLET ORAL
Qty: 21 EACH | Refills: 0 | Status: SHIPPED | OUTPATIENT
Start: 2022-04-27 | End: 2022-05-18

## 2022-04-27 NOTE — PROGRESS NOTES
Neurosurgery  Established Patient    SUBJECTIVE:     History of Present Illness: Emeka Caballero is a 47 y.o. male s/p L4-pelvis fusion and L4-5, L5-S1 TLIF on 02/08/2022 fors/p L4-pelvis fusion and L4-5, L5-S1 TLIF on 02/08/2022 with Dr. Reed. He is being seen in clinic today for his 3 month post-op evaluation and follow-up from his recent ED visit. States that over the past week he developed severe left leg pain. The pain became so severe that he fell and went to the ED for an evaluation. Describes the pain as constant and burning in his left buttock with radiation down the posterior aspect of the leg. Rates the pain as a 8/10. Aggravating factors include walking and standing. Denies alleviating factors. Denies b/b dysfunction, saddle anesthesia, or gait instability. Endorses weakness in the leg associated with tingling as a result of the pain. He is currently in PT with little relief. He is wearing the LSO brace.      Review of patient's allergies indicates:  No Known Allergies    Current Outpatient Medications   Medication Sig Dispense Refill    acetaminophen (TYLENOL) 325 MG tablet Take 2 tablets (650 mg total) by mouth every 6 (six) hours as needed for Pain.  0    blood sugar diagnostic Strp To check BG 3 times daily, to use with insurance preferred meter 100 each 11    blood-glucose meter kit To check BG 3 times daily, to use with insurance preferred meter 1 each 0    cyclobenzaprine (FLEXERIL) 10 MG tablet Take 1 tablet (10 mg total) by mouth 3 (three) times daily as needed for Muscle spasms. 30 tablet 0    docusate sodium (COLACE) 100 MG capsule Take 1 capsule (100 mg total) by mouth 2 (two) times daily as needed for Constipation. 60 capsule 0    gemfibroziL (LOPID) 600 MG tablet Take 1 tablet (600 mg total) by mouth 2 (two) times daily before meals. 180 tablet 3    HYDROcodone-acetaminophen (NORCO) 7.5-325 mg per tablet Take 1 tablet by mouth every 6 (six) hours as needed for Pain. 12  tablet 0    lancets Misc To check BG 3 times daily, to use with insurance preferred meter 100 each 11    metFORMIN (GLUCOPHAGE-XR) 500 MG ER 24hr tablet Take 2 tablets (1,000 mg total) by mouth 2 (two) times daily with meals. 360 tablet 3    methocarbamoL (ROBAXIN) 500 MG Tab Take 1 tablet (500 mg total) by mouth nightly as needed (Back pain). 20 tablet 0    naproxen (NAPROSYN) 500 MG tablet Take 1 tablet (500 mg total) by mouth 2 (two) times daily with meals. For pain 20 tablet 0    tamsulosin (FLOMAX) 0.4 mg Cap Take 2 capsules (0.8 mg total) by mouth once daily. 60 capsule 11    UNABLE TO FIND Bleaching cream      diazePAM (VALIUM) 5 MG tablet Take 0.5 tablets (2.5 mg total) by mouth every 6 (six) hours as needed (Pain). 10 tablet 0    gabapentin (NEURONTIN) 300 MG capsule Take 1 capsule (300 mg total) by mouth 3 (three) times daily. 90 capsule 0    methylPREDNISolone (MEDROL DOSEPACK) 4 mg tablet use as directed 21 each 0    triamcinolone acetonide 0.1% (KENALOG) 0.1 % cream Apply topically 2 (two) times daily. Apply to rash. DO NOT USE ON FACE. 45 g 1     No current facility-administered medications for this visit.       Past Medical History:   Diagnosis Date    Arthritis     Diabetes mellitus, type 2 2/11/2022     Past Surgical History:   Procedure Laterality Date    CATARACT EXTRACTION Right     EPIDURAL STEROID INJECTION N/A 10/21/2020    Procedure: Injection, Steroid, Epidural Caudal;  Surgeon: Vipul Nixon Jr., MD;  Location: [facility];  Service: Pain Management;  Laterality: N/A;  Caudal KURT  Arrive @ 1315; No ATC or DM; Needs MD Signature    LUMBAR FUSION N/A 2/8/2022    Procedure: FUSION, SPINE, LUMBAR;  Surgeon: Alphonse Reed MD;  Location: [address];  Service: Neurosurgery;  Laterality: N/A;  AIRO, L4-S1     Family History     Problem Relation (Age of Onset)    Cataracts Mother    No Known Problems Father, Sister, Brother, Maternal Aunt, Maternal Uncle, Paternal Aunt,  Paternal Uncle, Maternal Grandmother, Maternal Grandfather, Paternal Grandmother, Paternal Grandfather        Social History     Socioeconomic History    Marital status: Single   Tobacco Use    Smoking status: Former Smoker    Smokeless tobacco: Never Used   Substance and Sexual Activity    Alcohol use: Yes     Comment: socially    Drug use: No       Review of Systems   Constitutional: Negative for activity change, appetite change and fever.   HENT: Negative for hearing loss and postnasal drip.    Eyes: Negative for pain and visual disturbance.   Respiratory: Negative for shortness of breath.    Cardiovascular: Negative for chest pain and palpitations.   Gastrointestinal: Negative for abdominal pain.   Endocrine: Negative for polydipsia, polyphagia and polyuria.   Musculoskeletal: Positive for arthralgias, back pain, gait problem and myalgias.   Neurological: Positive for weakness. Negative for dizziness, numbness and headaches.   Psychiatric/Behavioral: Negative for confusion and dysphoric mood.       OBJECTIVE:     Vital Signs  Pulse: 94  BP: 120/85  Pain Score:   8  There is no height or weight on file to calculate BMI.    Neurosurgery Physical Exam  General: well developed, well nourished, no distress.   Head: normocephalic, atraumatic  Neurologic: Alert and oriented. Thought content appropriate.  GCS: Motor: 6/Verbal: 5/Eyes: 4 GCS Total: 15  Mental Status: Awake, Alert, Oriented x 4  Language: No aphasia  Speech: No dysarthria  Cranial nerves: face symmetric, tongue midline, CN II-XII grossly intact.   Eyes: pupils equal, round, reactive to light with accomodation, EOMI.   Pulmonary: normal respirations, no signs of respiratory distress  Abdomen: soft, non-distended  Skin: Skin is warm, dry and intact. Incision well-healed.   Sensory: intact to light touch throughout  Motor Strength:Moves all extremities spontaneously with good tone.     Strength  Deltoids Triceps Biceps Wrist Extension Wrist Flexion Hand     Upper: R 5/5 5/5 5/5 5/5 5/5 5/5    L 5/5 5/5 5/5 5/5 5/5 5/5     HF KE KF DF PF EHL   Lower: R 5/5 5/5 5/5 5/5 5/5 5/5    L 4+/5 5/5 5/5 5/5 5/5 5/5     Reflexes:   Lyons's: Negative.  Clonus: Negative.     Cerebellar:   Gait stable, antalgic     Cervical:   ROM: Full with flexion, extension, lateral rotation and ear-to-shoulder bend.   Midline TTP: Negative.     Thoracic:  Midline TTP: Negative.     Lumbar:  Midline TTP: Negative.  Straight Leg Test: Negative.    Other:  SI joint TTP: Positive Left  Greater trochanter TTP: Negative.  Tenderness with external/internal hip rotation: Negative.    Diagnostic Results:  I have personally reviewed the CT lumbar dated 4/27/22 with Dr. Reed. The imaging shows posterior instrumented lumbar fusion L4-pelvis with hardware in stable position and alignment. Evidence of early bony fusion.     ASSESSMENT/PLAN:   Emeka Caballero is a 47 y.o. male  s/p L4-pelvis fusion and L4-5, L5-S1 TLIF on 02/08/2022 fors/p L4-pelvis fusion and L4-5, L5-S1 TLIF on 02/08/2022 with Dr. Reed. He was seen in clinic today for his 3 month post-op evaluation and follow-up from his recent ED visit. States that over the past week he developed severe left leg pain. After reviewing the imaging and concerns with Dr. Reed, he recommended an updated MRI lumbar spine to further evaluate his radicular complaints. A repeat CT lumbar spine to be obtained in 3 months has also been ordered given the early bony fusion noted.     I would like the patient to follow-up in clinic in 4 weeks with Dr. Reed to review the image findings. I have encouraged him to contact the clinic with any questions, concerns, or adverse clinical changes. He verbalized understanding.     KELLY David-AMELIA  Neurosurgery  [facility]. [place].     Note dictated with voice recognition software, please excuse any grammatical errors.

## 2022-04-28 ENCOUNTER — CLINICAL SUPPORT (OUTPATIENT)
Dept: REHABILITATION | Facility: HOSPITAL | Age: 47
End: 2022-04-28
Attending: PHYSICIAN ASSISTANT
Payer: MEDICAID

## 2022-04-28 DIAGNOSIS — E11.9 TYPE 2 DIABETES MELLITUS WITHOUT COMPLICATION, WITHOUT LONG-TERM CURRENT USE OF INSULIN: ICD-10-CM

## 2022-04-28 DIAGNOSIS — M48.062 SPINAL STENOSIS OF LUMBAR REGION WITH NEUROGENIC CLAUDICATION: ICD-10-CM

## 2022-04-28 DIAGNOSIS — M54.16 LUMBAR RADICULOPATHY: Primary | ICD-10-CM

## 2022-04-28 DIAGNOSIS — M43.17 SPONDYLOLISTHESIS OF LUMBOSACRAL REGION: ICD-10-CM

## 2022-04-28 DIAGNOSIS — M47.816 LUMBAR SPONDYLOSIS: ICD-10-CM

## 2022-04-28 PROCEDURE — 97110 THERAPEUTIC EXERCISES: CPT | Mod: PN

## 2022-04-28 NOTE — PROGRESS NOTES
[facility]   Physical Therapy Treatment Note     Name: Emeka [facility] Number: [id number]    Therapy Diagnosis:   Encounter Diagnoses   Name Primary?    Lumbar radiculopathy Yes    Spinal stenosis of lumbar region with neurogenic claudication     Lumbar spondylosis     Spondylolisthesis of lumbosacral region     Type 2 diabetes mellitus without complication, without long-term current use of insulin      Physician: Ling Malloy PA-C    Visit Date: 4/28/2022       Physician Orders: PT Eval and Treat   Medical Diagnosis from Referral: Lumbar Spondylolisthesis  Evaluation Date: 2/17/2022  Authorization Period Expiration: 2/14/2023  Plan of Care Expiration: 5/31/2022  Progress Note Due: 5/14/2022  Visit # / Visits authorized: 14/ 20  FOTO: 3/3     Precautions: Diabetes     PTA Visit #: 1/5     Time In: 3:15 P   Time Out: 4:00 P  Total Billable Time: 45    SUBJECTIVE     Pt reports: episode of increased back and LE pain 2 days ago 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 Reports started on anti-inflamatories and back pain is better and leg pain has resolved.  He was compliant with home exercise program for stretching only.  Response to previous treatment: Decreased back pain and stiffness after exercise  Functional change: improved functional transfers. Continues to require SPC  For gait stability.    Pain: 3/10  Location:  Lower back     OBJECTIVE       Treatment     Emeka received the treatments listed below:      therapeutic exercises to develop strength, ROM and core stabilization for 40 minutes including:  Nu-step @ L3 x 8 min.  Seated forward and lateral reach stretch w physio ball 10 sec x 5 / direction  Supine HS Curls w physio ball  5 sec 2 x 10   Hooklying Heel Bridge w GTB 2 x 10. ( Resumed )   Bird Dog Alt UE/LE 2 x10.   + Ball on Wall Partial squat 5 sec hold 2 x 10.   Standing Scapular Retraction / Shoulder Ext w  GTB 2x10 rows, 10x  alt extension, 10 B extension    Manual intervention: 0 minutes  - STM and cupping to B lumbar paraspinals and QL  -B piriformis release with passive hip IR/ER  -STM B glutes      Patient Education and Home Exercises     Home Exercises Provided and Patient Education Provided     Education provided: Need for increased conditioning prior to return to work duties. Reviewed functional activity body mechanics.  -    Written Home Exercises Provided: Pt instructed  With resumption of core stab therex  as noted. Exercises were reviewed and Emeka was able to demonstrate them prior to the end of the session.  Emeka demonstrated good understanding of the education provided. See EMR under Patient Instructions for exercises provided during therapy sessions    ASSESSMENT     Pt noting recent flare up of LBP and LE pain with attempted return to landscaping work duties. Notes improvement of symptoms with anti-inflammatory meds prescribed by MD. Good tolerance of core stab and strengthening therex today without evidence of radicular symptoms nor increased LBP. LE and core weakness remain a limiting factor.      Emeka Is progressing slowly towards his goals.   Pt prognosis is Fair.    Pt will continue to benefit from skilled outpatient physical therapy to address the deficits listed in the problem list box on initial evaluation, provide pt/family education and to maximize pt's level of independence in the home and community environment.     Pt's spiritual, cultural and educational needs considered and pt agreeable to plan of care and goals.     Anticipated barriers to physical therapy: none    Goals: Goals:  Short Term Goals: 4 weeks   1)  Pt will be independent with post-op restriction compliance. (MET)  2) Pt will tolerate progression of core stabilization and LE  Strengthening therex without LBP nor radicular symptom exacerbation. (MET)     Long Term Goals: 8 weeks   1)  Pt will demo normalized gait pattern on all  surfaces without an AD. (Not Met to date)  2) Pt will be able to perform all functional mobility tasks and work duties with lift restrictions as merited with back / LE pain < 3/10. (Not Met to Date)  3) Pt will be independent w HEP  and  awareness to promote self management. ( On Going).    PLAN     Con't w PT 2x/wk  for progressive therex, manual therapy, gait training and functional activity training.    Harrison Mojica, PT

## 2022-05-05 ENCOUNTER — TELEPHONE (OUTPATIENT)
Dept: NEUROSURGERY | Facility: CLINIC | Age: 47
End: 2022-05-05
Payer: MEDICAID

## 2022-05-10 ENCOUNTER — OFFICE VISIT (OUTPATIENT)
Dept: FAMILY MEDICINE | Facility: CLINIC | Age: 47
End: 2022-05-10
Payer: MEDICAID

## 2022-05-10 ENCOUNTER — LAB VISIT (OUTPATIENT)
Dept: LAB | Facility: HOSPITAL | Age: 47
End: 2022-05-10
Attending: FAMILY MEDICINE
Payer: MEDICAID

## 2022-05-10 VITALS
OXYGEN SATURATION: 98 % | HEIGHT: 68 IN | TEMPERATURE: 99 F | RESPIRATION RATE: 16 BRPM | BODY MASS INDEX: 30.91 KG/M2 | HEART RATE: 93 BPM | WEIGHT: 203.94 LBS | SYSTOLIC BLOOD PRESSURE: 130 MMHG | DIASTOLIC BLOOD PRESSURE: 90 MMHG

## 2022-05-10 DIAGNOSIS — M48.061 SPINAL STENOSIS, LUMBAR REGION, WITHOUT NEUROGENIC CLAUDICATION: ICD-10-CM

## 2022-05-10 DIAGNOSIS — E78.2 MIXED DYSLIPIDEMIA: ICD-10-CM

## 2022-05-10 DIAGNOSIS — E11.9 DIABETES MELLITUS WITHOUT COMPLICATION: ICD-10-CM

## 2022-05-10 DIAGNOSIS — R35.0 URINARY FREQUENCY: ICD-10-CM

## 2022-05-10 DIAGNOSIS — E11.9 DIABETES MELLITUS WITHOUT COMPLICATION: Primary | ICD-10-CM

## 2022-05-10 LAB
ALBUMIN SERPL BCP-MCNC: 3.4 G/DL (ref 3.5–5.2)
ALP SERPL-CCNC: 87 U/L (ref 55–135)
ALT SERPL W/O P-5'-P-CCNC: 18 U/L (ref 10–44)
ANION GAP SERPL CALC-SCNC: 6 MMOL/L (ref 8–16)
AST SERPL-CCNC: 14 U/L (ref 10–40)
BASOPHILS # BLD AUTO: 0.03 K/UL (ref 0–0.2)
BASOPHILS NFR BLD: 0.5 % (ref 0–1.9)
BILIRUB SERPL-MCNC: 0.3 MG/DL (ref 0.1–1)
BUN SERPL-MCNC: 13 MG/DL (ref 6–20)
CALCIUM SERPL-MCNC: 9.3 MG/DL (ref 8.7–10.5)
CHLORIDE SERPL-SCNC: 106 MMOL/L (ref 95–110)
CHOLEST SERPL-MCNC: 170 MG/DL (ref 120–199)
CHOLEST/HDLC SERPL: 5.7 {RATIO} (ref 2–5)
CO2 SERPL-SCNC: 28 MMOL/L (ref 23–29)
CREAT SERPL-MCNC: 0.8 MG/DL (ref 0.5–1.4)
DIFFERENTIAL METHOD: ABNORMAL
EOSINOPHIL # BLD AUTO: 0.2 K/UL (ref 0–0.5)
EOSINOPHIL NFR BLD: 2.6 % (ref 0–8)
ERYTHROCYTE [DISTWIDTH] IN BLOOD BY AUTOMATED COUNT: 16 % (ref 11.5–14.5)
EST. GFR  (AFRICAN AMERICAN): >60 ML/MIN/1.73 M^2
EST. GFR  (NON AFRICAN AMERICAN): >60 ML/MIN/1.73 M^2
GLUCOSE SERPL-MCNC: 111 MG/DL (ref 70–110)
HCT VFR BLD AUTO: 42.9 % (ref 40–54)
HDLC SERPL-MCNC: 30 MG/DL (ref 40–75)
HDLC SERPL: 17.6 % (ref 20–50)
HGB BLD-MCNC: 13.2 G/DL (ref 14–18)
IMM GRANULOCYTES # BLD AUTO: 0.01 K/UL (ref 0–0.04)
IMM GRANULOCYTES NFR BLD AUTO: 0.2 % (ref 0–0.5)
LDLC SERPL CALC-MCNC: 93.4 MG/DL (ref 63–159)
LYMPHOCYTES # BLD AUTO: 3 K/UL (ref 1–4.8)
LYMPHOCYTES NFR BLD: 45.5 % (ref 18–48)
MCH RBC QN AUTO: 24 PG (ref 27–31)
MCHC RBC AUTO-ENTMCNC: 30.8 G/DL (ref 32–36)
MCV RBC AUTO: 78 FL (ref 82–98)
MONOCYTES # BLD AUTO: 0.4 K/UL (ref 0.3–1)
MONOCYTES NFR BLD: 5.7 % (ref 4–15)
NEUTROPHILS # BLD AUTO: 3 K/UL (ref 1.8–7.7)
NEUTROPHILS NFR BLD: 45.5 % (ref 38–73)
NONHDLC SERPL-MCNC: 140 MG/DL
NRBC BLD-RTO: 0 /100 WBC
PLATELET # BLD AUTO: 275 K/UL (ref 150–450)
PMV BLD AUTO: 11.2 FL (ref 9.2–12.9)
POTASSIUM SERPL-SCNC: 4.2 MMOL/L (ref 3.5–5.1)
PROT SERPL-MCNC: 8 G/DL (ref 6–8.4)
RBC # BLD AUTO: 5.49 M/UL (ref 4.6–6.2)
SODIUM SERPL-SCNC: 140 MMOL/L (ref 136–145)
TRIGL SERPL-MCNC: 233 MG/DL (ref 30–150)
WBC # BLD AUTO: 6.5 K/UL (ref 3.9–12.7)

## 2022-05-10 PROCEDURE — 3044F HG A1C LEVEL LT 7.0%: CPT | Mod: CPTII,,, | Performed by: FAMILY MEDICINE

## 2022-05-10 PROCEDURE — 99214 PR OFFICE/OUTPT VISIT, EST, LEVL IV, 30-39 MIN: ICD-10-PCS | Mod: S$PBB,,, | Performed by: FAMILY MEDICINE

## 2022-05-10 PROCEDURE — 3075F SYST BP GE 130 - 139MM HG: CPT | Mod: CPTII,,, | Performed by: FAMILY MEDICINE

## 2022-05-10 PROCEDURE — 3080F PR MOST RECENT DIASTOLIC BLOOD PRESSURE >= 90 MM HG: ICD-10-PCS | Mod: CPTII,,, | Performed by: FAMILY MEDICINE

## 2022-05-10 PROCEDURE — 36415 COLL VENOUS BLD VENIPUNCTURE: CPT | Mod: PN | Performed by: FAMILY MEDICINE

## 2022-05-10 PROCEDURE — 83036 HEMOGLOBIN GLYCOSYLATED A1C: CPT | Performed by: FAMILY MEDICINE

## 2022-05-10 PROCEDURE — 99999 PR PBB SHADOW E&M-EST. PATIENT-LVL III: CPT | Mod: PBBFAC,,, | Performed by: FAMILY MEDICINE

## 2022-05-10 PROCEDURE — 3008F PR BODY MASS INDEX (BMI) DOCUMENTED: ICD-10-PCS | Mod: CPTII,,, | Performed by: FAMILY MEDICINE

## 2022-05-10 PROCEDURE — 1159F MED LIST DOCD IN RCRD: CPT | Mod: CPTII,,, | Performed by: FAMILY MEDICINE

## 2022-05-10 PROCEDURE — 1159F PR MEDICATION LIST DOCUMENTED IN MEDICAL RECORD: ICD-10-PCS | Mod: CPTII,,, | Performed by: FAMILY MEDICINE

## 2022-05-10 PROCEDURE — 99214 OFFICE O/P EST MOD 30 MIN: CPT | Mod: S$PBB,,, | Performed by: FAMILY MEDICINE

## 2022-05-10 PROCEDURE — 1160F RVW MEDS BY RX/DR IN RCRD: CPT | Mod: CPTII,,, | Performed by: FAMILY MEDICINE

## 2022-05-10 PROCEDURE — 80061 LIPID PANEL: CPT | Performed by: FAMILY MEDICINE

## 2022-05-10 PROCEDURE — 3008F BODY MASS INDEX DOCD: CPT | Mod: CPTII,,, | Performed by: FAMILY MEDICINE

## 2022-05-10 PROCEDURE — 3075F PR MOST RECENT SYSTOLIC BLOOD PRESS GE 130-139MM HG: ICD-10-PCS | Mod: CPTII,,, | Performed by: FAMILY MEDICINE

## 2022-05-10 PROCEDURE — 1160F PR REVIEW ALL MEDS BY PRESCRIBER/CLIN PHARMACIST DOCUMENTED: ICD-10-PCS | Mod: CPTII,,, | Performed by: FAMILY MEDICINE

## 2022-05-10 PROCEDURE — 99213 OFFICE O/P EST LOW 20 MIN: CPT | Mod: PBBFAC,PN | Performed by: FAMILY MEDICINE

## 2022-05-10 PROCEDURE — 80053 COMPREHEN METABOLIC PANEL: CPT | Performed by: FAMILY MEDICINE

## 2022-05-10 PROCEDURE — 3080F DIAST BP >= 90 MM HG: CPT | Mod: CPTII,,, | Performed by: FAMILY MEDICINE

## 2022-05-10 PROCEDURE — 3044F PR MOST RECENT HEMOGLOBIN A1C LEVEL <7.0%: ICD-10-PCS | Mod: CPTII,,, | Performed by: FAMILY MEDICINE

## 2022-05-10 PROCEDURE — 99999 PR PBB SHADOW E&M-EST. PATIENT-LVL III: ICD-10-PCS | Mod: PBBFAC,,, | Performed by: FAMILY MEDICINE

## 2022-05-10 PROCEDURE — 85025 COMPLETE CBC W/AUTO DIFF WBC: CPT | Performed by: FAMILY MEDICINE

## 2022-05-10 RX ORDER — GEMFIBROZIL 600 MG/1
600 TABLET, FILM COATED ORAL
Qty: 180 TABLET | Refills: 1 | Status: SHIPPED | OUTPATIENT
Start: 2022-05-10 | End: 2022-10-20

## 2022-05-10 RX ORDER — NAPROXEN 500 MG/1
500 TABLET ORAL 2 TIMES DAILY WITH MEALS
Qty: 30 TABLET | Refills: 0 | Status: SHIPPED | OUTPATIENT
Start: 2022-05-10 | End: 2022-10-20 | Stop reason: ALTCHOICE

## 2022-05-10 RX ORDER — TAMSULOSIN HYDROCHLORIDE 0.4 MG/1
0.8 CAPSULE ORAL DAILY
Qty: 180 CAPSULE | Refills: 1 | Status: SHIPPED | OUTPATIENT
Start: 2022-05-10 | End: 2022-08-22 | Stop reason: SDUPTHER

## 2022-05-10 RX ORDER — METFORMIN HYDROCHLORIDE 500 MG/1
1000 TABLET, EXTENDED RELEASE ORAL 2 TIMES DAILY WITH MEALS
Qty: 360 TABLET | Refills: 3 | Status: SHIPPED | OUTPATIENT
Start: 2022-05-10 | End: 2022-10-20 | Stop reason: SDUPTHER

## 2022-05-11 LAB
ESTIMATED AVG GLUCOSE: 143 MG/DL (ref 68–131)
HBA1C MFR BLD: 6.6 % (ref 4–5.6)

## 2022-05-16 NOTE — PROGRESS NOTES
Subjective:       Patient ID: Emeka Caballero is a 47 y.o. male.    Chief Complaint: Diabetes    Diabetes  He presents for his follow-up diabetic visit. He has type 2 diabetes mellitus. His disease course has been improving. Pertinent negatives for hypoglycemia include no headaches. Pertinent negatives for diabetes include no blurred vision, no chest pain, no foot paresthesias, no polydipsia, no polyphagia, no polyuria and no weakness. Current diabetic treatment includes oral agent (monotherapy). He is compliant with treatment all of the time. He is following a high fat/cholesterol diet. He participates in exercise intermittently. He monitors blood glucose at home 1-2 x per day. His breakfast blood glucose range is generally  mg/dl.     Needs refill on tamsulsoin and gemfibrozil    Review of Systems   Constitutional: Negative for unexpected weight change.   HENT: Negative for ear pain and sore throat.    Eyes: Negative for blurred vision and visual disturbance.   Respiratory: Negative for shortness of breath.    Cardiovascular: Negative for chest pain.   Gastrointestinal: Negative for abdominal pain and blood in stool.   Endocrine: Negative for cold intolerance, heat intolerance, polydipsia, polyphagia and polyuria.   Genitourinary: Negative for dysuria and frequency.   Musculoskeletal: Positive for back pain.   Integumentary:  Negative for rash.   Neurological: Negative for weakness, numbness and headaches.   Hematological: Negative for adenopathy.   Psychiatric/Behavioral: Negative for suicidal ideas.         Objective:      Physical Exam  Vitals reviewed.   Constitutional:       General: He is not in acute distress.  HENT:      Head: Normocephalic and atraumatic.      Right Ear: Ear canal and external ear normal.      Left Ear: Ear canal and external ear normal.      Nose: Nose normal.      Mouth/Throat:      Mouth: Mucous membranes are moist.      Pharynx: No oropharyngeal exudate or posterior  oropharyngeal erythema.   Eyes:      Extraocular Movements: Extraocular movements intact.      Conjunctiva/sclera: Conjunctivae normal.      Pupils: Pupils are equal, round, and reactive to light.   Abdominal:      General: Abdomen is flat. Bowel sounds are normal. There is no distension.      Palpations: Abdomen is soft.      Tenderness: There is no abdominal tenderness. There is no guarding.   Skin:     General: Skin is warm.      Capillary Refill: Capillary refill takes less than 2 seconds.   Neurological:      Mental Status: He is alert and oriented to person, place, and time.      Cranial Nerves: No cranial nerve deficit.      Sensory: No sensory deficit.   Psychiatric:         Mood and Affect: Mood normal.         Behavior: Behavior normal.         Assessment:       Problem List Items Addressed This Visit    None     Visit Diagnoses     Diabetes mellitus without complication    -  Primary    Relevant Medications    metFORMIN (GLUCOPHAGE-XR) 500 MG ER 24hr tablet    blood sugar diagnostic Strp    Other Relevant Orders    Comprehensive Metabolic Panel    CBC Auto Differential (Completed)    Hemoglobin A1C    Microalbumin/Creatinine Ratio, Urine    Mixed dyslipidemia        Relevant Medications    gemfibroziL (LOPID) 600 MG tablet    Other Relevant Orders    Comprehensive Metabolic Panel    CBC Auto Differential (Completed)    Lipid Panel    Urinary frequency        Relevant Medications    tamsulosin (FLOMAX) 0.4 mg Cap    Spinal stenosis, lumbar region, without neurogenic claudication        Relevant Medications    naproxen (NAPROSYN) 500 MG tablet          Plan:       Emeka was seen today for diabetes.    Diagnoses and all orders for this visit:    Diabetes mellitus without complication  -     metFORMIN (GLUCOPHAGE-XR) 500 MG ER 24hr tablet; Take 2 tablets (1,000 mg total) by mouth 2 (two) times daily with meals.  -     blood sugar diagnostic Strp; To check BG 3 times daily, to use with insurance preferred  meter  -     Comprehensive Metabolic Panel; Standing  -     CBC Auto Differential; Future  -     Hemoglobin A1C; Standing  -     Microalbumin/Creatinine Ratio, Urine; Future  Importance of routine follow up explained  Check labs  Follow up in 6 months    Mixed dyslipidemia  -     gemfibroziL (LOPID) 600 MG tablet; Take 1 tablet (600 mg total) by mouth 2 (two) times daily before meals.  -     Comprehensive Metabolic Panel; Standing  -     CBC Auto Differential; Future  -     Lipid Panel; Standing  Check lipids  Discussed recommendation for statin for diabetics but needs labs first    Urinary frequency  -     tamsulosin (FLOMAX) 0.4 mg Cap; Take 2 capsules (0.8 mg total) by mouth once daily.  Continue Flomax    Spinal stenosis, lumbar region, without neurogenic claudication  -     naproxen (NAPROSYN) 500 MG tablet; Take 1 tablet (500 mg total) by mouth 2 (two) times daily with meals. For pain  Naproxen refilled

## 2022-05-17 ENCOUNTER — PATIENT OUTREACH (OUTPATIENT)
Dept: ADMINISTRATIVE | Facility: OTHER | Age: 47
End: 2022-05-17
Payer: MEDICAID

## 2022-05-17 NOTE — PROGRESS NOTES
Health Maintenance Due   Topic Date Due    Diabetes Urine Screening  Never done    TETANUS VACCINE  Never done    Sign Pain Contract  Never done    Complete Opioid Risk Tool  Never done    Low Dose Statin  Never done    Colorectal Cancer Screening  Never done    COVID-19 Vaccine (3 - Booster for Moderna series) 03/04/2022     Updates were requested from care everywhere.  Chart was reviewed for overdue Proactive [facility] En[facility]ters ([facility]) topics (CRS, Breast Cancer Screening, Eye exam)  Health Maintenance has been updated.  LINKS immunization registry triggered.  Immunizations were reconciled.

## 2022-05-19 ENCOUNTER — OFFICE VISIT (OUTPATIENT)
Dept: UROLOGY | Facility: CLINIC | Age: 47
End: 2022-05-19
Payer: MEDICAID

## 2022-05-19 VITALS — WEIGHT: 202.81 LBS | RESPIRATION RATE: 16 BRPM | HEIGHT: 68 IN | BODY MASS INDEX: 30.74 KG/M2

## 2022-05-19 DIAGNOSIS — N52.9 ERECTILE DYSFUNCTION, UNSPECIFIED ERECTILE DYSFUNCTION TYPE: Primary | ICD-10-CM

## 2022-05-19 DIAGNOSIS — N40.1 BPH WITH OBSTRUCTION/LOWER URINARY TRACT SYMPTOMS: ICD-10-CM

## 2022-05-19 DIAGNOSIS — N13.8 BPH WITH OBSTRUCTION/LOWER URINARY TRACT SYMPTOMS: ICD-10-CM

## 2022-05-19 PROCEDURE — 1159F MED LIST DOCD IN RCRD: CPT | Mod: CPTII,,, | Performed by: NURSE PRACTITIONER

## 2022-05-19 PROCEDURE — 99214 PR OFFICE/OUTPT VISIT, EST, LEVL IV, 30-39 MIN: ICD-10-PCS | Mod: S$PBB,,, | Performed by: NURSE PRACTITIONER

## 2022-05-19 PROCEDURE — 99999 PR PBB SHADOW E&M-EST. PATIENT-LVL IV: CPT | Mod: PBBFAC,,, | Performed by: NURSE PRACTITIONER

## 2022-05-19 PROCEDURE — 99999 PR PBB SHADOW E&M-EST. PATIENT-LVL IV: ICD-10-PCS | Mod: PBBFAC,,, | Performed by: NURSE PRACTITIONER

## 2022-05-19 PROCEDURE — 1159F PR MEDICATION LIST DOCUMENTED IN MEDICAL RECORD: ICD-10-PCS | Mod: CPTII,,, | Performed by: NURSE PRACTITIONER

## 2022-05-19 PROCEDURE — 3044F PR MOST RECENT HEMOGLOBIN A1C LEVEL <7.0%: ICD-10-PCS | Mod: CPTII,,, | Performed by: NURSE PRACTITIONER

## 2022-05-19 PROCEDURE — 99214 OFFICE O/P EST MOD 30 MIN: CPT | Mod: PBBFAC | Performed by: NURSE PRACTITIONER

## 2022-05-19 PROCEDURE — 1160F PR REVIEW ALL MEDS BY PRESCRIBER/CLIN PHARMACIST DOCUMENTED: ICD-10-PCS | Mod: CPTII,,, | Performed by: NURSE PRACTITIONER

## 2022-05-19 PROCEDURE — 3008F BODY MASS INDEX DOCD: CPT | Mod: CPTII,,, | Performed by: NURSE PRACTITIONER

## 2022-05-19 PROCEDURE — 99214 OFFICE O/P EST MOD 30 MIN: CPT | Mod: S$PBB,,, | Performed by: NURSE PRACTITIONER

## 2022-05-19 PROCEDURE — 1160F RVW MEDS BY RX/DR IN RCRD: CPT | Mod: CPTII,,, | Performed by: NURSE PRACTITIONER

## 2022-05-19 PROCEDURE — 3008F PR BODY MASS INDEX (BMI) DOCUMENTED: ICD-10-PCS | Mod: CPTII,,, | Performed by: NURSE PRACTITIONER

## 2022-05-19 PROCEDURE — 3044F HG A1C LEVEL LT 7.0%: CPT | Mod: CPTII,,, | Performed by: NURSE PRACTITIONER

## 2022-05-19 RX ORDER — TADALAFIL 20 MG/1
20 TABLET ORAL DAILY PRN
Qty: 30 TABLET | Refills: 11 | Status: SHIPPED | OUTPATIENT
Start: 2022-05-19 | End: 2023-08-14

## 2022-05-19 NOTE — PROGRESS NOTES
Subjective:       Patient ID: Emeka Caballero is a 47 y.o. male who is an established patient was last seen in this office 4/7/2022    Chief Complaint:   Chief Complaint   Patient presents with    Follow-up     Patient previously seen by Dr Rosas in February 2020 for evaluation of varicocele/ED/LUTS. He was started on Flomax at that time. He has been lost to urology follow up until now.    He is s/p lumbar fusion on 2/8/22 [facility]. In review of records, patient had alegria catheter placed during his surgery. Alegria was removed on POD #1 and patient noted to be voiding without difficulty. He later became febrile during his admission. CT scan showed heterogeneous appearance of prostate, ?prostatitis.  No urine culture obtained. Blood cx--negative. Recommended patient follow up with urology as outpatient    He reports dysuria, urinary frequency, pelvic pressure since alegria removal. He was discharged home with 2 week course of Levaquin which he is currently taking. He has noted improvement in his urinary symptoms with antibiotics. There have been no further occurrences of fever. Denies gross hematuria.  He remains on Flomax    5/19/22  Patient presented with UTI symptoms at previous visit. UCx was negative at that time. Flomax also increased to BID at his last visit. He reports improvement in LUTS with Flomax at this dosage. He is satisfied with the way he is voiding. Denies dysuria or gross hematuria    He also reports issues with ED. Reports partial erections most of the time.  Denies issues with libido. Reports dizziness with Viagra in the past. He would like to try another PO medication for ED    PSA 11/21--0.79      PVR (bladder scan) last visit - 108 ml (not true PVR)      THONG used for language interpretation (Loraine #[id number])      ACTIVE MEDICAL ISSUES:  Patient Active Problem List   Diagnosis    Viral syndrome    Lumbar radiculopathy    Spinal stenosis of lumbar region    Lumbar  spondylosis    DDD (degenerative disc disease), lumbar    Spondylolisthesis of lumbosacral region    Fever    Constipation    BPH (benign prostatic hyperplasia)    HLD (hyperlipidemia)    Diabetes mellitus, type 2    Prostatitis       ALLERGIES AND MEDICATIONS: updated and reviewed.  Review of patient's allergies indicates:  No Known Allergies  Current Outpatient Medications   Medication Sig    acetaminophen (TYLENOL) 325 MG tablet Take 2 tablets (650 mg total) by mouth every 6 (six) hours as needed for Pain.    blood sugar diagnostic Strp To check BG 3 times daily, to use with insurance preferred meter    blood-glucose meter kit To check BG 3 times daily, to use with insurance preferred meter    docusate sodium (COLACE) 100 MG capsule Take 1 capsule (100 mg total) by mouth 2 (two) times daily as needed for Constipation.    gabapentin (NEURONTIN) 300 MG capsule Take 1 capsule (300 mg total) by mouth 3 (three) times daily.    gemfibroziL (LOPID) 600 MG tablet Take 1 tablet (600 mg total) by mouth 2 (two) times daily before meals.    lancets Misc To check BG 3 times daily, to use with insurance preferred meter    metFORMIN (GLUCOPHAGE-XR) 500 MG ER 24hr tablet Take 2 tablets (1,000 mg total) by mouth 2 (two) times daily with meals.    methocarbamoL (ROBAXIN) 500 MG Tab Take 1 tablet (500 mg total) by mouth nightly as needed (Back pain).    naproxen (NAPROSYN) 500 MG tablet Take 1 tablet (500 mg total) by mouth 2 (two) times daily with meals. For pain    tamsulosin (FLOMAX) 0.4 mg Cap Take 2 capsules (0.8 mg total) by mouth once daily.    UNABLE TO FIND Bleaching cream    diazePAM (VALIUM) 5 MG tablet Take 0.5 tablets (2.5 mg total) by mouth every 6 (six) hours as needed (Pain).    tadalafiL (CIALIS) 20 MG Tab Take 1 tablet (20 mg total) by mouth daily as needed (erectile dysfunction).    triamcinolone acetonide 0.1% (KENALOG) 0.1 % cream Apply topically 2 (two) times daily. Apply to rash. DO NOT  "USE ON FACE.     No current facility-administered medications for this visit.       Review of Systems   Constitutional: Negative for activity change, chills, fatigue, fever and unexpected weight change.   Eyes: Negative for discharge, redness and visual disturbance.   Respiratory: Negative for cough, shortness of breath and wheezing.    Cardiovascular: Negative for chest pain and leg swelling.   Gastrointestinal: Negative for abdominal distention, abdominal pain, constipation, diarrhea, nausea and vomiting.   Genitourinary: Negative for decreased urine volume, difficulty urinating, dysuria, flank pain, frequency, hematuria, penile discharge, penile pain, penile swelling, scrotal swelling, testicular pain and urgency.   Musculoskeletal: Negative for arthralgias, joint swelling and myalgias.   Skin: Negative for color change and rash.   Neurological: Negative for dizziness and light-headedness.   Psychiatric/Behavioral: Negative for behavioral problems and confusion. The patient is not nervous/anxious.        Objective:      Vitals:    05/19/22 1111   Resp: 16   Weight: 92 kg (202 lb 13.2 oz)   Height: 5' 8" (1.727 m)     Physical Exam  Constitutional:       Appearance: He is well-developed.   HENT:      Head: Normocephalic and atraumatic.      Nose: Nose normal.   Eyes:      General:         Right eye: No discharge.         Left eye: No discharge.      Conjunctiva/sclera: Conjunctivae normal.   Neck:      Thyroid: No thyromegaly.      Trachea: No tracheal deviation.   Cardiovascular:      Rate and Rhythm: Normal rate and regular rhythm.   Pulmonary:      Effort: Pulmonary effort is normal. No respiratory distress.      Breath sounds: No wheezing.   Abdominal:      General: There is no distension.      Palpations: Abdomen is soft.      Tenderness: There is no abdominal tenderness.      Hernia: No hernia is present.   Genitourinary:     Comments: Patient declined exam  Musculoskeletal:      Cervical back: Normal range " of motion and neck supple.      Comments: Back brace noted   Skin:     General: Skin is warm and dry.      Findings: No erythema or rash.   Neurological:      Mental Status: He is alert and oriented to person, place, and time.   Psychiatric:         Behavior: Behavior normal.         Judgment: Judgment normal.         Urine dipstick shows patient unable to produce specimen    Assessment:       1. Erectile dysfunction, unspecified erectile dysfunction type    2. BPH with obstruction/lower urinary tract symptoms          Plan:       1. BPH with obstruction/lower urinary tract symptoms  -Flomax BID  - Ambulatory referral/consult to Urology    2. Erectile dysfunction, unspecified erectile dysfunction type  -Viagra stopped 2/2 dizziness  -Trial Cialis 20 mg prn. GoodRx coupon given to patient  -Discussed priapism risk  - tadalafiL (CIALIS) 20 MG Tab; Take 1 tablet (20 mg total) by mouth daily as needed (erectile dysfunction).  Dispense: 30 tablet; Refill: 11            Follow up in about 2 months (around 7/19/2022) for Follow up.

## 2022-05-24 ENCOUNTER — TELEPHONE (OUTPATIENT)
Dept: FAMILY MEDICINE | Facility: CLINIC | Age: 47
End: 2022-05-24
Payer: MEDICAID

## 2022-05-24 NOTE — TELEPHONE ENCOUNTER
----- Message from Dio Zarate Jr., MD sent at 5/22/2022  4:33 PM CDT -----  Diabetes is controlled but lipids shows triglycerides are still very high. Needs to work on diet as we discussed and take all medications as prescribed. Also important to keep appointments including doing labs prior to appt

## 2022-05-24 NOTE — TELEPHONE ENCOUNTER
Shazia     Called patient , confirmed both identifies . Reviewed recent lab results , patient verbalized understanding . Has agreed to improve diet as discussed and will attend f/u in Oct.

## 2022-05-31 ENCOUNTER — PATIENT MESSAGE (OUTPATIENT)
Dept: ADMINISTRATIVE | Facility: HOSPITAL | Age: 47
End: 2022-05-31
Payer: MEDICAID

## 2022-06-01 ENCOUNTER — TELEPHONE (OUTPATIENT)
Dept: NEUROSURGERY | Facility: CLINIC | Age: 47
End: 2022-06-01
Payer: MEDICAID

## 2022-06-01 DIAGNOSIS — Z98.1 S/P LUMBAR FUSION: Primary | ICD-10-CM

## 2022-06-01 NOTE — TELEPHONE ENCOUNTER
----- Message from Sarah Delgadillo sent at 6/1/2022  2:40 PM CDT -----  Regarding: appt  Contact: pt @[phone]  Pt is having severe back pain, asking for appt asap

## 2022-06-01 NOTE — TELEPHONE ENCOUNTER
Pt wife reports severe back pain and would be evaluated. Pt scheduled  For 6/2 with Yolie Vickers NP.

## 2022-06-02 ENCOUNTER — TELEPHONE (OUTPATIENT)
Dept: NEUROSURGERY | Facility: CLINIC | Age: 47
End: 2022-06-02
Payer: MEDICAID

## 2022-06-03 ENCOUNTER — TELEPHONE (OUTPATIENT)
Dept: NEUROSURGERY | Facility: CLINIC | Age: 47
End: 2022-06-03
Payer: MEDICAID

## 2022-06-10 ENCOUNTER — TELEPHONE (OUTPATIENT)
Dept: NEUROSURGERY | Facility: CLINIC | Age: 47
End: 2022-06-10
Payer: MEDICAID

## 2022-06-10 NOTE — TELEPHONE ENCOUNTER
Spoke w/ pt and they wanted an MRI at the imaging center because the machines at other locations are too small. No appointments are available prior to the pt leaving the country for 2 months. Epic does not allow scheduling for mid August when he returns. Asked the pt to call in a month and I will put in a recall so that he can be scheduled in advance of his return.

## 2022-07-20 ENCOUNTER — TELEPHONE (OUTPATIENT)
Dept: NEUROSURGERY | Facility: CLINIC | Age: 47
End: 2022-07-20
Payer: MEDICAID

## 2022-08-15 ENCOUNTER — PATIENT OUTREACH (OUTPATIENT)
Dept: ADMINISTRATIVE | Facility: HOSPITAL | Age: 47
End: 2022-08-15
Payer: MEDICAID

## 2022-08-15 NOTE — PROGRESS NOTES
Attempted to contact pt in regards to overdue HM unavailable left voicemail. Immunization's updated.

## 2022-08-22 ENCOUNTER — HOSPITAL ENCOUNTER (OUTPATIENT)
Dept: RADIOLOGY | Facility: HOSPITAL | Age: 47
Discharge: HOME | End: 2022-08-22
Attending: NURSE PRACTITIONER
Payer: MEDICAID

## 2022-08-22 ENCOUNTER — TELEPHONE (OUTPATIENT)
Dept: UROLOGY | Facility: CLINIC | Age: 47
End: 2022-08-22
Payer: MEDICAID

## 2022-08-22 DIAGNOSIS — Z98.1 S/P LUMBAR FUSION: ICD-10-CM

## 2022-08-22 DIAGNOSIS — R26.9 GAIT DISTURBANCE: ICD-10-CM

## 2022-08-22 DIAGNOSIS — R35.0 URINARY FREQUENCY: ICD-10-CM

## 2022-08-22 DIAGNOSIS — M54.9 DORSALGIA, UNSPECIFIED: ICD-10-CM

## 2022-08-22 DIAGNOSIS — M54.16 LUMBAR RADICULOPATHY, CHRONIC: ICD-10-CM

## 2022-08-22 PROCEDURE — 72131 CT LUMBAR SPINE WITHOUT CONTRAST: ICD-10-PCS | Mod: 26,,, | Performed by: RADIOLOGY

## 2022-08-22 PROCEDURE — 25500020 PHARM REV CODE 255: Performed by: NURSE PRACTITIONER

## 2022-08-22 PROCEDURE — 72158 MRI LUMBAR SPINE W/O & W/DYE: CPT | Mod: TC

## 2022-08-22 PROCEDURE — 72158 MRI LUMBAR SPINE W/O & W/DYE: CPT | Mod: 26,,, | Performed by: RADIOLOGY

## 2022-08-22 PROCEDURE — 72131 CT LUMBAR SPINE W/O DYE: CPT | Mod: TC

## 2022-08-22 PROCEDURE — A9585 GADOBUTROL INJECTION: HCPCS | Performed by: NURSE PRACTITIONER

## 2022-08-22 PROCEDURE — 72158 MRI LUMBAR SPINE W WO CONTRAST: ICD-10-PCS | Mod: 26,,, | Performed by: RADIOLOGY

## 2022-08-22 PROCEDURE — 72131 CT LUMBAR SPINE W/O DYE: CPT | Mod: 26,,, | Performed by: RADIOLOGY

## 2022-08-22 RX ORDER — GADOBUTROL 604.72 MG/ML
10 INJECTION INTRAVENOUS
Status: COMPLETED | OUTPATIENT
Start: 2022-08-22 | End: 2022-08-22

## 2022-08-22 RX ADMIN — GADOBUTROL 10 ML: 604.72 INJECTION INTRAVENOUS at 08:08

## 2022-08-22 NOTE — TELEPHONE ENCOUNTER
LM for Alana to contact office      ----- Message from Lizzie Tyler sent at 8/22/2022  8:19 AM CDT -----  Regarding: Alana/ Girlfriend/  [phone]  Type: Patient Call Back    Who called:  Alana    What is the request in detail:  Patient is having a problem urinating and is needing an appt.  Alana would like a call from staff to have patient scheduled.  Thank you    Would the patient rather a call back or a response via [facility]?  Call back    Best call back number:  [phone]      Thank you

## 2022-08-22 NOTE — TELEPHONE ENCOUNTER
Pt's girlfriend called requesting refill for Flomax-request sent to Dr. Rosas.  Notified of appt with Dr. Sanz 9/23/22 @ 3:15pm

## 2022-08-23 ENCOUNTER — TELEPHONE (OUTPATIENT)
Dept: NEUROSURGERY | Facility: CLINIC | Age: 47
End: 2022-08-23
Payer: MEDICAID

## 2022-08-23 RX ORDER — TAMSULOSIN HYDROCHLORIDE 0.4 MG/1
0.8 CAPSULE ORAL DAILY
Qty: 180 CAPSULE | Refills: 0 | Status: ON HOLD | OUTPATIENT
Start: 2022-08-23 | End: 2023-03-14 | Stop reason: HOSPADM

## 2022-08-25 ENCOUNTER — OFFICE VISIT (OUTPATIENT)
Dept: NEUROSURGERY | Facility: CLINIC | Age: 47
End: 2022-08-25
Payer: MEDICAID

## 2022-08-25 VITALS — WEIGHT: 200 LBS | HEIGHT: 68 IN | BODY MASS INDEX: 30.31 KG/M2

## 2022-08-25 DIAGNOSIS — M43.17 SPONDYLOLISTHESIS OF LUMBOSACRAL REGION: Primary | ICD-10-CM

## 2022-08-25 PROCEDURE — 99999 PR PBB SHADOW E&M-EST. PATIENT-LVL III: CPT | Mod: PBBFAC,,, | Performed by: NEUROLOGICAL SURGERY

## 2022-08-25 PROCEDURE — 99213 OFFICE O/P EST LOW 20 MIN: CPT | Mod: S$PBB,,, | Performed by: NEUROLOGICAL SURGERY

## 2022-08-25 PROCEDURE — 4010F PR ACE/ARB THEARPY RXD/TAKEN: ICD-10-PCS | Mod: CPTII,,, | Performed by: NEUROLOGICAL SURGERY

## 2022-08-25 PROCEDURE — 99213 OFFICE O/P EST LOW 20 MIN: CPT | Mod: PBBFAC | Performed by: NEUROLOGICAL SURGERY

## 2022-08-25 PROCEDURE — 4010F ACE/ARB THERAPY RXD/TAKEN: CPT | Mod: CPTII,,, | Performed by: NEUROLOGICAL SURGERY

## 2022-08-25 PROCEDURE — 3008F BODY MASS INDEX DOCD: CPT | Mod: CPTII,,, | Performed by: NEUROLOGICAL SURGERY

## 2022-08-25 PROCEDURE — 3072F LOW RISK FOR RETINOPATHY: CPT | Mod: CPTII,,, | Performed by: NEUROLOGICAL SURGERY

## 2022-08-25 PROCEDURE — 3072F PR LOW RISK FOR RETINOPATHY: ICD-10-PCS | Mod: CPTII,,, | Performed by: NEUROLOGICAL SURGERY

## 2022-08-25 PROCEDURE — 99213 PR OFFICE/OUTPT VISIT, EST, LEVL III, 20-29 MIN: ICD-10-PCS | Mod: S$PBB,,, | Performed by: NEUROLOGICAL SURGERY

## 2022-08-25 PROCEDURE — 3044F HG A1C LEVEL LT 7.0%: CPT | Mod: CPTII,,, | Performed by: NEUROLOGICAL SURGERY

## 2022-08-25 PROCEDURE — 99999 PR PBB SHADOW E&M-EST. PATIENT-LVL III: ICD-10-PCS | Mod: PBBFAC,,, | Performed by: NEUROLOGICAL SURGERY

## 2022-08-25 PROCEDURE — 3044F PR MOST RECENT HEMOGLOBIN A1C LEVEL <7.0%: ICD-10-PCS | Mod: CPTII,,, | Performed by: NEUROLOGICAL SURGERY

## 2022-08-25 PROCEDURE — 3008F PR BODY MASS INDEX (BMI) DOCUMENTED: ICD-10-PCS | Mod: CPTII,,, | Performed by: NEUROLOGICAL SURGERY

## 2022-08-25 NOTE — PROGRESS NOTES
Neurosurgery   Established Patient   SUBJECTIVE:   History of Present Illness: Emeka Caballero is a 47 y.o. male s/p L4-pelvis fusion and L4-5, L5-S1 TLIF on 02/08/2022 fors/p L4-pelvis fusion and L4-5, L5-S1 TLIF on 02/08/2022 with Dr. Reed. He is being seen in clinic today for his 3 month post-op evaluation and follow-up from his recent ED visit. States that over the past week he developed severe left leg pain. The pain became so severe that he fell and went to the ED for an evaluation. Describes the pain as constant and burning in his left buttock with radiation down the posterior aspect of the leg. Rates the pain as a 8/10. Aggravating factors include walking and standing. Denies alleviating factors. Denies b/b dysfunction, saddle anesthesia, or gait instability. Endorses weakness in the leg associated with tingling as a result of the pain. He is currently in PT with little relief. He is wearing the LSO brace.     Interval History 8/25 pt continues to have significant back pain and radicular pain despite conservative management post op. Interval CT and MRI show pseudoarthrosis of L4-L5 and right L4 screw lucency. Patient's pain has progressed     Review of patient's allergies indicates:   No Known Allergies          Past Medical History:   Diagnosis Date    Arthritis     Diabetes mellitus, type 2 2/11/2022           Past Surgical History:   Procedure Laterality Date    CATARACT EXTRACTION Right     EPIDURAL STEROID INJECTION N/A 10/21/2020    Procedure: Injection, Steroid, Epidural Caudal; Surgeon: Vipul Nixon Jr., MD; Location: [facility]; Service: Pain Management; Laterality: N/A; Caudal KURT   Arrive @ 1315; No ATC or DM; Needs MD Signature    LUMBAR FUSION N/A 2/8/2022    Procedure: FUSION, SPINE, LUMBAR; Surgeon: Alphonse Reed MD; Location: [address]; Service: Neurosurgery; Laterality: N/A; AIRO, L4-S1          Family History       Problem Relation (Age of Onset)    Cataracts  Mother    No Known Problems Father, Sister, Brother, Maternal Aunt, Maternal Uncle, Paternal Aunt, Paternal Uncle, Maternal Grandmother, Maternal Grandfather, Paternal Grandmother, Paternal Grandfather          Social History           Socioeconomic History    Marital status: Single   Tobacco Use    Smoking status: Former Smoker    Smokeless tobacco: Never Used   Substance and Sexual Activity    Alcohol use: Yes     Comment: socially    Drug use: No     Review of Systems   Constitutional: Negative for activity change, appetite change and fever.   HENT: Negative for hearing loss and postnasal drip.   Eyes: Negative for pain and visual disturbance.   Respiratory: Negative for shortness of breath.   Cardiovascular: Negative for chest pain and palpitations.   Gastrointestinal: Negative for abdominal pain.   Endocrine: Negative for polydipsia, polyphagia and polyuria.   Musculoskeletal: Positive for arthralgias, back pain, gait problem and myalgias.   Neurological: Positive for weakness. Negative for dizziness, numbness and headaches.   Psychiatric/Behavioral: Negative for confusion and dysphoric mood.     OBJECTIVE:   Vital Signs   Pulse: 94   BP: 120/85   Pain Score: 8   There is no height or weight on file to calculate BMI.   Neurosurgery Physical Exam   General: well developed, well nourished, no distress.   Head: normocephalic, atraumatic   Neurologic: Alert and oriented. Thought content appropriate.   GCS: Motor: 6/Verbal: 5/Eyes: 4 GCS Total: 15   Mental Status: Awake, Alert, Oriented x 4   Language: No aphasia   Speech: No dysarthria   Cranial nerves: face symmetric, tongue midline, CN II-XII grossly intact.   Eyes: pupils equal, round, reactive to light with accomodation, EOMI.   Pulmonary: normal respirations, no signs of respiratory distress   Abdomen: soft, non-distended   Skin: Skin is warm, dry and intact. Incision well-healed.   Sensory: intact to light touch throughout   Motor Strength:Moves all  extremities spontaneously with good tone.     Strength  Deltoids Triceps Biceps Wrist Extension Wrist Flexion Hand    Upper: R 5/5 5/5 5/5 5/5 5/5 5/5    L 5/5 5/5 5/5 5/5 5/5 5/5     HF KE KF DF PF EHL   Lower: R 5/5 5/5 5/5 5/5 5/5 5/5    L 4+/5 5/5 5/5 5/5 5/5 5/5     Spasticity in bilateral lower extremities    Diagnostic Results:   CT/MRI Lumbar spine with pseudoarthrosis L4-L5 and lucency of R L4 and marginal posterior migration L4-5 interbody cage    ASSESSMENT/PLAN:   Emeka Caballero is a 47 y.o. male s/p L4-pelvis fusion and L4-5, L5-S1 TLIF on 02/08/2022 fors/p L4-pelvis fusion and L4-5, L5-S1 TLIF on 02/08/2022 with Dr. Reed. He was has continued back pain and radicular pain into left hip and buttock CT/MRI L spine showed pseudoarthrosis, R L4 screw lucency concerning for possible hardware failure    Plan:  Smoking cessation  Bone stimulator  CT L spine in 3 months  MRI T spine  Pain management for epidural steroid injections

## 2022-08-26 ENCOUNTER — PATIENT MESSAGE (OUTPATIENT)
Dept: ADMINISTRATIVE | Facility: HOSPITAL | Age: 47
End: 2022-08-26
Payer: MEDICAID

## 2022-09-09 DIAGNOSIS — Z98.1 S/P LUMBAR FUSION: Primary | ICD-10-CM

## 2022-09-09 DIAGNOSIS — M54.16 LUMBAR RADICULOPATHY: ICD-10-CM

## 2022-09-13 ENCOUNTER — TELEPHONE (OUTPATIENT)
Dept: NEUROSURGERY | Facility: CLINIC | Age: 47
End: 2022-09-13
Payer: MEDICAID

## 2022-09-13 NOTE — TELEPHONE ENCOUNTER
----- Message from Rosa Isela Fernandes sent at 9/13/2022  9:46 AM CDT -----  Pt spouse calling to inform office that the insurance is not paying for pt injection and he needs to go to another place , please call       Confirmed patient's contact info below:  Contact Name: Emeka Ada  Phone Number: [phone]

## 2022-09-16 ENCOUNTER — CLINICAL SUPPORT (OUTPATIENT)
Dept: REHABILITATION | Facility: HOSPITAL | Age: 47
End: 2022-09-16
Payer: MEDICAID

## 2022-09-16 DIAGNOSIS — Z98.1 S/P LUMBAR FUSION: ICD-10-CM

## 2022-09-16 DIAGNOSIS — R26.9 ABNORMAL GAIT: ICD-10-CM

## 2022-09-16 DIAGNOSIS — G89.29 CHRONIC MIDLINE LOW BACK PAIN WITHOUT SCIATICA: ICD-10-CM

## 2022-09-16 DIAGNOSIS — M54.16 LUMBAR RADICULOPATHY: ICD-10-CM

## 2022-09-16 DIAGNOSIS — R53.1 DECREASED STRENGTH: ICD-10-CM

## 2022-09-16 DIAGNOSIS — M54.50 CHRONIC MIDLINE LOW BACK PAIN WITHOUT SCIATICA: ICD-10-CM

## 2022-09-16 DIAGNOSIS — M25.60 DECREASED RANGE OF MOTION: ICD-10-CM

## 2022-09-16 PROCEDURE — 97110 THERAPEUTIC EXERCISES: CPT | Mod: PN

## 2022-09-16 PROCEDURE — 97162 PT EVAL MOD COMPLEX 30 MIN: CPT | Mod: PN

## 2022-09-16 NOTE — PLAN OF CARE
"[facility]  Physical Therapy Initial Evaluation    [id number]    Therapy Diagnosis:   Encounter Diagnoses   Name Primary?    S/P lumbar fusion     Lumbar radiculopathy     Chronic midline low back pain without sciatica     Abnormal gait     Decreased strength     Decreased range of motion      Physician: Yolie Vickers, NP    Physician Orders: PT Eval and Treat   Medical Diagnosis from Referral:   Z98.1 (ICD-10-CM) - S/P lumbar fusion   M54.16 (ICD-10-CM) - Lumbar radiculopathy     Evaluation Date: 9/16/2022  Authorization Period Expiration: 12/31/2022  Plan of Care Expiration: 9/16/2022 to 11/11/2022  Visit # / Visits authorized: 1/ 1  FOTO# 1/5    Time In: 8:21am  Time Out: 9:15am  Total Billable Time: 54 minutes (1MCE, 1TE)    Precautions: Diabetes and s/p Lumbar fusion Feb 2022.     Subjective     Date of onset: 2/8/2022    History of current condition - Emeka reports: Pt reports having a back surgery earlier this year. Since then he has continued to have issues with his back. Pt reports that he came the PT previously but does not think that it helped. He stopped coming and he tried to do walking but he cannot walk well. Pt reports that he went back to he surgeon and was told that he still has loosening of equipment and some narrowing. He was told he needs to stop smoking and allow for his back to heal. They said to try this and they would see him in three more months. Pt reports that was last week and he stopped smoking that day. Pt reports pain in the middle of the low back. When he bends over he feels like one of his screws is poking into his back. Pt reports numbness and tingling down the knee down the front of his shins and into his feet, this makes him jump at night. Pt denies falls. Denies numbness between legs just pain. Pt feels like the pain down his legs is burning like its "hot". Pt further describes pain between legs as a " cramp but its ok today. Pt states currening having pain in bilateral glute region. When he stands up he feels like its hard and something is stopping him. Pt states he has no balance.      Medical History:   Past Medical History:   Diagnosis Date    Arthritis     Diabetes mellitus, type 2 2/11/2022       Surgical History:   Emeka Caballero  has a past surgical history that includes Epidural steroid injection (N/A, 10/21/2020); Lumbar fusion (N/A, 2/8/2022); and Cataract extraction (Right).    Medications:   Emeka has a current medication list which includes the following prescription(s): acetaminophen, blood sugar diagnostic, blood-glucose meter, diazepam, docusate sodium, gabapentin, gemfibrozil, lancets, metformin, methocarbamol, naproxen, tadalafil, tamsulosin, triamcinolone acetonide 0.1%, and UNABLE TO FIND.    Allergies:   Review of patient's allergies indicates:  No Known Allergies     Imaging, MRI studies: Impression:     1. Postoperative change of L4-S1/pelvis posterior instrumented fusion, L4-L5 and L5-S1 discectomies with interbody spacers and L4-L5 and L5-S1 decompressive laminectomies.  Interval detrimental change when compared to CT of 04/27/2022 and radiograph of 02/11/2022 as follows:  *Posterior migration of the L4-L5 interbody spacer with mild mass effect on the adjacent right lateral recess.  *Abnormal signal intensity about the bilateral L4 pedicular screws, right greater than left, consistent with mechanical loosening.  *Worsening endplate erosive changes at L5-S1.  2.  Ill-defined edema in posterior soft tissues near the laminectomy bed without focal drainable collection.     3.  Multilevel degenerative changes with moderate to severe left and moderate right neural foraminal narrowing at L5-S1 further detailed above.    Prior Therapy: Previous PT earlier this year but he chose to stop coming.   Social History:  lives with their family, no stairs and no steps to enter.   Occupation:  Landscaping   Prior Level of Function: Independent  Current Level of Function: Independent - very difficult to put on shoes and socks. Pt states sometimes has trouble driving cause he has trouble moving his leg.     Pain:  Current 8/10, worst 10/10, best 8/10   Location: bilateral back   Description: Burning, Tingling, Sharp, and stabbing   Aggravating Factors: Sitting, standing, walking, bending over, lifting.   Easing Factors: can only find relief when laying down on his right side with knees bent.     Pts goals: Pt can not think of a goal cause he pushes to try to do everything on his own, he just wants to feel better.     Objective   Observation:Slow to come to stand, no lumbar flexion coming to stand.   Posture: Forward flexed posture, slight genu varus, bilateral pes planus, slight right lateral shift of shoulder.   Gait: bilateral scissoring gait, slight hip flexion, bilateral lat sway, decreased knee flexion, decreased hip extension during terminal stance. Pt grabs for anything he can hold onto. PT SBA while watching gait.   Palpation: Slight warm to touch lumbar paraspinals. Tenderness bilateral lumbar paraspinals   Sensation: intact to light touch.     Neuro Screen:     Level  Response   DTR Knee - L2,3,4 3+ bilateral     Achilles - S1,2 2+bilateral      Myotomes Response   L1,2 - hip flexion 3+/5B   L3,4 - knee extension  4+/5 B   L4 - ankle DF 4/5 right, 5/5 left    L5 - great toe ext  4/5 right, 5/5 left    S1 - ankle ev or toe flexion  5/5 bilateral      Dermatomes Response   L1- Lat hip to groin Burning bilateral    L2 - Mid thigh normal   L3 - inner knee normal   L4 - Great Toe normal   L5 - Ant ankle normal   S1 - Lateral heel  normal   S2 - Med back of knee/Med heel  normal     Range of Motion/Strength:     Lumbar AROM Pain/Dysfunction with Movement   Flexion 25% Pt hinges from hips, minimal lumbar flexion   Extension 0 Unable to get past neutral.    Right side bending 25% pain   Left side bending  "25%  pain   Right rotation NT Pain   Left rotation NT    Quadrant Ext NT    Quadrant Flex NT    Axial Compression No pain        Hip Right Left Pain/Dysfunction with Movement   AROM/PROM      flexion WNL WNL    extension 5/6 5/7 Pain with ext    abduction WNL WNL    adduction WNL WNL    Internal rotation NT NT    External rotation NT NT         L/E MMT Right Left Pain/Dysfunction with Movement   Hip Flexion 3+/5 3+/5    Hip Extension 3/5 3+/5    Hip Abduction 3/5 3+/5    Hip Adduction NT NT    Hip IR NT NT    Hip ER NT NT    Knee Flexion 4+/5 4+/5    Knee Extension 4+/5 4+/5    Ankle DF 4/5 4+/5    Ankle PF NT NT    Ankle Inversion 5/5 5/5    Ankle Eversion 5/5 5/5        Joint Mobility:   - Lumbar: Segmental mobility not tested 2/2 fusion.   - Hip: restrictions into hip internal rotation and extension. Relief of back pain with hip flexion.     Flexibility: Tight hamstrings, tight hip flexors.     Special Tests:   + Babinski bilateral  +     TU seconds - SBA by PT due to poor balance and abnormal gait mechanics putting pt at risk of falling.      30 second sit-to-stand test (without U/E support): 0 (11w/ UE support)    Balance: unable to perform Single Leg stance without upper extremity support.   Functional Squat: Excessive hip flexion    CMS Impairment/Limitation/Restriction for FOTO Survey    Therapist reviewed FOTO scores for Emeka Caballero on 2022.   FOTO documents entered into [facility] - see Media section.    Limitation Score: TBD%  Predicted Score:TBD%  Mod ROLANDO:TBD       TREATMENT     Treatment Time In: 9:05  Treatment Time Out: 9:15  Total Treatment time separate from Evaluation: 10 minutes    Emeka received therapeutic exercises to develop strength, endurance, ROM, flexibility, and core stabilization for 10 minutes including:  Hip flexor stretch off table bilateral [id number]  Lumbar lock hip ext bilateral 3x10   Clamshell bilateral 3x10 5"    Home Exercises and Patient Education " Provided    Education provided:   - Pt educated on POC  - Pt educated on HEP  - Pt educated on anatomy and physiology of current condition as it relates to signs and symptoms.     Written Home Exercises Provided: yes.  Exercises were reviewed and Emeka was able to demonstrate them prior to the end of the session.  Emeka demonstrated good  understanding of the education provided.     See EMR under Patient Instructions for exercises provided 9/16/2022.    Assessment     Emeka is a 47 y.o. male referred to outpatient Physical Therapy with a medical diagnosis of s/p lumbar fusion and lumbar radiculopathy. Pt presents c abnormal UMN signs including positive babinski bilaterally. Pt ambulates with abnormal mechanics including scissoring gait and poor balance. Pt stands with slight lumbar flexion and decreased lumbar lordosis. Pt has been non compliant with his post op restrictions of no bending, lifting, twisting as he returned to work as a . Pt has poor strength especially on the right and inability to control lower extremity functionally. Pt to continue to be monitored for further UMN signs and symptoms as pt does not present as typical Lumbar fusion.     Pt will benefit from skilled outpatient Physical Therapy to address the deficits stated above and in the chart below, provide pt/family education, and to maximize pt's level of independence.   Pt prognosis is Fair.     Plan of care discussed with patient: Yes  Pt's spiritual, cultural and educational needs considered and pt agreeable to plan of care and goals as stated below:     Anticipated Barriers for therapy: Trying to work, chronicity, smoker       Medical Necessity is demonstrated by the following  History  Co-morbidities and personal factors that may impact the plan of care Co-morbidities:   diabetes and prior lumbar surgery    Personal Factors:   education level  coping style  social background     moderate   Examination  Body Structures  and Functions, activity limitations and participation restrictions that may impact the plan of care Body Regions:   back  lower extremities    Body Systems:    ROM  strength  balance  gait  transfers  transitions  motor control    Participation Restrictions:   Caution with bending, lifting, twisting    Activity limitations:   Learning and applying knowledge  no deficits    General Tasks and Commands  no deficits    Communication  no deficits    Mobility  lifting and carrying objects  walking    Self care  dressing    Domestic Life  doing house work (cleaning house, washing dishes, laundry)    Interactions/Relationships  no deficits    Life Areas  employment    Community and Social Life  community life  recreation and leisure         high   Clinical Presentation evolving clinical presentation with changing clinical characteristics moderate   Decision Making/ Complexity Score: moderate         Goals:  Short Term Goals (4 Weeks):  1. Pt will be compliant with HEP to supplement PT in decreasing pain with functional mobility.  2. Pt will perform improve bilateral hip abd and flexion strength by 1 grade in order to improve gait mechanics.   3. Pt will report worst pain of 5/10 in order to perform work related tasks   4. Pt will be able to stand for 30min without any increase in pain in order to perform work tasks.     Long Term Goals (8 Weeks):   1. Pt will improve FOTO score to </= TBD% limited to decrease perceived limitation with maintaining/changing body position.   2. Pt will report worst pain of 2/10 in order to perform work related tasks   4. Pt will be able to stand for 32 hours without any increase in pain in order to perform work tasks.   4. Pt will be independent c final home exercise program in order to DC to home program.     Plan     Plan of care Certification: 9/16/2022 to 11/11/2022.    Outpatient Physical Therapy 2 times weekly for 8 weeks to include the following interventions: Electrical Stimulation  PRN, Gait Training, Manual Therapy, Neuromuscular Re-ed, Patient Education, Therapeutic Activities, and Therapeutic Exercise.   All other modalities PRN. Pt to be treated in conjunction with PTA PRN. Dry Needling PRN.    Tran Sanches, PT, DPT, OCS, Cert. DN

## 2022-09-19 ENCOUNTER — PATIENT MESSAGE (OUTPATIENT)
Dept: NEUROSURGERY | Facility: CLINIC | Age: 47
End: 2022-09-19
Payer: MEDICAID

## 2022-09-23 ENCOUNTER — OFFICE VISIT (OUTPATIENT)
Dept: UROLOGY | Facility: CLINIC | Age: 47
End: 2022-09-23
Payer: MEDICAID

## 2022-09-23 VITALS — HEIGHT: 68 IN | BODY MASS INDEX: 29.14 KG/M2 | WEIGHT: 192.25 LBS

## 2022-09-23 DIAGNOSIS — N13.8 BPH WITH OBSTRUCTION/LOWER URINARY TRACT SYMPTOMS: ICD-10-CM

## 2022-09-23 DIAGNOSIS — N52.9 ERECTILE DYSFUNCTION, UNSPECIFIED ERECTILE DYSFUNCTION TYPE: Primary | ICD-10-CM

## 2022-09-23 DIAGNOSIS — N40.1 BPH WITH OBSTRUCTION/LOWER URINARY TRACT SYMPTOMS: ICD-10-CM

## 2022-09-23 PROCEDURE — 99214 OFFICE O/P EST MOD 30 MIN: CPT | Mod: PBBFAC | Performed by: STUDENT IN AN ORGANIZED HEALTH CARE EDUCATION/TRAINING PROGRAM

## 2022-09-23 PROCEDURE — 1160F PR REVIEW ALL MEDS BY PRESCRIBER/CLIN PHARMACIST DOCUMENTED: ICD-10-PCS | Mod: CPTII,,, | Performed by: STUDENT IN AN ORGANIZED HEALTH CARE EDUCATION/TRAINING PROGRAM

## 2022-09-23 PROCEDURE — 1159F PR MEDICATION LIST DOCUMENTED IN MEDICAL RECORD: ICD-10-PCS | Mod: CPTII,,, | Performed by: STUDENT IN AN ORGANIZED HEALTH CARE EDUCATION/TRAINING PROGRAM

## 2022-09-23 PROCEDURE — 99999 PR PBB SHADOW E&M-EST. PATIENT-LVL IV: ICD-10-PCS | Mod: PBBFAC,,, | Performed by: STUDENT IN AN ORGANIZED HEALTH CARE EDUCATION/TRAINING PROGRAM

## 2022-09-23 PROCEDURE — 99214 PR OFFICE/OUTPT VISIT, EST, LEVL IV, 30-39 MIN: ICD-10-PCS | Mod: S$PBB,,, | Performed by: STUDENT IN AN ORGANIZED HEALTH CARE EDUCATION/TRAINING PROGRAM

## 2022-09-23 PROCEDURE — 1160F RVW MEDS BY RX/DR IN RCRD: CPT | Mod: CPTII,,, | Performed by: STUDENT IN AN ORGANIZED HEALTH CARE EDUCATION/TRAINING PROGRAM

## 2022-09-23 PROCEDURE — 3044F HG A1C LEVEL LT 7.0%: CPT | Mod: CPTII,,, | Performed by: STUDENT IN AN ORGANIZED HEALTH CARE EDUCATION/TRAINING PROGRAM

## 2022-09-23 PROCEDURE — 99999 PR PBB SHADOW E&M-EST. PATIENT-LVL IV: CPT | Mod: PBBFAC,,, | Performed by: STUDENT IN AN ORGANIZED HEALTH CARE EDUCATION/TRAINING PROGRAM

## 2022-09-23 PROCEDURE — 1159F MED LIST DOCD IN RCRD: CPT | Mod: CPTII,,, | Performed by: STUDENT IN AN ORGANIZED HEALTH CARE EDUCATION/TRAINING PROGRAM

## 2022-09-23 PROCEDURE — 99214 OFFICE O/P EST MOD 30 MIN: CPT | Mod: S$PBB,,, | Performed by: STUDENT IN AN ORGANIZED HEALTH CARE EDUCATION/TRAINING PROGRAM

## 2022-09-23 PROCEDURE — 3008F PR BODY MASS INDEX (BMI) DOCUMENTED: ICD-10-PCS | Mod: CPTII,,, | Performed by: STUDENT IN AN ORGANIZED HEALTH CARE EDUCATION/TRAINING PROGRAM

## 2022-09-23 PROCEDURE — 3008F BODY MASS INDEX DOCD: CPT | Mod: CPTII,,, | Performed by: STUDENT IN AN ORGANIZED HEALTH CARE EDUCATION/TRAINING PROGRAM

## 2022-09-23 PROCEDURE — 3044F PR MOST RECENT HEMOGLOBIN A1C LEVEL <7.0%: ICD-10-PCS | Mod: CPTII,,, | Performed by: STUDENT IN AN ORGANIZED HEALTH CARE EDUCATION/TRAINING PROGRAM

## 2022-09-23 NOTE — PROGRESS NOTES
Patient ID: Emeka Caballero is a 47 y.o. male.    Chief Complaint: Follow-up  ED/LUTS    HPI  47 y.o. who presents to the Urology clinic for evaluation of    ED  Unreliable erections with 20mg Cialis taken 1-2 hours prior to intercourse. He notes success previously with ICI but discontinued due to cost. Patient denies smoking, drinking alcohol at time of intercourse. Symptoms started after lumbar pain/weakness and has persisted despite surgery. Would like T checked. Hx of DM.       BPH/LUTS  Notes weak stream without flomax, notes poor water intake and significant constipation ( 1 x per week), not taking medications.   No hx of UTIs      Medically Necessary ROS documented in HPI    Past Medical History  Active Ambulatory Problems     Diagnosis Date Noted    Viral syndrome     Lumbar radiculopathy 02/17/2020    Spinal stenosis of lumbar region 02/17/2020    Lumbar spondylosis 02/17/2020    DDD (degenerative disc disease), lumbar 02/17/2020    Spondylolisthesis of lumbosacral region 02/17/2020    Fever 02/11/2022    Constipation 02/11/2022    BPH (benign prostatic hyperplasia) 02/11/2022    HLD (hyperlipidemia) 02/11/2022    Diabetes mellitus, type 2 02/11/2022    Prostatitis 02/13/2022    Chronic midline low back pain without sciatica 09/16/2022    Abnormal gait 09/16/2022    Decreased strength 09/16/2022    Decreased range of motion 09/16/2022     Resolved Ambulatory Problems     Diagnosis Date Noted    No Resolved Ambulatory Problems     Past Medical History:   Diagnosis Date    Arthritis          Past Surgical History  Past Surgical History:   Procedure Laterality Date    CATARACT EXTRACTION Right     EPIDURAL STEROID INJECTION N/A 10/21/2020    Procedure: Injection, Steroid, Epidural Caudal;  Surgeon: Vipul Nixon Jr., MD;  Location: [facility];  Service: Pain Management;  Laterality: N/A;  Caudal KURT  Arrive @ 1315; No ATC or DM; Needs MD Signature    LUMBAR FUSION N/A 2/8/2022    Procedure:  FUSION, SPINE, LUMBAR;  Surgeon: Alphonse Reed MD;  Location: [facility] OR [address] [place] [address];  Service: Neurosurgery;  Laterality: N/A;  AIRO, L4-S1       Social History  Social Connections: Not on file       Medications    Current Outpatient Medications:     blood sugar diagnostic Strp, To check BG 3 times daily, to use with insurance preferred meter, Disp: 100 each, Rfl: 11    blood-glucose meter kit, To check BG 3 times daily, to use with insurance preferred meter, Disp: 1 each, Rfl: 0    lancets Misc, To check BG 3 times daily, to use with insurance preferred meter, Disp: 100 each, Rfl: 11    metFORMIN (GLUCOPHAGE-XR) 500 MG ER 24hr tablet, Take 2 tablets (1,000 mg total) by mouth 2 (two) times daily with meals., Disp: 360 tablet, Rfl: 3    methocarbamoL (ROBAXIN) 500 MG Tab, Take 1 tablet (500 mg total) by mouth nightly as needed (Back pain)., Disp: 20 tablet, Rfl: 0    tadalafiL (CIALIS) 20 MG Tab, Take 1 tablet (20 mg total) by mouth daily as needed (erectile dysfunction)., Disp: 30 tablet, Rfl: 11    tamsulosin (FLOMAX) 0.4 mg Cap, Take 2 capsules (0.8 mg total) by mouth once daily., Disp: 180 capsule, Rfl: 0    UNABLE TO FIND, Bleaching cream, Disp: , Rfl:     acetaminophen (TYLENOL) 325 MG tablet, Take 2 tablets (650 mg total) by mouth every 6 (six) hours as needed for Pain. (Patient not taking: No sig reported), Disp: , Rfl: 0    diazePAM (VALIUM) 5 MG tablet, Take 0.5 tablets (2.5 mg total) by mouth every 6 (six) hours as needed (Pain)., Disp: 10 tablet, Rfl: 0    docusate sodium (COLACE) 100 MG capsule, Take 1 capsule (100 mg total) by mouth 2 (two) times daily as needed for Constipation. (Patient not taking: No sig reported), Disp: 60 capsule, Rfl: 0    gabapentin (NEURONTIN) 300 MG capsule, Take 1 capsule (300 mg total) by mouth 3 (three) times daily., Disp: 90 capsule, Rfl: 0    gemfibroziL (LOPID) 600 MG tablet, Take 1 tablet (600 mg total) by mouth 2 (two) times daily before meals. (Patient not taking: No sig  reported), Disp: 180 tablet, Rfl: 1    naproxen (NAPROSYN) 500 MG tablet, Take 1 tablet (500 mg total) by mouth 2 (two) times daily with meals. For pain (Patient not taking: No sig reported), Disp: 30 tablet, Rfl: 0    pep injection, Bring the medication to appointment   For compounding pharmacy use:  Add PAPAVERINE 30 mcg Add PHENTOLAMINE 10 mg Add ALPROSTADIL 100 mcg, Disp: 1 vial, Rfl: 3    triamcinolone acetonide 0.1% (KENALOG) 0.1 % cream, Apply topically 2 (two) times daily. Apply to rash. DO NOT USE ON FACE., Disp: 45 g, Rfl: 1    Allergies  Review of patient's allergies indicates:  No Known Allergies    Patient's PMH, FH, Social hx, Medications, allergies reviewed and updated as pertinent to today's visit    Objective:      Physical Exam  Constitutional:       General: He is not in acute distress.     Appearance: He is well-developed. He is not ill-appearing, toxic-appearing or diaphoretic.   HENT:      Head: Normocephalic and atraumatic.      Mouth/Throat:      Mouth: Mucous membranes are moist.   Eyes:      Conjunctiva/sclera: Conjunctivae normal.   Pulmonary:      Effort: Pulmonary effort is normal. No respiratory distress.   Abdominal:      General: Abdomen is flat. There is no distension.   Musculoskeletal:         General: No swelling or deformity.      Cervical back: Neck supple.   Skin:     General: Skin is warm.      Findings: No rash.   Neurological:      Mental Status: He is alert and oriented to person, place, and time.      Gait: Gait normal.   Psychiatric:         Mood and Affect: Mood normal.         Thought Content: Thought content normal.         Judgment: Judgment normal.           Lab Results   Component Value Date    PSADIAG 0.79 11/03/2021        Assessment:       1. Erectile dysfunction, unspecified erectile dysfunction type    2. BPH with obstruction/lower urinary tract symptoms          Plan:         ED  PEP Rx faxed to [facility], patient will follow up for reeducation  Discussed  lumbar pathology may explain ED    BPH/LUTS  Flomax continue  Recommend RBUS to measure prostate/ cystoscopy to eval anatomy  May need UDS  Aggressively avoid constipation  Last PSA 0.79      Refused free interpretation services, accompanied by wife who is fluent in english

## 2022-09-28 ENCOUNTER — TELEPHONE (OUTPATIENT)
Dept: UROLOGY | Facility: CLINIC | Age: 47
End: 2022-09-28
Payer: MEDICAID

## 2022-09-28 NOTE — TELEPHONE ENCOUNTER
I lvm for the pt to call clinic back and make him aware of his appointment time. He also needs to bring his medication in on ice.

## 2022-09-28 NOTE — TELEPHONE ENCOUNTER
Alana notified they would need to come in for an office visit for injection teaching by Dr. Sanz. Verbalized understanding. Appt is for 11/10/22 @ 9:00am and pt was placed on waiting list for a sooner appt.        ----- Message from Chris Sanz MD sent at 9/28/2022  4:27 PM CDT -----  That would be against medical advise, I do not feel comfortable accepting any liability of handing her a pamphlet in the event that she misunderstands the instructions. They agreed to a teaching prior to using the medication. I recommend adding to the wait list, there is no rush to using this medication, this can lead to complications and undesired events.     Deirdre Do you mind calling the wife to share my concerns    Thanks  ----- Message -----  From: Cat Tanner MA  Sent: 9/28/2022   4:08 PM CDT  To: Chris Sanz MD    The wife is wanting to know if there is any way he can be given like a pamphlet or something with instructions. The first 9 oclock slot without overbooking is not until November 10th. The pt wife stated that appointment is too far out and she can do it since she is a nurse.

## 2022-10-03 ENCOUNTER — CLINICAL SUPPORT (OUTPATIENT)
Dept: REHABILITATION | Facility: HOSPITAL | Age: 47
End: 2022-10-03
Payer: MEDICAID

## 2022-10-03 DIAGNOSIS — M25.60 DECREASED RANGE OF MOTION: ICD-10-CM

## 2022-10-03 DIAGNOSIS — R26.9 ABNORMAL GAIT: ICD-10-CM

## 2022-10-03 DIAGNOSIS — M54.50 CHRONIC MIDLINE LOW BACK PAIN WITHOUT SCIATICA: Primary | ICD-10-CM

## 2022-10-03 DIAGNOSIS — G89.29 CHRONIC MIDLINE LOW BACK PAIN WITHOUT SCIATICA: Primary | ICD-10-CM

## 2022-10-03 DIAGNOSIS — R53.1 DECREASED STRENGTH: ICD-10-CM

## 2022-10-03 PROCEDURE — 97110 THERAPEUTIC EXERCISES: CPT | Mod: PN

## 2022-10-03 NOTE — PROGRESS NOTES
"[facility]   Physical Therapy Treatment Note     [id number]    Therapy Diagnosis:   Encounter Diagnoses   Name Primary?    Chronic midline low back pain without sciatica Yes    Abnormal gait     Decreased strength     Decreased range of motion      Physician: Yolie Vickers NP    Visit Date: 10/3/2022    Physician Orders: PT Eval and Treat   Medical Diagnosis from Referral:   Z98.1 (ICD-10-CM) - S/P lumbar fusion   M54.16 (ICD-10-CM) - Lumbar radiculopathy      Evaluation Date: 9/16/2022  Authorization Period Expiration: 12/31/2022  Plan of Care Expiration: 9/16/2022 to 11/11/2022  Visit # / Visits authorized: 1/40 (+Evaluation)  FOTO# 2/5    PTA Visit #: 0/5     Time In: 8:15 AM  Time Out: 9:15 AM  Total Billable Time: 60 minutes    Precautions: Diabetes and s/p Lumbar fusion Feb 2022.     SUBJECTIVE     Pt reports: His back is hurting him a lot today, rated at 8/10.  He was not compliant with home exercise program.  Response to previous treatment: Evaluation last session  Functional change: Evaluation last session    Pain: 8/10  Location: bilateral low back      OBJECTIVE     Objective Measures updated at progress report unless specified.     Treatment     Emeka received the treatments listed below:      Therapeutic exercises to develop strength, endurance, and core stabilization for 60 minutes including:  Nu-Step: 8 minutes, Level 4.0  TA Contractions: 10"x10  TA Marches: 3x10, alternating   DL Gluteal Bridges: 3x10, 5" holds  Supine SLR's: 2x10, B, cueing for core activation  Sidelying Clamshells: 3x10, B, GTB  Lumbar lock hip ext bilateral 3x10  +Sit to Stands: 3x10, 18" box  Hip Flexor Stretch off table: 15"x3, B  Patient education      Patient Education and Home Exercises     Home Exercises Provided and Patient Education Provided     Education provided:   - Importance of continuing HEP to supplement therapy sessions.    Written " Home Exercises Provided: yes. Exercises were reviewed and Emeka was able to demonstrate them prior to the end of the session.  Emeka demonstrated good  understanding of the education provided. See EMR under Patient Instructions for exercises provided during therapy sessions    ASSESSMENT     Emeka presented to therapy with high levels of low back pain. Tolerated session fairly with focus on core stabilization and gross LE strengthening. Required constant tactile and verbal cueing for correct performance of core-based exercises. Patient continues to benefit from participating in skilled physical therapy to address core and gross LE strength deficits. Will continue to progress as tolerated.     Emeka Is progressing well towards his goals.   Pt prognosis is Fair.     Pt will continue to benefit from skilled outpatient physical therapy to address the deficits listed in the problem list box on initial evaluation, provide pt/family education and to maximize pt's level of independence in the home and community environment.     Pt's spiritual, cultural and educational needs considered and pt agreeable to plan of care and goals.     Anticipated barriers to physical therapy: Trying to work, chronicity, smoker    Goals:   Short Term Goals (4 Weeks):  1. Pt will be compliant with HEP to supplement PT in decreasing pain with functional mobility.Progressing, Not Met  2. Pt will perform improve bilateral hip abd and flexion strength by 1 grade in order to improve gait mechanics. Progressing, Not Met  3. Pt will report worst pain of 5/10 in order to perform work related tasks. Progressing, Not Met  4. Pt will be able to stand for 30min without any increase in pain in order to perform work tasks. Progressing, Not Met     Long Term Goals (8 Weeks):   1. Pt will improve FOTO score to </= TBD% limited to decrease perceived limitation with maintaining/changing body position. Progressing, Not Met  2. Pt will report worst  pain of 2/10 in order to perform work related tasks. Progressing, Not Met  4. Pt will be able to stand for 32 hours without any increase in pain in order to perform work tasks. Progressing, Not Met  4. Pt will be independent c final home exercise program in order to DC to home program. Progressing, Not Met    PLAN     Continue to progress as tolerated to work towards functional goals.     Kaley Tan, PT

## 2022-10-03 NOTE — PATIENT INSTRUCTIONS
"TA Contractions    Lay on your back with your knees bent and feet flat on the floor/bed. Push your low back into the floor/bed so that all of your back is making contact with the surface. Draw your bellybutton slightly in and up towards your spine. You should be able to breathe while maintaining this contraction.      Hold for 5 seconds and relax. Perform 2-3 sets of 10 repetitions.      TA Contractions with marching    -laying on your back, perform a transverse abdominis (TA) contraction as previously taught  -raise the legs to where both the hips and knees are bent at 90 degree angles  -keeping the contraction, slowly march with one leg, and lightly touch the table  -slowly return back to the original position with knees and hips bent, then perform the other leg     Note: the further you march your leg out from your body, the tougher and more progressed the exercise is.      Perform 2-3 sets of 10 repetitions, alternating.        Bridges    While lying on your back with knees bent, tighten your lower abdominal muscles, squeeze your buttocks and then raise your buttocks off the floor/bed as creating a "Bridge" with your body. Hold and then lower yourself and repeat.     Hold for 5 seconds and relax. Perform 2-3 sets of 10 repetitions.             Supine Straight Leg Raises    While lying on your back, raise up your leg with a straight knee.  Keep the opposite knee bent with the foot planted on the ground.      "

## 2022-10-04 ENCOUNTER — HOSPITAL ENCOUNTER (OUTPATIENT)
Dept: RADIOLOGY | Facility: HOSPITAL | Age: 47
Discharge: HOME | End: 2022-10-04
Attending: STUDENT IN AN ORGANIZED HEALTH CARE EDUCATION/TRAINING PROGRAM
Payer: MEDICAID

## 2022-10-04 DIAGNOSIS — N13.8 BPH WITH OBSTRUCTION/LOWER URINARY TRACT SYMPTOMS: ICD-10-CM

## 2022-10-04 DIAGNOSIS — N40.1 BPH WITH OBSTRUCTION/LOWER URINARY TRACT SYMPTOMS: ICD-10-CM

## 2022-10-04 PROCEDURE — 76857 US EXAM PELVIC LIMITED: CPT | Mod: 26,,, | Performed by: STUDENT IN AN ORGANIZED HEALTH CARE EDUCATION/TRAINING PROGRAM

## 2022-10-04 PROCEDURE — 76857 US BLADDER: ICD-10-PCS | Mod: 26,,, | Performed by: STUDENT IN AN ORGANIZED HEALTH CARE EDUCATION/TRAINING PROGRAM

## 2022-10-04 PROCEDURE — 76770 US EXAM ABDO BACK WALL COMP: CPT | Mod: TC

## 2022-10-04 PROCEDURE — 76770 US RETROPERITONEAL COMPLETE: ICD-10-PCS | Mod: 26,,, | Performed by: STUDENT IN AN ORGANIZED HEALTH CARE EDUCATION/TRAINING PROGRAM

## 2022-10-04 PROCEDURE — 76857 US EXAM PELVIC LIMITED: CPT | Mod: TC

## 2022-10-04 PROCEDURE — 76770 US EXAM ABDO BACK WALL COMP: CPT | Mod: 26,,, | Performed by: STUDENT IN AN ORGANIZED HEALTH CARE EDUCATION/TRAINING PROGRAM

## 2022-10-05 ENCOUNTER — CLINICAL SUPPORT (OUTPATIENT)
Dept: REHABILITATION | Facility: HOSPITAL | Age: 47
End: 2022-10-05
Payer: MEDICAID

## 2022-10-05 DIAGNOSIS — R26.9 ABNORMAL GAIT: ICD-10-CM

## 2022-10-05 DIAGNOSIS — M25.60 DECREASED RANGE OF MOTION: ICD-10-CM

## 2022-10-05 DIAGNOSIS — G89.29 CHRONIC MIDLINE LOW BACK PAIN WITHOUT SCIATICA: Primary | ICD-10-CM

## 2022-10-05 DIAGNOSIS — M54.50 CHRONIC MIDLINE LOW BACK PAIN WITHOUT SCIATICA: Primary | ICD-10-CM

## 2022-10-05 DIAGNOSIS — R53.1 DECREASED STRENGTH: ICD-10-CM

## 2022-10-05 PROCEDURE — 97110 THERAPEUTIC EXERCISES: CPT | Mod: PN

## 2022-10-05 NOTE — PROGRESS NOTES
"[facility]   Physical Therapy Treatment Note     [id number]    Therapy Diagnosis:   Encounter Diagnoses   Name Primary?    Chronic midline low back pain without sciatica Yes    Abnormal gait     Decreased strength     Decreased range of motion      Physician: Yolie Vickers NP    Visit Date: 10/5/2022    Physician Orders: PT Eval and Treat   Medical Diagnosis from Referral:   Z98.1 (ICD-10-CM) - S/P lumbar fusion   M54.16 (ICD-10-CM) - Lumbar radiculopathy      Evaluation Date: 9/16/2022  Authorization Period Expiration: 12/31/2022  Plan of Care Expiration: 9/16/2022 to 11/11/2022  Visit # / Visits authorized: 2/40 (+Evaluation)  FOTO# 3/5    PTA Visit #: 0/5     Time In: 8:15 AM  Time Out: 9:15 AM  Total Billable Time: 60 minutes    Precautions: Diabetes and s/p Lumbar fusion Feb 2022.     SUBJECTIVE     Pt reports: When he sits for too long he has increased low back pain and feels as though he cannot control his legs at times.  He was not compliant with home exercise program.  Response to previous treatment: Decreased low back pain  Functional change: Ongoing    Pain: 8/10  Location: bilateral low back      OBJECTIVE     Objective Measures updated at progress report unless specified.     Treatment     Emeka received the treatments listed below:      Therapeutic exercises to develop strength, endurance, and core stabilization for 60 minutes including:  Nu-Step: 8 minutes, Level 5.0  TA Contractions: 10"x10  TA Marches: 3x10, alternating   DL Gluteal Bridges: 3x10, 5" holds  Supine SLR's: 3x10, B, cueing for core activation  Sidelying Clamshells: 3x10, B, GTB  Lumbar lock hip ext bilateral 3x10  Sit to Stands: 3x10, 18" box, 10# kettlebell at chest  Hip Flexor Stretch off table: 15"x3, B - NOT TODAY  Patient education      Patient Education and Home Exercises     Home Exercises Provided and Patient Education Provided     Education " provided:   - Importance of continuing HEP to supplement therapy sessions.    Written Home Exercises Provided: yes. Exercises were reviewed and Emeka was able to demonstrate them prior to the end of the session.  Emeka demonstrated good  understanding of the education provided. See EMR under Patient Instructions for exercises provided during therapy sessions    ASSESSMENT     Emeka presented to therapy with high levels of low back pain that decreased substantially by the end of session. Tolerated session fairly with continued focus on core stabilization and gross LE strengthening. Required constant tactile and verbal cueing for correct performance of core-based exercises and lumbar locked hip extension. Patient continues to benefit from participating in skilled physical therapy to address core and gross LE strength deficits. Will continue to progress as tolerated.     Emeka Is progressing well towards his goals.   Pt prognosis is Fair.     Pt will continue to benefit from skilled outpatient physical therapy to address the deficits listed in the problem list box on initial evaluation, provide pt/family education and to maximize pt's level of independence in the home and community environment.     Pt's spiritual, cultural and educational needs considered and pt agreeable to plan of care and goals.     Anticipated barriers to physical therapy: Trying to work, chronicity, smoker    Goals:   Short Term Goals (4 Weeks):  1. Pt will be compliant with HEP to supplement PT in decreasing pain with functional mobility.Progressing, Not Met  2. Pt will perform improve bilateral hip abd and flexion strength by 1 grade in order to improve gait mechanics. Progressing, Not Met  3. Pt will report worst pain of 5/10 in order to perform work related tasks. Progressing, Not Met  4. Pt will be able to stand for 30min without any increase in pain in order to perform work tasks. Progressing, Not Met     Long Term Goals (8  Weeks):   1. Pt will improve FOTO score to </= TBD% limited to decrease perceived limitation with maintaining/changing body position. Progressing, Not Met  2. Pt will report worst pain of 2/10 in order to perform work related tasks. Progressing, Not Met  4. Pt will be able to stand for 32 hours without any increase in pain in order to perform work tasks. Progressing, Not Met  4. Pt will be independent c final home exercise program in order to DC to home program. Progressing, Not Met    PLAN     Continue to progress as tolerated to work towards functional goals.     Kaley Tan, PT

## 2022-10-10 ENCOUNTER — CLINICAL SUPPORT (OUTPATIENT)
Dept: REHABILITATION | Facility: HOSPITAL | Age: 47
End: 2022-10-10
Payer: MEDICAID

## 2022-10-10 DIAGNOSIS — M54.50 CHRONIC MIDLINE LOW BACK PAIN WITHOUT SCIATICA: Primary | ICD-10-CM

## 2022-10-10 DIAGNOSIS — R53.1 DECREASED STRENGTH: ICD-10-CM

## 2022-10-10 DIAGNOSIS — G89.29 CHRONIC MIDLINE LOW BACK PAIN WITHOUT SCIATICA: Primary | ICD-10-CM

## 2022-10-10 DIAGNOSIS — M25.60 DECREASED RANGE OF MOTION: ICD-10-CM

## 2022-10-10 DIAGNOSIS — R26.9 ABNORMAL GAIT: ICD-10-CM

## 2022-10-10 PROCEDURE — 97110 THERAPEUTIC EXERCISES: CPT | Mod: PN

## 2022-10-10 NOTE — PROGRESS NOTES
"[facility]   Physical Therapy Treatment Note     Name: Emeka [facility] Number: [id number]    Therapy Diagnosis:   Encounter Diagnoses   Name Primary?    Chronic midline low back pain without sciatica Yes    Abnormal gait     Decreased strength     Decreased range of motion      Physician: Yolie Vickers, NP    Visit Date: 10/10/2022    Physician Orders: PT Eval and Treat   Medical Diagnosis from Referral:   Z98.1 (ICD-10-CM) - S/P lumbar fusion   M54.16 (ICD-10-CM) - Lumbar radiculopathy      Evaluation Date: 9/16/2022  Authorization Period Expiration: 12/31/2022  Plan of Care Expiration: 9/16/2022 to 11/11/2022  Visit # / Visits authorized: 3/40 (+Evaluation)  FOTO# 4/5  PTA Visit #: 0/5     Time In: 8:15 AM  Time Out: 9:13 AM  Total Billable Time: 60 minutes (4TE)    Precautions: Diabetes and s/p Lumbar fusion Feb 2022.     SUBJECTIVE     Pt reports: Pt states that his back is doing ok, maybe a little bit better.   He was not compliant with home exercise program.  Response to previous treatment: Decreased low back pain  Functional change: Ongoing    Pain: 7/10  Location: bilateral low back      OBJECTIVE     Objective Measures updated at progress report unless specified.     Treatment     Emeka received the treatments listed below:      Therapeutic exercises to develop strength, endurance, and core stabilization for 58 minutes including:  Nu-Step: 8 minutes, Level 5.0  TA Contractions: 10"x10 -extensive cuing on diaphragmatic breathing.  TA Marches: 3x10, alternating - extensive verbal cuing on diaphragmatic breathing.   DL Gluteal Bridges: 3x10, 5" holds  Supine SLR's: 3x10, B, cueing for core activation  Sidelying Clamshells: 3x10, B, GTB  Lumbar lock hip ext bilateral 3x10  Sit to Stands: 3x10, 18" box, 10# kettlebell at chest  Hip Flexor Stretch off table: 15"x3, B - NOT TODAY  Patient education - provided to pt and pt spouse      Patient Education and Home " Exercises     Home Exercises Provided and Patient Education Provided     Education provided:   - Importance of continuing HEP to supplement therapy sessions.  - Pt spouse educated on purpose of therapy today.     Written Home Exercises Provided: Patient instructed to cont prior HEP. Exercises were reviewed and Emeka was able to demonstrate them prior to the end of the session.  Emeka demonstrated good  understanding of the education provided. See EMR under Patient Instructions for exercises provided during therapy sessions    ASSESSMENT   Pt presents to PT today with continued low back pain and abnormal gait mechanics. PT continues to benefit from strengthening exercises to improve stability of the lumbar region. At this point, gait may not be able to be improved as it is unclear if there is a neurological component to his abnormal mechanics. Pt to continue to be monitored for progress.     Emeka Is progressing well towards his goals.   Pt prognosis is Fair.     Pt will continue to benefit from skilled outpatient physical therapy to address the deficits listed in the problem list box on initial evaluation, provide pt/family education and to maximize pt's level of independence in the home and community environment.     Pt's spiritual, cultural and educational needs considered and pt agreeable to plan of care and goals.     Anticipated barriers to physical therapy: Trying to work, chronicity, smoker    Goals:   Short Term Goals (4 Weeks):  1. Pt will be compliant with HEP to supplement PT in decreasing pain with functional mobility.Progressing, Not Met  2. Pt will perform improve bilateral hip abd and flexion strength by 1 grade in order to improve gait mechanics. Progressing, Not Met  3. Pt will report worst pain of 5/10 in order to perform work related tasks. Progressing, Not Met  4. Pt will be able to stand for 30min without any increase in pain in order to perform work tasks. Progressing, Not Met      Long Term Goals (8 Weeks):   1. Pt will improve FOTO score to </= TBD% limited to decrease perceived limitation with maintaining/changing body position. Progressing, Not Met  2. Pt will report worst pain of 2/10 in order to perform work related tasks. Progressing, Not Met  4. Pt will be able to stand for 32 hours without any increase in pain in order to perform work tasks. Progressing, Not Met  4. Pt will be independent c final home exercise program in order to DC to home program. Progressing, Not Met    PLAN     Continue to progress as tolerated to work towards functional goals.     Tran Sanches, PT

## 2022-10-13 ENCOUNTER — CLINICAL SUPPORT (OUTPATIENT)
Dept: REHABILITATION | Facility: HOSPITAL | Age: 47
End: 2022-10-13
Payer: MEDICAID

## 2022-10-13 DIAGNOSIS — G89.29 CHRONIC MIDLINE LOW BACK PAIN WITHOUT SCIATICA: Primary | ICD-10-CM

## 2022-10-13 DIAGNOSIS — M54.50 CHRONIC MIDLINE LOW BACK PAIN WITHOUT SCIATICA: Primary | ICD-10-CM

## 2022-10-13 DIAGNOSIS — M25.60 DECREASED RANGE OF MOTION: ICD-10-CM

## 2022-10-13 DIAGNOSIS — R53.1 DECREASED STRENGTH: ICD-10-CM

## 2022-10-13 DIAGNOSIS — R26.9 ABNORMAL GAIT: ICD-10-CM

## 2022-10-13 PROCEDURE — 97110 THERAPEUTIC EXERCISES: CPT | Mod: PN

## 2022-10-13 NOTE — PROGRESS NOTES
[facility]   Physical Therapy Treatment Note     Name: Emeka [facility] Number: [id number]    Therapy Diagnosis:   Encounter Diagnoses   Name Primary?    Chronic midline low back pain without sciatica Yes    Abnormal gait     Decreased strength     Decreased range of motion        Physician: Yolie Vickers NP    Visit Date: 10/13/2022    Physician Orders: PT Eval and Treat   Medical Diagnosis from Referral:   Z98.1 (ICD-10-CM) - S/P lumbar fusion   M54.16 (ICD-10-CM) - Lumbar radiculopathy      Evaluation Date: 9/16/2022  Authorization Period Expiration: 12/31/2022  Plan of Care Expiration: 9/16/2022 to 11/11/2022  Visit # / Visits authorized: 4/40 (+Evaluation)  FOTO# 5/5 - Next  PTA Visit #: 0/5     Time In: 8:15 AM  Time Out: 9:13 AM  Total Billable Time: 57 minutes (4TE)    Precautions: Diabetes and s/p Lumbar fusion Feb 2022.     SUBJECTIVE     Pt reports: Pt reports that last night and today he has had pain down the right back that goes into the right leg and is burning and painful to the knee.     Later in treatment, pt reported that a month after surgery, his wife kicked him out and he had to go stay in a hotel. He fell 2x in the hotel bathroom and had no one to help him get up. He said his back got very swollen at that point. He further reports that he walked like this prior to surgery for about 2 years.     He was not compliant with home exercise program.  Response to previous treatment: no adverse reactions  Functional change: continues to walk with abnormal gait mechanics.     Pain: 8/10  Location: bilateral low back      OBJECTIVE     Objective Measures updated at progress report unless specified.     Joint mobility : Extensive stiffness in bilateral hips into extension   Palpation: Increased sensitivity to light tough - possible allodynia      Treatment     Emeka received the treatments listed below:      Emeka received the following manual therapy  "techniques: Joint mobilizations were applied to the: bilateral Hips for 12 minutes, including:  Posterior to anterior hip mobilization for extension       Therapeutic exercises to develop strength, endurance, and core stabilization for 46 minutes including:  Nu-Step: 8 minutes, Level 5.0  Prone hip ext 2x10 bilateral following manual   DL Gluteal Bridges: 3x10, 5" holds   Hip Flexor Stretch off table: [id number] bilateral   Supine SLR's: 3x10, B, cueing for core activation  Lumbar lock hip ext bilateral 3x10 3#  Patient education    Sit to Stands: 3x10, 18" box, 10# kettlebell at chest  TA Contractions: 10"x10 -extensive cuing on diaphragmatic breathing.  TA Marches: 3x10, alternating - extensive verbal cuing on diaphragmatic breathing.   Sidelying Clamshells: 3x10, B, GTB        Patient Education and Home Exercises     Home Exercises Provided and Patient Education Provided     Education provided:   - Pt again reminded of what exercises to be doing at home.     Written Home Exercises Provided: Patient instructed to cont prior HEP. Exercises were reviewed and Emeka was able to demonstrate them prior to the end of the session.  Emeka demonstrated good  understanding of the education provided. See EMR under Patient Instructions for exercises provided during therapy sessions    ASSESSMENT     Pt presents to PT today with symptoms of burning down the right low back and right leg to the knee. Manual performed to assist with hip mobilization followed by strengthening in order to further assist hip extension. Pt had reduction of burning down the leg post treatment and reduction in overall pain. Pt continues to ambulate with scissoring gait but PT found out today that this was ongoing for 2 years prior to lumbar surgery. PT to continue to monitor pt response to treatment.     Emeka Is progressing well towards his goals.   Pt prognosis is Fair.     Pt will continue to benefit from skilled outpatient physical therapy " to address the deficits listed in the problem list box on initial evaluation, provide pt/family education and to maximize pt's level of independence in the home and community environment.     Pt's spiritual, cultural and educational needs considered and pt agreeable to plan of care and goals.     Anticipated barriers to physical therapy: Trying to work, chronicity, smoker    Goals:   Short Term Goals (4 Weeks):  1. Pt will be compliant with HEP to supplement PT in decreasing pain with functional mobility.Progressing, Not Met  2. Pt will perform improve bilateral hip abd and flexion strength by 1 grade in order to improve gait mechanics. Progressing, Not Met  3. Pt will report worst pain of 5/10 in order to perform work related tasks. Progressing, Not Met  4. Pt will be able to stand for 30min without any increase in pain in order to perform work tasks. Progressing, Not Met     Long Term Goals (8 Weeks):   1. Pt will improve FOTO score to </= TBD% limited to decrease perceived limitation with maintaining/changing body position. Progressing, Not Met  2. Pt will report worst pain of 2/10 in order to perform work related tasks. Progressing, Not Met  4. Pt will be able to stand for 32 hours without any increase in pain in order to perform work tasks. Progressing, Not Met  4. Pt will be independent c final home exercise program in order to DC to home program. Progressing, Not Met    PLAN     Continue with hip mobilization and strengthening into hip extension.     Tran Sanches, PT

## 2022-10-14 ENCOUNTER — LAB VISIT (OUTPATIENT)
Dept: LAB | Facility: HOSPITAL | Age: 47
End: 2022-10-14
Attending: FAMILY MEDICINE
Payer: MEDICAID

## 2022-10-14 DIAGNOSIS — E78.2 MIXED DYSLIPIDEMIA: ICD-10-CM

## 2022-10-14 DIAGNOSIS — E11.9 DIABETES MELLITUS WITHOUT COMPLICATION: ICD-10-CM

## 2022-10-14 LAB
ALBUMIN SERPL BCP-MCNC: 3.3 G/DL (ref 3.5–5.2)
ALP SERPL-CCNC: 88 U/L (ref 55–135)
ALT SERPL W/O P-5'-P-CCNC: 29 U/L (ref 10–44)
ANION GAP SERPL CALC-SCNC: 7 MMOL/L (ref 8–16)
AST SERPL-CCNC: 18 U/L (ref 10–40)
BILIRUB SERPL-MCNC: 0.3 MG/DL (ref 0.1–1)
BUN SERPL-MCNC: 17 MG/DL (ref 6–20)
CALCIUM SERPL-MCNC: 9.3 MG/DL (ref 8.7–10.5)
CHLORIDE SERPL-SCNC: 107 MMOL/L (ref 95–110)
CHOLEST SERPL-MCNC: 137 MG/DL (ref 120–199)
CHOLEST/HDLC SERPL: 5.3 {RATIO} (ref 2–5)
CO2 SERPL-SCNC: 27 MMOL/L (ref 23–29)
CREAT SERPL-MCNC: 0.9 MG/DL (ref 0.5–1.4)
EST. GFR  (NO RACE VARIABLE): >60 ML/MIN/1.73 M^2
ESTIMATED AVG GLUCOSE: 126 MG/DL (ref 68–131)
GLUCOSE SERPL-MCNC: 100 MG/DL (ref 70–110)
HBA1C MFR BLD: 6 % (ref 4–5.6)
HDLC SERPL-MCNC: 26 MG/DL (ref 40–75)
HDLC SERPL: 19 % (ref 20–50)
LDLC SERPL CALC-MCNC: 81.4 MG/DL (ref 63–159)
NONHDLC SERPL-MCNC: 111 MG/DL
POTASSIUM SERPL-SCNC: 4.5 MMOL/L (ref 3.5–5.1)
PROT SERPL-MCNC: 7.8 G/DL (ref 6–8.4)
SODIUM SERPL-SCNC: 141 MMOL/L (ref 136–145)
TRIGL SERPL-MCNC: 148 MG/DL (ref 30–150)

## 2022-10-14 PROCEDURE — 36415 COLL VENOUS BLD VENIPUNCTURE: CPT | Mod: PN | Performed by: FAMILY MEDICINE

## 2022-10-14 PROCEDURE — 83036 HEMOGLOBIN GLYCOSYLATED A1C: CPT | Performed by: FAMILY MEDICINE

## 2022-10-14 PROCEDURE — 80053 COMPREHEN METABOLIC PANEL: CPT | Performed by: FAMILY MEDICINE

## 2022-10-14 PROCEDURE — 80061 LIPID PANEL: CPT | Performed by: FAMILY MEDICINE

## 2022-10-17 ENCOUNTER — CLINICAL SUPPORT (OUTPATIENT)
Dept: REHABILITATION | Facility: HOSPITAL | Age: 47
End: 2022-10-17
Payer: MEDICAID

## 2022-10-17 DIAGNOSIS — M54.50 CHRONIC MIDLINE LOW BACK PAIN WITHOUT SCIATICA: Primary | ICD-10-CM

## 2022-10-17 DIAGNOSIS — R53.1 DECREASED STRENGTH: ICD-10-CM

## 2022-10-17 DIAGNOSIS — M25.60 DECREASED RANGE OF MOTION: ICD-10-CM

## 2022-10-17 DIAGNOSIS — G89.29 CHRONIC MIDLINE LOW BACK PAIN WITHOUT SCIATICA: Primary | ICD-10-CM

## 2022-10-17 DIAGNOSIS — R26.9 ABNORMAL GAIT: ICD-10-CM

## 2022-10-17 PROCEDURE — 97110 THERAPEUTIC EXERCISES: CPT | Mod: PN

## 2022-10-17 NOTE — PROGRESS NOTES
"[facility]   Physical Therapy Treatment Note     Name: Emeka [facility]  Clinic Number: [id number]    Therapy Diagnosis:   Encounter Diagnoses   Name Primary?    Chronic midline low back pain without sciatica Yes    Abnormal gait     Decreased strength     Decreased range of motion        Physician: Yolie Vickers, NP    Visit Date: 10/17/2022    Physician Orders: PT Eval and Treat   Medical Diagnosis from Referral:   Z98.1 (ICD-10-CM) - S/P lumbar fusion   M54.16 (ICD-10-CM) - Lumbar radiculopathy      Evaluation Date: 9/16/2022  Authorization Period Expiration: 12/31/2022  Plan of Care Expiration: 9/16/2022 to 11/11/2022  Visit # / Visits authorized: 5/40 (+Evaluation)  FOTO# 5/5 - Next  PTA Visit #: 0/5     Time In: 8:15 AM  Time Out: 9:15 AM  Total Billable Time: 57 minutes (2TE 1:1    Precautions: Diabetes and s/p Lumbar fusion Feb 2022.     SUBJECTIVE     Pt reports: Pt states that he is doing really well today, has had no pain for the last two days.     He was not compliant with home exercise program.  Response to previous treatment:no pain for two days  Functional change: less antalgic gait    Pain: 0/10  Location: bilateral low back      OBJECTIVE     Objective Measures updated at progress report unless specified.     Treatment     Emeka received the treatments listed below:      Emeka received the following manual therapy techniques: Joint mobilizations were applied to the: bilateral Hips for 10 minutes, including:  Posterior to anterior hip mobilization for extension       Therapeutic exercises to develop strength, endurance, and core stabilization for 38 minutes including:  Nu-Step: 8 minutes, Level 5.0  Prone hip ext 3x10 bilateral following manual   DL Gluteal Bridges: 3x10, 5" holds   Hip Flexor Stretch off table: [id number] bilateral   Supine SLR's: 3x10, B, cueing for core activation  Lumbar lock hip ext bilateral 3x10 4#  Patient education    Emeka " "participated in gait training to improve functional mobility and safety for 8  minutes, including:  At // bars - pt taught to attempt to widen base of support and increased terminal hip extension along with increasing hip flexion during swing.   Pt stable enough to move to middle of clinic where he ambulated about 250ft with SBA and improved gait mechanics.       Sit to Stands: 3x10, 18" box, 10# kettlebell at chest  TA Contractions: 10"x10 -extensive cuing on diaphragmatic breathing.  TA Marches: 3x10, alternating - extensive verbal cuing on diaphragmatic breathing.   Sidelying Clamshells: 3x10, B, GTB        Patient Education and Home Exercises     Home Exercises Provided and Patient Education Provided     Education provided:   - Pt again reminded of what exercises to be doing at home.   - pt encouraged to continue with gait training at home.     Written Home Exercises Provided: Patient instructed to cont prior HEP. Exercises were reviewed and Emeka was able to demonstrate them prior to the end of the session.  Emeka demonstrated good  understanding of the education provided. See EMR under Patient Instructions for exercises provided during therapy sessions    ASSESSMENT     Pt presents to PT today with improvements in pain. Gait training was added to program today in order to improve mechanics and reduce risk of falls. Pt did well with training. He does continue to lack terminal hip extension and hip flexion during swing phase. SBA required in order to ensure safety.     Emeka Is progressing well towards his goals.   Pt prognosis is Fair.     Pt will continue to benefit from skilled outpatient physical therapy to address the deficits listed in the problem list box on initial evaluation, provide pt/family education and to maximize pt's level of independence in the home and community environment.     Pt's spiritual, cultural and educational needs considered and pt agreeable to plan of care and goals.   "   Anticipated barriers to physical therapy: Trying to work, chronicity, smoker    Goals:   Short Term Goals (4 Weeks):  1. Pt will be compliant with HEP to supplement PT in decreasing pain with functional mobility.Progressing, Not Met  2. Pt will perform improve bilateral hip abd and flexion strength by 1 grade in order to improve gait mechanics. Progressing, Not Met  3. Pt will report worst pain of 5/10 in order to perform work related tasks. Progressing, Not Met  4. Pt will be able to stand for 30min without any increase in pain in order to perform work tasks. Progressing, Not Met     Long Term Goals (8 Weeks):   1. Pt will improve FOTO score to </= TBD% limited to decrease perceived limitation with maintaining/changing body position. Progressing, Not Met  2. Pt will report worst pain of 2/10 in order to perform work related tasks. Progressing, Not Met  4. Pt will be able to stand for 32 hours without any increase in pain in order to perform work tasks. Progressing, Not Met  4. Pt will be independent c final home exercise program in order to DC to home program. Progressing, Not Met    PLAN     Continue with hip mobilization and strengthening into hip extension.     Tran Sanches, PT

## 2022-10-19 ENCOUNTER — CLINICAL SUPPORT (OUTPATIENT)
Dept: REHABILITATION | Facility: HOSPITAL | Age: 47
End: 2022-10-19
Payer: MEDICAID

## 2022-10-19 DIAGNOSIS — G89.29 CHRONIC MIDLINE LOW BACK PAIN WITHOUT SCIATICA: Primary | ICD-10-CM

## 2022-10-19 DIAGNOSIS — M25.60 DECREASED RANGE OF MOTION: ICD-10-CM

## 2022-10-19 DIAGNOSIS — R53.1 DECREASED STRENGTH: ICD-10-CM

## 2022-10-19 DIAGNOSIS — R26.9 ABNORMAL GAIT: ICD-10-CM

## 2022-10-19 DIAGNOSIS — M54.50 CHRONIC MIDLINE LOW BACK PAIN WITHOUT SCIATICA: Primary | ICD-10-CM

## 2022-10-19 PROCEDURE — 97110 THERAPEUTIC EXERCISES: CPT | Mod: PN

## 2022-10-19 NOTE — PROGRESS NOTES
"[facility]   Physical Therapy Treatment Note     Name: Emeka [facility]  Clinic Number: [id number]    Therapy Diagnosis:   Encounter Diagnoses   Name Primary?    Chronic midline low back pain without sciatica Yes    Abnormal gait     Decreased strength     Decreased range of motion        Physician: Yolie Vickers, NP    Visit Date: 10/19/2022    Physician Orders: PT Eval and Treat   Medical Diagnosis from Referral:   Z98.1 (ICD-10-CM) - S/P lumbar fusion   M54.16 (ICD-10-CM) - Lumbar radiculopathy      Evaluation Date: 9/16/2022  Authorization Period Expiration: 12/31/2022  Plan of Care Expiration: 9/16/2022 to 11/11/2022  Visit # / Visits authorized: 6/40 (+Evaluation)  FOTO# 5/5 - Next  PTA Visit #: 0/5     Time In: 8:18 AM  Time Out: 9:15 AM  Total Billable Time: 57 minutes (2TE 1:1)    Precautions: Diabetes and s/p Lumbar fusion Feb 2022.     SUBJECTIVE     Pt reports: Yesterday, pt was picking weeds all day, bent over, had a lot of pain after doing this. Knows he was told not to perform bending, lifting, twisting, but he has to do his job.     He was not compliant with home exercise program.  Response to previous treatment: felt better  Functional change: less antalgic gait    Pain: 0/10  Location: bilateral low back      OBJECTIVE     Objective Measures updated at progress report unless specified.     Treatment     Emeka received the treatments listed below:      Emeka received the following manual therapy techniques: Joint mobilizations were applied to the: bilateral Hips for 10 minutes, including:  Posterior to anterior hip mobilization for extension       Therapeutic exercises to develop strength, endurance, and core stabilization for 38 minutes including:  Nu-Step: 8 minutes, Level 5.0  Prone hip ext 3x10 bilateral following manual   DL Gluteal Bridges: 3x10, 5" holds   Hip Flexor Stretch off table: [id number] bilateral   Supine SLR's: 3x10, 1# B, cueing for core " "activation  Lumbar lock hip ext bilateral 3x10 4#  Sit to Stands: 3x10, 18" box, 10# kettlebell at chest  Patient education    Emeka participated in gait training to improve functional mobility and safety for 8  minutes, including:  Ambulation along white line to assist with visual cues to widen base of support.         TA Contractions: 10"x10 -extensive cuing on diaphragmatic breathing.  TA Marches: 3x10, alternating - extensive verbal cuing on diaphragmatic breathing.   Sidelying Clamshells: 3x10, B, GTB        Patient Education and Home Exercises     Home Exercises Provided and Patient Education Provided     Education provided:   - Pt again reminded of what exercises to be doing at home.   - pt encouraged to continue with gait training at home.     Written Home Exercises Provided: Patient instructed to cont prior HEP. Exercises were reviewed and Emeka was able to demonstrate them prior to the end of the session.  Emeka demonstrated good  understanding of the education provided. See EMR under Patient Instructions for exercises provided during therapy sessions    ASSESSMENT   Pt continues to not adhere to post op instructions and puts excessive strain through his lumbar spine was performing work related activities. This aggravates symptoms. There are improvements in gait mechanics especially in base of support and reduced scissoring. However, pt continues to demonstrates decreased hip extension, flat back, and increased thoracic ext to balance while ambulating.     Emeka Is progressing well towards his goals.   Pt prognosis is Fair.     Pt will continue to benefit from skilled outpatient physical therapy to address the deficits listed in the problem list box on initial evaluation, provide pt/family education and to maximize pt's level of independence in the home and community environment.     Pt's spiritual, cultural and educational needs considered and pt agreeable to plan of care and goals.   "   Anticipated barriers to physical therapy: Trying to work, chronicity, smoker    Goals:   Short Term Goals (4 Weeks):  1. Pt will be compliant with HEP to supplement PT in decreasing pain with functional mobility.Progressing, Not Met  2. Pt will perform improve bilateral hip abd and flexion strength by 1 grade in order to improve gait mechanics. Progressing, Not Met  3. Pt will report worst pain of 5/10 in order to perform work related tasks. Progressing, Not Met  4. Pt will be able to stand for 30min without any increase in pain in order to perform work tasks. Progressing, Not Met     Long Term Goals (8 Weeks):   1. Pt will improve FOTO score to </= TBD% limited to decrease perceived limitation with maintaining/changing body position. Progressing, Not Met  2. Pt will report worst pain of 2/10 in order to perform work related tasks. Progressing, Not Met  4. Pt will be able to stand for 32 hours without any increase in pain in order to perform work tasks. Progressing, Not Met  4. Pt will be independent c final home exercise program in order to DC to home program. Progressing, Not Met    PLAN     Continue with hip mobilization and strengthening into hip extension.     Tran Sanches, PT

## 2022-10-20 ENCOUNTER — OFFICE VISIT (OUTPATIENT)
Dept: FAMILY MEDICINE | Facility: CLINIC | Age: 47
End: 2022-10-20
Payer: MEDICAID

## 2022-10-20 VITALS
OXYGEN SATURATION: 98 % | DIASTOLIC BLOOD PRESSURE: 78 MMHG | WEIGHT: 193.81 LBS | SYSTOLIC BLOOD PRESSURE: 128 MMHG | HEIGHT: 68 IN | HEART RATE: 108 BPM | BODY MASS INDEX: 29.37 KG/M2 | TEMPERATURE: 98 F

## 2022-10-20 DIAGNOSIS — E78.2 MIXED DYSLIPIDEMIA: ICD-10-CM

## 2022-10-20 DIAGNOSIS — M48.061 SPINAL STENOSIS, LUMBAR REGION, WITHOUT NEUROGENIC CLAUDICATION: ICD-10-CM

## 2022-10-20 DIAGNOSIS — E11.9 DIABETES MELLITUS WITHOUT COMPLICATION: Primary | ICD-10-CM

## 2022-10-20 DIAGNOSIS — Z12.11 SCREENING FOR COLORECTAL CANCER: ICD-10-CM

## 2022-10-20 DIAGNOSIS — Z12.12 SCREENING FOR COLORECTAL CANCER: ICD-10-CM

## 2022-10-20 PROCEDURE — 99999 PR PBB SHADOW E&M-EST. PATIENT-LVL IV: ICD-10-PCS | Mod: PBBFAC,,, | Performed by: FAMILY MEDICINE

## 2022-10-20 PROCEDURE — 3044F HG A1C LEVEL LT 7.0%: CPT | Mod: CPTII,,, | Performed by: FAMILY MEDICINE

## 2022-10-20 PROCEDURE — 3078F DIAST BP <80 MM HG: CPT | Mod: CPTII,,, | Performed by: FAMILY MEDICINE

## 2022-10-20 PROCEDURE — 3044F PR MOST RECENT HEMOGLOBIN A1C LEVEL <7.0%: ICD-10-PCS | Mod: CPTII,,, | Performed by: FAMILY MEDICINE

## 2022-10-20 PROCEDURE — 3066F PR DOCUMENTATION OF TREATMENT FOR NEPHROPATHY: ICD-10-PCS | Mod: CPTII,,, | Performed by: FAMILY MEDICINE

## 2022-10-20 PROCEDURE — 1159F MED LIST DOCD IN RCRD: CPT | Mod: CPTII,,, | Performed by: FAMILY MEDICINE

## 2022-10-20 PROCEDURE — 3061F NEG MICROALBUMINURIA REV: CPT | Mod: CPTII,,, | Performed by: FAMILY MEDICINE

## 2022-10-20 PROCEDURE — 1160F RVW MEDS BY RX/DR IN RCRD: CPT | Mod: CPTII,,, | Performed by: FAMILY MEDICINE

## 2022-10-20 PROCEDURE — 1160F PR REVIEW ALL MEDS BY PRESCRIBER/CLIN PHARMACIST DOCUMENTED: ICD-10-PCS | Mod: CPTII,,, | Performed by: FAMILY MEDICINE

## 2022-10-20 PROCEDURE — 3074F PR MOST RECENT SYSTOLIC BLOOD PRESSURE < 130 MM HG: ICD-10-PCS | Mod: CPTII,,, | Performed by: FAMILY MEDICINE

## 2022-10-20 PROCEDURE — 99214 PR OFFICE/OUTPT VISIT, EST, LEVL IV, 30-39 MIN: ICD-10-PCS | Mod: S$PBB,,, | Performed by: FAMILY MEDICINE

## 2022-10-20 PROCEDURE — 3074F SYST BP LT 130 MM HG: CPT | Mod: CPTII,,, | Performed by: FAMILY MEDICINE

## 2022-10-20 PROCEDURE — 3061F PR NEG MICROALBUMINURIA RESULT DOCUMENTED/REVIEW: ICD-10-PCS | Mod: CPTII,,, | Performed by: FAMILY MEDICINE

## 2022-10-20 PROCEDURE — 1159F PR MEDICATION LIST DOCUMENTED IN MEDICAL RECORD: ICD-10-PCS | Mod: CPTII,,, | Performed by: FAMILY MEDICINE

## 2022-10-20 PROCEDURE — 3078F PR MOST RECENT DIASTOLIC BLOOD PRESSURE < 80 MM HG: ICD-10-PCS | Mod: CPTII,,, | Performed by: FAMILY MEDICINE

## 2022-10-20 PROCEDURE — 99999 PR PBB SHADOW E&M-EST. PATIENT-LVL IV: CPT | Mod: PBBFAC,,, | Performed by: FAMILY MEDICINE

## 2022-10-20 PROCEDURE — 99214 OFFICE O/P EST MOD 30 MIN: CPT | Mod: S$PBB,,, | Performed by: FAMILY MEDICINE

## 2022-10-20 PROCEDURE — 3066F NEPHROPATHY DOC TX: CPT | Mod: CPTII,,, | Performed by: FAMILY MEDICINE

## 2022-10-20 PROCEDURE — 99214 OFFICE O/P EST MOD 30 MIN: CPT | Mod: PBBFAC,PN | Performed by: FAMILY MEDICINE

## 2022-10-20 RX ORDER — METFORMIN HYDROCHLORIDE 500 MG/1
1000 TABLET, EXTENDED RELEASE ORAL 2 TIMES DAILY WITH MEALS
Qty: 360 TABLET | Refills: 3 | Status: SHIPPED | OUTPATIENT
Start: 2022-10-20 | End: 2023-05-04 | Stop reason: SDUPTHER

## 2022-10-20 RX ORDER — ATORVASTATIN CALCIUM 40 MG/1
40 TABLET, FILM COATED ORAL DAILY
Qty: 90 TABLET | Refills: 3 | Status: SHIPPED | OUTPATIENT
Start: 2022-10-20 | End: 2023-05-04 | Stop reason: SDUPTHER

## 2022-10-20 NOTE — PROGRESS NOTES
Subjective:       Patient ID: Emeka Caballero is a 47 y.o. male.    Chief Complaint: Diabetes, Hyperlipidemia, and Back Pain    Diabetes  He presents for his follow-up diabetic visit. He has type 2 diabetes mellitus. His disease course has been improving. Pertinent negatives for hypoglycemia include no headaches. Pertinent negatives for diabetes include no blurred vision, no chest pain, no foot paresthesias, no polydipsia, no polyphagia, no polyuria, no weakness and no weight loss. Current diabetic treatment includes oral agent (monotherapy). He is compliant with treatment all of the time. He is following a high fat/cholesterol diet. He participates in exercise intermittently. He monitors blood glucose at home 1-2 x per day. His breakfast blood glucose range is generally  mg/dl.   Hyperlipidemia  This is a chronic problem. Recent lipid tests were reviewed and are normal. Exacerbating diseases include diabetes. Associated symptoms include leg pain. Pertinent negatives include no chest pain. Current antihyperlipidemic treatment includes fibric acid derivatives. The current treatment provides moderate improvement of lipids. There are no compliance problems.    Back Pain  This is a chronic problem. The current episode started more than 1 year ago. The problem occurs constantly. The problem has been gradually worsening since onset. The pain is present in the lumbar spine. The quality of the pain is described as burning and shooting. The pain radiates to the left thigh and right thigh. The pain is severe. The pain is The same all the time. The symptoms are aggravated by bending, standing and twisting. Associated symptoms include leg pain and paresthesias. Pertinent negatives include no abdominal pain, bladder incontinence, bowel incontinence, chest pain, dysuria, fever, headaches, weakness or weight loss. Risk factors include obesity. He has tried NSAIDs, heat and muscle relaxant (surgery) for the symptoms.  The treatment provided no relief.     Review of Systems   Constitutional:  Negative for fever and weight loss.   Eyes:  Negative for blurred vision.   Cardiovascular:  Negative for chest pain.   Gastrointestinal:  Negative for abdominal pain and bowel incontinence.   Endocrine: Negative for polydipsia, polyphagia and polyuria.   Genitourinary:  Negative for bladder incontinence and dysuria.   Musculoskeletal:  Positive for back pain and leg pain.   Neurological:  Positive for paresthesias. Negative for weakness and headaches.       Objective:      Physical Exam  Vitals reviewed.   Constitutional:       General: He is not in acute distress.  HENT:      Head: Normocephalic and atraumatic.      Right Ear: Ear canal and external ear normal.      Left Ear: Ear canal and external ear normal.      Nose: Nose normal.      Mouth/Throat:      Mouth: Mucous membranes are moist.      Pharynx: No oropharyngeal exudate or posterior oropharyngeal erythema.   Eyes:      Extraocular Movements: Extraocular movements intact.      Conjunctiva/sclera: Conjunctivae normal.      Pupils: Pupils are equal, round, and reactive to light.   Cardiovascular:      Rate and Rhythm: Normal rate and regular rhythm.      Heart sounds: No murmur heard.  Pulmonary:      Effort: Pulmonary effort is normal. No respiratory distress.      Breath sounds: Normal breath sounds. No wheezing or rales.   Abdominal:      General: Abdomen is flat. Bowel sounds are normal. There is no distension.      Palpations: Abdomen is soft.      Tenderness: There is no abdominal tenderness. There is no guarding.   Musculoskeletal:      Cervical back: Normal range of motion.   Skin:     General: Skin is warm.      Capillary Refill: Capillary refill takes less than 2 seconds.   Neurological:      Mental Status: He is alert and oriented to person, place, and time.      Cranial Nerves: No cranial nerve deficit.      Sensory: No sensory deficit.   Psychiatric:         Mood and  Affect: Mood normal.         Behavior: Behavior normal.         Lab Visit on 10/14/2022   Component Date Value Ref Range Status    Microalbumin, Urine 10/14/2022 7.0  ug/mL Final    Creatinine, Urine 10/14/2022 238.0  23.0 - 375.0 mg/dL Final    Microalb/Creat Ratio 10/14/2022 2.9  0.0 - 30.0 ug/mg Final   Lab Visit on 10/14/2022   Component Date Value Ref Range Status    Sodium 10/14/2022 141  136 - 145 mmol/L Final    Potassium 10/14/2022 4.5  3.5 - 5.1 mmol/L Final    Chloride 10/14/2022 107  95 - 110 mmol/L Final    CO2 10/14/2022 27  23 - 29 mmol/L Final    Glucose 10/14/2022 100  70 - 110 mg/dL Final    BUN 10/14/2022 17  6 - 20 mg/dL Final    Creatinine 10/14/2022 0.9  0.5 - 1.4 mg/dL Final    Calcium 10/14/2022 9.3  8.7 - 10.5 mg/dL Final    Total Protein 10/14/2022 7.8  6.0 - 8.4 g/dL Final    Albumin 10/14/2022 3.3 (L)  3.5 - 5.2 g/dL Final    Total Bilirubin 10/14/2022 0.3  0.1 - 1.0 mg/dL Final    Comment: For infants and newborns, interpretation of results should be based  on gestational age, weight and in agreement with clinical  observations.    Premature Infant recommended reference ranges:  Up to 24 hours.............<8.0 mg/dL  Up to 48 hours............<12.0 mg/dL  3-5 days..................<15.0 mg/dL  6-29 days.................<15.0 mg/dL      Alkaline Phosphatase 10/14/2022 88  55 - 135 U/L Final    AST 10/14/2022 18  10 - 40 U/L Final    ALT 10/14/2022 29  10 - 44 U/L Final    Anion Gap 10/14/2022 7 (L)  8 - 16 mmol/L Final    eGFR 10/14/2022 >60  >60 mL/min/1.73 m^2 Final    Hemoglobin A1C 10/14/2022 6.0 (H)  4.0 - 5.6 % Final    Comment: ADA Screening Guidelines:  5.7-6.4%  Consistent with prediabetes  >or=6.5%  Consistent with diabetes    High levels of fetal hemoglobin interfere with the HbA1C  assay. Heterozygous hemoglobin variants (HbS, HgC, etc)do  not significantly interfere with this assay.   However, presence of multiple variants may affect accuracy.      Estimated Avg Glucose  10/14/2022 126  68 - 131 mg/dL Final    Cholesterol 10/14/2022 137  120 - 199 mg/dL Final    Comment: The National Cholesterol Education Program (NCEP) has set the  following guidelines (reference ranges) for Cholesterol:  Optimal.....................<200 mg/dL  Borderline High.............200-239 mg/dL  High........................> or = 240 mg/dL      Triglycerides 10/14/2022 148  30 - 150 mg/dL Final    Comment: The National Cholesterol Education Program (NCEP) has set the  following guidelines (reference values) for triglycerides:  Normal......................<150 mg/dL  Borderline High.............150-199 mg/dL  High........................200-499 mg/dL      HDL 10/14/2022 26 (L)  40 - 75 mg/dL Final    Comment: The National Cholesterol Education Program (NCEP) has set the  following guidelines (reference values) for HDL Cholesterol:  Low...............<40 mg/dL  Optimal...........>60 mg/dL      LDL Cholesterol 10/14/2022 81.4  63.0 - 159.0 mg/dL Final    Comment: The National Cholesterol Education Program (NCEP) has set the  following guidelines (reference values) for LDL Cholesterol:  Optimal.......................<130 mg/dL  Borderline High...............130-159 mg/dL  High..........................160-189 mg/dL  Very High.....................>190 mg/dL      HDL/Cholesterol Ratio 10/14/2022 19.0 (L)  20.0 - 50.0 % Final    Total Cholesterol/HDL Ratio 10/14/2022 5.3 (H)  2.0 - 5.0 Final    Non-HDL Cholesterol 10/14/2022 111  mg/dL Final    Comment: Risk category and Non-HDL cholesterol goals:  Coronary heart disease (CHD)or equivalent (10-year risk of CHD >20%):  Non-HDL cholesterol goal     <130 mg/dL  Two or more CHD risk factors and 10-year risk of CHD <= 20%:  Non-HDL cholesterol goal     <160 mg/dL  0 to 1 CHD risk factor:  Non-HDL cholesterol goal     <190 mg/dL       The 10-year ASCVD risk score (Jasper SMART, et al., 2019) is: 10.2%    Values used to calculate the score:      Age: 47 years      Sex:  Male      Is Non- : Yes      Diabetic: Yes      Tobacco smoker: No      Systolic Blood Pressure: 128 mmHg      Is BP treated: No      HDL Cholesterol: 26 mg/dL      Total Cholesterol: 137 mg/dL    Assessment:       Problem List Items Addressed This Visit    None  Visit Diagnoses       Diabetes mellitus without complication    -  Primary    Relevant Medications    metFORMIN (GLUCOPHAGE-XR) 500 MG ER 24hr tablet    Other Relevant Orders    Comprehensive Metabolic Panel    Lipid Panel    Hemoglobin A1C    CBC Auto Differential    Mixed dyslipidemia        Relevant Orders    Hepatic Function Panel    Lipid Panel    Comprehensive Metabolic Panel    Lipid Panel    Hemoglobin A1C    CBC Auto Differential    Spinal stenosis, lumbar region, without neurogenic claudication        Screening for colorectal cancer        Relevant Orders    Fecal Immunochemical Test (iFOBT)            Plan:       Emeka was seen today for diabetes, hyperlipidemia and back pain.    Diagnoses and all orders for this visit:    Diabetes mellitus without complication  -     Comprehensive Metabolic Panel; Future  -     Lipid Panel; Future  -     Hemoglobin A1C; Future  -     CBC Auto Differential; Future  -     metFORMIN (GLUCOPHAGE-XR) 500 MG ER 24hr tablet; Take 2 tablets (1,000 mg total) by mouth 2 (two) times daily with meals.  A1c controlled  Continue current regimen    Mixed dyslipidemia  -     Hepatic Function Panel; Future  -     Lipid Panel; Future  -     Comprehensive Metabolic Panel; Future  -     Lipid Panel; Future  -     Hemoglobin A1C; Future  -     CBC Auto Differential; Future  -     atorvastatin (LIPITOR) 40 MG tablet; Take 1 tablet (40 mg total) by mouth once daily.  Given new 2022 lipid guidelines will change from gemfibrizol to atorvastatin  Recheck lipid and hepatic function in 4-6 weeks    Spinal stenosis, lumbar region, without neurogenic claudication  Advised follow up with spine care  provider    Screening for colorectal cancer  -     Fecal Immunochemical Test (iFOBT); Future  [employer] was given to patient on 10/20/2022

## 2022-10-24 ENCOUNTER — CLINICAL SUPPORT (OUTPATIENT)
Dept: REHABILITATION | Facility: HOSPITAL | Age: 47
End: 2022-10-24
Payer: MEDICAID

## 2022-10-24 DIAGNOSIS — G89.29 CHRONIC MIDLINE LOW BACK PAIN WITHOUT SCIATICA: Primary | ICD-10-CM

## 2022-10-24 DIAGNOSIS — M25.60 DECREASED RANGE OF MOTION: ICD-10-CM

## 2022-10-24 DIAGNOSIS — R53.1 DECREASED STRENGTH: ICD-10-CM

## 2022-10-24 DIAGNOSIS — R26.9 ABNORMAL GAIT: ICD-10-CM

## 2022-10-24 DIAGNOSIS — M54.50 CHRONIC MIDLINE LOW BACK PAIN WITHOUT SCIATICA: Primary | ICD-10-CM

## 2022-10-24 PROCEDURE — 97110 THERAPEUTIC EXERCISES: CPT | Mod: PN

## 2022-10-24 NOTE — PROGRESS NOTES
"[facility]   Physical Therapy Treatment Note     Name: Emeka [facility] Number: [id number]    Therapy Diagnosis:   Encounter Diagnoses   Name Primary?    Chronic midline low back pain without sciatica Yes    Abnormal gait     Decreased strength     Decreased range of motion        Physician: Yolie Vickers NP    Visit Date: 10/24/2022    Physician Orders: PT Eval and Treat   Medical Diagnosis from Referral:   Z98.1 (ICD-10-CM) - S/P lumbar fusion   M54.16 (ICD-10-CM) - Lumbar radiculopathy      Evaluation Date: 9/16/2022  Authorization Period Expiration: 12/31/2022  Plan of Care Expiration: 9/16/2022 to 11/11/2022  Visit # / Visits authorized: 6/40 (+Evaluation)  FOTO# 5/5 - Next  PTA Visit #: 1/5     Time In:   8:02 AM  Time Out:   9:08 AM  Total Billable Time:  66 minutes (4 TE)    Precautions: Diabetes and s/p Lumbar fusion Feb 2022.     SUBJECTIVE     Pt reports: He's having less pain but feeling achy.   He was not compliant with home exercise program.  Response to previous treatment: felt better  Functional change: less antalgic gait    Pain: 0/10  Location: bilateral low back      OBJECTIVE     Objective Measures updated at progress report unless specified.     Treatment     Emeak received the treatments listed below:      Emeka received the following manual therapy techniques: Joint mobilizations were applied to the: bilateral Hips for 10 minutes, including:  Posterior to anterior hip mobilization for extension       Therapeutic exercises to develop strength, endurance, and core stabilization for  minutes including:  Nu-Step: 8 minutes, Level 5.0  Prone hip ext 3x10 bilateral following manual   DL Gluteal Bridges: 3x10, 5" holds   Hip Flexor Stretch off side of table c strap: [id number] bilateral   Supine SLR's: 3x10, 2# B, cueing for core activation  Lumbar lock hip ext bilateral 3x10 4#  Sit to Stands: 3x10, 18" box, 10# kettlebell at chest  Patient " "education    Emeka participated in gait training to improve functional mobility and safety for 8 minutes, including:  Ambulation along white line to assist with visual cues to widen base of support.         TA Contractions: 10"x10 -extensive cuing on diaphragmatic breathing.  TA Marches: 3x10, alternating - extensive verbal cuing on diaphragmatic breathing.   Sidelying Clamshells: 3x10, B, GTB        Patient Education and Home Exercises     Home Exercises Provided and Patient Education Provided     Education provided:   - Pt again reminded of what exercises to be doing at home.   - pt encouraged to continue with gait training at home.     Written Home Exercises Provided: Patient instructed to cont prior HEP. Exercises were reviewed and Emeka was able to demonstrate them prior to the end of the session.  Emeka demonstrated good  understanding of the education provided. See EMR under Patient Instructions for exercises provided during therapy sessions    ASSESSMENT     Pt struggles to avoid postural positioning that causes extra strain on his lumbar spine. Provided vc's during all therex to maintain proper form, avoid unnecessary stress on lumbar spine, and activate core muscles to decrease load on lumbar spine. Emeka was able to maintain a wider JACK during gait training.  Provided cues to decrease thoracic extension and to maintain a more upright position. Dane was able to perform with minimal carryover.       Emeka Is progressing well towards his goals.   Pt prognosis is Fair.     Pt will continue to benefit from skilled outpatient physical therapy to address the deficits listed in the problem list box on initial evaluation, provide pt/family education and to maximize pt's level of independence in the home and community environment.     Pt's spiritual, cultural and educational needs considered and pt agreeable to plan of care and goals.     Anticipated barriers to physical therapy: Trying to " work, chronicity, smoker    Goals:   Short Term Goals (4 Weeks):  1. Pt will be compliant with HEP to supplement PT in decreasing pain with functional mobility.Progressing, Not Met  2. Pt will perform improve bilateral hip abd and flexion strength by 1 grade in order to improve gait mechanics. Progressing, Not Met  3. Pt will report worst pain of 5/10 in order to perform work related tasks. Progressing, Not Met  4. Pt will be able to stand for 30min without any increase in pain in order to perform work tasks. Progressing, Not Met     Long Term Goals (8 Weeks):   1. Pt will improve FOTO score to </= TBD% limited to decrease perceived limitation with maintaining/changing body position. Progressing, Not Met  2. Pt will report worst pain of 2/10 in order to perform work related tasks. Progressing, Not Met  4. Pt will be able to stand for 32 hours without any increase in pain in order to perform work tasks. Progressing, Not Met  4. Pt will be independent c final home exercise program in order to DC to home program. Progressing, Not Met    PLAN     Continue with hip mobilization and strengthening into hip extension.     Lucille Chase PTA   Co-Treated by Tran Sanches PT, DPT, OCS

## 2022-10-24 NOTE — PROGRESS NOTES
"[facility]   Physical Therapy Treatment Note     Name: Emeka [facility]  Clinic Number: [id number]    Therapy Diagnosis:   Encounter Diagnoses   Name Primary?    Chronic midline low back pain without sciatica Yes    Abnormal gait     Decreased strength     Decreased range of motion        Physician: Yolie Vickers, NP    Visit Date: 10/24/2022    Physician Orders: PT Eval and Treat   Medical Diagnosis from Referral:   Z98.1 (ICD-10-CM) - S/P lumbar fusion   M54.16 (ICD-10-CM) - Lumbar radiculopathy      Evaluation Date: 9/16/2022  Authorization Period Expiration: 12/31/2022  Plan of Care Expiration: 9/16/2022 to 11/11/2022  Visit # / Visits authorized: 6/40 (+Evaluation)  FOTO# 5/5 - Next  PTA Visit #: 0/5     Time In: 8:18 AM  Time Out: 9:15 AM  Total Billable Time: 57 minutes (2TE 1:1)    Precautions: Diabetes and s/p Lumbar fusion Feb 2022.     SUBJECTIVE     Pt reports: Yesterday, pt was picking weeds all day, bent over, had a lot of pain after doing this. Knows he was told not to perform bending, lifting, twisting, but he has to do his job.     He was not compliant with home exercise program.  Response to previous treatment: felt better  Functional change: less antalgic gait    Pain: 0/10  Location: bilateral low back      OBJECTIVE     Objective Measures updated at progress report unless specified.     Treatment     Emeka received the treatments listed below:      Emeka received the following manual therapy techniques: Joint mobilizations were applied to the: bilateral Hips for 10 minutes, including:  Posterior to anterior hip mobilization for extension       Therapeutic exercises to develop strength, endurance, and core stabilization for 38 minutes including:  Nu-Step: 8 minutes, Level 5.0  Prone hip ext 3x10 bilateral following manual   DL Gluteal Bridges: 3x10, 5" holds   Hip Flexor Stretch off table: [id number] bilateral   Supine SLR's: 3x10, 1# B, cueing for core " "activation  Lumbar lock hip ext bilateral 3x10 4#  Sit to Stands: 3x10, 18" box, 10# kettlebell at chest  Patient education    Emeka participated in gait training to improve functional mobility and safety for 8  minutes, including:  Ambulation along white line to assist with visual cues to widen base of support.         TA Contractions: 10"x10 -extensive cuing on diaphragmatic breathing.  TA Marches: 3x10, alternating - extensive verbal cuing on diaphragmatic breathing.   Sidelying Clamshells: 3x10, B, GTB        Patient Education and Home Exercises     Home Exercises Provided and Patient Education Provided     Education provided:   - Pt again reminded of what exercises to be doing at home.   - pt encouraged to continue with gait training at home.     Written Home Exercises Provided: Patient instructed to cont prior HEP. Exercises were reviewed and Emeka was able to demonstrate them prior to the end of the session.  Emeka demonstrated good  understanding of the education provided. See EMR under Patient Instructions for exercises provided during therapy sessions    ASSESSMENT   Pt continues to not adhere to post op instructions and puts excessive strain through his lumbar spine was performing work related activities. This aggravates symptoms. There are improvements in gait mechanics especially in base of support and reduced scissoring. However, pt continues to demonstrates decreased hip extension, flat back, and increased thoracic ext to balance while ambulating.     Emeka Is progressing well towards his goals.   Pt prognosis is Fair.     Pt will continue to benefit from skilled outpatient physical therapy to address the deficits listed in the problem list box on initial evaluation, provide pt/family education and to maximize pt's level of independence in the home and community environment.     Pt's spiritual, cultural and educational needs considered and pt agreeable to plan of care and goals.   "   Anticipated barriers to physical therapy: Trying to work, chronicity, smoker    Goals:   Short Term Goals (4 Weeks):  1. Pt will be compliant with HEP to supplement PT in decreasing pain with functional mobility.Progressing, Not Met  2. Pt will perform improve bilateral hip abd and flexion strength by 1 grade in order to improve gait mechanics. Progressing, Not Met  3. Pt will report worst pain of 5/10 in order to perform work related tasks. Progressing, Not Met  4. Pt will be able to stand for 30min without any increase in pain in order to perform work tasks. Progressing, Not Met     Long Term Goals (8 Weeks):   1. Pt will improve FOTO score to </= TBD% limited to decrease perceived limitation with maintaining/changing body position. Progressing, Not Met  2. Pt will report worst pain of 2/10 in order to perform work related tasks. Progressing, Not Met  4. Pt will be able to stand for 32 hours without any increase in pain in order to perform work tasks. Progressing, Not Met  4. Pt will be independent c final home exercise program in order to DC to home program. Progressing, Not Met    PLAN     Continue with hip mobilization and strengthening into hip extension.     Tran Sanches, PT

## 2022-10-31 ENCOUNTER — CLINICAL SUPPORT (OUTPATIENT)
Dept: REHABILITATION | Facility: HOSPITAL | Age: 47
End: 2022-10-31
Payer: MEDICAID

## 2022-10-31 DIAGNOSIS — M25.60 DECREASED RANGE OF MOTION: ICD-10-CM

## 2022-10-31 DIAGNOSIS — R53.1 DECREASED STRENGTH: ICD-10-CM

## 2022-10-31 DIAGNOSIS — M54.50 CHRONIC MIDLINE LOW BACK PAIN WITHOUT SCIATICA: Primary | ICD-10-CM

## 2022-10-31 DIAGNOSIS — G89.29 CHRONIC MIDLINE LOW BACK PAIN WITHOUT SCIATICA: Primary | ICD-10-CM

## 2022-10-31 DIAGNOSIS — R26.9 ABNORMAL GAIT: ICD-10-CM

## 2022-10-31 PROCEDURE — 97110 THERAPEUTIC EXERCISES: CPT | Mod: PN

## 2022-10-31 NOTE — PROGRESS NOTES
"[facility]   Physical Therapy Treatment Note     Name: Emeka [facility]  Clinic Number: [id number]    Therapy Diagnosis:   Encounter Diagnoses   Name Primary?    Chronic midline low back pain without sciatica Yes    Abnormal gait     Decreased strength     Decreased range of motion        Physician: Yolie Vickers, NP    Visit Date: 10/31/2022    Physician Orders: PT Eval and Treat   Medical Diagnosis from Referral:   Z98.1 (ICD-10-CM) - S/P lumbar fusion   M54.16 (ICD-10-CM) - Lumbar radiculopathy      Evaluation Date: 9/16/2022  Authorization Period Expiration: 12/31/2022  Plan of Care Expiration: 9/16/2022 to 11/11/2022  Visit # / Visits authorized: 8/40 (+Evaluation)  FOTO# 5/5 - Next  PTA Visit #: 0/5     Time In: 7:06 AM  Time Out: 8:00 AM  Total Billable Time:  54 minutes (4 TE)    Precautions: Diabetes and s/p Lumbar fusion Feb 2022.     SUBJECTIVE     Pt reports: No new complaints.   He was not compliant with home exercise program.  Response to previous treatment: felt better  Functional change: less antalgic gait    Pain: 0/10  Location: bilateral low back      OBJECTIVE     Objective Measures updated at progress report unless specified.     Treatment     Emeka received the treatments listed below:      Emeka received the following manual therapy techniques: Joint mobilizations were applied to the: bilateral Hips for 5 minutes, including:  Posterior to anterior hip mobilization for extension       Therapeutic exercises to develop strength, endurance, and core stabilization for 49 minutes including:  Nu-Step: 8 minutes, Level 5.0  Prone hip ext 3x10 bilateral following manual   DL Gluteal Bridges: 3x10, 5" holds   Hip Flexor Stretch off side of table c strap: [id number] bilateral   Supine SLR's: 3x10, 3# B, cueing for core activation  Lumbar lock hip ext bilateral 3x10 4#  Sit to Stands: 3x10, 18" box, 15# kettlebell at chest  +DL Shuttle Press: 20x, 100#  +SL " Shuttle Press: 20x, B, 62#  Patient education    Emeka participated in gait training to improve functional mobility and safety for 0 minutes, including:  Ambulation along white line to assist with visual cues to widen base of support        Patient Education and Home Exercises     Home Exercises Provided and Patient Education Provided     Education provided:   - Pt again reminded of what exercises to be doing at home.   - pt encouraged to continue with gait training at home.     Written Home Exercises Provided: Patient instructed to cont prior HEP. Exercises were reviewed and Emeka was able to demonstrate them prior to the end of the session.  Emeka demonstrated good  understanding of the education provided. See EMR under Patient Instructions for exercises provided during therapy sessions    ASSESSMENT     Continues to present with antalgic gait, although improved over the last few weeks. Continuing to focus on gluteal strengthening and working into hip extension. Continues to require verbal and tactile cueing to avoid compensation and progress proper musculature. Implemented increased quadriceps strengthening on shuttle press this session with both double and single leg.        Emeka Is progressing well towards his goals.   Pt prognosis is Fair.     Pt will continue to benefit from skilled outpatient physical therapy to address the deficits listed in the problem list box on initial evaluation, provide pt/family education and to maximize pt's level of independence in the home and community environment.     Pt's spiritual, cultural and educational needs considered and pt agreeable to plan of care and goals.     Anticipated barriers to physical therapy: Trying to work, chronicity, smoker    Goals:   Short Term Goals (4 Weeks):  1. Pt will be compliant with HEP to supplement PT in decreasing pain with functional mobility.Progressing, Not Met  2. Pt will perform improve bilateral hip abd and flexion  strength by 1 grade in order to improve gait mechanics. Progressing, Not Met  3. Pt will report worst pain of 5/10 in order to perform work related tasks. Progressing, Not Met  4. Pt will be able to stand for 30min without any increase in pain in order to perform work tasks. Progressing, Not Met     Long Term Goals (8 Weeks):   1. Pt will improve FOTO score to </= TBD% limited to decrease perceived limitation with maintaining/changing body position. Progressing, Not Met  2. Pt will report worst pain of 2/10 in order to perform work related tasks. Progressing, Not Met  4. Pt will be able to stand for 32 hours without any increase in pain in order to perform work tasks. Progressing, Not Met  4. Pt will be independent c final home exercise program in order to DC to home program. Progressing, Not Met    PLAN     Continue with hip mobilization and strengthening into hip extension.     Kaley Tan, PT, DPT

## 2022-11-07 ENCOUNTER — CLINICAL SUPPORT (OUTPATIENT)
Dept: REHABILITATION | Facility: HOSPITAL | Age: 47
End: 2022-11-07
Payer: MEDICAID

## 2022-11-07 DIAGNOSIS — M25.60 DECREASED RANGE OF MOTION: ICD-10-CM

## 2022-11-07 DIAGNOSIS — G89.29 CHRONIC MIDLINE LOW BACK PAIN WITHOUT SCIATICA: Primary | ICD-10-CM

## 2022-11-07 DIAGNOSIS — R53.1 DECREASED STRENGTH: ICD-10-CM

## 2022-11-07 DIAGNOSIS — M54.50 CHRONIC MIDLINE LOW BACK PAIN WITHOUT SCIATICA: Primary | ICD-10-CM

## 2022-11-07 DIAGNOSIS — R26.9 ABNORMAL GAIT: ICD-10-CM

## 2022-11-07 PROCEDURE — 97110 THERAPEUTIC EXERCISES: CPT | Mod: PN,CQ

## 2022-11-07 NOTE — PROGRESS NOTES
"[facility]   Physical Therapy Treatment Note     Name: Emeka [facility]  Clinic Number: [id number]    Therapy Diagnosis:   Encounter Diagnoses   Name Primary?    Chronic midline low back pain without sciatica Yes    Abnormal gait     Decreased strength     Decreased range of motion        Physician: Yolie Vickers NP    Visit Date: 11/7/2022    Physician Orders: PT Eval and Treat   Medical Diagnosis from Referral:   Z98.1 (ICD-10-CM) - S/P lumbar fusion   M54.16 (ICD-10-CM) - Lumbar radiculopathy      Evaluation Date: 9/16/2022  Authorization Period Expiration: 12/31/2022  Plan of Care Expiration: 9/16/2022 to 11/11/2022  Visit # / Visits authorized: 10/40 (+Evaluation)  FOTO# Next  PTA Visit #: 1/5     Time In: 8:17 am  Time Out: 9:18 am  Total Billable Time: 55 minutes (4 TE)    Precautions: Diabetes and s/p Lumbar fusion Feb 2022.     SUBJECTIVE     Pt reports: No new complaints.   He was not compliant with home exercise program.  Response to previous treatment: felt better  Functional change: less antalgic gait    Pain: 0/10  Location: bilateral low back      OBJECTIVE     Objective Measures updated at progress report unless specified.     Treatment     Emeka received the treatments listed below:      Emeka received the following manual therapy techniques: Joint mobilizations were applied to the: bilateral Hips for 5 minutes, including:  Posterior to anterior hip mobilization for extension     Therapeutic exercises to develop strength, endurance, and core stabilization for 50 minutes including:      Nu-Step: 8 minutes, Level 5.0  Prone hip ext 3x10 2# (bilateral following manual)  DL Gluteal Bridges: 3x10, 5" holds   Hip Flexor Stretch off side of table c strap: [id number] bilateral   Supine SLR's: 3x10, 3# B (cueing for core activation)  Lumbar lock hip ext bilateral 3x10 4# on right, 5# on left.   Sit to Stands: 3x10, 18" box, 20# kettlebell at chest  DL Shuttle Press: "  30x, 100#  SL Shuttle Press: 30x, B, 62#      Patient education    Emeka participated in gait training to improve functional mobility and safety for 0 minutes, including:  Ambulation along white line to assist with visual cues to widen base of support        Patient Education and Home Exercises     Home Exercises Provided and Patient Education Provided     Education provided:   - Pt again reminded of what exercises to be doing at home.   - pt encouraged to continue with gait training at home.     Written Home Exercises Provided: Patient instructed to cont prior HEP. Exercises were reviewed and Eemka was able to demonstrate them prior to the end of the session.  Emeka demonstrated good  understanding of the education provided. See EMR under Patient Instructions for exercises provided during therapy session.     ASSESSMENT     Emeka presented to PT with continued improvements in gait.  Progressions were made to multiple therex to increase strength and endurance.  During Hip Extension, provided Emeka with vc's to avoid lifting/rotating his pelvis, which carried over occasionally.  He was able to perform today's therex progressions without pain or discomfort.       Emeka Is progressing well towards his goals.   Pt prognosis is Fair.     Pt will continue to benefit from skilled outpatient physical therapy to address the deficits listed in the problem list box on initial evaluation, provide pt/family education and to maximize pt's level of independence in the home and community environment.     Pt's spiritual, cultural and educational needs considered and pt agreeable to plan of care and goals.     Anticipated barriers to physical therapy: Trying to work, chronicity, smoker    Goals:   Short Term Goals (4 Weeks):  1. Pt will be compliant with HEP to supplement PT in decreasing pain with functional mobility.Progressing, Not Met  2. Pt will perform improve bilateral hip abd and flexion strength by 1  grade in order to improve gait mechanics. Progressing, Not Met  3. Pt will report worst pain of 5/10 in order to perform work related tasks. Progressing, Not Met  4. Pt will be able to stand for 30min without any increase in pain in order to perform work tasks. Progressing, Not Met     Long Term Goals (8 Weeks):   1. Pt will improve FOTO score to </= TBD% limited to decrease perceived limitation with maintaining/changing body position. Progressing, Not Met  2. Pt will report worst pain of 2/10 in order to perform work related tasks. Progressing, Not Met  4. Pt will be able to stand for 32 hours without any increase in pain in order to perform work tasks. Progressing, Not Met  4. Pt will be independent c final home exercise program in order to DC to home program. Progressing, Not Met    PLAN     Continue with hip mobilization and strengthening into hip extension.     Lucille Chase, PTA

## 2022-11-09 ENCOUNTER — CLINICAL SUPPORT (OUTPATIENT)
Dept: REHABILITATION | Facility: HOSPITAL | Age: 47
End: 2022-11-09
Payer: MEDICAID

## 2022-11-09 DIAGNOSIS — R26.9 ABNORMAL GAIT: ICD-10-CM

## 2022-11-09 DIAGNOSIS — R53.1 DECREASED STRENGTH: ICD-10-CM

## 2022-11-09 DIAGNOSIS — M54.50 CHRONIC MIDLINE LOW BACK PAIN WITHOUT SCIATICA: Primary | ICD-10-CM

## 2022-11-09 DIAGNOSIS — M25.60 DECREASED RANGE OF MOTION: ICD-10-CM

## 2022-11-09 DIAGNOSIS — G89.29 CHRONIC MIDLINE LOW BACK PAIN WITHOUT SCIATICA: Primary | ICD-10-CM

## 2022-11-09 PROCEDURE — 97110 THERAPEUTIC EXERCISES: CPT | Mod: PN

## 2022-11-09 NOTE — PROGRESS NOTES
[facility]   Physical Therapy Treatment Note     [id number]    Therapy Diagnosis:   Encounter Diagnoses   Name Primary?    Chronic midline low back pain without sciatica Yes    Abnormal gait     Decreased strength     Decreased range of motion        Physician: Yolie Vickers NP    Visit Date: 11/9/2022    Physician Orders: PT Eval and Treat   Medical Diagnosis from Referral:   Z98.1 (ICD-10-CM) - S/P lumbar fusion   M54.16 (ICD-10-CM) - Lumbar radiculopathy      Evaluation Date: 9/16/2022  Authorization Period Expiration: 12/31/2022  Plan of Care Expiration: 9/16/2022 to 11/11/2022  Visit # / Visits authorized: 11/40 (+Evaluation)  FOTO# Next  PTA Visit #: 0/5     Time In: 8:17 am  Time Out: 9:18 am  Total Billable Time: 55 minutes (2 TE 1:1)    Precautions: Diabetes and s/p Lumbar fusion Feb 2022.     SUBJECTIVE     Pt reports: Pt states that he has been seeing improvement in his strength and his pain only happens every so often.   He was not compliant with home exercise program.  Response to previous treatment: felt better  Functional change: less antalgic gait    Pain: 0/10  Location: bilateral low back      OBJECTIVE     Objective Measures updated at progress report unless specified.     Myotomes Response   L1,2 - hip flexion 3+/5B   L3,4 - knee extension  5/5 B   L4 - ankle DF 4+/5 right, 5/5 left    L5 - great toe ext  4+/5 right, 5/5 left    S1 - ankle ev or toe flexion  5/5 bilateral       Dermatomes Response   L1- Lat hip to groin No burning today   L2 - Mid thigh normal   L3 - inner knee normal   L4 - Great Toe normal   L5 - Ant ankle normal   S1 - Lateral heel  normal   S2 - Med back of knee/Med heel  normal      Range of Motion/Strength:      Lumbar AROM Pain/Dysfunction with Movement   Flexion 25% Pt hinges from hips, minimal lumbar flexion   Extension 0 Unable to get past neutral.    Right side bending 25% No pain   Left side  "bending 25%  No pain   Right rotation NT No pain    Left rotation NT     Quadrant Ext NT     Quadrant Flex NT     Axial Compression No pain             L/E MMT Right Left Pain/Dysfunction with Movement   Hip Flexion 4-/5 4/5     Hip Extension 4-/5 4-/5     Hip Abduction 3/5 3+/5     Hip Adduction NT NT     Hip IR NT NT     Hip ER NT NT     Knee Flexion 4+/5 4+/5     Knee Extension 4+/5 4+/5     Ankle DF 4/5 4+/5     Ankle PF NT NT     Ankle Inversion 5/5 5/5     Ankle Eversion 5/5 5/5          TUG:  10 seconds - no SBA required.      30 second sit-to-stand test (without U/E support): 11 no upper extremity support    Treatment     Emeka received the treatments listed below:      Emeka received the following manual therapy techniques: Joint mobilizations were applied to the: bilateral Hips for 5 minutes, including:  Posterior to anterior hip mobilization for extension     Therapeutic exercises to develop strength, endurance, and core stabilization for 50 minutes including:      Nu-Step: 8 minutes, Level 5.0  Prone hip ext 3x10 2# (bilateral following manual)  DL Gluteal Bridges: 3x10, 5" holds   Hip Flexor Stretch off side of table c strap: [id number] bilateral   Supine SLR's: 3x10, 3# B 5" holds  Lumbar lock hip ext bilateral 3x10 5# bilateral   Sit to Stands: 3x10, 18" box, 20# kettlebell at chest  Leg Press 75# 3x10  Patient education    Emeka participated in gait training to improve functional mobility and safety for 0 minutes, including:  Ambulation along white line to assist with visual cues to widen base of support        Patient Education and Home Exercises     Home Exercises Provided and Patient Education Provided     Education provided:   - Pt educated on continuing to work on strength and gait mechanics.     Written Home Exercises Provided: Patient instructed to cont prior HEP. Exercises were reviewed and Emeka was able to demonstrate them prior to the end of the session.  Emeka " demonstrated good  understanding of the education provided. See EMR under Patient Instructions for exercises provided during therapy session.     ASSESSMENT   See updated plan of care for details     PLAN     See updated plan of care for details     Tran Sanches, PT

## 2022-11-09 NOTE — PLAN OF CARE
Outpatient Therapy Updated Plan of Care     Visit Date: 11/9/2022  Name: Emeka Caballero  Clinic Number: [id number]    Therapy Diagnosis:   Encounter Diagnoses   Name Primary?    Chronic midline low back pain without sciatica Yes    Abnormal gait     Decreased strength     Decreased range of motion      Physician: Yolie Vickers, NP    Physician Orders: PT Eval and Treat   Medical Diagnosis from Referral:   Z98.1 (ICD-10-CM) - S/P lumbar fusion   M54.16 (ICD-10-CM) - Lumbar radiculopathy      Evaluation Date: 9/16/2022  Authorization Period Expiration: 12/31/2022  Plan of Care Expiration: 9/16/2022 to 11/11/2022  Visit # / Visits authorized: 11/40 (+Evaluation)      Current Certification Period:  9/16/2022 to 11/11/2022  Precautions:  S/p lumbar fusion  Visits from Evaluation Date:  10  Functional Level Prior to Evaluation:  unable to ambulate without pain    Subjective     Update:   Pt reports: Pt states that he has been seeing improvement in his strength and his pain only happens every so often.   He was not compliant with home exercise program.  Response to previous treatment: felt better  Functional change: less antalgic gait     Pain: 0/10  Location: bilateral low back      Objective     Update:      Myotomes Response   L1,2 - hip flexion 3+/5B   L3,4 - knee extension  5/5 B   L4 - ankle DF 4+/5 right, 5/5 left    L5 - great toe ext  4+/5 right, 5/5 left    S1 - ankle ev or toe flexion  5/5 bilateral       Dermatomes Response   L1- Lat hip to groin No burning today   L2 - Mid thigh normal   L3 - inner knee normal   L4 - Great Toe normal   L5 - Ant ankle normal   S1 - Lateral heel  normal   S2 - Med back of knee/Med heel  normal      Range of Motion/Strength:      Lumbar AROM Pain/Dysfunction with Movement   Flexion 25% Pt hinges from hips, minimal lumbar flexion   Extension 0 Unable to get past neutral.    Right side bending 25% No pain   Left side bending 25%  No pain   Right rotation NT No pain     Left rotation NT     Quadrant Ext NT     Quadrant Flex NT     Axial Compression No pain              L/E MMT Right Left Pain/Dysfunction with Movement   Hip Flexion 4-/5 4/5     Hip Extension 4-/5 4-/5     Hip Abduction 3/5 3+/5     Hip Adduction NT NT     Hip IR NT NT     Hip ER NT NT     Knee Flexion 4+/5 4+/5     Knee Extension 4+/5 4+/5     Ankle DF 4/5 4+/5     Ankle PF NT NT     Ankle Inversion 5/5 5/5     Ankle Eversion 5/5 5/5           TUG:  10 seconds - no SBA required.      30 second sit-to-stand test (without U/E support): 11 no upper extremity support    Assessment     Update:   Pt presents to PT today with improvement in strength and pain level. Range of motion continues to be limited in lumbar region along with bilateral hips. Pt continues to ambulate with scissoring gait and only makes corrections with repeated cuing. Pt would continue to benefit from skilled PT to address residual strength, mobility, and gait deficits in order improve functional mobility.    Emeka Is progressing well towards his goals.   Pt prognosis is Fair.     Pt will continue to benefit from skilled outpatient physical therapy to address the deficits listed in the problem list box on initial evaluation, provide pt/family education and to maximize pt's level of independence in the home and community environment.     Pt's spiritual, cultural and educational needs considered and pt agreeable to plan of care and goals.     Anticipated barriers to physical therapy: Trying to work, chronicity, smoker    Goals:   Short Term Goals (4 Weeks):  1. Pt will be compliant with HEP to supplement PT in decreasing pain with functional mobility.Met  2. Pt will perform improve bilateral hip abd and flexion strength by 1 grade in order to improve gait mechanics. t Met  3. Pt will report worst pain of 5/10 in order to perform work related tasks.   Met  4. Pt will be able to stand for 30min without any increase in pain in order to perform work  tasks. Progressing, Not Met     Long Term Goals (8 Weeks):   1. Pt will improve FOTO score to </= TBD% limited to decrease perceived limitation with maintaining/changing body position. Progressing, Not Met  2. Pt will report worst pain of 2/10 in order to perform work related tasks. Progressing, Not Met  4. Pt will be able to stand for 32 hours without any increase in pain in order to perform work tasks. Progressing, Not Met  4. Pt will be independent c final home exercise program in order to DC to home program. Progressing, Not Met    Previous Short Term Goals Status:   see above  New Short Term Goals Status:   see above  Long Term Goal Status:   continue per initial plan of care.  Reasons for Recertification of Therapy:   Gait, range of motion, strength deficits    Plan     Updated Certification Period: 11/9/2022 to 12/31/2022  Recommended Treatment Plan: 1 times per week for 5 weeks: Electrical Stimulation PRN, Gait Training, Manual Therapy, Neuromuscular Re-ed, Patient Education, Therapeutic Activities, and Therapeutic Exercise      Tran Sanches, PT  11/9/2022      I CERTIFY THE NEED FOR THESE SERVICES FURNISHED UNDER THIS PLAN OF TREATMENT AND WHILE UNDER MY CARE    Physician's comments:        Physician's Signature: ___________________________________________________

## 2022-11-14 ENCOUNTER — PATIENT MESSAGE (OUTPATIENT)
Dept: REHABILITATION | Facility: HOSPITAL | Age: 47
End: 2022-11-14
Payer: MEDICAID

## 2022-11-23 ENCOUNTER — CLINICAL SUPPORT (OUTPATIENT)
Dept: REHABILITATION | Facility: HOSPITAL | Age: 47
End: 2022-11-23
Payer: MEDICAID

## 2022-11-23 DIAGNOSIS — G89.29 CHRONIC MIDLINE LOW BACK PAIN WITHOUT SCIATICA: Primary | ICD-10-CM

## 2022-11-23 DIAGNOSIS — R26.9 ABNORMAL GAIT: ICD-10-CM

## 2022-11-23 DIAGNOSIS — M25.60 DECREASED RANGE OF MOTION: ICD-10-CM

## 2022-11-23 DIAGNOSIS — R53.1 DECREASED STRENGTH: ICD-10-CM

## 2022-11-23 DIAGNOSIS — M54.50 CHRONIC MIDLINE LOW BACK PAIN WITHOUT SCIATICA: Primary | ICD-10-CM

## 2022-11-23 PROCEDURE — 97110 THERAPEUTIC EXERCISES: CPT | Mod: PN

## 2022-11-23 NOTE — PROGRESS NOTES
"[facility]   Physical Therapy Treatment Note     Name: Emeka [facility]  Clinic Number: [id number]    Therapy Diagnosis:   Encounter Diagnoses   Name Primary?    Chronic midline low back pain without sciatica Yes    Abnormal gait     Decreased strength     Decreased range of motion          Physician: Yolie Vickers NP    Visit Date: 11/23/2022    Physician Orders: PT Eval and Treat   Medical Diagnosis from Referral:   Z98.1 (ICD-10-CM) - S/P lumbar fusion   M54.16 (ICD-10-CM) - Lumbar radiculopathy      Evaluation Date: 9/16/2022  Authorization Period Expiration: 12/31/2022  Plan of Care Expiration: 11/9/2022 to 12/31/2022  Visit # / Visits authorized: 12/40 (+Evaluation)  FOTO# Next  PTA Visit #: 0/5     Time In: 8:17 am  Time Out: 9:18 am  Total Billable Time: 57 minutes (2 TE 1:1)    Precautions: Diabetes and s/p Lumbar fusion Feb 2022.     SUBJECTIVE     Pt reports: Pt state he is doing most of the exercises at home. Pain comes and goes but is ok today.   He was not compliant with home exercise program.  Response to previous treatment: felt better  Functional change: less antalgic gait    Pain: 0/10  Location: bilateral low back      OBJECTIVE     Objective Measures updated at progress report unless specified.       Treatment     Emeka received the treatments listed below:      Emeka received the following manual therapy techniques: Joint mobilizations were applied to the: bilateral Hips for 0 minutes, including:  Posterior to anterior hip mobilization for extension     Therapeutic exercises to develop strength, endurance, and core stabilization for 50 minutes including:      Nu-Step: 8 minutes, Level 5.0  Prone hip ext 3x10 3# (bilateral following manual)  DL Gluteal Bridges: 3x10, 5" holds 5#  Hip Flexor Stretch off side of table c strap: [id number] bilateral   Supine SLR's: 3x10, 3# B 5" holds  Lumbar lock hip ext bilateral 3x10 5# bilateral   Sit to Stands: 3x10, " "18" box, 20# tri at chest  Leg Press 75# 3x10  Patient education    Emeka participated in gait training to improve functional mobility and safety for 0 minutes, including:  Ambulation along white line to assist with visual cues to widen base of support    Patient Education and Home Exercises     Home Exercises Provided and Patient Education Provided     Education provided:   - Pt educated on continuing to work on strength and gait mechanics.     Written Home Exercises Provided: Patient instructed to cont prior HEP. Exercises were reviewed and Emeka was able to demonstrate them prior to the end of the session.  Emeka demonstrated good  understanding of the education provided. See EMR under Patient Instructions for exercises provided during therapy session.     ASSESSMENT   Pt presents to PT today with no low back pain. HE continues to demonstrate abnormal gait mechanics. Pt is progressing in strengthening and some of his exercises were able to be advanced without any increase in pain. Pt does fatigue and requests to terminate session early. Pt to continue to be progressed within limits.     Emeka Is progressing well towards his goals.   Pt prognosis is Fair.      Pt will continue to benefit from skilled outpatient physical therapy to address the deficits listed in the problem list box on initial evaluation, provide pt/family education and to maximize pt's level of independence in the home and community environment.      Pt's spiritual, cultural and educational needs considered and pt agreeable to plan of care and goals.     Anticipated barriers to physical therapy: Trying to work, chronicity, smoker     Goals:   Short Term Goals (4 Weeks):  1. Pt will be compliant with HEP to supplement PT in decreasing pain with functional mobility.Met  2. Pt will perform improve bilateral hip abd and flexion strength by 1 grade in order to improve gait mechanics. t Met  3. Pt will report worst pain of 5/10 in " order to perform work related tasks.   Met  4. Pt will be able to stand for 30min without any increase in pain in order to perform work tasks. Progressing, Not Met     Long Term Goals (8 Weeks):   1. Pt will improve FOTO score to </= TBD% limited to decrease perceived limitation with maintaining/changing body position. Progressing, Not Met  2. Pt will report worst pain of 2/10 in order to perform work related tasks. Progressing, Not Met  4. Pt will be able to stand for 32 hours without any increase in pain in order to perform work tasks. Progressing, Not Met  4. Pt will be independent c final home exercise program in order to DC to home program. Progressing, Not Met  PLAN   Continue with updated poc    Tran Sanches, PT

## 2022-11-30 ENCOUNTER — CLINICAL SUPPORT (OUTPATIENT)
Dept: REHABILITATION | Facility: HOSPITAL | Age: 47
End: 2022-11-30
Payer: MEDICAID

## 2022-11-30 DIAGNOSIS — G89.29 CHRONIC MIDLINE LOW BACK PAIN WITHOUT SCIATICA: Primary | ICD-10-CM

## 2022-11-30 DIAGNOSIS — M54.50 CHRONIC MIDLINE LOW BACK PAIN WITHOUT SCIATICA: Primary | ICD-10-CM

## 2022-11-30 DIAGNOSIS — M25.60 DECREASED RANGE OF MOTION: ICD-10-CM

## 2022-11-30 DIAGNOSIS — R53.1 DECREASED STRENGTH: ICD-10-CM

## 2022-11-30 DIAGNOSIS — R26.9 ABNORMAL GAIT: ICD-10-CM

## 2022-11-30 PROCEDURE — 97530 THERAPEUTIC ACTIVITIES: CPT | Mod: PN

## 2022-11-30 PROCEDURE — 97110 THERAPEUTIC EXERCISES: CPT | Mod: PN

## 2022-11-30 NOTE — PROGRESS NOTES
"[facility]   Physical Therapy Treatment Note     [facility]  [facility] Number: [id number]    Therapy Diagnosis:   Encounter Diagnoses   Name Primary?    Chronic midline low back pain without sciatica Yes    Abnormal gait     Decreased strength     Decreased range of motion        Physician: Yolie Vickers NP    Visit Date: 11/30/2022    Physician Orders: PT Eval and Treat   Medical Diagnosis from Referral:   Z98.1 (ICD-10-CM) - S/P lumbar fusion   M54.16 (ICD-10-CM) - Lumbar radiculopathy      Evaluation Date: 9/16/2022  Authorization Period Expiration: 12/31/2022  Plan of Care Expiration: 11/9/2022 to 12/31/2022  Visit # / Visits authorized: 12/40 (+Evaluation)  FOTO# Next  PTA Visit #: 0/5     Time In: 8:17 am  Time Out: 9:15 am  Total Billable Time: 55 (3TE, 1TA)    Precautions: Diabetes and s/p Lumbar fusion Feb 2022.     SUBJECTIVE     Pt reports: Pt state he is doing most of the exercises at home. Pain comes and goes but is ok today.   He was not compliant with home exercise program.  Response to previous treatment: felt better  Functional change: less antalgic gait    Pain: 0/10  Location: bilateral low back      OBJECTIVE     Objective Measures updated at progress report unless specified.       Treatment     Emeka received the treatments listed below:      Bold = Performed    Therapeutic exercises to develop strength, endurance, and core stabilization for 40 minutes including:      Nu-Step: 8 minutes, Level 5.0  Prone hip ext 3x10 3# (bilateral following manual)  DL Gluteal Bridges: 3x10, 5" holds green theraband thighs 10#  Hip Flexor Stretch off side of table c strap: [id number] bilateral   Matrix knee ext 50# - 3x10 DL  Leg Press 85# 3x10    Emeka participated in dynamic functional therapeutic activities to improve functional performance for 14  minutes, including:  Sit to Stands: 3x10, 18" box, 20# kettlebell at chest  Step ups 12" step bilateral 3x10 " "  Patient education    Supine SLR's: 3x10, 3# B 5" holds  Lumbar lock hip ext bilateral 3x10 5# bilateral     Emeka participated in gait training to improve functional mobility and safety for 0 minutes, including:  Ambulation along white line to assist with visual cues to widen base of support    Patient Education and Home Exercises     Home Exercises Provided and Patient Education Provided     Education provided:   - Pt educated on continuing to work on strength and gait mechanics.     Written Home Exercises Provided: Patient instructed to cont prior HEP. Exercises were reviewed and Emeka was able to demonstrate them prior to the end of the session.  Emeka demonstrated good  understanding of the education provided. See EMR under Patient Instructions for exercises provided during therapy session.     ASSESSMENT   Pt presents to PT today with no low back pain. Pt was progressed to more functional therapeutic activities today in order to prepare pt for DC next visit. Gait mechanics still abnormal, but it is not clear how long they have yaritza abnormal prior to surgery. Pt has been very compliant with home exercise program and has progressed well.     Emeka Is progressing well towards his goals.   Pt prognosis is Fair.      Pt will continue to benefit from skilled outpatient physical therapy to address the deficits listed in the problem list box on initial evaluation, provide pt/family education and to maximize pt's level of independence in the home and community environment.      Pt's spiritual, cultural and educational needs considered and pt agreeable to plan of care and goals.     Anticipated barriers to physical therapy: Trying to work, chronicity, smoker     Goals:   Short Term Goals (4 Weeks):  1. Pt will be compliant with HEP to supplement PT in decreasing pain with functional mobility.Met  2. Pt will perform improve bilateral hip abd and flexion strength by 1 grade in order to improve gait " mechanics. t Met  3. Pt will report worst pain of 5/10 in order to perform work related tasks.   Met  4. Pt will be able to stand for 30min without any increase in pain in order to perform work tasks. Progressing, Not Met     Long Term Goals (8 Weeks):   1. Pt will improve FOTO score to </= TBD% limited to decrease perceived limitation with maintaining/changing body position. Progressing, Not Met  2. Pt will report worst pain of 2/10 in order to perform work related tasks. Progressing, Not Met  4. Pt will be able to stand for 32 hours without any increase in pain in order to perform work tasks. Progressing, Not Met  4. Pt will be independent c final home exercise program in order to DC to home program. Progressing, Not Met  PLAN   DC Next visit     Tran Sanches, PT

## 2022-12-07 ENCOUNTER — CLINICAL SUPPORT (OUTPATIENT)
Dept: REHABILITATION | Facility: HOSPITAL | Age: 47
End: 2022-12-07
Payer: MEDICAID

## 2022-12-07 DIAGNOSIS — R53.1 DECREASED STRENGTH: ICD-10-CM

## 2022-12-07 DIAGNOSIS — R26.9 ABNORMAL GAIT: ICD-10-CM

## 2022-12-07 DIAGNOSIS — M25.60 DECREASED RANGE OF MOTION: ICD-10-CM

## 2022-12-07 DIAGNOSIS — G89.29 CHRONIC MIDLINE LOW BACK PAIN WITHOUT SCIATICA: Primary | ICD-10-CM

## 2022-12-07 DIAGNOSIS — M54.50 CHRONIC MIDLINE LOW BACK PAIN WITHOUT SCIATICA: Primary | ICD-10-CM

## 2022-12-07 PROCEDURE — 97110 THERAPEUTIC EXERCISES: CPT | Mod: PN

## 2022-12-07 NOTE — PROGRESS NOTES
[facility]   Physical Therapy Treatment/Discharge Note     Name: Emeka Caballero  [facility] Number: [id number]    Therapy Diagnosis:   Encounter Diagnoses   Name Primary?    Chronic midline low back pain without sciatica Yes    Abnormal gait     Decreased strength     Decreased range of motion        Physician: Yolie Vickers NP    Visit Date: 12/7/2022    Physician Orders: PT Eval and Treat   Medical Diagnosis from Referral:   Z98.1 (ICD-10-CM) - S/P lumbar fusion   M54.16 (ICD-10-CM) - Lumbar radiculopathy      Evaluation Date: 9/16/2022  Authorization Period Expiration: 12/31/2022  Plan of Care Expiration: 11/9/2022 to 12/31/2022  Visit # / Visits authorized: 13/40 (+Evaluation)    PTA Visit #: 0/5     Time In: 8:20 AM  Time Out: 8:40 AM  Total Billable Time: 20     Precautions: Diabetes and s/p Lumbar fusion Feb 2022.     SUBJECTIVE     Pt reports: He feels as though his has improved since beginning therapy and can feel a difference in his pain and function.   He was not compliant with home exercise program.  Response to previous treatment: felt better  Functional change: less antalgic gait    Pain: 0/10  Location: bilateral low back      OBJECTIVE     12/7/2022. Improvements bolded below:    Range of Motion/Strength:      Lumbar AROM Pain/Dysfunction with Movement   Flexion 95    Extension 10    Right side bending 20    Left side bending 20          L/E MMT Right Left Pain/Dysfunction with Movement   Hip Flexion 4/5 4+/5     Hip Extension 4/5 4/5     Hip Abduction 3+/5 4-/5     Hip Adduction NT NT     Hip IR NT NT     Hip ER NT NT     Knee Flexion 5/5 5/5     Knee Extension 5/5 5/5     Ankle DF 4/5 4+/5     Ankle PF NT NT     Ankle Inversion 5/5 5/5     Ankle Eversion 5/5 5/5         30 second sit-to-stand test (without U/E support): 12 no upper extremity support      Treatment     Emeka received the treatments listed below:      Bold = Performed    Therapeutic exercises  "to develop strength, endurance, and core stabilization for 20 minutes including:      Nu-Step: 8 minutes, Level 5.0  Reassessment (see above)  PHYLLIS Wong Hip Flexor stretch: 15"x4 rounds, B  Reviewed previously issued HEP with good performance and knowledge        Patient Education and Home Exercises     Home Exercises Provided and Patient Education Provided     Education provided:   - Pt educated on continuing to work on strength and gait mechanics.     Written Home Exercises Provided: Patient instructed to cont prior HEP. Exercises were reviewed and Emeka was able to demonstrate them prior to the end of the session.  Emeka demonstrated good  understanding of the education provided. See EMR under Patient Instructions for exercises provided during therapy session.     ASSESSMENT   Emeka presented to therapy with minimal pain and reports of significant improvements in function since beginning therapy. Reassessed thoracolumbar ROM with patient displaying minimal pain throughout performance. Improved BLE strength MMT's scores as bolded above. Patient was able to display all stretching and strengthening exercises that will be beneficial for him to continue performing in order to continue making positive strides. At this time patient has met all STG's and majority of LTG's. Patient is formally discharged from skilled physical therapy at this time.     Emeka Is progressing well towards his goals.   Pt prognosis is Fair.      Pt will continue to benefit from skilled outpatient physical therapy to address the deficits listed in the problem list box on initial evaluation, provide pt/family education and to maximize pt's level of independence in the home and community environment.      Pt's spiritual, cultural and educational needs considered and pt agreeable to plan of care and goals.     Anticipated barriers to physical therapy: Trying to work, chronicity, smoker     Goals:   Short Term Goals (4 Weeks):  1. " Pt will be compliant with HEP to supplement PT in decreasing pain with functional mobility.Met  2. Pt will perform improve bilateral hip abd and flexion strength by 1 grade in order to improve gait mechanics. Met  3. Pt will report worst pain of 5/10 in order to perform work related tasks.   Met  4. Pt will be able to stand for 30min without any increase in pain in order to perform work tasks. Progressing, Not Met     Long Term Goals (8 Weeks):   1. Pt will improve FOTO score to </= TBD% limited to decrease perceived limitation with maintaining/changing body position. Not Met  2. Pt will report worst pain of 2/10 in order to perform work related tasks. Met  4. Pt will be able to stand for 32 hours without any increase in pain in order to perform work tasks. Not Met  4. Pt will be independent c final home exercise program in order to DC to home program. Met    PLAN     Patient formally discharged from skilled physical therapy.     Kaley Tan, PT

## 2022-12-14 ENCOUNTER — TELEPHONE (OUTPATIENT)
Dept: NEUROSURGERY | Facility: CLINIC | Age: 47
End: 2022-12-14
Payer: MEDICAID

## 2022-12-15 ENCOUNTER — HOSPITAL ENCOUNTER (OUTPATIENT)
Dept: RADIOLOGY | Facility: HOSPITAL | Age: 47
Discharge: HOME | End: 2022-12-15
Attending: STUDENT IN AN ORGANIZED HEALTH CARE EDUCATION/TRAINING PROGRAM
Payer: MEDICAID

## 2022-12-15 ENCOUNTER — OFFICE VISIT (OUTPATIENT)
Dept: NEUROSURGERY | Facility: CLINIC | Age: 47
End: 2022-12-15
Payer: MEDICAID

## 2022-12-15 VITALS
DIASTOLIC BLOOD PRESSURE: 69 MMHG | HEART RATE: 102 BPM | WEIGHT: 190 LBS | SYSTOLIC BLOOD PRESSURE: 103 MMHG | HEIGHT: 68 IN | BODY MASS INDEX: 28.79 KG/M2

## 2022-12-15 DIAGNOSIS — M43.17 SPONDYLOLISTHESIS OF LUMBOSACRAL REGION: ICD-10-CM

## 2022-12-15 DIAGNOSIS — M43.17 SPONDYLOLISTHESIS AT L5-S1 LEVEL: Primary | ICD-10-CM

## 2022-12-15 PROCEDURE — 3074F PR MOST RECENT SYSTOLIC BLOOD PRESSURE < 130 MM HG: ICD-10-PCS | Mod: CPTII,,, | Performed by: NEUROLOGICAL SURGERY

## 2022-12-15 PROCEDURE — 4010F PR ACE/ARB THEARPY RXD/TAKEN: ICD-10-PCS | Mod: CPTII,,, | Performed by: NEUROLOGICAL SURGERY

## 2022-12-15 PROCEDURE — 3044F PR MOST RECENT HEMOGLOBIN A1C LEVEL <7.0%: ICD-10-PCS | Mod: CPTII,,, | Performed by: NEUROLOGICAL SURGERY

## 2022-12-15 PROCEDURE — 99213 PR OFFICE/OUTPT VISIT, EST, LEVL III, 20-29 MIN: ICD-10-PCS | Mod: S$PBB,,, | Performed by: NEUROLOGICAL SURGERY

## 2022-12-15 PROCEDURE — 72146 MRI CHEST SPINE W/O DYE: CPT | Mod: 26,,, | Performed by: RADIOLOGY

## 2022-12-15 PROCEDURE — 3078F PR MOST RECENT DIASTOLIC BLOOD PRESSURE < 80 MM HG: ICD-10-PCS | Mod: CPTII,,, | Performed by: NEUROLOGICAL SURGERY

## 2022-12-15 PROCEDURE — 72146 MRI THORACIC SPINE WITHOUT CONTRAST: ICD-10-PCS | Mod: 26,,, | Performed by: RADIOLOGY

## 2022-12-15 PROCEDURE — 3072F LOW RISK FOR RETINOPATHY: CPT | Mod: CPTII,,, | Performed by: NEUROLOGICAL SURGERY

## 2022-12-15 PROCEDURE — 3072F PR LOW RISK FOR RETINOPATHY: ICD-10-PCS | Mod: CPTII,,, | Performed by: NEUROLOGICAL SURGERY

## 2022-12-15 PROCEDURE — 72131 CT LUMBAR SPINE WITHOUT CONTRAST: ICD-10-PCS | Mod: 26,,, | Performed by: RADIOLOGY

## 2022-12-15 PROCEDURE — 72131 CT LUMBAR SPINE W/O DYE: CPT | Mod: TC

## 2022-12-15 PROCEDURE — 99213 OFFICE O/P EST LOW 20 MIN: CPT | Mod: S$PBB,,, | Performed by: NEUROLOGICAL SURGERY

## 2022-12-15 PROCEDURE — 72131 CT LUMBAR SPINE W/O DYE: CPT | Mod: 26,,, | Performed by: RADIOLOGY

## 2022-12-15 PROCEDURE — 3008F PR BODY MASS INDEX (BMI) DOCUMENTED: ICD-10-PCS | Mod: CPTII,,, | Performed by: NEUROLOGICAL SURGERY

## 2022-12-15 PROCEDURE — 72146 MRI CHEST SPINE W/O DYE: CPT | Mod: TC

## 2022-12-15 PROCEDURE — 3044F HG A1C LEVEL LT 7.0%: CPT | Mod: CPTII,,, | Performed by: NEUROLOGICAL SURGERY

## 2022-12-15 PROCEDURE — 3078F DIAST BP <80 MM HG: CPT | Mod: CPTII,,, | Performed by: NEUROLOGICAL SURGERY

## 2022-12-15 PROCEDURE — 99999 PR PBB SHADOW E&M-EST. PATIENT-LVL III: CPT | Mod: PBBFAC,,, | Performed by: NEUROLOGICAL SURGERY

## 2022-12-15 PROCEDURE — 99999 PR PBB SHADOW E&M-EST. PATIENT-LVL III: ICD-10-PCS | Mod: PBBFAC,,, | Performed by: NEUROLOGICAL SURGERY

## 2022-12-15 PROCEDURE — 4010F ACE/ARB THERAPY RXD/TAKEN: CPT | Mod: CPTII,,, | Performed by: NEUROLOGICAL SURGERY

## 2022-12-15 PROCEDURE — 3074F SYST BP LT 130 MM HG: CPT | Mod: CPTII,,, | Performed by: NEUROLOGICAL SURGERY

## 2022-12-15 PROCEDURE — 3008F BODY MASS INDEX DOCD: CPT | Mod: CPTII,,, | Performed by: NEUROLOGICAL SURGERY

## 2022-12-15 PROCEDURE — 99213 OFFICE O/P EST LOW 20 MIN: CPT | Mod: PBBFAC,25 | Performed by: NEUROLOGICAL SURGERY

## 2022-12-15 NOTE — PROGRESS NOTES
Neurosurgery  Established Patient    SUBJECTIVE:     History of Present Illness:  History of Present Illness:   Emeka Caballero is a 47 y.o. male s/p L4-pelvis fusion and L4-S1 TLIF on 02/08/2022 for s/p L4-pelvis fusion and L4-5, L5-S1 TLIF on 02/08/2022 with Dr. Reed. He is being seen for 10 month follow up. He is having back pain with transition from sitting to standing and walks with a slow gait; walking and standing can make the pain worse. He has constipation and no urinary incontinence. Denies alleviating factors.  He completed PT recently. He has not been smoking for the last 2 months and is using a bone growth stimulator.     Review of patient's allergies indicates:  No Known Allergies    Current Outpatient Medications   Medication Sig Dispense Refill    atorvastatin (LIPITOR) 40 MG tablet Take 1 tablet (40 mg total) by mouth once daily. 90 tablet 3    blood sugar diagnostic Strp To check BG 3 times daily, to use with insurance preferred meter 100 each 11    blood-glucose meter kit To check BG 3 times daily, to use with insurance preferred meter 1 each 0    lancets Misc To check BG 3 times daily, to use with insurance preferred meter 100 each 11    metFORMIN (GLUCOPHAGE-XR) 500 MG ER 24hr tablet Take 2 tablets (1,000 mg total) by mouth 2 (two) times daily with meals. 360 tablet 3    pep injection Bring the medication to appointment     For compounding pharmacy use:   Add PAPAVERINE 30 mcg  Add PHENTOLAMINE 10 mg  Add ALPROSTADIL 100 mcg 1 vial 3    tadalafiL (CIALIS) 20 MG Tab Take 1 tablet (20 mg total) by mouth daily as needed (erectile dysfunction). 30 tablet 11    tamsulosin (FLOMAX) 0.4 mg Cap Take 2 capsules (0.8 mg total) by mouth once daily. 180 capsule 0     No current facility-administered medications for this visit.       Past Medical History:   Diagnosis Date    Arthritis     Diabetes mellitus, type 2 2/11/2022     Past Surgical History:   Procedure Laterality Date    CATARACT  "EXTRACTION Right     EPIDURAL STEROID INJECTION N/A 10/21/2020    Procedure: Injection, Steroid, Epidural Caudal;  Surgeon: Vipul Nixon Jr., MD;  Location: [facility];  Service: Pain Management;  Laterality: N/A;  Caudal KURT  Arrive @ 1315; No ATC or DM; Needs MD Signature    LUMBAR FUSION N/A 2/8/2022    Procedure: FUSION, SPINE, LUMBAR;  Surgeon: Alphonse Reed MD;  Location: [facility] [address] [place] [address];  Service: Neurosurgery;  Laterality: N/A;  AIRO, L4-S1     Family History       Problem Relation (Age of Onset)    Cataracts Mother    No Known Problems Father, Sister, Brother, Maternal Aunt, Maternal Uncle, Paternal Aunt, Paternal Uncle, Maternal Grandmother, Maternal Grandfather, Paternal Grandmother, Paternal Grandfather          Social History     Socioeconomic History    Marital status: Single   Tobacco Use    Smoking status: Former    Smokeless tobacco: Never   Substance and Sexual Activity    Alcohol use: Yes     Comment: socially    Drug use: No     OBJECTIVE:     Vital Signs  Pulse: 102  BP: 103/69  Pain Score:   7  Height: 5' 8" (172.7 cm)  Weight: 86.2 kg (190 lb)  Body mass index is 28.89 kg/m².    Neurosurgery Physical Exam  General: well developed, well nourished, no distress.   Head: normocephalic, atraumatic  Neurologic:   GCS 15, A+Ox3  CN 2-12 intact  Skin: Skin is warm, dry and intact.  Sensory: intact to light touch throughout    Strength  Deltoids Triceps Biceps Wrist Extension Wrist Flexion Hand    Upper: R 5/5 5/5 5/5 5/5 5/5 5/5    L 5/5 5/5 5/5 5/5 5/5 5/5     Iliopsoas Quadriceps Knee  Flexion Tibialis  anterior Gastro- cnemius EHL   Lower: R 4/5 4/5 5/5 5/5 5/5 5/5    L 5/5 5/5 5/5 5/5 5/5 5/5     Reflexes:   DTR: 2+ symmetrically throughout.    SI Joint pain, positive crossed hip abduction       Diagnostic Results:  CT Lumbar spine: Lucency around R L4 screw. L5-S1 fixation with L4-5 and L5-S1 interbody fusion.     ASSESSMENT/PLAN:   46 yo male with prior L5-S1 spondylolisthesis s/p " L4-Pelvis PSIF (2/8/22) presenting with 10 month follow-up. Symptoms are consistent with SI Joint pain.     B/l SI Joint injection  2.   Physical therapy following injection  3.   RTC after completed        Note dictated with voice recognition software, please excuse any grammatical errors.

## 2022-12-16 ENCOUNTER — TELEPHONE (OUTPATIENT)
Dept: ADMINISTRATIVE | Facility: OTHER | Age: 47
End: 2022-12-16
Payer: MEDICAID

## 2022-12-19 ENCOUNTER — TELEPHONE (OUTPATIENT)
Dept: NEUROSURGERY | Facility: CLINIC | Age: 47
End: 2022-12-19
Payer: MEDICAID

## 2022-12-19 NOTE — TELEPHONE ENCOUNTER
----- Message from Rizwana Forbes sent at 12/19/2022  9:16 AM CST -----  Name of Who is Calling:Alana (wife)              What is the request in detail:Requesting a call back to schedule procedure.               Can the clinic reply by [facility]:no              What Number to Call Back if not in [facility]:[phone]

## 2022-12-20 ENCOUNTER — TELEPHONE (OUTPATIENT)
Dept: PAIN MEDICINE | Facility: OTHER | Age: 47
End: 2022-12-20
Payer: MEDICAID

## 2022-12-20 ENCOUNTER — PATIENT MESSAGE (OUTPATIENT)
Dept: PAIN MEDICINE | Facility: OTHER | Age: 47
End: 2022-12-20
Payer: MEDICAID

## 2022-12-20 NOTE — TELEPHONE ENCOUNTER
----- Message from Madi Ventura MA sent at 12/20/2022 10:00 AM CST -----  Regarding: FW: returning call  Contact: alana   Good morning Ling,    I saw you tried to call this patient on 12/16, they have been calling back to Neurosurgery thinking we called them. Can you get in touch with the patient?    Thank you  ----- Message -----  From: Maria A Lofton MA  Sent: 12/20/2022   9:23 AM CST  To: Derek Cun[facility] Staff  Subject: returning call                                   Type:  Patient Returning Call    Who Called Alana Mcmahanestine   Who Left Message for Patient:Ling Laura   Does the patient know what this is regarding? Appt   Would the patient rather a call back or a response via [facility]? Call back   Best Call Back Number: [phone]  Additional Information:

## 2022-12-23 ENCOUNTER — PATIENT MESSAGE (OUTPATIENT)
Dept: PAIN MEDICINE | Facility: OTHER | Age: 47
End: 2022-12-23
Payer: MEDICAID

## 2022-12-27 ENCOUNTER — HOSPITAL ENCOUNTER (OUTPATIENT)
Facility: OTHER | Age: 47
Discharge: HOME | End: 2022-12-27
Attending: ANESTHESIOLOGY | Admitting: ANESTHESIOLOGY
Payer: MEDICAID

## 2022-12-27 VITALS
HEART RATE: 82 BPM | SYSTOLIC BLOOD PRESSURE: 112 MMHG | HEIGHT: 68 IN | RESPIRATION RATE: 16 BRPM | OXYGEN SATURATION: 99 % | DIASTOLIC BLOOD PRESSURE: 65 MMHG | TEMPERATURE: 99 F | WEIGHT: 195 LBS | BODY MASS INDEX: 29.55 KG/M2

## 2022-12-27 DIAGNOSIS — G89.29 CHRONIC PAIN: ICD-10-CM

## 2022-12-27 DIAGNOSIS — M46.1 SACROILIITIS: Primary | ICD-10-CM

## 2022-12-27 LAB — POCT GLUCOSE: 151 MG/DL (ref 70–110)

## 2022-12-27 PROCEDURE — 63600175 PHARM REV CODE 636 W HCPCS: Performed by: ANESTHESIOLOGY

## 2022-12-27 PROCEDURE — 27096 PR INJECTION,SACROILIAC JOINT: ICD-10-PCS | Mod: 50,,, | Performed by: ANESTHESIOLOGY

## 2022-12-27 PROCEDURE — 25000003 PHARM REV CODE 250: Performed by: ANESTHESIOLOGY

## 2022-12-27 PROCEDURE — 27096 INJECT SACROILIAC JOINT: CPT | Mod: 50 | Performed by: ANESTHESIOLOGY

## 2022-12-27 PROCEDURE — 25500020 PHARM REV CODE 255: Performed by: ANESTHESIOLOGY

## 2022-12-27 PROCEDURE — 27096 INJECT SACROILIAC JOINT: CPT | Mod: 50,,, | Performed by: ANESTHESIOLOGY

## 2022-12-27 PROCEDURE — 82947 ASSAY GLUCOSE BLOOD QUANT: CPT | Performed by: ANESTHESIOLOGY

## 2022-12-27 RX ORDER — TRIAMCINOLONE ACETONIDE 40 MG/ML
INJECTION, SUSPENSION INTRA-ARTICULAR; INTRAMUSCULAR
Status: DISCONTINUED | OUTPATIENT
Start: 2022-12-27 | End: 2022-12-27 | Stop reason: HOSPADM

## 2022-12-27 RX ORDER — BUPIVACAINE HYDROCHLORIDE 2.5 MG/ML
INJECTION, SOLUTION EPIDURAL; INFILTRATION; INTRACAUDAL
Status: DISCONTINUED | OUTPATIENT
Start: 2022-12-27 | End: 2022-12-27 | Stop reason: HOSPADM

## 2022-12-27 RX ORDER — SODIUM CHLORIDE 9 MG/ML
500 INJECTION, SOLUTION INTRAVENOUS CONTINUOUS
Status: DISCONTINUED | OUTPATIENT
Start: 2022-12-27 | End: 2022-12-27 | Stop reason: HOSPADM

## 2022-12-27 RX ORDER — ALPRAZOLAM 0.5 MG/1
1 TABLET ORAL ONCE
Status: COMPLETED | OUTPATIENT
Start: 2022-12-27 | End: 2022-12-27

## 2022-12-27 RX ORDER — LIDOCAINE HYDROCHLORIDE 20 MG/ML
INJECTION, SOLUTION INFILTRATION; PERINEURAL
Status: DISCONTINUED | OUTPATIENT
Start: 2022-12-27 | End: 2022-12-27 | Stop reason: HOSPADM

## 2022-12-27 RX ADMIN — ALPRAZOLAM 1 MG: 0.5 TABLET ORAL at 08:12

## 2022-12-27 NOTE — DISCHARGE INSTRUCTIONS

## 2022-12-27 NOTE — H&P
HPI  INJECTION,SACROILIAC JOINT, BILATERAL CONTRAST           Past Medical History:   Diagnosis Date    Arthritis     Diabetes mellitus, type 2 2/11/2022     Past Surgical History:   Procedure Laterality Date    CATARACT EXTRACTION Right     EPIDURAL STEROID INJECTION N/A 10/21/2020    Procedure: Injection, Steroid, Epidural Caudal;  Surgeon: Vipul Nixon Jr., MD;  Location: [facility];  Service: Pain Management;  Laterality: N/A;  Caudal KURT  Arrive @ 1315; No ATC or DM; Needs MD Signature    LUMBAR FUSION N/A 2/8/2022    Procedure: FUSION, SPINE, LUMBAR;  Surgeon: Alphonse Reed MD;  Location: [facility] OR [address] [place] [address];  Service: Neurosurgery;  Laterality: N/A;  AIRO, L4-S1     Review of patient's allergies indicates:  No Known Allergies     PMHx, PSHx, Allergies, Medications reviewed in epic      ROS negative except pain complaints in HPI    OBJECTIVE:    There were no vitals taken for this visit.    PHYSICAL EXAMINATION:    GENERAL: Well appearing, in no acute distress, alert and oriented x3.  PSYCH:  Mood and affect appropriate.  SKIN: Skin color, texture, turgor normal, no rashes or lesions.  CV: RRR with palpation of the radial artery.  PULM: No evidence of respiratory difficulty, symmetric chest rise. Clear to auscultation.  NEURO: Cranial nerves grossly intact.    Plan:    Proceed with procedure as planned    Deejay Castillo  12/27/2022

## 2022-12-27 NOTE — OP NOTE
Sacroiliac Joint Injection under Fluoroscopic Guidance    The procedure, risks, benefits, and options were discussed with the patient. There are no contraindications to the procedure. The patent expressed understanding and agreed to the procedure. Informed written consent was obtained prior to the start of the procedure and can be found in the patient's chart.    PATIENT NAME: Emeka Caballero   MRN: [id number]     DATE OF PROCEDURE: 12/27/2022    PROCEDURE: Bilateral Sacroiliac Joint Injection under Fluoroscopic Guidance    PRE-OP DIAGNOSIS: Spondylolisthesis at L5-S1 level [M43.17]  Sacroiliac dysfunction [M53.3]    POST-OP DIAGNOSIS: Same    PHYSICIAN: Brielle José MD    ASSISTANTS: Brett Wester, DO [facility] Resident       MEDICATIONS INJECTED: Preservative-free Kenalog 40mg with 3cc of Bupivacine 0.25%     LOCAL ANESTHETIC INJECTED: Xylocaine 2%     SEDATION: None    ESTIMATED BLOOD LOSS: None    COMPLICATIONS: None    TECHNIQUE: Time-out was performed to identify the patient and procedure to be performed. With the patient laying in a prone position, the surgical area was prepped and draped in the usual sterile fashion using ChloraPrep and a fenestrated drape. The sacroiliac joint was determined under fluoroscopy guidance. Skin anesthesia was achieved by injecting Lidocaine 2% over the injection site. The sacroiliac joint was  then approached with a 25 gauge, 3.5 inch spinal quinke needle that was introduced under fluoroscopic guidance in the AP and Lateral views. Once the needle tip was in the area of the joint, and there was no blood, contrast dye Omnipaque (300mg/mL) was injected to confirm placement and there was no vascular runoff. Fluoroscopic imaging in the AP and lateral views revealed a clear outline of the joint space. 4 mL of the medication mixture listed above was injected slowly intraarticular and trinh-articular. Displacement of the radio opaque contrast after injection of the  medication confirmed that the medication went into the area of the joint. The needles were removed and bleeding was nil. The same procedure was repeated on the contralateral side. A sterile dressing was applied. No specimens collected. The patient tolerated the procedure well.     PAIN BEFORE THE PROCEDURE: 10/10    PAIN AFTER THE PROCEDURE: 5/10        The patient was monitored after the procedure in the recovery area. They were given post-procedure and discharge instructions to follow at home. The patient was discharged in a stable condition.        Brielle José MD

## 2023-02-03 ENCOUNTER — TELEPHONE (OUTPATIENT)
Dept: UROLOGY | Facility: CLINIC | Age: 48
End: 2023-02-03
Payer: MEDICAID

## 2023-02-03 NOTE — TELEPHONE ENCOUNTER
Spoke to pts wife she said he is having difficulty urinating at times.  Appt scheduled to see Dr. Murray 2/6/23 @ 1:00pm-Jimmy Zheng.  Instructed her to bring pt to ER if he is unable to urinate at all.        ----- Message from Zaria Low sent at 2/3/2023 12:46 PM CST -----  Name of Caller pt's wife nicanor  [phone]    Reason for Visit/Symptoms pt requesting to be seen asap for difficulty urinating. Call pt. Nothing available.   Best Contact Number [facility] [phone] (home)     Preferred Date/Time of Appointment asap  Interested in Virtual Visit (yes/no)  Additional Information

## 2023-02-06 ENCOUNTER — OFFICE VISIT (OUTPATIENT)
Dept: UROLOGY | Facility: CLINIC | Age: 48
End: 2023-02-06
Payer: MEDICAID

## 2023-02-06 VITALS
DIASTOLIC BLOOD PRESSURE: 84 MMHG | BODY MASS INDEX: 30.67 KG/M2 | SYSTOLIC BLOOD PRESSURE: 123 MMHG | WEIGHT: 201.75 LBS | HEART RATE: 102 BPM

## 2023-02-06 DIAGNOSIS — R33.8 BENIGN PROSTATIC HYPERPLASIA WITH URINARY RETENTION: Primary | ICD-10-CM

## 2023-02-06 DIAGNOSIS — N40.1 BENIGN PROSTATIC HYPERPLASIA WITH URINARY RETENTION: Primary | ICD-10-CM

## 2023-02-06 PROCEDURE — 3072F PR LOW RISK FOR RETINOPATHY: ICD-10-PCS | Mod: CPTII,,, | Performed by: UROLOGY

## 2023-02-06 PROCEDURE — 3008F BODY MASS INDEX DOCD: CPT | Mod: CPTII,,, | Performed by: UROLOGY

## 2023-02-06 PROCEDURE — 3074F SYST BP LT 130 MM HG: CPT | Mod: CPTII,,, | Performed by: UROLOGY

## 2023-02-06 PROCEDURE — 1159F PR MEDICATION LIST DOCUMENTED IN MEDICAL RECORD: ICD-10-PCS | Mod: CPTII,,, | Performed by: UROLOGY

## 2023-02-06 PROCEDURE — 99214 PR OFFICE/OUTPT VISIT, EST, LEVL IV, 30-39 MIN: ICD-10-PCS | Mod: S$PBB,,, | Performed by: UROLOGY

## 2023-02-06 PROCEDURE — 1159F MED LIST DOCD IN RCRD: CPT | Mod: CPTII,,, | Performed by: UROLOGY

## 2023-02-06 PROCEDURE — 3079F DIAST BP 80-89 MM HG: CPT | Mod: CPTII,,, | Performed by: UROLOGY

## 2023-02-06 PROCEDURE — 99999 PR PBB SHADOW E&M-EST. PATIENT-LVL III: CPT | Mod: PBBFAC,,, | Performed by: UROLOGY

## 2023-02-06 PROCEDURE — 99213 OFFICE O/P EST LOW 20 MIN: CPT | Mod: PBBFAC,PO | Performed by: UROLOGY

## 2023-02-06 PROCEDURE — 3008F PR BODY MASS INDEX (BMI) DOCUMENTED: ICD-10-PCS | Mod: CPTII,,, | Performed by: UROLOGY

## 2023-02-06 PROCEDURE — 99999 PR PBB SHADOW E&M-EST. PATIENT-LVL III: ICD-10-PCS | Mod: PBBFAC,,, | Performed by: UROLOGY

## 2023-02-06 PROCEDURE — 3079F PR MOST RECENT DIASTOLIC BLOOD PRESSURE 80-89 MM HG: ICD-10-PCS | Mod: CPTII,,, | Performed by: UROLOGY

## 2023-02-06 PROCEDURE — 99214 OFFICE O/P EST MOD 30 MIN: CPT | Mod: S$PBB,,, | Performed by: UROLOGY

## 2023-02-06 PROCEDURE — 81002 URINALYSIS NONAUTO W/O SCOPE: CPT | Mod: PBBFAC,PO | Performed by: UROLOGY

## 2023-02-06 PROCEDURE — 3074F PR MOST RECENT SYSTOLIC BLOOD PRESSURE < 130 MM HG: ICD-10-PCS | Mod: CPTII,,, | Performed by: UROLOGY

## 2023-02-06 PROCEDURE — 3072F LOW RISK FOR RETINOPATHY: CPT | Mod: CPTII,,, | Performed by: UROLOGY

## 2023-02-06 PROCEDURE — 51798 US URINE CAPACITY MEASURE: CPT | Mod: PBBFAC,PO | Performed by: UROLOGY

## 2023-02-06 RX ORDER — CEFAZOLIN SODIUM 2 G/50ML
2 SOLUTION INTRAVENOUS
Status: CANCELLED | OUTPATIENT
Start: 2023-02-06

## 2023-02-06 NOTE — PROGRESS NOTES
[facility] - Urology   Clinic Note    SUBJECTIVE:     Chief Complaint   Patient presents with    Urinary Retention    Nocturia    Suprapubic pressure       Referral from: No ref. provider found.    History of Present Illness:  Emeka Caballero is a 47 y.o. male who presents to clinic for BPH.    Pt established with Dr. Page, here today for voiding dysfunction and difficulty emptying.  He reports he has a weak stream and is unable to completely empty his bladder.  A bladder scan shows a postvoid residual of 170 cc.  Patient states that he does not get his bladder close to being empty.  This has been bothering him for some time.  Of note he did have a lumbar surgery about 1 year ago but says that this problem was persistent before then.  Had a renal bladder ultrasound which revealed a prostate of approximately 70 cc.  He desires intervention at this time.    Patient endorses no additional complaints at this time.    Past Medical History:   Diagnosis Date    Arthritis     Diabetes mellitus, type 2 2/11/2022       Past Surgical History:   Procedure Laterality Date    CATARACT EXTRACTION Right     EPIDURAL STEROID INJECTION N/A 10/21/2020    Procedure: Injection, Steroid, Epidural Caudal;  Surgeon: Vipul Nixon Jr., MD;  Location: [facility] ENDO;  Service: Pain Management;  Laterality: N/A;  Caudal KURT  Arrive @ 1315; No ATC or DM; Needs MD Signature    INJECTION, SACROILIAC JOINT Bilateral 12/27/2022    Procedure: INJECTION,SACROILIAC JOINT, BILATERAL CONTRAST DIRECT REF  ORDERED;  Surgeon: Brielle José MD;  Location: [facility] PAIN MGT;  Service: Pain Management;  Laterality: Bilateral;    LUMBAR FUSION N/A 2/8/2022    Procedure: FUSION, SPINE, LUMBAR;  Surgeon: Alphonse Reed MD;  Location: [address];  Service: Neurosurgery;  Laterality: N/A;  AIRO, L4-S1       Family History   Problem Relation Age of Onset    Cataracts Mother     No Known Problems Father     No Known Problems Sister     No Known  Problems Brother     No Known Problems Maternal Aunt     No Known Problems Maternal Uncle     No Known Problems Paternal Aunt     No Known Problems Paternal Uncle     No Known Problems Maternal Grandmother     No Known Problems Maternal Grandfather     No Known Problems Paternal Grandmother     No Known Problems Paternal Grandfather        Social History     Tobacco Use    Smoking status: Former    Smokeless tobacco: Never   Substance Use Topics    Alcohol use: Yes     Comment: socially    Drug use: No       Current Outpatient Medications on File Prior to Visit   Medication Sig Dispense Refill    alprostadiL 500 mcg/mL Soln 50 mcg, phentolamine 5 mg SolR 5 mg, papaverine 30 mg/mL Soln 30 mcg BRING TO PHYSICIAN'S OFFICE FOR TEST DOSE      atorvastatin (LIPITOR) 40 MG tablet Take 1 tablet (40 mg total) by mouth once daily. 90 tablet 3    blood sugar diagnostic Strp To check BG 3 times daily, to use with insurance preferred meter 100 each 11    lancets Misc To check BG 3 times daily, to use with insurance preferred meter 100 each 11    metFORMIN (GLUCOPHAGE-XR) 500 MG ER 24hr tablet Take 2 tablets (1,000 mg total) by mouth 2 (two) times daily with meals. 360 tablet 3    pep injection Bring the medication to appointment     For compounding pharmacy use:   Add PAPAVERINE 30 mcg  Add PHENTOLAMINE 10 mg  Add ALPROSTADIL 100 mcg 1 vial 3    tadalafiL (CIALIS) 20 MG Tab Take 1 tablet (20 mg total) by mouth daily as needed (erectile dysfunction). 30 tablet 11    tamsulosin (FLOMAX) 0.4 mg Cap Take 2 capsules (0.8 mg total) by mouth once daily. 180 capsule 0    blood-glucose meter kit To check BG 3 times daily, to use with insurance preferred meter 1 each 0     No current facility-administered medications on file prior to visit.       Review of patient's allergies indicates:  No Known Allergies    Review of Systems:  A review of 10+ systems was conducted with pertinent positive and negative findings documented in HPI with all  "other systems reviewed and negative.    OBJECTIVE:     Estimated body mass index is 30.67 kg/m² as calculated from the following:    Height as of 12/27/22: 5' 8" (1.727 m).    Weight as of this encounter: 91.5 kg (201 lb 11.5 oz).    Vital Signs (Most Recent)  Vitals:    02/06/23 1322   BP: 123/84   Pulse: 102       Physical Exam:  GENERAL: patient sitting comfortably  HEENT: normocephalic  NECK: supple, no JVD  PULM: normal chest rise, no increased WOB  HEART: non-diaphoretic  ABDO: soft, nondistended, nontender  BACK: no CVA tenderness bilaterally  SKIN: warm, dry, well perfused  EXT: no bruising or edema  NEURO: grossly normal with no focal deficits  PSYCH: appropriate mood and affect    Genitourinary Exam:  deferred    Lab Results   Component Value Date    BUN 17 10/14/2022    CREATININE 0.9 10/14/2022    WBC 6.50 05/10/2022    HGB 13.2 (L) 05/10/2022    HCT 42.9 05/10/2022     05/10/2022    AST 18 10/14/2022    ALT 29 10/14/2022    ALKPHOS 88 10/14/2022    ALBUMIN 3.3 (L) 10/14/2022    HGBA1C 6.0 (H) 10/14/2022    INR 1.0 02/08/2022        Imaging:  I have personally reviewed all relevant imaging studies.    Results for orders placed or performed during the hospital encounter of 12/15/22 (from the past 2160 hour(s))   CT Lumbar Spine Without Contrast    Impression    1. Operative changes detailed above with stable loosening about the right L4 transpedicular fixation screw and unchanged positioning of the L4-L5 interbody spacer.  No detrimental change.  2. Multilevel spondylosis most notable at L5-S1 resulting in at least moderate bilateral foraminal narrowing.  3. Additional findings as above.    Electronically signed by resident: Gilberto Mcfarlane MD  Date:    12/15/2022  Time:    14:01    Electronically signed by: Niko Quigley Jr  Date:    12/15/2022  Time:    16:09     Results for orders placed or performed during the hospital encounter of 12/15/22 (from the past 2160 hour(s))   MRI Thoracic Spine " Without Contrast    Impression    1. Stable posterior central disc protrusion at T8-T9 abutting the ventral thecal sac.  No foraminal narrowing or spinal canal stenosis.  2. Additional findings as above.    Electronically signed by resident: Gilberto Mcfarlane MD  Date:    12/15/2022  Time:    13:25    Electronically signed by: Niko Quigley Jr  Date:    12/15/2022  Time:    14:04     [place] [facility]  See [facility] notes for report.       PSA:  Lab Results   Component Value Date    PSADIAG 0.79 11/03/2021    PSATOTAL 0.75 01/30/2020    PSAFREE 0.23 01/30/2020       Testosterone:  Lab Results   Component Value Date    TESTOSTERONE 273 02/22/2020    TESTOSTERONE 59.0 02/22/2020        ASSESSMENT     1. Benign prostatic hyperplasia with urinary retention        PLAN:     BPH with elevated PVR      - The patient's prostate has been measured using RBUS at an estimated volume of 70 cc.    - The risks, benefits, and technical details of HoLEP were discussed in detail today. We also discussed alternative treatment modalities for BPH with urinary obstruction.     - After reviewing the relative merits of alternative treatment modalities we specifically reviewed potential complications related HoLEP itself. These include bleeding and infection, ureteral or bladder injury, urethral stricture, bladder neck contracture, failure to improve voiding dysfunction, persistent urinary retention, stress urinary incontinence, erectile dysfunction, retrograde ejaculation, and need for any further intervention or management.    - After being advised of these risks and treatment options he has elected to undergo HoLEP. We will send a urine culture preoperatively and will treat appropriately based on the results. No guarantees as to the outcome of the treatment were made.  We will evaluate his outlet with cystoscopy.  It is possible that he does have a somewhat a reflexive bladder but at this time the patient  would like to proceed with de- obstruction.    Cecil Murray MD  Urology  [facility]  [facility]    Disclaimer: This note has been generated using voice-recognition software. There may be typographical errors that have been missed during proof-reading.

## 2023-02-13 ENCOUNTER — TELEPHONE (OUTPATIENT)
Dept: UROLOGY | Facility: CLINIC | Age: 48
End: 2023-02-13
Payer: MEDICAID

## 2023-02-13 ENCOUNTER — OFFICE VISIT (OUTPATIENT)
Dept: UROLOGY | Facility: CLINIC | Age: 48
End: 2023-02-13
Payer: MEDICAID

## 2023-02-13 VITALS
HEART RATE: 93 BPM | HEIGHT: 68 IN | BODY MASS INDEX: 30.15 KG/M2 | SYSTOLIC BLOOD PRESSURE: 114 MMHG | DIASTOLIC BLOOD PRESSURE: 78 MMHG | WEIGHT: 198.94 LBS

## 2023-02-13 DIAGNOSIS — R33.8 BENIGN PROSTATIC HYPERPLASIA WITH URINARY RETENTION: ICD-10-CM

## 2023-02-13 DIAGNOSIS — N40.1 BENIGN PROSTATIC HYPERPLASIA WITH URINARY RETENTION: ICD-10-CM

## 2023-02-13 PROCEDURE — 87086 URINE CULTURE/COLONY COUNT: CPT | Performed by: UROLOGY

## 2023-02-13 PROCEDURE — 99999 PR PBB SHADOW E&M-EST. PATIENT-LVL III: ICD-10-PCS | Mod: PBBFAC,,, | Performed by: UROLOGY

## 2023-02-13 PROCEDURE — 1159F PR MEDICATION LIST DOCUMENTED IN MEDICAL RECORD: ICD-10-PCS | Mod: CPTII,,, | Performed by: UROLOGY

## 2023-02-13 PROCEDURE — 52000 PR CYSTOURETHROSCOPY: ICD-10-PCS | Mod: S$PBB,,, | Performed by: UROLOGY

## 2023-02-13 PROCEDURE — 1159F MED LIST DOCD IN RCRD: CPT | Mod: CPTII,,, | Performed by: UROLOGY

## 2023-02-13 PROCEDURE — 3008F PR BODY MASS INDEX (BMI) DOCUMENTED: ICD-10-PCS | Mod: CPTII,,, | Performed by: UROLOGY

## 2023-02-13 PROCEDURE — 3078F DIAST BP <80 MM HG: CPT | Mod: CPTII,,, | Performed by: UROLOGY

## 2023-02-13 PROCEDURE — 99213 OFFICE O/P EST LOW 20 MIN: CPT | Mod: PBBFAC,PO | Performed by: UROLOGY

## 2023-02-13 PROCEDURE — 3072F LOW RISK FOR RETINOPATHY: CPT | Mod: CPTII,,, | Performed by: UROLOGY

## 2023-02-13 PROCEDURE — 3078F PR MOST RECENT DIASTOLIC BLOOD PRESSURE < 80 MM HG: ICD-10-PCS | Mod: CPTII,,, | Performed by: UROLOGY

## 2023-02-13 PROCEDURE — 52000 CYSTOURETHROSCOPY: CPT | Mod: S$PBB,,, | Performed by: UROLOGY

## 2023-02-13 PROCEDURE — 3074F SYST BP LT 130 MM HG: CPT | Mod: CPTII,,, | Performed by: UROLOGY

## 2023-02-13 PROCEDURE — 3008F BODY MASS INDEX DOCD: CPT | Mod: CPTII,,, | Performed by: UROLOGY

## 2023-02-13 PROCEDURE — 99999 PR PBB SHADOW E&M-EST. PATIENT-LVL III: CPT | Mod: PBBFAC,,, | Performed by: UROLOGY

## 2023-02-13 PROCEDURE — 3074F PR MOST RECENT SYSTOLIC BLOOD PRESSURE < 130 MM HG: ICD-10-PCS | Mod: CPTII,,, | Performed by: UROLOGY

## 2023-02-13 PROCEDURE — 99499 NO LOS: ICD-10-PCS | Mod: S$PBB,,, | Performed by: UROLOGY

## 2023-02-13 PROCEDURE — 3072F PR LOW RISK FOR RETINOPATHY: ICD-10-PCS | Mod: CPTII,,, | Performed by: UROLOGY

## 2023-02-13 PROCEDURE — 99499 UNLISTED E&M SERVICE: CPT | Mod: S$PBB,,, | Performed by: UROLOGY

## 2023-02-13 PROCEDURE — 52000 CYSTOURETHROSCOPY: CPT | Mod: PBBFAC,PO | Performed by: UROLOGY

## 2023-02-13 NOTE — PROGRESS NOTES
[facility] - Urology   Clinic Note    SUBJECTIVE:     Chief Complaint   Patient presents with    Other     Cysto        Referral from: Cecil Murray MD.    History of Present Illness:  Emeka Caballero is a 47 y.o. male who presents to clinic for BPH.    Pt established with Dr. Page, here today for voiding dysfunction and difficulty emptying.  He reports he has a weak stream and is unable to completely empty his bladder.  A bladder scan shows a postvoid residual of 170 cc.  Patient states that he does not get his bladder close to being empty.  This has been bothering him for some time.  Of note he did have a lumbar surgery about 1 year ago but says that this problem was persistent before then.  Had a renal bladder ultrasound which revealed a prostate of approximately 70 cc.  He desires intervention at this time.    02/13/2023  Here today for cysto which revealed trilobar hypertrophy with a moderate intravesical median lobe.    Patient endorses no additional complaints at this time.    Past Medical History:   Diagnosis Date    Arthritis     Diabetes mellitus, type 2 2/11/2022       Past Surgical History:   Procedure Laterality Date    CATARACT EXTRACTION Right     EPIDURAL STEROID INJECTION N/A 10/21/2020    Procedure: Injection, Steroid, Epidural Caudal;  Surgeon: Vipul Nixon Jr., MD;  Location: [facility] ENDO;  Service: Pain Management;  Laterality: N/A;  Caudal KURT  Arrive @ 1315; No ATC or DM; Needs MD Signature    INJECTION, SACROILIAC JOINT Bilateral 12/27/2022    Procedure: INJECTION,SACROILIAC JOINT, BILATERAL CONTRAST DIRECT REF  ORDERED;  Surgeon: Brielle José MD;  Location: [facility] PAIN MGT;  Service: Pain Management;  Laterality: Bilateral;    LUMBAR FUSION N/A 2/8/2022    Procedure: FUSION, SPINE, LUMBAR;  Surgeon: Alphonse Reed MD;  Location: [facility] OR [address] [place] [address];  Service: Neurosurgery;  Laterality: N/A;  AIRO, L4-S1       Family History   Problem Relation Age of Onset    Cataracts  Mother     No Known Problems Father     No Known Problems Sister     No Known Problems Brother     No Known Problems Maternal Aunt     No Known Problems Maternal Uncle     No Known Problems Paternal Aunt     No Known Problems Paternal Uncle     No Known Problems Maternal Grandmother     No Known Problems Maternal Grandfather     No Known Problems Paternal Grandmother     No Known Problems Paternal Grandfather        Social History     Tobacco Use    Smoking status: Former    Smokeless tobacco: Never   Substance Use Topics    Alcohol use: Yes     Comment: socially    Drug use: No       Current Outpatient Medications on File Prior to Visit   Medication Sig Dispense Refill    alprostadiL 500 mcg/mL Soln 50 mcg, phentolamine 5 mg SolR 5 mg, papaverine 30 mg/mL Soln 30 mcg BRING TO PHYSICIAN'S OFFICE FOR TEST DOSE      atorvastatin (LIPITOR) 40 MG tablet Take 1 tablet (40 mg total) by mouth once daily. 90 tablet 3    blood sugar diagnostic Strp To check BG 3 times daily, to use with insurance preferred meter 100 each 11    lancets Misc To check BG 3 times daily, to use with insurance preferred meter 100 each 11    metFORMIN (GLUCOPHAGE-XR) 500 MG ER 24hr tablet Take 2 tablets (1,000 mg total) by mouth 2 (two) times daily with meals. 360 tablet 3    pep injection Bring the medication to appointment     For compounding pharmacy use:   Add PAPAVERINE 30 mcg  Add PHENTOLAMINE 10 mg  Add ALPROSTADIL 100 mcg 1 vial 3    tadalafiL (CIALIS) 20 MG Tab Take 1 tablet (20 mg total) by mouth daily as needed (erectile dysfunction). 30 tablet 11    tamsulosin (FLOMAX) 0.4 mg Cap Take 2 capsules (0.8 mg total) by mouth once daily. 180 capsule 0    blood-glucose meter kit To check BG 3 times daily, to use with insurance preferred meter 1 each 0     No current facility-administered medications on file prior to visit.       Review of patient's allergies indicates:  No Known Allergies    Review of Systems:  A review of 10+ systems was  "conducted with pertinent positive and negative findings documented in HPI with all other systems reviewed and negative.    OBJECTIVE:     Estimated body mass index is 30.25 kg/m² as calculated from the following:    Height as of this encounter: 5' 8" (1.727 m).    Weight as of this encounter: 90.2 kg (198 lb 15.4 oz).    Vital Signs (Most Recent)  Vitals:    02/13/23 1415   BP: 114/78   Pulse: 93       Physical Exam:  GENERAL: patient sitting comfortably  HEENT: normocephalic  NECK: supple, no JVD  PULM: normal chest rise, no increased WOB  HEART: non-diaphoretic  ABDO: soft, nondistended, nontender  BACK: no CVA tenderness bilaterally  SKIN: warm, dry, well perfused  EXT: no bruising or edema  NEURO: grossly normal with no focal deficits  PSYCH: appropriate mood and affect    Genitourinary Exam:  deferred    Lab Results   Component Value Date    BUN 17 10/14/2022    CREATININE 0.9 10/14/2022    WBC 6.50 05/10/2022    HGB 13.2 (L) 05/10/2022    HCT 42.9 05/10/2022     05/10/2022    AST 18 10/14/2022    ALT 29 10/14/2022    ALKPHOS 88 10/14/2022    ALBUMIN 3.3 (L) 10/14/2022    HGBA1C 6.0 (H) 10/14/2022    INR 1.0 02/08/2022        Imaging:  I have personally reviewed all relevant imaging studies.    Results for orders placed or performed during the hospital encounter of 12/15/22 (from the past 2160 hour(s))   CT Lumbar Spine Without Contrast    Impression    1. Operative changes detailed above with stable loosening about the right L4 transpedicular fixation screw and unchanged positioning of the L4-L5 interbody spacer.  No detrimental change.  2. Multilevel spondylosis most notable at L5-S1 resulting in at least moderate bilateral foraminal narrowing.  3. Additional findings as above.    Electronically signed by resident: Gilberto Mcfarlane MD  Date:    12/15/2022  Time:    14:01    Electronically signed by: Niko Quigley Jr  Date:    12/15/2022  Time:    16:09     Results for orders placed or performed " during the hospital encounter of 12/15/22 (from the past 2160 hour(s))   MRI Thoracic Spine Without Contrast    Impression    1. Stable posterior central disc protrusion at T8-T9 abutting the ventral thecal sac.  No foraminal narrowing or spinal canal stenosis.  2. Additional findings as above.    Electronically signed by resident: Gilberto Mcfarlane MD  Date:    12/15/2022  Time:    13:25    Electronically signed by: Niko Quigley Jr  Date:    12/15/2022  Time:    14:04     [place] [facility]  See [facility] notes for report.       PSA:  Lab Results   Component Value Date    PSADIAG 0.79 11/03/2021    PSATOTAL 0.75 01/30/2020    PSAFREE 0.23 01/30/2020       Testosterone:  Lab Results   Component Value Date    TESTOSTERONE 273 02/22/2020    TESTOSTERONE 59.0 02/22/2020        ASSESSMENT     1. Benign prostatic hyperplasia with urinary retention        PLAN:     BPH with elevated PVR      - The patient's prostate has been measured using RBUS at an estimated volume of 70 cc.    - The risks, benefits, and technical details of HoLEP were discussed in detail today. We also discussed alternative treatment modalities for BPH with urinary obstruction.     - After reviewing the relative merits of alternative treatment modalities we specifically reviewed potential complications related HoLEP itself. These include bleeding and infection, ureteral or bladder injury, urethral stricture, bladder neck contracture, failure to improve voiding dysfunction, persistent urinary retention, stress urinary incontinence, erectile dysfunction, retrograde ejaculation, and need for any further intervention or management.    - After being advised of these risks and treatment options he has elected to undergo HoLEP. We will send a urine culture preoperatively and will treat appropriately based on the results. No guarantees as to the outcome of the treatment were made.      Cecil Murray MD  Urology  [facility]    Disclaimer: This note has been generated using voice-recognition software. There may be typographical errors that have been missed during proof-reading.

## 2023-02-13 NOTE — H&P (VIEW-ONLY)
[facility]   Clinic Note    SUBJECTIVE:     Chief Complaint   Patient presents with    Other     Cysto        Referral from: Cecil Murray MD.    History of Present Illness:  Emeka Caballero is a 47 y.o. male who presents to clinic for BPH.    Pt established with Dr. Page, here today for voiding dysfunction and difficulty emptying.  He reports he has a weak stream and is unable to completely empty his bladder.  A bladder scan shows a postvoid residual of 170 cc.  Patient states that he does not get his bladder close to being empty.  This has been bothering him for some time.  Of note he did have a lumbar surgery about 1 year ago but says that this problem was persistent before then.  Had a renal bladder ultrasound which revealed a prostate of approximately 70 cc.  He desires intervention at this time.    02/13/2023  Here today for cysto which revealed trilobar hypertrophy with a moderate intravesical median lobe.    Patient endorses no additional complaints at this time.    Past Medical History:   Diagnosis Date    Arthritis     Diabetes mellitus, type 2 2/11/2022       Past Surgical History:   Procedure Laterality Date    CATARACT EXTRACTION Right     EPIDURAL STEROID INJECTION N/A 10/21/2020    Procedure: Injection, Steroid, Epidural Caudal;  Surgeon: Vipul Nixon Jr., MD;  Location: [facility] ENDO;  Service: Pain Management;  Laterality: N/A;  Caudal KURT  Arrive @ 1315; No ATC or DM; Needs MD Signature    INJECTION, SACROILIAC JOINT Bilateral 12/27/2022    Procedure: INJECTION,SACROILIAC JOINT, BILATERAL CONTRAST DIRECT REF  ORDERED;  Surgeon: Brielle José MD;  Location: [facility] PAIN MGT;  Service: Pain Management;  Laterality: Bilateral;    LUMBAR FUSION N/A 2/8/2022    Procedure: FUSION, SPINE, LUMBAR;  Surgeon: Alphonse Reed MD;  Location: [facility] OR [address] [place] [address];  Service: Neurosurgery;  Laterality: N/A;  AIRO, L4-S1       Family History   Problem Relation Age of Onset    Cataracts  Mother     No Known Problems Father     No Known Problems Sister     No Known Problems Brother     No Known Problems Maternal Aunt     No Known Problems Maternal Uncle     No Known Problems Paternal Aunt     No Known Problems Paternal Uncle     No Known Problems Maternal Grandmother     No Known Problems Maternal Grandfather     No Known Problems Paternal Grandmother     No Known Problems Paternal Grandfather        Social History     Tobacco Use    Smoking status: Former    Smokeless tobacco: Never   Substance Use Topics    Alcohol use: Yes     Comment: socially    Drug use: No       Current Outpatient Medications on File Prior to Visit   Medication Sig Dispense Refill    alprostadiL 500 mcg/mL Soln 50 mcg, phentolamine 5 mg SolR 5 mg, papaverine 30 mg/mL Soln 30 mcg BRING TO PHYSICIAN'S OFFICE FOR TEST DOSE      atorvastatin (LIPITOR) 40 MG tablet Take 1 tablet (40 mg total) by mouth once daily. 90 tablet 3    blood sugar diagnostic Strp To check BG 3 times daily, to use with insurance preferred meter 100 each 11    lancets Misc To check BG 3 times daily, to use with insurance preferred meter 100 each 11    metFORMIN (GLUCOPHAGE-XR) 500 MG ER 24hr tablet Take 2 tablets (1,000 mg total) by mouth 2 (two) times daily with meals. 360 tablet 3    pep injection Bring the medication to appointment     For compounding pharmacy use:   Add PAPAVERINE 30 mcg  Add PHENTOLAMINE 10 mg  Add ALPROSTADIL 100 mcg 1 vial 3    tadalafiL (CIALIS) 20 MG Tab Take 1 tablet (20 mg total) by mouth daily as needed (erectile dysfunction). 30 tablet 11    tamsulosin (FLOMAX) 0.4 mg Cap Take 2 capsules (0.8 mg total) by mouth once daily. 180 capsule 0    blood-glucose meter kit To check BG 3 times daily, to use with insurance preferred meter 1 each 0     No current facility-administered medications on file prior to visit.       Review of patient's allergies indicates:  No Known Allergies    Review of Systems:  A review of 10+ systems was  "conducted with pertinent positive and negative findings documented in HPI with all other systems reviewed and negative.    OBJECTIVE:     Estimated body mass index is 30.25 kg/m² as calculated from the following:    Height as of this encounter: 5' 8" (1.727 m).    Weight as of this encounter: 90.2 kg (198 lb 15.4 oz).    Vital Signs (Most Recent)  Vitals:    02/13/23 1415   BP: 114/78   Pulse: 93       Physical Exam:  GENERAL: patient sitting comfortably  HEENT: normocephalic  NECK: supple, no JVD  PULM: normal chest rise, no increased WOB  HEART: non-diaphoretic  ABDO: soft, nondistended, nontender  BACK: no CVA tenderness bilaterally  SKIN: warm, dry, well perfused  EXT: no bruising or edema  NEURO: grossly normal with no focal deficits  PSYCH: appropriate mood and affect    Genitourinary Exam:  deferred    Lab Results   Component Value Date    BUN 17 10/14/2022    CREATININE 0.9 10/14/2022    WBC 6.50 05/10/2022    HGB 13.2 (L) 05/10/2022    HCT 42.9 05/10/2022     05/10/2022    AST 18 10/14/2022    ALT 29 10/14/2022    ALKPHOS 88 10/14/2022    ALBUMIN 3.3 (L) 10/14/2022    HGBA1C 6.0 (H) 10/14/2022    INR 1.0 02/08/2022        Imaging:  I have personally reviewed all relevant imaging studies.    Results for orders placed or performed during the hospital encounter of 12/15/22 (from the past 2160 hour(s))   CT Lumbar Spine Without Contrast    Impression    1. Operative changes detailed above with stable loosening about the right L4 transpedicular fixation screw and unchanged positioning of the L4-L5 interbody spacer.  No detrimental change.  2. Multilevel spondylosis most notable at L5-S1 resulting in at least moderate bilateral foraminal narrowing.  3. Additional findings as above.    Electronically signed by resident: Gilberto Mcfarlane MD  Date:    12/15/2022  Time:    14:01    Electronically signed by: Niko Quigley Jr  Date:    12/15/2022  Time:    16:09     Results for orders placed or performed " during the hospital encounter of 12/15/22 (from the past 2160 hour(s))   MRI Thoracic Spine Without Contrast    Impression    1. Stable posterior central disc protrusion at T8-T9 abutting the ventral thecal sac.  No foraminal narrowing or spinal canal stenosis.  2. Additional findings as above.    Electronically signed by resident: Gilberto Mcfarlane MD  Date:    12/15/2022  Time:    13:25    Electronically signed by: Niko Quigley Jr  Date:    12/15/2022  Time:    14:04     [place] [facility]  See [facility] notes for report.       PSA:  Lab Results   Component Value Date    PSADIAG 0.79 11/03/2021    PSATOTAL 0.75 01/30/2020    PSAFREE 0.23 01/30/2020       Testosterone:  Lab Results   Component Value Date    TESTOSTERONE 273 02/22/2020    TESTOSTERONE 59.0 02/22/2020        ASSESSMENT     1. Benign prostatic hyperplasia with urinary retention        PLAN:     BPH with elevated PVR      - The patient's prostate has been measured using RBUS at an estimated volume of 70 cc.    - The risks, benefits, and technical details of HoLEP were discussed in detail today. We also discussed alternative treatment modalities for BPH with urinary obstruction.     - After reviewing the relative merits of alternative treatment modalities we specifically reviewed potential complications related HoLEP itself. These include bleeding and infection, ureteral or bladder injury, urethral stricture, bladder neck contracture, failure to improve voiding dysfunction, persistent urinary retention, stress urinary incontinence, erectile dysfunction, retrograde ejaculation, and need for any further intervention or management.    - After being advised of these risks and treatment options he has elected to undergo HoLEP. We will send a urine culture preoperatively and will treat appropriately based on the results. No guarantees as to the outcome of the treatment were made.      Cecil Murray MD  Urology  [facility]    Disclaimer: This note has been generated using voice-recognition software. There may be typographical errors that have been missed during proof-reading.

## 2023-02-13 NOTE — TELEPHONE ENCOUNTER
----- Message from Travis Rivas sent at 2/13/2023  8:29 AM CST -----  Contact: Wife  .Type:  Needs Medical Advice    Who Called: Wife  Would the patient rather a call back or a response via [facility]? call  Best Call Back Number: [phone]  Additional Information:   Caller would like to know if the pt will be unable to drive after todays appt.

## 2023-02-13 NOTE — PROCEDURES
Cystoscopy Procedure Note    Procedure:   Flexible cysto-uretheroscopy    Pre Procedure Diagnosis:  BPH and LUTS    Post Procedure Diagnosis:  BPH and LUTS    Surgeon: Cecil Murray MD     Anesthesia: 2% uro-jet lidocaine jelly for local analgesia    Procedure note:  A flexible cysto-urethroscopy was performed after consent was obtained.  The risks and benefits were explained. 2% lidocaine urojet was used for local analgesia. The genitalia was prepped and draped in the sterile fashion.     The flexible scope was advanced into the urethra and into the bladder. There There were no urethral strictures noted throughout the course of the urethra. during scope passage.     The bladder was completely surveyed in a systematic fashion. The bilateral ureteral orifices were identified in their normal orthotopic locations with efflux noted bilaterally. The remained of the bladder was evaluated. No bladder tumors or lesions suspicious for malignancy were seen.     Upon retroflexion there was moderate median lobe enlargement. As the flexible cystoscope was being withdrawn, the prostate was evaluated carefully. The lateral lobes of the prostate were moderately enlarged. A urine specimen was obtained from the bladder via aspiration through the cystoscope and sent for culture.    The patient tolerated the procedure well without complication.     Cecil Murray MD  Urology  [facility]

## 2023-02-15 LAB — BACTERIA UR CULT: NO GROWTH

## 2023-02-22 ENCOUNTER — CLINICAL SUPPORT (OUTPATIENT)
Dept: REHABILITATION | Facility: HOSPITAL | Age: 48
End: 2023-02-22
Payer: MEDICAID

## 2023-02-22 DIAGNOSIS — M43.17 SPONDYLOLISTHESIS AT L5-S1 LEVEL: ICD-10-CM

## 2023-02-22 DIAGNOSIS — G89.29 CHRONIC LOW BACK PAIN WITHOUT SCIATICA, UNSPECIFIED BACK PAIN LATERALITY: ICD-10-CM

## 2023-02-22 DIAGNOSIS — M54.50 CHRONIC LOW BACK PAIN WITHOUT SCIATICA, UNSPECIFIED BACK PAIN LATERALITY: ICD-10-CM

## 2023-02-22 DIAGNOSIS — M62.81 MUSCLE WEAKNESS: ICD-10-CM

## 2023-02-22 PROCEDURE — 97161 PT EVAL LOW COMPLEX 20 MIN: CPT | Mod: PN

## 2023-02-22 PROCEDURE — 97110 THERAPEUTIC EXERCISES: CPT | Mod: PN

## 2023-02-22 NOTE — PROGRESS NOTES
[facility]   Physical Therapy Initial Evaluation     Date: 2/22/2023   Name: Emeka GilbertLowery  [facility] Number: [id number]    Therapy Diagnosis:   Encounter Diagnoses   Name Primary?    Spondylolisthesis at L5-S1 level     Chronic low back pain without sciatica, unspecified back pain laterality     Muscle weakness      Physician: Nolberto Leone MD    Physician Orders: PT Eval and Treat   Medical Diagnosis from Referral: M43.17 (ICD-10-CM) - Spondylolisthesis at L5-S1 level  Evaluation Date: 2/22/2023  Authorization Period Expiration: 12/15/2023  Plan of Care Expiration: 4/19/2023  Progress Note Due: 3/22/2023  Visit # / Visits authorized: 1/ 1     Precautions: Standard     Time In: 500p  Time Out: 600p  Total Appointment Time (timed & untimed codes): 60 minutes      SUBJECTIVE     Pt presents to PT w/ reports of lumbar spine pain.  Has a Hx of L4-S1 spinal fusion performed in Feb of 2022.  Went through formal PT and was DC in Dec of 2022.  Since then he has started to have L sided back pain arise once again.  Describes pain as sharp, stabbing pain into the lumbar spine.  Based off imaging this could possibly be due to loosening of screw as according to pt.  He also reports new onset over the past month of burning/hot sensation along sonya ant thigh.  Even prior to surgery he had sonya foot numbness which is still present.  Will describe occasional/random cramping/stabbing into sonya feet, especially at night.  Has an enlarged prostate to which Dr's are monitoring and he is to have a procedure for this performed in March of 2023.  Describes difficulty with bowel/bladder function as he may only have a bowel movement 1x/wk.  Aggravating factors for back pain include walking/standing, rising from chair, turning in bed, sitting too long.  Easing factors include applying topical cream towards the back. Pt has had an antalgic gait for >2 yrs to which this did not improve following surgery or PT.   He has been unable to get good answers to this point for his gait and foot symptoms.  He works as a  to which he will have pain and difficulty throughout the day.  Would like to improve his walking and pain levels.     Imaging, CT scan films: lumbar spine: 12/22- 1. Operative changes detailed above with stable loosening about the right L4 transpedicular fixation screw and unchanged positioning of the L4-L5 interbody spacer.  No detrimental change.  2. Multilevel spondylosis most notable at L5-S1 resulting in at least moderate bilateral foraminal narrowing.  3. Additional findings as above.      Pain:  Current 5/10, worst 9/10, best 3/10     Patients goals: To be able to walk normal     Medical History:   Past Medical History:   Diagnosis Date    Arthritis     Diabetes mellitus, type 2 2/11/2022       Surgical History:   Emeka Caballero  has a past surgical history that includes Epidural steroid injection (N/A, 10/21/2020); Lumbar fusion (N/A, 2/8/2022); Cataract extraction (Right); and injection, sacroiliac joint (Bilateral, 12/27/2022).    Medications:   Emeka has a current medication list which includes the following prescription(s): alprostadiL 500 mcg/mL Soln 50 mcg, phentolamine 5 mg SolR 5 mg, papaverine 30 mg/mL Soln 30 mcg, atorvastatin, blood sugar diagnostic, blood-glucose meter, lancets, metformin, alprostadil, tadalafil, and tamsulosin.    Allergies:   Review of patient's allergies indicates:  No Known Allergies       OBJECTIVE     Observation: Gait: scissor gait pattern with lateral trunk sway during stance phase    Posture/Pelvic Positioning:  R pelvic upslip    Lumbar Range of Motion:    Limitations Pain   Flexion WNL   No pain        Extension WNL   Pain along lower lumbar spine        Left Side Bending To sup knee L sided pain        Right Side Bending To sup knee  L sided pain              Hip Range of Motion:  CHAPIS: Karson 3 fist width to table      Lower Extremity  Strength  Right LE  Left LE    Iliospoas (seated): 3+/5 Iliospoas (seated): 4/5   Glute Max:  4/5 Glute Max: 4/5   PGM: 4-/5 PGM: 3+/5     Sensation: dec sensation along R L2/3 dermatomes    Reflexes:  -Patellar (L3-L4): 3+  -Achilles (S1): 3+  - Biceps: 3+  - Brachioradialis: 3+  - Hoffmans: -    Special Tests:  Thigh thrust: -  Active SLR: +  SLR: -    Joint Mobility:   -Segmental mobility testing:Hypomobile L2 CPA    Palpation: Inc tone along L>R psoas, L QL, lumbar paraspinals      Limitation/Restriction for FOTO back Survey    Therapist reviewed FOTO scores for Emeka Caballero on 2/22/2023.   FOTO documents entered into [employer] - see Media section.    Limitation Score: 42%         TREATMENT     Total Treatment time (time-based codes) separate from Evaluation: 30 minutes      Emeka received the treatments listed below:      manual therapy techniques: Joint mobilizations and Soft tissue Mobilization were applied to the: lumbar spine for 15 minutes, including:  Prone L2 CPA gr3  Karson psoas release    therapeutic activities to improve functional performance for 15  minutes, including:  Pt education in findings, gait mechanics, and sleep positioning for symptom modulation      PATIENT EDUCATION AND HOME EXERCISES     Education provided:   - Pt educated in dx, prognosis, POC, and HEP to include activity and sleep modification to aide in symptom modulation.     Written Home Exercises Provided: yes. Exercises were reviewed and Emeka was able to demonstrate them prior to the end of the session.  Emeka demonstrated good  understanding of the education provided. See EMR under Patient Instructions for exercises provided during therapy sessions.    ASSESSMENT     Mr. Caballero is a 46 y/o M presenting to PT w/ reports of LBP. Objective examination revealed decreased lumbar AROM, hip AROM, hip strength, decreased sensation, inc repsonse to reflex testing, antalgic gait, and inc muscle tone consistent with  lumbar spine pain secondary to hip weakness and gait impairments.  Can not confidently rule out possible neurologic pathology.  His gait and reflex testing reveal possible upper motor neuron signs contributing to said symptoms.  Pt was educated in said findings and recommended if no change is seen within PT at this time he should go back to Dr for further imaging follow up.  He does not present with urgent signs at this time that makes me think he is not a good candidate for PT intervention.  Currently he is limited in his ability to walk, sit for prolonged periods, sleep, and complete job duties.  Pt would benefit from skilled PT to address above limitations to return to PLOF.     Patient prognosis is Good.   Patient will benefit from skilled outpatient Physical Therapy to address the deficits stated above and in the chart below, provide patient /family education, and to maximize patientt's level of independence.     Plan of care discussed with patient: Yes  Patient's spiritual, cultural and educational needs considered and patient is agreeable to the plan of care and goals as stated below:     Anticipated Barriers for therapy: none    Medical Necessity is demonstrated by the following  History  Co-morbidities and personal factors that may impact the plan of care Co-morbidities:   diabetes    Personal Factors:   no deficits     moderate   Examination  Body Structures and Functions, activity limitations and participation restrictions that may impact the plan of care Body Regions:   back    Body Systems:    ROM  strength  gross coordinated movement  gait  transfers  motor control    Participation Restrictions:   none    Activity limitations:   Learning and applying knowledge  no deficits    General Tasks and Commands  no deficits    Communication  no deficits    Mobility  lifting and carrying objects  walking    Self care  no deficits    Domestic Life  doing house work (cleaning house, washing dishes,  laundry)    Interactions/Relationships  no deficits    Life Areas  no deficits    Community and Social Life  no deficits         low   Clinical Presentation stable and uncomplicated low   Decision Making/ Complexity Score: low     Goals:  Short Term Goals:  Pt will be ind w/ HEP w/in 2 wks  Pt will report a dec of at least 2pts on 0-10 scale (per MCID) in back pain within 3 wks for symptom modulation  3.   Pt will report being able to sit for at least 1 hr w/o onset of lumbar spine pain within 3 wks.     Long Term Goals:  Pt will demonstrate an inc of at least 1 MMT grade in hip strength within 6 wks for improved gait mechanics  Pt will demonstrate improved gait mechanics by not crossing mid line within 6 wks   Pt will report being able to lift 20 pounds from the ground w/o onset of LBP > 2/10 for improved work tolerance within 6 wks.      PLAN   Plan of care Certification: 2/22/2023 to 4/19/2023.    Outpatient Physical Therapy 2 times weekly for 8 weeks to include the following interventions: Gait Training, Manual Therapy, Moist Heat/ Ice, Neuromuscular Re-ed, Patient Education, Self Care, Therapeutic Activities, and Therapeutic Exercise.     Massimo Arita, PT, DPT, OCS      I CERTIFY THE NEED FOR THESE SERVICES FURNISHED UNDER THIS PLAN OF TREATMENT AND WHILE UNDER MY CARE   Physician's comments:     Physician's Signature: ___________________________________________________

## 2023-02-23 NOTE — PLAN OF CARE
[facility]   Physical Therapy Initial Evaluation     Date: 2/22/2023   Name: Emeka GilbertLowery  [facility] Number: [id number]    Therapy Diagnosis:   Encounter Diagnoses   Name Primary?    Spondylolisthesis at L5-S1 level     Chronic low back pain without sciatica, unspecified back pain laterality     Muscle weakness      Physician: Nolberto Leone MD    Physician Orders: PT Eval and Treat   Medical Diagnosis from Referral: M43.17 (ICD-10-CM) - Spondylolisthesis at L5-S1 level  Evaluation Date: 2/22/2023  Authorization Period Expiration: 12/15/2023  Plan of Care Expiration: 4/19/2023  Progress Note Due: 3/22/2023  Visit # / Visits authorized: 1/ 1     Precautions: Standard     Time In: 500p  Time Out: 600p  Total Appointment Time (timed & untimed codes): 60 minutes      SUBJECTIVE     Pt presents to PT w/ reports of lumbar spine pain.  Has a Hx of L4-S1 spinal fusion performed in Feb of 2022.  Went through formal PT and was DC in Dec of 2022.  Since then he has started to have L sided back pain arise once again.  Describes pain as sharp, stabbing pain into the lumbar spine.  Based off imaging this could possibly be due to loosening of screw as according to pt.  He also reports new onset over the past month of burning/hot sensation along sonya ant thigh.  Even prior to surgery he had sonya foot numbness which is still present.  Will describe occasional/random cramping/stabbing into sonya feet, especially at night.  Has an enlarged prostate to which Dr's are monitoring and he is to have a procedure for this performed in March of 2023.  Describes difficulty with bowel/bladder function as he may only have a bowel movement 1x/wk.  Aggravating factors for back pain include walking/standing, rising from chair, turning in bed, sitting too long.  Easing factors include applying topical cream towards the back. Pt has had an antalgic gait for >2 yrs to which this did not improve following surgery or PT.   He has been unable to get good answers to this point for his gait and foot symptoms.  He works as a  to which he will have pain and difficulty throughout the day.  Would like to improve his walking and pain levels.     Imaging, CT scan films: lumbar spine: 12/22- 1. Operative changes detailed above with stable loosening about the right L4 transpedicular fixation screw and unchanged positioning of the L4-L5 interbody spacer.  No detrimental change.  2. Multilevel spondylosis most notable at L5-S1 resulting in at least moderate bilateral foraminal narrowing.  3. Additional findings as above.      Pain:  Current 5/10, worst 9/10, best 3/10     Patients goals: To be able to walk normal     Medical History:   Past Medical History:   Diagnosis Date    Arthritis     Diabetes mellitus, type 2 2/11/2022       Surgical History:   Emeka Caballero  has a past surgical history that includes Epidural steroid injection (N/A, 10/21/2020); Lumbar fusion (N/A, 2/8/2022); Cataract extraction (Right); and injection, sacroiliac joint (Bilateral, 12/27/2022).    Medications:   Emeka has a current medication list which includes the following prescription(s): alprostadiL 500 mcg/mL Soln 50 mcg, phentolamine 5 mg SolR 5 mg, papaverine 30 mg/mL Soln 30 mcg, atorvastatin, blood sugar diagnostic, blood-glucose meter, lancets, metformin, alprostadil, tadalafil, and tamsulosin.    Allergies:   Review of patient's allergies indicates:  No Known Allergies       OBJECTIVE     Observation: Gait: scissor gait pattern with lateral trunk sway during stance phase    Posture/Pelvic Positioning:  R pelvic upslip    Lumbar Range of Motion:    Limitations Pain   Flexion WNL   No pain        Extension WNL   Pain along lower lumbar spine        Left Side Bending To sup knee L sided pain        Right Side Bending To sup knee  L sided pain              Hip Range of Motion:  CHAPIS: Karson 3 fist width to table      Lower Extremity  Strength  Right LE  Left LE    Iliospoas (seated): 3+/5 Iliospoas (seated): 4/5   Glute Max:  4/5 Glute Max: 4/5   PGM: 4-/5 PGM: 3+/5     Sensation: dec sensation along R L2/3 dermatomes    Reflexes:  -Patellar (L3-L4): 3+  -Achilles (S1): 3+  - Biceps: 3+  - Brachioradialis: 3+  - Hoffmans: -    Special Tests:  Thigh thrust: -  Active SLR: +  SLR: -    Joint Mobility:   -Segmental mobility testing:Hypomobile L2 CPA    Palpation: Inc tone along L>R psoas, L QL, lumbar paraspinals      Limitation/Restriction for FOTO back Survey    Therapist reviewed FOTO scores for Emeka Caballero on 2/22/2023.   FOTO documents entered into [employer] - see Media section.    Limitation Score: 42%         TREATMENT     Total Treatment time (time-based codes) separate from Evaluation: 30 minutes      Emeka received the treatments listed below:      manual therapy techniques: Joint mobilizations and Soft tissue Mobilization were applied to the: lumbar spine for 15 minutes, including:  Prone L2 CPA gr3  Karson psoas release    therapeutic activities to improve functional performance for 15  minutes, including:  Pt education in findings, gait mechanics, and sleep positioning for symptom modulation      PATIENT EDUCATION AND HOME EXERCISES     Education provided:   - Pt educated in dx, prognosis, POC, and HEP to include activity and sleep modification to aide in symptom modulation.     Written Home Exercises Provided: yes. Exercises were reviewed and Emeka was able to demonstrate them prior to the end of the session.  Emeka demonstrated good  understanding of the education provided. See EMR under Patient Instructions for exercises provided during therapy sessions.    ASSESSMENT     Mr. Caballero is a 48 y/o M presenting to PT w/ reports of LBP. Objective examination revealed decreased lumbar AROM, hip AROM, hip strength, decreased sensation, inc repsonse to reflex testing, antalgic gait, and inc muscle tone consistent with  lumbar spine pain secondary to hip weakness and gait impairments.  Can not confidently rule out possible neurologic pathology.  His gait and reflex testing reveal possible upper motor neuron signs contributing to said symptoms.  Pt was educated in said findings and recommended if no change is seen within PT at this time he should go back to Dr for further imaging follow up.  He does not present with urgent signs at this time that makes me think he is not a good candidate for PT intervention.  Currently he is limited in his ability to walk, sit for prolonged periods, sleep, and complete job duties.  Pt would benefit from skilled PT to address above limitations to return to PLOF.     Patient prognosis is Good.   Patient will benefit from skilled outpatient Physical Therapy to address the deficits stated above and in the chart below, provide patient /family education, and to maximize patientt's level of independence.     Plan of care discussed with patient: Yes  Patient's spiritual, cultural and educational needs considered and patient is agreeable to the plan of care and goals as stated below:     Anticipated Barriers for therapy: none    Medical Necessity is demonstrated by the following  History  Co-morbidities and personal factors that may impact the plan of care Co-morbidities:   diabetes    Personal Factors:   no deficits     moderate   Examination  Body Structures and Functions, activity limitations and participation restrictions that may impact the plan of care Body Regions:   back    Body Systems:    ROM  strength  gross coordinated movement  gait  transfers  motor control    Participation Restrictions:   none    Activity limitations:   Learning and applying knowledge  no deficits    General Tasks and Commands  no deficits    Communication  no deficits    Mobility  lifting and carrying objects  walking    Self care  no deficits    Domestic Life  doing house work (cleaning house, washing dishes,  laundry)    Interactions/Relationships  no deficits    Life Areas  no deficits    Community and Social Life  no deficits         low   Clinical Presentation stable and uncomplicated low   Decision Making/ Complexity Score: low     Goals:  Short Term Goals:  Pt will be ind w/ HEP w/in 2 wks  Pt will report a dec of at least 2pts on 0-10 scale (per MCID) in back pain within 3 wks for symptom modulation  3.   Pt will report being able to sit for at least 1 hr w/o onset of lumbar spine pain within 3 wks.     Long Term Goals:  Pt will demonstrate an inc of at least 1 MMT grade in hip strength within 6 wks for improved gait mechanics  Pt will demonstrate improved gait mechanics by not crossing mid line within 6 wks   Pt will report being able to lift 20 pounds from the ground w/o onset of LBP > 2/10 for improved work tolerance within 6 wks.      PLAN   Plan of care Certification: 2/22/2023 to 4/19/2023.    Outpatient Physical Therapy 2 times weekly for 8 weeks to include the following interventions: Gait Training, Manual Therapy, Moist Heat/ Ice, Neuromuscular Re-ed, Patient Education, Self Care, Therapeutic Activities, and Therapeutic Exercise.     Massimo Arita, PT, DPT, OCS      I CERTIFY THE NEED FOR THESE SERVICES FURNISHED UNDER THIS PLAN OF TREATMENT AND WHILE UNDER MY CARE   Physician's comments:     Physician's Signature: ___________________________________________________

## 2023-03-02 LAB
BILIRUB SERPL-MCNC: NEGATIVE MG/DL
BLOOD URINE, POC: NEGATIVE
CLARITY, POC UA: CLEAR
COLOR, POC UA: YELLOW
GLUCOSE UR QL STRIP: NEGATIVE
KETONES UR QL STRIP: NEGATIVE
LEUKOCYTE ESTERASE URINE, POC: NEGATIVE
NITRITE, POC UA: NEGATIVE
PH, POC UA: 8
POC RESIDUAL URINE VOLUME: 177 ML (ref 0–100)
PROTEIN, POC: NEGATIVE
SPECIFIC GRAVITY, POC UA: NORMAL
UROBILINOGEN, POC UA: NEGATIVE

## 2023-03-03 ENCOUNTER — PATIENT MESSAGE (OUTPATIENT)
Dept: ADMINISTRATIVE | Facility: HOSPITAL | Age: 48
End: 2023-03-03
Payer: MEDICAID

## 2023-03-03 ENCOUNTER — PATIENT OUTREACH (OUTPATIENT)
Dept: ADMINISTRATIVE | Facility: HOSPITAL | Age: 48
End: 2023-03-03
Payer: MEDICAID

## 2023-03-09 ENCOUNTER — CLINICAL SUPPORT (OUTPATIENT)
Dept: REHABILITATION | Facility: HOSPITAL | Age: 48
End: 2023-03-09
Payer: MEDICAID

## 2023-03-09 DIAGNOSIS — M54.50 CHRONIC LOW BACK PAIN WITHOUT SCIATICA, UNSPECIFIED BACK PAIN LATERALITY: ICD-10-CM

## 2023-03-09 DIAGNOSIS — M62.81 MUSCLE WEAKNESS: Primary | ICD-10-CM

## 2023-03-09 DIAGNOSIS — G89.29 CHRONIC MIDLINE LOW BACK PAIN WITHOUT SCIATICA: ICD-10-CM

## 2023-03-09 DIAGNOSIS — M54.50 CHRONIC MIDLINE LOW BACK PAIN WITHOUT SCIATICA: ICD-10-CM

## 2023-03-09 DIAGNOSIS — G89.29 CHRONIC LOW BACK PAIN WITHOUT SCIATICA, UNSPECIFIED BACK PAIN LATERALITY: ICD-10-CM

## 2023-03-09 PROCEDURE — 97110 THERAPEUTIC EXERCISES: CPT | Mod: PN

## 2023-03-09 NOTE — PROGRESS NOTES
[facility]   Physical Therapy Treatment Note     Name: Emeka [facility] Number: [id number]    Therapy Diagnosis:   Encounter Diagnoses   Name Primary?    Muscle weakness Yes    Chronic midline low back pain without sciatica     Chronic low back pain without sciatica, unspecified back pain laterality      Physician: Nolberto Leone MD    Visit Date: 3/9/2023    Physician: Nolberto Leone MD     Physician Orders: PT Eval and Treat   Medical Diagnosis from Referral: M43.17 (ICD-10-CM) - Spondylolisthesis at L5-S1 level  Evaluation Date: 2/22/2023  Authorization Period Expiration: 12/15/2023  Plan of Care Expiration: 4/19/2023  Progress Note Due: 3/22/2023  Visit # / Visits authorized: 1/ 1      Precautions: Standard        Time In: 8:15 am  Time Out: 9:18am  Total Billable Time: 53  minutes      SUBJECTIVE     Pt reports: that he has lower back pain. Pain is 5/10. He still doing HEP. He states he has increase pain after work . Pt states he has poor balance during walking.   He was compliant with home exercise program.  Response to previous treatment: no change   Functional change: none    Pain: 5/10  Location:  lower back      OBJECTIVE     Objective Measures updated at progress report unless specified.       TREATMENT     Emeka received the treatments listed below:      received therapeutic exercises to develop strength and ROM for 35  minutes including:    Nustep 6 min   LTR 2 min   Single knee to chest 3x30 sec   Bridges  2x10  HL hip abd YTB 3x10  HL hip add ball squeeze 3x10   Open book 2x10   Seated lumbar flexion with yoga ball 2 min   Seated trunk extension 2 min       received the following manual therapy techniques: Soft tissue Mobilization were applied to the: lumbar  for 10 minutes, including:    Prone L2 CPA gr3  Karson psoas release    neuromuscular re-education activities to improve: Balance, Coordination, Kinesthetic, Sense, Proprioception, and Posture for 8  minutes. The following activities were included:    TrA activation 3x10 hold 5 sec  SLR + TrA activation 3x10  NBOS on ground EO 3x30 sec  Tandem stance on ground EO 3x30 sec     therapeutic activities to improve functional performance for 00  minutes, including:      PATIENT EDUCATION AND HOME EXERCISES     Education provided:   - Pt educated in dx, prognosis, POC, and HEP to include activity and sleep modification to aide in symptom modulation.      Written Home Exercises Provided: yes. Exercises were reviewed and Emeka was able to demonstrate them prior to the end of the session.  Emeka demonstrated good  understanding of the education provided. See EMR under Patient Instructions for exercises provided during therapy sessions.    ASSESSMENT     Pt presented to there first f/u with lower back pain. Lumbar stability exercises performed to improve TrA activation and coordination during static and dynamic mobility. Balance exercises performed to improve B LE proprioception. Lumbar mobility and and B LE strengthening performed paolo improve functional activities at work and community. Pt required mod v/c's to perform exercises with proper from and proper muscles activation.  Manual therapy performed to decrease pain. Plan to cont skilled PT services to return to work pain free.     Emeka Is progressing well towards his goals.   Pt prognosis is Good.     Pt will continue to benefit from skilled outpatient physical therapy to address the deficits listed in the problem list box on initial evaluation, provide pt/family education and to maximize pt's level of independence in the home and community environment.     Pt's spiritual, cultural and educational needs considered and pt agreeable to plan of care and goals.     Anticipated barriers to physical therapy: none    Goals:  Short Term Goals:  Pt will be ind w/ HEP w/in 2 wks  Pt will report a dec of at least 2pts on 0-10 scale (per MCID) in back pain within 3  wks for symptom modulation  3.   Pt will report being able to sit for at least 1 hr w/o onset of lumbar spine pain within 3 wks.      Long Term Goals:  Pt will demonstrate an inc of at least 1 MMT grade in hip strength within 6 wks for improved gait mechanics  Pt will demonstrate improved gait mechanics by not crossing mid line within 6 wks   Pt will report being able to lift 20 pounds from the ground w/o onset of LBP > 2/10 for improved work tolerance within 6 wks.      PLAN     Plan to cont strengthening exercises and manual therapy.     Adin Woods, PT

## 2023-03-14 ENCOUNTER — ANESTHESIA (OUTPATIENT)
Dept: SURGERY | Facility: HOSPITAL | Age: 48
End: 2023-03-14
Payer: MEDICAID

## 2023-03-14 ENCOUNTER — PATIENT MESSAGE (OUTPATIENT)
Dept: SURGERY | Facility: HOSPITAL | Age: 48
End: 2023-03-14

## 2023-03-14 ENCOUNTER — TELEPHONE (OUTPATIENT)
Dept: UROLOGY | Facility: CLINIC | Age: 48
End: 2023-03-14
Payer: MEDICAID

## 2023-03-14 ENCOUNTER — HOSPITAL ENCOUNTER (OUTPATIENT)
Facility: HOSPITAL | Age: 48
Discharge: HOME | End: 2023-03-14
Attending: UROLOGY | Admitting: UROLOGY
Payer: MEDICAID

## 2023-03-14 ENCOUNTER — ANESTHESIA EVENT (OUTPATIENT)
Dept: SURGERY | Facility: HOSPITAL | Age: 48
End: 2023-03-14
Payer: MEDICAID

## 2023-03-14 DIAGNOSIS — N40.1 BENIGN PROSTATIC HYPERPLASIA WITH URINARY RETENTION: ICD-10-CM

## 2023-03-14 DIAGNOSIS — R33.8 BENIGN PROSTATIC HYPERPLASIA WITH URINARY RETENTION: Primary | ICD-10-CM

## 2023-03-14 DIAGNOSIS — N40.1 BENIGN PROSTATIC HYPERPLASIA WITH URINARY RETENTION: Primary | ICD-10-CM

## 2023-03-14 DIAGNOSIS — R33.8 BENIGN PROSTATIC HYPERPLASIA WITH URINARY RETENTION: ICD-10-CM

## 2023-03-14 LAB
ESTIMATED AVG GLUCOSE: 151 MG/DL (ref 68–131)
HBA1C MFR BLD: 6.9 % (ref 4–5.6)
POCT GLUCOSE: 105 MG/DL (ref 70–110)
POCT GLUCOSE: 135 MG/DL (ref 70–110)

## 2023-03-14 PROCEDURE — 63600175 PHARM REV CODE 636 W HCPCS: Performed by: NURSE ANESTHETIST, CERTIFIED REGISTERED

## 2023-03-14 PROCEDURE — 25000003 PHARM REV CODE 250: Performed by: UROLOGY

## 2023-03-14 PROCEDURE — 25000003 PHARM REV CODE 250: Performed by: NURSE ANESTHETIST, CERTIFIED REGISTERED

## 2023-03-14 PROCEDURE — 63600175 PHARM REV CODE 636 W HCPCS: Performed by: UROLOGY

## 2023-03-14 PROCEDURE — 00914 ANES TRURL PX RESCJ PRST8: CPT | Performed by: UROLOGY

## 2023-03-14 PROCEDURE — 27201423 OPTIME MED/SURG SUP & DEVICES STERILE SUPPLY: Performed by: UROLOGY

## 2023-03-14 PROCEDURE — 88305 TISSUE EXAM BY PATHOLOGIST: CPT | Performed by: PATHOLOGY

## 2023-03-14 PROCEDURE — 88305 TISSUE EXAM BY PATHOLOGIST: ICD-10-PCS | Mod: 26,,, | Performed by: PATHOLOGY

## 2023-03-14 PROCEDURE — C1758 CATHETER, URETERAL: HCPCS | Performed by: UROLOGY

## 2023-03-14 PROCEDURE — 52649 PROSTATE LASER ENUCLEATION: CPT | Mod: ,,, | Performed by: UROLOGY

## 2023-03-14 PROCEDURE — D9220A PRA ANESTHESIA: ICD-10-PCS | Mod: CRNA,,, | Performed by: NURSE ANESTHETIST, CERTIFIED REGISTERED

## 2023-03-14 PROCEDURE — 63600175 PHARM REV CODE 636 W HCPCS: Performed by: ANESTHESIOLOGY

## 2023-03-14 PROCEDURE — 71000015 HC POSTOP RECOV 1ST HR: Performed by: UROLOGY

## 2023-03-14 PROCEDURE — 36415 COLL VENOUS BLD VENIPUNCTURE: CPT | Performed by: UROLOGY

## 2023-03-14 PROCEDURE — D9220A PRA ANESTHESIA: Mod: ANES,,, | Performed by: ANESTHESIOLOGY

## 2023-03-14 PROCEDURE — 52649 PR LASER ENUCLEATION PROSTATE W MORCELLATION: ICD-10-PCS | Mod: ,,, | Performed by: UROLOGY

## 2023-03-14 PROCEDURE — 71000033 HC RECOVERY, INTIAL HOUR: Performed by: UROLOGY

## 2023-03-14 PROCEDURE — 36000707: Performed by: UROLOGY

## 2023-03-14 PROCEDURE — D9220A PRA ANESTHESIA: Mod: CRNA,,, | Performed by: NURSE ANESTHETIST, CERTIFIED REGISTERED

## 2023-03-14 PROCEDURE — 83036 HEMOGLOBIN GLYCOSYLATED A1C: CPT | Performed by: UROLOGY

## 2023-03-14 PROCEDURE — 37000009 HC ANESTHESIA EA ADD 15 MINS: Performed by: UROLOGY

## 2023-03-14 PROCEDURE — 37000008 HC ANESTHESIA 1ST 15 MINUTES: Performed by: UROLOGY

## 2023-03-14 PROCEDURE — D9220A PRA ANESTHESIA: ICD-10-PCS | Mod: ANES,,, | Performed by: ANESTHESIOLOGY

## 2023-03-14 PROCEDURE — 36000706: Performed by: UROLOGY

## 2023-03-14 PROCEDURE — 71000016 HC POSTOP RECOV ADDL HR: Performed by: UROLOGY

## 2023-03-14 PROCEDURE — C1782 MORCELLATOR: HCPCS | Performed by: UROLOGY

## 2023-03-14 PROCEDURE — 71000039 HC RECOVERY, EACH ADD'L HOUR: Performed by: UROLOGY

## 2023-03-14 PROCEDURE — 88305 TISSUE EXAM BY PATHOLOGIST: CPT | Mod: 26,,, | Performed by: PATHOLOGY

## 2023-03-14 RX ORDER — KETOROLAC TROMETHAMINE 30 MG/ML
INJECTION, SOLUTION INTRAMUSCULAR; INTRAVENOUS
Status: DISCONTINUED | OUTPATIENT
Start: 2023-03-14 | End: 2023-03-14

## 2023-03-14 RX ORDER — ONDANSETRON 2 MG/ML
4 INJECTION INTRAMUSCULAR; INTRAVENOUS EVERY 12 HOURS PRN
Status: DISCONTINUED | OUTPATIENT
Start: 2023-03-14 | End: 2023-03-14 | Stop reason: HOSPADM

## 2023-03-14 RX ORDER — PHENAZOPYRIDINE HYDROCHLORIDE 200 MG/1
200 TABLET, FILM COATED ORAL 3 TIMES DAILY PRN
Qty: 9 TABLET | Refills: 0 | Status: SHIPPED | OUTPATIENT
Start: 2023-03-14 | End: 2023-03-17

## 2023-03-14 RX ORDER — ONDANSETRON 2 MG/ML
4 INJECTION INTRAMUSCULAR; INTRAVENOUS ONCE AS NEEDED
Status: DISCONTINUED | OUTPATIENT
Start: 2023-03-14 | End: 2023-03-14 | Stop reason: HOSPADM

## 2023-03-14 RX ORDER — ATORVASTATIN CALCIUM 40 MG/1
40 TABLET, FILM COATED ORAL DAILY
Status: DISCONTINUED | OUTPATIENT
Start: 2023-03-14 | End: 2023-03-14 | Stop reason: HOSPADM

## 2023-03-14 RX ORDER — SODIUM CHLORIDE, SODIUM LACTATE, POTASSIUM CHLORIDE, CALCIUM CHLORIDE 600; 310; 30; 20 MG/100ML; MG/100ML; MG/100ML; MG/100ML
INJECTION, SOLUTION INTRAVENOUS CONTINUOUS
Status: DISCONTINUED | OUTPATIENT
Start: 2023-03-14 | End: 2023-03-14 | Stop reason: HOSPADM

## 2023-03-14 RX ORDER — LIDOCAINE HYDROCHLORIDE 20 MG/ML
INJECTION INTRAVENOUS
Status: DISCONTINUED | OUTPATIENT
Start: 2023-03-14 | End: 2023-03-14

## 2023-03-14 RX ORDER — HYDROCODONE BITARTRATE AND ACETAMINOPHEN 5; 325 MG/1; MG/1
1 TABLET ORAL EVERY 4 HOURS PRN
Status: DISCONTINUED | OUTPATIENT
Start: 2023-03-14 | End: 2023-03-14 | Stop reason: HOSPADM

## 2023-03-14 RX ORDER — TALC
6 POWDER (GRAM) TOPICAL NIGHTLY PRN
Status: DISCONTINUED | OUTPATIENT
Start: 2023-03-14 | End: 2023-03-14 | Stop reason: HOSPADM

## 2023-03-14 RX ORDER — OXYCODONE AND ACETAMINOPHEN 5; 325 MG/1; MG/1
1 TABLET ORAL EVERY 4 HOURS PRN
Qty: 12 TABLET | Refills: 0 | Status: SHIPPED | OUTPATIENT
Start: 2023-03-14

## 2023-03-14 RX ORDER — PROMETHAZINE HYDROCHLORIDE 25 MG/1
25 SUPPOSITORY RECTAL EVERY 6 HOURS PRN
Status: DISCONTINUED | OUTPATIENT
Start: 2023-03-14 | End: 2023-03-14 | Stop reason: HOSPADM

## 2023-03-14 RX ORDER — ROCURONIUM BROMIDE 10 MG/ML
INJECTION, SOLUTION INTRAVENOUS
Status: DISCONTINUED | OUTPATIENT
Start: 2023-03-14 | End: 2023-03-14

## 2023-03-14 RX ORDER — PHENAZOPYRIDINE HYDROCHLORIDE 100 MG/1
200 TABLET, FILM COATED ORAL
Status: DISCONTINUED | OUTPATIENT
Start: 2023-03-14 | End: 2023-03-14 | Stop reason: HOSPADM

## 2023-03-14 RX ORDER — PROPOFOL 10 MG/ML
VIAL (ML) INTRAVENOUS
Status: DISCONTINUED | OUTPATIENT
Start: 2023-03-14 | End: 2023-03-14

## 2023-03-14 RX ORDER — PHENYLEPHRINE HYDROCHLORIDE 10 MG/ML
INJECTION INTRAVENOUS
Status: DISCONTINUED | OUTPATIENT
Start: 2023-03-14 | End: 2023-03-14

## 2023-03-14 RX ORDER — CEFAZOLIN SODIUM 1 G/3ML
INJECTION, POWDER, FOR SOLUTION INTRAMUSCULAR; INTRAVENOUS
Status: DISCONTINUED | OUTPATIENT
Start: 2023-03-14 | End: 2023-03-14

## 2023-03-14 RX ORDER — DEXTROSE 40 %
15 GEL (GRAM) ORAL
Status: DISCONTINUED | OUTPATIENT
Start: 2023-03-14 | End: 2023-03-14 | Stop reason: HOSPADM

## 2023-03-14 RX ORDER — FENTANYL CITRATE 50 UG/ML
INJECTION, SOLUTION INTRAMUSCULAR; INTRAVENOUS
Status: DISCONTINUED | OUTPATIENT
Start: 2023-03-14 | End: 2023-03-14

## 2023-03-14 RX ORDER — LABETALOL HYDROCHLORIDE 5 MG/ML
10 INJECTION, SOLUTION INTRAVENOUS EVERY 4 HOURS PRN
Status: DISCONTINUED | OUTPATIENT
Start: 2023-03-14 | End: 2023-03-14 | Stop reason: HOSPADM

## 2023-03-14 RX ORDER — HYDROMORPHONE HYDROCHLORIDE 2 MG/ML
0.2 INJECTION, SOLUTION INTRAMUSCULAR; INTRAVENOUS; SUBCUTANEOUS EVERY 5 MIN PRN
Status: DISCONTINUED | OUTPATIENT
Start: 2023-03-14 | End: 2023-03-14 | Stop reason: HOSPADM

## 2023-03-14 RX ORDER — ACETAMINOPHEN 325 MG/1
650 TABLET ORAL EVERY 4 HOURS PRN
Status: DISCONTINUED | OUTPATIENT
Start: 2023-03-14 | End: 2023-03-14 | Stop reason: HOSPADM

## 2023-03-14 RX ORDER — MIDAZOLAM HYDROCHLORIDE 1 MG/ML
INJECTION, SOLUTION INTRAMUSCULAR; INTRAVENOUS
Status: DISCONTINUED | OUTPATIENT
Start: 2023-03-14 | End: 2023-03-14

## 2023-03-14 RX ORDER — MEPERIDINE HYDROCHLORIDE 50 MG/ML
12.5 INJECTION INTRAMUSCULAR; INTRAVENOUS; SUBCUTANEOUS ONCE AS NEEDED
Status: DISCONTINUED | OUTPATIENT
Start: 2023-03-14 | End: 2023-03-14 | Stop reason: HOSPADM

## 2023-03-14 RX ORDER — CEFAZOLIN SODIUM 2 G/50ML
2 SOLUTION INTRAVENOUS
Status: DISCONTINUED | OUTPATIENT
Start: 2023-03-14 | End: 2023-03-14 | Stop reason: HOSPADM

## 2023-03-14 RX ORDER — INSULIN ASPART 100 [IU]/ML
0-5 INJECTION, SOLUTION INTRAVENOUS; SUBCUTANEOUS
Status: DISCONTINUED | OUTPATIENT
Start: 2023-03-14 | End: 2023-03-14 | Stop reason: HOSPADM

## 2023-03-14 RX ORDER — HYDROMORPHONE HYDROCHLORIDE 2 MG/ML
1 INJECTION, SOLUTION INTRAMUSCULAR; INTRAVENOUS; SUBCUTANEOUS EVERY 4 HOURS PRN
Status: DISCONTINUED | OUTPATIENT
Start: 2023-03-14 | End: 2023-03-14 | Stop reason: HOSPADM

## 2023-03-14 RX ORDER — CEFAZOLIN SODIUM 2 G/50ML
2 SOLUTION INTRAVENOUS
Status: DISCONTINUED | OUTPATIENT
Start: 2023-03-14 | End: 2023-03-14

## 2023-03-14 RX ORDER — OXYBUTYNIN CHLORIDE 5 MG/1
5 TABLET ORAL 3 TIMES DAILY PRN
Status: DISCONTINUED | OUTPATIENT
Start: 2023-03-14 | End: 2023-03-14 | Stop reason: HOSPADM

## 2023-03-14 RX ORDER — HYDRALAZINE HYDROCHLORIDE 20 MG/ML
10 INJECTION INTRAMUSCULAR; INTRAVENOUS EVERY 6 HOURS PRN
Status: DISCONTINUED | OUTPATIENT
Start: 2023-03-14 | End: 2023-03-14 | Stop reason: HOSPADM

## 2023-03-14 RX ORDER — DEXTROSE 40 %
30 GEL (GRAM) ORAL
Status: DISCONTINUED | OUTPATIENT
Start: 2023-03-14 | End: 2023-03-14 | Stop reason: HOSPADM

## 2023-03-14 RX ORDER — SUCCINYLCHOLINE CHLORIDE 20 MG/ML
INJECTION INTRAMUSCULAR; INTRAVENOUS
Status: DISCONTINUED | OUTPATIENT
Start: 2023-03-14 | End: 2023-03-14

## 2023-03-14 RX ORDER — GLUCAGON 1 MG
1 KIT INJECTION
Status: DISCONTINUED | OUTPATIENT
Start: 2023-03-14 | End: 2023-03-14 | Stop reason: HOSPADM

## 2023-03-14 RX ORDER — SODIUM CHLORIDE 0.9 % (FLUSH) 0.9 %
3 SYRINGE (ML) INJECTION
Status: DISCONTINUED | OUTPATIENT
Start: 2023-03-14 | End: 2023-03-14 | Stop reason: HOSPADM

## 2023-03-14 RX ORDER — ATROPA BELLADONNA AND OPIUM 16.2; 3 MG/1; MG/1
30 SUPPOSITORY RECTAL DAILY PRN
Status: DISCONTINUED | OUTPATIENT
Start: 2023-03-14 | End: 2023-03-14 | Stop reason: HOSPADM

## 2023-03-14 RX ADMIN — PROPOFOL 200 MG: 10 INJECTION, EMULSION INTRAVENOUS at 08:03

## 2023-03-14 RX ADMIN — SODIUM CHLORIDE, SODIUM LACTATE, POTASSIUM CHLORIDE, AND CALCIUM CHLORIDE: .6; .31; .03; .02 INJECTION, SOLUTION INTRAVENOUS at 08:03

## 2023-03-14 RX ADMIN — HYDROMORPHONE HYDROCHLORIDE 0.2 MG: 2 INJECTION INTRAMUSCULAR; INTRAVENOUS; SUBCUTANEOUS at 10:03

## 2023-03-14 RX ADMIN — FENTANYL CITRATE 100 MCG: 50 INJECTION, SOLUTION INTRAMUSCULAR; INTRAVENOUS at 08:03

## 2023-03-14 RX ADMIN — CEFAZOLIN 2 G: 330 INJECTION, POWDER, FOR SOLUTION INTRAMUSCULAR; INTRAVENOUS at 08:03

## 2023-03-14 RX ADMIN — SUCCINYLCHOLINE CHLORIDE 160 MG: 20 INJECTION, SOLUTION INTRAMUSCULAR; INTRAVENOUS at 08:03

## 2023-03-14 RX ADMIN — ROCURONIUM BROMIDE 20 MG: 10 INJECTION, SOLUTION INTRAVENOUS at 09:03

## 2023-03-14 RX ADMIN — SODIUM CHLORIDE, SODIUM LACTATE, POTASSIUM CHLORIDE, AND CALCIUM CHLORIDE: .6; .31; .03; .02 INJECTION, SOLUTION INTRAVENOUS at 09:03

## 2023-03-14 RX ADMIN — SUGAMMADEX 200 MG: 100 INJECTION, SOLUTION INTRAVENOUS at 09:03

## 2023-03-14 RX ADMIN — ESMOLOL HYDROCHLORIDE 20 MG: 100 INJECTION, SOLUTION INTRAVENOUS at 09:03

## 2023-03-14 RX ADMIN — MIDAZOLAM 2 MG: 1 INJECTION INTRAMUSCULAR; INTRAVENOUS at 08:03

## 2023-03-14 RX ADMIN — PHENYLEPHRINE HYDROCHLORIDE 100 MCG: 10 INJECTION INTRAVENOUS at 09:03

## 2023-03-14 RX ADMIN — ESMOLOL HYDROCHLORIDE 30 MG: 100 INJECTION, SOLUTION INTRAVENOUS at 08:03

## 2023-03-14 RX ADMIN — ONDANSETRON 8 MG: 2 INJECTION, SOLUTION INTRAMUSCULAR; INTRAVENOUS at 09:03

## 2023-03-14 RX ADMIN — KETOROLAC TROMETHAMINE 30 MG: 30 INJECTION, SOLUTION INTRAMUSCULAR; INTRAVENOUS at 09:03

## 2023-03-14 RX ADMIN — HYDROCODONE BITARTRATE AND ACETAMINOPHEN 1 TABLET: 5; 325 TABLET ORAL at 10:03

## 2023-03-14 RX ADMIN — ROCURONIUM BROMIDE 25 MG: 10 INJECTION, SOLUTION INTRAVENOUS at 08:03

## 2023-03-14 RX ADMIN — LIDOCAINE HYDROCHLORIDE 50 MG: 20 INJECTION, SOLUTION INTRAVENOUS at 08:03

## 2023-03-14 RX ADMIN — ROCURONIUM BROMIDE 5 MG: 10 INJECTION, SOLUTION INTRAVENOUS at 08:03

## 2023-03-14 NOTE — TELEPHONE ENCOUNTER
"I called back and spoke to pt's SO Alana. I told her that the pyridium was called in to the [facility]. She states that she picked up the pyridium but she was under the impression that pt would be prescribed "actual pain medicine". I told her that pyridium is the only thing called in at the moment but I would check with the provider. She states pt is in pain.   "

## 2023-03-14 NOTE — INTERVAL H&P NOTE
The patient has been examined and the H&P has been reviewed:    I concur with the findings and no changes have occurred since H&P was written.    Surgery risks, benefits and alternative options discussed and understood by patient/family.      Cecil Murray MD  Urology  [facility] [place]      There are no hospital problems to display for this patient.

## 2023-03-14 NOTE — PLAN OF CARE
Dr. Murray at bedside, assessed pt and urine output.  States he will discharge pt home today.  CBI discontinued per Dr. Murray and irrigation port of 3 way cath capped off.  Urine remains blood tinged and is draining well.

## 2023-03-14 NOTE — ANESTHESIA PROCEDURE NOTES
Intubation    Date/Time: 3/14/2023 8:49 AM  Performed by: Sukhjinder Simpson CRNA  Authorized by: Costa Marte Jr., MD     Intubation:     Induction:  Intravenous    Intubated:  Postinduction    Mask Ventilation:  Easy mask    Attempts:  1    Attempted By:  CRNA    Method of Intubation:  Video laryngoscopy    Blade:  Kim 3    Laryngeal View Grade: Grade I - full view of cords      Difficult Airway Encountered?: No      Complications:  None    Airway Device:  Oral endotracheal tube    Airway Device Size:  7.5    Style/Cuff Inflation:  Cuffed (inflated to minimal occlusive pressure)    Tube secured:  23    Secured at:  The teeth    Placement Verified By:  Capnometry    Complicating Factors:  None    Findings Post-Intubation:  BS equal bilateral and atraumatic/condition of teeth unchanged

## 2023-03-14 NOTE — OP NOTE
[facility]   HoLEP Operative Note    Date: 03/14/2023    Pre-Op Diagnosis: BPH with bladder outlet obstruction    Post-Op Diagnosis: BPH with bladder outlet obstruction    Procedure(s) Performed:   Holmium laser enucleation of the prostate.    Specimen(s):   Prostate chips sent to pathology for analysis    Staff Surgeon: Cecil Murray MD    Assistant Surgeon: None    Anesthesia: General    Estimated Blood Loss: Not measurable    Drains: A 22-Bhutanese 3-way Noonan catheter in the bladder to continuous irrigation and gravity drainage.     Indications: Emeka Caballero is a 47 y.o. male with a long standing history of prostatic obstruction and has undergone outpatient testing confirming obstructive voiding dysfunction with a prostate volume of 71 grams. He was counseled in the outpatient setting about HoLEP and its relative advantages over traditional transurethral resection of the prostate. We specifically discussed the risks of surgery, including transient or permanent stress urinary incontinence, refractory storage or obstructive urinary complaints requiring further therapy, urethral stricture disease, bladder neck contracture and retrograde ejaculation. The patient signed a surgical consent to proceed.    Operative Findings: Trilobar hyperplasia of the prostate with significant intracystic protrusion. The bladder itself was moderately trabeculated, without diverticula or cellules. The right and left ureteral orifices were orthotopic with clear efflux. The anterior urethra was within normal limits. There were no concerning intracystic lesions.     Procedure in detail:  The patient was brought to the operating room. General anesthesia was induced. The patient was placed in the relaxed dorsal lithotomy position. All pressure points were carefully padded, and the genitals and perineum were prepped and draped in the standard sterile fashion. The 22-Bhutanese rigid cystoscope utilizing a 30-degree lens  was introduced per urethra, and systematic cystoscopy at this time revealed the above findings. The bladder was then drained, and the scope was removed. This was exchanged for the 26-Malaysian rigid resectoscope sheath utilizing a visual obturator and 30-degree lens. The obturator was then exchanged for the Ralph laser working element and a 550 nanometer Holmium laser fiber were passed.     At settings of 2 Joules and 50 Hz, a transverse incision was made just above the level of the verumontanum down to the level of the surgical capsule. This allowed for retrograde enucleation of the median lobe tissue. This dissection was carried back up to the level of the bladder neck. Once this was completed, we decided to perform an en-bloc enucleation. The transverse incision above the verumontanum was then hockey-sticked around the apex of the left adenoma and, using a combination of blunt dissection, as well as laser application, the left adenoma was enucleated in a counterclockwise fashion, moving from the apex of the prostate back up to the level of the bladder base. This dissection was carried anteriorly past the 12 o'clock position.     Once the left sided adenoma was enucleated, we performed an identical procedure on the right side. The transverse incision above the verumontanum was then hockey-sticked around the apex of the right adenoma and, using a combination of blunt dissection, as well as laser application, the right adenoma was enucleated in a counterclockwise fashion, moving from the apex of the prostate back up to the level of the bladder base.     We then identified the apical mucosal attachments at the 12 o'clock position. Laser power was lowered to 1.5 Joules and 30 Hz, and the mucosal bridge was taken down in a caudad direction firing away from the sphincteric complex. The dissection was carried along the capsule anterior to the adenoma. After the mucosal tissue was taken and the anterior plane was developed,  we moved back to the base and joined our left, right, and anterior incisions at the 12 o'clock position at the base of the bladder. The bladder neck was then entered at the 12 o'clock position. The remainder of the adenoma was freed from the capsule in a circumferentially. This allowed for the entire adenoma, which was take en-bloc, to be pushed back into the bladder lumen, taken off the remaining bladder neck attachments and it was observed to be free floating.     Hemostasis was then obtained throughout the surgical capsule by defocusing the laser fiber. The bladder then was carefully inspected, and the right and left ureteral orifices were distant from any laser effect. The Ralph laser working element, as well as laser fiber and inner resectoscope sheath, were then removed and exchanged for the offset nephroscope utilizing an adapter bridge. Dual inflow irrigation was then utilized, and the morcellator was passed through the offset nephroscope. Under dual inflow irrigation, the adenomatous tissue was engaged, morcellated and removed in its entirety. The bladder was then carefully inspected. There was no evidence of morcellator injury or laser effect on the urothelium. The scope was withdrawn into the prostatic fossa, from which minimal venous bleeding was noted. The scope was then removed, and a 22-Saudi Arabian 3-way Noonan catheter was placed to gravity drainage and continuous irrigation.     The patient was then awakened, extubated and transferred in a stable condition to the recovery room. There were no noted intraoperative complications. All counts were correct for this case. The patient will be discharged home on the day of the procedure.      Disposition: The patient will remain on the urology service overnight for observation and CBI will be titrated.    Cecil Murray MD  Urology  [facility] [place]    Disclaimer: This note has been generated using voice-recognition software. There may be  typographical errors that have been missed during proof-reading.

## 2023-03-14 NOTE — ANESTHESIA POSTPROCEDURE EVALUATION
Anesthesia Post Evaluation    Patient: Emeka Caballero    Procedure(s) Performed: Procedure(s) (LRB):  ENUCLEATION, PROSTATE, USING LASER (N/A)    Final Anesthesia Type: general      Patient location during evaluation: PACU  Patient participation: Yes- Able to Participate  Level of consciousness: awake and alert  Post-procedure vital signs: reviewed and stable  Pain management: adequate  Airway patency: patent    PONV status at discharge: No PONV  Anesthetic complications: no      Cardiovascular status: stable  Respiratory status: spontaneous ventilation  Hydration status: euvolemic  Follow-up not needed.          Vitals Value Taken Time   /81 03/14/23 1245   Temp 36.6 °C (97.9 °F) 03/14/23 1200   Pulse 63 03/14/23 1245   Resp 17 03/14/23 1245   SpO2 97 % 03/14/23 1245         No case tracking events are documented in the log.      Pain/Mercy Score: Pain Rating Prior to Med Admin: 5 (3/14/2023 10:28 AM)  Mercy Score: 10 (3/14/2023 12:12 PM)

## 2023-03-14 NOTE — ANESTHESIA PREPROCEDURE EVALUATION
03/14/2023  Emeka Caballero is a 47 y.o., male.      Pre-op Assessment    I have reviewed the Patient Summary Reports.     I have reviewed the Nursing Notes.    I have reviewed the Medications.     Review of Systems  Anesthesia Hx:  Denies Family Hx of Anesthesia complications.   Denies Personal Hx of Anesthesia complications.        Patient Active Problem List   Diagnosis    Viral syndrome    Lumbar radiculopathy    Spinal stenosis of lumbar region    Lumbar spondylosis    DDD (degenerative disc disease), lumbar    Spondylolisthesis of lumbosacral region    Fever    Constipation    BPH (benign prostatic hyperplasia)    HLD (hyperlipidemia)    Diabetes mellitus, type 2    Prostatitis    Chronic midline low back pain without sciatica    Abnormal gait    Decreased strength    Decreased range of motion    Muscle weakness    Low back pain       Past Medical History:   Diagnosis Date    Arthritis     Diabetes mellitus, type 2 2/11/2022       ECHO: No results found for this or any previous visit.      Body mass index is 33.09 kg/m².    Tobacco Use: Medium Risk    Smoking Tobacco Use: Former    Smokeless Tobacco Use: Never    Passive Exposure: Not on file       Social History     Substance and Sexual Activity   Drug Use No        Alcohol Use: Not on file       Review of patient's allergies indicates:  No Known Allergies      Airway:  No value filed.     Physical Exam    Airway:  Mallampati: II   Mouth Opening: Normal  TM Distance: Normal          Anesthesia Plan  Type of Anesthesia, risks & benefits discussed:    Anesthesia Type: Gen ETT  Intra-op Monitoring Plan: Standard ASA Monitors  Post Op Pain Control Plan: multimodal analgesia  Induction:  IV  Airway Plan: Video  Informed Consent: Informed consent signed with the Patient and all parties understand the risks and agree with  anesthesia plan.  All questions answered.   ASA Score: 2  Day of Surgery Review of History & Physical: H&P Update referred to the surgeon/provider.    Ready For Surgery From Anesthesia Perspective.     .

## 2023-03-14 NOTE — PLAN OF CARE
All instruction reinforced with wife, leg bag placed and educated on device, dr morfin called for more pain med than pyridium, he stated he would call it in, patient aware, patient discharged in WC

## 2023-03-14 NOTE — TELEPHONE ENCOUNTER
----- Message from Nahum Hoyos sent at 3/14/2023  4:33 PM CDT -----  .Type:  Needs Medical Advice    Who Called: pt    Would the patient rather a call back or a response via [facility]? Call back  Best Call Back Number:   Additional Information:     Pt was supposed to get pain medicine sent to the pharmacy and nothing as been sent yet and pt is in pain please call pt back once medication is sent to the pharmacy

## 2023-03-14 NOTE — PLAN OF CARE
Instructions given for discharge including irrigation technique if needed with claimed understanding

## 2023-03-14 NOTE — PATIENT INSTRUCTIONS
[facility] Discharge Instructions:    -Urinary urgency and possible incontinence are an anticipated part of the recovery, some patients opt to wear a protective pad to guard against accidents    -Consider avoiding known bladder irritants while your urinary tract is recovering for the next 4-6 weeks:     Coffee - Regular & Decaf  Tea - caffeinated  Carbonated beverages - cola, non-wilmer, diet & caffeine-free  Alcohols - Beer, Red Wine, White Wine, Champagne  Fruits - Grapefruit, Lemon, Orange, Pineapple  Fruit Juices - Cranberry, Grapefruit, Orange, Pineapple  Vegetables - Tomato & Tomato Products  Flavor Enhancers - Hot peppers, Spicy foods, Chili, Horseradish, Vinegar, Monosodium glutamate (MSG)  Artificial Sweeteners - NutraSweet, Sweet 'N Low, Equal (sweetener), Saccharin    After the procedure  You will have a catheter in your bladder after the procedure. If your urine color is  light and there is not much blood, the catheter may be removed before you leave the  hospital the same day, or will be removed the next day. If you need to go home with the catheter, it will be removed in the clinic.    Care at home after surgery  What to expect:  Expect to see blood in your urine for 1 to 2 weeks. This may last longer if you take  anticoagulant medication.  Some men may notice these symptoms after surgery:  ? Burning when you urinate  ? A small amount of pain or discomfort  ? Urine leakage (incontinence)    Activity  It is important to limit your activity at first to allow for healing.  ? First week - Rest and do as little activity as possible. Do not lift anything heavier than  10 pounds for 1 week.  ? Week 2 - Limit your activity, including exercise, to half of what you normally do.  ? Week 3 - You can return to your regular activities as you are able.  Limit strenuous activities such as biking, horseback riding and motorcycle riding for  1 month.    Retrograde ejaculation  Retrograde ejaculation means that ejaculate  (semen) does not come out of the penis  during orgasm. You will have retrograde ejaculation after your surgery. Your erections  and orgasms will feel the same. Some men say the experience of the orgasm is somehow changed without the ejaculate. Unfortunately, this is an irreversible effect of HoLEP.  After the prostate has been enucleated or hollowed out, there is not enough pressure to make the semen come out.    Caring for your bladder  You may have incontinence after your surgery. This is because the muscles that support  your bladder and stop the flow of urine are weak. You also may have an overactive  bladder. Incontinence does not usually last. It can improve if you do pelvic floor muscle  exercises. These tips can also help:  ? Wear an absorbent brief or pad to stay dry.  ? Stay hydrated. It is easy to think that if you drink less water, you will have less  urine leakage. This can actually make it worse because dehydration can irritate the  bladder.  ? Try to drink at least 2 liters (or 8 full 8-ounce glasses) of fluid a day unless your  urologist tells you otherwise. Fluids will also help to flush blood from your urine.    Pelvic floor exercises  Pelvic floor muscles run from the pubic bone to the tailbone. They support your bladder  and rectum. These muscles help with bowel control, bladder control and sexual function.If you have BPH, the muscles of your pelvic floor do not have to work as hard to prevent urine leakage. If you do not use these muscles, they will get weaker. This also may force your bladder muscle to overwork. After HoLEP surgery, your pelvic floor muscles can be too weak to support your overworking bladder. Pelvic floor exercises can help with urinary control and function. Kegel exercises are simple squeeze-and-relax exercises that can strengthen your pelvic floor muscles. Many men find that routinely doing their pelvic floor exercises helps with incontinence. Improving the function of the  pelvic floor may also help with sexual function.    To practice your Kegel exercises, use one of these cues:  ? Kegel's are an exercise trying to simulate the feeling of trying to stop your urine stream by squeezing your pelvic floor muscles.  ? When sitting in a chair, squeeze your muscles to lift your scrotum in toward your  belly button and off the chair.  ? Squeeze your pelvic floor muscles as if you are trying to stop from passing gas.  Once you have learned how to tighten your pelvic floor muscles, you are ready to start  doing pelvic floor exercises.    To start, lie on your back with your legs supported and relaxed. Squeeze and relax the  muscles of your pelvic floor. Once you are comfortable doing the exercises lying down,  you are ready to start doing them while sitting or standing.  When doing the exercises, remember these tips:  ? Do not hold your breath.  ? Try not to squeeze the muscles of your buttocks, thighs and abdomen.  ? Relax completely between contractions.    Apply the rule of 5s to your pelvic floor exercise routine:  ? Perform a set of 5 Kegel exercises 5 times a day.  ? Hold each contraction for 5 seconds.    The quality of your exercises is more important than the number of sets you do. Try to think about your pelvic floor muscles as you are moving them. It could take time for you to see a difference in your bladder control. Begin your Kegel exercises as soon as possible after surgery to strengthen your pelvic floor properly and get back your bladder control.    When to call your urologist  Call your urologist if you have any of these symptoms:  ? A temperature of more than 101 degrees F  ? Nausea and vomiting  ? Inability to urinate    Discharge Medications:  Pyridium: 200mg TID as needed  This medicine numbs the lining of the urinary tract and should help with burning with urination  It may turn your urine orange    You may be discharged with a Noonan catheter. If you are, keep it  connected to the leg back and contact the urologist on call if it stops draining or you start passing large clots. You will return to clinic to have it removed. You will be contacted with this appointment date and time.

## 2023-03-14 NOTE — TRANSFER OF CARE
"Anesthesia Transfer of Care Note    Patient: Emeka Caballero    Procedure(s) Performed: Procedure(s) (LRB):  ENUCLEATION, PROSTATE, USING LASER (N/A)    Patient location: PACU    Anesthesia Type: general    Transport from OR: Transported from OR on 6-10 L/min O2 by face mask with adequate spontaneous ventilation    Post pain: adequate analgesia    Post assessment: no apparent anesthetic complications and tolerated procedure well    Post vital signs: stable    Level of consciousness: awake and alert    Nausea/Vomiting: no nausea/vomiting    Complications: none    Transfer of care protocol was followed      Last vitals:   Visit Vitals  /83 (BP Location: Right arm, Patient Position: Sitting)   Pulse 96   Temp 37.1 °C (98.8 °F) (Temporal)   Resp 18   Ht 5' 6" (1.676 m)   Wt 93 kg (205 lb)   BMI 33.09 kg/m²     "

## 2023-03-15 ENCOUNTER — CLINICAL SUPPORT (OUTPATIENT)
Dept: UROLOGY | Facility: CLINIC | Age: 48
End: 2023-03-15
Payer: MEDICAID

## 2023-03-15 VITALS
HEART RATE: 89 BPM | WEIGHT: 205.69 LBS | DIASTOLIC BLOOD PRESSURE: 79 MMHG | SYSTOLIC BLOOD PRESSURE: 144 MMHG | HEIGHT: 66 IN | BODY MASS INDEX: 33.06 KG/M2

## 2023-03-15 PROCEDURE — 99999 PR PBB SHADOW E&M-EST. PATIENT-LVL II: ICD-10-PCS | Mod: PBBFAC,,,

## 2023-03-15 PROCEDURE — 99212 OFFICE O/P EST SF 10 MIN: CPT | Mod: PBBFAC,PO

## 2023-03-15 PROCEDURE — 99999 PR PBB SHADOW E&M-EST. PATIENT-LVL II: CPT | Mod: PBBFAC,,,

## 2023-03-15 NOTE — PROGRESS NOTES
VOIDING TRIAL    INPUT 150 ML    Sensation to urinate, feeling of discomfort.  Balloon deflated 40 cc.  Noonan removed    OUTPUT 8 ML    Encouraged to increase hydration with water.  Showed patient/significant other the amount he voided by drawing up with 10 cc syringe.  Contact office with update.

## 2023-03-15 NOTE — DISCHARGE SUMMARY
[facility]  Discharge Note  Short Stay    Admit Date: 3/14/2023    Discharge Date and Time: 03/15/2023 8:39 AM      Attending Physician: No att. providers found     Discharge Provider: Cecil Murray MD    Diagnoses:  There are no hospital problems to display for this patient.      Discharged Condition: stable    Procedure(s) (LRB):  ENUCLEATION, PROSTATE, USING LASER (N/A)     Hospital Course: Patient was admitted for the above procedure and tolerated the procedure well with no complications. He was discharged home in good condition on the same day.       Final Diagnoses: Same as principal problem.    Disposition: Home or Self Care    Follow up/Patient Instructions:    Medications:  Reconciled Home Medications:   Discharge Medication List as of 3/14/2023 12:11 PM        START taking these medications    Details   phenazopyridine (PYRIDIUM) 200 MG tablet Take 1 tablet (200 mg total) by mouth 3 (three) times daily as needed for Pain., Starting Tue 3/14/2023, Until Fri 3/17/2023 at 2359, Normal           CONTINUE these medications which have NOT CHANGED    Details   alprostadiL 500 mcg/mL Soln 50 mcg, phentolamine 5 mg SolR 5 mg, papaverine 30 mg/mL Soln 30 mcg BRING TO PHYSICIAN'S OFFICE FOR TEST DOSE, Historical Med      atorvastatin (LIPITOR) 40 MG tablet Take 1 tablet (40 mg total) by mouth once daily., Starting Thu 10/20/2022, Normal      blood sugar diagnostic Strp To check BG 3 times daily, to use with insurance preferred meter, Normal      blood-glucose meter kit To check BG 3 times daily, to use with insurance preferred meter, Normal      lancets Misc To check BG 3 times daily, to use with insurance preferred meter, Normal      metFORMIN (GLUCOPHAGE-XR) 500 MG ER 24hr tablet Take 2 tablets (1,000 mg total) by mouth 2 (two) times daily with meals., Starting Thu 10/20/2022, Normal      pep injection Bring the medication to appointment     For compounding pharmacy use:   Add PAPAVERINE 30 mcg  Add  PHENTOLAMINE 10 mg  Add ALPROSTADIL 100 mcg, Print      tadalafiL (CIALIS) 20 MG Tab Take 1 tablet (20 mg total) by mouth daily as needed (erectile dysfunction)., Starting Thu 5/19/2022, Until Fri 5/19/2023 at 2359, Normal           STOP taking these medications       tamsulosin (FLOMAX) 0.4 mg Cap Comments:   Reason for Stopping:             Discharge Procedure Orders   Diet Adult Regular     No driving until:   Order Comments: No longer taking narcotic pain medications     No dressing needed     Notify your health care provider if you experience any of the following:  temperature >100.4     Notify your health care provider if you experience any of the following:  persistent nausea and vomiting or diarrhea     Notify your health care provider if you experience any of the following:  severe uncontrolled pain     Activity as tolerated      Follow-up Information       Cecil Murray MD Follow up in 1 day(s).    Specialty: Urology  Contact information:  [address]  [address]  [place] [id number]  [phone]                             Time spent on Discharge: 15 minutes

## 2023-03-16 VITALS
RESPIRATION RATE: 18 BRPM | SYSTOLIC BLOOD PRESSURE: 138 MMHG | HEART RATE: 61 BPM | WEIGHT: 205 LBS | BODY MASS INDEX: 32.95 KG/M2 | OXYGEN SATURATION: 98 % | HEIGHT: 66 IN | TEMPERATURE: 98 F | DIASTOLIC BLOOD PRESSURE: 79 MMHG

## 2023-03-17 LAB
FINAL PATHOLOGIC DIAGNOSIS: NORMAL
GROSS: NORMAL
Lab: NORMAL

## 2023-03-20 NOTE — PROGRESS NOTES
[facility]   Physical Therapy Treatment Note     [id number]    Therapy Diagnosis:   No diagnosis found.    Physician: Nolberto Leone MD    Visit Date: 3/21/2023    Physician: Nolberto Leone MD     Physician Orders: PT Eval and Treat   Medical Diagnosis from Referral: M43.17 (ICD-10-CM) - Spondylolisthesis at L5-S1 level  Evaluation Date: 2/22/2023  Authorization Period Expiration: 12/15/2023  Plan of Care Expiration: 4/19/2023  Progress Note Due: 3/22/2023  Visit # / Visits authorized: 1/ 1      Precautions: Standard        Time In: 8:15 am  Time Out: 9:18am  Total Billable Time: 53  minutes      SUBJECTIVE     Pt reports: that he has lower back pain. Pain is 5/10. He still doing HEP. He states he has increase pain after work . Pt states he has poor balance during walking.   He was compliant with home exercise program.  Response to previous treatment: no change   Functional change: none    Pain: 5/10  Location:  lower back      OBJECTIVE     Objective Measures updated at progress report unless specified.       TREATMENT     Emeka received the treatments listed below:      received therapeutic exercises to develop strength and ROM for 35  minutes including:    Nustep 6 min   LTR 2 min   Single knee to chest 3x30 sec   Bridges  2x10  HL hip abd YTB 3x10  HL hip add ball squeeze 3x10   Open book 2x10   Seated lumbar flexion with yoga ball 2 min   Seated trunk extension 2 min       received the following manual therapy techniques: Soft tissue Mobilization were applied to the: lumbar  for 10 minutes, including:    Prone L2 CPA gr3  Karson psoas release    neuromuscular re-education activities to improve: Balance, Coordination, Kinesthetic, Sense, Proprioception, and Posture for 8 minutes. The following activities were included:    TrA activation 3x10 hold 5 sec  SLR + TrA activation 3x10  NBOS on ground EO 3x30 sec  Tandem stance on ground EO 3x30  sec     therapeutic activities to improve functional performance for 00  minutes, including:      PATIENT EDUCATION AND HOME EXERCISES     Education provided:   - Pt educated in dx, prognosis, POC, and HEP to include activity and sleep modification to aide in symptom modulation.      Written Home Exercises Provided: yes. Exercises were reviewed and Emeka was able to demonstrate them prior to the end of the session.  Emeka demonstrated good  understanding of the education provided. See EMR under Patient Instructions for exercises provided during therapy sessions.    ASSESSMENT     Pt presented to there first f/u with lower back pain. Lumbar stability exercises performed to improve TrA activation and coordination during static and dynamic mobility. Balance exercises performed to improve B LE proprioception. Lumbar mobility and and B LE strengthening performed paolo improve functional activities at work and community. Pt required mod v/c's to perform exercises with proper from and proper muscles activation.  Manual therapy performed to decrease pain. Plan to cont skilled PT services to return to work pain free.     Emeka Is progressing well towards his goals.   Pt prognosis is Good.     Pt will continue to benefit from skilled outpatient physical therapy to address the deficits listed in the problem list box on initial evaluation, provide pt/family education and to maximize pt's level of independence in the home and community environment.     Pt's spiritual, cultural and educational needs considered and pt agreeable to plan of care and goals.     Anticipated barriers to physical therapy: none    Goals:  Short Term Goals:  Pt will be ind w/ HEP w/in 2 wks  Pt will report a dec of at least 2pts on 0-10 scale (per MCID) in back pain within 3 wks for symptom modulation  3.   Pt will report being able to sit for at least 1 hr w/o onset of lumbar spine pain within 3 wks.      Long Term Goals:  Pt will demonstrate  an inc of at least 1 MMT grade in hip strength within 6 wks for improved gait mechanics  Pt will demonstrate improved gait mechanics by not crossing mid line within 6 wks   Pt will report being able to lift 20 pounds from the ground w/o onset of LBP > 2/10 for improved work tolerance within 6 wks.      PLAN     Plan to cont strengthening exercises and manual therapy.     Adin Woods, PT

## 2023-03-21 ENCOUNTER — CLINICAL SUPPORT (OUTPATIENT)
Dept: REHABILITATION | Facility: HOSPITAL | Age: 48
End: 2023-03-21
Payer: MEDICAID

## 2023-04-10 ENCOUNTER — OFFICE VISIT (OUTPATIENT)
Dept: UROLOGY | Facility: CLINIC | Age: 48
End: 2023-04-10
Payer: MEDICAID

## 2023-04-10 VITALS
DIASTOLIC BLOOD PRESSURE: 78 MMHG | BODY MASS INDEX: 32.34 KG/M2 | HEART RATE: 93 BPM | WEIGHT: 201.25 LBS | HEIGHT: 66 IN | SYSTOLIC BLOOD PRESSURE: 126 MMHG

## 2023-04-10 DIAGNOSIS — N13.8 BPH WITH OBSTRUCTION/LOWER URINARY TRACT SYMPTOMS: Primary | ICD-10-CM

## 2023-04-10 DIAGNOSIS — N40.1 BPH WITH OBSTRUCTION/LOWER URINARY TRACT SYMPTOMS: Primary | ICD-10-CM

## 2023-04-10 DIAGNOSIS — R35.1 NOCTURIA: ICD-10-CM

## 2023-04-10 PROCEDURE — 99213 OFFICE O/P EST LOW 20 MIN: CPT | Mod: PBBFAC,PO | Performed by: UROLOGY

## 2023-04-10 PROCEDURE — 3008F BODY MASS INDEX DOCD: CPT | Mod: CPTII,,, | Performed by: UROLOGY

## 2023-04-10 PROCEDURE — 3074F SYST BP LT 130 MM HG: CPT | Mod: CPTII,,, | Performed by: UROLOGY

## 2023-04-10 PROCEDURE — 3044F HG A1C LEVEL LT 7.0%: CPT | Mod: CPTII,,, | Performed by: UROLOGY

## 2023-04-10 PROCEDURE — 3078F PR MOST RECENT DIASTOLIC BLOOD PRESSURE < 80 MM HG: ICD-10-PCS | Mod: CPTII,,, | Performed by: UROLOGY

## 2023-04-10 PROCEDURE — 99024 PR POST-OP FOLLOW-UP VISIT: ICD-10-PCS | Mod: ,,, | Performed by: UROLOGY

## 2023-04-10 PROCEDURE — 3074F PR MOST RECENT SYSTOLIC BLOOD PRESSURE < 130 MM HG: ICD-10-PCS | Mod: CPTII,,, | Performed by: UROLOGY

## 2023-04-10 PROCEDURE — 99024 POSTOP FOLLOW-UP VISIT: CPT | Mod: ,,, | Performed by: UROLOGY

## 2023-04-10 PROCEDURE — 99999 PR PBB SHADOW E&M-EST. PATIENT-LVL III: ICD-10-PCS | Mod: PBBFAC,,, | Performed by: UROLOGY

## 2023-04-10 PROCEDURE — 3008F PR BODY MASS INDEX (BMI) DOCUMENTED: ICD-10-PCS | Mod: CPTII,,, | Performed by: UROLOGY

## 2023-04-10 PROCEDURE — 1159F PR MEDICATION LIST DOCUMENTED IN MEDICAL RECORD: ICD-10-PCS | Mod: CPTII,,, | Performed by: UROLOGY

## 2023-04-10 PROCEDURE — 1159F MED LIST DOCD IN RCRD: CPT | Mod: CPTII,,, | Performed by: UROLOGY

## 2023-04-10 PROCEDURE — 3072F PR LOW RISK FOR RETINOPATHY: ICD-10-PCS | Mod: CPTII,,, | Performed by: UROLOGY

## 2023-04-10 PROCEDURE — 99999 PR PBB SHADOW E&M-EST. PATIENT-LVL III: CPT | Mod: PBBFAC,,, | Performed by: UROLOGY

## 2023-04-10 PROCEDURE — 3078F DIAST BP <80 MM HG: CPT | Mod: CPTII,,, | Performed by: UROLOGY

## 2023-04-10 PROCEDURE — 3072F LOW RISK FOR RETINOPATHY: CPT | Mod: CPTII,,, | Performed by: UROLOGY

## 2023-04-10 PROCEDURE — 3044F PR MOST RECENT HEMOGLOBIN A1C LEVEL <7.0%: ICD-10-PCS | Mod: CPTII,,, | Performed by: UROLOGY

## 2023-04-10 RX ORDER — OXYBUTYNIN CHLORIDE 10 MG/1
10 TABLET, EXTENDED RELEASE ORAL DAILY
Qty: 30 TABLET | Refills: 11 | Status: SHIPPED | OUTPATIENT
Start: 2023-04-10 | End: 2024-04-09

## 2023-04-10 NOTE — PROGRESS NOTES
[place] - Urology   Clinic Note    SUBJECTIVE:     Chief Complaint   Patient presents with    Benign Prostatic Hypertrophy       Referral from: No ref. provider found.    History of Present Illness:  Emeka Caballero is a 47 y.o. male who presents to clinic for BPH.    Pt established with Dr. Page, here today for voiding dysfunction and difficulty emptying.  He reports he has a weak stream and is unable to completely empty his bladder.  A bladder scan shows a postvoid residual of 170 cc.  Patient states that he does not get his bladder close to being empty.  This has been bothering him for some time.  Of note he did have a lumbar surgery about 1 year ago but says that this problem was persistent before then.  Had a renal bladder ultrasound which revealed a prostate of approximately 70 cc.  He desires intervention at this time.    02/13/2023  Here today for cysto which revealed trilobar hypertrophy with a moderate intravesical median lobe.    04/10/2023  Here for follow-up.  Patient reports he is voiding well.  Does have occasional stress incontinence but reports that this is of minimal bother and has just a couple of dribbles when he exerts himself.  Reports his stream is very strong.  He does report persistent nocturia 8 times per night.  Most recent blood sugars do show poor diabetes control.    Final Pathologic Diagnosis 1. 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        Patient endorses no additional complaints at this time.    Past Medical History:   Diagnosis Date    Arthritis     Diabetes mellitus, type 2 2/11/2022       Past Surgical History:   Procedure Laterality Date    CATARACT EXTRACTION Right     EPIDURAL STEROID INJECTION N/A 10/21/2020    Procedure: Injection, Steroid, Epidural Caudal;  Surgeon: Vipul Nixon Jr., MD;  Location: [facility];  Service: Pain Management;  Laterality: N/A;  Caudal KURT  Arrive @ 1315; No ATC or DM; Needs MD Signature     INJECTION, SACROILIAC JOINT Bilateral 12/27/2022    Procedure: INJECTION,SACROILIAC JOINT, BILATERAL CONTRAST DIRECT REF  ORDERED;  Surgeon: Brielle José MD;  Location: [facility];  Service: Pain Management;  Laterality: Bilateral;    LASER ENUCLEATION OF PROSTATE N/A 3/14/2023    Procedure: ENUCLEATION, PROSTATE, USING LASER;  Surgeon: Cecil Murray MD;  Location: [facility] OR;  Service: Urology;  Laterality: N/A;    LUMBAR FUSION N/A 2/8/2022    Procedure: FUSION, SPINE, LUMBAR;  Surgeon: Alphonse Reed MD;  [address];  Service: Neurosurgery;  Laterality: N/A;  AIRO, L4-S1       Family History   Problem Relation Age of Onset    Cataracts Mother     No Known Problems Father     No Known Problems Sister     No Known Problems Brother     No Known Problems Maternal Aunt     No Known Problems Maternal Uncle     No Known Problems Paternal Aunt     No Known Problems Paternal Uncle     No Known Problems Maternal Grandmother     No Known Problems Maternal Grandfather     No Known Problems Paternal Grandmother     No Known Problems Paternal Grandfather        Social History     Tobacco Use    Smoking status: Former    Smokeless tobacco: Never   Substance Use Topics    Alcohol use: Yes     Comment: socially    Drug use: No       Current Outpatient Medications on File Prior to Visit   Medication Sig Dispense Refill    alprostadiL 500 mcg/mL Soln 50 mcg, phentolamine 5 mg SolR 5 mg, papaverine 30 mg/mL Soln 30 mcg BRING TO PHYSICIAN'S OFFICE FOR TEST DOSE      atorvastatin (LIPITOR) 40 MG tablet Take 1 tablet (40 mg total) by mouth once daily. 90 tablet 3    blood sugar diagnostic Strp To check BG 3 times daily, to use with insurance preferred meter 100 each 11    lancets Misc To check BG 3 times daily, to use with insurance preferred meter 100 each 11    metFORMIN (GLUCOPHAGE-XR) 500 MG ER 24hr tablet Take 2 tablets (1,000 mg total) by mouth 2 (two) times daily with meals. 360 tablet 3     "oxyCODONE-acetaminophen (PERCOCET) 5-325 mg per tablet Take 1 tablet by mouth every 4 (four) hours as needed for Pain. 12 tablet 0    pep injection Bring the medication to appointment     For compounding pharmacy use:   Add PAPAVERINE 30 mcg  Add PHENTOLAMINE 10 mg  Add ALPROSTADIL 100 mcg 1 vial 3    tadalafiL (CIALIS) 20 MG Tab Take 1 tablet (20 mg total) by mouth daily as needed (erectile dysfunction). 30 tablet 11    blood-glucose meter kit To check BG 3 times daily, to use with insurance preferred meter 1 each 0     No current facility-administered medications on file prior to visit.       Review of patient's allergies indicates:  No Known Allergies    Review of Systems:  A review of 10+ systems was conducted with pertinent positive and negative findings documented in HPI with all other systems reviewed and negative.    OBJECTIVE:     Estimated body mass index is 32.49 kg/m² as calculated from the following:    Height as of this encounter: 5' 6" (1.676 m).    Weight as of this encounter: 91.3 kg (201 lb 4.5 oz).    Vital Signs (Most Recent)  Vitals:    04/10/23 1307   BP: 126/78   Pulse: 93       Physical Exam:  GENERAL: patient sitting comfortably  HEENT: normocephalic  NECK: supple, no JVD  PULM: normal chest rise, no increased WOB  HEART: non-diaphoretic  ABDO: soft, nondistended, nontender  BACK: no CVA tenderness bilaterally  SKIN: warm, dry, well perfused  EXT: no bruising or edema  NEURO: grossly normal with no focal deficits  PSYCH: appropriate mood and affect    Genitourinary Exam:  deferred    Lab Results   Component Value Date    BUN 17 10/14/2022    CREATININE 0.9 10/14/2022    WBC 6.50 05/10/2022    HGB 13.2 (L) 05/10/2022    HCT 42.9 05/10/2022     05/10/2022    AST 18 10/14/2022    ALT 29 10/14/2022    ALKPHOS 88 10/14/2022    ALBUMIN 3.3 (L) 10/14/2022    HGBA1C 6.9 (H) 03/14/2023    INR 1.0 02/08/2022        Imaging:  I have personally reviewed all relevant imaging studies.    No results " found for this or any previous visit (from the past 2160 hour(s)).    No results found for this or any previous visit (from the past 2160 hour(s)).    [place] [facility]  See [facility] notes for report.       PSA:  Lab Results   Component Value Date    PSADIAG 0.79 11/03/2021    PSATOTAL 0.75 01/30/2020    PSAFREE 0.23 01/30/2020       Testosterone:  Lab Results   Component Value Date    TESTOSTERONE 273 02/22/2020    TESTOSTERONE 59.0 02/22/2020        ASSESSMENT     1. BPH with obstruction/lower urinary tract symptoms    2. Nocturia        PLAN:     BPH with elevated PVR      - Final Pathology: BPH.    - Continue Kegals 200x/day.     - Follow up in 3-4 months.    2. Nocturia    - Will initiate Oxybutynin 10mg XR    Cecil Murray MD  Urology  [facility]    Disclaimer: This note has been generated using voice-recognition software. There may be typographical errors that have been missed during proof-reading.

## 2023-04-12 ENCOUNTER — PATIENT MESSAGE (OUTPATIENT)
Dept: ADMINISTRATIVE | Facility: HOSPITAL | Age: 48
End: 2023-04-12
Payer: MEDICAID

## 2023-04-21 ENCOUNTER — PATIENT MESSAGE (OUTPATIENT)
Dept: RESEARCH | Facility: HOSPITAL | Age: 48
End: 2023-04-21
Payer: MEDICAID

## 2023-04-29 ENCOUNTER — LAB VISIT (OUTPATIENT)
Dept: LAB | Facility: HOSPITAL | Age: 48
End: 2023-04-29
Attending: FAMILY MEDICINE
Payer: MEDICAID

## 2023-04-29 DIAGNOSIS — E11.9 DIABETES MELLITUS WITHOUT COMPLICATION: ICD-10-CM

## 2023-04-29 DIAGNOSIS — E78.2 MIXED DYSLIPIDEMIA: ICD-10-CM

## 2023-04-29 LAB
ALBUMIN SERPL BCP-MCNC: 3.6 G/DL (ref 3.5–5.2)
ALP SERPL-CCNC: 93 U/L (ref 55–135)
ALT SERPL W/O P-5'-P-CCNC: 29 U/L (ref 10–44)
ANION GAP SERPL CALC-SCNC: 7 MMOL/L (ref 8–16)
AST SERPL-CCNC: 18 U/L (ref 10–40)
BASOPHILS # BLD AUTO: 0.04 K/UL (ref 0–0.2)
BASOPHILS NFR BLD: 0.7 % (ref 0–1.9)
BILIRUB SERPL-MCNC: 0.4 MG/DL (ref 0.1–1)
BUN SERPL-MCNC: 12 MG/DL (ref 6–20)
CALCIUM SERPL-MCNC: 9 MG/DL (ref 8.7–10.5)
CHLORIDE SERPL-SCNC: 112 MMOL/L (ref 95–110)
CHOLEST SERPL-MCNC: 124 MG/DL (ref 120–199)
CHOLEST/HDLC SERPL: 4.3 {RATIO} (ref 2–5)
CO2 SERPL-SCNC: 25 MMOL/L (ref 23–29)
CREAT SERPL-MCNC: 1 MG/DL (ref 0.5–1.4)
DIFFERENTIAL METHOD: ABNORMAL
EOSINOPHIL # BLD AUTO: 0.2 K/UL (ref 0–0.5)
EOSINOPHIL NFR BLD: 3 % (ref 0–8)
ERYTHROCYTE [DISTWIDTH] IN BLOOD BY AUTOMATED COUNT: 14.8 % (ref 11.5–14.5)
EST. GFR  (NO RACE VARIABLE): >60 ML/MIN/1.73 M^2
ESTIMATED AVG GLUCOSE: 143 MG/DL (ref 68–131)
GLUCOSE SERPL-MCNC: 124 MG/DL (ref 70–110)
HBA1C MFR BLD: 6.6 % (ref 4–5.6)
HCT VFR BLD AUTO: 41.5 % (ref 40–54)
HDLC SERPL-MCNC: 29 MG/DL (ref 40–75)
HDLC SERPL: 23.4 % (ref 20–50)
HGB BLD-MCNC: 13.7 G/DL (ref 14–18)
IMM GRANULOCYTES # BLD AUTO: 0.02 K/UL (ref 0–0.04)
IMM GRANULOCYTES NFR BLD AUTO: 0.3 % (ref 0–0.5)
LDLC SERPL CALC-MCNC: 65.4 MG/DL (ref 63–159)
LYMPHOCYTES # BLD AUTO: 2.8 K/UL (ref 1–4.8)
LYMPHOCYTES NFR BLD: 45.5 % (ref 18–48)
MCH RBC QN AUTO: 27.3 PG (ref 27–31)
MCHC RBC AUTO-ENTMCNC: 33 G/DL (ref 32–36)
MCV RBC AUTO: 83 FL (ref 82–98)
MONOCYTES # BLD AUTO: 0.4 K/UL (ref 0.3–1)
MONOCYTES NFR BLD: 6.5 % (ref 4–15)
NEUTROPHILS # BLD AUTO: 2.7 K/UL (ref 1.8–7.7)
NEUTROPHILS NFR BLD: 44 % (ref 38–73)
NONHDLC SERPL-MCNC: 95 MG/DL
NRBC BLD-RTO: 0 /100 WBC
PLATELET # BLD AUTO: 245 K/UL (ref 150–450)
PMV BLD AUTO: 9.8 FL (ref 9.2–12.9)
POTASSIUM SERPL-SCNC: 4.1 MMOL/L (ref 3.5–5.1)
PROT SERPL-MCNC: 7.2 G/DL (ref 6–8.4)
RBC # BLD AUTO: 5.01 M/UL (ref 4.6–6.2)
SODIUM SERPL-SCNC: 144 MMOL/L (ref 136–145)
TRIGL SERPL-MCNC: 148 MG/DL (ref 30–150)
WBC # BLD AUTO: 6.04 K/UL (ref 3.9–12.7)

## 2023-04-29 PROCEDURE — 80053 COMPREHEN METABOLIC PANEL: CPT | Performed by: FAMILY MEDICINE

## 2023-04-29 PROCEDURE — 85025 COMPLETE CBC W/AUTO DIFF WBC: CPT | Performed by: FAMILY MEDICINE

## 2023-04-29 PROCEDURE — 83036 HEMOGLOBIN GLYCOSYLATED A1C: CPT | Performed by: FAMILY MEDICINE

## 2023-04-29 PROCEDURE — 80061 LIPID PANEL: CPT | Performed by: FAMILY MEDICINE

## 2023-04-29 PROCEDURE — 36415 COLL VENOUS BLD VENIPUNCTURE: CPT | Performed by: FAMILY MEDICINE

## 2023-05-04 ENCOUNTER — OFFICE VISIT (OUTPATIENT)
Dept: FAMILY MEDICINE | Facility: CLINIC | Age: 48
End: 2023-05-04
Payer: MEDICAID

## 2023-05-04 VITALS
TEMPERATURE: 99 F | WEIGHT: 203.25 LBS | DIASTOLIC BLOOD PRESSURE: 66 MMHG | OXYGEN SATURATION: 97 % | HEIGHT: 66 IN | HEART RATE: 112 BPM | BODY MASS INDEX: 32.66 KG/M2 | SYSTOLIC BLOOD PRESSURE: 130 MMHG

## 2023-05-04 DIAGNOSIS — E78.5 TYPE 2 DIABETES MELLITUS WITH HYPERLIPIDEMIA: Primary | Chronic | ICD-10-CM

## 2023-05-04 DIAGNOSIS — R35.1 NOCTURIA: Chronic | ICD-10-CM

## 2023-05-04 DIAGNOSIS — Z13.5 SCREENING FOR DIABETIC RETINOPATHY: ICD-10-CM

## 2023-05-04 DIAGNOSIS — N40.0 BENIGN PROSTATIC HYPERPLASIA, UNSPECIFIED WHETHER LOWER URINARY TRACT SYMPTOMS PRESENT: ICD-10-CM

## 2023-05-04 DIAGNOSIS — I10 ESSENTIAL HYPERTENSION, BENIGN: Chronic | ICD-10-CM

## 2023-05-04 DIAGNOSIS — K59.09 CHRONIC CONSTIPATION: Chronic | ICD-10-CM

## 2023-05-04 DIAGNOSIS — E11.69 TYPE 2 DIABETES MELLITUS WITH HYPERLIPIDEMIA: Primary | Chronic | ICD-10-CM

## 2023-05-04 DIAGNOSIS — Z12.12 SCREENING FOR COLORECTAL CANCER: ICD-10-CM

## 2023-05-04 DIAGNOSIS — E78.2 MIXED DYSLIPIDEMIA: Chronic | ICD-10-CM

## 2023-05-04 DIAGNOSIS — Z12.11 SCREENING FOR COLORECTAL CANCER: ICD-10-CM

## 2023-05-04 PROCEDURE — 3072F LOW RISK FOR RETINOPATHY: CPT | Mod: CPTII,,, | Performed by: FAMILY MEDICINE

## 2023-05-04 PROCEDURE — 99214 PR OFFICE/OUTPT VISIT, EST, LEVL IV, 30-39 MIN: ICD-10-PCS | Mod: S$PBB,,, | Performed by: FAMILY MEDICINE

## 2023-05-04 PROCEDURE — 4010F PR ACE/ARB THEARPY RXD/TAKEN: ICD-10-PCS | Mod: CPTII,,, | Performed by: FAMILY MEDICINE

## 2023-05-04 PROCEDURE — 3008F PR BODY MASS INDEX (BMI) DOCUMENTED: ICD-10-PCS | Mod: CPTII,,, | Performed by: FAMILY MEDICINE

## 2023-05-04 PROCEDURE — 99999 PR PBB SHADOW E&M-EST. PATIENT-LVL IV: CPT | Mod: PBBFAC,,, | Performed by: FAMILY MEDICINE

## 2023-05-04 PROCEDURE — 99214 OFFICE O/P EST MOD 30 MIN: CPT | Mod: S$PBB,,, | Performed by: FAMILY MEDICINE

## 2023-05-04 PROCEDURE — 1160F PR REVIEW ALL MEDS BY PRESCRIBER/CLIN PHARMACIST DOCUMENTED: ICD-10-PCS | Mod: CPTII,,, | Performed by: FAMILY MEDICINE

## 2023-05-04 PROCEDURE — 3078F PR MOST RECENT DIASTOLIC BLOOD PRESSURE < 80 MM HG: ICD-10-PCS | Mod: CPTII,,, | Performed by: FAMILY MEDICINE

## 2023-05-04 PROCEDURE — 3075F PR MOST RECENT SYSTOLIC BLOOD PRESS GE 130-139MM HG: ICD-10-PCS | Mod: CPTII,,, | Performed by: FAMILY MEDICINE

## 2023-05-04 PROCEDURE — 99214 OFFICE O/P EST MOD 30 MIN: CPT | Mod: PBBFAC,PN | Performed by: FAMILY MEDICINE

## 2023-05-04 PROCEDURE — 1159F PR MEDICATION LIST DOCUMENTED IN MEDICAL RECORD: ICD-10-PCS | Mod: CPTII,,, | Performed by: FAMILY MEDICINE

## 2023-05-04 PROCEDURE — 1160F RVW MEDS BY RX/DR IN RCRD: CPT | Mod: CPTII,,, | Performed by: FAMILY MEDICINE

## 2023-05-04 PROCEDURE — 3078F DIAST BP <80 MM HG: CPT | Mod: CPTII,,, | Performed by: FAMILY MEDICINE

## 2023-05-04 PROCEDURE — 3075F SYST BP GE 130 - 139MM HG: CPT | Mod: CPTII,,, | Performed by: FAMILY MEDICINE

## 2023-05-04 PROCEDURE — 3008F BODY MASS INDEX DOCD: CPT | Mod: CPTII,,, | Performed by: FAMILY MEDICINE

## 2023-05-04 PROCEDURE — 1159F MED LIST DOCD IN RCRD: CPT | Mod: CPTII,,, | Performed by: FAMILY MEDICINE

## 2023-05-04 PROCEDURE — 3044F PR MOST RECENT HEMOGLOBIN A1C LEVEL <7.0%: ICD-10-PCS | Mod: CPTII,,, | Performed by: FAMILY MEDICINE

## 2023-05-04 PROCEDURE — 99999 PR PBB SHADOW E&M-EST. PATIENT-LVL IV: ICD-10-PCS | Mod: PBBFAC,,, | Performed by: FAMILY MEDICINE

## 2023-05-04 PROCEDURE — 3044F HG A1C LEVEL LT 7.0%: CPT | Mod: CPTII,,, | Performed by: FAMILY MEDICINE

## 2023-05-04 PROCEDURE — 4010F ACE/ARB THERAPY RXD/TAKEN: CPT | Mod: CPTII,,, | Performed by: FAMILY MEDICINE

## 2023-05-04 PROCEDURE — 3072F PR LOW RISK FOR RETINOPATHY: ICD-10-PCS | Mod: CPTII,,, | Performed by: FAMILY MEDICINE

## 2023-05-04 RX ORDER — METFORMIN HYDROCHLORIDE 500 MG/1
1000 TABLET, EXTENDED RELEASE ORAL 2 TIMES DAILY WITH MEALS
Qty: 360 TABLET | Refills: 3 | Status: SHIPPED | OUTPATIENT
Start: 2023-05-04 | End: 2023-11-21 | Stop reason: SDUPTHER

## 2023-05-04 RX ORDER — ATORVASTATIN CALCIUM 40 MG/1
40 TABLET, FILM COATED ORAL DAILY
Qty: 90 TABLET | Refills: 3 | Status: SHIPPED | OUTPATIENT
Start: 2023-05-04 | End: 2023-11-21 | Stop reason: SDUPTHER

## 2023-05-04 RX ORDER — LISINOPRIL 5 MG/1
5 TABLET ORAL DAILY
Qty: 90 TABLET | Refills: 1 | Status: SHIPPED | OUTPATIENT
Start: 2023-05-04 | End: 2024-05-03

## 2023-05-04 NOTE — PROGRESS NOTES
Patient Name: Emeka Caballero    : 1975  MRN: [id number]      Subjective:     Patient ID: Emeka is a 48 y.o. male    Chief Complaint:  Diabetes and Hyperlipidemia    Diabetes  He presents for his follow-up diabetic visit. He has type 2 diabetes mellitus. His disease course has been improving. Pertinent negatives for hypoglycemia include no headaches. Pertinent negatives for diabetes include no blurred vision, no chest pain, no foot paresthesias, no polydipsia, no polyphagia, no polyuria, no weakness and no weight loss. Risk factors for coronary artery disease include male sex, dyslipidemia and diabetes mellitus. Current diabetic treatment includes oral agent (monotherapy). He is compliant with treatment all of the time. His weight is stable. Diabetic current diet: eating a very salty diet according to wife- adds salt to ffod even prior to tasting it. He rarely participates in exercise. He monitors blood glucose at home 1-2 x per day. (110-140 fasting) An ACE inhibitor/angiotensin II receptor blocker is not being taken. Eye exam is not current.   Hyperlipidemia  This is a chronic problem. Recent lipid tests were reviewed and are normal. Exacerbating diseases include diabetes. Associated symptoms include leg pain. Pertinent negatives include no chest pain or shortness of breath. Current antihyperlipidemic treatment includes fibric acid derivatives. The current treatment provides moderate improvement of lipids. There are no compliance problems.       Review of Systems   Constitutional:  Negative for fever, unexpected weight change and weight loss.   Eyes:  Negative for blurred vision and visual disturbance.   Respiratory:  Negative for chest tightness, shortness of breath and wheezing.    Cardiovascular:  Negative for chest pain.   Gastrointestinal:  Positive for constipation. Negative for abdominal pain, blood in stool and fecal incontinence.   Endocrine: Negative for polydipsia, polyphagia and  "polyuria.   Genitourinary:  Positive for erectile dysfunction. Negative for bladder incontinence and dysuria.   Musculoskeletal:  Positive for back pain and leg pain.   Neurological:  Negative for weakness and headaches.      Objective:   /66 (BP Location: Right arm, Patient Position: Sitting, BP Method: Large (Manual))   Pulse (!) 112   Temp 98.8 °F (37.1 °C) (Oral)   Ht 5' 6" (1.676 m)   Wt 92.2 kg (203 lb 4.2 oz)   SpO2 97%   BMI 32.81 kg/m²     Physical Exam  Vitals reviewed.   Constitutional:       General: He is not in acute distress.  HENT:      Head: Normocephalic and atraumatic.      Right Ear: Ear canal and external ear normal.      Left Ear: Ear canal and external ear normal.      Nose: Nose normal.      Mouth/Throat:      Mouth: Mucous membranes are moist.      Pharynx: No oropharyngeal exudate or posterior oropharyngeal erythema.   Eyes:      Extraocular Movements: Extraocular movements intact.      Conjunctiva/sclera: Conjunctivae normal.      Pupils: Pupils are equal, round, and reactive to light.   Cardiovascular:      Rate and Rhythm: Normal rate and regular rhythm.      Heart sounds: No murmur heard.  Pulmonary:      Effort: Pulmonary effort is normal. No respiratory distress.      Breath sounds: Normal breath sounds. No wheezing or rales.   Abdominal:      General: Abdomen is flat. Bowel sounds are normal. There is no distension.      Palpations: Abdomen is soft.      Tenderness: There is no abdominal tenderness. There is no guarding.   Musculoskeletal:      Cervical back: Normal range of motion.   Skin:     General: Skin is warm.      Capillary Refill: Capillary refill takes less than 2 seconds.   Neurological:      Mental Status: He is alert and oriented to person, place, and time.      Cranial Nerves: No cranial nerve deficit.      Sensory: No sensory deficit.   Psychiatric:         Mood and Affect: Mood normal.         Behavior: Behavior normal.      Lab Visit on 04/29/2023 "   Component Date Value Ref Range Status    Sodium 04/29/2023 144  136 - 145 mmol/L Final    Potassium 04/29/2023 4.1  3.5 - 5.1 mmol/L Final    Chloride 04/29/2023 112 (H)  95 - 110 mmol/L Final    CO2 04/29/2023 25  23 - 29 mmol/L Final    Glucose 04/29/2023 124 (H)  70 - 110 mg/dL Final    BUN 04/29/2023 12  6 - 20 mg/dL Final    Creatinine 04/29/2023 1.0  0.5 - 1.4 mg/dL Final    Calcium 04/29/2023 9.0  8.7 - 10.5 mg/dL Final    Total Protein 04/29/2023 7.2  6.0 - 8.4 g/dL Final    Albumin 04/29/2023 3.6  3.5 - 5.2 g/dL Final    Total Bilirubin 04/29/2023 0.4  0.1 - 1.0 mg/dL Final    Comment: For infants and newborns, interpretation of results should be based  on gestational age, weight and in agreement with clinical  observations.    Premature Infant recommended reference ranges:  Up to 24 hours.............<8.0 mg/dL  Up to 48 hours............<12.0 mg/dL  3-5 days..................<15.0 mg/dL  6-29 days.................<15.0 mg/dL      Alkaline Phosphatase 04/29/2023 93  55 - 135 U/L Final    AST 04/29/2023 18  10 - 40 U/L Final    ALT 04/29/2023 29  10 - 44 U/L Final    Anion Gap 04/29/2023 7 (L)  8 - 16 mmol/L Final    eGFR 04/29/2023 >60  >60 mL/min/1.73 m^2 Final    Cholesterol 04/29/2023 124  120 - 199 mg/dL Final    Comment: The National Cholesterol Education Program (NCEP) has set the  following guidelines (reference ranges) for Cholesterol:  Optimal.....................<200 mg/dL  Borderline High.............200-239 mg/dL  High........................> or = 240 mg/dL      Triglycerides 04/29/2023 148  30 - 150 mg/dL Final    Comment: The National Cholesterol Education Program (NCEP) has set the  following guidelines (reference values) for triglycerides:  Normal......................<150 mg/dL  Borderline High.............150-199 mg/dL  High........................200-499 mg/dL      HDL 04/29/2023 29 (L)  40 - 75 mg/dL Final    Comment: The National Cholesterol Education Program (NCEP) has set  the  following guidelines (reference values) for HDL Cholesterol:  Low...............<40 mg/dL  Optimal...........>60 mg/dL      LDL Cholesterol 04/29/2023 65.4  63.0 - 159.0 mg/dL Final    Comment: The National Cholesterol Education Program (NCEP) has set the  following guidelines (reference values) for LDL Cholesterol:  Optimal.......................<130 mg/dL  Borderline High...............130-159 mg/dL  High..........................160-189 mg/dL  Very High.....................>190 mg/dL      HDL/Cholesterol Ratio 04/29/2023 23.4  20.0 - 50.0 % Final    Total Cholesterol/HDL Ratio 04/29/2023 4.3  2.0 - 5.0 Final    Non-HDL Cholesterol 04/29/2023 95  mg/dL Final    Comment: Risk category and Non-HDL cholesterol goals:  Coronary heart disease (CHD)or equivalent (10-year risk of CHD >20%):  Non-HDL cholesterol goal     <130 mg/dL  Two or more CHD risk factors and 10-year risk of CHD <= 20%:  Non-HDL cholesterol goal     <160 mg/dL  0 to 1 CHD risk factor:  Non-HDL cholesterol goal     <190 mg/dL      Hemoglobin A1C 04/29/2023 6.6 (H)  4.0 - 5.6 % Final    Comment: ADA Screening Guidelines:  5.7-6.4%  Consistent with prediabetes  >or=6.5%  Consistent with diabetes    High levels of fetal hemoglobin interfere with the HbA1C  assay. Heterozygous hemoglobin variants (HbS, HgC, etc)do  not significantly interfere with this assay.   However, presence of multiple variants may affect accuracy.      Estimated Avg Glucose 04/29/2023 143 (H)  68 - 131 mg/dL Final    WBC 04/29/2023 6.04  3.90 - 12.70 K/uL Final    RBC 04/29/2023 5.01  4.60 - 6.20 M/uL Final    Hemoglobin 04/29/2023 13.7 (L)  14.0 - 18.0 g/dL Final    Hematocrit 04/29/2023 41.5  40.0 - 54.0 % Final    MCV 04/29/2023 83  82 - 98 fL Final    MCH 04/29/2023 27.3  27.0 - 31.0 pg Final    MCHC 04/29/2023 33.0  32.0 - 36.0 g/dL Final    RDW 04/29/2023 14.8 (H)  11.5 - 14.5 % Final    Platelets 04/29/2023 245  150 - 450 K/uL Final    MPV 04/29/2023 9.8  9.2 - 12.9 fL  Final    Immature Granulocytes 04/29/2023 0.3  0.0 - 0.5 % Final    Gran # (ANC) 04/29/2023 2.7  1.8 - 7.7 K/uL Final    Immature Grans (Abs) 04/29/2023 0.02  0.00 - 0.04 K/uL Final    Comment: Mild elevation in immature granulocytes is non specific and   can be seen in a variety of conditions including stress response,   acute inflammation, trauma and pregnancy. Correlation with other   laboratory and clinical findings is essential.      Lymph # 04/29/2023 2.8  1.0 - 4.8 K/uL Final    Mono # 04/29/2023 0.4  0.3 - 1.0 K/uL Final    Eos # 04/29/2023 0.2  0.0 - 0.5 K/uL Final    Baso # 04/29/2023 0.04  0.00 - 0.20 K/uL Final    nRBC 04/29/2023 0  0 /100 WBC Final    Gran % 04/29/2023 44.0  38.0 - 73.0 % Final    Lymph % 04/29/2023 45.5  18.0 - 48.0 % Final    Mono % 04/29/2023 6.5  4.0 - 15.0 % Final    Eosinophil % 04/29/2023 3.0  0.0 - 8.0 % Final    Basophil % 04/29/2023 0.7  0.0 - 1.9 % Final    Differential Method 04/29/2023 Automated   Final         Assessment        ICD-10-CM ICD-9-CM   1. Type 2 diabetes mellitus with hyperlipidemia  E11.69 250.80    E78.5 272.4   2. Essential hypertension, benign  I10 401.1   3. Mixed dyslipidemia  E78.2 272.2   4. Chronic constipation  K59.09 564.00   5. Nocturia  R35.1 788.43   6. Benign prostatic hyperplasia, unspecified whether lower urinary tract symptoms present  N40.0 600.00   7. Screening for diabetic retinopathy  Z13.5 V80.2   8. Screening for colorectal cancer  Z12.11 V76.51    Z12.12 V76.41         Plan:     1. Type 2 diabetes mellitus with hyperlipidemia  Assessment & Plan:  Lab Results   Component Value Date    HGBA1C 6.6 (H) 04/29/2023    HGBA1C 6.9 (H) 03/14/2023    HGBA1C 6.0 (H) 10/14/2022     A1c better.  Continue current regimen.    Orders:  -     Diabetes Digital Medicine (DDMP) Enrollment Order  -     Diabetes Digital Medicine (DDMP): Assign Onboarding Questionnaires  -     Ambulatory referral/consult to Optometry; Future; Expected date: 05/11/2023  -      metFORMIN (GLUCOPHAGE-XR) 500 MG ER 24hr tablet; Take 2 tablets (1,000 mg total) by mouth 2 (two) times daily with meals.  Dispense: 360 tablet; Refill: 3  -     Microalbumin/creatinine urine ratio; Future; Expected date: 11/04/2023  -     CBC auto differential; Future; Expected date: 11/04/2023  -     Comprehensive metabolic panel; Future; Expected date: 05/04/2023  -     Hemoglobin A1c; Future; Expected date: 11/04/2023  -     Lipid panel; Future; Expected date: 11/04/2023    2. Essential hypertension, benign  Assessment & Plan:  BP Guidelines discussed.  Risks of uncontrolled hypertension discussed  Strongly recommended decreasing salt intake.  As pressures continue being elevated, start on Lisinopril 5 mg and recheck BP and BMP lab test in 2-4 weeks.    Orders:  -     Hypertension Digital Medicine (HDMP) Enrollment Order  -     Hypertension Digital Medicine (HDMP): Assign Onboarding Questionnaires  -     lisinopriL (PRINIVIL,ZESTRIL) 5 MG tablet; Take 1 tablet (5 mg total) by mouth once daily.  Dispense: 90 tablet; Refill: 1  -     BASIC METABOLIC PANEL; Future; Expected date: 05/18/2023  -     Comprehensive metabolic panel; Future; Expected date: 05/04/2023    3. Mixed dyslipidemia  Assessment & Plan:  Lab Results   Component Value Date    CHOL 124 04/29/2023    CHOL 137 10/14/2022    CHOL 170 05/10/2022     Lab Results   Component Value Date    HDL 29 (L) 04/29/2023    HDL 26 (L) 10/14/2022    HDL 30 (L) 05/10/2022     Lab Results   Component Value Date    LDLCALC 65.4 04/29/2023    LDLCALC 81.4 10/14/2022    LDLCALC 93.4 05/10/2022     Lab Results   Component Value Date    TRIG 148 04/29/2023    TRIG 148 10/14/2022    TRIG 233 (H) 05/10/2022     The ASCVD Risk score (Jasper SMART, et al., 2019) failed to calculate for the following reasons:    The valid total cholesterol range is 130 to 320 mg/dL     Lipid panel better.   Continue Atorvastatin 40 mg daily.    Orders:  -     Lipids Digital Medicine (LDMP)  Enrollment Order  -     Lipids Digital Medicine (LDMP): Assign Onboarding Questionnaires  -     atorvastatin (LIPITOR) 40 MG tablet; Take 1 tablet (40 mg total) by mouth once daily.  Dispense: 90 tablet; Refill: 3  -     Comprehensive metabolic panel; Future; Expected date: 05/04/2023  -     Lipid panel; Future; Expected date: 11/04/2023    4. Chronic constipation  Assessment & Plan:  Trial of Linzess.    Orders:  -     linaCLOtide (LINZESS) 72 mcg Cap capsule; Take 1 capsule (72 mcg total) by mouth before breakfast.  Dispense: 30 capsule; Refill: 5    5. Nocturia  Overview:  4- Prescribed Ditropan by Urology    Assessment & Plan:  Repeats improving with Ditropan      6. Benign prostatic hyperplasia, unspecified whether lower urinary tract symptoms present  Overview:  3- Holmium laser enucleation of the prostate.  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 Screening for diabetic retinopathy  -     Ambulatory referral/consult to Optometry; Future; Expected date: 05/11/2023    8. Screening for colorectal cancer  -     Fecal Immunochemical Test (iFOBT); Future; Expected date: 05/04/2023           -Dio Zarate Jr., MD, AAHIVS      This office note has been dictated.  This dictation has been generated using M-Modal Fluency Direct dictation; some phonetic errors may occur.         Patient Instructions   Continue la Metformin 1000 mg en la manana y 1000 mg en la tarde para la diabetes  Continue atorvastatin 40 mg para el colesterol    Para la constipacion Linzess maik vez al colin    Para la precion lisinopril 5 mg maik vez al colin  Vamos a chequear la precion en 2 semana    Llame al [phone] para programar maik carmen para el cuidado de la vista     Lo veo en 6 meses      Future Appointments   Date Time Provider Department Center   5/18/2023  8:15 AM LAB, [facility] DRAW STATION [facility]   5/18/2023  8:40 AM NURSE, [facility] FAM MED/INT MED [facility]    8/14/2023  2:15 PM Cecil Murray MD [facility] UROLOGY [facility]   11/6/2023  8:00 AM LAB, [facility] DRAW STATION [facility] LAB [facility]   11/6/2023  8:15 AM LAB, [facility] DRAW STATION [facility] LAB [facility]   11/21/2023 10:20 AM Dio Zarate Jr., MD [facility]

## 2023-05-04 NOTE — PATIENT INSTRUCTIONS
Continue la Metformin 1000 mg en la manana y 1000 mg en la tarde para la diabetes  Continue atorvastatin 40 mg para el colesterol    Para la constipacion Linzess maik vez al colin    Para la precion lisinopril 5 mg maik vez al colin  Vamos a chequear la precion en 2 semana    Llame al [phone] para programar maik carmen para el cuidado de la vista     Lo veo en 6 meses

## 2023-05-07 PROBLEM — R35.1 NOCTURIA: Chronic | Status: ACTIVE | Noted: 2023-05-07

## 2023-05-07 PROBLEM — E78.5 HLD (HYPERLIPIDEMIA): Status: RESOLVED | Noted: 2022-02-11 | Resolved: 2023-05-07

## 2023-05-07 PROBLEM — K59.09 CHRONIC CONSTIPATION: Chronic | Status: ACTIVE | Noted: 2022-02-11

## 2023-05-07 PROBLEM — R50.9 FEVER: Status: RESOLVED | Noted: 2022-02-11 | Resolved: 2023-05-07

## 2023-05-07 PROBLEM — N40.0 BPH (BENIGN PROSTATIC HYPERPLASIA): Chronic | Status: ACTIVE | Noted: 2022-02-11

## 2023-05-07 PROBLEM — R53.1 DECREASED STRENGTH: Status: RESOLVED | Noted: 2022-09-16 | Resolved: 2023-05-07

## 2023-05-07 PROBLEM — R26.9 ABNORMAL GAIT: Status: RESOLVED | Noted: 2022-09-16 | Resolved: 2023-05-07

## 2023-05-07 PROBLEM — E78.5 TYPE 2 DIABETES MELLITUS WITH HYPERLIPIDEMIA: Chronic | Status: ACTIVE | Noted: 2022-02-11

## 2023-05-07 PROBLEM — E11.69 TYPE 2 DIABETES MELLITUS WITH HYPERLIPIDEMIA: Chronic | Status: ACTIVE | Noted: 2022-02-11

## 2023-05-07 PROBLEM — N41.9 PROSTATITIS: Status: RESOLVED | Noted: 2022-02-13 | Resolved: 2023-05-07

## 2023-05-08 ENCOUNTER — PATIENT MESSAGE (OUTPATIENT)
Dept: RESEARCH | Facility: HOSPITAL | Age: 48
End: 2023-05-08
Payer: MEDICAID

## 2023-05-08 NOTE — ASSESSMENT & PLAN NOTE
Lab Results   Component Value Date    CHOL 124 04/29/2023    CHOL 137 10/14/2022    CHOL 170 05/10/2022     Lab Results   Component Value Date    HDL 29 (L) 04/29/2023    HDL 26 (L) 10/14/2022    HDL 30 (L) 05/10/2022     Lab Results   Component Value Date    LDLCALC 65.4 04/29/2023    LDLCALC 81.4 10/14/2022    LDLCALC 93.4 05/10/2022     Lab Results   Component Value Date    TRIG 148 04/29/2023    TRIG 148 10/14/2022    TRIG 233 (H) 05/10/2022     The ASCVD Risk score (Jasper DK, et al., 2019) failed to calculate for the following reasons:    The valid total cholesterol range is 130 to 320 mg/dL     Lipid panel better.   Continue Atorvastatin 40 mg daily.

## 2023-05-08 NOTE — ASSESSMENT & PLAN NOTE
BP Guidelines discussed.  Risks of uncontrolled hypertension discussed  Strongly recommended decreasing salt intake.  As pressures continue being elevated, start on Lisinopril 5 mg and recheck BP and BMP lab test in 2-4 weeks.

## 2023-05-08 NOTE — ASSESSMENT & PLAN NOTE
Lab Results   Component Value Date    HGBA1C 6.6 (H) 04/29/2023    HGBA1C 6.9 (H) 03/14/2023    HGBA1C 6.0 (H) 10/14/2022     A1c better.  Continue current regimen.

## 2023-05-09 ENCOUNTER — TELEPHONE (OUTPATIENT)
Dept: FAMILY MEDICINE | Facility: CLINIC | Age: 48
End: 2023-05-09
Payer: MEDICAID

## 2023-05-09 DIAGNOSIS — K59.09 CHRONIC CONSTIPATION: Primary | ICD-10-CM

## 2023-05-09 NOTE — TELEPHONE ENCOUNTER
Constipation , stomach still having pain requesting G.I. referral . Has blood on tissue sometimeswhen having derek . Had long periods constipation without any bowels

## 2023-05-09 NOTE — TELEPHONE ENCOUNTER
----- Message from Betty Mills sent at 5/9/2023 11:35 AM CDT -----  Type: Patient Call Back    Who called:wife     What is the request in detail:returning a calll    Can the clinic reply by [facility]?    Would the patient rather a call back or a response via [facility]?call     Best call back number: [phone]      Additional Information:

## 2023-05-09 NOTE — TELEPHONE ENCOUNTER
----- Message from Trudy Mckeon sent at 5/9/2023  9:46 AM CDT -----  Regarding: pt called  Name of Who is Calling: JON GRAY [[id number]] Papo ( spouse)      What is the request in detail: pt is requesting a referral to be seen by a gastrologist. Please advise      Can the clinic reply by [facility]: No      What [facility] to [facility] Back [facility]: [phone]

## 2023-05-17 ENCOUNTER — PATIENT MESSAGE (OUTPATIENT)
Dept: FAMILY MEDICINE | Facility: CLINIC | Age: 48
End: 2023-05-17
Payer: MEDICAID

## 2023-05-18 ENCOUNTER — CLINICAL SUPPORT (OUTPATIENT)
Dept: FAMILY MEDICINE | Facility: CLINIC | Age: 48
End: 2023-05-18
Payer: MEDICAID

## 2023-05-18 ENCOUNTER — LAB VISIT (OUTPATIENT)
Dept: LAB | Facility: HOSPITAL | Age: 48
End: 2023-05-18
Attending: FAMILY MEDICINE
Payer: MEDICAID

## 2023-05-18 VITALS — SYSTOLIC BLOOD PRESSURE: 94 MMHG | DIASTOLIC BLOOD PRESSURE: 62 MMHG | HEART RATE: 79 BPM

## 2023-05-18 DIAGNOSIS — I10 ESSENTIAL HYPERTENSION, BENIGN: Primary | ICD-10-CM

## 2023-05-18 DIAGNOSIS — I10 ESSENTIAL HYPERTENSION, BENIGN: Chronic | ICD-10-CM

## 2023-05-18 LAB
ANION GAP SERPL CALC-SCNC: 10 MMOL/L (ref 8–16)
BUN SERPL-MCNC: 14 MG/DL (ref 6–20)
CALCIUM SERPL-MCNC: 9.2 MG/DL (ref 8.7–10.5)
CHLORIDE SERPL-SCNC: 109 MMOL/L (ref 95–110)
CO2 SERPL-SCNC: 23 MMOL/L (ref 23–29)
CREAT SERPL-MCNC: 1 MG/DL (ref 0.5–1.4)
EST. GFR  (NO RACE VARIABLE): >60 ML/MIN/1.73 M^2
GLUCOSE SERPL-MCNC: 131 MG/DL (ref 70–110)
POTASSIUM SERPL-SCNC: 4.1 MMOL/L (ref 3.5–5.1)
SODIUM SERPL-SCNC: 142 MMOL/L (ref 136–145)

## 2023-05-18 PROCEDURE — 99999 PR PBB SHADOW E&M-EST. PATIENT-LVL I: ICD-10-PCS | Mod: PBBFAC,,,

## 2023-05-18 PROCEDURE — 99499 NO LOS: ICD-10-PCS | Mod: S$PBB,,, | Performed by: FAMILY MEDICINE

## 2023-05-18 PROCEDURE — 99499 UNLISTED E&M SERVICE: CPT | Mod: S$PBB,,, | Performed by: FAMILY MEDICINE

## 2023-05-18 PROCEDURE — 99211 OFF/OP EST MAY X REQ PHY/QHP: CPT | Mod: PBBFAC,PN

## 2023-05-18 PROCEDURE — 80048 BASIC METABOLIC PNL TOTAL CA: CPT | Performed by: FAMILY MEDICINE

## 2023-05-18 PROCEDURE — 99999 PR PBB SHADOW E&M-EST. PATIENT-LVL I: CPT | Mod: PBBFAC,,,

## 2023-05-18 PROCEDURE — 36415 COLL VENOUS BLD VENIPUNCTURE: CPT | Mod: PN | Performed by: FAMILY MEDICINE

## 2023-05-18 NOTE — PROGRESS NOTES
Emeka Caballero 48 y.o. male is here today for Blood Pressure check.   History of HTN yes.    Review of patient's allergies indicates:  No Known Allergies  Creatinine   Date Value Ref Range Status   04/29/2023 1.0 0.5 - 1.4 mg/dL Final     Sodium   Date Value Ref Range Status   04/29/2023 144 136 - 145 mmol/L Final     Potassium   Date Value Ref Range Status   04/29/2023 4.1 3.5 - 5.1 mmol/L Final   ]  Patient denies taking blood pressure medications on a regular basis at the same time of the day.     Current Outpatient Medications:     alprostadiL 500 mcg/mL Soln 50 mcg, phentolamine 5 mg SolR 5 mg, papaverine 30 mg/mL Soln 30 mcg, BRING TO PHYSICIAN'S OFFICE FOR TEST DOSE, Disp: , Rfl:     atorvastatin (LIPITOR) 40 MG tablet, Take 1 tablet (40 mg total) by mouth once daily., Disp: 90 tablet, Rfl: 3    blood sugar diagnostic Strp, To check BG 3 times daily, to use with insurance preferred meter, Disp: 100 each, Rfl: 11    blood-glucose meter kit, To check BG 3 times daily, to use with insurance preferred meter, Disp: 1 each, Rfl: 0    lancets Misc, To check BG 3 times daily, to use with insurance preferred meter, Disp: 100 each, Rfl: 11    linaCLOtide (LINZESS) 72 mcg Cap capsule, Take 1 capsule (72 mcg total) by mouth before breakfast., Disp: 30 capsule, Rfl: 5    lisinopriL (PRINIVIL,ZESTRIL) 5 MG tablet, Take 1 tablet (5 mg total) by mouth once daily., Disp: 90 tablet, Rfl: 1    metFORMIN (GLUCOPHAGE-XR) 500 MG ER 24hr tablet, Take 2 tablets (1,000 mg total) by mouth 2 (two) times daily with meals., Disp: 360 tablet, Rfl: 3    oxybutynin (DITROPAN-XL) 10 MG 24 hr tablet, Take 1 tablet (10 mg total) by mouth once daily., Disp: 30 tablet, Rfl: 11    oxyCODONE-acetaminophen (PERCOCET) 5-325 mg per tablet, Take 1 tablet by mouth every 4 (four) hours as needed for Pain., Disp: 12 tablet, Rfl: 0    pep injection, Bring the medication to appointment   For compounding pharmacy use:  Add PAPAVERINE 30 mcg Add  PHENTOLAMINE 10 mg Add ALPROSTADIL 100 mcg, Disp: 1 vial, Rfl: 3    tadalafiL (CIALIS) 20 MG Tab, Take 1 tablet (20 mg total) by mouth daily as needed (erectile dysfunction)., Disp: 30 tablet, Rfl: 11  Does patient have record of home blood pressure readings no. Readings have been averaging n/a.   Last dose of blood pressure medication was taken at 5/17/2023 night.  Patient is asymptomatic.   Complains of mild, intermittent dizziness at home that relieve on their own. He reports not drinking enough water.    BP: 94/62 , Pulse: 79 .    Dr. Zarate notified.

## 2023-05-26 ENCOUNTER — PATIENT MESSAGE (OUTPATIENT)
Dept: NEUROSURGERY | Facility: CLINIC | Age: 48
End: 2023-05-26
Payer: MEDICAID

## 2023-05-30 DIAGNOSIS — M54.16 LUMBAR RADICULOPATHY: ICD-10-CM

## 2023-05-30 DIAGNOSIS — Z98.1 S/P LUMBAR FUSION: Primary | ICD-10-CM

## 2023-05-30 NOTE — TELEPHONE ENCOUNTER
Obie Urbano! Can you advise on this patient? PT and injections were ordered at his last visit, he's gotten the injection but has not done the PT yet.

## 2023-06-01 ENCOUNTER — PATIENT MESSAGE (OUTPATIENT)
Dept: ADMINISTRATIVE | Facility: HOSPITAL | Age: 48
End: 2023-06-01
Payer: MEDICAID

## 2023-06-14 ENCOUNTER — PATIENT MESSAGE (OUTPATIENT)
Dept: ADMINISTRATIVE | Facility: HOSPITAL | Age: 48
End: 2023-06-14
Payer: MEDICAID

## 2023-06-21 ENCOUNTER — CLINICAL SUPPORT (OUTPATIENT)
Dept: REHABILITATION | Facility: HOSPITAL | Age: 48
End: 2023-06-21
Payer: MEDICAID

## 2023-06-21 DIAGNOSIS — M54.50 CHRONIC MIDLINE LOW BACK PAIN WITHOUT SCIATICA: ICD-10-CM

## 2023-06-21 DIAGNOSIS — G89.29 CHRONIC LOW BACK PAIN WITHOUT SCIATICA, UNSPECIFIED BACK PAIN LATERALITY: ICD-10-CM

## 2023-06-21 DIAGNOSIS — G89.29 CHRONIC MIDLINE LOW BACK PAIN WITHOUT SCIATICA: ICD-10-CM

## 2023-06-21 DIAGNOSIS — M62.81 MUSCLE WEAKNESS: Primary | ICD-10-CM

## 2023-06-21 DIAGNOSIS — M54.50 CHRONIC LOW BACK PAIN WITHOUT SCIATICA, UNSPECIFIED BACK PAIN LATERALITY: ICD-10-CM

## 2023-06-21 PROCEDURE — 97110 THERAPEUTIC EXERCISES: CPT | Mod: PN

## 2023-06-21 NOTE — PROGRESS NOTES
[facility]   Physical Therapy Treatment Note     [facility] [id number]    Therapy Diagnosis:   Encounter Diagnoses   Name Primary?    Muscle weakness Yes    Chronic midline low back pain without sciatica     Chronic low back pain without sciatica, unspecified back pain laterality        Physician: Nolberto Leone MD    Visit Date: 6/21/2023    Physician: Nolberto Leone MD     Physician Orders: PT Eval and Treat   Medical Diagnosis from Referral: M43.17 (ICD-10-CM) - Spondylolisthesis at L5-S1 level  Evaluation Date: 2/22/2023  Authorization Period Expiration: 12/15/2023  Plan of Care Expiration: 8/21/2023  Progress Note Due: 7/21/2023  Visit # / Visits authorized: 2/40       Time In: 1:45pm  Time Out: 2:45m  Total Billable Time: 60 minutes (3TE medicaid) Tech assist    Precautions: Standard   SUBJECTIVE     Pt reports: he thought he was done with PT before but he still cannot walk good. Prior to coming here he was walking this way 2.5 years prior to starting therapy. He just wants to walk better. He still has trouble moving the right leg. Can only stand for 15 min before he has to sit down and take a break or hold himself up at a wall. When he walks too much, he will feel heavy.   He was compliant with home exercise program.  Response to previous treatment: no change   Functional change: none    Pain: 5/10 - sitting, standing, walking.   Location:  lower back      OBJECTIVE     Objective Measures updated at progress report unless specified.     Lumbar range of motion:  Full flexion slight pain  75% limited extension c pain  Full lateral flexion with pain  Rotation not tested        L/E MMT Right Left Pain/Dysfunction with Movement   Hip Flexion 3+/5 4-/5    Hip Extension 3/5 3/5    Hip Abduction 3-/5 3-/5    Knee Flexion 4+/5 4+/5    Knee Extension 4+/5 4+/5      Gait: Pt ambulates with excessive lumbar lordosis, lack of hip extension, bilateral external  "rotation, abnormal arm swing.     TREATMENT     Emeka received the treatments listed below:      received therapeutic exercises to develop strength and ROM for 35  minutes including:  Bridge  Supine hip flexor stretch off table 2x10 10"  Re-assessment made above    neuromuscular re-education activities to improve: Balance, Coordination, Kinesthetic, Sense, Proprioception, and Posture for 25 minutes. The following activities were included:    Lumbar lock hip ext green theraband - 3x10 bilateral  Hip hinge c dowel 3x12  Sitting on ball chioma 3x12 c cuing on posterior pelvic tilt    PATIENT EDUCATION AND HOME EXERCISES     Education provided:   - Pt educated on likelihood of returning to gait mechancis he had years ago  - Pt educated on new HEP     Written Home Exercises Provided: yes. Exercises were reviewed and Emeka was able to demonstrate them prior to the end of the session.  Emeka demonstrated good  understanding of the education provided. See EMR under Patient Instructions for exercises provided during therapy sessions.    ASSESSMENT   Emeka returns to PT today after assuming he was discharged last visit. Based on reassessment, pt has lower extremity weakness especially in hip abductors. This was extremely difficulty. Pt also struggle to maintain posterior pelvic tilt. Neuro re-ed to attempt to retrain core strength and control during activities that require simultaneous lower extremity motion. This was difficult for pt to perform. Unclear if gait will fully return, but focus on hip strength and core control during functional tasks, may improve mechanics and reduce return of lumbar pain.     Emeka Is progressing well towards his goals.   Pt prognosis is Good.     Pt will continue to benefit from skilled outpatient physical therapy to address the deficits listed in the problem list box on initial evaluation, provide pt/family education and to maximize pt's level of independence in the home " and community environment.     Pt's spiritual, cultural and educational needs considered and pt agreeable to plan of care and goals.     Anticipated barriers to physical therapy: none    Goals:  Short Term Goals: (4 Weeks)  Pt will be ind with initial home exercise program in order to facilitate PT.   Pt will improve glute med strength to 4-/5 in order to further improve gait mechanics.   Pt will be able to maintain a posterior pelvic tilt during standing marching in order to improve gait mechanics.   Pt will reduce worst pain to 3/10 in order to return to full work activities.      Long Term Goals: (8weeks)  Pt will be independent c final home exercise program in order to DC to home program.   Pt will reduce worst pain to 1/10 in order to return to full work activities.   Pt will improve glute med strength to 4/5 in order to further improve gait mechanics.     PLAN     See updated plan of care for details.     Tran Sanches, PT, DPT, OCS

## 2023-06-21 NOTE — PLAN OF CARE
[facility]  Physical Therapy Plan of Care Note     [facility]  [facility] Number[facility] [id number]    Therapy Diagnosis:   Encounter Diagnoses   Name Primary?    Muscle weakness Yes    Chronic midline low back pain without sciatica     Chronic low back pain without sciatica, unspecified back pain laterality      Physician: Nolberto Leone MD    Visit Date: 6/21/2023    Physician Orders: PT Eval and Treat   Medical Diagnosis from Referral: M43.17 (ICD-10-CM) - Spondylolisthesis at L5-S1 level  Evaluation Date: 2/22/2023  Authorization Period Expiration: 12/15/2023  Plan of Care Expiration: 8/21/2023  Progress Note Due: 7/21/2023  Visit # / Visits authorized: 2/40     Precautions: Standard     SUBJECTIVE     Update:   Pt reports: he thought he was done with PT before but he still cannot walk good. Prior to coming here he was walking this way 2.5 years prior to starting therapy. He just wants to walk better. He still has trouble moving the right leg. Can only stand for 15 min before he has to sit down and take a break or hold himself up at a wall. When he walks too much, he will feel heavy.   He was compliant with home exercise program.  Response to previous treatment: no change   Functional change: none     Pain: 5/10 - sitting, standing, walking.   Location:  lower back      OBJECTIVE     Update:   Lumbar range of motion:  Full flexion slight pain  75% limited extension c pain  Full lateral flexion with pain  Rotation not tested           L/E MMT Right Left Pain/Dysfunction with Movement   Hip Flexion 3+/5 4-/5     Hip Extension 3/5 3/5     Hip Abduction 3-/5 3-/5     Knee Flexion 4+/5 4+/5     Knee Extension 4+/5 4+/5        Gait: Pt ambulates with excessive lumbar lordosis, lack of hip extension, bilateral external rotation, abnormal arm swing.     ASSESSMENT     Update:   Emeka returns to PT today after assuming he was discharged last visit. Based on reassessment, pt has  lower extremity weakness especially in hip abductors. This was extremely difficulty. Pt also struggle to maintain posterior pelvic tilt. Neuro re-ed to attempt to retrain core strength and control during activities that require simultaneous lower extremity motion. This was difficult for pt to perform. Unclear if gait will fully return, but focus on hip strength and core control during functional tasks, may improve mechanics and reduce return of lumbar pain.      Emeka Is progressing well towards his goals.   Pt prognosis is Good.      Pt will continue to benefit from skilled outpatient physical therapy to address the deficits listed in the problem list box on initial evaluation, provide pt/family education and to maximize pt's level of independence in the home and community environment.      Pt's spiritual, cultural and educational needs considered and pt agreeable to plan of care and goals.     Anticipated barriers to physical therapy: none     Goals:  Short Term Goals: (4 Weeks)  Pt will be ind with initial home exercise program in order to facilitate PT.   Pt will improve glute med strength to 4-/5 in order to further improve gait mechanics.   Pt will be able to maintain a posterior pelvic tilt during standing marching in order to improve gait mechanics.   Pt will reduce worst pain to 3/10 in order to return to full work activities.      Long Term Goals: (8weeks)  Pt will be independent c final home exercise program in order to DC to home program.   Pt will reduce worst pain to 1/10 in order to return to full work activities.   Pt will improve glute med strength to 4/5 in order to further improve gait mechanics.     Previous Short Term Goals Status:   see above  New Short Term Goals Status:   see above  Long Term Goal Status: modified:  see above  Reasons for Recertification of Therapy:   abnormal gait, strength deficits.     GOALS  See above    PLAN     Updated Certification Period: 6/21/2023 to 8/21/2023    Recommended Treatment Plan: 2 times per week for 8 weeks:  Electrical Stimulation PRN, Gait Training, Manual Therapy, Neuromuscular Re-ed, Patient Education, Therapeutic Activities, and Therapeutic Exercise  Other Recommendations: see above    Tran Sanches, PT, DPT, OCS

## 2023-06-29 ENCOUNTER — CLINICAL SUPPORT (OUTPATIENT)
Dept: REHABILITATION | Facility: HOSPITAL | Age: 48
End: 2023-06-29
Payer: MEDICAID

## 2023-06-29 DIAGNOSIS — G89.29 CHRONIC MIDLINE LOW BACK PAIN WITHOUT SCIATICA: ICD-10-CM

## 2023-06-29 DIAGNOSIS — M54.50 CHRONIC LOW BACK PAIN WITHOUT SCIATICA, UNSPECIFIED BACK PAIN LATERALITY: ICD-10-CM

## 2023-06-29 DIAGNOSIS — G89.29 CHRONIC LOW BACK PAIN WITHOUT SCIATICA, UNSPECIFIED BACK PAIN LATERALITY: ICD-10-CM

## 2023-06-29 DIAGNOSIS — M54.50 CHRONIC MIDLINE LOW BACK PAIN WITHOUT SCIATICA: ICD-10-CM

## 2023-06-29 DIAGNOSIS — M62.81 MUSCLE WEAKNESS: Primary | ICD-10-CM

## 2023-06-29 PROCEDURE — 97110 THERAPEUTIC EXERCISES: CPT | Mod: PN

## 2023-06-29 NOTE — PROGRESS NOTES
"[facility]   Physical Therapy Treatment Note     Name: Emeka [facility]Lowery  [facility] Number: [id number]    Therapy Diagnosis:   Encounter Diagnoses   Name Primary?    Muscle weakness Yes    Chronic midline low back pain without sciatica     Chronic low back pain without sciatica, unspecified back pain laterality          Physician: Yolie Vickers NP    Visit Date: 6/29/2023    Physician: Nolberto Leone MD     Physician Orders: PT Eval and Treat   Medical Diagnosis from Referral: M43.17 (ICD-10-CM) - Spondylolisthesis at L5-S1 level  Evaluation Date: 2/22/2023  Authorization Period Expiration: 12/15/2023  Plan of Care Expiration: 8/21/2023  Progress Note Due: 7/21/2023  Visit # / Visits authorized: 3/40       Time In: 7:00am  Time Out: 8:00am  Total Billable Time: 60 minutes (2TE medicaid) Tech assist    Precautions: Standard   SUBJECTIVE     Pt reports: Felt good after last session but has pain today, probably because he slept on a couch.   He was compliant with home exercise program.  Response to previous treatment: no change   Functional change: none    Pain: 5/10 - sitting, standing, walking.   Location:  lower back      OBJECTIVE     Objective Measures updated at progress report unless specified.     Lumbar range of motion:  Full flexion slight pain  75% limited extension c pain  Full lateral flexion with pain  Rotation not tested        L/E MMT Right Left Pain/Dysfunction with Movement   Hip Flexion 3+/5 4-/5    Hip Extension 3/5 3/5    Hip Abduction 3-/5 3-/5    Knee Flexion 4+/5 4+/5    Knee Extension 4+/5 4+/5      Gait: Pt ambulates with excessive lumbar lordosis, lack of hip extension, bilateral external rotation, abnormal arm swing.     TREATMENT     Emeka received the treatments listed below:      received therapeutic exercises to develop strength and ROM for 35  minutes including:  Bridge 3x10 5"  DKTC c ball 2x20   Supine hip flexor stretch off table 2x10 " "10"    neuromuscular re-education activities to improve: Balance, Coordination, Kinesthetic, Sense, Proprioception, and Posture for 25 minutes. The following activities were included:    Lumbar lock hip ext green theraband - 3x10 bilateral  Hip hinge c dowel 3x12  Sitting on ball chioma 3x12 c cuing on posterior pelvic tilt    PATIENT EDUCATION AND HOME EXERCISES     Education provided:   - Pt educated on likelihood of returning to gait mechancis he had years ago  - Pt educated on new HEP     Written Home Exercises Provided: yes. Exercises were reviewed and Emeka was able to demonstrate them prior to the end of the session.  Emeka demonstrated good  understanding of the education provided. See EMR under Patient Instructions for exercises provided during therapy sessions.    ASSESSMENT   Emeka returns to PT today with slightly increased LBP most likely due to sleeping situation. He continues to ambulates with increased lumbar lordosis and bilateral hip external rotation. PT continues to focus on hip mobility, core control reducing lordosis and functional movement to encourage proper gait mechanics.     Emeka Is progressing well towards his goals.   Pt prognosis is Good.     Pt will continue to benefit from skilled outpatient physical therapy to address the deficits listed in the problem list box on initial evaluation, provide pt/family education and to maximize pt's level of independence in the home and community environment.     Pt's spiritual, cultural and educational needs considered and pt agreeable to plan of care and goals.     Anticipated barriers to physical therapy: none    Goals:  Short Term Goals: (4 Weeks)  Pt will be ind with initial home exercise program in order to facilitate PT.   Pt will improve glute med strength to 4-/5 in order to further improve gait mechanics.   Pt will be able to maintain a posterior pelvic tilt during standing marching in order to improve gait mechanics.   Pt " will reduce worst pain to 3/10 in order to return to full work activities.      Long Term Goals: (8weeks)  Pt will be independent c final home exercise program in order to DC to home program.   Pt will reduce worst pain to 1/10 in order to return to full work activities.   Pt will improve glute med strength to 4/5 in order to further improve gait mechanics.     PLAN     See updated plan of care for details.     Tran Sanches, PT, DPT, OCS

## 2023-07-07 ENCOUNTER — CLINICAL SUPPORT (OUTPATIENT)
Dept: REHABILITATION | Facility: HOSPITAL | Age: 48
End: 2023-07-07
Payer: MEDICAID

## 2023-07-07 DIAGNOSIS — G89.29 CHRONIC LOW BACK PAIN WITHOUT SCIATICA, UNSPECIFIED BACK PAIN LATERALITY: ICD-10-CM

## 2023-07-07 DIAGNOSIS — M62.81 MUSCLE WEAKNESS: Primary | ICD-10-CM

## 2023-07-07 DIAGNOSIS — G89.29 CHRONIC MIDLINE LOW BACK PAIN WITHOUT SCIATICA: ICD-10-CM

## 2023-07-07 DIAGNOSIS — M54.50 CHRONIC MIDLINE LOW BACK PAIN WITHOUT SCIATICA: ICD-10-CM

## 2023-07-07 DIAGNOSIS — M54.50 CHRONIC LOW BACK PAIN WITHOUT SCIATICA, UNSPECIFIED BACK PAIN LATERALITY: ICD-10-CM

## 2023-07-07 PROCEDURE — 97110 THERAPEUTIC EXERCISES: CPT | Mod: PN

## 2023-07-07 NOTE — PROGRESS NOTES
"[facility]   Physical Therapy Treatment Note     [facility]  [facility] [id number]    Therapy Diagnosis:   Encounter Diagnoses   Name Primary?    Muscle weakness Yes    Chronic midline low back pain without sciatica     Chronic low back pain without sciatica, unspecified back pain laterality        Physician: Nolberto Leone MD    Visit Date: 7/7/2023    Physician: Nolberto Leone MD     Physician Orders: PT Eval and Treat   Medical Diagnosis from Referral: M43.17 (ICD-10-CM) - Spondylolisthesis at L5-S1 level  Evaluation Date: 2/22/2023  Authorization Period Expiration: 12/15/2023  Plan of Care Expiration: 8/21/2023  Progress Note Due: 7/21/2023  Visit # / Visits authorized: 4/40     Time In: 8:15am  Time Out: 9:11am  Total Billable Time: 56 minutes (2TE medicaid) Tech assist    Precautions: Standard   SUBJECTIVE     Pt reports: Doing well, not as bad as it has been.   He was compliant with home exercise program.  Response to previous treatment: no change   Functional change: none    Pain: 3/10 - sitting, standing, walking.   Location:  lower back      OBJECTIVE     Objective Measures updated at progress report unless specified.     Lumbar range of motion:  Full flexion slight pain  75% limited extension c pain  Full lateral flexion with pain  Rotation not tested        L/E MMT Right Left Pain/Dysfunction with Movement   Hip Flexion 3+/5 4-/5    Hip Extension 3/5 3/5    Hip Abduction 3-/5 3-/5    Knee Flexion 4+/5 4+/5    Knee Extension 4+/5 4+/5      Gait: Pt ambulates with excessive lumbar lordosis, lack of hip extension, bilateral external rotation, abnormal arm swing.     TREATMENT     Emeka received the treatments listed below:      received therapeutic exercises to develop strength and ROM for 25  minutes including:  Bridge 3x10 5"  DKTC c ball 2x20   Supine hip flexor stretch off table 2x10 10"    neuromuscular re-education activities to improve: Balance, " Coordination, Kinesthetic, Sense, Proprioception, and Posture for 20 minutes. The following activities were included:    Lumbar lock hip ext green theraband - 3x10 bilateral  Hip hinge c dowel 3x12 sit to stand   Sitting on ball chioma 3x12 c cuing on posterior pelvic tilt    Emeka participated in gait training to improve functional mobility and safety for 10  minutes, including:  Ambulation with cuing to engage core, posterior pelvic tilt and improve JACK. Balance difficulty noted. Cane then utilized and pt was able to perform more smoothly. Pt encouraged to utilize cane while continuein to progress mobility with proper form.     PATIENT EDUCATION AND HOME EXERCISES     Education provided:   - Pt educated on likelihood of returning to gait mechancis he had years ago  - Pt educated on new HEP     Written Home Exercises Provided: yes. Exercises were reviewed and Emeka was able to demonstrate them prior to the end of the session.  Emeka demonstrated good  understanding of the education provided. See EMR under Patient Instructions for exercises provided during therapy sessions.    ASSESSMENT   Emeka returns to PT today with improvement in back pain. He continues to ambulate with increased lordosis and reduced hip extension. Pt taught proper gait mechanics and it was found that the use of a cane to improve balance allowed pt to ambulate with less difficulty. Pt encouraged to utilize cane while improvements continue to be made in gait and strength.     Emeka Is progressing well towards his goals.   Pt prognosis is Good.     Pt will continue to benefit from skilled outpatient physical therapy to address the deficits listed in the problem list box on initial evaluation, provide pt/family education and to maximize pt's level of independence in the home and community environment.     Pt's spiritual, cultural and educational needs considered and pt agreeable to plan of care and goals.     Anticipated  barriers to physical therapy: none    Goals:  Short Term Goals: (4 Weeks)  Pt will be ind with initial home exercise program in order to facilitate PT.   Pt will improve glute med strength to 4-/5 in order to further improve gait mechanics.   Pt will be able to maintain a posterior pelvic tilt during standing marching in order to improve gait mechanics.   Pt will reduce worst pain to 3/10 in order to return to full work activities.      Long Term Goals: (8weeks)  Pt will be independent c final home exercise program in order to DC to home program.   Pt will reduce worst pain to 1/10 in order to return to full work activities.   Pt will improve glute med strength to 4/5 in order to further improve gait mechanics.     PLAN     See updated plan of care for details.     Tran Sanches, PT, DPT, OCS

## 2023-07-17 ENCOUNTER — CLINICAL SUPPORT (OUTPATIENT)
Dept: REHABILITATION | Facility: HOSPITAL | Age: 48
End: 2023-07-17
Payer: MEDICAID

## 2023-07-17 DIAGNOSIS — G89.29 CHRONIC LOW BACK PAIN WITHOUT SCIATICA, UNSPECIFIED BACK PAIN LATERALITY: ICD-10-CM

## 2023-07-17 DIAGNOSIS — G89.29 CHRONIC MIDLINE LOW BACK PAIN WITHOUT SCIATICA: ICD-10-CM

## 2023-07-17 DIAGNOSIS — M54.50 CHRONIC LOW BACK PAIN WITHOUT SCIATICA, UNSPECIFIED BACK PAIN LATERALITY: ICD-10-CM

## 2023-07-17 DIAGNOSIS — M54.50 CHRONIC MIDLINE LOW BACK PAIN WITHOUT SCIATICA: ICD-10-CM

## 2023-07-17 DIAGNOSIS — M62.81 MUSCLE WEAKNESS: Primary | ICD-10-CM

## 2023-07-17 PROCEDURE — 97110 THERAPEUTIC EXERCISES: CPT | Mod: PN

## 2023-07-17 NOTE — PROGRESS NOTES
"[facility]   Physical Therapy Treatment Note     Name: Emeka Caballero  [facility] Number: [id number]    Therapy Diagnosis:   Encounter Diagnoses   Name Primary?    Muscle weakness Yes    Chronic midline low back pain without sciatica     Chronic low back pain without sciatica, unspecified back pain laterality          Physician: Nolberto Leone MD    Visit Date: 7/17/2023    Physician: Nolberto Leone MD     Physician Orders: PT Eval and Treat   Medical Diagnosis from Referral: M43.17 (ICD-10-CM) - Spondylolisthesis at L5-S1 level  Evaluation Date: 2/22/2023  Authorization Period Expiration: 12/15/2023  Plan of Care Expiration: 8/21/2023  Progress Note Due: 7/21/2023  Visit # / Visits authorized: 5/40     Time In: 8:15am  Time Out: 9:15am  Total Billable Time: 56 minutes (2TE medicaid) Tech assist    Precautions: Standard   SUBJECTIVE     Pt reports: Walking with the cane and he feels like he is more stable and walk is better.   He was compliant with home exercise program.  Response to previous treatment: no change   Functional change: walking with a cane    Pain: 3/10 - sitting, standing, walking.   Location:  lower back      OBJECTIVE     Objective Measures updated at progress report unless specified.     Lumbar range of motion:  Full flexion slight pain  75% limited extension c pain  Full lateral flexion with pain  Rotation not tested        L/E MMT Right Left Pain/Dysfunction with Movement   Hip Flexion 3+/5 4-/5    Hip Extension 3/5 3/5    Hip Abduction 3-/5 3-/5    Knee Flexion 4+/5 4+/5    Knee Extension 4+/5 4+/5      Gait: Pt ambulates with excessive lumbar lordosis, lack of hip extension, bilateral external rotation, abnormal arm swing.     TREATMENT     Emeka received the treatments listed below:      received therapeutic exercises to develop strength and ROM for 25  minutes including:  Nustep 7 min lv 5  Bridge 3x10 5" 7#  DKTC c ball 2x20   Supine hip flexor stretch " "off table 2x10 10"    neuromuscular re-education activities to improve: Balance, Coordination, Kinesthetic, Sense, Proprioception, and Posture for 20 minutes. The following activities were included:    Lumbar lock hip ext green theraband - 3x10 bilateral  Hip hinge c dowel 3x12 sit to stand   Sitting on ball chioma 3x12 c cuing on posterior pelvic tilt    Emeka participated in gait training to improve functional mobility and safety for 10  minutes, including:  Ambulation with cuing to engage core, posterior pelvic tilt and improve JACK. Balance difficulty noted. Cane then utilized and pt was able to perform more smoothly. Pt encouraged to utilize cane while continuein to progress mobility with proper form.     PATIENT EDUCATION AND HOME EXERCISES     Education provided:   - Pt educated on likelihood of returning to gait mechancis he had years ago  - Pt educated on new HEP     Written Home Exercises Provided: yes. Exercises were reviewed and Emeka was able to demonstrate them prior to the end of the session.  Emeka demonstrated good  understanding of the education provided. See EMR under Patient Instructions for exercises provided during therapy sessions.    ASSESSMENT   Emeka returns to PT today with improvement in gait mechanics. While utilizing a cane, he is able to make corrections to posture that reduces risk of falls. Continued motor control and mobility training to provide pt with the most available and controlled motion as possible. PT to continue to address deficits in mobility and improve gait mechanics.     Emeka Is progressing well towards his goals.   Pt prognosis is Good.     Pt will continue to benefit from skilled outpatient physical therapy to address the deficits listed in the problem list box on initial evaluation, provide pt/family education and to maximize pt's level of independence in the home and community environment.     Pt's spiritual, cultural and educational needs " considered and pt agreeable to plan of care and goals.     Anticipated barriers to physical therapy: none    Goals:  Short Term Goals: (4 Weeks)  Pt will be ind with initial home exercise program in order to facilitate PT.   Pt will improve glute med strength to 4-/5 in order to further improve gait mechanics.   Pt will be able to maintain a posterior pelvic tilt during standing marching in order to improve gait mechanics.   Pt will reduce worst pain to 3/10 in order to return to full work activities.      Long Term Goals: (8weeks)  Pt will be independent c final home exercise program in order to DC to home program.   Pt will reduce worst pain to 1/10 in order to return to full work activities.   Pt will improve glute med strength to 4/5 in order to further improve gait mechanics.     PLAN     See updated plan of care for details.     Tran Sanches, PT, DPT, OCS

## 2023-07-21 ENCOUNTER — CLINICAL SUPPORT (OUTPATIENT)
Dept: REHABILITATION | Facility: HOSPITAL | Age: 48
End: 2023-07-21
Payer: MEDICAID

## 2023-07-21 DIAGNOSIS — G89.29 CHRONIC MIDLINE LOW BACK PAIN WITHOUT SCIATICA: ICD-10-CM

## 2023-07-21 DIAGNOSIS — M54.50 CHRONIC MIDLINE LOW BACK PAIN WITHOUT SCIATICA: ICD-10-CM

## 2023-07-21 DIAGNOSIS — M54.50 CHRONIC LOW BACK PAIN WITHOUT SCIATICA, UNSPECIFIED BACK PAIN LATERALITY: ICD-10-CM

## 2023-07-21 DIAGNOSIS — M62.81 MUSCLE WEAKNESS: Primary | ICD-10-CM

## 2023-07-21 DIAGNOSIS — G89.29 CHRONIC LOW BACK PAIN WITHOUT SCIATICA, UNSPECIFIED BACK PAIN LATERALITY: ICD-10-CM

## 2023-07-21 PROCEDURE — 97110 THERAPEUTIC EXERCISES: CPT | Mod: PN,CQ

## 2023-07-21 NOTE — PROGRESS NOTES
"[facility]   Physical Therapy Treatment Note     Name: Emeka Caballero  Clinic Number: [id number]    Therapy Diagnosis:   No diagnosis found.        Physician: Nolberto Leone MD    Visit Date: 7/21/2023    Physician: Nolberto Leone MD     Physician Orders: PT Eval and Treat   Medical Diagnosis from Referral: M43.17 (ICD-10-CM) - Spondylolisthesis at L5-S1 level  Evaluation Date: 2/22/2023  Authorization Period Expiration: 12/15/2023  Plan of Care Expiration: 8/21/2023  Progress Note Due: 7/21/2023  Visit # / Visits authorized: 5/eval + 40     PTA Visit: 1/5    Time In: 8:45 am  Time Out: 9:45 am   Total Billable Time: 30 minutes (2TE medicaid)     Precautions: Standard   SUBJECTIVE     Pt reports: Some of the exercises are tough but he will do them so he can start walking better.   He was compliant with home exercise program.  Response to previous treatment: no change   Functional change: walking with a cane    Pain: 3/10 - sitting, standing, walking.   Location:  lower back      OBJECTIVE     Objective Measures updated at progress report unless specified.     Lumbar range of motion:  Full flexion slight pain  75% limited extension c pain  Full lateral flexion with pain  Rotation not tested        L/E MMT Right Left Pain/Dysfunction with Movement   Hip Flexion 3+/5 4-/5    Hip Extension 3/5 3/5    Hip Abduction 3-/5 3-/5    Knee Flexion 4+/5 4+/5    Knee Extension 4+/5 4+/5      Gait: Pt ambulates with excessive lumbar lordosis, lack of hip extension, bilateral external rotation, abnormal arm swing.     TREATMENT     Emeka received the treatments listed below:      received therapeutic exercises to develop strength and ROM for 25  minutes including:  Nustep 9 min lv 5  Bridge 3x10 5" +9#  DKTC c ball 2x20   Supine hip flexor stretch off table 2x10 10"    neuromuscular re-education activities to improve: Balance, Coordination, Kinesthetic, Sense, Proprioception, and Posture for " 20 minutes. The following activities were included:    Lumbar lock hip ext green theraband - 3x10 bilateral  Hip hinge c dowel 3x12 sit to stand   Sitting on ball chioma 3x12 c cuing on posterior pelvic tilt    Emeka participated in gait training to improve functional mobility and safety for 00  minutes, including:  Ambulation with cuing to engage core, posterior pelvic tilt and improve JACK. Balance difficulty noted. Cane then utilized and pt was able to perform more smoothly. Pt encouraged to utilize cane while continuein to progress mobility with proper form.     PATIENT EDUCATION AND HOME EXERCISES     Education provided:   - Pt educated on likelihood of returning to gait mechancis he had years ago  - Pt educated on new HEP     Written Home Exercises Provided: yes. Exercises were reviewed and Emeka was able to demonstrate them prior to the end of the session.  Emeka demonstrated good  understanding of the education provided. See EMR under Patient Instructions for exercises provided during therapy sessions.    ASSESSMENT   Emeka presented to PT ambulating with SPC and improved gait mechanics.  Continued with  exercises for lumbo-pelvic mobility and control.  Emeka was challenged with performing PPT on SB, requiring heavy verbal/tactile cues to perform with correct lumbo-pelvic mechanics.  Will continue to address mobility deficits, as well as improved gait mechanics.       Emeka Is progressing well towards his goals.   Pt prognosis is Good.     Pt will continue to benefit from skilled outpatient physical therapy to address the deficits listed in the problem list box on initial evaluation, provide pt/family education and to maximize pt's level of independence in the home and community environment.     Pt's spiritual, cultural and educational needs considered and pt agreeable to plan of care and goals.     Anticipated barriers to physical therapy: none    Goals:  Short Term Goals: (4  Weeks)  Pt will be ind with initial home exercise program in order to facilitate PT.   Pt will improve glute med strength to 4-/5 in order to further improve gait mechanics.   Pt will be able to maintain a posterior pelvic tilt during standing marching in order to improve gait mechanics.   Pt will reduce worst pain to 3/10 in order to return to full work activities.      Long Term Goals: (8weeks)  Pt will be independent c final home exercise program in order to DC to home program.   Pt will reduce worst pain to 1/10 in order to return to full work activities.   Pt will improve glute med strength to 4/5 in order to further improve gait mechanics.     PLAN     See updated plan of care for details.     Lucille Chase, PTA

## 2023-07-27 ENCOUNTER — CLINICAL SUPPORT (OUTPATIENT)
Dept: REHABILITATION | Facility: HOSPITAL | Age: 48
End: 2023-07-27
Payer: MEDICAID

## 2023-07-27 DIAGNOSIS — M54.50 CHRONIC MIDLINE LOW BACK PAIN WITHOUT SCIATICA: ICD-10-CM

## 2023-07-27 DIAGNOSIS — G89.29 CHRONIC LOW BACK PAIN WITHOUT SCIATICA, UNSPECIFIED BACK PAIN LATERALITY: ICD-10-CM

## 2023-07-27 DIAGNOSIS — G89.29 CHRONIC MIDLINE LOW BACK PAIN WITHOUT SCIATICA: ICD-10-CM

## 2023-07-27 DIAGNOSIS — M54.50 CHRONIC LOW BACK PAIN WITHOUT SCIATICA, UNSPECIFIED BACK PAIN LATERALITY: ICD-10-CM

## 2023-07-27 DIAGNOSIS — M62.81 MUSCLE WEAKNESS: Primary | ICD-10-CM

## 2023-07-27 PROCEDURE — 97110 THERAPEUTIC EXERCISES: CPT | Mod: PN

## 2023-07-27 NOTE — PROGRESS NOTES
"[facility]   Physical Therapy Treatment Note     Name: Emeka Caballero  Clinic Number: [id number]    Therapy Diagnosis:   Encounter Diagnoses   Name Primary?    Muscle weakness Yes    Chronic midline low back pain without sciatica     Chronic low back pain without sciatica, unspecified back pain laterality      Physician: Nolberto Leone MD    Visit Date: 7/27/2023    Physician: Nolberto Leone MD     Physician Orders: PT Eval and Treat   Medical Diagnosis from Referral: M43.17 (ICD-10-CM) - Spondylolisthesis at L5-S1 level  Evaluation Date: 2/22/2023  Authorization Period Expiration: 12/31/2023  Plan of Care Expiration: 8/21/2023  Progress Note Due: 7/21/2023  Visit # / Visits authorized: 6/40 (+Evaluation)     PTA Visit: 0/5    Time In: 5:30 PM  Time Out: 6:25 PM   Total Billable Time: 55 minutes    Precautions: Standard   SUBJECTIVE     Pt reports: No new complaints. Continuing to feel fatigued when standing for too long.   He was compliant with home exercise program.  Response to previous treatment: no change   Functional change: walking with a cane    Pain: 3/10 - sitting, standing, walking.   Location:  lower back      OBJECTIVE     Lumbar range of motion:  Full flexion slight pain  75% limited extension c pain  Full lateral flexion with pain  Rotation not tested        L/E MMT Right Left Pain/Dysfunction with Movement   Hip Flexion 3+/5 4-/5    Hip Extension 3/5 3/5    Hip Abduction 3-/5 3-/5    Knee Flexion 4+/5 4+/5    Knee Extension 4+/5 4+/5      Gait: Pt ambulates with excessive lumbar lordosis, lack of hip extension, bilateral external rotation, abnormal arm swing.     TREATMENT     Emeka received the treatments listed below:      received therapeutic exercises to develop strength and ROM for 30 minutes including:  Nustep 8 min lv 5  Bridge 3x10 5" +9#  +Supine SLR's: 3x10, B  DKTC c ball 2x20   Supine hip flexor stretch off table 2x10 10"    neuromuscular " re-education activities to improve: Balance, Coordination, Kinesthetic, Sense, Proprioception, and Posture for 25 minutes. The following activities were included:  Lumbar lock hip ext green theraband - 3x10 bilateral  Hip hinge c dowel 3x12 sit to stand   Sitting on ball chioma 3x12 c cuing on posterior pelvic tilt  +Pallof Press: 20x, B, GTB      PATIENT EDUCATION AND HOME EXERCISES     Education provided:   - Pt educated on likelihood of returning to gait mechancis he had years ago  - Pt educated on new HEP     Written Home Exercises Provided: yes. Exercises were reviewed and Emeka was able to demonstrate them prior to the end of the session.  Emeka demonstrated good  understanding of the education provided. See EMR under Patient Instructions for exercises provided during therapy sessions.    ASSESSMENT     Emeka presented to PT ambulating with SPC and improved gait mechanics. Tolerated session well with continued focus on improving lumbar mobility, core strength, and gross LE strength. Required increased rest breaks in order for proper performance of exercises as to avoid compensation. Implemented core strengthening today with pallof press and good performance. Will continue to address mobility deficits, as well as improved gait mechanics.       Emeka Is progressing well towards his goals.   Pt prognosis is Good.     Pt will continue to benefit from skilled outpatient physical therapy to address the deficits listed in the problem list box on initial evaluation, provide pt/family education and to maximize pt's level of independence in the home and community environment.     Pt's spiritual, cultural and educational needs considered and pt agreeable to plan of care and goals.     Anticipated barriers to physical therapy: none    Goals:  Short Term Goals: (4 Weeks)  Pt will be ind with initial home exercise program in order to facilitate PT. Progressing, Not Met  Pt will improve glute med strength to  4-/5 in order to further improve gait mechanics. Progressing, Not Met  Pt will be able to maintain a posterior pelvic tilt during standing marching in order to improve gait mechanics. Progressing, Not Met  Pt will reduce worst pain to 3/10 in order to return to full work activities. Progressing, Not Met     Long Term Goals: (8weeks)  Pt will be independent c final home exercise program in order to DC to home program. Progressing, Not Met  Pt will reduce worst pain to 1/10 in order to return to full work activities. Progressing, Not Met  Pt will improve glute med strength to 4/5 in order to further improve gait mechanics. Progressing, Not Met    PLAN     Not performed today:   Emeka participated in gait training to improve functional mobility and safety for 00  minutes, including:  Ambulation with cuing to engage core, posterior pelvic tilt and improve JACK. Balance difficulty noted. Cane then utilized and pt was able to perform more smoothly. Pt encouraged to utilize cane while continuein to progress mobility with proper form.     Kaley Tan, PT

## 2023-08-03 ENCOUNTER — OFFICE VISIT (OUTPATIENT)
Dept: NEUROSURGERY | Facility: CLINIC | Age: 48
End: 2023-08-03
Payer: MEDICAID

## 2023-08-03 ENCOUNTER — PATIENT MESSAGE (OUTPATIENT)
Dept: NEUROSURGERY | Facility: CLINIC | Age: 48
End: 2023-08-03

## 2023-08-03 VITALS — DIASTOLIC BLOOD PRESSURE: 78 MMHG | SYSTOLIC BLOOD PRESSURE: 121 MMHG | HEART RATE: 90 BPM

## 2023-08-03 DIAGNOSIS — Z98.1 HISTORY OF LUMBAR FUSION: Primary | ICD-10-CM

## 2023-08-03 PROCEDURE — 3078F DIAST BP <80 MM HG: CPT | Mod: CPTII,,, | Performed by: NEUROLOGICAL SURGERY

## 2023-08-03 PROCEDURE — 99213 OFFICE O/P EST LOW 20 MIN: CPT | Mod: S$PBB,,, | Performed by: NEUROLOGICAL SURGERY

## 2023-08-03 PROCEDURE — 1159F MED LIST DOCD IN RCRD: CPT | Mod: CPTII,,, | Performed by: NEUROLOGICAL SURGERY

## 2023-08-03 PROCEDURE — 4010F ACE/ARB THERAPY RXD/TAKEN: CPT | Mod: CPTII,,, | Performed by: NEUROLOGICAL SURGERY

## 2023-08-03 PROCEDURE — 99999 PR PBB SHADOW E&M-EST. PATIENT-LVL III: CPT | Mod: PBBFAC,,, | Performed by: NEUROLOGICAL SURGERY

## 2023-08-03 PROCEDURE — 3074F SYST BP LT 130 MM HG: CPT | Mod: CPTII,,, | Performed by: NEUROLOGICAL SURGERY

## 2023-08-03 PROCEDURE — 3074F PR MOST RECENT SYSTOLIC BLOOD PRESSURE < 130 MM HG: ICD-10-PCS | Mod: CPTII,,, | Performed by: NEUROLOGICAL SURGERY

## 2023-08-03 PROCEDURE — 3072F LOW RISK FOR RETINOPATHY: CPT | Mod: CPTII,,, | Performed by: NEUROLOGICAL SURGERY

## 2023-08-03 PROCEDURE — 3044F HG A1C LEVEL LT 7.0%: CPT | Mod: CPTII,,, | Performed by: NEUROLOGICAL SURGERY

## 2023-08-03 PROCEDURE — 4010F PR ACE/ARB THEARPY RXD/TAKEN: ICD-10-PCS | Mod: CPTII,,, | Performed by: NEUROLOGICAL SURGERY

## 2023-08-03 PROCEDURE — 3044F PR MOST RECENT HEMOGLOBIN A1C LEVEL <7.0%: ICD-10-PCS | Mod: CPTII,,, | Performed by: NEUROLOGICAL SURGERY

## 2023-08-03 PROCEDURE — 3078F PR MOST RECENT DIASTOLIC BLOOD PRESSURE < 80 MM HG: ICD-10-PCS | Mod: CPTII,,, | Performed by: NEUROLOGICAL SURGERY

## 2023-08-03 PROCEDURE — 99999 PR PBB SHADOW E&M-EST. PATIENT-LVL III: ICD-10-PCS | Mod: PBBFAC,,, | Performed by: NEUROLOGICAL SURGERY

## 2023-08-03 PROCEDURE — 99213 PR OFFICE/OUTPT VISIT, EST, LEVL III, 20-29 MIN: ICD-10-PCS | Mod: S$PBB,,, | Performed by: NEUROLOGICAL SURGERY

## 2023-08-03 PROCEDURE — 99213 OFFICE O/P EST LOW 20 MIN: CPT | Mod: PBBFAC | Performed by: NEUROLOGICAL SURGERY

## 2023-08-03 PROCEDURE — 1159F PR MEDICATION LIST DOCUMENTED IN MEDICAL RECORD: ICD-10-PCS | Mod: CPTII,,, | Performed by: NEUROLOGICAL SURGERY

## 2023-08-03 PROCEDURE — 3072F PR LOW RISK FOR RETINOPATHY: ICD-10-PCS | Mod: CPTII,,, | Performed by: NEUROLOGICAL SURGERY

## 2023-08-03 NOTE — PROGRESS NOTES
"    CHIEF COMPLAINT:  F/u for back pain    I, Bethany Nunez, attest that this documentation has been prepared under the direction and in the presence of Alphonse Reed MD.    HPI:  Emeka Caballero is a 48 y.o.  male s/p L4-pelvis fusion and L4-S1 TLIF on 02/08/2022 for s/p L4-pelvis fusion and L4-5, L5-S1 TLIF on 02/08/2022 with Dr. Reed. He is being seen today to f/u on concerning pain he has been having in his back since undergoing his procedure. Today he reports he is still having difficulty walking due to the pain he is having in his back. He states it feels as though it feels something is holding it. He reports having injections into the SI joint, with some relief the first time, but not the second time. He notes the symptoms make him feel heavy. He denies any use of nicotine recently. He also endorses that his feet "jump" and he is having bladder dysfunction.       Review of patient's allergies indicates:  No Known Allergies    Past Medical History:   Diagnosis Date    Arthritis     Diabetes mellitus, type 2 2/11/2022    Essential hypertension, benign 5/4/2023     Past Surgical History:   Procedure Laterality Date    CATARACT EXTRACTION Right     EPIDURAL STEROID INJECTION N/A 10/21/2020    Procedure: Injection, Steroid, Epidural Caudal;  Surgeon: Vipul Nixon Jr., MD;  Location: [facility] ENDO;  Service: Pain Management;  Laterality: N/A;  Caudal KURT  Arrive @ 1315; No ATC or DM; Needs MD Signature    INJECTION, SACROILIAC JOINT Bilateral 12/27/2022    Procedure: INJECTION,SACROILIAC JOINT, BILATERAL CONTRAST DIRECT REF  ORDERED;  Surgeon: Brilele José MD;  Location: [facility]IN MGT;  Service: Pain Management;  Laterality: Bilateral;    LASER ENUCLEATION OF PROSTATE N/A 3/14/2023    Procedure: ENUCLEATION, PROSTATE, USING LASER;  Surgeon: Cecil Murray MD;  Location: [facility] OR;  Service: Urology;  Laterality: N/A;    LUMBAR FUSION N/A 2/8/2022    Procedure: FUSION, SPINE, LUMBAR;  " Surgeon: Alphonse Reed MD;  Location: [facility] OR [address] [place] [address];  Service: Neurosurgery;  Laterality: N/A;  AIRO, L4-S1     Family History   Problem Relation Age of Onset    Cataracts Mother     No Known Problems Father     No Known Problems Sister     No Known Problems Brother     No Known Problems Maternal Aunt     No Known Problems Maternal Uncle     No Known Problems Paternal Aunt     No Known Problems Paternal Uncle     No Known Problems Maternal Grandmother     No Known Problems Maternal Grandfather     No Known Problems Paternal Grandmother     No Known Problems Paternal Grandfather      Social History     Tobacco Use    Smoking status: Former     Current packs/day: 0.00    Smokeless tobacco: Never   Substance Use Topics    Alcohol use: Yes     Comment: socially    Drug use: No        Review of Systems   Constitutional: Negative.    HENT: Negative.     Eyes: Negative.    Respiratory: Negative.     Cardiovascular: Negative.    Gastrointestinal: Negative.    Endocrine: Negative.    Genitourinary: Negative.    Musculoskeletal:  Positive for back pain and gait problem. Negative for neck pain.   Skin: Negative.    Allergic/Immunologic: Negative.    Neurological:  Negative for weakness, light-headedness, numbness and headaches.   Hematological: Negative.    Psychiatric/Behavioral: Negative.     All other systems reviewed and are negative.      OBJECTIVE:   Vital Signs:  Pulse: 90 (08/03/23 1510)  BP: 121/78 (08/03/23 1510)    Physical Exam:    Vital signs: All nursing notes and vital signs reviewed -- afebrile, vital signs stable.  Constitutional: Patient sitting comfortably in chair. Appears well developed and well nourished.  Skin: Exposed areas are intact without abnormal markings, rashes or other lesions. Well healed incision.  HEENT: Normocephalic. Normal conjunctivae.  Cardiovascular: Normal rate and regular rhythm.  Respiratory: Chest wall rises and falls symmetrically, without signs of respiratory  distress.  Abdomen: Soft and non-tender.  Extremities: Warm and without edema. Calves supple, non-tender.  Psych/Behavior: Normal affect.    Neurological:    Mental status: Alert and oriented. Conversational and appropriate.       Cranial Nerves: VFF to confrontation. PERRL. EOMI without nystagmus. Facial STLT normal and symmetric. Strong, symmetric muscles of mastication. Facial strength full and symmetric. Hearing equal bilaterally to finger rub. Palate and uvula rise and fall normally in midline. Shoulder shrug 5/5 strength. Tongue midline.     Motor:    Upper:  Deltoids Triceps Biceps WE WF     R 5/5 5/5 5/5 5/5 5/5 5/5    L 5/5 5/5 5/5 5/5 5/5 5/5      Lower:  HF KE KF DF PF EHL    R 5/5 5/5 5/5 5/5 5/5 5/5    L 5/5 5/5 5/5 5/5 5/5 5/5     Sensory: Intact sensation to light touch in all extremities. Romberg negative.    Reflexes:          DTR: 2+ symmetrically throughout.     Lyons's: Negative.     Babinski's: Negative.     Clonus: Negative.    Cerebellar: Finger-to-nose and rapid alternating movements normal. Antalgic gait.    Spine:    Posture: Stooped posture and bent knees.    Bending: Full ROM with forward, back and lateral bending. No rib prominence with forward bend.    Cervical:      ROM: Full with flexion, extension, lateral rotation and ear-to-shoulder bend.      Midline TTP: Negative.     Spurling's test: Negative.     Lhermitte's: Negative.    Thoracic:     Midline TTP: Negative    Lumbar:     Midline TTP: Negative     Straight Leg Test: Negative     Crossed Straight Leg Test: Negative     Sciatic notch tenderness: Negative.    Other:     SI joint TTP: Positive on the right.     Greater trochanter TTP: Negative.     Tenderness with external/internal hip rotation: Negative.      ASSESSMENT/PLAN:     Emeka Caballero has persistent SI joint pain which is affecting his QOL and posture. He did not respond to SI joint injections. We will repeat the imaging to see whats going on. Last imaging  was reassuring.    The patient understands and agrees with the plan of care. All questions were answered.     1. CT L spine  2. MRI L spine  3. Flex ex L spine and scoli  4. RTC with me pending #1-3      I, Dr. Alphonse Reed personally performed the services described in this documentation. All medical record entries made by the scribe, Bethany Nunez, were at my direction and in my presence.  I have reviewed the chart and agree that the record reflects my personal performance and is accurate and complete.      Alphonse Reed M.D.  Department of Neurosurgery  [facility]

## 2023-08-07 ENCOUNTER — HOSPITAL ENCOUNTER (OUTPATIENT)
Dept: RADIOLOGY | Facility: HOSPITAL | Age: 48
Discharge: HOME | End: 2023-08-07
Attending: NEUROLOGICAL SURGERY
Payer: MEDICAID

## 2023-08-07 DIAGNOSIS — Z98.1 HISTORY OF LUMBAR FUSION: ICD-10-CM

## 2023-08-07 PROCEDURE — 72114 X-RAY EXAM L-S SPINE BENDING: CPT | Mod: TC

## 2023-08-07 PROCEDURE — 72081 X-RAY EXAM ENTIRE SPI 1 VW: CPT | Mod: TC

## 2023-08-07 PROCEDURE — 72114 X-RAY EXAM L-S SPINE BENDING: CPT | Mod: 26,59,, | Performed by: RADIOLOGY

## 2023-08-07 PROCEDURE — 72081 X-RAY EXAM ENTIRE SPI 1 VW: CPT | Mod: 26,,, | Performed by: RADIOLOGY

## 2023-08-07 PROCEDURE — 72081 XR SPINE SCOLIOSIS 1 VIEW_SUPINE OR ERECT: ICD-10-PCS | Mod: 26,,, | Performed by: RADIOLOGY

## 2023-08-07 PROCEDURE — 72114 XR LUMBAR SPINE 5 VIEW WITH FLEX AND EXT: ICD-10-PCS | Mod: 26,59,, | Performed by: RADIOLOGY

## 2023-08-14 ENCOUNTER — OFFICE VISIT (OUTPATIENT)
Dept: UROLOGY | Facility: CLINIC | Age: 48
End: 2023-08-14
Payer: MEDICAID

## 2023-08-14 VITALS
SYSTOLIC BLOOD PRESSURE: 111 MMHG | HEART RATE: 96 BPM | DIASTOLIC BLOOD PRESSURE: 78 MMHG | HEIGHT: 66 IN | BODY MASS INDEX: 32.08 KG/M2 | WEIGHT: 199.63 LBS

## 2023-08-14 DIAGNOSIS — N52.9 ERECTILE DYSFUNCTION, UNSPECIFIED ERECTILE DYSFUNCTION TYPE: ICD-10-CM

## 2023-08-14 DIAGNOSIS — N40.1 BPH WITH OBSTRUCTION/LOWER URINARY TRACT SYMPTOMS: Primary | ICD-10-CM

## 2023-08-14 DIAGNOSIS — N13.8 BPH WITH OBSTRUCTION/LOWER URINARY TRACT SYMPTOMS: Primary | ICD-10-CM

## 2023-08-14 DIAGNOSIS — R35.1 NOCTURIA: ICD-10-CM

## 2023-08-14 PROCEDURE — 99999 PR PBB SHADOW E&M-EST. PATIENT-LVL III: ICD-10-PCS | Mod: PBBFAC,,, | Performed by: UROLOGY

## 2023-08-14 PROCEDURE — 1159F PR MEDICATION LIST DOCUMENTED IN MEDICAL RECORD: ICD-10-PCS | Mod: CPTII,,, | Performed by: UROLOGY

## 2023-08-14 PROCEDURE — 3008F PR BODY MASS INDEX (BMI) DOCUMENTED: ICD-10-PCS | Mod: CPTII,,, | Performed by: UROLOGY

## 2023-08-14 PROCEDURE — 4010F ACE/ARB THERAPY RXD/TAKEN: CPT | Mod: CPTII,,, | Performed by: UROLOGY

## 2023-08-14 PROCEDURE — 3078F DIAST BP <80 MM HG: CPT | Mod: CPTII,,, | Performed by: UROLOGY

## 2023-08-14 PROCEDURE — 3072F LOW RISK FOR RETINOPATHY: CPT | Mod: CPTII,,, | Performed by: UROLOGY

## 2023-08-14 PROCEDURE — 1159F MED LIST DOCD IN RCRD: CPT | Mod: CPTII,,, | Performed by: UROLOGY

## 2023-08-14 PROCEDURE — 99213 OFFICE O/P EST LOW 20 MIN: CPT | Mod: PBBFAC,PO | Performed by: UROLOGY

## 2023-08-14 PROCEDURE — 1160F RVW MEDS BY RX/DR IN RCRD: CPT | Mod: CPTII,,, | Performed by: UROLOGY

## 2023-08-14 PROCEDURE — 4010F PR ACE/ARB THEARPY RXD/TAKEN: ICD-10-PCS | Mod: CPTII,,, | Performed by: UROLOGY

## 2023-08-14 PROCEDURE — 3044F HG A1C LEVEL LT 7.0%: CPT | Mod: CPTII,,, | Performed by: UROLOGY

## 2023-08-14 PROCEDURE — 3072F PR LOW RISK FOR RETINOPATHY: ICD-10-PCS | Mod: CPTII,,, | Performed by: UROLOGY

## 2023-08-14 PROCEDURE — 3078F PR MOST RECENT DIASTOLIC BLOOD PRESSURE < 80 MM HG: ICD-10-PCS | Mod: CPTII,,, | Performed by: UROLOGY

## 2023-08-14 PROCEDURE — 3074F PR MOST RECENT SYSTOLIC BLOOD PRESSURE < 130 MM HG: ICD-10-PCS | Mod: CPTII,,, | Performed by: UROLOGY

## 2023-08-14 PROCEDURE — 3044F PR MOST RECENT HEMOGLOBIN A1C LEVEL <7.0%: ICD-10-PCS | Mod: CPTII,,, | Performed by: UROLOGY

## 2023-08-14 PROCEDURE — 1160F PR REVIEW ALL MEDS BY PRESCRIBER/CLIN PHARMACIST DOCUMENTED: ICD-10-PCS | Mod: CPTII,,, | Performed by: UROLOGY

## 2023-08-14 PROCEDURE — 99214 OFFICE O/P EST MOD 30 MIN: CPT | Mod: S$PBB,,, | Performed by: UROLOGY

## 2023-08-14 PROCEDURE — 3008F BODY MASS INDEX DOCD: CPT | Mod: CPTII,,, | Performed by: UROLOGY

## 2023-08-14 PROCEDURE — 99999 PR PBB SHADOW E&M-EST. PATIENT-LVL III: CPT | Mod: PBBFAC,,, | Performed by: UROLOGY

## 2023-08-14 PROCEDURE — 3074F SYST BP LT 130 MM HG: CPT | Mod: CPTII,,, | Performed by: UROLOGY

## 2023-08-14 PROCEDURE — 99214 PR OFFICE/OUTPT VISIT, EST, LEVL IV, 30-39 MIN: ICD-10-PCS | Mod: S$PBB,,, | Performed by: UROLOGY

## 2023-08-14 RX ORDER — TADALAFIL 20 MG/1
20 TABLET ORAL DAILY PRN
Qty: 30 TABLET | Refills: 11 | Status: SHIPPED | OUTPATIENT
Start: 2023-08-14 | End: 2024-08-13

## 2023-08-14 NOTE — PROGRESS NOTES
[place] - Urology   Clinic Note    SUBJECTIVE:     Chief Complaint   Patient presents with    Other     4 month follow up [place]        Referral from: No ref. provider found.    History of Present Illness:  Emeka Caballero is a 48 y.o. male who presents to clinic for BPH.    Pt established with Dr. Page, here today for voiding dysfunction and difficulty emptying.  He reports he has a weak stream and is unable to completely empty his bladder.  A bladder scan shows a postvoid residual of 170 cc.  Patient states that he does not get his bladder close to being empty.  This has been bothering him for some time.  Of note he did have a lumbar surgery about 1 year ago but says that this problem was persistent before then.  Had a renal bladder ultrasound which revealed a prostate of approximately 70 cc.  He desires intervention at this time.    02/13/2023  Here today for cysto which revealed trilobar hypertrophy with a moderate intravesical median lobe.    04/10/2023  Here for follow-up.  Patient reports he is voiding well.  Does have occasional stress incontinence but reports that this is of minimal bother and has just a couple of dribbles when he exerts himself.  Reports his stream is very strong.  He does report persistent nocturia 8 times per night.  Most recent blood sugars do show poor diabetes control.    Final Pathologic Diagnosis 1. 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      08/14/2023  Here for follow-up.  Denies any significant incontinence but reports that he does have nocturia 5-6 times per night.  Oxybutynin has had not helped him at all.  He does continue to have poor glycemic control.  He also reports erectile dysfunction and is interested in medical therapy.  He previously was on injectable therapy but does not desire to do this at this time.  He inquired about vacuum assisted devices.  He reports that he snores all night.  He is never been evaluated for  sleep apnea.    Patient endorses no additional complaints at this time.    Past Medical History:   Diagnosis Date    Arthritis     Diabetes mellitus, type 2 2/11/2022    Essential hypertension, benign 5/4/2023       Past Surgical History:   Procedure Laterality Date    CATARACT EXTRACTION Right     EPIDURAL STEROID INJECTION N/A 10/21/2020    Procedure: Injection, Steroid, Epidural Caudal;  Surgeon: Vipul Nixon Jr., MD;  Location: [facility] ENDO;  Service: Pain Management;  Laterality: N/A;  Caudal KURT  Arrive @ 1315; No ATC or DM; Needs MD Signature    INJECTION, SACROILIAC JOINT Bilateral 12/27/2022    Procedure: INJECTION,SACROILIAC JOINT, BILATERAL CONTRAST DIRECT REF  ORDERED;  Surgeon: Brielle José MD;  Location: [facility] PAIN MGT;  Service: Pain Management;  Laterality: Bilateral;    LASER ENUCLEATION OF PROSTATE N/A 3/14/2023    Procedure: ENUCLEATION, PROSTATE, USING LASER;  Surgeon: Cecil Murray MD;  Location: [facility] OR;  Service: Urology;  Laterality: N/A;    LUMBAR FUSION N/A 2/8/2022    Procedure: FUSION, SPINE, LUMBAR;  Surgeon: Alphonse Reed MD;  Location: [address];  Service: Neurosurgery;  Laterality: N/A;  AIRO, L4-S1       Family History   Problem Relation Age of Onset    Cataracts Mother     No Known Problems Father     No Known Problems Sister     No Known Problems Brother     No Known Problems Maternal Aunt     No Known Problems Maternal Uncle     No Known Problems Paternal Aunt     No Known Problems Paternal Uncle     No Known Problems Maternal Grandmother     No Known Problems Maternal Grandfather     No Known Problems Paternal Grandmother     No Known Problems Paternal Grandfather        Social History     Tobacco Use    Smoking status: Former     Current packs/day: 0.00    Smokeless tobacco: Never   Substance Use Topics    Alcohol use: Yes     Comment: socially    Drug use: No       Current Outpatient Medications on File Prior to Visit   Medication Sig Dispense Refill     "alprostadiL 500 mcg/mL Soln 50 mcg, phentolamine 5 mg SolR 5 mg, papaverine 30 mg/mL Soln 30 mcg BRING TO PHYSICIAN'S OFFICE FOR TEST DOSE      atorvastatin (LIPITOR) 40 MG tablet Take 1 tablet (40 mg total) by mouth once daily. 90 tablet 3    blood sugar diagnostic Strp To check BG 3 times daily, to use with insurance preferred meter 100 each 11    lancets Misc To check BG 3 times daily, to use with insurance preferred meter 100 each 11    linaCLOtide (LINZESS) 72 mcg Cap capsule Take 1 capsule (72 mcg total) by mouth before breakfast. 30 capsule 5    lisinopriL (PRINIVIL,ZESTRIL) 5 MG tablet Take 1 tablet (5 mg total) by mouth once daily. 90 tablet 1    metFORMIN (GLUCOPHAGE-XR) 500 MG ER 24hr tablet Take 2 tablets (1,000 mg total) by mouth 2 (two) times daily with meals. 360 tablet 3    oxybutynin (DITROPAN-XL) 10 MG 24 hr tablet Take 1 tablet (10 mg total) by mouth once daily. 30 tablet 11    oxyCODONE-acetaminophen (PERCOCET) 5-325 mg per tablet Take 1 tablet by mouth every 4 (four) hours as needed for Pain. 12 tablet 0    pep injection Bring the medication to appointment     For compounding pharmacy use:   Add PAPAVERINE 30 mcg  Add PHENTOLAMINE 10 mg  Add ALPROSTADIL 100 mcg 1 vial 3    blood-glucose meter kit To check BG 3 times daily, to use with insurance preferred meter 1 each 0    [DISCONTINUED] tadalafiL (CIALIS) 20 MG Tab Take 1 tablet (20 mg total) by mouth daily as needed (erectile dysfunction). 30 tablet 11     No current facility-administered medications on file prior to visit.       Review of patient's allergies indicates:  No Known Allergies    Review of Systems:  A review of 10+ systems was conducted with pertinent positive and negative findings documented in HPI with all other systems reviewed and negative.    OBJECTIVE:     Estimated body mass index is 32.22 kg/m² as calculated from the following:    Height as of this encounter: 5' 6" (1.676 m).    Weight as of this encounter: 90.5 kg (199 lb " 10 oz).    Vital Signs (Most Recent)  Vitals:    08/14/23 1402   BP: 111/78   Pulse: 96       Physical Exam:  GENERAL: patient sitting comfortably  HEENT: normocephalic  NECK: supple, no JVD  PULM: normal chest rise, no increased WOB  HEART: non-diaphoretic  ABDO: soft, nondistended, nontender  BACK: no CVA tenderness bilaterally  SKIN: warm, dry, well perfused  EXT: no bruising or edema  NEURO: grossly normal with no focal deficits  PSYCH: appropriate mood and affect    Genitourinary Exam:  deferred    Lab Results   Component Value Date    BUN 14 05/18/2023    CREATININE 1.0 05/18/2023    WBC 6.04 04/29/2023    HGB 13.7 (L) 04/29/2023    HCT 41.5 04/29/2023     04/29/2023    AST 18 04/29/2023    ALT 29 04/29/2023    ALKPHOS 93 04/29/2023    ALBUMIN 3.6 04/29/2023    HGBA1C 6.6 (H) 04/29/2023    INR 1.0 02/08/2022        Imaging:  I have personally reviewed all relevant imaging studies.    No results found for this or any previous visit (from the past 2160 hour(s)).    No results found for this or any previous visit (from the past 2160 hour(s)).    X-Ray Spine Scoliosis AP Standing  Narrative: EXAMINATION:  XR SPINE SCOLIOSIS 1 VIEW_SUPINE OR ERECT    CLINICAL HISTORY:  Arthrodesis status    FINDINGS:  There is a mild dextroconvex curvature of the spine.  There is postoperative change of the lumbosacral junction.  No hardware failure seen or complication seen.  Impression: No acute process seen.    Electronically signed by: Tan Abad MD  Date:    08/07/2023  Time:    13:46  X-Ray Lumbar Complete Including Flex And Ext  Narrative: EXAMINATION:  XR LUMBAR SPINE 5 VIEW WITH FLEX AND EXT    CLINICAL HISTORY:  Arthrodesis status    FINDINGS:  There are pedicle screws, and fixation rods between L4 and S2/SI joints.  There is L5 methylmethacrylate.  There is grade 1 anterolisthesis and dish at L5-S1.  No hardware failure or complication seen.  There is DJD.  Impression: No significant hardware failure or  complication seen.    Electronically signed by: Tan Abad MD  Date:    08/07/2023  Time:    13:45       PSA:  Lab Results   Component Value Date    PSADIAG 0.79 11/03/2021    PSATOTAL 0.75 01/30/2020    PSAFREE 0.23 01/30/2020       Testosterone:  Lab Results   Component Value Date    TESTOSTERONE 273 02/22/2020    TESTOSTERONE 59.0 02/22/2020        ASSESSMENT     1. BPH with obstruction/lower urinary tract symptoms    2. Nocturia    3. Erectile dysfunction, unspecified erectile dysfunction type          PLAN:     BPH with elevated PVR      - can continue Kegel's.  We will see him back in 6 months with a flow PVR    2. Nocturia    - Will initiate Oxybutynin 10mg XR.  Patient getting no relief with oxybutynin.  He reports that he snores all night and really needs a sleep study in order to evaluate for sleep apnea.  Sleep apnea is a cause of nocturia and his nocturia will not resolve until that is treated.    3. ED    - discussed options.  Patient previously was on injectable therapy and reports that this worked however it was too expensive.  He would like to try Cialis again.  I counseled him that this might not work.  He is understanding but wants to try it.  He additionally inquired about a vacuum assisted device and I provided him with education on this.    Cecil Murray MD  Urology  [facility]    Disclaimer: This note has been generated using voice-recognition software. There may be typographical errors that have been missed during proof-reading.

## 2023-09-06 ENCOUNTER — HOSPITAL ENCOUNTER (OUTPATIENT)
Dept: RADIOLOGY | Facility: HOSPITAL | Age: 48
Discharge: HOME | End: 2023-09-06
Attending: NEUROLOGICAL SURGERY
Payer: MEDICAID

## 2023-09-06 DIAGNOSIS — Z98.1 HISTORY OF LUMBAR FUSION: ICD-10-CM

## 2023-09-06 PROCEDURE — 72131 CT LUMBAR SPINE W/O DYE: CPT | Mod: TC

## 2023-09-06 PROCEDURE — 72131 CT LUMBAR SPINE WITHOUT CONTRAST: ICD-10-PCS | Mod: 26,,, | Performed by: RADIOLOGY

## 2023-09-06 PROCEDURE — 72148 MRI LUMBAR SPINE W/O DYE: CPT | Mod: TC

## 2023-09-06 PROCEDURE — 72148 MRI LUMBAR SPINE W/O DYE: CPT | Mod: 26,,, | Performed by: RADIOLOGY

## 2023-09-06 PROCEDURE — 72131 CT LUMBAR SPINE W/O DYE: CPT | Mod: 26,,, | Performed by: RADIOLOGY

## 2023-09-06 PROCEDURE — 72148 MRI LUMBAR SPINE WITHOUT CONTRAST: ICD-10-PCS | Mod: 26,,, | Performed by: RADIOLOGY

## 2023-09-12 ENCOUNTER — OFFICE VISIT (OUTPATIENT)
Dept: OPTOMETRY | Facility: CLINIC | Age: 48
End: 2023-09-12
Payer: MEDICAID

## 2023-09-12 DIAGNOSIS — E78.5 TYPE 2 DIABETES MELLITUS WITH HYPERLIPIDEMIA: Primary | Chronic | ICD-10-CM

## 2023-09-12 DIAGNOSIS — H52.7 REFRACTIVE ERROR: ICD-10-CM

## 2023-09-12 DIAGNOSIS — Z13.5 SCREENING FOR DIABETIC RETINOPATHY: ICD-10-CM

## 2023-09-12 DIAGNOSIS — E11.69 TYPE 2 DIABETES MELLITUS WITH HYPERLIPIDEMIA: Primary | Chronic | ICD-10-CM

## 2023-09-12 PROCEDURE — 3044F HG A1C LEVEL LT 7.0%: CPT | Mod: CPTII,,, | Performed by: OPTOMETRIST

## 2023-09-12 PROCEDURE — 2023F PR DILATED RETINAL EXAM W/O EVID OF RETINOPATHY: ICD-10-PCS | Mod: CPTII,,, | Performed by: OPTOMETRIST

## 2023-09-12 PROCEDURE — 1159F MED LIST DOCD IN RCRD: CPT | Mod: CPTII,,, | Performed by: OPTOMETRIST

## 2023-09-12 PROCEDURE — 92014 COMPRE OPH EXAM EST PT 1/>: CPT | Mod: S$PBB,,, | Performed by: OPTOMETRIST

## 2023-09-12 PROCEDURE — 92015 PR REFRACTION: ICD-10-PCS | Mod: ,,, | Performed by: OPTOMETRIST

## 2023-09-12 PROCEDURE — 92015 DETERMINE REFRACTIVE STATE: CPT | Mod: ,,, | Performed by: OPTOMETRIST

## 2023-09-12 PROCEDURE — 4010F PR ACE/ARB THEARPY RXD/TAKEN: ICD-10-PCS | Mod: CPTII,,, | Performed by: OPTOMETRIST

## 2023-09-12 PROCEDURE — 1160F PR REVIEW ALL MEDS BY PRESCRIBER/CLIN PHARMACIST DOCUMENTED: ICD-10-PCS | Mod: CPTII,,, | Performed by: OPTOMETRIST

## 2023-09-12 PROCEDURE — 92014 PR EYE EXAM, EST PATIENT,COMPREHESV: ICD-10-PCS | Mod: S$PBB,,, | Performed by: OPTOMETRIST

## 2023-09-12 PROCEDURE — 2023F DILAT RTA XM W/O RTNOPTHY: CPT | Mod: CPTII,,, | Performed by: OPTOMETRIST

## 2023-09-12 PROCEDURE — 4010F ACE/ARB THERAPY RXD/TAKEN: CPT | Mod: CPTII,,, | Performed by: OPTOMETRIST

## 2023-09-12 PROCEDURE — 1159F PR MEDICATION LIST DOCUMENTED IN MEDICAL RECORD: ICD-10-PCS | Mod: CPTII,,, | Performed by: OPTOMETRIST

## 2023-09-12 PROCEDURE — 99999 PR PBB SHADOW E&M-EST. PATIENT-LVL III: ICD-10-PCS | Mod: PBBFAC,,, | Performed by: OPTOMETRIST

## 2023-09-12 PROCEDURE — 99213 OFFICE O/P EST LOW 20 MIN: CPT | Mod: PBBFAC,PO | Performed by: OPTOMETRIST

## 2023-09-12 PROCEDURE — 1160F RVW MEDS BY RX/DR IN RCRD: CPT | Mod: CPTII,,, | Performed by: OPTOMETRIST

## 2023-09-12 PROCEDURE — 99999 PR PBB SHADOW E&M-EST. PATIENT-LVL III: CPT | Mod: PBBFAC,,, | Performed by: OPTOMETRIST

## 2023-09-12 PROCEDURE — 3044F PR MOST RECENT HEMOGLOBIN A1C LEVEL <7.0%: ICD-10-PCS | Mod: CPTII,,, | Performed by: OPTOMETRIST

## 2023-09-12 NOTE — PROGRESS NOTES
Subjective:       Patient ID: Emeka Caballero is a 48 y.o. male      Chief Complaint   Patient presents with    Concerns About Ocular Health    Diabetic Eye Exam     History of Present Illness  Dls: 3/21/22 Dr. Garcia     47 y/o male presents today for diabetic eye exam.   Pt c/o blurry vision at near ou.   Pt does not wear any glasses.      x1 day     + ou off/on tearing  No itching  No burning  No pain  No ha's  No floaters  No flashes    Eye meds  Otc gtts ou prn     Pohx:  S/p CE OD     Fohx:  Cat- mother     Hemoglobin A1C       Date                     Value               Ref Range             Status                04/29/2023               6.6 (H)             4.0 - 5.6 %           Final                  03/14/2023               6.9 (H)             4.0 - 5.6 %           Final                   10/14/2022               6.0 (H)             4.0 - 5.6 %           Final                 Assessment/Plan:     1. Type 2 diabetes mellitus with hyperlipidemia  2. Screening for diabetic retinopathy  No diabetic retinopathy. Discussed with pt the effects of diabetes on vision, importance of good blood sugar control, compliance with meds, and follow up care with PCP. Return in 1 year for dilated eye exam, sooner PRN.  - Ambulatory referral/consult to Optometry    3. Refractive error  Can use OTC readers if preferred. Educated patient on refractive error and discussed lens options. Dispensed updated spectacle Rx. Educated about adaptation period to new specs.      Eyeglass Final Rx       Eyeglass Final Rx         Sphere Cylinder Axis Add    Right +0.75 Sphere  +1.75    Left +0.50 +0.50 045 +1.75      Expiration Date: 9/12/2024                      Follow up in about 1 year (around 9/12/2024) for Diabetic Eye Exam.

## 2023-10-04 ENCOUNTER — PATIENT OUTREACH (OUTPATIENT)
Dept: ADMINISTRATIVE | Facility: HOSPITAL | Age: 48
End: 2023-10-04
Payer: MEDICAID

## 2023-10-04 ENCOUNTER — PATIENT MESSAGE (OUTPATIENT)
Dept: ADMINISTRATIVE | Facility: HOSPITAL | Age: 48
End: 2023-10-04
Payer: MEDICAID

## 2023-10-04 NOTE — PROGRESS NOTES
Care Everywhere updates requested and reviewed.  Immunizations reconciled. Media reports reviewed.  Duplicate HM overrides and  orders removed.  Overdue HM topic chart audit and/or requested.  Overdue lab testing linked to upcoming lab appointments if applies.          Health Maintenance Due   Topic Date Due    Pneumococcal Vaccines (Age 0-64) (1 - PCV) Never done    TETANUS VACCINE  Never done    Colorectal Cancer Screening  Never done    COVID-19 Vaccine (3 - Moderna series) 2021    Foot Exam  2023    Shingles Vaccine (1 of 2) Never done    Influenza Vaccine (1) 2023    Diabetes Urine Screening  10/14/2023    Hemoglobin A1c  10/29/2023

## 2023-10-05 ENCOUNTER — TELEPHONE (OUTPATIENT)
Dept: NEUROSURGERY | Facility: CLINIC | Age: 48
End: 2023-10-05
Payer: MEDICAID

## 2023-10-05 NOTE — TELEPHONE ENCOUNTER
----- Message from Tariq Dillard sent at 10/5/2023 10:33 AM CDT -----  Regarding: appt; returning missed call  Contact: PT @ [phone]  Pt is returning a missed call from someone in the office and is asking for a return call back soon. Thanks.    Reason for call: returning missed call from Brandie Doyle in the office.    Patient's DX: n/a     Patient requesting call back or [facility] msg: call back. Thanks.

## 2023-10-05 NOTE — TELEPHONE ENCOUNTER
Pc to pt. Need to cancel appt this pm.  Md has emergent surgery.  Will call back to r/s. Pt. Wife verbalized understanding.

## 2023-10-24 ENCOUNTER — OFFICE VISIT (OUTPATIENT)
Dept: FAMILY MEDICINE | Facility: CLINIC | Age: 48
End: 2023-10-24
Payer: MEDICAID

## 2023-10-24 VITALS
DIASTOLIC BLOOD PRESSURE: 64 MMHG | OXYGEN SATURATION: 97 % | HEART RATE: 115 BPM | BODY MASS INDEX: 32.78 KG/M2 | HEIGHT: 66 IN | SYSTOLIC BLOOD PRESSURE: 112 MMHG | WEIGHT: 203.94 LBS

## 2023-10-24 DIAGNOSIS — E78.5 TYPE 2 DIABETES MELLITUS WITH HYPERLIPIDEMIA: Primary | ICD-10-CM

## 2023-10-24 DIAGNOSIS — M48.061 SPINAL STENOSIS, LUMBAR REGION, WITHOUT NEUROGENIC CLAUDICATION: ICD-10-CM

## 2023-10-24 DIAGNOSIS — B35.1 ONYCHOMYCOSIS: ICD-10-CM

## 2023-10-24 DIAGNOSIS — N40.0 BENIGN PROSTATIC HYPERPLASIA, UNSPECIFIED WHETHER LOWER URINARY TRACT SYMPTOMS PRESENT: ICD-10-CM

## 2023-10-24 DIAGNOSIS — E11.69 TYPE 2 DIABETES MELLITUS WITH HYPERLIPIDEMIA: Primary | ICD-10-CM

## 2023-10-24 DIAGNOSIS — K21.00 GASTROESOPHAGEAL REFLUX DISEASE WITH ESOPHAGITIS WITHOUT HEMORRHAGE: ICD-10-CM

## 2023-10-24 DIAGNOSIS — E78.2 MIXED DYSLIPIDEMIA: ICD-10-CM

## 2023-10-24 DIAGNOSIS — N52.9 ERECTILE DYSFUNCTION, UNSPECIFIED ERECTILE DYSFUNCTION TYPE: ICD-10-CM

## 2023-10-24 DIAGNOSIS — I10 ESSENTIAL HYPERTENSION, BENIGN: ICD-10-CM

## 2023-10-24 DIAGNOSIS — M54.16 LUMBAR RADICULOPATHY: ICD-10-CM

## 2023-10-24 PROCEDURE — 99213 OFFICE O/P EST LOW 20 MIN: CPT | Mod: PBBFAC,PO | Performed by: INTERNAL MEDICINE

## 2023-10-24 PROCEDURE — 3078F DIAST BP <80 MM HG: CPT | Mod: CPTII,,, | Performed by: INTERNAL MEDICINE

## 2023-10-24 PROCEDURE — 3074F PR MOST RECENT SYSTOLIC BLOOD PRESSURE < 130 MM HG: ICD-10-PCS | Mod: CPTII,,, | Performed by: INTERNAL MEDICINE

## 2023-10-24 PROCEDURE — 99214 OFFICE O/P EST MOD 30 MIN: CPT | Mod: S$PBB,,, | Performed by: INTERNAL MEDICINE

## 2023-10-24 PROCEDURE — 1159F PR MEDICATION LIST DOCUMENTED IN MEDICAL RECORD: ICD-10-PCS | Mod: CPTII,,, | Performed by: INTERNAL MEDICINE

## 2023-10-24 PROCEDURE — 1160F PR REVIEW ALL MEDS BY PRESCRIBER/CLIN PHARMACIST DOCUMENTED: ICD-10-PCS | Mod: CPTII,,, | Performed by: INTERNAL MEDICINE

## 2023-10-24 PROCEDURE — 3008F PR BODY MASS INDEX (BMI) DOCUMENTED: ICD-10-PCS | Mod: CPTII,,, | Performed by: INTERNAL MEDICINE

## 2023-10-24 PROCEDURE — 1159F MED LIST DOCD IN RCRD: CPT | Mod: CPTII,,, | Performed by: INTERNAL MEDICINE

## 2023-10-24 PROCEDURE — 99999PBSHW FLU VACCINE (QUAD) GREATER THAN OR EQUAL TO 3YO PRESERVATIVE FREE IM: ICD-10-PCS | Mod: PBBFAC,,,

## 2023-10-24 PROCEDURE — 3074F SYST BP LT 130 MM HG: CPT | Mod: CPTII,,, | Performed by: INTERNAL MEDICINE

## 2023-10-24 PROCEDURE — 99999 PR PBB SHADOW E&M-EST. PATIENT-LVL III: CPT | Mod: PBBFAC,,, | Performed by: INTERNAL MEDICINE

## 2023-10-24 PROCEDURE — 99999PBSHW FLU VACCINE (QUAD) GREATER THAN OR EQUAL TO 3YO PRESERVATIVE FREE IM: Mod: PBBFAC,,,

## 2023-10-24 PROCEDURE — 99214 PR OFFICE/OUTPT VISIT, EST, LEVL IV, 30-39 MIN: ICD-10-PCS | Mod: S$PBB,,, | Performed by: INTERNAL MEDICINE

## 2023-10-24 PROCEDURE — 4010F ACE/ARB THERAPY RXD/TAKEN: CPT | Mod: CPTII,,, | Performed by: INTERNAL MEDICINE

## 2023-10-24 PROCEDURE — 99999 PR PBB SHADOW E&M-EST. PATIENT-LVL III: ICD-10-PCS | Mod: PBBFAC,,, | Performed by: INTERNAL MEDICINE

## 2023-10-24 PROCEDURE — 3078F PR MOST RECENT DIASTOLIC BLOOD PRESSURE < 80 MM HG: ICD-10-PCS | Mod: CPTII,,, | Performed by: INTERNAL MEDICINE

## 2023-10-24 PROCEDURE — 90471 IMMUNIZATION ADMIN: CPT | Mod: PBBFAC,PO

## 2023-10-24 PROCEDURE — 3044F PR MOST RECENT HEMOGLOBIN A1C LEVEL <7.0%: ICD-10-PCS | Mod: CPTII,,, | Performed by: INTERNAL MEDICINE

## 2023-10-24 PROCEDURE — 4010F PR ACE/ARB THEARPY RXD/TAKEN: ICD-10-PCS | Mod: CPTII,,, | Performed by: INTERNAL MEDICINE

## 2023-10-24 PROCEDURE — 3008F BODY MASS INDEX DOCD: CPT | Mod: CPTII,,, | Performed by: INTERNAL MEDICINE

## 2023-10-24 PROCEDURE — 3044F HG A1C LEVEL LT 7.0%: CPT | Mod: CPTII,,, | Performed by: INTERNAL MEDICINE

## 2023-10-24 PROCEDURE — 1160F RVW MEDS BY RX/DR IN RCRD: CPT | Mod: CPTII,,, | Performed by: INTERNAL MEDICINE

## 2023-10-24 RX ORDER — PANTOPRAZOLE SODIUM 40 MG/1
40 TABLET, DELAYED RELEASE ORAL DAILY
Qty: 30 TABLET | Refills: 11 | Status: SHIPPED | OUTPATIENT
Start: 2023-10-24 | End: 2023-11-21 | Stop reason: SDUPTHER

## 2023-10-24 RX ORDER — GABAPENTIN 300 MG/1
300 CAPSULE ORAL NIGHTLY
Qty: 30 CAPSULE | Refills: 11 | Status: SHIPPED | OUTPATIENT
Start: 2023-10-24 | End: 2023-11-21 | Stop reason: SDUPTHER

## 2023-10-24 RX ORDER — TAMSULOSIN HYDROCHLORIDE 0.4 MG/1
0.4 CAPSULE ORAL DAILY
Qty: 30 CAPSULE | Refills: 11 | Status: SHIPPED | OUTPATIENT
Start: 2023-10-24 | End: 2024-10-23

## 2023-10-24 RX ORDER — SEMAGLUTIDE 0.68 MG/ML
0.25 INJECTION, SOLUTION SUBCUTANEOUS
Qty: 1 EACH | Refills: 6 | Status: SHIPPED | OUTPATIENT
Start: 2023-10-24 | End: 2023-10-24

## 2023-10-24 NOTE — PROGRESS NOTES
Subjective:       Patient ID: Emeka Caballero is a 48 y.o. male.    Chief Complaint: Establish Care and Back Pain (Lower back )      HPI  Emeka Caballero is a 48 y.o. male with HTN, DM2, HLD, BPH, Constipation who presents today for Establish Care and Back Pain (Lower back )    Patient reports had surgery on 02/08/2021 and has remained with back pain.  Has tried epidural injections twice with some improvement but the pain returns.  Takes Tylenol or other over-the-counter medications with partial improvement.  Has on an off toe and leg numbness.  Denies weakness but pain and discomfort sometimes can be limiting.  He is followed by Neurosurgery and considering revision.    Has history of diabetes mellitus type 2.  Takes metformin and follows his Accu-Cheks.  If his glucose is above 150 he will take metformin as recommended by family but not as prescribed.  His last hemoglobin A1c was 6.6, previously 6.9.  He tries to watch his diet.  Reports he urinates often but drinks fluids.  Not necessarily thirsty.  Of Ozempic, however, appears to have frequent significant reflux and may not tolerate the injection, plus cost is a concern.    Has noted nocturia and saw urologist started him on oxybutynin after laser enucleation of the prostate but not sure is helping.    Reports he smokes occasionally and drinks socially but not often.  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        Health Maintenance:  Health Maintenance   Topic Date Due    TETANUS VACCINE  Never done    Colorectal Cancer Screening  Never done    Foot Exam  01/24/2023    Shingles Vaccine (1 of 2) Never done    Hemoglobin A1c  10/29/2023    Lipid Panel  04/29/2024    Low Dose Statin  09/12/2024    Eye Exam  09/12/2024    Hepatitis C Screening  Completed       Review of Systems   Constitutional: Negative.  Negative for fever and unexpected weight change.   HENT: Negative.     Respiratory: Negative.     Cardiovascular: Negative.    Gastrointestinal:  Negative.  Positive for reflux (Using pantoprazole as needed).   Endocrine: Negative for polydipsia.   Genitourinary:  Positive for frequency. Negative for difficulty urinating.        Voids a couple of times at night, some hesitance   Musculoskeletal:  Positive for back pain.   Integumentary:  Negative.   Neurological:  Positive for numbness. Negative for weakness.   Psychiatric/Behavioral: Negative.        Past Medical History:   Diagnosis Date    Arthritis     Diabetes mellitus, type 2 2/11/2022    Essential hypertension, benign 5/4/2023       Past Surgical History:   Procedure Laterality Date    CATARACT EXTRACTION Right     EPIDURAL STEROID INJECTION N/A 10/21/2020    Procedure: Injection, Steroid, Epidural Caudal;  Surgeon: Vipul Nixon Jr., MD;  Location: [facility] ENDO;  Service: Pain Management;  Laterality: N/A;  Caudal KURT  Arrive @ 1315; No ATC or DM; Needs MD Signature    INJECTION, SACROILIAC JOINT Bilateral 12/27/2022    Procedure: INJECTION,SACROILIAC JOINT, BILATERAL CONTRAST DIRECT REF  ORDERED;  Surgeon: Brielle José MD;  [facility];  Service: Pain Management;  Laterality: Bilateral;    LASER ENUCLEATION OF PROSTATE N/A 3/14/2023    Procedure: ENUCLEATION, PROSTATE, USING LASER;  Surgeon: Cecil Murray MD;  Location: [facility] OR;  Service: Urology;  Laterality: N/A;    LUMBAR FUSION N/A 2/8/2022    Procedure: FUSION, SPINE, LUMBAR;  Surgeon: Alphonse Reed MD;  Location: [address];  Service: Neurosurgery;  Laterality: N/A;  AIRO, L4-S1       Family History   Problem Relation Age of Onset    Cataracts Mother     No Known Problems Father     No Known Problems Sister     No Known Problems Brother     No Known Problems Maternal Aunt     No Known Problems Maternal Uncle     No Known Problems Paternal Aunt     No Known Problems Paternal Uncle     No Known Problems Maternal Grandmother     No Known Problems Maternal Grandfather     No Known Problems Paternal Grandmother      No Known Problems Paternal Grandfather        Social History     Socioeconomic History    Marital status: Single   Tobacco Use    Smoking status: Former    Smokeless tobacco: Never   Substance and Sexual Activity    Alcohol use: Yes     Comment: socially    Drug use: No       Current Outpatient Medications   Medication Sig Dispense Refill    alprostadiL 500 mcg/mL Soln 50 mcg, phentolamine 5 mg SolR 5 mg, papaverine 30 mg/mL Soln 30 mcg BRING TO PHYSICIAN'S OFFICE FOR TEST DOSE      atorvastatin (LIPITOR) 40 MG tablet Take 1 tablet (40 mg total) by mouth once daily. 90 tablet 3    blood sugar diagnostic Strp To check BG 3 times daily, to use with insurance preferred meter 100 each 11    blood-glucose meter kit To check BG 3 times daily, to use with insurance preferred meter 1 each 0    lancets Misc To check BG 3 times daily, to use with insurance preferred meter 100 each 11    linaCLOtide (LINZESS) 72 mcg Cap capsule Take 1 capsule (72 mcg total) by mouth before breakfast. 30 capsule 5    lisinopriL (PRINIVIL,ZESTRIL) 5 MG tablet Take 1 tablet (5 mg total) by mouth once daily. 90 tablet 1    metFORMIN (GLUCOPHAGE-XR) 500 MG ER 24hr tablet Take 2 tablets (1,000 mg total) by mouth 2 (two) times daily with meals. 360 tablet 3    oxybutynin (DITROPAN-XL) 10 MG 24 hr tablet Take 1 tablet (10 mg total) by mouth once daily. 30 tablet 11    oxyCODONE-acetaminophen (PERCOCET) 5-325 mg per tablet Take 1 tablet by mouth every 4 (four) hours as needed for Pain. 12 tablet 0    pep injection Bring the medication to appointment     For compounding pharmacy use:   Add PAPAVERINE 30 mcg  Add PHENTOLAMINE 10 mg  Add ALPROSTADIL 100 mcg 1 vial 3    tadalafiL (CIALIS) 20 MG Tab Take 1 tablet (20 mg total) by mouth daily as needed (Erectile Dysfunction). 30 tablet 11     No current facility-administered medications for this visit.       Review of patient's allergies indicates:  No Known Allergies      Objective:       Last 3 sets of  "Vitals        8/3/2023     3:10 PM 8/14/2023     2:02 PM 9/12/2023     8:50 AM   Vitals - 1 value per visit   SYSTOLIC 121 111    DIASTOLIC 78 78    Pulse 90 96    Weight (lb)  199.63    Weight (kg)  90.55    Height  5' 6" (1.676 m)    BMI (Calculated)  32.2    Pain Score Seven Zero Zero   Physical Exam  Constitutional:       General: He is not in acute distress.  HENT:      Head: Normocephalic.      Right Ear: Tympanic membrane, ear canal and external ear normal.      Left Ear: Tympanic membrane, ear canal and external ear normal.      Nose: Nose normal.      Mouth/Throat:      Mouth: Mucous membranes are moist.   Eyes:      General: No scleral icterus.     Extraocular Movements: Extraocular movements intact.      Conjunctiva/sclera: Conjunctivae normal.   Neck:      Vascular: No carotid bruit.      Comments: No goiter.  Cardiovascular:      Rate and Rhythm: Normal rate and regular rhythm.      Pulses: Normal pulses.      Heart sounds: Normal heart sounds.      Comments: Heart rate improved in the 80s  Pulmonary:      Effort: Pulmonary effort is normal.      Breath sounds: Normal breath sounds.   Abdominal:      General: Bowel sounds are normal. There is no distension.      Palpations: Abdomen is soft. There is no mass.      Tenderness: There is no abdominal tenderness.   Musculoskeletal:         General: No swelling.      Comments: Negative straight leg but discomfort with palpation of sacral iliac area.   Lymphadenopathy:      Cervical: No cervical adenopathy.   Skin:     General: Skin is warm and dry.   Neurological:      General: No focal deficit present.      Mental Status: He is alert and oriented to person, place, and time.      Motor: No weakness.   Psychiatric:         Mood and Affect: Mood normal.         Behavior: Behavior normal.       Protective Sensation (w/ 10 gram monofilament):  Right: Intact  Left: Intact    Visual Inspection:  Onychomycosis -  Bilateral    Pedal Pulses:   Right: Present  Left: " Present    Posterior Tibialis Pulses:   Right:Present  Left: Present     CBC:  Recent Labs   Lab 03/10/22  1050 05/10/22  1119 04/29/23  0902   WBC 5.71 6.50 6.04   RBC 4.26 L 5.49 5.01   Hemoglobin 11.1 L 13.2 L 13.7 L   Hematocrit 35.1 L 42.9 41.5   Platelets 319 275 245   MCV 82 78 L 83   MCH 26.1 L 24.0 L 27.3   MCHC 31.6 L 30.8 L 33.0     CMP:  Recent Labs   Lab 05/10/22  1119 10/14/22  0713 04/29/23  0902 05/18/23  0808   Glucose 111 H 100 124 H 131 H   Calcium 9.3 9.3 9.0 9.2   Albumin 3.4 L 3.3 L 3.6  --    Total Protein 8.0 7.8 7.2  --    Sodium 140 141 144 142   Potassium 4.2 4.5 4.1 4.1   CO2 28 27 25 23   Chloride 106 107 112 H 109   BUN 13 17 12 14   Creatinine 0.8 0.9 1.0 1.0   Alkaline Phosphatase 87 88 93  --    ALT 18 29 29  --    AST 14 18 18  --    Total Bilirubin 0.3 0.3 0.4  --      URINALYSIS:  Recent Labs   Lab 02/09/22  2359 02/10/22  0953 02/14/22  0906 03/10/22  1101 03/02/23  1609   Color, UA Yellow Straw   < > Yellow Yellow   Clarity, UA  --   --   --   --  Clear   Specific Gravity, UA 1.030 1.005   < > 1.020  --    Spec Grav UA  --   --    < >  --   --    pH, UA 5.0 6.0   < > 6.0 8   Protein, UA Negative Negative   < > Negative  --    Bacteria Occasional Rare  --   --   --    Nitrite, UA Negative Negative   < > Negative NEGATIVE   Leukocytes, UA Negative Negative   < > Negative  --    Urobilinogen, UA  --   --    < >  --  NEGATIVE   Hyaline Casts, UA 3 A  --   --   --   --     < > = values in this interval not displayed.      LIPIDS:  Recent Labs   Lab 02/11/22  0917 05/10/22  1119 10/14/22  0713 04/29/23  0902   TSH 1.384  --   --   --    HDL  --  30 L 26 L 29 L   Cholesterol  --  170 137 124   Triglycerides  --  233 H 148 148   LDL Cholesterol  --  93.4 81.4 65.4   HDL/Cholesterol Ratio  --  17.6 L 19.0 L 23.4   Non-HDL Cholesterol  --  140 111 95   Total Cholesterol/HDL Ratio  --  5.7 H 5.3 H 4.3     TSH:  Recent Labs   Lab 02/11/22  0917   TSH 1.384       A1C:  Recent Labs   Lab  01/06/22  1507 05/10/22  1119 10/14/22  0713 03/14/23  1241 04/29/23  0901   Hemoglobin A1C 7.3 H 6.6 H 6.0 H 6.9 H 6.6 H       Imaging:  CT Lumbar Spine Without Contrast  Narrative: EXAMINATION:  CT LUMBAR SPINE WITHOUT CONTRAST    CLINICAL HISTORY:  postop;  Arthrodesis status    TECHNIQUE:  Low-dose axial, sagittal and coronal reformations are obtained through the lumbar spine.  Contrast was not administered.    COMPARISON:  09/06/2023 MRI, CT 04/27/2022    FINDINGS:  Prior L4-S1 bi-iliac posterior instrumented fusion noted.  There is slight lucency around the right L4 pedicle screw (series 605, image ) and minimal lucency around the left L4 pedicle screw, similar to the prior exam.  The pedicle screws are intact.  The vertebral body alignment and intervertebral disc spacers are stable.  There is posterior osseous fusion throughout on the left side and from L5-S1 on the right-side.  No fractures, marrow replacement process, or evidence of spondylo discitis.    There is progression of the facet disease at L3-4, particularly on the right side (series 605, images ), noting ligamentum flavum/joint hypertrophy with prominent subchondral sclerosis/cystic change.  Disc bulging noted.  These findings contribute to moderate/severe spinal canal stenosis and moderate/severe bilateral neural foraminal narrowing at L3-4, progressed from prior exams, correlating with today's MRI exam.  Impression: Prior L4-pelvic posterior instrumented fusion with interbody disc spacers.  No acute findings, again noting probable mild loosening of the L4 pedicle screws.  The vertebral body and intervertebral disc spacer alignment stable.    Lumbar spondylosis at L3-4, as above, progressed.    Electronically signed by: Juanjo Burnette MD  Date:    09/06/2023  Time:    15:29  MRI Lumbar Spine Without Contrast  Narrative: EXAMINATION:  MRI LUMBAR SPINE WITHOUT CONTRAST    CLINICAL HISTORY:  pain; Arthrodesis  status    TECHNIQUE:  Multiplanar, multisequence MR images were acquired from the thoracolumbar junction to the sacrum without contrast.    COMPARISON:  Lumbar spine radiograph 08/07/2023, CT lumbar spine 12/15/2022, MRI lumbar spine 11/19/2021    FINDINGS:  Postoperative changes of L4-pelvis fusion with L4-L5 and L5-S1 intervertebral disc spacers.    Alignment: Grade 1 L5 on S1 anterolisthesis, stable.    Vertebrae: No fracture or marrow infiltrative process.    Discs: Disc heights are maintained.    Cord: Conus terminates at L1-L2 and appears unremarkable.  Cauda equina appears unremarkable.    Degenerative findings:    T12-L1: No spinal canal stenosis or neural foraminal narrowing.    L1-L2: No spinal canal stenosis or neural foraminal narrowing.    L2-L3: No spinal canal stenosis or neural foraminal narrowing.    L3-L4: Disc bulging with advanced facet joint arthropathy, noting joint/ligamentum flavum hypertrophy with surrounding inflammatory changes, particularly on the right side.  These findings contribute to moderate/severe spinal canal stenosis and moderate/severe bilateral neural foraminal narrowing (series 5, images 5 and 11).    L4-L5: Stable slight posterior migration of the intervertebral disc spacer with mass effect on the right lateral recess.  Mild right neural foraminal narrowing, stable.    L5-S1: Moderate bilateral neural foraminal narrowing, stable.    Paraspinal muscles & soft tissues: Partially visualized left pelvic kidney.  Impression: Postoperative changes of L4-pelvic posterior instrument fusion, as above. The vertebral body and intervertebral disc spacer alignment is stable.  No acute findings.    Lumbar spondylosis at L3-4, contributing to moderate/severe spinal canal and bilateral neural foraminal stenosis, progressed from the prior MRI.    Electronically signed by resident: Ginette Cortes  Date:    09/06/2023  Time:    14:09    Electronically signed by: Juanjo Burnette  MD  Date:    09/06/2023  Time:    15:25      Assessment:       1. Type 2 diabetes mellitus with hyperlipidemia    2. Essential hypertension, benign    3. Mixed dyslipidemia    4. Spinal stenosis, lumbar region, without neurogenic claudication    5. Lumbar radiculopathy    6. Benign prostatic hyperplasia, unspecified whether lower urinary tract symptoms present    7. Erectile dysfunction, unspecified erectile dysfunction type    8. Gastroesophageal reflux disease with esophagitis without hemorrhage    9. Onychomycosis            Plan:       1. Type 2 diabetes mellitus with hyperlipidemia  -     CBC Auto Differential; Future; Expected date: 10/24/2023  -     Hemoglobin A1C; Future; Expected date: 10/24/2023  -     Lipid Panel; Future; Expected date: 10/24/2023  - seems to be partially controlled.  Not using metformin as prescribed.  Encouraged compliance as hypoglycemia with the medication is not common.  Diet encouraged.  - BMI over 30 and will benefit of weight loss.  I do not think he will tolerate GLP 1 agonist due to GERD.    2. Essential hypertension, benign  -     Comprehensive Metabolic Panel; Future; Expected date: 10/24/2023  -     Hemoglobin A1C; Future; Expected date: 10/24/2023  - blood pressure looks controlled.  Heart rate was elevated but now improved.  Continue to monitor.    3. Mixed dyslipidemia  -     Lipid Panel; Future; Expected date: 10/24/2023  - on atorvastatin, goal is LDL less than 70.  - smoking cessation recommended.    4. Spinal stenosis, lumbar region, without neurogenic claudication  -     frequent back pains, followed by Neurosurgery.  Will try gabapentin for his pain, at least at night.  - gabapentin (NEURONTIN) 300 MG capsule; Take 1 capsule (300 mg total) by mouth every evening.  Dispense: 30 capsule; Refill: 11    5. Lumbar radiculopathy  -   as above  -     TSH; Future; Expected date: 10/24/2023  -     gabapentin (NEURONTIN) 300 MG capsule; Take 1 capsule (300 mg total) by mouth  every evening.  Dispense: 30 capsule; Refill: 11    6. Benign prostatic hyperplasia, unspecified whether lower urinary tract symptoms present  Overview:  3- Holmium laser enucleation of the prostate.  Pathology   Prostate with benign glandular and stromal hyperplasia  Reports hesitancy and nocturia, will add Flomax.    Orders:  -     PSA, Screening; Future; Expected date: 10/24/2023  -     tamsulosin (FLOMAX) 0.4 mg Cap; Take 1 capsule (0.4 mg total) by mouth once daily.  Dispense: 30 capsule; Refill: 11    7. Erectile dysfunction, unspecified erectile dysfunction type  -     PSA, Screening; Future; Expected date: 10/24/2023  - follow with Urology, diabetic control.    8. Gastroesophageal reflux disease with esophagitis without hemorrhage  -     pantoprazole (PROTONIX) 40 MG tablet; Take 1 tablet (40 mg total) by mouth once daily.  Dispense: 30 tablet; Refill: 11  - avoid laying down to 2 3 hours after eating, keep head of the bed elevated, avoid irritants    9. Onychomycosis   -     ciclopirox (PENLAC) 8 % Soln; Apply topically nightly.  Dispense: 6 mL; Refill: 6    Other orders  -     Influenza - Quadrivalent *Preferred* (6 months+) (PF)  -     ciclopirox (PENLAC) 8 % Soln; Apply topically nightly.  Dispense: 6 mL; Refill: 6       Health Maintenance Due   Topic Date Due    Pneumococcal Vaccines (Age 0-64) (1 - PCV) Never done    TETANUS VACCINE  Never done    Colorectal Cancer Screening  Never done    Foot Exam  01/24/2023    Shingles Vaccine (1 of 2) Never done    Influenza Vaccine (1) 09/01/2023    COVID-19 Vaccine (3 - 2023-24 season) 09/01/2023    Diabetes Urine Screening  10/14/2023    Hemoglobin A1c  10/29/2023            Return to clinic in 3 months.    Mica Kay MD  [facility]  Disclaimer:  This note has been generated using voice-recognition software. There may be grammatical or spelling errors that have been missed during proof-reading

## 2023-10-25 ENCOUNTER — PATIENT MESSAGE (OUTPATIENT)
Dept: FAMILY MEDICINE | Facility: CLINIC | Age: 48
End: 2023-10-25
Payer: MEDICAID

## 2023-10-25 ENCOUNTER — LAB VISIT (OUTPATIENT)
Dept: LAB | Facility: HOSPITAL | Age: 48
End: 2023-10-25
Attending: INTERNAL MEDICINE
Payer: MEDICAID

## 2023-10-25 DIAGNOSIS — M54.16 LUMBAR RADICULOPATHY: ICD-10-CM

## 2023-10-25 DIAGNOSIS — E78.5 TYPE 2 DIABETES MELLITUS WITH HYPERLIPIDEMIA: ICD-10-CM

## 2023-10-25 DIAGNOSIS — E78.2 MIXED DYSLIPIDEMIA: Primary | Chronic | ICD-10-CM

## 2023-10-25 DIAGNOSIS — E11.69 TYPE 2 DIABETES MELLITUS WITH HYPERLIPIDEMIA: ICD-10-CM

## 2023-10-25 DIAGNOSIS — I10 ESSENTIAL HYPERTENSION, BENIGN: ICD-10-CM

## 2023-10-25 DIAGNOSIS — N52.9 ERECTILE DYSFUNCTION, UNSPECIFIED ERECTILE DYSFUNCTION TYPE: ICD-10-CM

## 2023-10-25 DIAGNOSIS — N40.0 BENIGN PROSTATIC HYPERPLASIA, UNSPECIFIED WHETHER LOWER URINARY TRACT SYMPTOMS PRESENT: ICD-10-CM

## 2023-10-25 LAB
ALBUMIN SERPL BCP-MCNC: 3.8 G/DL (ref 3.5–5.2)
ALP SERPL-CCNC: 76 U/L (ref 55–135)
ALT SERPL W/O P-5'-P-CCNC: 21 U/L (ref 10–44)
ANION GAP SERPL CALC-SCNC: 9 MMOL/L (ref 8–16)
AST SERPL-CCNC: 17 U/L (ref 10–40)
BASOPHILS # BLD AUTO: 0.04 K/UL (ref 0–0.2)
BASOPHILS NFR BLD: 0.6 % (ref 0–1.9)
BILIRUB SERPL-MCNC: 0.5 MG/DL (ref 0.1–1)
BUN SERPL-MCNC: 14 MG/DL (ref 6–20)
CALCIUM SERPL-MCNC: 9.4 MG/DL (ref 8.7–10.5)
CHLORIDE SERPL-SCNC: 109 MMOL/L (ref 95–110)
CHOLEST SERPL-MCNC: 164 MG/DL (ref 120–199)
CHOLEST/HDLC SERPL: 6.6 {RATIO} (ref 2–5)
CO2 SERPL-SCNC: 23 MMOL/L (ref 23–29)
COMPLEXED PSA SERPL-MCNC: 0.21 NG/ML (ref 0–4)
CREAT SERPL-MCNC: 0.9 MG/DL (ref 0.5–1.4)
DIFFERENTIAL METHOD: ABNORMAL
EOSINOPHIL # BLD AUTO: 0.2 K/UL (ref 0–0.5)
EOSINOPHIL NFR BLD: 3.1 % (ref 0–8)
ERYTHROCYTE [DISTWIDTH] IN BLOOD BY AUTOMATED COUNT: 14.6 % (ref 11.5–14.5)
EST. GFR  (NO RACE VARIABLE): >60 ML/MIN/1.73 M^2
ESTIMATED AVG GLUCOSE: 143 MG/DL (ref 68–131)
GLUCOSE SERPL-MCNC: 131 MG/DL (ref 70–110)
HBA1C MFR BLD: 6.6 % (ref 4–5.6)
HCT VFR BLD AUTO: 45.2 % (ref 40–54)
HDLC SERPL-MCNC: 25 MG/DL (ref 40–75)
HDLC SERPL: 15.2 % (ref 20–50)
HGB BLD-MCNC: 14.9 G/DL (ref 14–18)
IMM GRANULOCYTES # BLD AUTO: 0.01 K/UL (ref 0–0.04)
IMM GRANULOCYTES NFR BLD AUTO: 0.2 % (ref 0–0.5)
LDLC SERPL CALC-MCNC: 72.4 MG/DL (ref 63–159)
LYMPHOCYTES # BLD AUTO: 3 K/UL (ref 1–4.8)
LYMPHOCYTES NFR BLD: 46.7 % (ref 18–48)
MCH RBC QN AUTO: 27.7 PG (ref 27–31)
MCHC RBC AUTO-ENTMCNC: 33 G/DL (ref 32–36)
MCV RBC AUTO: 84 FL (ref 82–98)
MONOCYTES # BLD AUTO: 0.5 K/UL (ref 0.3–1)
MONOCYTES NFR BLD: 7.2 % (ref 4–15)
NEUTROPHILS # BLD AUTO: 2.7 K/UL (ref 1.8–7.7)
NEUTROPHILS NFR BLD: 42.2 % (ref 38–73)
NONHDLC SERPL-MCNC: 139 MG/DL
NRBC BLD-RTO: 0 /100 WBC
PLATELET # BLD AUTO: 234 K/UL (ref 150–450)
PMV BLD AUTO: 10.8 FL (ref 9.2–12.9)
POTASSIUM SERPL-SCNC: 3.9 MMOL/L (ref 3.5–5.1)
PROT SERPL-MCNC: 7.6 G/DL (ref 6–8.4)
RBC # BLD AUTO: 5.37 M/UL (ref 4.6–6.2)
SODIUM SERPL-SCNC: 141 MMOL/L (ref 136–145)
TRIGL SERPL-MCNC: 333 MG/DL (ref 30–150)
TSH SERPL DL<=0.005 MIU/L-ACNC: 0.99 UIU/ML (ref 0.4–4)
WBC # BLD AUTO: 6.42 K/UL (ref 3.9–12.7)

## 2023-10-25 PROCEDURE — 80061 LIPID PANEL: CPT | Performed by: INTERNAL MEDICINE

## 2023-10-25 PROCEDURE — 80053 COMPREHEN METABOLIC PANEL: CPT | Performed by: INTERNAL MEDICINE

## 2023-10-25 PROCEDURE — 84153 ASSAY OF PSA TOTAL: CPT | Performed by: INTERNAL MEDICINE

## 2023-10-25 PROCEDURE — 85025 COMPLETE CBC W/AUTO DIFF WBC: CPT | Performed by: INTERNAL MEDICINE

## 2023-10-25 PROCEDURE — 84443 ASSAY THYROID STIM HORMONE: CPT | Performed by: INTERNAL MEDICINE

## 2023-10-25 PROCEDURE — 36415 COLL VENOUS BLD VENIPUNCTURE: CPT | Performed by: INTERNAL MEDICINE

## 2023-10-25 PROCEDURE — 83036 HEMOGLOBIN GLYCOSYLATED A1C: CPT | Performed by: INTERNAL MEDICINE

## 2023-10-25 RX ORDER — CICLOPIROX 80 MG/ML
SOLUTION TOPICAL NIGHTLY
Qty: 6 ML | Refills: 6 | Status: SHIPPED | OUTPATIENT
Start: 2023-10-25

## 2023-10-30 ENCOUNTER — OFFICE VISIT (OUTPATIENT)
Dept: SPINE | Facility: CLINIC | Age: 48
End: 2023-10-30
Payer: MEDICAID

## 2023-10-30 VITALS — SYSTOLIC BLOOD PRESSURE: 124 MMHG | HEART RATE: 103 BPM | DIASTOLIC BLOOD PRESSURE: 78 MMHG

## 2023-10-30 DIAGNOSIS — G89.29 CHRONIC RIGHT SI JOINT PAIN: Primary | ICD-10-CM

## 2023-10-30 DIAGNOSIS — M53.3 CHRONIC RIGHT SI JOINT PAIN: Primary | ICD-10-CM

## 2023-10-30 PROCEDURE — 99213 OFFICE O/P EST LOW 20 MIN: CPT | Mod: PBBFAC | Performed by: NEUROLOGICAL SURGERY

## 2023-10-30 PROCEDURE — 1159F MED LIST DOCD IN RCRD: CPT | Mod: CPTII,,, | Performed by: NEUROLOGICAL SURGERY

## 2023-10-30 PROCEDURE — 3074F SYST BP LT 130 MM HG: CPT | Mod: CPTII,,, | Performed by: NEUROLOGICAL SURGERY

## 2023-10-30 PROCEDURE — 1159F PR MEDICATION LIST DOCUMENTED IN MEDICAL RECORD: ICD-10-PCS | Mod: CPTII,,, | Performed by: NEUROLOGICAL SURGERY

## 2023-10-30 PROCEDURE — 3078F DIAST BP <80 MM HG: CPT | Mod: CPTII,,, | Performed by: NEUROLOGICAL SURGERY

## 2023-10-30 PROCEDURE — 3044F HG A1C LEVEL LT 7.0%: CPT | Mod: CPTII,,, | Performed by: NEUROLOGICAL SURGERY

## 2023-10-30 PROCEDURE — 3078F PR MOST RECENT DIASTOLIC BLOOD PRESSURE < 80 MM HG: ICD-10-PCS | Mod: CPTII,,, | Performed by: NEUROLOGICAL SURGERY

## 2023-10-30 PROCEDURE — 4010F ACE/ARB THERAPY RXD/TAKEN: CPT | Mod: CPTII,,, | Performed by: NEUROLOGICAL SURGERY

## 2023-10-30 PROCEDURE — 3074F PR MOST RECENT SYSTOLIC BLOOD PRESSURE < 130 MM HG: ICD-10-PCS | Mod: CPTII,,, | Performed by: NEUROLOGICAL SURGERY

## 2023-10-30 PROCEDURE — 99213 OFFICE O/P EST LOW 20 MIN: CPT | Mod: S$PBB,,, | Performed by: NEUROLOGICAL SURGERY

## 2023-10-30 PROCEDURE — 3044F PR MOST RECENT HEMOGLOBIN A1C LEVEL <7.0%: ICD-10-PCS | Mod: CPTII,,, | Performed by: NEUROLOGICAL SURGERY

## 2023-10-30 PROCEDURE — 99213 PR OFFICE/OUTPT VISIT, EST, LEVL III, 20-29 MIN: ICD-10-PCS | Mod: S$PBB,,, | Performed by: NEUROLOGICAL SURGERY

## 2023-10-30 PROCEDURE — 4010F PR ACE/ARB THEARPY RXD/TAKEN: ICD-10-PCS | Mod: CPTII,,, | Performed by: NEUROLOGICAL SURGERY

## 2023-10-30 PROCEDURE — 99999 PR PBB SHADOW E&M-EST. PATIENT-LVL III: CPT | Mod: PBBFAC,,, | Performed by: NEUROLOGICAL SURGERY

## 2023-10-30 PROCEDURE — 99999 PR PBB SHADOW E&M-EST. PATIENT-LVL III: ICD-10-PCS | Mod: PBBFAC,,, | Performed by: NEUROLOGICAL SURGERY

## 2023-10-30 NOTE — H&P (VIEW-ONLY)
Dear Dr. Cornelius ref. provider found,    Thank you for referring this patient to my clinic. The full details of my evaluation will also be forthcoming to you by letter.    CHIEF COMPLAINT:  Back pain    I, Rodolfo Ji, attest that this documentation has been prepared under the direction and in the presence of Alphonse Reed MD.    HPI:  Emeka Caballero is a 48 y.o.  male with a PMHx of HTN, Type II DM, arthritis, epidural injections, and lumbar fusion for evaluation of back pain. He ambulates with a cane. He reports new pain to the right of his midline spine since his recent surgery. He states that he has also been leaning forward more since the surgery. He denies any recent falls or increase in hand clumsiness, but he states that he was unable to properly hold items.    He was shown his imaging results and taught about what the results of such mean.  Patient denies any other complaints at this time.      Review of patient's allergies indicates:  No Known Allergies    Past Medical History:   Diagnosis Date    Arthritis     Diabetes mellitus, type 2 2/11/2022    Essential hypertension, benign 5/4/2023     Past Surgical History:   Procedure Laterality Date    CATARACT EXTRACTION Right     EPIDURAL STEROID INJECTION N/A 10/21/2020    Procedure: Injection, Steroid, Epidural Caudal;  Surgeon: Vipul Nixon Jr., MD;  Location: [facility] ENDO;  Service: Pain Management;  Laterality: N/A;  Caudal KURT  Arrive @ 1315; No ATC or DM; Needs MD Signature    INJECTION, SACROILIAC JOINT Bilateral 12/27/2022    Procedure: INJECTION,SACROILIAC JOINT, BILATERAL CONTRAST DIRECT REF  ORDERED;  Surgeon: Brielle José MD;  Location: [facility] PAIN MGT;  Service: Pain Management;  Laterality: Bilateral;    LASER ENUCLEATION OF PROSTATE N/A 3/14/2023    Procedure: ENUCLEATION, PROSTATE, USING LASER;  Surgeon: Cecil Murray MD;  Location: [facility] OR;  Service: Urology;  Laterality: N/A;    LUMBAR FUSION N/A 2/8/2022    Procedure:  FUSION, SPINE, LUMBAR;  Surgeon: Alphonse Reed MD;  Location: [facility] OR [address] [place] [address];  Service: Neurosurgery;  Laterality: N/A;  AIRO, L4-S1     Family History   Problem Relation Age of Onset    Cataracts Mother     No Known Problems Father     No Known Problems Sister     No Known Problems Brother     No Known Problems Maternal Aunt     No Known Problems Maternal Uncle     No Known Problems Paternal Aunt     No Known Problems Paternal Uncle     No Known Problems Maternal Grandmother     No Known Problems Maternal Grandfather     No Known Problems Paternal Grandmother     No Known Problems Paternal Grandfather      Social History     Tobacco Use    Smoking status: Some Days     Types: Cigarettes    Smokeless tobacco: Never   Substance Use Topics    Alcohol use: Yes     Comment: socially    Drug use: No        Review of Systems    OBJECTIVE:   Vital Signs:       Physical Exam:    Vital signs: All nursing notes and vital signs reviewed -- afebrile, vital signs stable.  Constitutional: Patient sitting comfortably in chair. Appears well developed and well nourished.  Skin: Exposed areas are intact without abnormal markings, rashes or other lesions.  HEENT: Normocephalic. Normal conjunctivae.  Cardiovascular: Normal rate and regular rhythm.  Respiratory: Chest wall rises and falls symmetrically, without signs of respiratory distress.  Abdomen: Soft and non-tender.  Extremities: Warm and without edema. Calves supple, non-tender.  Psych/Behavior: Normal affect.    Neurological:    Mental status: Alert and oriented. Conversational and appropriate.       Cranial Nerves: VFF to confrontation. PERRL. EOMI without nystagmus. Facial STLT normal and symmetric. Strong, symmetric muscles of mastication. Facial strength full and symmetric. Hearing equal bilaterally to finger rub. Palate and uvula rise and fall normally in midline. Shoulder shrug 5/5 strength. Tongue midline.     Motor:    Upper:  Deltoids Triceps Biceps WE WF     R  5/5 5/5 5/5 5/5 5/5 5/5    L 5/5 5/5 5/5 5/5 5/5 5/5      Lower:  HF KE KF DF PF EHL    R 5/5 5/5 5/5 5/5 5/5 5/5    L 5/5 5/5 5/5 5/5 5/5 5/5     Sensory: Intact sensation to light touch in all extremities. Romberg negative.    Reflexes:          DTR: 2+ symmetrically throughout.     Lyons's: Negative.     Babinski's: Negative.     Clonus: Negative.    Cerebellar: Finger-to-nose and rapid alternating movements normal. Gait stable, fluid.    Spine:    Posture: Head well aligned over pelvis in front and side views.  No focal or global spinal deformity visible on inspection. Shoulders and hips even. No obvious leg length discrepancy. No scapula winging.    Bending: Full ROM with forward, back and lateral bending. No rib prominence with forward bend.    Cervical:      ROM: Full with flexion, extension, lateral rotation and ear-to-shoulder bend.      Midline TTP: Negative.     Spurling's test: Negative.     Lhermitte's: Negative.    Thoracic:     Midline TTP: Negative    Lumbar:     Midline TTP: Negative     Straight Leg Test: Negative     Crossed Straight Leg Test: Negative     Sciatic notch tenderness: Negative.    Other:     SI joint TTP: Negative.     Greater trochanter TTP: Negative.     Tenderness with external/internal hip rotation: Negative.    Diagnostic Results:  All imaging was independently reviewed by me.      MRI lumbar spine:  1. Moderate central stenosis L3-4.  2. No significant stenosis L4-5 or L5-S1.    CT lumbar spine:  1. Solid bony fusion L4-pelvis.  2. Some significant facet arthropath at L3-4 on right.  3. Vacuum pendemoma right SI joint.      Flex/Ex X-ray lumbar spine:  1. No instability.    Scoliosis standing AP and Lateral X-ray:  1. No spinal pelvic mismatch.   2 Spinal hardware in good position  3. Measurements:    Sagittal balance: 11cm   Pelvic Tilt:17 degrees    ASSESSMENT/PLAN:     Emeka Caballero has right SI joint pain. I recommend an SI joint injection and PT. All of his  spinal imaging is reassuring. He will RTC in 3 months.    The patient understands and agrees with the plan of care. All questions were answered.     1. RTC in 3 months in [facility]        I, Dr. Alphonse Reed personally performed the services described in this documentation. All medical record entries made by the scribe, Rodolfo Ji, were at my direction and in my presence.  I have reviewed the chart and agree that the record reflects my personal performance and is accurate and complete.      Alphonse Reed M.D.  Department of Neurosurgery  [facility]

## 2023-10-30 NOTE — PROGRESS NOTES
Dear Dr. Cornelius ref. provider found,    Thank you for referring this patient to my clinic. The full details of my evaluation will also be forthcoming to you by letter.    CHIEF COMPLAINT:  Back pain    I, Rodolfo Ji, attest that this documentation has been prepared under the direction and in the presence of Alphonse Reed MD.    HPI:  Emeka Caballero is a 48 y.o.  male with a PMHx of HTN, Type II DM, arthritis, epidural injections, and lumbar fusion for evaluation of back pain. He ambulates with a cane. He reports new pain to the right of his midline spine since his recent surgery. He states that he has also been leaning forward more since the surgery. He denies any recent falls or increase in hand clumsiness, but he states that he was unable to properly hold items.    He was shown his imaging results and taught about what the results of such mean.  Patient denies any other complaints at this time.      Review of patient's allergies indicates:  No Known Allergies    Past Medical History:   Diagnosis Date    Arthritis     Diabetes mellitus, type 2 2/11/2022    Essential hypertension, benign 5/4/2023     Past Surgical History:   Procedure Laterality Date    CATARACT EXTRACTION Right     EPIDURAL STEROID INJECTION N/A 10/21/2020    Procedure: Injection, Steroid, Epidural Caudal;  Surgeon: Vipul Nixon Jr., MD;  Location: [facility] ENDO;  Service: Pain Management;  Laterality: N/A;  Caudal KURT  Arrive @ 1315; No ATC or DM; Needs MD Signature    INJECTION, SACROILIAC JOINT Bilateral 12/27/2022    Procedure: INJECTION,SACROILIAC JOINT, BILATERAL CONTRAST DIRECT REF  ORDERED;  Surgeon: Brielle José MD;  Location: [facility] PAIN MGT;  Service: Pain Management;  Laterality: Bilateral;    LASER ENUCLEATION OF PROSTATE N/A 3/14/2023    Procedure: ENUCLEATION, PROSTATE, USING LASER;  Surgeon: Cecil Murray MD;  Location: [facility] OR;  Service: Urology;  Laterality: N/A;    LUMBAR FUSION N/A 2/8/2022    Procedure:  FUSION, SPINE, LUMBAR;  Surgeon: Alphonse Reed MD;  Location: [facility] OR [address] [place] [address];  Service: Neurosurgery;  Laterality: N/A;  AIRO, L4-S1     Family History   Problem Relation Age of Onset    Cataracts Mother     No Known Problems Father     No Known Problems Sister     No Known Problems Brother     No Known Problems Maternal Aunt     No Known Problems Maternal Uncle     No Known Problems Paternal Aunt     No Known Problems Paternal Uncle     No Known Problems Maternal Grandmother     No Known Problems Maternal Grandfather     No Known Problems Paternal Grandmother     No Known Problems Paternal Grandfather      Social History     Tobacco Use    Smoking status: Some Days     Types: Cigarettes    Smokeless tobacco: Never   Substance Use Topics    Alcohol use: Yes     Comment: socially    Drug use: No        Review of Systems    OBJECTIVE:   Vital Signs:       Physical Exam:    Vital signs: All nursing notes and vital signs reviewed -- afebrile, vital signs stable.  Constitutional: Patient sitting comfortably in chair. Appears well developed and well nourished.  Skin: Exposed areas are intact without abnormal markings, rashes or other lesions.  HEENT: Normocephalic. Normal conjunctivae.  Cardiovascular: Normal rate and regular rhythm.  Respiratory: Chest wall rises and falls symmetrically, without signs of respiratory distress.  Abdomen: Soft and non-tender.  Extremities: Warm and without edema. Calves supple, non-tender.  Psych/Behavior: Normal affect.    Neurological:    Mental status: Alert and oriented. Conversational and appropriate.       Cranial Nerves: VFF to confrontation. PERRL. EOMI without nystagmus. Facial STLT normal and symmetric. Strong, symmetric muscles of mastication. Facial strength full and symmetric. Hearing equal bilaterally to finger rub. Palate and uvula rise and fall normally in midline. Shoulder shrug 5/5 strength. Tongue midline.     Motor:    Upper:  Deltoids Triceps Biceps WE WF     R  5/5 5/5 5/5 5/5 5/5 5/5    L 5/5 5/5 5/5 5/5 5/5 5/5      Lower:  HF KE KF DF PF EHL    R 5/5 5/5 5/5 5/5 5/5 5/5    L 5/5 5/5 5/5 5/5 5/5 5/5     Sensory: Intact sensation to light touch in all extremities. Romberg negative.    Reflexes:          DTR: 2+ symmetrically throughout.     Lyons's: Negative.     Babinski's: Negative.     Clonus: Negative.    Cerebellar: Finger-to-nose and rapid alternating movements normal. Gait stable, fluid.    Spine:    Posture: Head well aligned over pelvis in front and side views.  No focal or global spinal deformity visible on inspection. Shoulders and hips even. No obvious leg length discrepancy. No scapula winging.    Bending: Full ROM with forward, back and lateral bending. No rib prominence with forward bend.    Cervical:      ROM: Full with flexion, extension, lateral rotation and ear-to-shoulder bend.      Midline TTP: Negative.     Spurling's test: Negative.     Lhermitte's: Negative.    Thoracic:     Midline TTP: Negative    Lumbar:     Midline TTP: Negative     Straight Leg Test: Negative     Crossed Straight Leg Test: Negative     Sciatic notch tenderness: Negative.    Other:     SI joint TTP: Negative.     Greater trochanter TTP: Negative.     Tenderness with external/internal hip rotation: Negative.    Diagnostic Results:  All imaging was independently reviewed by me.      MRI lumbar spine:  1. Moderate central stenosis L3-4.  2. No significant stenosis L4-5 or L5-S1.    CT lumbar spine:  1. Solid bony fusion L4-pelvis.  2. Some significant facet arthropath at L3-4 on right.  3. Vacuum pendemoma right SI joint.      Flex/Ex X-ray lumbar spine:  1. No instability.    Scoliosis standing AP and Lateral X-ray:  1. No spinal pelvic mismatch.   2 Spinal hardware in good position  3. Measurements:    Sagittal balance: 11cm   Pelvic Tilt:17 degrees    ASSESSMENT/PLAN:     Emeka Caballero has right SI joint pain. I recommend an SI joint injection and PT. All of his  spinal imaging is reassuring. He will RTC in 3 months.    The patient understands and agrees with the plan of care. All questions were answered.     1. RTC in 3 months in [facility]        I, Dr. Alphonse Reed personally performed the services described in this documentation. All medical record entries made by the scribe, Rodolfo Ji, were at my direction and in my presence.  I have reviewed the chart and agree that the record reflects my personal performance and is accurate and complete.      Alphonse Reed M.D.  Department of Neurosurgery  [facility]

## 2023-11-14 ENCOUNTER — PATIENT MESSAGE (OUTPATIENT)
Dept: ADMINISTRATIVE | Facility: HOSPITAL | Age: 48
End: 2023-11-14
Payer: MEDICAID

## 2023-11-21 ENCOUNTER — OFFICE VISIT (OUTPATIENT)
Dept: FAMILY MEDICINE | Facility: CLINIC | Age: 48
End: 2023-11-21
Payer: MEDICAID

## 2023-11-21 VITALS
TEMPERATURE: 98 F | OXYGEN SATURATION: 98 % | HEIGHT: 66 IN | SYSTOLIC BLOOD PRESSURE: 124 MMHG | HEART RATE: 87 BPM | WEIGHT: 201.94 LBS | DIASTOLIC BLOOD PRESSURE: 64 MMHG | BODY MASS INDEX: 32.45 KG/M2

## 2023-11-21 DIAGNOSIS — E78.5 TYPE 2 DIABETES MELLITUS WITH HYPERLIPIDEMIA: Primary | Chronic | ICD-10-CM

## 2023-11-21 DIAGNOSIS — Z12.11 SCREENING FOR COLORECTAL CANCER: ICD-10-CM

## 2023-11-21 DIAGNOSIS — K21.00 GASTROESOPHAGEAL REFLUX DISEASE WITH ESOPHAGITIS WITHOUT HEMORRHAGE: ICD-10-CM

## 2023-11-21 DIAGNOSIS — E11.69 TYPE 2 DIABETES MELLITUS WITH HYPERLIPIDEMIA: Primary | Chronic | ICD-10-CM

## 2023-11-21 DIAGNOSIS — Z12.12 SCREENING FOR COLORECTAL CANCER: ICD-10-CM

## 2023-11-21 DIAGNOSIS — M48.061 SPINAL STENOSIS, LUMBAR REGION, WITHOUT NEUROGENIC CLAUDICATION: ICD-10-CM

## 2023-11-21 DIAGNOSIS — K59.09 CHRONIC CONSTIPATION: Chronic | ICD-10-CM

## 2023-11-21 DIAGNOSIS — R09.82 POSTNASAL DRIP: ICD-10-CM

## 2023-11-21 DIAGNOSIS — M54.16 LUMBAR RADICULOPATHY: ICD-10-CM

## 2023-11-21 DIAGNOSIS — E78.2 MIXED DYSLIPIDEMIA: Chronic | ICD-10-CM

## 2023-11-21 PROCEDURE — 3074F PR MOST RECENT SYSTOLIC BLOOD PRESSURE < 130 MM HG: ICD-10-PCS | Mod: CPTII,,, | Performed by: FAMILY MEDICINE

## 2023-11-21 PROCEDURE — 3044F PR MOST RECENT HEMOGLOBIN A1C LEVEL <7.0%: ICD-10-PCS | Mod: CPTII,,, | Performed by: FAMILY MEDICINE

## 2023-11-21 PROCEDURE — 99214 PR OFFICE/OUTPT VISIT, EST, LEVL IV, 30-39 MIN: ICD-10-PCS | Mod: S$PBB,,, | Performed by: FAMILY MEDICINE

## 2023-11-21 PROCEDURE — 3008F PR BODY MASS INDEX (BMI) DOCUMENTED: ICD-10-PCS | Mod: CPTII,,, | Performed by: FAMILY MEDICINE

## 2023-11-21 PROCEDURE — 3074F SYST BP LT 130 MM HG: CPT | Mod: CPTII,,, | Performed by: FAMILY MEDICINE

## 2023-11-21 PROCEDURE — 99214 OFFICE O/P EST MOD 30 MIN: CPT | Mod: PBBFAC,PN | Performed by: FAMILY MEDICINE

## 2023-11-21 PROCEDURE — 4010F PR ACE/ARB THEARPY RXD/TAKEN: ICD-10-PCS | Mod: CPTII,,, | Performed by: FAMILY MEDICINE

## 2023-11-21 PROCEDURE — 1159F PR MEDICATION LIST DOCUMENTED IN MEDICAL RECORD: ICD-10-PCS | Mod: CPTII,,, | Performed by: FAMILY MEDICINE

## 2023-11-21 PROCEDURE — 99999 PR PBB SHADOW E&M-EST. PATIENT-LVL IV: CPT | Mod: PBBFAC,,, | Performed by: FAMILY MEDICINE

## 2023-11-21 PROCEDURE — 3044F HG A1C LEVEL LT 7.0%: CPT | Mod: CPTII,,, | Performed by: FAMILY MEDICINE

## 2023-11-21 PROCEDURE — 3008F BODY MASS INDEX DOCD: CPT | Mod: CPTII,,, | Performed by: FAMILY MEDICINE

## 2023-11-21 PROCEDURE — 1160F PR REVIEW ALL MEDS BY PRESCRIBER/CLIN PHARMACIST DOCUMENTED: ICD-10-PCS | Mod: CPTII,,, | Performed by: FAMILY MEDICINE

## 2023-11-21 PROCEDURE — 3078F DIAST BP <80 MM HG: CPT | Mod: CPTII,,, | Performed by: FAMILY MEDICINE

## 2023-11-21 PROCEDURE — 1160F RVW MEDS BY RX/DR IN RCRD: CPT | Mod: CPTII,,, | Performed by: FAMILY MEDICINE

## 2023-11-21 PROCEDURE — 4010F ACE/ARB THERAPY RXD/TAKEN: CPT | Mod: CPTII,,, | Performed by: FAMILY MEDICINE

## 2023-11-21 PROCEDURE — 1159F MED LIST DOCD IN RCRD: CPT | Mod: CPTII,,, | Performed by: FAMILY MEDICINE

## 2023-11-21 PROCEDURE — 3078F PR MOST RECENT DIASTOLIC BLOOD PRESSURE < 80 MM HG: ICD-10-PCS | Mod: CPTII,,, | Performed by: FAMILY MEDICINE

## 2023-11-21 PROCEDURE — 99999 PR PBB SHADOW E&M-EST. PATIENT-LVL IV: ICD-10-PCS | Mod: PBBFAC,,, | Performed by: FAMILY MEDICINE

## 2023-11-21 PROCEDURE — 99214 OFFICE O/P EST MOD 30 MIN: CPT | Mod: S$PBB,,, | Performed by: FAMILY MEDICINE

## 2023-11-21 RX ORDER — PANTOPRAZOLE SODIUM 40 MG/1
40 TABLET, DELAYED RELEASE ORAL DAILY
Qty: 30 TABLET | Refills: 11 | Status: SHIPPED | OUTPATIENT
Start: 2023-11-21 | End: 2024-11-20

## 2023-11-21 RX ORDER — GABAPENTIN 300 MG/1
300 CAPSULE ORAL NIGHTLY
Qty: 30 CAPSULE | Refills: 11 | Status: SHIPPED | OUTPATIENT
Start: 2023-11-21 | End: 2024-11-20

## 2023-11-21 RX ORDER — METFORMIN HYDROCHLORIDE 500 MG/1
500 TABLET, EXTENDED RELEASE ORAL
Qty: 90 TABLET | Refills: 3 | Status: SHIPPED | OUTPATIENT
Start: 2023-11-21

## 2023-11-21 RX ORDER — LEVOCETIRIZINE DIHYDROCHLORIDE 5 MG/1
5 TABLET, FILM COATED ORAL NIGHTLY
Qty: 30 TABLET | Refills: 11 | Status: SHIPPED | OUTPATIENT
Start: 2023-11-21 | End: 2024-11-20

## 2023-11-21 RX ORDER — ATORVASTATIN CALCIUM 40 MG/1
40 TABLET, FILM COATED ORAL DAILY
Qty: 90 TABLET | Refills: 3 | Status: SHIPPED | OUTPATIENT
Start: 2023-11-21

## 2023-11-27 ENCOUNTER — HOSPITAL ENCOUNTER (OUTPATIENT)
Facility: OTHER | Age: 48
Discharge: HOME | End: 2023-11-27
Attending: ANESTHESIOLOGY | Admitting: ANESTHESIOLOGY
Payer: MEDICAID

## 2023-11-27 VITALS
BODY MASS INDEX: 30.01 KG/M2 | HEIGHT: 68 IN | SYSTOLIC BLOOD PRESSURE: 124 MMHG | HEART RATE: 80 BPM | RESPIRATION RATE: 16 BRPM | OXYGEN SATURATION: 96 % | WEIGHT: 198 LBS | TEMPERATURE: 98 F | DIASTOLIC BLOOD PRESSURE: 79 MMHG

## 2023-11-27 DIAGNOSIS — M46.1 SACROILIITIS: Primary | ICD-10-CM

## 2023-11-27 DIAGNOSIS — G89.29 CHRONIC PAIN: ICD-10-CM

## 2023-11-27 LAB — POCT GLUCOSE: 116 MG/DL (ref 70–110)

## 2023-11-27 PROCEDURE — 27096 PR INJECTION,SACROILIAC JOINT: ICD-10-PCS | Mod: RT,,, | Performed by: ANESTHESIOLOGY

## 2023-11-27 PROCEDURE — 27096 INJECT SACROILIAC JOINT: CPT | Mod: RT,,, | Performed by: ANESTHESIOLOGY

## 2023-11-27 PROCEDURE — 25000003 PHARM REV CODE 250: Performed by: ANESTHESIOLOGY

## 2023-11-27 PROCEDURE — 27096 INJECT SACROILIAC JOINT: CPT | Mod: RT | Performed by: ANESTHESIOLOGY

## 2023-11-27 PROCEDURE — 63600175 PHARM REV CODE 636 W HCPCS: Performed by: ANESTHESIOLOGY

## 2023-11-27 PROCEDURE — 25500020 PHARM REV CODE 255: Performed by: ANESTHESIOLOGY

## 2023-11-27 RX ORDER — BUPIVACAINE HYDROCHLORIDE 2.5 MG/ML
INJECTION, SOLUTION EPIDURAL; INFILTRATION; INTRACAUDAL
Status: DISCONTINUED | OUTPATIENT
Start: 2023-11-27 | End: 2023-11-27 | Stop reason: HOSPADM

## 2023-11-27 RX ORDER — TRIAMCINOLONE ACETONIDE 40 MG/ML
INJECTION, SUSPENSION INTRA-ARTICULAR; INTRAMUSCULAR
Status: DISCONTINUED | OUTPATIENT
Start: 2023-11-27 | End: 2023-11-27 | Stop reason: HOSPADM

## 2023-11-27 RX ORDER — SODIUM CHLORIDE 9 MG/ML
INJECTION, SOLUTION INTRAVENOUS CONTINUOUS
Status: DISCONTINUED | OUTPATIENT
Start: 2023-11-27 | End: 2023-11-27 | Stop reason: HOSPADM

## 2023-11-27 RX ORDER — LIDOCAINE HYDROCHLORIDE 20 MG/ML
INJECTION, SOLUTION INFILTRATION; PERINEURAL
Status: DISCONTINUED | OUTPATIENT
Start: 2023-11-27 | End: 2023-11-27 | Stop reason: HOSPADM

## 2023-11-27 RX ORDER — ALPRAZOLAM 0.5 MG/1
1 TABLET, ORALLY DISINTEGRATING ORAL
Status: DISCONTINUED | OUTPATIENT
Start: 2023-11-27 | End: 2023-11-27 | Stop reason: HOSPADM

## 2023-11-27 RX ADMIN — ALPRAZOLAM 1 MG: 0.5 TABLET, ORALLY DISINTEGRATING ORAL at 07:11

## 2023-11-27 NOTE — DISCHARGE INSTRUCTIONS

## 2023-11-27 NOTE — OP NOTE
Sacroiliac Joint Injection under Fluoroscopic Guidance    The procedure, risks, benefits, and options were discussed with the patient. There are no contraindications to the procedure. The patent expressed understanding and agreed to the procedure. Informed written consent was obtained prior to the start of the procedure and can be found in the patient's chart.    PATIENT NAME: Emeka Caballero   MRN: [id number]     DATE OF PROCEDURE: 11/27/2023    PROCEDURE: Right Sacroiliac Joint Injection under Fluoroscopic Guidance    PRE-OP DIAGNOSIS: Chronic right SI joint pain [M53.3, G89.29]    POST-OP DIAGNOSIS: Same    PHYSICIAN: Brielle José MD    ASSISTANTS: [employer]     MEDICATIONS INJECTED: Preservative-free Kenalog 40mg with 3cc of Bupivacine 0.25%     LOCAL ANESTHETIC INJECTED: Xylocaine 2%     SEDATION: None    ESTIMATED BLOOD LOSS: None    COMPLICATIONS: None    TECHNIQUE: Time-out was performed to identify the patient and procedure to be performed. With the patient laying in a prone position, the surgical area was prepped and draped in the usual sterile fashion using ChloraPrep and a fenestrated drape. The sacroiliac joint was determined under fluoroscopy guidance. Skin anesthesia was achieved by injecting Lidocaine 2% over the injection site. The sacroiliac joint was  then approached with a 25 gauge, 3.5 inch spinal quinke needle that was introduced under fluoroscopic guidance in the AP and Lateral views. Once the needle tip was in the area of the joint, and there was no blood, contrast dye Omnipaque (300mg/mL) was injected to confirm placement and there was no vascular runoff. Fluoroscopic imaging in the AP and lateral views revealed a clear outline of the joint space. 4 mL of the medication mixture listed above was injected slowly intraarticular and trinh-articular. Displacement of the radio opaque contrast after injection of the medication confirmed that the medication went into the area of the joint. The  needles were removed and bleeding was nil.  A sterile dressing was applied. No specimens collected. The patient tolerated the procedure well.     PRE-PROCEDURE PAIN SCORE: 10/10    POST-PROCEDURE PAIN SCORE: 0/10    The patient was monitored after the procedure in the recovery area. They were given post-procedure and discharge instructions to follow at home. The patient was discharged in a stable condition.        Brielle José MD

## 2023-11-27 NOTE — DISCHARGE SUMMARY
Discharge Note  Short Stay      SUMMARY     Admit Date: 11/27/2023    Attending Physician: Brielle José      Discharge Physician: Brielle José      Discharge Date: 11/27/2023 8:30 AM    Procedure(s) (LRB):  INJECTION,SACROILIAC JOINT RIGHT DIRECT REFERRAL  Sooner date (Right)    Final Diagnosis: Chronic right SI joint pain [M53.3, G89.29]    Disposition: Home or self care    Patient Instructions:   Current Discharge Medication List        CONTINUE these medications which have NOT CHANGED    Details   atorvastatin (LIPITOR) 40 MG tablet Take 1 tablet (40 mg total) by mouth once daily.  Qty: 90 tablet, Refills: 3    Comments: Please discontinue refills for gemfibrozil  Associated Diagnoses: Mixed dyslipidemia      blood sugar diagnostic Strp To check BG 3 times daily, to use with insurance preferred meter  Qty: 100 each, Refills: 11    Associated Diagnoses: Diabetes mellitus without complication      blood-glucose meter kit To check BG 3 times daily, to use with insurance preferred meter  Qty: 1 each, Refills: 0    Associated Diagnoses: Newly diagnosed diabetes      ciclopirox (PENLAC) 8 % Soln Apply topically nightly.  Qty: 6 mL, Refills: 6      gabapentin (NEURONTIN) 300 MG capsule Take 1 capsule (300 mg total) by mouth every evening.  Qty: 30 capsule, Refills: 11    Associated Diagnoses: Spinal stenosis, lumbar region, without neurogenic claudication; Lumbar radiculopathy      lancets Misc To check BG 3 times daily, to use with insurance preferred meter  Qty: 100 each, Refills: 11    Associated Diagnoses: Newly diagnosed diabetes      levocetirizine (XYZAL) 5 MG tablet Take 1 tablet (5 mg total) by mouth every evening.  Qty: 30 tablet, Refills: 11    Associated Diagnoses: Postnasal drip      linaCLOtide (LINZESS) 72 mcg Cap capsule Take 1 capsule (72 mcg total) by mouth daily as needed (constipation).  Qty: 30 capsule, Refills: 5    Associated Diagnoses: Chronic constipation      lisinopriL (PRINIVIL,ZESTRIL) 5 MG  tablet Take 1 tablet (5 mg total) by mouth once daily.  Qty: 90 tablet, Refills: 1    Comments: .  Associated Diagnoses: Essential hypertension, benign      metFORMIN (GLUCOPHAGE-XR) 500 MG ER 24hr tablet Take 1 tablet (500 mg total) by mouth daily with breakfast.  Qty: 90 tablet, Refills: 3    Associated Diagnoses: Type 2 diabetes mellitus with hyperlipidemia      omega-3 fatty acids-fish oil 340-1,000 mg Cap Take 1 capsule by mouth once daily.  Qty: 90 capsule, Refills: 3    Associated Diagnoses: Mixed dyslipidemia      oxybutynin (DITROPAN-XL) 10 MG 24 hr tablet Take 1 tablet (10 mg total) by mouth once daily.  Qty: 30 tablet, Refills: 11      oxyCODONE-acetaminophen (PERCOCET) 5-325 mg per tablet Take 1 tablet by mouth every 4 (four) hours as needed for Pain.  Qty: 12 tablet, Refills: 0      pantoprazole (PROTONIX) 40 MG tablet Take 1 tablet (40 mg total) by mouth once daily.  Qty: 30 tablet, Refills: 11    Associated Diagnoses: Gastroesophageal reflux disease with esophagitis without hemorrhage      tadalafiL (CIALIS) 20 MG Tab Take 1 tablet (20 mg total) by mouth daily as needed (Erectile Dysfunction).  Qty: 30 tablet, Refills: 11      tamsulosin (FLOMAX) 0.4 mg Cap Take 1 capsule (0.4 mg total) by mouth once daily.  Qty: 30 capsule, Refills: 11    Associated Diagnoses: Benign prostatic hyperplasia, unspecified whether lower urinary tract symptoms present                 Discharge Diagnosis: Chronic right SI joint pain [M53.3, G89.29]  Condition on Discharge: Stable with no complications to procedure   Diet on Discharge: Same as before.  Activity: as per instruction sheet.  Discharge to: Home with a responsible adult.  Follow up: 2-4 weeks       Please call my office or pager at [phone] if experienced any weakness or loss of sensation, fever > 101.5, pain uncontrolled with oral medications, persistent nausea/vomiting/or diarrhea, redness or drainage from the incisions, or any other worrisome concerns. If  physician on call was not reached or could not communicate with our office for any reason please go to the nearest emergency department

## 2023-12-04 NOTE — PROGRESS NOTES
"  Patient Name: Emeka Caballero    : 1975  MRN: [id number]      Subjective:     Patient ID: Emeka is a 48 y.o. male    Chief Complaint:  Diabetes    48-year-old male with diabetes, hyperlipidemia, gastroesophageal reflux disease, and lumbar radiculopathy presents for routine follow-up on these conditions.  He reports that he would seen another primary at his last visit because his wife made him an appointment with that provider, however he states that he wants to continue his primary care with myself as he feels comfortable with me and because he lives closer to this office.  He reports compliance with all his medications and reports no acute symptomatology.         Review of Systems   Constitutional:  Negative for fever and unexpected weight change.   HENT:  Positive for postnasal drip and rhinorrhea.    Eyes:  Negative for visual disturbance.   Respiratory:  Negative for chest tightness, shortness of breath and wheezing.    Cardiovascular:  Negative for chest pain.   Gastrointestinal:  Positive for constipation. Negative for abdominal pain, blood in stool and fecal incontinence.   Endocrine: Negative for polydipsia, polyphagia and polyuria.   Genitourinary:  Positive for erectile dysfunction. Negative for bladder incontinence and dysuria.   Musculoskeletal:  Positive for back pain and leg pain.   Neurological:  Negative for weakness and headaches.        Objective:   /64 (BP Location: Left arm, Patient Position: Sitting, BP Method: Large (Manual))   Pulse 87   Temp 98 °F (36.7 °C) (Oral)   Ht 5' 6" (1.676 m)   Wt 91.6 kg (201 lb 15.1 oz)   SpO2 98%   BMI 32.59 kg/m²     Physical Exam  Vitals reviewed.   Constitutional:       General: He is not in acute distress.  HENT:      Head: Normocephalic and atraumatic.      Right Ear: Ear canal and external ear normal.      Left Ear: Ear canal and external ear normal.      Nose: Nose normal.      Mouth/Throat:      Mouth: Mucous membranes are " moist.      Pharynx: No oropharyngeal exudate or posterior oropharyngeal erythema.   Eyes:      Extraocular Movements: Extraocular movements intact.      Conjunctiva/sclera: Conjunctivae normal.      Pupils: Pupils are equal, round, and reactive to light.   Cardiovascular:      Rate and Rhythm: Normal rate and regular rhythm.      Heart sounds: No murmur heard.  Pulmonary:      Effort: Pulmonary effort is normal. No respiratory distress.      Breath sounds: Normal breath sounds. No wheezing or rales.   Abdominal:      General: Abdomen is flat. Bowel sounds are normal. There is no distension.      Palpations: Abdomen is soft.      Tenderness: There is no abdominal tenderness. There is no guarding.   Musculoskeletal:      Cervical back: Normal range of motion.   Skin:     General: Skin is warm.      Capillary Refill: Capillary refill takes less than 2 seconds.   Neurological:      Mental Status: He is alert and oriented to person, place, and time.      Cranial Nerves: No cranial nerve deficit.      Sensory: No sensory deficit.   Psychiatric:         Mood and Affect: Mood normal.         Behavior: Behavior normal.        Lab Visit on 10/25/2023   Component Date Value Ref Range Status    Sodium 10/25/2023 141  136 - 145 mmol/L Final    Potassium 10/25/2023 3.9  3.5 - 5.1 mmol/L Final    Chloride 10/25/2023 109  95 - 110 mmol/L Final    CO2 10/25/2023 23  23 - 29 mmol/L Final    Glucose 10/25/2023 131 (H)  70 - 110 mg/dL Final    BUN 10/25/2023 14  6 - 20 mg/dL Final    Creatinine 10/25/2023 0.9  0.5 - 1.4 mg/dL Final    Calcium 10/25/2023 9.4  8.7 - 10.5 mg/dL Final    Total Protein 10/25/2023 7.6  6.0 - 8.4 g/dL Final    Albumin 10/25/2023 3.8  3.5 - 5.2 g/dL Final    Total Bilirubin 10/25/2023 0.5  0.1 - 1.0 mg/dL Final    Comment: For infants and newborns, interpretation of results should be based  on gestational age, weight and in agreement with clinical  observations.    Premature Infant recommended reference  ranges:  Up to 24 hours.............<8.0 mg/dL  Up to 48 hours............<12.0 mg/dL  3-5 days..................<15.0 mg/dL  6-29 days.................<15.0 mg/dL      Alkaline Phosphatase 10/25/2023 76  55 - 135 U/L Final    AST 10/25/2023 17  10 - 40 U/L Final    ALT 10/25/2023 21  10 - 44 U/L Final    eGFR 10/25/2023 >60  >60 mL/min/1.73 m^2 Final    Anion Gap 10/25/2023 9  8 - 16 mmol/L Final    WBC 10/25/2023 6.42  3.90 - 12.70 K/uL Final    RBC 10/25/2023 5.37  4.60 - 6.20 M/uL Final    Hemoglobin 10/25/2023 14.9  14.0 - 18.0 g/dL Final    Hematocrit 10/25/2023 45.2  40.0 - 54.0 % Final    MCV 10/25/2023 84  82 - 98 fL Final    MCH 10/25/2023 27.7  27.0 - 31.0 pg Final    MCHC 10/25/2023 33.0  32.0 - 36.0 g/dL Final    RDW 10/25/2023 14.6 (H)  11.5 - 14.5 % Final    Platelets 10/25/2023 234  150 - 450 K/uL Final    MPV 10/25/2023 10.8  9.2 - 12.9 fL Final    Immature Granulocytes 10/25/2023 0.2  0.0 - 0.5 % Final    Gran # (ANC) 10/25/2023 2.7  1.8 - 7.7 K/uL Final    Immature Grans (Abs) 10/25/2023 0.01  0.00 - 0.04 K/uL Final    Comment: Mild elevation in immature granulocytes is non specific and   can be seen in a variety of conditions including stress response,   acute inflammation, trauma and pregnancy. Correlation with other   laboratory and clinical findings is essential.      Lymph # 10/25/2023 3.0  1.0 - 4.8 K/uL Final    Mono # 10/25/2023 0.5  0.3 - 1.0 K/uL Final    Eos # 10/25/2023 0.2  0.0 - 0.5 K/uL Final    Baso # 10/25/2023 0.04  0.00 - 0.20 K/uL Final    nRBC 10/25/2023 0  0 /100 WBC Final    Gran % 10/25/2023 42.2  38.0 - 73.0 % Final    Lymph % 10/25/2023 46.7  18.0 - 48.0 % Final    Mono % 10/25/2023 7.2  4.0 - 15.0 % Final    Eosinophil % 10/25/2023 3.1  0.0 - 8.0 % Final    Basophil % 10/25/2023 0.6  0.0 - 1.9 % Final    Differential Method 10/25/2023 Automated   Final    Hemoglobin A1C 10/25/2023 6.6 (H)  4.0 - 5.6 % Final    Comment: ADA Screening Guidelines:  5.7-6.4%  Consistent with  prediabetes  >or=6.5%  Consistent with diabetes    High levels of fetal hemoglobin interfere with the HbA1C  assay. Heterozygous hemoglobin variants (HbS, HgC, etc)do  not significantly interfere with this assay.   However, presence of multiple variants may affect accuracy.      Estimated Avg Glucose 10/25/2023 143 (H)  68 - 131 mg/dL Final    Cholesterol 10/25/2023 164  120 - 199 mg/dL Final    Comment: The National Cholesterol Education Program (NCEP) has set the  following guidelines (reference ranges) for Cholesterol:  Optimal.....................<200 mg/dL  Borderline High.............200-239 mg/dL  High........................> or = 240 mg/dL      Triglycerides 10/25/2023 333 (H)  30 - 150 mg/dL Final    Comment: The National Cholesterol Education Program (NCEP) has set the  following guidelines (reference values) for triglycerides:  Normal......................<150 mg/dL  Borderline High.............150-199 mg/dL  High........................200-499 mg/dL      HDL 10/25/2023 25 (L)  40 - 75 mg/dL Final    Comment: The National Cholesterol Education Program (NCEP) has set the  following guidelines (reference values) for HDL Cholesterol:  Low...............<40 mg/dL  Optimal...........>60 mg/dL      LDL Cholesterol 10/25/2023 72.4  63.0 - 159.0 mg/dL Final    Comment: The National Cholesterol Education Program (NCEP) has set the  following guidelines (reference values) for LDL Cholesterol:  Optimal.......................<130 mg/dL  Borderline High...............130-159 mg/dL  High..........................160-189 mg/dL  Very High.....................>190 mg/dL      HDL/Cholesterol Ratio 10/25/2023 15.2 (L)  20.0 - 50.0 % Final    Total Cholesterol/HDL Ratio 10/25/2023 6.6 (H)  2.0 - 5.0 Final    Non-HDL Cholesterol 10/25/2023 139  mg/dL Final    Comment: Risk category and Non-HDL cholesterol goals:  Coronary heart disease (CHD)or equivalent (10-year risk of CHD >20%):  Non-HDL cholesterol goal     <130  mg/dL  Two or more CHD risk factors and 10-year risk of CHD <= 20%:  Non-HDL cholesterol goal     <160 mg/dL  0 to 1 CHD risk factor:  Non-HDL cholesterol goal     <190 mg/dL      TSH 10/25/2023 0.988  0.400 - 4.000 uIU/mL Final    PSA, Screen 10/25/2023 0.21  0.00 - 4.00 ng/mL Final    Comment: The testing method is a chemiluminescent microparticle immunoassay   manufactured by [employer] and performed on the Cerora   or   PillGuard system. Values obtained with different assay manufacturers   for   methods may be different and cannot be used interchangeably.  PSA Expected levels:  Hormonal Therapy: <0.05 ng/ml  Prostatectomy: <0.01 ng/ml  Radiation Therapy: <1.00 ng/ml         Assessment        ICD-10-CM ICD-9-CM   1. Type 2 diabetes mellitus with hyperlipidemia  E11.69 250.80    E78.5 272.4   2. Mixed dyslipidemia  E78.2 272.2   3. Spinal stenosis, lumbar region, without neurogenic claudication  M48.061 724.02   4. Lumbar radiculopathy  M54.16 724.4   5. Chronic constipation  K59.09 564.00   6. Gastroesophageal reflux disease with esophagitis without hemorrhage  K21.00 530.81     530.10   7. Postnasal drip  R09.82 784.91   8. Screening for colorectal cancer  Z12.11 V76.51    Z12.12 V76.41         Plan:     1. Type 2 diabetes mellitus with hyperlipidemia  Overview:  Lab Results   Component Value Date    HGBA1C 6.6 (H) 10/25/2023    HGBA1C 6.6 (H) 04/29/2023    HGBA1C 6.9 (H) 03/14/2023     Lab Results   Component Value Date    LDLCALC 72.4 10/25/2023     Lab Results   Component Value Date    MICALBCREAT 2.9 10/14/2022         Assessment & Plan:  Sugar controlled with current regimen.  Will continue current regimen.  Discussed importance of dietary and lifestyle modifications.    Orders:  -     metFORMIN (GLUCOPHAGE-XR) 500 MG ER 24hr tablet; Take 1 tablet (500 mg total) by mouth daily with breakfast.  Dispense: 90 tablet; Refill: 3  -     Microalbumin/creatinine urine ratio; Future; Expected date:  03/21/2024  -     CBC auto differential; Future; Expected date: 03/21/2024  -     Comprehensive metabolic panel; Future; Expected date: 03/21/2024  -     Hemoglobin A1c; Future; Expected date: 03/21/2024  -     Lipid panel; Future; Expected date: 03/21/2024    2. Mixed dyslipidemia  Overview:  Lab Results   Component Value Date    CHOL 164 10/25/2023    CHOL 124 04/29/2023    CHOL 137 10/14/2022     Lab Results   Component Value Date    HDL 25 (L) 10/25/2023    HDL 29 (L) 04/29/2023    HDL 26 (L) 10/14/2022     Lab Results   Component Value Date    LDLCALC 72.4 10/25/2023    LDLCALC 65.4 04/29/2023    LDLCALC 81.4 10/14/2022     Lab Results   Component Value Date    TRIG 333 (H) 10/25/2023    TRIG 148 04/29/2023    TRIG 148 10/14/2022     Lab Results   Component Value Date    CHOLHDL 15.2 (L) 10/25/2023    CHOLHDL 23.4 04/29/2023    CHOLHDL 19.0 (L) 10/14/2022        The 10-year ASCVD risk score (Jasper SMART, et al., 2019) is: 28.3%    Values used to calculate the score:      Age: 48 years      Sex: Male      Is Non- : Yes      Diabetic: Yes      Tobacco smoker: Yes      Systolic Blood Pressure: 124 mmHg      Is BP treated: Yes      HDL Cholesterol: 25 mg/dL      Total Cholesterol: 164 mg/dL      Assessment & Plan:  Continue atorvastatin 40 milligrams daily.    Orders:  -     atorvastatin (LIPITOR) 40 MG tablet; Take 1 tablet (40 mg total) by mouth once daily.  Dispense: 90 tablet; Refill: 3  -     Comprehensive metabolic panel; Future; Expected date: 03/21/2024    3. Spinal stenosis, lumbar region, without neurogenic claudication/  4. Lumbar radiculopathy  Assessment & Plan:  Reports fair control with gabapentin.  Will continue.    Orders:  -     gabapentin (NEURONTIN) 300 MG capsule; Take 1 capsule (300 mg total) by mouth every evening.  Dispense: 30 capsule; Refill: 11    5. Chronic constipation  Assessment & Plan:  Using Linzess as needed.  Will continue    Orders:  -     linaCLOtide  (LINZESS) 72 mcg Cap capsule; Take 1 capsule (72 mcg total) by mouth daily as needed (constipation).  Dispense: 30 capsule; Refill: 5    6. Gastroesophageal reflux disease with esophagitis without hemorrhage  -     pantoprazole (PROTONIX) 40 MG tablet; Take 1 tablet (40 mg total) by mouth once daily.  Dispense: 30 tablet; Refill: 11    7. Postnasal drip  -     levocetirizine (XYZAL) 5 MG tablet; Take 1 tablet (5 mg total) by mouth every evening.  Dispense: 30 tablet; Refill: 11    8. Screening for colorectal cancer  -     Fecal Immunochemical Test (iFOBT); Future; Expected date: 11/21/2023  Discussed options for colorectal cancer screening.  We discussed colonoscopy verses stool testing and risks and benefits each.   Patient elected to proceed with FitKit and patient educated on collection method.  Patient informed that if stool testing is positive will need to proceed with colonoscopy, in which case the colonoscopy is no longer considered a screening test but rather a diagnostic procedure for insurance purposes, which may additional associated costs, such as deductible/co-insurance.              -Dio Zarate Jr., MD, AAHIVS      This office note has been dictated.  This dictation has been generated using M-Modal Fluency Direct dictation; some phonetic errors may occur.         There are no Patient Instructions on file for this visit.    Follow Up  No follow-ups on file.    Future Appointments   Date Time Provider Department Center   3/28/2024  8:00 AM LAB, [facility] DRAW STATION [facility]   4/4/2024  2:20 PM Dio Zarate Jr., MD [facility]

## 2023-12-04 NOTE — ASSESSMENT & PLAN NOTE
Sugar controlled with current regimen.  Will continue current regimen.  Discussed importance of dietary and lifestyle modifications.

## 2023-12-12 ENCOUNTER — HOSPITAL ENCOUNTER (EMERGENCY)
Facility: HOSPITAL | Age: 48
Discharge: HOME | End: 2023-12-12
Attending: EMERGENCY MEDICINE
Payer: MEDICAID

## 2023-12-12 VITALS
WEIGHT: 205 LBS | HEART RATE: 81 BPM | DIASTOLIC BLOOD PRESSURE: 82 MMHG | OXYGEN SATURATION: 98 % | RESPIRATION RATE: 18 BRPM | TEMPERATURE: 98 F | HEIGHT: 68 IN | BODY MASS INDEX: 31.07 KG/M2 | SYSTOLIC BLOOD PRESSURE: 137 MMHG

## 2023-12-12 DIAGNOSIS — M54.31 SCIATICA OF RIGHT SIDE: Primary | ICD-10-CM

## 2023-12-12 PROCEDURE — 63600175 PHARM REV CODE 636 W HCPCS

## 2023-12-12 PROCEDURE — 25000003 PHARM REV CODE 250

## 2023-12-12 PROCEDURE — 96372 THER/PROPH/DIAG INJ SC/IM: CPT

## 2023-12-12 PROCEDURE — 99284 EMERGENCY DEPT VISIT MOD MDM: CPT

## 2023-12-12 RX ORDER — LIDOCAINE 50 MG/G
1 PATCH TOPICAL ONCE
Qty: 15 PATCH | Refills: 0 | Status: SHIPPED | OUTPATIENT
Start: 2023-12-12 | End: 2023-12-12

## 2023-12-12 RX ORDER — KETOROLAC TROMETHAMINE 30 MG/ML
15 INJECTION, SOLUTION INTRAMUSCULAR; INTRAVENOUS
Status: COMPLETED | OUTPATIENT
Start: 2023-12-12 | End: 2023-12-12

## 2023-12-12 RX ORDER — CYCLOBENZAPRINE HCL 10 MG
10 TABLET ORAL
Status: COMPLETED | OUTPATIENT
Start: 2023-12-12 | End: 2023-12-12

## 2023-12-12 RX ORDER — HYDROCODONE BITARTRATE AND ACETAMINOPHEN 7.5; 325 MG/1; MG/1
1 TABLET ORAL EVERY 6 HOURS PRN
Qty: 11 TABLET | Refills: 0 | Status: SHIPPED | OUTPATIENT
Start: 2023-12-12

## 2023-12-12 RX ORDER — NAPROXEN 500 MG/1
500 TABLET ORAL 2 TIMES DAILY
Qty: 20 TABLET | Refills: 0 | Status: SHIPPED | OUTPATIENT
Start: 2023-12-12

## 2023-12-12 RX ORDER — CYCLOBENZAPRINE HCL 5 MG
10 TABLET ORAL 3 TIMES DAILY PRN
Qty: 20 TABLET | Refills: 0 | Status: SHIPPED | OUTPATIENT
Start: 2023-12-12 | End: 2023-12-22

## 2023-12-12 RX ADMIN — KETOROLAC TROMETHAMINE 15 MG: 30 INJECTION, SOLUTION INTRAMUSCULAR; INTRAVENOUS at 12:12

## 2023-12-12 RX ADMIN — CYCLOBENZAPRINE 10 MG: 10 TABLET, FILM COATED ORAL at 12:12

## 2023-12-12 NOTE — ED PROVIDER NOTES
Encounter Date: 12/12/2023       History     Chief Complaint   Patient presents with    Leg Pain     Right leg pain from hip down leg x 2 days.      48-year-old male with past medical history of sciatica, diabetes type 2, hypertension, arthritis presents to the ED complaining of a 2 day history of right buttock pain that radiates down his right lower extremity.  He states his symptoms feel similar to his history of sciatica in the past.  He is attempted ibuprofen 800 mg yesterday night with no relief.  He reports history of lumbar back surgery in the past.  He is seeing a back and spine specialist.  He reports doubt injections into his spine in the past.  He denies any associated trauma, fall, head trauma, loss conscious.  He denies any bladder/bowel incontinence, saddle anesthesia, fever, chills, chest pain, shortness of breath, abdominal pain, nausea, vomiting, diarrhea, dysuria, hematuria.  No other symptoms reported.    The history is provided by the patient. No  was used.     Review of patient's allergies indicates:  No Known Allergies  Past Medical History:   Diagnosis Date    Arthritis     Diabetes mellitus, type 2 2/11/2022    Essential hypertension, benign 5/4/2023     Past Surgical History:   Procedure Laterality Date    CATARACT EXTRACTION Right     EPIDURAL STEROID INJECTION N/A 10/21/2020    Procedure: Injection, Steroid, Epidural Caudal;  Surgeon: Vipul Nixon Jr., MD;  Location: [facility] ENDO;  Service: Pain Management;  Laterality: N/A;  Caudal KURT  Arrive @ 1315; No ATC or DM; Needs MD Signature    INJECTION, SACROILIAC JOINT Bilateral 12/27/2022    Procedure: INJECTION,SACROILIAC JOINT, BILATERAL CONTRAST DIRECT REF  ORDERED;  Surgeon: Brielle José MD;  Location: [facility] PAIN MGT;  Service: Pain Management;  Laterality: Bilateral;    INJECTION, SACROILIAC JOINT Right 11/27/2023    Procedure: INJECTION,SACROILIAC JOINT RIGHT DIRECT REFERRAL  Sooner date;  Surgeon:  Brielle José MD;  Location: [facility] PAIN MGT;  Service: Pain Management;  Laterality: Right;    LASER ENUCLEATION OF PROSTATE N/A 3/14/2023    Procedure: ENUCLEATION, PROSTATE, USING LASER;  Surgeon: Cecil Murray MD;  [facility];  Service: Urology;  Laterality: N/A;    LUMBAR FUSION N/A 2/8/2022    Procedure: FUSION, SPINE, LUMBAR;  Surgeon: Alphonse Reed MD;  Location: [address];  Service: Neurosurgery;  Laterality: N/A;  AIRO, L4-S1     Family History   Problem Relation Age of Onset    Cataracts Mother     No Known Problems Father     No Known Problems Sister     No Known Problems Brother     No Known Problems Maternal Aunt     No Known Problems Maternal Uncle     No Known Problems Paternal Aunt     No Known Problems Paternal Uncle     No Known Problems Maternal Grandmother     No Known Problems Maternal Grandfather     No Known Problems Paternal Grandmother     No Known Problems Paternal Grandfather      Social History     Tobacco Use    Smoking status: Some Days     Types: Cigarettes    Smokeless tobacco: Never   Substance Use Topics    Alcohol use: Yes     Comment: socially    Drug use: No     Review of Systems   Constitutional:  Negative for chills and fever.   HENT:  Negative for congestion, ear pain, rhinorrhea and sore throat.    Eyes:  Negative for redness.   Respiratory:  Negative for cough and shortness of breath.    Cardiovascular:  Negative for chest pain.   Gastrointestinal:  Negative for abdominal pain, diarrhea, nausea and vomiting.   Genitourinary:  Negative for decreased urine volume, difficulty urinating, dysuria, frequency, hematuria and urgency.        (-) bladder/bowel incontinence  (-) saddle anesthesia   Musculoskeletal:  Positive for arthralgias, back pain and myalgias. Negative for neck pain.   Skin:  Negative for rash.   Neurological:  Negative for headaches.        (-) head trauma  (-) LOC   Psychiatric/Behavioral:  Negative for confusion.        Physical Exam     Initial  Vitals [12/12/23 1021]   BP Pulse Resp Temp SpO2   (!) 136/98 88 18 97.9 °F (36.6 °C) 98 %      MAP       --         Physical Exam    Nursing note and vitals reviewed.  Constitutional: He appears well-developed and well-nourished. He is not diaphoretic.  Non-toxic appearance. No distress.   HENT:   Head: Normocephalic and atraumatic.   Right Ear: Hearing, tympanic membrane, external ear and ear canal normal. Tympanic membrane is not perforated, not erythematous and not bulging.   Left Ear: Hearing, tympanic membrane, external ear and ear canal normal. Tympanic membrane is not perforated, not erythematous and not bulging.   Nose: Nose normal.   Mouth/Throat: Uvula is midline and oropharynx is clear and moist.   Eyes: Conjunctivae and EOM are normal.   Neck: Neck supple.   Normal range of motion.   Full passive range of motion without pain.     Cardiovascular:            Pulses:       Radial pulses are 2+ on the right side and 2+ on the left side.   Pulmonary/Chest: No respiratory distress. He has no decreased breath sounds. He has no wheezes.   Abdominal: Abdomen is soft and flat. Bowel sounds are normal. He exhibits no distension. There is no abdominal tenderness.   No right CVA tenderness.  No left CVA tenderness. There is no rebound and no guarding.   Musculoskeletal:      Cervical back: Full passive range of motion without pain, normal range of motion and neck supple. No rigidity.      Comments: Full ROM of neck. No neck rigidity. No midline tenderness to cervical, thoracic, or lumbar spine.  No bony step-offs.  Full range of motion of bilateral upper and lower extremities.  Strength and sensation intact bilateral upper and lower extremities.  Patient able to ambulate into the room.  No erythema, edema, bruising, rash, or cellulitis patient's back.      Neurological: He is alert. No cranial nerve deficit.   Neuro intact.  Strength and sensation intact to bilateral upper and lower extremities.   Skin: Skin is warm  and dry. No rash noted.         ED Course   Procedures  Labs Reviewed - No data to display       Imaging Results    None          Medications   ketorolac injection 15 mg (has no administration in time range)   cyclobenzaprine tablet 10 mg (has no administration in time range)     Medical Decision Making  This is a 48-year-old male with past medical history of sciatica, diabetes type 2, hypertension, arthritis presents to the ED complaining of a 2 day history of right buttock pain that radiates down his right lower extremity.  He states his symptoms feel similar to his history of sciatica in the past.  On physical exam, patient is well-appearing and in no acute distress.  Nontoxic appearing.  Lungs are clear to auscultation bilaterally.  Abdomen is soft and nontender.  No guarding, rigidity, rebound.  2+ radial pulses bilaterally.  Posterior oropharynx is not erythematous.  No edema or exudate.  Uvula midline.  Bilateral tympanic membrane is normal.  No erythema, bulging, or perforations.  Neuro intact.  Strength and sensation intact bilateral upper and lower extremities.  Full ROM of neck. No neck rigidity. No midline tenderness to cervical, thoracic, or lumbar spine.  No bony step-offs.  Full range of motion of bilateral upper and lower extremities.  Strength and sensation intact bilateral upper and lower extremities.  Patient able to ambulate into the room.  No erythema, edema, bruising, rash, or cellulitis patient's back.  Patient is ambulating with a cane.  He states that he normally ambulates a cane even without pain.  CVA tenderness bilaterally.  Without any trauma, I do not believe patient needs any imaging at this time.  He is ambulating well.  He is moving his extremities well.  Flexeril and Toradol ordered.  Will discharge patient on naproxen, Flexeril, Lidoderm patches and a short course of pain medicine.  Urged prompt follow-up with back and spine and PCP for further evaluation.    Strict return  precautions given. I discussed with the patient/family the diagnosis, treatment plan, indications for return to the emergency department, and for expected follow-up. The patient/family verbalized an understanding. The patient/family is asked if there are any questions or concerns. We discuss the case, until all issues are addressed to the patient/family's satisfaction. Patient/family understands and is agreeable to the plan. Patient is stable and ready for discharge.      Risk  Prescription drug management.                                      Clinical Impression:  Final diagnoses:  [M54.31] Sciatica of right side (Primary)          ED Disposition Condition    Discharge Stable          ED Prescriptions       Medication Sig Dispense Start Date End Date Auth. Provider    naproxen (NAPROSYN) 500 MG tablet Take 1 tablet (500 mg total) by mouth 2 (two) times daily. 20 tablet 12/12/2023 -- Dewayne Webber PA-C    cyclobenzaprine (FLEXERIL) 5 MG tablet Take 2 tablets (10 mg total) by mouth 3 (three) times daily as needed for Muscle spasms. 20 tablet 12/12/2023 12/22/2023 Dewayne Webber PA-C    LIDOcaine (LIDODERM) 5 % (Expires today) Place 1 patch onto the skin once. Remove & Discard patch within 12 hours or as directed by MD for 1 dose 15 patch 12/12/2023 12/12/2023 Dewayne Webber PA-C    HYDROcodone-acetaminophen (NORCO) 7.5-325 mg per tablet Take 1 tablet by mouth every 6 (six) hours as needed for Pain. 11 tablet 12/12/2023 -- Dewayne Webber PA-C          Follow-up Information       Follow up With Specialties Details Why Contact Dio Gomez Jr., MD Family Medicine In 2 days for further evaluation [address]  [facility] [address]  [phone]      [facility] - Emergency Dept Emergency Medicine In 2 days  [address] [facility] [place] [address] [phone]             Dewayne Webber PA-C  12/12/23 5920

## 2023-12-12 NOTE — DISCHARGE INSTRUCTIONS
Please return to the Emergency Department for any new or worsening symptoms including: bladder/bowel incontinence, saddle anesthesia, fever, chest pain, shortness of breath, loss of consciousness, dizziness, weakness, or any other concerns.     Please follow up with your Primary Care Provider within in the week. If you do not have one, you may contact the one listed on your discharge paperwork or you may also call the [facility] Appointment Desk at [phone] to schedule an appointment with one.     Please take all medication as prescribed.      Detail Level: Detailed Depth Of Biopsy: dermis Was A Bandage Applied: Yes Size Of Lesion In Cm: 0.5 X Size Of Lesion In Cm: 0 Biopsy Type: H and E Biopsy Method: 15 blade Anesthesia Type: 1% lidocaine with epinephrine Hemostasis: Drysol Wound Care: Petrolatum Dressing: bandage Destruction After The Procedure: No Type Of Destruction Used: Curettage Curettage Text: The wound bed was treated with curettage after the biopsy was performed. Cryotherapy Text: The wound bed was treated with cryotherapy after the biopsy was performed. Electrodesiccation Text: The wound bed was treated with electrodesiccation after the biopsy was performed. Electrodesiccation And Curettage Text: The wound bed was treated with electrodesiccation and curettage after the biopsy was performed. Silver Nitrate Text: The wound bed was treated with silver nitrate after the biopsy was performed. Lab: 13 Path Notes (To The Dermatopathologist): 2 specimens in the bottle Rt. Neck inked Consent: Written and verbal consent was obtained and risks were reviewed including but not limited to scarring, infection, bleeding, scabbing, incomplete removal, nerve damage and allergy to anesthesia. Post-Care Instructions: I reviewed with the patient in detail post-care instructions. Patient is to keep the biopsy site dry overnight, and then apply vaseline or aquaphor twice daily until healed. Patient may apply hydrogen peroxide soaks to remove any crusting. Patient was advised to hold pressure for 20 minutes for any bleeding but if any questions or complications occur please do not hesitate to call or come in to speak to the provider. Notification Instructions: Patient will be notified of biopsy results. However, patient instructed to call the office if not contacted within 2 weeks. Billing Type: Third-Party Bill Information: Selecting Yes will display possible errors in your note based on the variables you have selected. This validation is only offered as a suggestion for you. PLEASE NOTE THAT THE VALIDATION TEXT WILL BE REMOVED WHEN YOU FINALIZE YOUR NOTE. IF YOU WANT TO FAX A PRELIMINARY NOTE YOU WILL NEED TO TOGGLE THIS TO 'NO' IF YOU DO NOT WANT IT IN YOUR FAXED NOTE.

## 2023-12-13 ENCOUNTER — TELEPHONE (OUTPATIENT)
Dept: NEUROSURGERY | Facility: CLINIC | Age: 48
End: 2023-12-13
Payer: MEDICAID

## 2023-12-13 NOTE — TELEPHONE ENCOUNTER
----- Message from Ling Malloy PA-C sent at 12/13/2023  2:29 PM CST -----  We can see him and order another MRI if his pain is debilitating and worse.   ----- Message -----  From: Brandie Doyle RN  Sent: 12/13/2023   1:02 PM CST  To: Yolie Vickers NP; Ling Malloy PA-C    Pt. Went to ER yesterday.  Pain is significant. Gettign worse.  Had injection 2 weeks ago but not working.  Can I schedule pt to f/u with one of you.     ----- Message -----  From: Edward Saldivar  Sent: 12/13/2023  11:51 AM CST  To: Derek Cunha Staff    Pt's SO Alana is requesting a call back regarding questions concerning pt's current condition(severe back pain).    Alana can be reached at [phone].    Thanks

## 2023-12-13 NOTE — TELEPHONE ENCOUNTER
P/c to emily.  Appt scheduled for tomorrow   Future Appointments   Date Time Provider Department Center   12/14/2023  2:00 PM Yolie Vickers, NP [facility] NEUROS[address] [place] [address]   3/28/2024  8:00 AM LAB, [facility] DRAW STATION [facility] LAB [facility]   4/4/2024  2:20 PM Dio Zarate Jr., MD [facility]

## 2023-12-14 ENCOUNTER — OFFICE VISIT (OUTPATIENT)
Dept: NEUROSURGERY | Facility: CLINIC | Age: 48
End: 2023-12-14
Payer: MEDICAID

## 2023-12-14 VITALS
BODY MASS INDEX: 30.96 KG/M2 | WEIGHT: 203.63 LBS | SYSTOLIC BLOOD PRESSURE: 127 MMHG | TEMPERATURE: 98 F | HEART RATE: 85 BPM | DIASTOLIC BLOOD PRESSURE: 83 MMHG

## 2023-12-14 DIAGNOSIS — M54.16 LUMBAR RADICULOPATHY: ICD-10-CM

## 2023-12-14 DIAGNOSIS — M48.02 SPINAL STENOSIS, CERVICAL REGION: ICD-10-CM

## 2023-12-14 DIAGNOSIS — M62.81 MUSCLE WEAKNESS: ICD-10-CM

## 2023-12-14 DIAGNOSIS — R26.9 GAIT DISTURBANCE: ICD-10-CM

## 2023-12-14 DIAGNOSIS — M41.9 SCOLIOSIS, UNSPECIFIED SCOLIOSIS TYPE, UNSPECIFIED SPINAL REGION: ICD-10-CM

## 2023-12-14 DIAGNOSIS — Z98.1 S/P LUMBAR FUSION: Primary | ICD-10-CM

## 2023-12-14 PROCEDURE — 3079F DIAST BP 80-89 MM HG: CPT | Mod: CPTII,,, | Performed by: NURSE PRACTITIONER

## 2023-12-14 PROCEDURE — 3008F PR BODY MASS INDEX (BMI) DOCUMENTED: ICD-10-PCS | Mod: CPTII,,, | Performed by: NURSE PRACTITIONER

## 2023-12-14 PROCEDURE — 99213 OFFICE O/P EST LOW 20 MIN: CPT | Mod: S$PBB,,, | Performed by: NURSE PRACTITIONER

## 2023-12-14 PROCEDURE — 1160F PR REVIEW ALL MEDS BY PRESCRIBER/CLIN PHARMACIST DOCUMENTED: ICD-10-PCS | Mod: CPTII,,, | Performed by: NURSE PRACTITIONER

## 2023-12-14 PROCEDURE — 99214 OFFICE O/P EST MOD 30 MIN: CPT | Mod: PBBFAC | Performed by: NURSE PRACTITIONER

## 2023-12-14 PROCEDURE — 3079F PR MOST RECENT DIASTOLIC BLOOD PRESSURE 80-89 MM HG: ICD-10-PCS | Mod: CPTII,,, | Performed by: NURSE PRACTITIONER

## 2023-12-14 PROCEDURE — 4010F PR ACE/ARB THEARPY RXD/TAKEN: ICD-10-PCS | Mod: CPTII,,, | Performed by: NURSE PRACTITIONER

## 2023-12-14 PROCEDURE — 1159F PR MEDICATION LIST DOCUMENTED IN MEDICAL RECORD: ICD-10-PCS | Mod: CPTII,,, | Performed by: NURSE PRACTITIONER

## 2023-12-14 PROCEDURE — 3074F SYST BP LT 130 MM HG: CPT | Mod: CPTII,,, | Performed by: NURSE PRACTITIONER

## 2023-12-14 PROCEDURE — 3044F PR MOST RECENT HEMOGLOBIN A1C LEVEL <7.0%: ICD-10-PCS | Mod: CPTII,,, | Performed by: NURSE PRACTITIONER

## 2023-12-14 PROCEDURE — 3008F BODY MASS INDEX DOCD: CPT | Mod: CPTII,,, | Performed by: NURSE PRACTITIONER

## 2023-12-14 PROCEDURE — 1160F RVW MEDS BY RX/DR IN RCRD: CPT | Mod: CPTII,,, | Performed by: NURSE PRACTITIONER

## 2023-12-14 PROCEDURE — 1159F MED LIST DOCD IN RCRD: CPT | Mod: CPTII,,, | Performed by: NURSE PRACTITIONER

## 2023-12-14 PROCEDURE — 3044F HG A1C LEVEL LT 7.0%: CPT | Mod: CPTII,,, | Performed by: NURSE PRACTITIONER

## 2023-12-14 PROCEDURE — 4010F ACE/ARB THERAPY RXD/TAKEN: CPT | Mod: CPTII,,, | Performed by: NURSE PRACTITIONER

## 2023-12-14 PROCEDURE — 3074F PR MOST RECENT SYSTOLIC BLOOD PRESSURE < 130 MM HG: ICD-10-PCS | Mod: CPTII,,, | Performed by: NURSE PRACTITIONER

## 2023-12-14 PROCEDURE — 99213 PR OFFICE/OUTPT VISIT, EST, LEVL III, 20-29 MIN: ICD-10-PCS | Mod: S$PBB,,, | Performed by: NURSE PRACTITIONER

## 2023-12-14 PROCEDURE — 99999 PR PBB SHADOW E&M-EST. PATIENT-LVL IV: ICD-10-PCS | Mod: PBBFAC,,, | Performed by: NURSE PRACTITIONER

## 2023-12-14 PROCEDURE — 99999 PR PBB SHADOW E&M-EST. PATIENT-LVL IV: CPT | Mod: PBBFAC,,, | Performed by: NURSE PRACTITIONER

## 2023-12-15 NOTE — PROGRESS NOTES
Neurosurgery  Established Patient    SUBJECTIVE:     History of Present Illness: Emeka Caballero is a 48 y.o. male s/p L4-pelvis fusion and L4-S1 TLIF on 02/08/2022 for s/p L4-pelvis fusion and L4-5, L5-S1 TLIF on 02/08/2022 with Dr. Reed. He is being seen in clinic today to follow-up from his recent ED visit from 12/12/23 where he had fallen and has had worsening right buttock pain that radiates down his right lower extremity. The patient obtained an injection as previously recommended by Dr. Reed about 2 weeks ago in the right SI joint without any relief. He is taking ibuprofen 800 mg with no relief. Reports difficulties with standing up straight. He is utilizing a cane to ambulate. Reports the pain being a burning sensation associated with numbness in the feet. He has also attempted PT without relief.     Review of patient's allergies indicates:  No Known Allergies    Current Outpatient Medications   Medication Sig Dispense Refill    atorvastatin (LIPITOR) 40 MG tablet Take 1 tablet (40 mg total) by mouth once daily. 90 tablet 3    blood sugar diagnostic Strp To check BG 3 times daily, to use with insurance preferred meter 100 each 11    blood-glucose meter kit To check BG 3 times daily, to use with insurance preferred meter 1 each 0    ciclopirox (PENLAC) 8 % Soln Apply topically nightly. 6 mL 6    cyclobenzaprine (FLEXERIL) 5 MG tablet Take 2 tablets (10 mg total) by mouth 3 (three) times daily as needed for Muscle spasms. 20 tablet 0    gabapentin (NEURONTIN) 300 MG capsule Take 1 capsule (300 mg total) by mouth every evening. 30 capsule 11    HYDROcodone-acetaminophen (NORCO) 7.5-325 mg per tablet Take 1 tablet by mouth every 6 (six) hours as needed for Pain. 11 tablet 0    lancets Misc To check BG 3 times daily, to use with insurance preferred meter 100 each 11    levocetirizine (XYZAL) 5 MG tablet Take 1 tablet (5 mg total) by mouth every evening. 30 tablet 11    linaCLOtide (LINZESS) 72 mcg  Cap capsule Take 1 capsule (72 mcg total) by mouth daily as needed (constipation). 30 capsule 5    lisinopriL (PRINIVIL,ZESTRIL) 5 MG tablet Take 1 tablet (5 mg total) by mouth once daily. 90 tablet 1    metFORMIN (GLUCOPHAGE-XR) 500 MG ER 24hr tablet Take 1 tablet (500 mg total) by mouth daily with breakfast. 90 tablet 3    naproxen (NAPROSYN) 500 MG tablet Take 1 tablet (500 mg total) by mouth 2 (two) times daily. 20 tablet 0    omega-3 fatty acids-fish oil 340-1,000 mg Cap Take 1 capsule by mouth once daily. 90 capsule 3    oxybutynin (DITROPAN-XL) 10 MG 24 hr tablet Take 1 tablet (10 mg total) by mouth once daily. 30 tablet 11    oxyCODONE-acetaminophen (PERCOCET) 5-325 mg per tablet Take 1 tablet by mouth every 4 (four) hours as needed for Pain. 12 tablet 0    pantoprazole (PROTONIX) 40 MG tablet Take 1 tablet (40 mg total) by mouth once daily. 30 tablet 11    tadalafiL (CIALIS) 20 MG Tab Take 1 tablet (20 mg total) by mouth daily as needed (Erectile Dysfunction). 30 tablet 11    tamsulosin (FLOMAX) 0.4 mg Cap Take 1 capsule (0.4 mg total) by mouth once daily. 30 capsule 11     No current facility-administered medications for this visit.       Past Medical History:   Diagnosis Date    Arthritis     Diabetes mellitus, type 2 2/11/2022    Essential hypertension, benign 5/4/2023     Past Surgical History:   Procedure Laterality Date    CATARACT EXTRACTION Right     EPIDURAL STEROID INJECTION N/A 10/21/2020    Procedure: Injection, Steroid, Epidural Caudal;  Surgeon: Vipul Nixon Jr., MD;  Location: [facility] ENDO;  Service: Pain Management;  Laterality: N/A;  Caudal KURT  Arrive @ 1315; No ATC or DM; Needs MD Signature    INJECTION, SACROILIAC JOINT Bilateral 12/27/2022    Procedure: INJECTION,SACROILIAC JOINT, BILATERAL CONTRAST DIRECT REF  ORDERED;  Surgeon: Brielle José MD;  Location: [facility] PAIN MGT;  Service: Pain Management;  Laterality: Bilateral;    INJECTION, SACROILIAC JOINT Right 11/27/2023     Procedure: INJECTION,SACROILIAC JOINT RIGHT DIRECT REFERRAL  Sooner date;  Surgeon: Brielle José MD;  Location: [facility] PAIN MGT;  Service: Pain Management;  Laterality: Right;    LASER ENUCLEATION OF PROSTATE N/A 3/14/2023    Procedure: ENUCLEATION, PROSTATE, USING LASER;  Surgeon: Cecil Murray MD;  [facility]: [facility] OR;  Service: Urology;  Laterality: N/A;    LUMBAR FUSION N/A 2/8/2022    Procedure: FUSION, SPINE, LUMBAR;  Surgeon: Alphonse Reed MD;  [facility];  Service: Neurosurgery;  Laterality: N/A;  AIRO, L4-S1     Family History       Problem Relation (Age of Onset)    Cataracts Mother    No Known Problems Father, Sister, Brother, Maternal Aunt, Maternal Uncle, Paternal Aunt, Paternal Uncle, Maternal Grandmother, Maternal Grandfather, Paternal Grandmother, Paternal Grandfather          Social History     Socioeconomic History    Marital status: Single   Tobacco Use    Smoking status: Some Days     Types: Cigarettes    Smokeless tobacco: Never   Substance and Sexual Activity    Alcohol use: Yes     Comment: socially    Drug use: No       Review of Systems    OBJECTIVE:     Vital Signs  Temp: 98 °F (36.7 °C)  Pulse: 85  BP: 127/83  Pain Score: 10-Worst pain ever  Weight: 92.4 kg (203 lb 9.5 oz)  Body mass index is 30.96 kg/m².    Neurosurgery Physical Exam  General: well developed, well nourished, no distress.   Head: normocephalic, atraumatic  Neurologic: Alert and oriented. Thought content appropriate.  GCS: Motor: 6/Verbal: 5/Eyes: 4 GCS Total: 15  Mental Status: Awake, Alert, Oriented x 4  Language: No aphasia  Speech: No dysarthria  Cranial nerves: face symmetric, tongue midline, CN II-XII grossly intact.   Eyes: pupils equal, round, reactive to light with accomodation, EOMI.   Pulmonary: normal respirations, no signs of respiratory distress  Abdomen: soft, non-distended  Skin: Skin is warm, dry and intact.  Sensory: intact to light touch throughout  Motor Strength:Moves all extremities  spontaneously with good tone.    Strength  Deltoids Triceps Biceps Wrist Extension Wrist Flexion Hand    Upper: R 5/5 5/5 5/5 5/5 5/5 5/5    L 5/5 5/5 5/5 5/5 5/5 5/5     HF KE KF DF PF EHL   Lower: R 4+/5 4+/5 4+/5 5/5 5/5 5/5    L 5/5 5/5 5/5 5/5 5/5 5/5     Cerebellar:   Gait antalgic      Cervical:   ROM: Full with flexion, extension, lateral rotation and ear-to-shoulder bend.   Midline TTP: Negative.     Thoracic:  Midline TTP: Negative.     Lumbar:  Midline TTP: Positive  Straight Leg Test: Positive RIGHT    Diagnostic Results:  There is no new imaging to review for this encounter.      ASSESSMENT/PLAN:   Emeka Caballero is a 48 y.o. male s/p L4-pelvis fusion and L4-S1 TLIF on 02/08/2022 for s/p L4-pelvis fusion and L4-5, L5-S1 TLIF on 02/08/2022 with Dr. Reed. He was seen in clinic today to follow-up from his recent ED visit from 12/12/23 where he had fallen and has had worsening right buttock pain that radiates down his right lower extremity. I have ordered:    - EMG of the BLE to evaluate his paraesthesias and weakness  - MRI cervical and thoracic spine to evaluate for stenosis contributing to his leg weakness, radicular complaints, and balance difficulties    I would like the patient to follow-up in clinic with Dr. Reed to review the imaging and discuss next steps. I have encouraged him to contact the clinic with any questions, concerns, or adverse clinical changes. He verbalized understanding.      ARMIN David  Neurosurgery  [facility].      Note dictated with voice recognition software, please excuse any grammatical errors.

## 2024-01-04 ENCOUNTER — TELEPHONE (OUTPATIENT)
Dept: NEUROLOGY | Facility: CLINIC | Age: 49
End: 2024-01-04
Payer: MEDICAID

## 2024-01-04 NOTE — TELEPHONE ENCOUNTER
Spoke to Alana Castellon to schedule patient's EMG Procedure. Patient was scheduled for 8:30am on Friday, January 26,2024 and this was confirmed by Ms. Castellon.  She is aware that the appointment is scheduled at [address] [place] [address], 7th floor of the clinic and that we request check in at Neurology appointment desk for 8am. I informed her that an appointment letter is being placed in the mail today to the confirmed address on file.

## 2024-01-04 NOTE — TELEPHONE ENCOUNTER
----- Message from Brandie Doyle RN sent at 1/4/2024  8:36 AM CST -----  Ok, thanks audie.   I appreciate it. Hope you are feeling better      ----- Message -----  From: Audie Wong  Sent: 1/4/2024   7:52 AM CST  To: Brandie Doyle RN    Good morning Brandie,    As far as I know this is the first that I am seeing of this request. I was out with COVID then vacation and since then actually performing the studies. I should have an opportunity to look into this after my morning procedures.    ThanksAudie    ----- Message -----  From: Brandie Doyle RN  Sent: 1/3/2024   4:49 PM CST  To: Audie Wong    Any idea when mr. Sid colon might be getting his EMG.  I cannot schedule him to see dr. Reed without it.  Thanks

## 2024-01-05 ENCOUNTER — HOSPITAL ENCOUNTER (OUTPATIENT)
Dept: RADIOLOGY | Facility: HOSPITAL | Age: 49
Discharge: HOME | End: 2024-01-05
Attending: NURSE PRACTITIONER
Payer: MEDICAID

## 2024-01-05 DIAGNOSIS — R26.9 GAIT DISTURBANCE: ICD-10-CM

## 2024-01-05 DIAGNOSIS — M62.81 MUSCLE WEAKNESS: ICD-10-CM

## 2024-01-05 DIAGNOSIS — M41.9 SCOLIOSIS, UNSPECIFIED SCOLIOSIS TYPE, UNSPECIFIED SPINAL REGION: ICD-10-CM

## 2024-01-05 DIAGNOSIS — M48.02 SPINAL STENOSIS, CERVICAL REGION: ICD-10-CM

## 2024-01-05 PROCEDURE — 72146 MRI CHEST SPINE W/O DYE: CPT | Mod: TC

## 2024-01-05 PROCEDURE — 72146 MRI CHEST SPINE W/O DYE: CPT | Mod: 26,,, | Performed by: RADIOLOGY

## 2024-01-05 PROCEDURE — 72141 MRI NECK SPINE W/O DYE: CPT | Mod: 26,,, | Performed by: RADIOLOGY

## 2024-01-05 PROCEDURE — 72141 MRI NECK SPINE W/O DYE: CPT | Mod: TC

## 2024-01-09 ENCOUNTER — PATIENT MESSAGE (OUTPATIENT)
Dept: NEUROSURGERY | Facility: CLINIC | Age: 49
End: 2024-01-09
Payer: MEDICAID

## 2024-01-26 ENCOUNTER — PROCEDURE VISIT (OUTPATIENT)
Dept: NEUROLOGY | Facility: CLINIC | Age: 49
End: 2024-01-26
Payer: MEDICAID

## 2024-01-26 DIAGNOSIS — Z98.1 S/P LUMBAR FUSION: ICD-10-CM

## 2024-01-26 DIAGNOSIS — M54.16 LUMBAR RADICULOPATHY: ICD-10-CM

## 2024-01-26 PROCEDURE — 95910 NRV CNDJ TEST 7-8 STUDIES: CPT | Mod: PBBFAC | Performed by: PSYCHIATRY & NEUROLOGY

## 2024-01-26 PROCEDURE — 95886 MUSC TEST DONE W/N TEST COMP: CPT | Mod: PBBFAC | Performed by: PSYCHIATRY & NEUROLOGY

## 2024-01-26 PROCEDURE — 95886 MUSC TEST DONE W/N TEST COMP: CPT | Mod: 26,S$PBB,, | Performed by: PSYCHIATRY & NEUROLOGY

## 2024-01-26 PROCEDURE — 95910 NRV CNDJ TEST 7-8 STUDIES: CPT | Mod: 26,S$PBB,, | Performed by: PSYCHIATRY & NEUROLOGY

## 2024-02-01 ENCOUNTER — TELEPHONE (OUTPATIENT)
Dept: NEUROSURGERY | Facility: CLINIC | Age: 49
End: 2024-02-01
Payer: MEDICAID

## 2024-02-01 NOTE — TELEPHONE ENCOUNTER
----- Message from Marla Comer sent at 1/31/2024  4:20 PM CST -----  Regarding: Appt  Contact: Justine  [phone]  Justine/ wife is returning a call to reschedule appt please call

## 2024-02-01 NOTE — TELEPHONE ENCOUNTER
P/c to wife, we will leave pt. Scheduled as planned.  She thanked me for calling.    Future Appointments   Date Time Provider Department Center   2/8/2024  8:15 AM [facility] OIC-XRAY [facility] XRAY [facility]   2/8/2024  9:00 AM Alphonse Reed MD [facility]   3/28/2024  8:00 AM LAB, [facility] DRAW STATION [facility]   4/4/2024  2:20 PM Dio Zarate Jr., MD [facility]

## 2024-02-08 ENCOUNTER — HOSPITAL ENCOUNTER (OUTPATIENT)
Dept: RADIOLOGY | Facility: HOSPITAL | Age: 49
Discharge: HOME | End: 2024-02-08
Attending: NURSE PRACTITIONER
Payer: MEDICAID

## 2024-02-08 ENCOUNTER — OFFICE VISIT (OUTPATIENT)
Dept: NEUROSURGERY | Facility: CLINIC | Age: 49
End: 2024-02-08
Payer: MEDICAID

## 2024-02-08 VITALS
WEIGHT: 203 LBS | DIASTOLIC BLOOD PRESSURE: 86 MMHG | SYSTOLIC BLOOD PRESSURE: 127 MMHG | HEART RATE: 83 BPM | TEMPERATURE: 99 F | BODY MASS INDEX: 30.87 KG/M2

## 2024-02-08 DIAGNOSIS — M54.16 LUMBAR RADICULOPATHY: ICD-10-CM

## 2024-02-08 DIAGNOSIS — M62.81 MUSCLE WEAKNESS: ICD-10-CM

## 2024-02-08 DIAGNOSIS — R26.9 GAIT DISTURBANCE: ICD-10-CM

## 2024-02-08 DIAGNOSIS — M41.9 SCOLIOSIS, UNSPECIFIED SCOLIOSIS TYPE, UNSPECIFIED SPINAL REGION: ICD-10-CM

## 2024-02-08 DIAGNOSIS — M48.062 LUMBAR STENOSIS WITH NEUROGENIC CLAUDICATION: Primary | ICD-10-CM

## 2024-02-08 PROCEDURE — 99999 PR PBB SHADOW E&M-EST. PATIENT-LVL III: CPT | Mod: PBBFAC,,, | Performed by: PHYSICIAN ASSISTANT

## 2024-02-08 PROCEDURE — 72082 X-RAY EXAM ENTIRE SPI 2/3 VW: CPT | Mod: TC

## 2024-02-08 PROCEDURE — 3008F BODY MASS INDEX DOCD: CPT | Mod: CPTII,,, | Performed by: PHYSICIAN ASSISTANT

## 2024-02-08 PROCEDURE — 3072F LOW RISK FOR RETINOPATHY: CPT | Mod: CPTII,,, | Performed by: PHYSICIAN ASSISTANT

## 2024-02-08 PROCEDURE — 3074F SYST BP LT 130 MM HG: CPT | Mod: CPTII,,, | Performed by: PHYSICIAN ASSISTANT

## 2024-02-08 PROCEDURE — 72082 X-RAY EXAM ENTIRE SPI 2/3 VW: CPT | Mod: 26,,, | Performed by: RADIOLOGY

## 2024-02-08 PROCEDURE — 4010F ACE/ARB THERAPY RXD/TAKEN: CPT | Mod: CPTII,,, | Performed by: PHYSICIAN ASSISTANT

## 2024-02-08 PROCEDURE — 99213 OFFICE O/P EST LOW 20 MIN: CPT | Mod: PBBFAC,25 | Performed by: PHYSICIAN ASSISTANT

## 2024-02-08 PROCEDURE — 99215 OFFICE O/P EST HI 40 MIN: CPT | Mod: S$PBB,,, | Performed by: PHYSICIAN ASSISTANT

## 2024-02-08 PROCEDURE — 1159F MED LIST DOCD IN RCRD: CPT | Mod: CPTII,,, | Performed by: PHYSICIAN ASSISTANT

## 2024-02-08 PROCEDURE — 3079F DIAST BP 80-89 MM HG: CPT | Mod: CPTII,,, | Performed by: PHYSICIAN ASSISTANT

## 2024-02-08 NOTE — PROGRESS NOTES
"Neurosurgery  Established Patient    SUBJECTIVE:     History of Present Illness: Emeka Caballero is a 48 y.o. male s/p L4-pelvis fusion and L4-S1 TLIF on 02/08/2022 with Dr. Reed. He presents for follow-up with updated imaging. He reports worsening right lower back and leg pain after a fall 12/12/2023. Back pain radiates from the right lumbosacral paraspinals to the right anterior thigh to the knee. He states the pain is burning. Worse with sitting, standing, lying down, walking. He states his legs "lock up" when sitting or standing too long. He uses a cane to ambulate 2/2 severe back pain. He is unable to stand completely upright 2/2 pain. He notes several falls and hand clumsiness/weakness. He reports chronic urinary hesitancy, nocturia, incomplete bladder emptying, and erectile dysfunction, all present prior to his first surgery. He sometimes has perineal burning pains. He follows with urology for an enlarged prostate. He has tried a right SI joint injection recently with no improvement. He is taking ibuprofen 800 mg with no relief. Also prescribed opioids without relief. Reports the pain being a burning sensation associated with numbness in the feet. He has also attempted PT without relief. He states the weakness in the RLE and severe pain have become debilitating.    Review of patient's allergies indicates:  No Known Allergies    Current Outpatient Medications   Medication Sig Dispense Refill    atorvastatin (LIPITOR) 40 MG tablet Take 1 tablet (40 mg total) by mouth once daily. 90 tablet 3    blood sugar diagnostic Strp To check BG 3 times daily, to use with insurance preferred meter 100 each 11    ciclopirox (PENLAC) 8 % Soln Apply topically nightly. 6 mL 6    gabapentin (NEURONTIN) 300 MG capsule Take 1 capsule (300 mg total) by mouth every evening. 30 capsule 11    HYDROcodone-acetaminophen (NORCO) 7.5-325 mg per tablet Take 1 tablet by mouth every 6 (six) hours as needed for Pain. 11 tablet 0    " lancets Misc To check BG 3 times daily, to use with insurance preferred meter 100 each 11    levocetirizine (XYZAL) 5 MG tablet Take 1 tablet (5 mg total) by mouth every evening. 30 tablet 11    linaCLOtide (LINZESS) 72 mcg Cap capsule Take 1 capsule (72 mcg total) by mouth daily as needed (constipation). 30 capsule 5    lisinopriL (PRINIVIL,ZESTRIL) 5 MG tablet Take 1 tablet (5 mg total) by mouth once daily. 90 tablet 1    metFORMIN (GLUCOPHAGE-XR) 500 MG ER 24hr tablet Take 1 tablet (500 mg total) by mouth daily with breakfast. 90 tablet 3    naproxen (NAPROSYN) 500 MG tablet Take 1 tablet (500 mg total) by mouth 2 (two) times daily. 20 tablet 0    omega-3 fatty acids-fish oil 340-1,000 mg Cap Take 1 capsule by mouth once daily. 90 capsule 3    oxybutynin (DITROPAN-XL) 10 MG 24 hr tablet Take 1 tablet (10 mg total) by mouth once daily. 30 tablet 11    oxyCODONE-acetaminophen (PERCOCET) 5-325 mg per tablet Take 1 tablet by mouth every 4 (four) hours as needed for Pain. 12 tablet 0    pantoprazole (PROTONIX) 40 MG tablet Take 1 tablet (40 mg total) by mouth once daily. 30 tablet 11    tadalafiL (CIALIS) 20 MG Tab Take 1 tablet (20 mg total) by mouth daily as needed (Erectile Dysfunction). 30 tablet 11    tamsulosin (FLOMAX) 0.4 mg Cap Take 1 capsule (0.4 mg total) by mouth once daily. 30 capsule 11    blood-glucose meter kit To check BG 3 times daily, to use with insurance preferred meter 1 each 0     No current facility-administered medications for this visit.       Past Medical History:   Diagnosis Date    Arthritis     Diabetes mellitus, type 2 2/11/2022    Essential hypertension, benign 5/4/2023     Past Surgical History:   Procedure Laterality Date    CATARACT EXTRACTION Right     EPIDURAL STEROID INJECTION N/A 10/21/2020    Procedure: Injection, Steroid, Epidural Caudal;  Surgeon: Vipul Nixon Jr., MD;  Location: [facility];  Service: Pain Management;  Laterality: N/A;  Caudal KURT  Arrive @ 1315; No ATC  or DM; Needs MD Signature    INJECTION, SACROILIAC JOINT Bilateral 12/27/2022    Procedure: INJECTION,SACROILIAC JOINT, BILATERAL CONTRAST DIRECT REF  ORDERED;  Surgeon: Brielle José MD;  Location: [facility] PAIN MGT;  Service: Pain Management;  Laterality: Bilateral;    INJECTION, SACROILIAC JOINT Right 11/27/2023    Procedure: INJECTION,SACROILIAC JOINT RIGHT DIRECT REFERRAL  Sooner date;  Surgeon: Brielle José MD;  Location: [facility] PAIN MGT;  Service: Pain Management;  Laterality: Right;    LASER ENUCLEATION OF PROSTATE N/A 3/14/2023    Procedure: ENUCLEATION, PROSTATE, USING LASER;  Surgeon: Cecil Murray MD;  Location[facility] OR;  Service: Urology;  Laterality: N/A;    LUMBAR FUSION N/A 2/8/2022    Procedure: FUSION, SPINE, LUMBAR;  Surgeon: Alphonse Reed MD;  [facility];  Service: Neurosurgery;  Laterality: N/A;  AIRO, L4-S1     Family History       Problem Relation (Age of Onset)    Cataracts Mother    No Known Problems Father, Sister, Brother, Maternal Aunt, Maternal Uncle, Paternal Aunt, Paternal Uncle, Maternal Grandmother, Maternal Grandfather, Paternal Grandmother, Paternal Grandfather          Social History     Socioeconomic History    Marital status: Single   Tobacco Use    Smoking status: Some Days     Types: Cigarettes    Smokeless tobacco: Never   Substance and Sexual Activity    Alcohol use: Yes     Comment: socially    Drug use: No       Review of Systems    OBJECTIVE:     Vital Signs  Temp: 98.5 °F (36.9 °C)  Pulse: 83  BP: 127/86  Pain Score: 0-No pain  Weight: 92.1 kg (203 lb)  Body mass index is 30.87 kg/m².    Neurosurgery Physical Exam  General: well developed, well nourished, no distress.   Head: normocephalic, atraumatic  Neurologic: Alert and oriented. Thought content appropriate.  GCS: Motor: 6/Verbal: 5/Eyes: 4 GCS Total: 15  Mental Status: Awake, Alert, Oriented x 4  Language: No aphasia  Speech: No dysarthria  Cranial nerves: face symmetric, tongue midline, CN  II-XII grossly intact.   Eyes: pupils equal, round, reactive to light with accomodation, EOMI.   Pulmonary: normal respirations, no signs of respiratory distress  Abdomen: soft, non-distended  Skin: Skin is warm, dry and intact.  Sensory: intact to light touch throughout  Motor Strength:Moves all extremities spontaneously with good tone.    Strength  Deltoids Triceps Biceps Wrist Extension Wrist Flexion Hand    Upper: R 5/5 5/5 5/5 5/5 5/5 5/5    L 5/5 5/5 5/5 5/5 5/5 5/5     HF KE KF DF PF EHL   Lower: R 4/5 4+/5 4+/5 5/5 5/5 5/5    L 5/5 5/5 5/5 5/5 5/5 5/5     Cerebellar:   Gait antalgic      Cervical:   ROM: Full with flexion, extension, lateral rotation and ear-to-shoulder bend.   Midline TTP: Negative.     Thoracic:  Midline TTP: Negative.     Lumbar:  Midline TTP: Positive  Straight Leg Test: Positive RIGHT  ROM: limited extension 2/2 pain    Diagnostic Results:    EMG BLE 1/26/24:   - This is an abnormal study. There is electrophysiologic evidence of:   1. Acute or ongoing denervation and chronic denervation in the right L4, L5, and S1 myotomes suggestive of radiculopathy at those levels.   2. Chronic denervation in the left L4 myotome, though the abnormality was noted in only one muscle representative of that myotome.   3. Acute or ongoing denervation and chronic denervation in the left S1 myotome suggestive of radiculopathy at that level.     MRI cervical and thoracic spine 1/5/24:  - no significant canal stenosis, no cord signal changes    XR scoliosis 2/8/24:  - no global imbalance, stable alignment s/p L4-pelvis fusion without hardware complication    XR lumbar spine flex/ext 8/7/23:  - no dynamic instability    CT lumbar spine 9/6/23:  - fusion noted along posterior hardware and at interbodies L4-5, L5-S1    MRI lumbar spine 9/6/23:  - L3-4 severe canal stenosis and b/l NFS, worse from prior MRI 8/2022    ASSESSMENT/PLAN:   Emeka Caballero is a 48 y.o. male s/p L4-pelvis fusion and L4-S1  TLIF on 02/08/2022 with Dr. Reed. 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 He has exhausted all conservative measures and the debilitating pain is impacting his quality of life. Patient was seen today by Dr. Reed. Recommending L2-pelvis extension of fusion. Prior CT shows evidence of bony fusion around the prior hardware. All risks, benefits, alternatives, indications and methods were reviewed in detail by the patient and surgeon. All questions were answered, and the patient wishes to proceed with surgery. No guarantees about the results of the procedure were made. We discussed concerning signs and symptoms that would prompt return to clinic or urgent medical attention, patient v/u. All questions answered. Encouraged to call the clinic with questions/concerns prior to the next visit.    - L2-pelvis fusion    Ling Malloy PA-C  Neurosurgery  [facility]        Note dictated with voice recognition software, please excuse any grammatical errors.     I spent 60 minutes reviewed patient records, examining & counseling the patient with greater than 50% of the time spent with direct patient care, counseling & coordination.

## 2024-02-09 ENCOUNTER — TELEPHONE (OUTPATIENT)
Dept: NEUROSURGERY | Facility: CLINIC | Age: 49
End: 2024-02-09
Payer: MEDICAID

## 2024-02-09 DIAGNOSIS — M43.8X9 SAGITTAL PLANE IMBALANCE: Primary | ICD-10-CM

## 2024-02-09 DIAGNOSIS — M41.9 SCOLIOSIS, UNSPECIFIED SCOLIOSIS TYPE, UNSPECIFIED SPINAL REGION: ICD-10-CM

## 2024-02-25 ENCOUNTER — TELEPHONE (OUTPATIENT)
Dept: NEUROSURGERY | Facility: CLINIC | Age: 49
End: 2024-02-25
Payer: MEDICAID

## 2024-02-25 DIAGNOSIS — Z98.1 S/P LUMBAR SPINAL FUSION: Primary | ICD-10-CM

## 2024-04-12 DIAGNOSIS — Z01.818 PREOPERATIVE TESTING: Primary | ICD-10-CM

## 2024-04-12 NOTE — PRE-PROCEDURE INSTRUCTIONS
Spoke with patient's Sigf other, Alana Castellon. She siad she handles all of his medical. She stated he has not had any problem with anesthesia in the past. I asked if could get an involvement in care form with next clinic or hospital visit. Will need medical clearance form his PCP.  He has an appt with Mio Juarez NP on 4/19. He has a preop testing form from Dr. Reed to bring to his PCP. Will need poc and T&S. Our  will call to set up these appts.     Preop instructions given. Hold aspirin, aspirin containing products, nsaids(aleve, advil, motrin, ibuprofen, naprosyn, naproxen, voltaren, diclofenac), vitamins and supplements ( Omega Fish oil) one week prior to surgery.     May take Tylenol.( Also sent to [facility] portal)  Verbalizes understanding.

## 2024-04-15 ENCOUNTER — TELEPHONE (OUTPATIENT)
Dept: PREADMISSION TESTING | Facility: HOSPITAL | Age: 49
End: 2024-04-15
Payer: MEDICAID

## 2024-04-15 NOTE — TELEPHONE ENCOUNTER
----- Message from Bindu Talbert RN sent at 4/12/2024  2:13 PM CDT -----  Surgerr 5/1  Please schedule poc and t&s. Will need involvement in care form. Alana Castellon handles his medical.  Thanks!

## 2024-04-19 ENCOUNTER — TELEPHONE (OUTPATIENT)
Dept: PREADMISSION TESTING | Facility: HOSPITAL | Age: 49
End: 2024-04-19
Payer: MEDICAID

## 2024-04-19 ENCOUNTER — OFFICE VISIT (OUTPATIENT)
Dept: FAMILY MEDICINE | Facility: CLINIC | Age: 49
End: 2024-04-19
Payer: MEDICAID

## 2024-04-19 ENCOUNTER — TELEPHONE (OUTPATIENT)
Dept: CARDIOLOGY | Facility: CLINIC | Age: 49
End: 2024-04-19
Payer: MEDICAID

## 2024-04-19 VITALS
HEIGHT: 68 IN | SYSTOLIC BLOOD PRESSURE: 130 MMHG | HEART RATE: 93 BPM | DIASTOLIC BLOOD PRESSURE: 84 MMHG | OXYGEN SATURATION: 95 % | BODY MASS INDEX: 31.48 KG/M2 | WEIGHT: 207.69 LBS | TEMPERATURE: 99 F | RESPIRATION RATE: 18 BRPM

## 2024-04-19 DIAGNOSIS — I10 ESSENTIAL HYPERTENSION, BENIGN: Chronic | ICD-10-CM

## 2024-04-19 DIAGNOSIS — E11.69 TYPE 2 DIABETES MELLITUS WITH HYPERLIPIDEMIA: Chronic | ICD-10-CM

## 2024-04-19 DIAGNOSIS — E78.2 MIXED DYSLIPIDEMIA: Chronic | ICD-10-CM

## 2024-04-19 DIAGNOSIS — E66.09 CLASS 1 OBESITY DUE TO EXCESS CALORIES WITH SERIOUS COMORBIDITY AND BODY MASS INDEX (BMI) OF 31.0 TO 31.9 IN ADULT: ICD-10-CM

## 2024-04-19 DIAGNOSIS — Z01.818 PRE-OP EXAM: Primary | ICD-10-CM

## 2024-04-19 DIAGNOSIS — E78.5 TYPE 2 DIABETES MELLITUS WITH HYPERLIPIDEMIA: Chronic | ICD-10-CM

## 2024-04-19 LAB
OHS QRS DURATION: 102 MS
OHS QTC CALCULATION: 472 MS

## 2024-04-19 PROCEDURE — 3075F SYST BP GE 130 - 139MM HG: CPT | Mod: CPTII,,, | Performed by: NURSE PRACTITIONER

## 2024-04-19 PROCEDURE — 99999 PR PBB SHADOW E&M-EST. PATIENT-LVL V: CPT | Mod: PBBFAC,,, | Performed by: NURSE PRACTITIONER

## 2024-04-19 PROCEDURE — 99214 OFFICE O/P EST MOD 30 MIN: CPT | Mod: S$PBB,,, | Performed by: NURSE PRACTITIONER

## 2024-04-19 PROCEDURE — 3079F DIAST BP 80-89 MM HG: CPT | Mod: CPTII,,, | Performed by: NURSE PRACTITIONER

## 2024-04-19 PROCEDURE — 4010F ACE/ARB THERAPY RXD/TAKEN: CPT | Mod: CPTII,,, | Performed by: NURSE PRACTITIONER

## 2024-04-19 PROCEDURE — 3008F BODY MASS INDEX DOCD: CPT | Mod: CPTII,,, | Performed by: NURSE PRACTITIONER

## 2024-04-19 PROCEDURE — 3072F LOW RISK FOR RETINOPATHY: CPT | Mod: CPTII,,, | Performed by: NURSE PRACTITIONER

## 2024-04-19 PROCEDURE — 93010 ELECTROCARDIOGRAM REPORT: CPT | Mod: S$PBB,,, | Performed by: INTERNAL MEDICINE

## 2024-04-19 PROCEDURE — 99215 OFFICE O/P EST HI 40 MIN: CPT | Mod: PBBFAC,PN,25 | Performed by: NURSE PRACTITIONER

## 2024-04-19 PROCEDURE — 1159F MED LIST DOCD IN RCRD: CPT | Mod: CPTII,,, | Performed by: NURSE PRACTITIONER

## 2024-04-19 PROCEDURE — 1160F RVW MEDS BY RX/DR IN RCRD: CPT | Mod: CPTII,,, | Performed by: NURSE PRACTITIONER

## 2024-04-19 PROCEDURE — 93005 ELECTROCARDIOGRAM TRACING: CPT | Mod: PBBFAC,PN | Performed by: INTERNAL MEDICINE

## 2024-04-19 NOTE — DISCHARGE INSTRUCTIONS
Your surgery has been scheduled for:______5/1/2024____________________________________    You should report to:  ____[facility], located on the [place] side of the first floor of the           [facility] ([phone])  __X__[facility], located on the [facility] side of the            Second floor of the [facility] ([phone])  ____3rd Floor [facility] located on the [facility] side of the [facility] [phone]  ____[facility]: located at [address] [place] [address]. [address]     Please Note   Tell your doctor if you take Aspirin, products containing Aspirin, herbal medications  or blood thinners, such as Coumadin, Ticlid, or Plavix.  (Consult your provider regarding holding or stopping before surgery).  Arrange for someone to drive you home following surgery.  You will not be allowed to leave the surgical facility alone or drive yourself home following sedation and anesthesia.        Before Surgery  Stop taking all herbal medications, vitamins, and supplements 7 days prior to surgery  No Motrin/Advil (Ibuprofen) 7 days before surgery  No Aleve (Naproxen) 7 days before surgery  Stop Taking Asprin, products containing Asprin __7___days before surgery  Stop taking blood thinners_______days before surgery  No Goody's/BC  Powder 7 days before surgery  Refrain from drinking alcoholic beverages for 24hours before and after surgery  Stop or limit smoking ___7______days before surgery  You may take Tylenol for pain    Night before Surgery  Do not eat or drink after midnight  Take a shower or bath (shower is recommended).  Bathe with Hibiclens soap or an antibacterial soap from the neck down.  If not supplied by your surgeon, hibiclens soap will need to be purchased over the counter in pharmacy.  Rinse soap off thoroughly.  Shampoo your hair with your regular shampoo    The Day of  Surgery  Take another bath or shower with hibiclens or any antibacterial soap, to reduce the chance of infection.  Take heart and blood pressure medications with a small sip of water, as advised by the perioperative team.  Do not take fluid pills  You may brush your teeth and rinse your mouth, but do not swall any additional water.   Do not apply perfumes, powder, body lotions or deodorant on the day of surgery.  Nail polish should be removed.  Do not wear makeup or moisturizer  Wear comfortable clothes, such as a button front shirt and loose fitting pants.  Leave all jewelry, including body piercings, and valuables at home.    Bring any devices you will neeed after surgery such as crutches or canes.  If you have sleep apnea, please bring your CPAP machine  In the event that your physical condition changes including the onset of a cold or respiratory illness, or if you have to delay or cancel your surgery, please notify your surgeon.     Anesthesia: General Anesthesia     You are watched continuously during your procedure by your anesthesia provider.     Youre due to have surgery. During surgery, youll be given medicine called anesthesia or anesthetic. This will keep you comfortable and pain-free. Your anesthesia provider will use general anesthesia.  What is general anesthesia?  General anesthesia puts you into a state like deep sleep. It goes into the bloodstream (IV anesthetics), into the lungs (gas anesthetics), or both. You feel nothing during the procedure. You will not remember it. During the procedure, the anesthesia provider monitors you continuously. He or she checks your heart rate and rhythm, blood pressure, breathing, and blood oxygen.  IV anesthetics. IV anesthetics are given through an IV line in your arm. Theyre often given first. This is so you are asleep before a gas anesthetic is started. Some kinds of IV anesthetics relieve pain. Others relax you. Your doctor will decide which kind is best in  your case.  Gas anesthetics. Gas anesthetics are breathed into the lungs. They are often used to keep you asleep. They can be given through a facemask or a tube placed in your larynx or trachea (breathing tube).  If you have a facemask, your anesthesia provider will most likely place it over your nose and mouth while youre still awake. Youll breathe oxygen through the mask as your IV anesthetic is started. Gas anesthetic may be added through the mask.  If you have a tube in the larynx or trachea, it will be inserted into your throat after youre asleep.  Anesthesia tools and medicines  You will likely have:  IV anesthetics. These are put into an IV line into your bloodstream.  Gas anesthetics. You breathe these anesthetics into your lungs, where they pass into your bloodstream.  Pulse oximeter. This is a small clip that is attached to the end of your finger. This measures your blood oxygen level.  Electrocardiography leads (electrodes). These are small sticky pads that are placed on your chest. They record your heart rate and rhythm.  Blood pressure cuff. This reads your blood pressure.  Risks and possible complications  General anesthesia has some risks. These include:  Breathing problems  Nausea and vomiting  Sore throat or hoarseness (usually temporary)  Allergic reaction to the anesthetic  Irregular heartbeat (rare)  Cardiac arrest (rare)   Anesthesia safety  Follow all instructions you are given for how long not to eat or drink before your procedure.  Be sure your doctor knows what medicines and drugs you take. This includes over-the-counter medicines, herbs, supplements, alcohol or other drugs. You will be asked when those were last taken.  Have an adult family member or friend drive you home after the procedure.  For the first 24 hours after your surgery:  Do not drive or use heavy equipment.  Do not make important decisions or sign legal documents. If important decisions or signing legal documents is  necessary during the first 24 hours after surgery, have a trusted family member or spouse act on your behalf.  Avoid alcohol.  Have a responsible adult stay with you. He or she can watch for problems and help keep you safe.  Date Last Reviewed: 12/1/2016 © 2000-2017 [employer]. [address] [place] [address], [place] [address]. All rights reserved. This information is not intended as a substitute for professional medical care. Always follow your healthcare professional's instructions.

## 2024-04-19 NOTE — PROGRESS NOTES
Health Maintenance Due   Topic     Pneumococcal Vaccines (Age 0-64) (1 of 2 - PCV) Pt decline    TETANUS VACCINE  Pt decline    Colorectal Cancer Screening  Pt decline    Shingles Vaccine (1 of 2) Pt decline    COVID-19 Vaccine (3 - 2023-24 season) Not offered at this office    Diabetes Urine Screening  Consult pcp    Hemoglobin A1c  Consult pcp

## 2024-04-19 NOTE — TELEPHONE ENCOUNTER
----- Message from Brittanie Oconnell MA sent at 4/19/2024  1:25 PM CDT -----  Regarding: FW: pre op exam    ----- Message -----  From: Pao Matt MA  Sent: 4/19/2024  10:32 AM CDT  To: [facility] Staff; #  Subject: pre op exam                                      Good morning,  Patient is schedule for May 1, 2024 for a pre op exam. TEE Juarez would like to see if the patient can be seen prior to May 1, 2024 because of surgery. Can you assist the patient concerning of getting an appointment before May 1, 2024. The patient can see any provider sooner. Thank you

## 2024-04-19 NOTE — PROGRESS NOTES
Routine Office Visit    Patient Name: Emeka Caballero    : 1975  MRN: [id number]    Chief Complaint:  Preop exam    Subjective:  Emeka is a 48 y.o. male who presents today for:    Preop exam - patient who is known to me with a history of obesity, hypertension, type 2 diabetes reports today for evaluation and preop exam.  Needs clearance for spinal fusion next month.  Reports doing well in his current state of health today.  He reports being mostly compliant with his medications.  Denies any acute concerns today.  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  He does not take any blood thinners.  Denies any history of structural heart disease.  Denies any problems with or reactions to anesthesia in the past.  Denies any history of CKD, strokes, or seizures.  Of note, patient declines  for today's visit.    Past Medical History  Past Medical History:   Diagnosis Date    Arthritis     Diabetes mellitus, type 2 2022    Essential hypertension, benign 2023       Family History  Family History   Problem Relation Name Age of Onset    Cataracts Mother      No Known Problems Father      No Known Problems Sister      No Known Problems Brother      No Known Problems Maternal Aunt      No Known Problems Maternal Uncle      No Known Problems Paternal Aunt      No Known Problems Paternal Uncle      No Known Problems Maternal Grandmother      No Known Problems Maternal Grandfather      No Known Problems Paternal Grandmother      No Known Problems Paternal Grandfather         Current Medications  Current Outpatient Medications on File Prior to Visit   Medication Sig Dispense Refill    atorvastatin (LIPITOR) 40 MG tablet Take 1 tablet (40 mg total) by mouth once daily. 90 tablet 3    blood sugar diagnostic Strp To check BG 3 times daily, to use with insurance preferred meter 100 each 11    blood-glucose meter kit To check BG 3 times  daily, to use with insurance preferred meter 1 each 0    gabapentin (NEURONTIN) 300 MG capsule Take 1 capsule (300 mg total) by mouth every evening. 30 capsule 11    HYDROcodone-acetaminophen (NORCO) 7.5-325 mg per tablet Take 1 tablet by mouth every 6 (six) hours as needed for Pain. 11 tablet 0    lancets Misc To check BG 3 times daily, to use with insurance preferred meter 100 each 11    linaCLOtide (LINZESS) 72 mcg Cap capsule Take 1 capsule (72 mcg total) by mouth daily as needed (constipation). 30 capsule 5    lisinopriL (PRINIVIL,ZESTRIL) 5 MG tablet Take 1 tablet (5 mg total) by mouth once daily. 90 tablet 1    metFORMIN (GLUCOPHAGE-XR) 500 MG ER 24hr tablet Take 1 tablet (500 mg total) by mouth daily with breakfast. 90 tablet 3    naproxen (NAPROSYN) 500 MG tablet Take 1 tablet (500 mg total) by mouth 2 (two) times daily. 20 tablet 0    omega-3 fatty acids-fish oil 340-1,000 mg Cap Take 1 capsule by mouth once daily. 90 capsule 3    oxybutynin (DITROPAN-XL) 10 MG 24 hr tablet Take 1 tablet (10 mg total) by mouth once daily. 30 tablet 11    oxyCODONE-acetaminophen (PERCOCET) 5-325 mg per tablet Take 1 tablet by mouth every 4 (four) hours as needed for Pain. 12 tablet 0    pantoprazole (PROTONIX) 40 MG tablet Take 1 tablet (40 mg total) by mouth once daily. 30 tablet 11    tadalafiL (CIALIS) 20 MG Tab Take 1 tablet (20 mg total) by mouth daily as needed (Erectile Dysfunction). 30 tablet 11    tamsulosin (FLOMAX) 0.4 mg Cap Take 1 capsule (0.4 mg total) by mouth once daily. 30 capsule 11     No current facility-administered medications on file prior to visit.       Allergies   Review of patient's allergies indicates:  No Known Allergies    Review of Systems (Pertinent positives)  Review of Systems   Constitutional: Negative.  Negative for chills and fever.   HENT: Negative.  Negative for congestion, sinus pain and sore throat.    Eyes: Negative.    Respiratory:  Negative for cough, shortness of breath and  "wheezing.    Cardiovascular:  Negative for chest pain, palpitations, orthopnea and claudication.   Gastrointestinal: Negative.  Negative for abdominal pain, diarrhea, nausea and vomiting.   Genitourinary: Negative.  Negative for dysuria, frequency and urgency.   Musculoskeletal: Negative.  Negative for back pain, joint pain and neck pain.   Skin: Negative.    Neurological: Negative.  Negative for dizziness, tingling, loss of consciousness and headaches.   Endo/Heme/Allergies: Negative.    Psychiatric/Behavioral: Negative.         /84 (BP Location: Right arm, Patient Position: Sitting, BP Method: Large (Manual))   Pulse 93   Temp 98.6 °F (37 °C) (Oral)   Resp 18   Ht 5' 8" (1.727 m)   Wt 94.2 kg (207 lb 10.8 oz)   SpO2 95%   BMI 31.58 kg/m²     Physical Exam  Vitals reviewed.   Constitutional:       General: He is not in acute distress.     Appearance: Normal appearance. He is not ill-appearing, toxic-appearing or diaphoretic.   HENT:      Head: Normocephalic and atraumatic.   Cardiovascular:      Rate and Rhythm: Normal rate and regular rhythm.      Pulses: Normal pulses.      Heart sounds: Normal heart sounds.   Pulmonary:      Effort: Pulmonary effort is normal. No respiratory distress.      Breath sounds: Normal breath sounds. No wheezing.   Abdominal:      General: Bowel sounds are normal. There is no distension.      Palpations: Abdomen is soft.      Tenderness: There is no abdominal tenderness.   Musculoskeletal:         General: No swelling, tenderness or deformity. Normal range of motion.   Skin:     General: Skin is warm and dry.      Capillary Refill: Capillary refill takes less than 2 seconds.   Neurological:      General: No focal deficit present.      Mental Status: He is alert and oriented to person, place, and time.   Psychiatric:         Mood and Affect: Mood normal.         Behavior: Behavior normal.          Assessment/Plan:  Emeka Caballero is a 48 y.o. male who presents today " for :    Emeka was seen today for pre-op exam.    Diagnoses and all orders for this visit:    Pre-op exam  -     Urinalysis; Future  -     PROTIME-INR; Future  -     APTT; Future  -     Ambulatory referral/consult to Cardiology; Future  -     IN OFFICE EKG 12-LEAD (to Muse)  -     X-Ray Chest PA And Lateral; Future    Essential hypertension, benign  -     CBC W/ AUTO DIFFERENTIAL; Future  -     COMPREHENSIVE METABOLIC PANEL; Future    Mixed dyslipidemia    Type 2 diabetes mellitus with hyperlipidemia  -     COMPREHENSIVE METABOLIC PANEL; Future  -     HEMOGLOBIN A1C; Future    Class 1 obesity due to excess calories with serious comorbidity and body mass index (BMI) of 31.0 to 31.9 in adult    - will check labs for preop  - chest x-ray and UA ordered per surgery requirements  - EKG in office does show potential LVH with right bundle branch block.  Per my read RBBB not present on previous EKGs  - given risk factors of diabetes and obesity and hypertension along with abnormal EKG will consult Cardiology for clearance prior to procedure  - all questions answered; will follow-up with lab work        This office note has been dictated.  This dictation has been generated using M[employer] Fluency Direct dictation; some phonetic errors may occur.

## 2024-04-19 NOTE — TELEPHONE ENCOUNTER
Spoke with patient's wife and scheduled pre-op appointment for 04/24/2024. No further assistance requested.

## 2024-04-22 ENCOUNTER — HOSPITAL ENCOUNTER (OUTPATIENT)
Dept: PREADMISSION TESTING | Facility: HOSPITAL | Age: 49
Discharge: HOME | End: 2024-04-22
Attending: NEUROLOGICAL SURGERY
Payer: MEDICAID

## 2024-04-22 VITALS
RESPIRATION RATE: 18 BRPM | TEMPERATURE: 98 F | HEART RATE: 80 BPM | HEIGHT: 68 IN | BODY MASS INDEX: 31.37 KG/M2 | SYSTOLIC BLOOD PRESSURE: 136 MMHG | OXYGEN SATURATION: 98 % | DIASTOLIC BLOOD PRESSURE: 84 MMHG | WEIGHT: 207 LBS

## 2024-04-24 ENCOUNTER — OFFICE VISIT (OUTPATIENT)
Dept: CARDIOLOGY | Facility: CLINIC | Age: 49
End: 2024-04-24
Payer: MEDICAID

## 2024-04-24 VITALS
WEIGHT: 206.81 LBS | DIASTOLIC BLOOD PRESSURE: 80 MMHG | BODY MASS INDEX: 31.34 KG/M2 | SYSTOLIC BLOOD PRESSURE: 128 MMHG | HEIGHT: 68 IN | RESPIRATION RATE: 18 BRPM | HEART RATE: 84 BPM | OXYGEN SATURATION: 98 %

## 2024-04-24 DIAGNOSIS — M48.061 SPINAL STENOSIS OF LUMBAR REGION, UNSPECIFIED WHETHER NEUROGENIC CLAUDICATION PRESENT: ICD-10-CM

## 2024-04-24 DIAGNOSIS — E11.59 HYPERTENSION ASSOCIATED WITH DIABETES: Chronic | ICD-10-CM

## 2024-04-24 DIAGNOSIS — Z01.810 PREOPERATIVE CARDIOVASCULAR EXAMINATION: Primary | ICD-10-CM

## 2024-04-24 DIAGNOSIS — Z01.818 PRE-OP EXAM: ICD-10-CM

## 2024-04-24 DIAGNOSIS — M54.16 LUMBAR RADICULOPATHY: ICD-10-CM

## 2024-04-24 DIAGNOSIS — M51.36 DDD (DEGENERATIVE DISC DISEASE), LUMBAR: ICD-10-CM

## 2024-04-24 DIAGNOSIS — E78.5 TYPE 2 DIABETES MELLITUS WITH HYPERLIPIDEMIA: Chronic | ICD-10-CM

## 2024-04-24 DIAGNOSIS — E11.69 TYPE 2 DIABETES MELLITUS WITH HYPERLIPIDEMIA: Chronic | ICD-10-CM

## 2024-04-24 DIAGNOSIS — I15.2 HYPERTENSION ASSOCIATED WITH DIABETES: Chronic | ICD-10-CM

## 2024-04-24 DIAGNOSIS — E11.69 DYSLIPIDEMIA ASSOCIATED WITH TYPE 2 DIABETES MELLITUS: Chronic | ICD-10-CM

## 2024-04-24 DIAGNOSIS — E78.5 DYSLIPIDEMIA ASSOCIATED WITH TYPE 2 DIABETES MELLITUS: Chronic | ICD-10-CM

## 2024-04-24 DIAGNOSIS — E66.09 CLASS 1 OBESITY DUE TO EXCESS CALORIES WITH SERIOUS COMORBIDITY AND BODY MASS INDEX (BMI) OF 31.0 TO 31.9 IN ADULT: ICD-10-CM

## 2024-04-24 PROCEDURE — 1160F RVW MEDS BY RX/DR IN RCRD: CPT | Mod: CPTII,,, | Performed by: INTERNAL MEDICINE

## 2024-04-24 PROCEDURE — 3074F SYST BP LT 130 MM HG: CPT | Mod: CPTII,,, | Performed by: INTERNAL MEDICINE

## 2024-04-24 PROCEDURE — 1159F MED LIST DOCD IN RCRD: CPT | Mod: CPTII,,, | Performed by: INTERNAL MEDICINE

## 2024-04-24 PROCEDURE — 99204 OFFICE O/P NEW MOD 45 MIN: CPT | Mod: S$PBB,,, | Performed by: INTERNAL MEDICINE

## 2024-04-24 PROCEDURE — 99214 OFFICE O/P EST MOD 30 MIN: CPT | Mod: PBBFAC,PO | Performed by: INTERNAL MEDICINE

## 2024-04-24 PROCEDURE — 3072F LOW RISK FOR RETINOPATHY: CPT | Mod: CPTII,,, | Performed by: INTERNAL MEDICINE

## 2024-04-24 PROCEDURE — 3008F BODY MASS INDEX DOCD: CPT | Mod: CPTII,,, | Performed by: INTERNAL MEDICINE

## 2024-04-24 PROCEDURE — 4010F ACE/ARB THERAPY RXD/TAKEN: CPT | Mod: CPTII,,, | Performed by: INTERNAL MEDICINE

## 2024-04-24 PROCEDURE — 3079F DIAST BP 80-89 MM HG: CPT | Mod: CPTII,,, | Performed by: INTERNAL MEDICINE

## 2024-04-24 PROCEDURE — 99999 PR PBB SHADOW E&M-EST. PATIENT-LVL IV: CPT | Mod: PBBFAC,,, | Performed by: INTERNAL MEDICINE

## 2024-04-24 NOTE — PROGRESS NOTES
CARDIOLOGY CLINIC VISIT        HISTORY OF PRESENT ILLNESS:     04/24/2024: Emeka Caballero presents for preoperative cardiovascular evaluation.  He has to have a revision of a spinal fusion.  Can not ambulate more than 50 ft.  Ambulates with a cane.  Severe discomfort.  Hypertension.  Hyperlipidemia.  Diabetes.  Denies any history of coronary artery disease.  States last surgery was roughly 2 years ago.  He did not have to see cardiology at that time.    CARDIOVASCULAR HISTORY:     None    PAST MEDICAL HISTORY:     Past Medical History:   Diagnosis Date    Arthritis     Diabetes mellitus, type 2 2/11/2022    Essential hypertension, benign 5/4/2023       PAST SURGICAL HISTORY:     Past Surgical History:   Procedure Laterality Date    CATARACT EXTRACTION Right     EPIDURAL STEROID INJECTION N/A 10/21/2020    Procedure: Injection, Steroid, Epidural Caudal;  Surgeon: Vipul Nixon Jr., MD;  Location: [facility] ENDO;  Service: Pain Management;  Laterality: N/A;  Caudal KURT  Arrive @ 1315; No ATC or DM; Needs MD Signature    INJECTION, SACROILIAC JOINT Bilateral 12/27/2022    Procedure: INJECTION,SACROILIAC JOINT, BILATERAL CONTRAST DIRECT REF  ORDERED;  Surgeon: Brielle José MD;  Location: [facility] PAIN MGT;  Service: Pain Management;  Laterality: Bilateral;    INJECTION, SACROILIAC JOINT Right 11/27/2023    Procedure: INJECTION,SACROILIAC JOINT RIGHT DIRECT REFERRAL  Sooner date;  Surgeon: Brielle José MD;  Location: [facility] PAIN MGT;  Service: Pain Management;  Laterality: Right;    LASER ENUCLEATION OF PROSTATE N/A 3/14/2023    Procedure: ENUCLEATION, PROSTATE, USING LASER;  Surgeon: Cecil Murray MD;  Location: [facility] OR;  Service: Urology;  Laterality: N/A;    LUMBAR FUSION N/A 2/8/2022    Procedure: FUSION, SPINE, LUMBAR;  Surgeon: Alphonse Reed MD;  [address];  Service: Neurosurgery;  Laterality: N/A;  AIRO, L4-S1       ALLERGIES AND MEDICATION:   Review of patient's allergies  indicates:  No Known Allergies     Medication List            Accurate as of April 24, 2024 11:33 AM. If you have any questions, ask your nurse or doctor.                CONTINUE taking these medications      atorvastatin 40 MG tablet  Commonly known as: LIPITOR  Take 1 tablet (40 mg total) by mouth once daily.     blood sugar diagnostic Strp  To check BG 3 times daily, to use with insurance preferred meter     blood-glucose meter kit  To check BG 3 times daily, to use with insurance preferred meter     gabapentin 300 MG capsule  Commonly known as: NEURONTIN  Take 1 capsule (300 mg total) by mouth every evening.     HYDROcodone-acetaminophen 7.5-325 mg per tablet  Commonly known as: NORCO  Take 1 tablet by mouth every 6 (six) hours as needed for Pain.     lancets Misc  To check BG 3 times daily, to use with insurance preferred meter     linaCLOtide 72 mcg Cap capsule  Commonly known as: LINZESS  Take 1 capsule (72 mcg total) by mouth daily as needed (constipation).     lisinopriL 5 MG tablet  Commonly known as: PRINIVIL,ZESTRIL  Take 1 tablet (5 mg total) by mouth once daily.     metFORMIN 500 MG ER 24hr tablet  Commonly known as: GLUCOPHAGE-XR  Take 1 tablet (500 mg total) by mouth daily with breakfast.     naproxen 500 MG tablet  Commonly known as: NAPROSYN  Take 1 tablet (500 mg total) by mouth 2 (two) times daily.     omega-3 fatty acids-fish oil 340-1,000 mg Cap  Take 1 capsule by mouth once daily.     oxybutynin 10 MG 24 hr tablet  Commonly known as: DITROPAN-XL  Take 1 tablet (10 mg total) by mouth once daily.     oxyCODONE-acetaminophen 5-325 mg per tablet  Commonly known as: PERCOCET  Take 1 tablet by mouth every 4 (four) hours as needed for Pain.     pantoprazole 40 MG tablet  Commonly known as: PROTONIX  Take 1 tablet (40 mg total) by mouth once daily.     tadalafiL 20 MG Tab  Commonly known as: CIALIS  Take 1 tablet (20 mg total) by mouth daily as needed (Erectile Dysfunction).     tamsulosin 0.4 mg  Cap  Commonly known as: FLOMAX  Take 1 capsule (0.4 mg total) by mouth once daily.              SOCIAL HISTORY:     Social History     Socioeconomic History    Marital status: Single   Tobacco Use    Smoking status: Some Days     Types: Cigarettes    Smokeless tobacco: Never   Substance and Sexual Activity    Alcohol use: Yes     Comment: socially    Drug use: No       FAMILY HISTORY:     Family History   Problem Relation Name Age of Onset    Cataracts Mother      No Known Problems Father      No Known Problems Sister      No Known Problems Brother      No Known Problems Maternal Aunt      No Known Problems Maternal Uncle      No Known Problems Paternal Aunt      No Known Problems Paternal Uncle      No Known Problems Maternal Grandmother      No Known Problems Maternal Grandfather      No Known Problems Paternal Grandmother      No Known Problems Paternal Grandfather         REVIEW OF SYSTEMS:   Review of Systems   Constitutional:  Negative for chills, diaphoresis, fever, malaise/fatigue and weight loss.   HENT:  Negative for congestion, hearing loss, sinus pain, sore throat and tinnitus.    Eyes:  Negative for blurred vision, double vision, photophobia and pain.   Respiratory:  Negative for cough, hemoptysis, sputum production, shortness of breath, wheezing and stridor.    Cardiovascular:  Negative for chest pain, palpitations, orthopnea, claudication, leg swelling and PND.   Gastrointestinal:  Negative for abdominal pain, blood in stool, heartburn, melena, nausea and vomiting.   Musculoskeletal:  Positive for back pain. Negative for falls, joint pain, myalgias and neck pain.   Neurological:  Negative for dizziness, tingling, tremors, sensory change, speech change, focal weakness, seizures, loss of consciousness, weakness and headaches.   Endo/Heme/Allergies:  Does not bruise/bleed easily.   Psychiatric/Behavioral:  Negative for depression, memory loss and substance abuse. The patient is not nervous/anxious.   "      PHYSICAL EXAM:     Vitals:    04/24/24 1122   BP: 128/80   Pulse: 84   Resp: 18    Body mass index is 31.44 kg/m².  Weight: 93.8 kg (206 lb 12.7 oz)   Height: 5' 8" (172.7 cm)     Physical Exam  Vitals reviewed.   Constitutional:       General: He is not in acute distress.     Appearance: Normal appearance. He is well-developed. He is not diaphoretic.   HENT:      Head: Normocephalic.   Eyes:      Extraocular Movements: Extraocular movements intact.   Neck:      Vascular: No carotid bruit or JVD.   Cardiovascular:      Rate and Rhythm: Normal rate and regular rhythm.      Pulses: Normal pulses.      Heart sounds: Normal heart sounds.   Pulmonary:      Effort: Pulmonary effort is normal.      Breath sounds: Normal breath sounds. No wheezing, rhonchi or rales.   Chest:      Chest wall: No tenderness.   Abdominal:      General: Bowel sounds are normal. There is no distension.      Palpations: Abdomen is soft.      Tenderness: There is no abdominal tenderness.   Musculoskeletal:      Right lower leg: No edema.      Left lower leg: No edema.   Skin:     General: Skin is warm and dry.      Coloration: Skin is not jaundiced or pale.      Findings: No erythema.   Neurological:      General: No focal deficit present.      Mental Status: He is alert and oriented to person, place, and time.      Motor: No weakness.   Psychiatric:         Speech: Speech normal.         Behavior: Behavior normal.         Thought Content: Thought content normal.         DATA:   EKG: (personally reviewed tracing)  04/19/2024-normal sinus rhythm, incomplete right bundle-branch block  Laboratory:  CBC:  Recent Labs   Lab 05/10/22  1119 04/29/23  0902 10/25/23  0715   WBC 6.50 6.04 6.42   Hemoglobin 13.2 L 13.7 L 14.9   Hematocrit 42.9 41.5 45.2   Platelets 275 245 234       CHEMISTRIES:  Recent Labs   Lab 02/11/22  0917 03/10/22  1050 05/10/22  1119 10/14/22  0713 04/29/23  0902 05/18/23  0808 10/25/23  0715   Glucose 210 H 117 H 111 H   < > " 124 H 131 H 131 H   Sodium 136 141 140   < > 144 142 141   Potassium 3.9 3.9 4.2   < > 4.1 4.1 3.9   BUN 10 13 13   < > 12 14 14   Creatinine 0.9 0.8 0.8   < > 1.0 1.0 0.9   eGFR if African American >60.0 >60.0 >60  --   --   --   --    eGFR if non African American >60.0 >60.0 >60  --   --   --   --    Calcium 8.8 9.4 9.3   < > 9.0 9.2 9.4    < > = values in this interval not displayed.       CARDIAC BIOMARKERS:  Recent Labs   Lab 03/10/22  1050   CPK 70       COAGS:  Recent Labs   Lab 01/11/22  1009 02/08/22  0650   INR 0.9 1.0       LIPIDS/LFTS:  Recent Labs   Lab 10/14/22  0713 04/29/23  0902 10/25/23  0715   Cholesterol 137 124 164   Triglycerides 148 148 333 H   HDL 26 L 29 L 25 L   LDL Cholesterol 81.4 65.4 72.4   Non-HDL Cholesterol 111 95 139   AST 18 18 17   ALT 29 29 21       Cardiovascular Testing:      ASSESSMENT:     Preoperative cardiovascular evaluation  Hypertension  Hyperlipidemia: LDL 72  Diabetes  Back pain    PLAN:     Preoperative cardiovascular evaluation: Because the patient's functional capacity is less than 4 Mets feel he needs to undergo preoperative testing.  DSE because he can not ambulate.  Hypertension: Continue current management.  Hyperlipidemia:  Continue current management.  If stress negative patient can proceed at low risk.  Krishna 0.9% risk of myocardial infarction or cardiac arrest, intraoperatively or up to 30 days postop. RCRI 3.9% 30 day risk of death, MI or cardiac arrest.           Santos Rivas MD, MPH, FACC, [facility]

## 2024-04-25 ENCOUNTER — HOSPITAL ENCOUNTER (OUTPATIENT)
Dept: CARDIOLOGY | Facility: HOSPITAL | Age: 49
Discharge: HOME | End: 2024-04-25
Attending: INTERNAL MEDICINE
Payer: MEDICAID

## 2024-04-25 DIAGNOSIS — E78.5 TYPE 2 DIABETES MELLITUS WITH HYPERLIPIDEMIA: Chronic | ICD-10-CM

## 2024-04-25 DIAGNOSIS — M51.36 DDD (DEGENERATIVE DISC DISEASE), LUMBAR: ICD-10-CM

## 2024-04-25 DIAGNOSIS — E11.69 DYSLIPIDEMIA ASSOCIATED WITH TYPE 2 DIABETES MELLITUS: Chronic | ICD-10-CM

## 2024-04-25 DIAGNOSIS — E11.69 TYPE 2 DIABETES MELLITUS WITH HYPERLIPIDEMIA: Chronic | ICD-10-CM

## 2024-04-25 DIAGNOSIS — M54.16 LUMBAR RADICULOPATHY: ICD-10-CM

## 2024-04-25 DIAGNOSIS — M48.061 SPINAL STENOSIS OF LUMBAR REGION, UNSPECIFIED WHETHER NEUROGENIC CLAUDICATION PRESENT: ICD-10-CM

## 2024-04-25 DIAGNOSIS — Z01.810 PREOPERATIVE CARDIOVASCULAR EXAMINATION: ICD-10-CM

## 2024-04-25 DIAGNOSIS — I15.2 HYPERTENSION ASSOCIATED WITH DIABETES: Chronic | ICD-10-CM

## 2024-04-25 DIAGNOSIS — E11.59 HYPERTENSION ASSOCIATED WITH DIABETES: Chronic | ICD-10-CM

## 2024-04-25 DIAGNOSIS — E78.5 DYSLIPIDEMIA ASSOCIATED WITH TYPE 2 DIABETES MELLITUS: Chronic | ICD-10-CM

## 2024-04-25 LAB
ASCENDING AORTA: 2.78 CM
AV INDEX (PROSTH): 0.69
AV MEAN GRADIENT: 3 MMHG
AV PEAK GRADIENT: 5 MMHG
AV VALVE AREA BY VELOCITY RATIO: 2.64 CM²
AV VALVE AREA: 2.54 CM²
AV VELOCITY RATIO: 0.71
CV ECHO LV RWT: 0.41 CM
CV STRESS BASE HR: 95 BPM
DIASTOLIC BLOOD PRESSURE: 84 MMHG
DOP CALC AO PEAK VEL: 1.15 M/S
DOP CALC AO VTI: 20.8 CM
DOP CALC LVOT AREA: 3.7 CM2
DOP CALC LVOT DIAMETER: 2.17 CM
DOP CALC LVOT PEAK VEL: 0.82 M/S
DOP CALC LVOT STROKE VOLUME: 52.86 CM3
DOP CALCLVOT PEAK VEL VTI: 14.3 CM
E WAVE DECELERATION TIME: 217.46 MSEC
E/A RATIO: 0.78
E/E' RATIO: 6.27 M/S
ECHO LV POSTERIOR WALL: 0.99 CM (ref 0.6–1.1)
FRACTIONAL SHORTENING: 27 % (ref 28–44)
INTERVENTRICULAR SEPTUM: 0.96 CM (ref 0.6–1.1)
IVC DIAMETER: 1.93 CM
IVRT: 93.24 MSEC
LA MAJOR: 5.4 CM
LA MINOR: 3.88 CM
LA WIDTH: 3.9 CM
LEFT ATRIUM SIZE: 4.45 CM
LEFT ATRIUM VOLUME: 66.61 CM3
LEFT INTERNAL DIMENSION IN SYSTOLE: 3.49 CM (ref 2.1–4)
LEFT VENTRICLE DIASTOLIC VOLUME: 107.43 ML
LEFT VENTRICLE SYSTOLIC VOLUME: 50.56 ML
LEFT VENTRICULAR INTERNAL DIMENSION IN DIASTOLE: 4.8 CM (ref 3.5–6)
LEFT VENTRICULAR MASS: 164.47 G
LV LATERAL E/E' RATIO: 5.22 M/S
LV SEPTAL E/E' RATIO: 7.83 M/S
LVOT MG: 1.62 MMHG
LVOT MV: 0.61 CM/S
MV PEAK A VEL: 0.6 M/S
MV PEAK E VEL: 0.47 M/S
MV STENOSIS PRESSURE HALF TIME: 63.06 MS
MV VALVE AREA P 1/2 METHOD: 3.49 CM2
OHS CV CPX 85 PERCENT MAX PREDICTED HEART RATE MALE: 144
OHS CV CPX MAX PREDICTED HEART RATE: 169
OHS CV CPX PATIENT IS FEMALE: 0
OHS CV CPX PATIENT IS MALE: 1
OHS CV CPX PEAK DIASTOLIC BLOOD PRESSURE: 91 MMHG
OHS CV CPX PEAK HEAR RATE: 155 BPM
OHS CV CPX PEAK RATE PRESSURE PRODUCT: ABNORMAL
OHS CV CPX PEAK SYSTOLIC BLOOD PRESSURE: 210 MMHG
OHS CV CPX PERCENT MAX PREDICTED HEART RATE ACHIEVED: 92
OHS CV CPX RATE PRESSURE PRODUCT PRESENTING: ABNORMAL
OHS CV RV/LV RATIO: 0.77 CM
PISA TR MAX VEL: 1.74 M/S
PULM VEIN S/D RATIO: 1.07
PV PEAK D VEL: 0.3 M/S
PV PEAK GRADIENT: 4 MMHG
PV PEAK S VEL: 0.32 M/S
PV PEAK VELOCITY: 0.94 M/S
RA MAJOR: 4.9 CM
RA PRESSURE ESTIMATED: 8 MMHG
RA WIDTH: 3.18 CM
RIGHT VENTRICULAR END-DIASTOLIC DIMENSION: 3.71 CM
RV TB RVSP: 10 MMHG
SINUS: 3.18 CM
STJ: 2.64 CM
SYSTOLIC BLOOD PRESSURE: 121 MMHG
TDI LATERAL: 0.09 M/S
TDI SEPTAL: 0.06 M/S
TDI: 0.08 M/S
TR MAX PG: 12 MMHG
TRICUSPID ANNULAR PLANE SYSTOLIC EXCURSION: 1.96 CM
TV PEAK GRADIENT: 1 MMHG
TV REST PULMONARY ARTERY PRESSURE: 20 MMHG

## 2024-04-25 PROCEDURE — 93351 STRESS TTE COMPLETE: CPT

## 2024-04-25 PROCEDURE — 93351 STRESS TTE COMPLETE: CPT | Mod: 26,,, | Performed by: INTERNAL MEDICINE

## 2024-04-25 PROCEDURE — 63600175 PHARM REV CODE 636 W HCPCS: Performed by: INTERNAL MEDICINE

## 2024-04-25 RX ORDER — DOBUTAMINE HYDROCHLORIDE 400 MG/100ML
0-20 INJECTION INTRAVENOUS CONTINUOUS
Status: DISCONTINUED | OUTPATIENT
Start: 2024-04-25 | End: 2024-04-26 | Stop reason: HOSPADM

## 2024-04-25 RX ADMIN — DOBUTAMINE HYDROCHLORIDE 10 MCG/KG/MIN: 400 INJECTION INTRAVENOUS at 01:04

## 2024-04-30 ENCOUNTER — TELEPHONE (OUTPATIENT)
Dept: NEUROSURGERY | Facility: CLINIC | Age: 49
End: 2024-04-30
Payer: MEDICAID

## 2024-04-30 NOTE — TELEPHONE ENCOUNTER
Phone call to patient with pre op instructions.  Patient instructed to remain NPO after midnight tonight , to bathe in hibicleanse or dial antibacterial soap tonight and tomorrow morning before arrival, and to arrive [facility] 2nd floor [facility]  at 6 am. Pt. Verbalized understanding of above instructions.      hand

## 2024-05-01 ENCOUNTER — HOSPITAL ENCOUNTER (INPATIENT)
Facility: HOSPITAL | Age: 49
LOS: 5 days | Discharge: NURSING, REHAB OR LONG-TERM CARE | DRG: 460 | End: 2024-05-06
Attending: NEUROLOGICAL SURGERY | Admitting: NEUROLOGICAL SURGERY
Payer: MEDICAID

## 2024-05-01 ENCOUNTER — ANESTHESIA EVENT (OUTPATIENT)
Dept: SURGERY | Facility: HOSPITAL | Age: 49
DRG: 460 | End: 2024-05-01
Payer: MEDICAID

## 2024-05-01 ENCOUNTER — ANESTHESIA (OUTPATIENT)
Dept: SURGERY | Facility: HOSPITAL | Age: 49
DRG: 460 | End: 2024-05-01
Payer: MEDICAID

## 2024-05-01 DIAGNOSIS — Z01.818 PREOPERATIVE TESTING: ICD-10-CM

## 2024-05-01 DIAGNOSIS — M47.816 LUMBAR SPONDYLOSIS: ICD-10-CM

## 2024-05-01 PROBLEM — R52 PAIN: Status: ACTIVE | Noted: 2024-05-01

## 2024-05-01 LAB
ABO + RH BLD: NORMAL
ANION GAP SERPL CALC-SCNC: 9 MMOL/L (ref 8–16)
APTT PPP: 27.1 SEC (ref 21–32)
BASOPHILS # BLD AUTO: 0.04 K/UL (ref 0–0.2)
BASOPHILS NFR BLD: 0.7 % (ref 0–1.9)
BLD GP AB SCN CELLS X3 SERPL QL: NORMAL
BUN SERPL-MCNC: 13 MG/DL (ref 6–20)
CALCIUM SERPL-MCNC: 9.4 MG/DL (ref 8.7–10.5)
CHLORIDE SERPL-SCNC: 108 MMOL/L (ref 95–110)
CO2 SERPL-SCNC: 23 MMOL/L (ref 23–29)
CREAT SERPL-MCNC: 1 MG/DL (ref 0.5–1.4)
DIFFERENTIAL METHOD BLD: ABNORMAL
EOSINOPHIL # BLD AUTO: 0.1 K/UL (ref 0–0.5)
EOSINOPHIL NFR BLD: 2.6 % (ref 0–8)
ERYTHROCYTE [DISTWIDTH] IN BLOOD BY AUTOMATED COUNT: 14.5 % (ref 11.5–14.5)
EST. GFR  (NO RACE VARIABLE): >60 ML/MIN/1.73 M^2
ESTIMATED AVG GLUCOSE: 148 MG/DL (ref 68–131)
GLUCOSE SERPL-MCNC: 146 MG/DL (ref 70–110)
HBA1C MFR BLD: 6.8 % (ref 4–5.6)
HCT VFR BLD AUTO: 45.5 % (ref 40–54)
HGB BLD-MCNC: 15 G/DL (ref 14–18)
IMM GRANULOCYTES # BLD AUTO: 0.01 K/UL (ref 0–0.04)
IMM GRANULOCYTES NFR BLD AUTO: 0.2 % (ref 0–0.5)
INR PPP: 1 (ref 0.8–1.2)
LYMPHOCYTES # BLD AUTO: 2.7 K/UL (ref 1–4.8)
LYMPHOCYTES NFR BLD: 49.7 % (ref 18–48)
MCH RBC QN AUTO: 27.5 PG (ref 27–31)
MCHC RBC AUTO-ENTMCNC: 33 G/DL (ref 32–36)
MCV RBC AUTO: 83 FL (ref 82–98)
MONOCYTES # BLD AUTO: 0.4 K/UL (ref 0.3–1)
MONOCYTES NFR BLD: 8.2 % (ref 4–15)
NEUTROPHILS # BLD AUTO: 2.1 K/UL (ref 1.8–7.7)
NEUTROPHILS NFR BLD: 38.6 % (ref 38–73)
NRBC BLD-RTO: 0 /100 WBC
PLATELET # BLD AUTO: 209 K/UL (ref 150–450)
PMV BLD AUTO: 10.6 FL (ref 9.2–12.9)
POCT GLUCOSE: 155 MG/DL (ref 70–110)
POCT GLUCOSE: 165 MG/DL (ref 70–110)
POCT GLUCOSE: 259 MG/DL (ref 70–110)
POTASSIUM SERPL-SCNC: 3.7 MMOL/L (ref 3.5–5.1)
PROTHROMBIN TIME: 10.5 SEC (ref 9–12.5)
RBC # BLD AUTO: 5.46 M/UL (ref 4.6–6.2)
SODIUM SERPL-SCNC: 140 MMOL/L (ref 136–145)
SPECIMEN OUTDATE: NORMAL
WBC # BLD AUTO: 5.37 K/UL (ref 3.9–12.7)

## 2024-05-01 PROCEDURE — 71000015 HC POSTOP RECOV 1ST HR: Performed by: NEUROLOGICAL SURGERY

## 2024-05-01 PROCEDURE — 36620 INSERTION CATHETER ARTERY: CPT | Mod: 59,,, | Performed by: ANESTHESIOLOGY

## 2024-05-01 PROCEDURE — D9220A PRA ANESTHESIA: Mod: ANES,,, | Performed by: ANESTHESIOLOGY

## 2024-05-01 PROCEDURE — 99900035 HC TECH TIME PER 15 MIN (STAT)

## 2024-05-01 PROCEDURE — 27800903 OPTIME MED/SURG SUP & DEVICES OTHER IMPLANTS: Performed by: NEUROLOGICAL SURGERY

## 2024-05-01 PROCEDURE — C1713 ANCHOR/SCREW BN/BN,TIS/BN: HCPCS | Performed by: NEUROLOGICAL SURGERY

## 2024-05-01 PROCEDURE — 85025 COMPLETE CBC W/AUTO DIFF WBC: CPT | Performed by: STUDENT IN AN ORGANIZED HEALTH CARE EDUCATION/TRAINING PROGRAM

## 2024-05-01 PROCEDURE — 22853 INSJ BIOMECHANICAL DEVICE: CPT | Mod: 82,,, | Performed by: ORTHOPAEDIC SURGERY

## 2024-05-01 PROCEDURE — 85610 PROTHROMBIN TIME: CPT | Performed by: STUDENT IN AN ORGANIZED HEALTH CARE EDUCATION/TRAINING PROGRAM

## 2024-05-01 PROCEDURE — 63600175 PHARM REV CODE 636 W HCPCS: Performed by: ANESTHESIOLOGY

## 2024-05-01 PROCEDURE — 27201423 OPTIME MED/SURG SUP & DEVICES STERILE SUPPLY: Performed by: NEUROLOGICAL SURGERY

## 2024-05-01 PROCEDURE — 63600175 PHARM REV CODE 636 W HCPCS

## 2024-05-01 PROCEDURE — 22853 INSJ BIOMECHANICAL DEVICE: CPT | Mod: ,,, | Performed by: NEUROLOGICAL SURGERY

## 2024-05-01 PROCEDURE — 94761 N-INVAS EAR/PLS OXIMETRY MLT: CPT

## 2024-05-01 PROCEDURE — 82962 GLUCOSE BLOOD TEST: CPT | Performed by: NEUROLOGICAL SURGERY

## 2024-05-01 PROCEDURE — C1734 ORTH/DEVIC/DRUG BN/BN,TIS/BN: HCPCS | Performed by: NEUROLOGICAL SURGERY

## 2024-05-01 PROCEDURE — D9220A PRA ANESTHESIA: Mod: CRNA,,, | Performed by: NURSE ANESTHETIST, CERTIFIED REGISTERED

## 2024-05-01 PROCEDURE — 22633 ARTHRD CMBN 1NTRSPC LUMBAR: CPT | Mod: 62,,, | Performed by: NEUROLOGICAL SURGERY

## 2024-05-01 PROCEDURE — 25000003 PHARM REV CODE 250: Performed by: NEUROLOGICAL SURGERY

## 2024-05-01 PROCEDURE — 83036 HEMOGLOBIN GLYCOSYLATED A1C: CPT | Performed by: STUDENT IN AN ORGANIZED HEALTH CARE EDUCATION/TRAINING PROGRAM

## 2024-05-01 PROCEDURE — 85730 THROMBOPLASTIN TIME PARTIAL: CPT | Performed by: STUDENT IN AN ORGANIZED HEALTH CARE EDUCATION/TRAINING PROGRAM

## 2024-05-01 PROCEDURE — 01NB0ZZ RELEASE LUMBAR NERVE, OPEN APPROACH: ICD-10-PCS | Performed by: ORTHOPAEDIC SURGERY

## 2024-05-01 PROCEDURE — 86920 COMPATIBILITY TEST SPIN: CPT | Performed by: ANESTHESIOLOGY

## 2024-05-01 PROCEDURE — 22848 INSERT PELV FIXATION DEVICE: CPT | Mod: ,,, | Performed by: NEUROLOGICAL SURGERY

## 2024-05-01 PROCEDURE — 25000003 PHARM REV CODE 250

## 2024-05-01 PROCEDURE — 36000711: Performed by: NEUROLOGICAL SURGERY

## 2024-05-01 PROCEDURE — 63052 LAM FACETC/FRMT ARTHRD LUM 1: CPT | Mod: 62,,, | Performed by: ORTHOPAEDIC SURGERY

## 2024-05-01 PROCEDURE — 0SP304Z REMOVAL OF INTERNAL FIXATION DEVICE FROM LUMBOSACRAL JOINT, OPEN APPROACH: ICD-10-PCS | Performed by: ORTHOPAEDIC SURGERY

## 2024-05-01 PROCEDURE — 63052 LAM FACETC/FRMT ARTHRD LUM 1: CPT | Mod: 62,,, | Performed by: NEUROLOGICAL SURGERY

## 2024-05-01 PROCEDURE — 22614 ARTHRD PST TQ 1NTRSPC EA ADD: CPT | Mod: 62,,, | Performed by: NEUROLOGICAL SURGERY

## 2024-05-01 PROCEDURE — 36415 COLL VENOUS BLD VENIPUNCTURE: CPT | Performed by: STUDENT IN AN ORGANIZED HEALTH CARE EDUCATION/TRAINING PROGRAM

## 2024-05-01 PROCEDURE — 22842 INSERT SPINE FIXATION DEVICE: CPT | Mod: ,,, | Performed by: NEUROLOGICAL SURGERY

## 2024-05-01 PROCEDURE — 25000003 PHARM REV CODE 250: Performed by: NURSE ANESTHETIST, CERTIFIED REGISTERED

## 2024-05-01 PROCEDURE — C1729 CATH, DRAINAGE: HCPCS | Performed by: NEUROLOGICAL SURGERY

## 2024-05-01 PROCEDURE — 86850 RBC ANTIBODY SCREEN: CPT | Performed by: NEUROLOGICAL SURGERY

## 2024-05-01 PROCEDURE — 15734 MUSCLE-SKIN GRAFT TRUNK: CPT | Mod: 62,51,, | Performed by: NEUROLOGICAL SURGERY

## 2024-05-01 PROCEDURE — 27201037 HC PRESSURE MONITORING SET UP

## 2024-05-01 PROCEDURE — 20930 SP BONE ALGRFT MORSEL ADD-ON: CPT | Mod: ,,, | Performed by: NEUROLOGICAL SURGERY

## 2024-05-01 PROCEDURE — 4A11X4G MONITORING OF PERIPHERAL NERVOUS ELECTRICAL ACTIVITY, INTRAOPERATIVE, EXTERNAL APPROACH: ICD-10-PCS | Performed by: ORTHOPAEDIC SURGERY

## 2024-05-01 PROCEDURE — 22848 INSERT PELV FIXATION DEVICE: CPT | Mod: 82,,, | Performed by: ORTHOPAEDIC SURGERY

## 2024-05-01 PROCEDURE — 00NY0ZZ RELEASE LUMBAR SPINAL CORD, OPEN APPROACH: ICD-10-PCS | Performed by: ORTHOPAEDIC SURGERY

## 2024-05-01 PROCEDURE — 15734 MUSCLE-SKIN GRAFT TRUNK: CPT | Mod: 62,51,, | Performed by: ORTHOPAEDIC SURGERY

## 2024-05-01 PROCEDURE — 86920 COMPATIBILITY TEST SPIN: CPT | Performed by: NEUROLOGICAL SURGERY

## 2024-05-01 PROCEDURE — 94799 UNLISTED PULMONARY SVC/PX: CPT

## 2024-05-01 PROCEDURE — A6010 COLLAGEN BASED WOUND FILLER: HCPCS | Performed by: NEUROLOGICAL SURGERY

## 2024-05-01 PROCEDURE — 71000016 HC POSTOP RECOV ADDL HR: Performed by: NEUROLOGICAL SURGERY

## 2024-05-01 PROCEDURE — 20936 SP BONE AGRFT LOCAL ADD-ON: CPT | Mod: ,,, | Performed by: NEUROLOGICAL SURGERY

## 2024-05-01 PROCEDURE — 63600175 PHARM REV CODE 636 W HCPCS: Performed by: NEUROLOGICAL SURGERY

## 2024-05-01 PROCEDURE — 11000001 HC ACUTE MED/SURG PRIVATE ROOM

## 2024-05-01 PROCEDURE — 22614 ARTHRD PST TQ 1NTRSPC EA ADD: CPT | Mod: 62,,, | Performed by: ORTHOPAEDIC SURGERY

## 2024-05-01 PROCEDURE — 22633 ARTHRD CMBN 1NTRSPC LUMBAR: CPT | Mod: 62,,, | Performed by: ORTHOPAEDIC SURGERY

## 2024-05-01 PROCEDURE — 22842 INSERT SPINE FIXATION DEVICE: CPT | Mod: 82,,, | Performed by: ORTHOPAEDIC SURGERY

## 2024-05-01 PROCEDURE — 25000003 PHARM REV CODE 250: Performed by: STUDENT IN AN ORGANIZED HEALTH CARE EDUCATION/TRAINING PROGRAM

## 2024-05-01 PROCEDURE — 63600175 PHARM REV CODE 636 W HCPCS: Performed by: NURSE ANESTHETIST, CERTIFIED REGISTERED

## 2024-05-01 PROCEDURE — 36000710: Performed by: NEUROLOGICAL SURGERY

## 2024-05-01 PROCEDURE — 0SG00AJ FUSION OF LUMBAR VERTEBRAL JOINT WITH INTERBODY FUSION DEVICE, POSTERIOR APPROACH, ANTERIOR COLUMN, OPEN APPROACH: ICD-10-PCS | Performed by: ORTHOPAEDIC SURGERY

## 2024-05-01 PROCEDURE — 80048 BASIC METABOLIC PNL TOTAL CA: CPT | Performed by: STUDENT IN AN ORGANIZED HEALTH CARE EDUCATION/TRAINING PROGRAM

## 2024-05-01 PROCEDURE — 63600175 PHARM REV CODE 636 W HCPCS: Performed by: STUDENT IN AN ORGANIZED HEALTH CARE EDUCATION/TRAINING PROGRAM

## 2024-05-01 PROCEDURE — 71000033 HC RECOVERY, INTIAL HOUR: Performed by: NEUROLOGICAL SURGERY

## 2024-05-01 PROCEDURE — 37000008 HC ANESTHESIA 1ST 15 MINUTES: Performed by: NEUROLOGICAL SURGERY

## 2024-05-01 PROCEDURE — 37000009 HC ANESTHESIA EA ADD 15 MINS: Performed by: NEUROLOGICAL SURGERY

## 2024-05-01 DEVICE — SET SCREW EXPEDIUM VERSE UNITZ: Type: IMPLANTABLE DEVICE | Site: BACK | Status: FUNCTIONAL

## 2024-05-01 DEVICE — SCREW EXPEDIUM FEN STRL 6X45MM: Type: IMPLANTABLE DEVICE | Site: BACK | Status: FUNCTIONAL

## 2024-05-01 DEVICE — IMPLANTABLE DEVICE: Type: IMPLANTABLE DEVICE | Site: BACK | Status: FUNCTIONAL

## 2024-05-01 DEVICE — CONNECTOR SPINAL SFX A6 5.5MM: Type: IMPLANTABLE DEVICE | Site: BACK | Status: FUNCTIONAL

## 2024-05-01 DEVICE — BONE 30CC CANCELLOUS CRUSHED: Type: IMPLANTABLE DEVICE | Site: BACK | Status: FUNCTIONAL

## 2024-05-01 DEVICE — SCREW INNER SINGLE SET TITANIU: Type: IMPLANTABLE DEVICE | Site: BACK | Status: FUNCTIONAL

## 2024-05-01 DEVICE — KIT CONFIDENCE W/O NEEDLES: Type: IMPLANTABLE DEVICE | Site: BACK | Status: FUNCTIONAL

## 2024-05-01 DEVICE — ROD SPINE MTRX STR 400X80MM: Type: IMPLANTABLE DEVICE | Site: BACK | Status: FUNCTIONAL

## 2024-05-01 DEVICE — KIT MED BONE INFUSE: Type: IMPLANTABLE DEVICE | Site: BACK | Status: FUNCTIONAL

## 2024-05-01 DEVICE — SCREW BONE SPINAL 5.5 6 X 50MM: Type: IMPLANTABLE DEVICE | Site: BACK | Status: FUNCTIONAL

## 2024-05-01 RX ORDER — KETAMINE HCL IN 0.9 % NACL 50 MG/5 ML
SYRINGE (ML) INTRAVENOUS
Status: DISCONTINUED | OUTPATIENT
Start: 2024-05-01 | End: 2024-05-01

## 2024-05-01 RX ORDER — IBUPROFEN 200 MG
24 TABLET ORAL
Status: DISCONTINUED | OUTPATIENT
Start: 2024-05-01 | End: 2024-05-06 | Stop reason: HOSPADM

## 2024-05-01 RX ORDER — OXYBUTYNIN CHLORIDE 10 MG/1
10 TABLET, EXTENDED RELEASE ORAL DAILY
Status: DISCONTINUED | OUTPATIENT
Start: 2024-05-01 | End: 2024-05-06 | Stop reason: HOSPADM

## 2024-05-01 RX ORDER — PROPOFOL 10 MG/ML
VIAL (ML) INTRAVENOUS
Status: DISCONTINUED | OUTPATIENT
Start: 2024-05-01 | End: 2024-05-01

## 2024-05-01 RX ORDER — SODIUM CHLORIDE 9 MG/ML
INJECTION, SOLUTION INTRAVENOUS CONTINUOUS
Status: DISCONTINUED | OUTPATIENT
Start: 2024-05-01 | End: 2024-05-02

## 2024-05-01 RX ORDER — OXYCODONE HYDROCHLORIDE 10 MG/1
10 TABLET ORAL EVERY 4 HOURS PRN
Status: DISCONTINUED | OUTPATIENT
Start: 2024-05-01 | End: 2024-05-06 | Stop reason: HOSPADM

## 2024-05-01 RX ORDER — DEXMEDETOMIDINE HYDROCHLORIDE 100 UG/ML
INJECTION, SOLUTION INTRAVENOUS
Status: COMPLETED
Start: 2024-05-01 | End: 2024-05-01

## 2024-05-01 RX ORDER — HYDROMORPHONE HYDROCHLORIDE 1 MG/ML
2 INJECTION, SOLUTION INTRAMUSCULAR; INTRAVENOUS; SUBCUTANEOUS
Status: DISCONTINUED | OUTPATIENT
Start: 2024-05-01 | End: 2024-05-04

## 2024-05-01 RX ORDER — BISACODYL 10 MG/1
10 SUPPOSITORY RECTAL DAILY
Status: DISCONTINUED | OUTPATIENT
Start: 2024-05-01 | End: 2024-05-06 | Stop reason: HOSPADM

## 2024-05-01 RX ORDER — HEPARIN SODIUM 5000 [USP'U]/ML
5000 INJECTION, SOLUTION INTRAVENOUS; SUBCUTANEOUS EVERY 8 HOURS
Status: DISCONTINUED | OUTPATIENT
Start: 2024-05-02 | End: 2024-05-02

## 2024-05-01 RX ORDER — IBUPROFEN 200 MG
16 TABLET ORAL
Status: DISCONTINUED | OUTPATIENT
Start: 2024-05-01 | End: 2024-05-06 | Stop reason: HOSPADM

## 2024-05-01 RX ORDER — ACETAMINOPHEN 500 MG
1000 TABLET ORAL 4 TIMES DAILY
Status: DISCONTINUED | OUTPATIENT
Start: 2024-05-01 | End: 2024-05-06 | Stop reason: HOSPADM

## 2024-05-01 RX ORDER — ONDANSETRON HYDROCHLORIDE 2 MG/ML
INJECTION, SOLUTION INTRAVENOUS
Status: DISCONTINUED | OUTPATIENT
Start: 2024-05-01 | End: 2024-05-01

## 2024-05-01 RX ORDER — POLYETHYLENE GLYCOL 3350 17 G/17G
17 POWDER, FOR SOLUTION ORAL DAILY
Status: DISCONTINUED | OUTPATIENT
Start: 2024-05-01 | End: 2024-05-06 | Stop reason: HOSPADM

## 2024-05-01 RX ORDER — TAMSULOSIN HYDROCHLORIDE 0.4 MG/1
0.4 CAPSULE ORAL DAILY
Status: DISCONTINUED | OUTPATIENT
Start: 2024-05-01 | End: 2024-05-06 | Stop reason: HOSPADM

## 2024-05-01 RX ORDER — GABAPENTIN 300 MG/1
300 CAPSULE ORAL NIGHTLY
Status: DISCONTINUED | OUTPATIENT
Start: 2024-05-01 | End: 2024-05-06 | Stop reason: HOSPADM

## 2024-05-01 RX ORDER — LIDOCAINE HYDROCHLORIDE AND EPINEPHRINE 10; 10 MG/ML; UG/ML
INJECTION, SOLUTION INFILTRATION; PERINEURAL
Status: DISCONTINUED | OUTPATIENT
Start: 2024-05-01 | End: 2024-05-01 | Stop reason: HOSPADM

## 2024-05-01 RX ORDER — LIDOCAINE HYDROCHLORIDE 20 MG/ML
INJECTION INTRAVENOUS
Status: DISCONTINUED | OUTPATIENT
Start: 2024-05-01 | End: 2024-05-01

## 2024-05-01 RX ORDER — DEXAMETHASONE SODIUM PHOSPHATE 4 MG/ML
INJECTION, SOLUTION INTRA-ARTICULAR; INTRALESIONAL; INTRAMUSCULAR; INTRAVENOUS; SOFT TISSUE
Status: DISCONTINUED | OUTPATIENT
Start: 2024-05-01 | End: 2024-05-01

## 2024-05-01 RX ORDER — LISINOPRIL 5 MG/1
5 TABLET ORAL DAILY
Status: DISCONTINUED | OUTPATIENT
Start: 2024-05-01 | End: 2024-05-06 | Stop reason: HOSPADM

## 2024-05-01 RX ORDER — FENTANYL CITRATE 50 UG/ML
INJECTION, SOLUTION INTRAMUSCULAR; INTRAVENOUS
Status: DISCONTINUED | OUTPATIENT
Start: 2024-05-01 | End: 2024-05-01

## 2024-05-01 RX ORDER — DEXMEDETOMIDINE HYDROCHLORIDE 100 UG/ML
INJECTION, SOLUTION INTRAVENOUS
Status: DISCONTINUED | OUTPATIENT
Start: 2024-05-01 | End: 2024-05-01

## 2024-05-01 RX ORDER — VANCOMYCIN HYDROCHLORIDE 1 G/20ML
INJECTION, POWDER, LYOPHILIZED, FOR SOLUTION INTRAVENOUS
Status: DISCONTINUED | OUTPATIENT
Start: 2024-05-01 | End: 2024-05-01 | Stop reason: HOSPADM

## 2024-05-01 RX ORDER — OXYCODONE HYDROCHLORIDE 5 MG/1
5 TABLET ORAL
Status: DISCONTINUED | OUTPATIENT
Start: 2024-05-01 | End: 2024-05-01 | Stop reason: HOSPADM

## 2024-05-01 RX ORDER — ONDANSETRON 8 MG/1
8 TABLET, ORALLY DISINTEGRATING ORAL EVERY 6 HOURS PRN
Status: DISCONTINUED | OUTPATIENT
Start: 2024-05-01 | End: 2024-05-06 | Stop reason: HOSPADM

## 2024-05-01 RX ORDER — MIDAZOLAM HYDROCHLORIDE 1 MG/ML
INJECTION INTRAMUSCULAR; INTRAVENOUS
Status: DISCONTINUED | OUTPATIENT
Start: 2024-05-01 | End: 2024-05-01

## 2024-05-01 RX ORDER — SUCCINYLCHOLINE CHLORIDE 20 MG/ML
INJECTION INTRAMUSCULAR; INTRAVENOUS
Status: DISCONTINUED | OUTPATIENT
Start: 2024-05-01 | End: 2024-05-01

## 2024-05-01 RX ORDER — AMOXICILLIN 250 MG
2 CAPSULE ORAL NIGHTLY PRN
Status: DISCONTINUED | OUTPATIENT
Start: 2024-05-01 | End: 2024-05-03

## 2024-05-01 RX ORDER — HYDROMORPHONE HYDROCHLORIDE 1 MG/ML
0.2 INJECTION, SOLUTION INTRAMUSCULAR; INTRAVENOUS; SUBCUTANEOUS EVERY 5 MIN PRN
Status: DISCONTINUED | OUTPATIENT
Start: 2024-05-01 | End: 2024-05-01 | Stop reason: HOSPADM

## 2024-05-01 RX ORDER — MUPIROCIN 20 MG/G
1 OINTMENT TOPICAL 2 TIMES DAILY
Status: DISCONTINUED | OUTPATIENT
Start: 2024-05-01 | End: 2024-05-01 | Stop reason: HOSPADM

## 2024-05-01 RX ORDER — PROCHLORPERAZINE EDISYLATE 5 MG/ML
5 INJECTION INTRAMUSCULAR; INTRAVENOUS EVERY 6 HOURS PRN
Status: DISCONTINUED | OUTPATIENT
Start: 2024-05-01 | End: 2024-05-06 | Stop reason: HOSPADM

## 2024-05-01 RX ORDER — MUPIROCIN 20 MG/G
OINTMENT TOPICAL
Status: DISCONTINUED | OUTPATIENT
Start: 2024-05-01 | End: 2024-05-01 | Stop reason: HOSPADM

## 2024-05-01 RX ORDER — ATORVASTATIN CALCIUM 40 MG/1
40 TABLET, FILM COATED ORAL DAILY
Status: DISCONTINUED | OUTPATIENT
Start: 2024-05-01 | End: 2024-05-06 | Stop reason: HOSPADM

## 2024-05-01 RX ORDER — GLUCAGON 1 MG
1 KIT INJECTION
Status: DISCONTINUED | OUTPATIENT
Start: 2024-05-01 | End: 2024-05-06 | Stop reason: HOSPADM

## 2024-05-01 RX ORDER — CEFAZOLIN SODIUM 1 G/3ML
INJECTION, POWDER, FOR SOLUTION INTRAMUSCULAR; INTRAVENOUS
Status: DISCONTINUED | OUTPATIENT
Start: 2024-05-01 | End: 2024-05-01

## 2024-05-01 RX ORDER — METHOCARBAMOL 500 MG/1
1000 TABLET, FILM COATED ORAL 4 TIMES DAILY
Status: DISCONTINUED | OUTPATIENT
Start: 2024-05-01 | End: 2024-05-06 | Stop reason: HOSPADM

## 2024-05-01 RX ORDER — ROCURONIUM BROMIDE 10 MG/ML
INJECTION, SOLUTION INTRAVENOUS
Status: DISCONTINUED | OUTPATIENT
Start: 2024-05-01 | End: 2024-05-01

## 2024-05-01 RX ORDER — PANTOPRAZOLE SODIUM 20 MG/1
40 TABLET, DELAYED RELEASE ORAL DAILY
Status: DISCONTINUED | OUTPATIENT
Start: 2024-05-01 | End: 2024-05-06 | Stop reason: HOSPADM

## 2024-05-01 RX ORDER — HALOPERIDOL 5 MG/ML
0.5 INJECTION INTRAMUSCULAR ONCE
Status: COMPLETED | OUTPATIENT
Start: 2024-05-01 | End: 2024-05-01

## 2024-05-01 RX ORDER — ALUMINUM HYDROXIDE, MAGNESIUM HYDROXIDE, AND SIMETHICONE 1200; 120; 1200 MG/30ML; MG/30ML; MG/30ML
30 SUSPENSION ORAL EVERY 4 HOURS PRN
Status: DISCONTINUED | OUTPATIENT
Start: 2024-05-01 | End: 2024-05-06 | Stop reason: HOSPADM

## 2024-05-01 RX ORDER — HALOPERIDOL 5 MG/ML
INJECTION INTRAMUSCULAR
Status: COMPLETED
Start: 2024-05-01 | End: 2024-05-01

## 2024-05-01 RX ORDER — MUPIROCIN 20 MG/G
OINTMENT TOPICAL 2 TIMES DAILY
Status: DISCONTINUED | OUTPATIENT
Start: 2024-05-01 | End: 2024-05-03

## 2024-05-01 RX ORDER — INSULIN ASPART 100 [IU]/ML
0-10 INJECTION, SOLUTION INTRAVENOUS; SUBCUTANEOUS
Status: DISCONTINUED | OUTPATIENT
Start: 2024-05-01 | End: 2024-05-06 | Stop reason: HOSPADM

## 2024-05-01 RX ADMIN — SUCCINYLCHOLINE CHLORIDE 160 MG: 20 INJECTION, SOLUTION INTRAMUSCULAR; INTRAVENOUS at 08:05

## 2024-05-01 RX ADMIN — HYDROMORPHONE HYDROCHLORIDE 0.2 MG: 1 INJECTION, SOLUTION INTRAMUSCULAR; INTRAVENOUS; SUBCUTANEOUS at 12:05

## 2024-05-01 RX ADMIN — HYDROMORPHONE HYDROCHLORIDE 0.2 MG: 1 INJECTION, SOLUTION INTRAMUSCULAR; INTRAVENOUS; SUBCUTANEOUS at 01:05

## 2024-05-01 RX ADMIN — TAMSULOSIN HYDROCHLORIDE 0.4 MG: 0.4 CAPSULE ORAL at 01:05

## 2024-05-01 RX ADMIN — ACETAMINOPHEN 1000 MG: 500 TABLET ORAL at 03:05

## 2024-05-01 RX ADMIN — MUPIROCIN: 20 OINTMENT TOPICAL at 07:05

## 2024-05-01 RX ADMIN — SODIUM CHLORIDE: 9 INJECTION, SOLUTION INTRAVENOUS at 01:05

## 2024-05-01 RX ADMIN — OXYBUTYNIN CHLORIDE 10 MG: 10 TABLET, EXTENDED RELEASE ORAL at 03:05

## 2024-05-01 RX ADMIN — Medication 10 MG: at 10:05

## 2024-05-01 RX ADMIN — METHOCARBAMOL 1000 MG: 500 TABLET ORAL at 03:05

## 2024-05-01 RX ADMIN — PROPOFOL 250 MG: 10 INJECTION, EMULSION INTRAVENOUS at 08:05

## 2024-05-01 RX ADMIN — PHENYLEPHRINE HYDROCHLORIDE 0.2 MCG/KG/MIN: 10 INJECTION INTRAVENOUS at 09:05

## 2024-05-01 RX ADMIN — ROCURONIUM BROMIDE 40 MG: 10 INJECTION, SOLUTION INTRAVENOUS at 08:05

## 2024-05-01 RX ADMIN — INSULIN ASPART 3 UNITS: 100 INJECTION, SOLUTION INTRAVENOUS; SUBCUTANEOUS at 09:05

## 2024-05-01 RX ADMIN — FENTANYL CITRATE 100 MCG: 50 INJECTION, SOLUTION INTRAMUSCULAR; INTRAVENOUS at 08:05

## 2024-05-01 RX ADMIN — ONDANSETRON 4 MG: 2 INJECTION INTRAMUSCULAR; INTRAVENOUS at 11:05

## 2024-05-01 RX ADMIN — HYDROMORPHONE HYDROCHLORIDE 2 MG: 1 INJECTION, SOLUTION INTRAMUSCULAR; INTRAVENOUS; SUBCUTANEOUS at 03:05

## 2024-05-01 RX ADMIN — SODIUM CHLORIDE: 0.9 INJECTION, SOLUTION INTRAVENOUS at 08:05

## 2024-05-01 RX ADMIN — METHOCARBAMOL 1000 MG: 500 TABLET ORAL at 09:05

## 2024-05-01 RX ADMIN — OXYCODONE HYDROCHLORIDE 10 MG: 10 TABLET ORAL at 10:05

## 2024-05-01 RX ADMIN — ACETAMINOPHEN 1000 MG: 500 TABLET ORAL at 12:05

## 2024-05-01 RX ADMIN — MIDAZOLAM HYDROCHLORIDE 2 MG: 2 INJECTION, SOLUTION INTRAMUSCULAR; INTRAVENOUS at 08:05

## 2024-05-01 RX ADMIN — SODIUM CHLORIDE, SODIUM ACETATE ANHYDROUS, SODIUM GLUCONATE, POTASSIUM CHLORIDE, AND MAGNESIUM CHLORIDE: 526; 222; 502; 37; 30 INJECTION, SOLUTION INTRAVENOUS at 08:05

## 2024-05-01 RX ADMIN — OXYCODONE HYDROCHLORIDE 10 MG: 10 TABLET ORAL at 12:05

## 2024-05-01 RX ADMIN — Medication 15 MG: at 09:05

## 2024-05-01 RX ADMIN — HYDROMORPHONE HYDROCHLORIDE 2 MG: 1 INJECTION, SOLUTION INTRAMUSCULAR; INTRAVENOUS; SUBCUTANEOUS at 06:05

## 2024-05-01 RX ADMIN — GABAPENTIN 300 MG: 300 CAPSULE ORAL at 09:05

## 2024-05-01 RX ADMIN — OXYCODONE HYDROCHLORIDE 10 MG: 10 TABLET ORAL at 04:05

## 2024-05-01 RX ADMIN — CEFAZOLIN 2 G: 330 INJECTION, POWDER, FOR SOLUTION INTRAMUSCULAR; INTRAVENOUS at 08:05

## 2024-05-01 RX ADMIN — CEFAZOLIN 2 G: 2 INJECTION, POWDER, FOR SOLUTION INTRAMUSCULAR; INTRAVENOUS at 03:05

## 2024-05-01 RX ADMIN — SUGAMMADEX 200 MG: 100 INJECTION, SOLUTION INTRAVENOUS at 10:05

## 2024-05-01 RX ADMIN — MUPIROCIN: 20 OINTMENT TOPICAL at 09:05

## 2024-05-01 RX ADMIN — PANTOPRAZOLE SODIUM 40 MG: 20 TABLET, DELAYED RELEASE ORAL at 01:05

## 2024-05-01 RX ADMIN — DEXAMETHASONE SODIUM PHOSPHATE 8 MG: 4 INJECTION, SOLUTION INTRAMUSCULAR; INTRAVENOUS at 09:05

## 2024-05-01 RX ADMIN — METHOCARBAMOL 1000 MG: 500 TABLET ORAL at 12:05

## 2024-05-01 RX ADMIN — HALOPERIDOL LACTATE 0.5 MG: 5 INJECTION, SOLUTION INTRAMUSCULAR at 01:05

## 2024-05-01 RX ADMIN — LIDOCAINE HYDROCHLORIDE 100 MG: 20 INJECTION INTRAVENOUS at 08:05

## 2024-05-01 RX ADMIN — HALOPERIDOL 0.5 MG: 5 INJECTION INTRAMUSCULAR at 01:05

## 2024-05-01 RX ADMIN — ACETAMINOPHEN 1000 MG: 500 TABLET ORAL at 10:05

## 2024-05-01 RX ADMIN — SODIUM CHLORIDE 0.2 MCG/KG/MIN: 9 INJECTION, SOLUTION INTRAVENOUS at 08:05

## 2024-05-01 RX ADMIN — Medication 25 MG: at 08:05

## 2024-05-01 RX ADMIN — DOCUSATE SODIUM AND SENNOSIDES 2 TABLET: 8.6; 5 TABLET, FILM COATED ORAL at 10:05

## 2024-05-01 RX ADMIN — ATORVASTATIN CALCIUM 40 MG: 40 TABLET, FILM COATED ORAL at 01:05

## 2024-05-01 RX ADMIN — ROCURONIUM BROMIDE 10 MG: 10 INJECTION, SOLUTION INTRAVENOUS at 08:05

## 2024-05-01 RX ADMIN — ONDANSETRON 8 MG: 8 TABLET, ORALLY DISINTEGRATING ORAL at 09:05

## 2024-05-01 RX ADMIN — DEXMEDETOMIDINE 12 MCG: 100 INJECTION, SOLUTION, CONCENTRATE INTRAVENOUS at 12:05

## 2024-05-01 NOTE — TRANSFER OF CARE
Anesthesia Transfer of Care Note    Patient: Emeka Caballero    Procedure(s) Performed: Procedure(s) (LRB):  **AIRO** L2-pelvis extension and revision of fusion, (N/A)    Patient location: PACU    Anesthesia Type: general    Transport from OR: Transported from OR on 6-10 L/min O2 by face mask with adequate spontaneous ventilation    Post pain: adequate analgesia    Post assessment: no apparent anesthetic complications and tolerated procedure well    Post vital signs: stable    Level of consciousness: awake    Nausea/Vomiting: no nausea/vomiting    Complications: none    Transfer of care protocol was followed    Last vitals: Visit Vitals  BP (!) 140/88   Pulse 84   Resp 18   SpO2 98%

## 2024-05-01 NOTE — ASSESSMENT & PLAN NOTE
Emeka Ada is a 49 y.o. male s/p L4-pelvis fusion and L4-S1 TLIF on 02/08/2022 for s/p L4-pelvis fusion and L4-5, L5-S1 TLIF on 02/08/2022 with Dr. Reed who presents with adjacent segment disease for extension/revision of fusion L2-Pelvis:    --Patient evaluated prior to procedure  --All diagnostics and imaging reviewed  --Patient NPO since MN  --No anti-coag/plt medication in the last 72h  --Risks & benefits of surgery explained in detail; patient consented and all questions answered  --Further reccs to follow procedure    Dispo: Ongoing

## 2024-05-01 NOTE — H&P
[facility] - Surgery ([facility])  Neuorsurgery  History and Physical     Patient Name: Emeka Caballero  MRN: [id number]  Admission Date: 5/1/2024  Attending Physician: Alphonse Reed MD   Primary Care Physician: Dio Zarate Jr., MD    Patient information was obtained from patient.     Subjective:     Chief Complaint/Reason for Admission: L2-Pelvis revision/extension     HPI:  Emeka Caballero is a 49 y.o. male s/p L4-pelvis fusion and L4-S1 TLIF on 02/08/2022 for s/p L4-pelvis fusion and L4-5, L5-S1 TLIF on 02/08/2022 with Dr. Reed who presents with adjacent segment disease for extension/revision of fusion L2-Pelvis. He is being seen in clinic today to follow-up from his recent ED visit from 12/12/23 where he had fallen and has had worsening right buttock pain that radiates down his right lower extremity. The patient obtained an injection as previously recommended by Dr. Reed about 2 weeks ago in the right SI joint without any relief. He is taking ibuprofen 800 mg with no relief. Reports difficulties with standing up straight. He is utilizing a cane to ambulate. Reports the pain being a burning sensation associated with numbness in the feet. He has also attempted PT without relief.     Medications Prior to Admission   Medication Sig Dispense Refill Last Dose    atorvastatin (LIPITOR) 40 MG tablet Take 1 tablet (40 mg total) by mouth once daily. 90 tablet 3     blood sugar diagnostic Strp To check BG 3 times daily, to use with insurance preferred meter 100 each 11     blood-glucose meter kit To check BG 3 times daily, to use with insurance preferred meter 1 each 0     gabapentin (NEURONTIN) 300 MG capsule Take 1 capsule (300 mg total) by mouth every evening. 30 capsule 11     HYDROcodone-acetaminophen (NORCO) 7.5-325 mg per tablet Take 1 tablet by mouth every 6 (six) hours as needed for Pain. 11 tablet 0     lancets Misc To check BG 3 times daily, to use with insurance preferred meter 100 each 11      linaCLOtide (LINZESS) 72 mcg Cap capsule Take 1 capsule (72 mcg total) by mouth daily as needed (constipation). 30 capsule 5     lisinopriL (PRINIVIL,ZESTRIL) 5 MG tablet Take 1 tablet (5 mg total) by mouth once daily. 90 tablet 1     metFORMIN (GLUCOPHAGE-XR) 500 MG ER 24hr tablet Take 1 tablet (500 mg total) by mouth daily with breakfast. 90 tablet 3     naproxen (NAPROSYN) 500 MG tablet Take 1 tablet (500 mg total) by mouth 2 (two) times daily. 20 tablet 0     omega-3 fatty acids-fish oil 340-1,000 mg Cap Take 1 capsule by mouth once daily. 90 capsule 3     oxybutynin (DITROPAN-XL) 10 MG 24 hr tablet Take 1 tablet (10 mg total) by mouth once daily. 30 tablet 11     oxyCODONE-acetaminophen (PERCOCET) 5-325 mg per tablet Take 1 tablet by mouth every 4 (four) hours as needed for Pain. 12 tablet 0     pantoprazole (PROTONIX) 40 MG tablet Take 1 tablet (40 mg total) by mouth once daily. 30 tablet 11     tadalafiL (CIALIS) 20 MG Tab Take 1 tablet (20 mg total) by mouth daily as needed (Erectile Dysfunction). 30 tablet 11     tamsulosin (FLOMAX) 0.4 mg Cap Take 1 capsule (0.4 mg total) by mouth once daily. 30 capsule 11        Review of patient's allergies indicates:  No Known Allergies    Past Medical History:   Diagnosis Date    Arthritis     Diabetes mellitus, type 2 2/11/2022    Essential hypertension, benign 5/4/2023     Past Surgical History:   Procedure Laterality Date    CATARACT EXTRACTION Right     EPIDURAL STEROID INJECTION N/A 10/21/2020    Procedure: Injection, Steroid, Epidural Caudal;  Surgeon: Vipul Nixon Jr., MD;  Location: [facility] ENDO;  Service: Pain Management;  Laterality: N/A;  Caudal KURT  Arrive @ 1315; No ATC or DM; Needs MD Signature    INJECTION, SACROILIAC JOINT Bilateral 12/27/2022    Procedure: INJECTION,SACROILIAC JOINT, BILATERAL CONTRAST DIRECT REF  ORDERED;  Surgeon: Brielle José MD;  Location: [facility] MGT;  Service: Pain Management;  Laterality: Bilateral;     INJECTION, SACROILIAC JOINT Right 11/27/2023    Procedure: INJECTION,SACROILIAC JOINT RIGHT DIRECT REFERRAL  Sooner date;  Surgeon: Brielle José MD;  [facility];  Service: Pain Management;  Laterality: Right;    LASER ENUCLEATION OF PROSTATE N/A 3/14/2023    Procedure: ENUCLEATION, PROSTATE, USING LASER;  Surgeon: Cecil Murray MD;  Location: [facility] OR;  Service: Urology;  Laterality: N/A;    LUMBAR FUSION N/A 2/8/2022    Procedure: FUSION, SPINE, LUMBAR;  Surgeon: Alphonse Reed MD;  [facility];  Service: Neurosurgery;  Laterality: N/A;  AIRO, L4-S1     Family History       Problem Relation (Age of Onset)    Cataracts Mother    No Known Problems Father, Sister, Brother, Maternal Aunt, Maternal Uncle, Paternal Aunt, Paternal Uncle, Maternal Grandmother, Maternal Grandfather, Paternal Grandmother, Paternal Grandfather          Tobacco Use    Smoking status: Some Days     Types: Cigarettes    Smokeless tobacco: Never   Substance and Sexual Activity    Alcohol use: Yes     Comment: socially    Drug use: No    Sexual activity: Not on file     Review of Systems   Constitutional:  Negative for activity change, chills, diaphoresis, fatigue and fever.   HENT:  Negative for tinnitus, trouble swallowing and voice change.    Eyes:  Negative for photophobia and visual disturbance.   Respiratory:  Negative for cough, choking, chest tightness, shortness of breath, wheezing and stridor.    Cardiovascular:  Negative for chest pain, palpitations and leg swelling.   Gastrointestinal:  Negative for abdominal distention, abdominal pain, blood in stool, constipation, diarrhea, nausea and vomiting.   Endocrine: Negative for polydipsia, polyphagia and polyuria.   Genitourinary:  Negative for dysuria, flank pain, frequency, hematuria and urgency.   Musculoskeletal:  Positive for back pain and gait problem. Negative for neck pain and neck stiffness.   Skin:  Negative for color change, pallor, rash and wound.  "  Neurological:  Negative for dizziness, tremors, seizures, syncope, facial asymmetry, speech difficulty, weakness, light-headedness, numbness and headaches.   Hematological:  Negative for adenopathy. Does not bruise/bleed easily.   Psychiatric/Behavioral:  Negative for agitation, behavioral problems and confusion.      Objective:        There is no height or weight on file to calculate BMI.  Vital Signs (Most Recent):    Vital Signs (24h Range):           Physical Exam   Neurosurgery Physical Exam  General: well developed, well nourished, no distress.   Head: normocephalic, atraumatic  Neurologic: Alert and oriented. Thought content appropriate.  GCS: Motor: 6/Verbal: 5/Eyes: 4 GCS Total: 15  Mental Status: Awake, Alert, Oriented x 4  Language: No aphasia  Speech: No dysarthria  Cranial nerves: face symmetric, tongue midline, CN II-XII grossly intact.   Eyes: pupils equal, round, reactive to light with accomodation, EOMI.   Pulmonary: normal respirations, no signs of respiratory distress  Abdomen: soft, non-distended  Skin: Skin is warm, dry and intact.  Sensory: intact to light touch throughout  Motor Strength:Moves all extremities spontaneously with good tone.    Strength   Deltoids Triceps Biceps Wrist Extension Wrist Flexion Hand    Upper: R 5/5 5/5 5/5 5/5 5/5 5/5     L 5/5 5/5 5/5 5/5 5/5 5/5       HF KE KF DF PF EHL   Lower: R 4+/5 4+/5 4+/5 5/5 5/5 5/5     L 5/5 5/5 5/5 5/5 5/5 5/5      Cerebellar:   Gait antalgic      Cervical:   ROM: Full with flexion, extension, lateral rotation and ear-to-shoulder bend.   Midline TTP: Negative.     Thoracic:  Midline TTP: Negative.     Lumbar:  Midline TTP: Positive  Straight Leg Test: Positive RIGHT     Diagnostic Results:  There is no new imaging to review for this encounter.      Significant Labs:  No results for input(s): "GLU", "NA", "K", "CL", "CO2", "BUN", "CREATININE", "CALCIUM", "MG" in the last 48 hours.  No results for input(s): "WBC", "HGB", "HCT", "PLT" " "in the last 48 hours.  No results for input(s): "LABPT", "INR", "APTT" in the last 48 hours.  Microbiology Results (last 7 days)       ** No results found for the last 168 hours. **          ABGs: No results for input(s): "PH", "PCO2", "PO2", "HCO3", "POCSATURATED", "BE" in the last 48 hours.  Cardiac markers: No results for input(s): "CKMB", "CPKMB", "TROPONINT", "TROPONINI", "MYOGLOBIN" in the last 48 hours.  CMP: No results for input(s): "GLU", "CALCIUM", "ALBUMIN", "PROT", "NA", "K", "CO2", "CL", "BUN", "CREATININE", "ALKPHOS", "ALT", "AST", "BILITOT" in the last 48 hours.  CRP: No results for input(s): "CRP" in the last 48 hours.  ESR: No results for input(s): "POCESR", "ERYTHROCYTES" in the last 48 hours.  LFTs: No results for input(s): "ALT", "AST", "ALKPHOS", "BILITOT", "PROT", "ALBUMIN" in the last 48 hours.  Procalcitonin: No results for input(s): "PROCAL" in the last 48 hours.    Significant Diagnostics:  I have reviewed all pertinent imaging results/findings within the past 24 hours.  Assessment and Plan:     Lumbar spondylosis  Emeka Caballero is a 49 y.o. male s/p L4-pelvis fusion and L4-S1 TLIF on 02/08/2022 for s/p L4-pelvis fusion and L4-5, L5-S1 TLIF on 02/08/2022 with Dr. Reed who presents with adjacent segment disease for extension/revision of fusion L2-Pelvis:    --Patient evaluated prior to procedure  --All diagnostics and imaging reviewed  --Patient NPO since MN  --No anti-coag/plt medication in the last 72h  --Risks & benefits of surgery explained in detail; patient consented and all questions answered  --Further reccs to follow procedure    Dispo: Ongoing         Skyler Cooney MD  Neurosurgery  [facility] - Surgery (2nd Fl)      "

## 2024-05-01 NOTE — NURSING
Patient Transferred to NPU Room (956)   Upon transfer to floor, patient greeted and assessed. Vitals taken. Neuro assessment completed. Team made aware of patient's transfer to floor. Patient Orders Reviewed. Emergency Equipment present in room. Fall Precautions Initiated. Patient acclimated to room. TLSO brace at bedside.4 Eyes skin assessment performed. See Below:       Nurses Note -- 4 Eyes      5/1/2024   4:43 PM      Skin assessed during: Transfer      [] No Altered Skin Integrity Present    []Prevention Measures Documented      [x] Yes- Altered Skin Integrity Present or Discovered   [x] LDA Added if Not in Epic (Describe Wound)   [] New Altered Skin Integrity was Present on Admit and Documented in LDA   [] Wound Image Taken      Pt has new surgical wound. Covered by wound vac.   Wound Care Consulted? No    Attending Nurse:  Amy ARMIJO     Second RN/Staff Member:   Diamond ARMIJO         [id number] Detailed

## 2024-05-01 NOTE — ANESTHESIA PROCEDURE NOTES
Intubation    Date/Time: 5/1/2024 8:30 AM    Performed by: Irving Winn CRNA  Authorized by: Rashid Young MD    Intubation:     Induction:  Intravenous    Intubated:  Postinduction    Mask Ventilation:  Easy mask    Attempts:  1    Attempted By:  CRNA    Method of Intubation:  Video laryngoscopy    Blade:  Kim 3    Laryngeal View Grade: Grade I - full view of cords      Difficult Airway Encountered?: No      Complications:  None    Airway Device:  Oral endotracheal tube    Airway Device Size:  7.5    Tube secured:  22    Secured at:  The lips    Placement Verified By:  Capnometry and Fiber optic visualization    Complicating Factors:  None    Findings Post-Intubation:  BS equal bilateral and atraumatic/condition of teeth unchanged

## 2024-05-01 NOTE — HPI
Emeka Caballero is a 49 y.o. male s/p L4-pelvis fusion and L4-S1 TLIF on 02/08/2022 for s/p L4-pelvis fusion and L4-5, L5-S1 TLIF on 02/08/2022 with Dr. Reed who presents with adjacent segment disease for extension/revision of fusion L2-Pelvis. He is being seen in clinic today to follow-up from his recent ED visit from 12/12/23 where he had fallen and has had worsening right buttock pain that radiates down his right lower extremity. The patient obtained an injection as previously recommended by Dr. Reed about 2 weeks ago in the right SI joint without any relief. He is taking ibuprofen 800 mg with no relief. Reports difficulties with standing up straight. He is utilizing a cane to ambulate. Reports the pain being a burning sensation associated with numbness in the feet. He has also attempted PT without relief.

## 2024-05-01 NOTE — SUBJECTIVE & OBJECTIVE
Medications Prior to Admission   Medication Sig Dispense Refill Last Dose    atorvastatin (LIPITOR) 40 MG tablet Take 1 tablet (40 mg total) by mouth once daily. 90 tablet 3     blood sugar diagnostic Strp To check BG 3 times daily, to use with insurance preferred meter 100 each 11     blood-glucose meter kit To check BG 3 times daily, to use with insurance preferred meter 1 each 0     gabapentin (NEURONTIN) 300 MG capsule Take 1 capsule (300 mg total) by mouth every evening. 30 capsule 11     HYDROcodone-acetaminophen (NORCO) 7.5-325 mg per tablet Take 1 tablet by mouth every 6 (six) hours as needed for Pain. 11 tablet 0     lancets Misc To check BG 3 times daily, to use with insurance preferred meter 100 each 11     linaCLOtide (LINZESS) 72 mcg Cap capsule Take 1 capsule (72 mcg total) by mouth daily as needed (constipation). 30 capsule 5     lisinopriL (PRINIVIL,ZESTRIL) 5 MG tablet Take 1 tablet (5 mg total) by mouth once daily. 90 tablet 1     metFORMIN (GLUCOPHAGE-XR) 500 MG ER 24hr tablet Take 1 tablet (500 mg total) by mouth daily with breakfast. 90 tablet 3     naproxen (NAPROSYN) 500 MG tablet Take 1 tablet (500 mg total) by mouth 2 (two) times daily. 20 tablet 0     omega-3 fatty acids-fish oil 340-1,000 mg Cap Take 1 capsule by mouth once daily. 90 capsule 3     oxybutynin (DITROPAN-XL) 10 MG 24 hr tablet Take 1 tablet (10 mg total) by mouth once daily. 30 tablet 11     oxyCODONE-acetaminophen (PERCOCET) 5-325 mg per tablet Take 1 tablet by mouth every 4 (four) hours as needed for Pain. 12 tablet 0     pantoprazole (PROTONIX) 40 MG tablet Take 1 tablet (40 mg total) by mouth once daily. 30 tablet 11     tadalafiL (CIALIS) 20 MG Tab Take 1 tablet (20 mg total) by mouth daily as needed (Erectile Dysfunction). 30 tablet 11     tamsulosin (FLOMAX) 0.4 mg Cap Take 1 capsule (0.4 mg total) by mouth once daily. 30 capsule 11        Review of patient's allergies indicates:  No Known Allergies    Past Medical  History:   Diagnosis Date    Arthritis     Diabetes mellitus, type 2 2/11/2022    Essential hypertension, benign 5/4/2023     Past Surgical History:   Procedure Laterality Date    CATARACT EXTRACTION Right     EPIDURAL STEROID INJECTION N/A 10/21/2020    Procedure: Injection, Steroid, Epidural Caudal;  Surgeon: Vipul Nixon Jr., MD;  Location: [facility] ENDO;  Service: Pain Management;  Laterality: N/A;  Caudal KURT  Arrive @ 1315; No ATC or DM; Needs MD Signature    INJECTION, SACROILIAC JOINT Bilateral 12/27/2022    Procedure: INJECTION,SACROILIAC JOINT, BILATERAL CONTRAST DIRECT REF  ORDERED;  Surgeon: Brielle José MD;  Location: [facility] PAIN MGT;  Service: Pain Management;  Laterality: Bilateral;    INJECTION, SACROILIAC JOINT Right 11/27/2023    Procedure: INJECTION,SACROILIAC JOINT RIGHT DIRECT REFERRAL  Sooner date;  Surgeon: Brielle José MD;  Location: [facility] PAIN MGT;  Service: Pain Management;  Laterality: Right;    LASER ENUCLEATION OF PROSTATE N/A 3/14/2023    Procedure: ENUCLEATION, PROSTATE, USING LASER;  Surgeon: Cecil Murray MD;  Location: [facility] OR;  Service: Urology;  Laterality: N/A;    LUMBAR FUSION N/A 2/8/2022    Procedure: FUSION, SPINE, LUMBAR;  Surgeon: Alphonse Reed MD;  Location: [address];  Service: Neurosurgery;  Laterality: N/A;  AIRO, L4-S1     Family History       Problem Relation (Age of Onset)    Cataracts Mother    No Known Problems Father, Sister, Brother, Maternal Aunt, Maternal Uncle, Paternal Aunt, Paternal Uncle, Maternal Grandmother, Maternal Grandfather, Paternal Grandmother, Paternal Grandfather          Tobacco Use    Smoking status: Some Days     Types: Cigarettes    Smokeless tobacco: Never   Substance and Sexual Activity    Alcohol use: Yes     Comment: socially    Drug use: No    Sexual activity: Not on file     Review of Systems   Constitutional:  Negative for activity change, chills, diaphoresis, fatigue and fever.   HENT:  Negative for tinnitus,  trouble swallowing and voice change.    Eyes:  Negative for photophobia and visual disturbance.   Respiratory:  Negative for cough, choking, chest tightness, shortness of breath, wheezing and stridor.    Cardiovascular:  Negative for chest pain, palpitations and leg swelling.   Gastrointestinal:  Negative for abdominal distention, abdominal pain, blood in stool, constipation, diarrhea, nausea and vomiting.   Endocrine: Negative for polydipsia, polyphagia and polyuria.   Genitourinary:  Negative for dysuria, flank pain, frequency, hematuria and urgency.   Musculoskeletal:  Positive for back pain and gait problem. Negative for neck pain and neck stiffness.   Skin:  Negative for color change, pallor, rash and wound.   Neurological:  Negative for dizziness, tremors, seizures, syncope, facial asymmetry, speech difficulty, weakness, light-headedness, numbness and headaches.   Hematological:  Negative for adenopathy. Does not bruise/bleed easily.   Psychiatric/Behavioral:  Negative for agitation, behavioral problems and confusion.      Objective:        There is no height or weight on file to calculate BMI.  Vital Signs (Most Recent):    Vital Signs (24h Range):           Physical Exam   Neurosurgery Physical Exam  General: well developed, well nourished, no distress.   Head: normocephalic, atraumatic  Neurologic: Alert and oriented. Thought content appropriate.  GCS: Motor: 6/Verbal: 5/Eyes: 4 GCS Total: 15  Mental Status: Awake, Alert, Oriented x 4  Language: No aphasia  Speech: No dysarthria  Cranial nerves: face symmetric, tongue midline, CN II-XII grossly intact.   Eyes: pupils equal, round, reactive to light with accomodation, EOMI.   Pulmonary: normal respirations, no signs of respiratory distress  Abdomen: soft, non-distended  Skin: Skin is warm, dry and intact.  Sensory: intact to light touch throughout  Motor Strength:Moves all extremities spontaneously with good tone.    Strength   Deltoids Triceps Biceps Wrist  "Extension Wrist Flexion Hand    Upper: R 5/5 5/5 5/5 5/5 5/5 5/5     L 5/5 5/5 5/5 5/5 5/5 5/5       HF KE KF DF PF EHL   Lower: R 4+/5 4+/5 4+/5 5/5 5/5 5/5     L 5/5 5/5 5/5 5/5 5/5 5/5      Cerebellar:   Gait antalgic      Cervical:   ROM: Full with flexion, extension, lateral rotation and ear-to-shoulder bend.   Midline TTP: Negative.     Thoracic:  Midline TTP: Negative.     Lumbar:  Midline TTP: Positive  Straight Leg Test: Positive RIGHT     Diagnostic Results:  There is no new imaging to review for this encounter.      Significant Labs:  No results for input(s): "GLU", "NA", "K", "CL", "CO2", "BUN", "CREATININE", "CALCIUM", "MG" in the last 48 hours.  No results for input(s): "WBC", "HGB", "HCT", "PLT" in the last 48 hours.  No results for input(s): "LABPT", "INR", "APTT" in the last 48 hours.  Microbiology Results (last 7 days)       ** No results found for the last 168 hours. **          ABGs: No results for input(s): "PH", "PCO2", "PO2", "HCO3", "POCSATURATED", "BE" in the last 48 hours.  Cardiac markers: No results for input(s): "CKMB", "CPKMB", "TROPONINT", "TROPONINI", "MYOGLOBIN" in the last 48 hours.  CMP: No results for input(s): "GLU", "CALCIUM", "ALBUMIN", "PROT", "NA", "K", "CO2", "CL", "BUN", "CREATININE", "ALKPHOS", "ALT", "AST", "BILITOT" in the last 48 hours.  CRP: No results for input(s): "CRP" in the last 48 hours.  ESR: No results for input(s): "POCESR", "ERYTHROCYTES" in the last 48 hours.  LFTs: No results for input(s): "ALT", "AST", "ALKPHOS", "BILITOT", "PROT", "ALBUMIN" in the last 48 hours.  Procalcitonin: No results for input(s): "PROCAL" in the last 48 hours.    Significant Diagnostics:  I have reviewed all pertinent imaging results/findings within the past 24 hours.  "

## 2024-05-01 NOTE — BRIEF OP NOTE
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 Item Serial No.  Lot No. LRB No. Used Action   SCREW EXPEDIUM FEN STRL 6X45MM - [id number]  SCREW EXPEDIUM FEN STRL 6X45MM  [employer]ES [id number] N/A 1 Implanted   SCREW EXPEDIUM FEN STRL 6X45MM - [id number]  SCREW EXPEDIUM FEN STRL 6X45MM  [employer]ES [id number] N/A 1 Implanted   KIT MED BONE INFUSE - [id number]  KIT MED BONE INFUSE  [employer] [facility] [id number] N/A 1 Implanted   CAGE POST LUM LORDTC 12MM 9X22 - [id number]  CAGE POST LUM LORDTC 12MM 9X22  SY[employer]ES [id number] N/A 1 Implanted   SCREW EXPEDIUM VERSE PA 7X40MM - [id number]  SCREW EXPEDIUM VERSE PA 7X40MM  [employer]  N/A 1 Implanted   SCREW BONE SPINAL 5.5 6 X 50MM - [id number]  SCREW BONE SPINAL 5.5 6 X 50MM  [employer]  N/A 2 Implanted   SET SCREW EXPEDIUM VERSE UNITZ - [id number]  SET SCREW EXPEDIUM VERSE UNITZ  [employer]  N/A 6 Implanted   SCREW INNER SINGLE SET TITANIU - [id number]  SCREW INNER SINGLE SET TITANIU  [employer]  N/A 6 Implanted   CONNECTOR SPINAL SFX A6 5.5MM - [id number]  CONNECTOR SPINAL SFX A6 5.5MM  [employer]  N/A 1 Implanted   LORIE SPINE MTRX STR [id number] - [id number]  LORIE SPINE MTRX STR [id number]  [employer]  N/A 1 Implanted   BONE 30CC CANCELLOUS CRUSHED - [id number]  BONE 30CC CANCELLOUS CRUSHED [id number] MUSCULOSKELETAL TRANSPLANT FND  N/A 1 Implanted    BONE 30CC CANCELLOUS CRUSHED - [id number]  BONE 30CC CANCELLOUS CRUSHED [id number] MUSCULOSKELETAL TRANSPLANT FND  N/A 1 Implanted   CELLERATE ACTIVATED COLLAGEN    WOUND CARE INNOVATIONS  N/A 1 Implanted       Operative Findings: L2-Pelvic revision/extension fusion with L3/4 TLIF    Estimated Blood Loss: * No values recorded between 5/1/2024  9:15 AM and 5/1/2024 12:04 PM *    Estimated Blood Loss has been documented.         Specimens:   Specimen (24h ago, onward)      None            [id number]

## 2024-05-01 NOTE — PLAN OF CARE
Chart reviewed. Preop nursing care completed per orders. Safe surgery checklist complete aside from anesthesia consent. Brother at bedside and to take belongings. Call bell within reach. Instructed pt to call for assistance.

## 2024-05-01 NOTE — ANESTHESIA PREPROCEDURE EVALUATION
4/22/2024  Emeka Caballero is a 49 y.o., male with Scoliosis and Sagittal plane imbalance, presents to periop center for anesthesia assessment and preop instructions.      Pre-op Assessment    I have reviewed the Patient Summary Reports.     I have reviewed the Nursing Notes. I have reviewed the NPO Status.   I have reviewed the Medications.     Review of Systems  Anesthesia Hx:   History of prior surgery of interest to airway management or planning:  Previous anesthesia: General 3/14/2023  ENUCLEATION, PROSTATE, USING LASER (Urethra) with general anesthesia.  Procedure performed at an [facility].      Airway issues documented on chart review include GETA     Denies Family Hx of Anesthesia complications.    Denies Personal Hx of Anesthesia complications.                    Social:  Former Smoker, No Alcohol Use       Hematology/Oncology:    Oncology Normal    -- Denies Anemia:                                  EENT/Dental:  denies chronic allergic rhinitis  Eyes: Visual Impairment   Has S/P Extraction - Right Catarract                  Cardiovascular:  Exercise tolerance: poor   Denies Pacemaker. Hypertension, well controlled       Denies Angina.     no hyperlipidemia  Denies GARCIA.    Functional Capacity 3 METS                         Pulmonary:    Denies COPD.  Denies Asthma.   Denies Shortness of breath.  Denies Recent URI.  Denies Sleep Apnea.                Renal/:   Denies Chronic Renal Disease. no renal calculi BPH 3/14/2023  Laser enucleation of prostate (N/A) Dr. Murray  Nocturia                 Hepatic/GI:   Denies PUD.  GERD, well controlled Denies Liver Disease.  Denies Hepatitis. Constipation Denies Hepatic/GI Symptoms           Musculoskeletal:  Arthritis    Musculoskeletal General/Symptoms: neck pain, low back pain. Functional capacity is ambulatory without assistance.     Chronic midline low back pain without sciatica  Decreased range of motion  Muscle  weakness  Low back pain    Joint Disease:  Arthritis, Osteoarthritis, spine     Spine Disorders:   Scoliosis , Congenital type     Cervical Spine Disorder, Cervical Spine Instability, Cervical Disc Disease Sagittal Plane Imbalance      Lumbar Spine Disorders, Lumbar Disc Disease, Radiculopathy, Spondylolisthesis, S/P Lumbar Fusion 2/8/2022  Lumbar fusion (N/A  Spinal stenosis of lumbar region  Lumbar spondylosis      Neurological:  Denies TIA.  Denies CVA. Neuromuscular Disease,   Denies Seizures.     Neuro Symptoms of pain, weakness    Arthritis, Osteoarthritis, spine, Low Back Pain, Myalgia                           Endocrine:  Diabetes, type 2    Diabetes, Type 2 Diabetes   , controlled by diet, oral hypoglycemics.                 Obesity / BMI > 30  Dermatological:  Skin Normal    Psych:  Psychiatric Normal                  Physical Exam  General: Well nourished, Cooperative, Alert and Oriented    Airway:  Mallampati: II / II  Mouth Opening: Normal  TM Distance: Normal  Tongue: Normal  Neck ROM: Normal ROM    Dental:  Intact    Chest/Lungs:  Clear to auscultation, Normal Respiratory Rate    Heart:  Rate: Normal  Rhythm: Regular Rhythm  Sounds: Normal        Anesthesia Assessment: Preoperative EQUATION    Planned Procedure: Procedure(s) (LRB):  **AIRO** L2-pelvis extension and revision of fusion, co case Celestre (N/A)  Requested Anesthesia Type:General  Surgeon: Alphonse Reed MD  Service: Neurosurgery  Known or anticipated Date of Surgery:5/1/2024    Surgeon notes: reviewed    Electronic QUestionnaire Assessment completed via nurse interview with patient.        Triage considerations:         Previous anesthesia records:GETA and No problems3/14/2023   ENUCLEATION, PROSTATE, USING LASER (Urethra)   Airway:  Mallampati: II   Mouth Opening: Normal  TM Distance: Normal  Airway Placement Date: 03/14/23 Placement Time: 0849 , created via procedure documentation Method of Intubation: Video Laryngoscopy Mask  Ventilation: Easy Intubated: Postinduction Blade: Kim #3 Airway Device Size: 7.5 Cuff Inflation: Minimal occlusive pressure Placement Verified By: Capnometry Complicating Factors: None Findings Post-Intubation: Bilateral breath sounds;Atraumatic/Condition of teeth unchanged Secured at: Teeth Complications: None Removal Date: 23 Removal Time: 0950     Last PCP note: 3-6 months ago , within [facility]   Subspecialty notes: Neurosurgery, Spine Service, Urology, ORAL SURGERY , OPTOMETRY    Other important co-morbidities: PER EPIC:DM2, GERD, HLD, HTN, Obesity, Smoker, and SCOLIOSIS, ARTHRITIS, BPH       Tests already available:  Available tests,  6-12 months ago , within [facility] .   2023 CT LUMBAR SPINE W/O CONTRAST, MRI LUMBAR SPINE W/O CONTRAST, 2023 XRAY SPINE SCOLIOSIS AP STANDING , XRAY LUMBAR SPINE  COMPLETE INCLUDING  F&E       Instructions given. (See in Nurse's note)    Optimization:  Anesthesia Preop Clinic Assessment  Indicated    Medical Opinion Indicated           Plan:    Testing:  BMP, CBC, EKG, PT/INR, PTT, T&C, T&S, and UA   Pre-anesthesia  visit       Visit focus: concerns in complex and/or prolonged anesthesia, position other than supine     Consultation:Patient's PCP for re-evaluation     Patient  has previously scheduled Medical Appointment: TOÑA CONTRERAS NP    Navigation: Tests Scheduled. TBD             Consults scheduled.TBD             Results will be tracked by Preop Clinic.  Bindu Talbert RN BSN    2024 MEDICAL EXAM     Expand All Collapse All    Routine Office Visit     Patient Name: Emeka Caballero    : 1975  MRN: [id number]     Chief Complaint:  Preop exam     Subjective:  Emeka is a 48 y.o. male who presents today for:     Preop exam - patient who is known to me with a history of obesity, hypertension, type 2 diabetes reports today for evaluation and preop exam.  Needs clearance for spinal fusion next month.  Reports doing well in his current state of  "health today.  He reports being mostly compliant with his medications.  Denies any acute concerns today.  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  He does not take any blood thinners.  Denies any history of structural heart disease.  Denies any problems with or reactions to anesthesia in the past.  Denies any history of CKD, strokes, or seizures.  Of note, patient declines  for today's visit.     Past Medical History       Past Medical History:   Diagnosis Date    Arthritis      Diabetes mellitus, type 2 2/11/2022    Essential hypertension, benign 5/4/2023         FAllergies   Review of patient's allergies indicates:  No Known Allergies     Review of Systems (Pertinent positives)  Review of Systems   Constitutional: Negative.  Negative for chills and fever.   HENT: Negative.  Negative for congestion, sinus pain and sore throat.    Eyes: Negative.    Respiratory:  Negative for cough, shortness of breath and wheezing.    Cardiovascular:  Negative for chest pain, palpitations, orthopnea and claudication.   Gastrointestinal: Negative.  Negative for abdominal pain, diarrhea, nausea and vomiting.   Genitourinary: Negative.  Negative for dysuria, frequency and urgency.   Musculoskeletal: Negative.  Negative for back pain, joint pain and neck pain.   Skin: Negative.    Neurological: Negative.  Negative for dizziness, tingling, loss of consciousness and headaches.   Endo/Heme/Allergies: Negative.    Psychiatric/Behavioral: Negative.           /84 (BP Location: Right arm, Patient Position: Sitting, BP Method: Large (Manual))   Pulse 93   Temp 98.6 °F (37 °C) (Oral)   Resp 18   Ht 5' 8" (1.727 m)   Wt 94.2 kg (207 lb 10.8 oz)   SpO2 95%   BMI 31.58 kg/m²      Physical Exam  Vitals reviewed.   Constitutional:       General: He is not in acute distress.     Appearance: Normal appearance. He is not ill-appearing, toxic-appearing or " diaphoretic.   HENT:      Head: Normocephalic and atraumatic.   Cardiovascular:      Rate and Rhythm: Normal rate and regular rhythm.      Pulses: Normal pulses.      Heart sounds: Normal heart sounds.   Pulmonary:      Effort: Pulmonary effort is normal. No respiratory distress.      Breath sounds: Normal breath sounds. No wheezing.   Abdominal:      General: Bowel sounds are normal. There is no distension.      Palpations: Abdomen is soft.      Tenderness: There is no abdominal tenderness.   Musculoskeletal:         General: No swelling, tenderness or deformity. Normal range of motion.   Skin:     General: Skin is warm and dry.      Capillary Refill: Capillary refill takes less than 2 seconds.   Neurological:      General: No focal deficit present.      Mental Status: He is alert and oriented to person, place, and time.   Psychiatric:         Mood and Affect: Mood normal.         Behavior: Behavior normal.         Assessment/Plan:  Emeka Caballero is a 48 y.o. male who presents today for :   Emeka was seen today for pre-op exam.   Diagnoses and all orders for this visit:   Pre-op exam  -     Urinalysis; Future  -     PROTIME-INR; Future  -     APTT; Future  -     Ambulatory referral/consult to Cardiology; Future  -     IN OFFICE EKG 12-LEAD (to Muse)  -     X-Ray Chest PA And Lateral; Future   Essential hypertension, benign  -     CBC W/ AUTO DIFFERENTIAL; Future  -     COMPREHENSIVE METABOLIC PANEL; Future   Mixed dyslipidemia   Type 2 diabetes mellitus with hyperlipidemia  -     COMPREHENSIVE METABOLIC PANEL; Future  -     HEMOGLOBIN A1C; Future   Class 1 obesity due to excess calories with serious comorbidity and body mass index (BMI) of 31.0 to 31.9 in adult     - will check labs for preop  - chest x-ray and UA ordered per surgery requirements  - EKG in office does show potential LVH with right bundle branch block.  Per my read RBBB not present on previous EKGs  - given risk factors of diabetes and  obesity and hypertension along with abnormal EKG will consult Cardiology for clearance prior to procedure (CARD VISIT 4/24 w Dr. Rivas)    - all questions answered; will follow-up with lab work               4/22/24 CARDIOLOGY CLEARANCE  Preoperative cardiovascular evaluation: Because the patient's functional capacity is less than 4 Mets feel he needs to undergo preoperative testing.  DSE because he can not ambulate.  Hypertension: Continue current management.  Hyperlipidemia:  Continue current management.  If stress negative patient can proceed at low risk.  Krishna 0.9% risk of myocardial infarction or cardiac arrest, intraoperatively or up to 30 days postop. RCRI 3.9% 30 day risk of death, MI or cardiac arrest.              Santos Rivas MD, MPH, [facility], [facility]    Anesthesia Plan  Type of Anesthesia, risks & benefits discussed:    Anesthesia Type: Gen ETT  Intra-op Monitoring Plan: Standard ASA Monitors and Art Line  Post Op Pain Control Plan: multimodal analgesia  Airway Plan: Direct  Informed Consent: Informed consent signed with the Patient and all parties understand the risks and agree with anesthesia plan.  All questions answered.   ASA Score: 3  Anesthesia Plan Notes: Normal DSE a week ago.    Ready For Surgery From Anesthesia Perspective.   .   Detail Level: Detailed Plan: Cryo

## 2024-05-01 NOTE — NURSING TRANSFER
Nursing Transfer Note      5/1/2024   2:49 PM    Nurse giving handoff:KAY Santoyo RN  Nurse receiving handoff:Amy ARMIJO    Reason patient is being transferred: meets criteria    Transfer To: 956    Transfer via bed    Transfer with IV, wound vac    Transported by PCT      Order for Tele Monitor? No    Additional Lines: Noonan Catheter    4eyes on Skin: yes    Medicines sent: IV fluids    Any special needs or follow-up needed: none    Patient belongings transferred with patient:  with brother    Chart send with patient: Yes    Notified: brother

## 2024-05-01 NOTE — ANESTHESIA PROCEDURE NOTES
Arterial    Diagnosis: Lumbar spinal stenosis    Patient location during procedure: done in OR  Timeout: 5/1/2024 8:32 AM  Procedure end time: 5/1/2024 8:34 AM    Staffing  Authorizing Provider: Rashid Young MD  Performing Provider: Rashid Young MD    Staffing  Performed by: Rashid Young MD  Authorized by: Rashid Young MD    Anesthesiologist was present at the time of the procedure.    Preanesthetic Checklist  Completed: patient identified, IV checked, site marked, risks and benefits discussed, surgical consent, monitors and equipment checked, pre-op evaluation, timeout performed and anesthesia consent givenArterial  Skin Prep: chlorhexidine gluconate  Local Infiltration: none  Orientation: left  Location: radial    Catheter Size: 20 G  Catheter placement by Anatomical landmarks. Heme positive aspiration all ports. Insertion Attempts: 1  Assessment  Dressing: secured with tape and tegaderm  Patient: Tolerated well

## 2024-05-01 NOTE — CARE UPDATE
I have reviewed the chart of Emeka Caballero who is hospitalized for the following:    Active Hospital Problems    Diagnosis    *Lumbar spondylosis    Acute blood loss anemia     EBL  Acute droop 5 points in hemoglobin post op  Close watch with daily cbc       Pain     worsening right buttock pain that radiates down his right lower extremity.   Now post op  Multimodal pain management      Essential hypertension, benign    Mixed dyslipidemia     Lab Results   Component Value Date    CHOL 164 10/25/2023    CHOL 124 04/29/2023    CHOL 137 10/14/2022     Lab Results   Component Value Date    HDL 25 (L) 10/25/2023    HDL 29 (L) 04/29/2023    HDL 26 (L) 10/14/2022     Lab Results   Component Value Date    LDLCALC 72.4 10/25/2023    LDLCALC 65.4 04/29/2023    LDLCALC 81.4 10/14/2022     Lab Results   Component Value Date    TRIG 333 (H) 10/25/2023    TRIG 148 04/29/2023    TRIG 148 10/14/2022     Lab Results   Component Value Date    CHOLHDL 15.2 (L) 10/25/2023    CHOLHDL 23.4 04/29/2023    CHOLHDL 19.0 (L) 10/14/2022        The 10-year ASCVD risk score (Jasper SMART, et al., 2019) is: 28.3%    Values used to calculate the score:      Age: 48 years      Sex: Male      Is Non- : Yes      Diabetic: Yes      Tobacco smoker: Yes      Systolic Blood Pressure: 124 mmHg      Is BP treated: Yes      HDL Cholesterol: 25 mg/dL      Total Cholesterol: 164 mg/dL        BPH (benign prostatic hyperplasia)     3- Holmium laser enucleation of the prostate.  Pathology   Prostate with benign glandular and stromal hyperplasia      Type 2 diabetes mellitus with hyperlipidemia     Lab Results   Component Value Date    HGBA1C 6.6 (H) 10/25/2023    HGBA1C 6.6 (H) 04/29/2023    HGBA1C 6.9 (H) 03/14/2023     Lab Results   Component Value Date    LDLCALC 72.4 10/25/2023     Lab Results   Component Value Date    MICALBCREAT 2.9 10/14/2022           Chronic constipation        Brit Johnson NP  [facility]

## 2024-05-02 PROBLEM — D62 ACUTE BLOOD LOSS ANEMIA: Status: ACTIVE | Noted: 2024-05-02

## 2024-05-02 LAB
ANION GAP SERPL CALC-SCNC: 7 MMOL/L (ref 8–16)
BASOPHILS # BLD AUTO: 0.01 K/UL (ref 0–0.2)
BASOPHILS NFR BLD: 0.1 % (ref 0–1.9)
BUN SERPL-MCNC: 16 MG/DL (ref 6–20)
CALCIUM SERPL-MCNC: 8.1 MG/DL (ref 8.7–10.5)
CHLORIDE SERPL-SCNC: 108 MMOL/L (ref 95–110)
CO2 SERPL-SCNC: 22 MMOL/L (ref 23–29)
CREAT SERPL-MCNC: 0.9 MG/DL (ref 0.5–1.4)
DIFFERENTIAL METHOD BLD: ABNORMAL
EOSINOPHIL # BLD AUTO: 0 K/UL (ref 0–0.5)
EOSINOPHIL NFR BLD: 0.1 % (ref 0–8)
ERYTHROCYTE [DISTWIDTH] IN BLOOD BY AUTOMATED COUNT: 14.5 % (ref 11.5–14.5)
EST. GFR  (NO RACE VARIABLE): >60 ML/MIN/1.73 M^2
GLUCOSE SERPL-MCNC: 195 MG/DL (ref 70–110)
HCT VFR BLD AUTO: 33.2 % (ref 40–54)
HGB BLD-MCNC: 10.9 G/DL (ref 14–18)
IMM GRANULOCYTES # BLD AUTO: 0.03 K/UL (ref 0–0.04)
IMM GRANULOCYTES NFR BLD AUTO: 0.3 % (ref 0–0.5)
LYMPHOCYTES # BLD AUTO: 1.9 K/UL (ref 1–4.8)
LYMPHOCYTES NFR BLD: 18 % (ref 18–48)
MCH RBC QN AUTO: 27.9 PG (ref 27–31)
MCHC RBC AUTO-ENTMCNC: 32.8 G/DL (ref 32–36)
MCV RBC AUTO: 85 FL (ref 82–98)
MONOCYTES # BLD AUTO: 0.9 K/UL (ref 0.3–1)
MONOCYTES NFR BLD: 8.5 % (ref 4–15)
NEUTROPHILS # BLD AUTO: 7.9 K/UL (ref 1.8–7.7)
NEUTROPHILS NFR BLD: 73 % (ref 38–73)
NRBC BLD-RTO: 0 /100 WBC
PLATELET # BLD AUTO: 173 K/UL (ref 150–450)
PMV BLD AUTO: 10.9 FL (ref 9.2–12.9)
POCT GLUCOSE: 177 MG/DL (ref 70–110)
POCT GLUCOSE: 200 MG/DL (ref 70–110)
POCT GLUCOSE: 200 MG/DL (ref 70–110)
POCT GLUCOSE: 210 MG/DL (ref 70–110)
POCT GLUCOSE: 215 MG/DL (ref 70–110)
POTASSIUM SERPL-SCNC: 3.8 MMOL/L (ref 3.5–5.1)
RBC # BLD AUTO: 3.9 M/UL (ref 4.6–6.2)
SODIUM SERPL-SCNC: 137 MMOL/L (ref 136–145)
WBC # BLD AUTO: 10.74 K/UL (ref 3.9–12.7)

## 2024-05-02 PROCEDURE — 85025 COMPLETE CBC W/AUTO DIFF WBC: CPT | Performed by: STUDENT IN AN ORGANIZED HEALTH CARE EDUCATION/TRAINING PROGRAM

## 2024-05-02 PROCEDURE — 80048 BASIC METABOLIC PNL TOTAL CA: CPT | Performed by: STUDENT IN AN ORGANIZED HEALTH CARE EDUCATION/TRAINING PROGRAM

## 2024-05-02 PROCEDURE — 97165 OT EVAL LOW COMPLEX 30 MIN: CPT

## 2024-05-02 PROCEDURE — 97116 GAIT TRAINING THERAPY: CPT

## 2024-05-02 PROCEDURE — 36415 COLL VENOUS BLD VENIPUNCTURE: CPT | Performed by: STUDENT IN AN ORGANIZED HEALTH CARE EDUCATION/TRAINING PROGRAM

## 2024-05-02 PROCEDURE — 63600175 PHARM REV CODE 636 W HCPCS: Performed by: STUDENT IN AN ORGANIZED HEALTH CARE EDUCATION/TRAINING PROGRAM

## 2024-05-02 PROCEDURE — 97535 SELF CARE MNGMENT TRAINING: CPT

## 2024-05-02 PROCEDURE — 97162 PT EVAL MOD COMPLEX 30 MIN: CPT

## 2024-05-02 PROCEDURE — 63600175 PHARM REV CODE 636 W HCPCS: Performed by: PHYSICIAN ASSISTANT

## 2024-05-02 PROCEDURE — 11000001 HC ACUTE MED/SURG PRIVATE ROOM

## 2024-05-02 PROCEDURE — 99024 POSTOP FOLLOW-UP VISIT: CPT | Mod: ,,, | Performed by: PHYSICIAN ASSISTANT

## 2024-05-02 PROCEDURE — 25000003 PHARM REV CODE 250: Performed by: STUDENT IN AN ORGANIZED HEALTH CARE EDUCATION/TRAINING PROGRAM

## 2024-05-02 RX ORDER — ENOXAPARIN SODIUM 100 MG/ML
40 INJECTION SUBCUTANEOUS EVERY 24 HOURS
Status: DISCONTINUED | OUTPATIENT
Start: 2024-05-02 | End: 2024-05-06 | Stop reason: HOSPADM

## 2024-05-02 RX ADMIN — ENOXAPARIN SODIUM 40 MG: 40 INJECTION SUBCUTANEOUS at 04:05

## 2024-05-02 RX ADMIN — METHOCARBAMOL 1000 MG: 500 TABLET ORAL at 07:05

## 2024-05-02 RX ADMIN — POLYETHYLENE GLYCOL 3350 17 G: 17 POWDER, FOR SOLUTION ORAL at 07:05

## 2024-05-02 RX ADMIN — HYDROMORPHONE HYDROCHLORIDE 2 MG: 1 INJECTION, SOLUTION INTRAMUSCULAR; INTRAVENOUS; SUBCUTANEOUS at 02:05

## 2024-05-02 RX ADMIN — OXYCODONE HYDROCHLORIDE 10 MG: 10 TABLET ORAL at 09:05

## 2024-05-02 RX ADMIN — OXYCODONE HYDROCHLORIDE 10 MG: 10 TABLET ORAL at 08:05

## 2024-05-02 RX ADMIN — MUPIROCIN: 20 OINTMENT TOPICAL at 09:05

## 2024-05-02 RX ADMIN — TAMSULOSIN HYDROCHLORIDE 0.4 MG: 0.4 CAPSULE ORAL at 07:05

## 2024-05-02 RX ADMIN — HYDROMORPHONE HYDROCHLORIDE 2 MG: 1 INJECTION, SOLUTION INTRAMUSCULAR; INTRAVENOUS; SUBCUTANEOUS at 09:05

## 2024-05-02 RX ADMIN — ACETAMINOPHEN 1000 MG: 500 TABLET ORAL at 07:05

## 2024-05-02 RX ADMIN — HYDROMORPHONE HYDROCHLORIDE 2 MG: 1 INJECTION, SOLUTION INTRAMUSCULAR; INTRAVENOUS; SUBCUTANEOUS at 07:05

## 2024-05-02 RX ADMIN — PANTOPRAZOLE SODIUM 40 MG: 20 TABLET, DELAYED RELEASE ORAL at 07:05

## 2024-05-02 RX ADMIN — HYDROMORPHONE HYDROCHLORIDE 2 MG: 1 INJECTION, SOLUTION INTRAMUSCULAR; INTRAVENOUS; SUBCUTANEOUS at 06:05

## 2024-05-02 RX ADMIN — METHOCARBAMOL 1000 MG: 500 TABLET ORAL at 04:05

## 2024-05-02 RX ADMIN — METHOCARBAMOL 1000 MG: 500 TABLET ORAL at 12:05

## 2024-05-02 RX ADMIN — SODIUM CHLORIDE: 9 INJECTION, SOLUTION INTRAVENOUS at 12:05

## 2024-05-02 RX ADMIN — ACETAMINOPHEN 1000 MG: 500 TABLET ORAL at 12:05

## 2024-05-02 RX ADMIN — PROCHLORPERAZINE EDISYLATE 5 MG: 5 INJECTION INTRAMUSCULAR; INTRAVENOUS at 12:05

## 2024-05-02 RX ADMIN — ONDANSETRON 8 MG: 8 TABLET, ORALLY DISINTEGRATING ORAL at 05:05

## 2024-05-02 RX ADMIN — OXYCODONE HYDROCHLORIDE 10 MG: 10 TABLET ORAL at 03:05

## 2024-05-02 RX ADMIN — ATORVASTATIN CALCIUM 40 MG: 40 TABLET, FILM COATED ORAL at 07:05

## 2024-05-02 RX ADMIN — HYDROMORPHONE HYDROCHLORIDE 2 MG: 1 INJECTION, SOLUTION INTRAMUSCULAR; INTRAVENOUS; SUBCUTANEOUS at 12:05

## 2024-05-02 RX ADMIN — OXYBUTYNIN CHLORIDE 10 MG: 10 TABLET, EXTENDED RELEASE ORAL at 07:05

## 2024-05-02 RX ADMIN — METHOCARBAMOL 1000 MG: 500 TABLET ORAL at 09:05

## 2024-05-02 RX ADMIN — GABAPENTIN 300 MG: 300 CAPSULE ORAL at 09:05

## 2024-05-02 RX ADMIN — LISINOPRIL 5 MG: 5 TABLET ORAL at 07:05

## 2024-05-02 RX ADMIN — HEPARIN SODIUM 5000 UNITS: 5000 INJECTION INTRAVENOUS; SUBCUTANEOUS at 05:05

## 2024-05-02 RX ADMIN — OXYCODONE HYDROCHLORIDE 10 MG: 10 TABLET ORAL at 05:05

## 2024-05-02 RX ADMIN — ACETAMINOPHEN 1000 MG: 500 TABLET ORAL at 04:05

## 2024-05-02 RX ADMIN — CEFAZOLIN 2 G: 2 INJECTION, POWDER, FOR SOLUTION INTRAMUSCULAR; INTRAVENOUS at 12:05

## 2024-05-02 RX ADMIN — CEFAZOLIN 2 G: 2 INJECTION, POWDER, FOR SOLUTION INTRAMUSCULAR; INTRAVENOUS at 04:05

## 2024-05-02 RX ADMIN — ACETAMINOPHEN 1000 MG: 500 TABLET ORAL at 09:05

## 2024-05-02 RX ADMIN — CEFAZOLIN 2 G: 2 INJECTION, POWDER, FOR SOLUTION INTRAMUSCULAR; INTRAVENOUS at 07:05

## 2024-05-02 NOTE — PT/OT/SLP PROGRESS
Occupational Therapy  Co- Treatment    Name: Emeka Caballero  MRN: [id number]  Admitting Diagnosis:  Lumbar spondylosis  1 Day Post-Op    Recommendations:     Discharge Recommendations: Moderate Intensity Therapy  Discharge Equipment Recommendations:  bedside commode, walker, rolling  Barriers to discharge:  None    Assessment:     Emeka Caballero is a 49 y.o. male with a medical diagnosis of Lumbar spondylosis.  He presents with decrease functional status secondary to medical diagnosis. Performance deficits affecting function are weakness, impaired functional mobility, decreased safety awareness, gait instability, decreased lower extremity function, decreased ROM, impaired balance. Patient with good tolerance to therapy this date but limited by increased pain limiting mobility at this time. Patient cognitively intact and presenting with functional BUE at this time but is limited by mobility. Per patient, he lives on second floor apartment with no elevator access and sleeps in a ground sitting bed, at this time at his current mobility level he is unable to complete necessary mobility activities therefore functioning below baseline at this time and would benefit from further OT treatment. Patient is therefore appropriate for acute OT services to increase patient self care performance and functional mobility. Following DC from [facility] patient should continue with a moderate intensity therapy to ensure patient safety and promote return to independence.     Rehab Prognosis:  Good; patient would benefit from acute skilled OT services to address these deficits and reach maximum level of function.       Plan:     Patient to be seen 4 x/week to address the above listed problems via self-care/home management, therapeutic exercises, therapeutic activities, neuromuscular re-education  Plan of Care Expires: 06/02/24  Plan of Care Reviewed with: patient    Subjective     Chief Complaint: none at this time    Patient/Family Comments/goals: DC     Occupational Profile:  Living Environment: Patient lives with son in 2 story apartment with no CARYL and WIS   Previous level of function: Independent   Roles and Routines: Patient is active   Equipment Used at home: Cane   Assistance upon Discharge: Family     Objective:     Communicated with: RN prior to session.  Patient found EOB with wound vac, telemetry upon OT entry to room.    General Precautions: Standard, fall    Orthopedic Precautions:N/A  Braces: N/A  Respiratory Status: Room air     Occupational Performance:     Bed Mobility:    Patient EOB upon OT entry     Functional Mobility/Transfers:  Patient completed Sit <> Stand Transfer with minimum assistance  with  no assistive device and increased timing   Functional Mobility: Patient able to complete 100ft with RW and CGA with increased time and standing RB provided     Activities of Daily Living:  Grooming: contact guard assistance to complete oral care and facial grooming standing at sink   Upper Body Dressing: minimum assistance to don gown as robe EOB     Cognitive/Visual Perceptual:  Cognitive/Psychosocial Skills:     -       Oriented to: Person, Place, Time, and Situation   -       Follows Commands/attention:Follows multistep  commands  -       Communication: clear/fluent  -       Safety awareness/insight to disability: intact   Visual/Perceptual:      -Intact     Physical Exam:  Sensation:    -       Intact  Upper Extremity Range of Motion:     -       Right Upper Extremity: WFL  -       Left Upper Extremity: WFL  Upper Extremity Strength:    -       Right Upper Extremity: WFL  -       Left Upper Extremity: WFL   Strength:    -       Right Upper Extremity: WFL  -       Left Upper Extremity: WFL  Fine Motor Coordination:    -       Intact      AMPAC 6 Click ADL:      Treatment & Education:  Co-evaluation completed due to patient medical instability and to ensure patient safety. Provided education regarding  role of OT, POC, & discharge recommendations.  Pt with no further questions/concerns at this time. OT provided education on home recommendations and fall prevention in preparation for D/C.     Patient left supine with all lines intact and call button in reach    GOALS:   Multidisciplinary Problems       Occupational Therapy Goals          Problem: Occupational Therapy    Goal Priority Disciplines Outcome Interventions   Occupational Therapy Goal     OT, PT/OT Progressing    Description: Goals to be met by: 6/2/24     Patient will increase functional independence with ADLs by performing:    UE Dressing with [place].  LE Dressing with [place].  Grooming while standing with [place].  Toileting from toilet with [place] for hygiene and clothing management.   Toilet transfer to toilet with [place].                         Time Tracking:     OT Date of Treatment: 05/02/24  OT Start Time: 0930  OT Stop Time: 0949  OT Total Time (min): 19 min    Billable Minutes:Evaluation 9  Self Care/Home Management 10    OT/MASON: OT          5/2/2024

## 2024-05-02 NOTE — PLAN OF CARE
Problem: Adult Inpatient Plan of Care  Goal: Plan of Care Review  5/2/2024 0731 by Dede Young RN  Outcome: Progressing  5/2/2024 0730 by Dede Young RN  Outcome: Progressing  Goal: Patient-Specific Goal (Individualized)  5/2/2024 0731 by Dede Young RN  Outcome: Progressing  5/2/2024 0730 by Dede Young RN  Outcome: Progressing  Goal: Absence of Hospital-Acquired Illness or Injury  5/2/2024 0731 by Dede Young RN  Outcome: Progressing  5/2/2024 0730 by Dede Young RN  Outcome: Progressing  Goal: Optimal Comfort and Wellbeing  5/2/2024 0731 by Dede Young RN  Outcome: Progressing  5/2/2024 0730 by Dede Young RN  Outcome: Progressing  Goal: Readiness for Transition of Care  5/2/2024 0731 by Dede Young RN  Outcome: Progressing  5/2/2024 0730 by Dede Young RN  Outcome: Progressing     Problem: Diabetes Comorbidity  Goal: Blood Glucose Level Within Targeted Range  5/2/2024 0731 by Dede Young RN  Outcome: Progressing  5/2/2024 0730 by Dede Young RN  Outcome: Progressing     Problem: Wound  Goal: Optimal Coping  5/2/2024 0731 by Dede Young RN  Outcome: Progressing  5/2/2024 0730 by Dede Young RN  Outcome: Progressing  Goal: Optimal Functional Ability  5/2/2024 0731 by Dede Young RN  Outcome: Progressing  5/2/2024 0730 by Dede Young RN  Outcome: Progressing  Goal: Absence of Infection Signs and Symptoms  5/2/2024 0731 by Dede Young RN  Outcome: Progressing  5/2/2024 0730 by Dede Young RN  Outcome: Progressing  Goal: Improved Oral Intake  5/2/2024 0731 by Dede Young RN  Outcome: Progressing  5/2/2024 0730 by Dede Young RN  Outcome: Progressing  Goal: Optimal Pain Control and Function  5/2/2024 0731 by Dede Young RN  Outcome: Progressing  5/2/2024 0730 by Dede Young RN  Outcome: Progressing  Goal: Skin Health and Integrity  5/2/2024 0731 by Hector  ALEKSANDER Aguayo  Outcome: Progressing  5/2/2024 0730 by Dede Young RN  Outcome: Progressing  Goal: Optimal Wound Healing  5/2/2024 0731 by Dede Young RN  Outcome: Progressing  5/2/2024 0730 by Dede oYung RN  Outcome: Progressing     Problem: Fall Injury Risk  Goal: Absence of Fall and Fall-Related Injury  5/2/2024 0731 by Dede Young RN  Outcome: Progressing  5/2/2024 0730 by Dede Young RN  Outcome: Progressing     Problem: Infection  Goal: Absence of Infection Signs and Symptoms  5/2/2024 0731 by Dede Young RN  Outcome: Progressing  5/2/2024 0730 by Dede Young RN  Outcome: Progressing     Problem: Pain Acute  Goal: Optimal Pain Control and Function  5/2/2024 0731 by Dede Young RN  Outcome: Progressing  5/2/2024 0730 by Dede Young RN  Outcome: Progressing

## 2024-05-02 NOTE — ASSESSMENT & PLAN NOTE
Emeka Caballero is a 49 y.o. male s/p L4-pelvis fusion and L4-S1 TLIF on 02/08/2022 for s/p L4-pelvis fusion and L4-5, L5-S1 TLIF on 02/08/2022 with Dr. Reed presents with adjacent segment disease now s/p extension/revision of fusion L2-Pelvis on 5/1.    - Neurologically stable  - Pain control: continue current regimen   - WV to POD5  - Drains: HV x 2 gravity, abx while drains in place  - Post op imaging showing satisfactory placement of hardware.   - Brace: TLSO when OOB  - Continue PT/OT/OOB. OOB > 6hrs/day  - GI: Diet as tolerated. Continue bowel regimen.   - [place]onan removed. Bladder scan q6h, I/O cath if > 350 cc.  - DVT ppx: LVX, Compression stockings, SCDs  - Atelectasis ppx: IS at bedside. Encourage use every hour while awake.  - Discussed with Dr. Reed    - Dispo: Pending drain removal, pain control

## 2024-05-02 NOTE — HOSPITAL COURSE
5/2: NAEON. AFVSS. Back pain improved. HV drains to gravity 2/2 output. Exam stable.   5/3: NAEON. AFVSS. Endorses back pain, controlled with medication. R HV drain removed. Exam stable. Voiding spontaneously. WV removed after leak indicator alarm on all night, several tegaderms placed without resolution. Aquacel foam dressing applied. Pending placement.  5/4: NAEON. AFVSS. Pain controlled. Strength stable. Observed ambulating hallway this AM. HV with high output, will keep today. Pending rehab.  5/5: drain ouput decreasing, 10 cc, ambulating, pending rehab  5/6: NAEON. Febrile to 101.2 overnight. No symptoms. Will order dopplers and CXR. If negative, okay to dc to rehab. HV drain removed without issues.

## 2024-05-02 NOTE — PT/OT/SLP EVAL
Physical Therapy Co-Evaluation with OT     Patient Name:  Emeka Caballero   MRN:  [id number]    Co-evaluation with OT performed due to patient complexity and deficits, requiring two skilled therapists to appropriately and safely assess patient's strength and endurance while facilitating functional tasks in addition to accommodating for patient's activity tolerance.    Recommendations:     Discharge Recommendations: Moderate Intensity Therapy   Discharge Equipment Recommendations: walker, rolling, bedside commode   Barriers to discharge: None    Assessment:     Emeka Caballero is a 49 y.o. male admitted with a medical diagnosis of Lumbar spondylosis.  He presents with the following impairments/functional limitations: weakness, impaired functional mobility, gait instability, decreased lower extremity function, pain, orthopedic precautions. Patient required minimal assistance with increased time for sit to stand transfer and ambulated with improved balance with 2ww. He displayed gait deviations and high pain levels during ambulation and stairs were unable to be attempted this date. Patient is not at his baseline function currently, may progress given pain management. Additional mobility will be assessed at subsequent session and recommendations will be undated as necessary.   Patient is safe to ambulate with nursing (2ww assist of 1), encouraged to sit up in chair when able.      Rehab Prognosis: Good; patient would benefit from acute skilled PT services to address these deficits and reach maximum level of function.    Recent Surgery: Procedure(s) (LRB):  **AIRO** L2-pelvis extension and revision of fusion, (N/A) 1 Day Post-Op    Plan:     During this hospitalization, patient to be seen 4 x/week to address the identified rehab impairments via gait training, therapeutic activities, therapeutic exercises, neuromuscular re-education, wheelchair management/training and progress toward the following  goals:    Plan of Care Expires:  06/02/24    Subjective     Chief Complaint: pain  Patient/Family Comments/goals: to progress mobility, decrease pain  Pain/Comfort:  Pain Rating 1: 0/10  Location - Side 1: Bilateral  Location - Orientation 1: lower  Location 1: back  Pain Addressed 1: Reposition, Distraction  Pain Rating Post-Intervention 1:  (not rated, facial grimacing)    Patients cultural, spiritual, Gnosticism conflicts given the current situation: no    Living Environment:  Patient lives with brother in 2nd floor apt with 12 stairs to enter, BHR. Patient's mattress is placed on ground and therefore he has an extremely low transfer to bed.   Prior to admission, patients level of function was independent.  Equipment used at home: other (see comments) (cane).  DME owned (not currently used): none.  Upon discharge, patient will have assistance from brother (not 24/7).    Objective:     Communicated with RN prior to session.  Patient found sitting edge of bed with telemetry, Other (comments) (wound vac)  upon PT entry to room. Son in room. TLSO donned.    General Precautions: Standard, fall  Orthopedic Precautions:N/A   Braces: N/A  Respiratory Status: Room air    Exams:  Cognitive Exam:  Patient is oriented to Person, Place, Time, and Situation  Sensation:    -       Intact  RLE ROM: WNL  RLE Strength: 4+/5 Knee extension, ankle DF/PF    LLE ROM: WNL  LLE Strength: 4+/5 Knee extension, ankle DF/PF      Functional Mobility:  Bed Mobility: patient received in chair   Transfers:     Sit to Stand:  minimum assistance with hand-held assist, increased time   Gait: patient ambulated 100' with 2ww with CGA  Deviations Noted: decreased gait speed, decreased step height R,L, and decreased step length R, L, forward stance over walker   Balance:   Sitting static: supervision  Sitting dynamic: supervision  Standing static: CGA 2ww  Standing dynamic: modA with HHA       AM-PAC 6 CLICK MOBILITY  Total Score:12       Treatment &  Education:  Education: patient educated on POC, need for therapy, safety with mobility, discharge recommendations and equipment recommendations. Patient verbalized understanding of all topics.   Verbal and tactile cues provided during mobility for walker management and safety including hand placement on walker vs chair during transfers and positioning of walker in relation to body.   Patient educated on components of brace, appropriate times to don/doff, verbal cues and [place] guidance for donning/doffing brace.  PT educated patient on spinal precautions: no bending, lifting >10lbs, or twisting, w/ acronym BLT to assist with remembering precautions. Patient verbalized understanding of spinal precautions and that these precautions will remain in place until MD clears pt to return to these activities.     Patient left up in chair with all lines intact, call button in reach, and RN notified. Son present.     GOALS:   Multidisciplinary Problems       Physical Therapy Goals          Problem: Physical Therapy    Goal Priority Disciplines Outcome Goal Variances Interventions   Physical Therapy Goal     PT, PT/OT Progressing     Description: Goals to be met by: 24     Patient will increase functional independence with mobility by performin. Supine to sit with Contact Guard Assistance  2. Sit to supine with Contact Guard Assistance  3. Sit to stand transfer with Contact Guard Assistance  4. Bed to chair transfer with Contact Guard Assistance using Rolling Walker  5. Gait  x 250 feet with Contact Guard Assistance using Rolling Walker.   6. Ascend/descend 12 stair with bilateral Handrails Contact Guard Assistance using No Assistive Device.                          History:     Past Medical History:   Diagnosis Date    Arthritis     Diabetes mellitus, type 2 2022    Essential hypertension, benign 2023       Past Surgical History:   Procedure Laterality Date    CATARACT EXTRACTION Right     EPIDURAL STEROID  INJECTION N/A 10/21/2020    Procedure: Injection, Steroid, Epidural Caudal;  Surgeon: Vipul Nixon Jr., MD;  Location: [facility] ENDO;  Service: Pain Management;  Laterality: N/A;  Caudal KURT  Arrive @ 1315; No ATC or DM; Needs MD Signature    INJECTION, SACROILIAC JOINT Bilateral 12/27/2022    Procedure: INJECTION,SACROILIAC JOINT, BILATERAL CONTRAST DIRECT REF  ORDERED;  Surgeon: Brielle José MD;  Location: [facility] PAIN MGT;  Service: Pain Management;  Laterality: Bilateral;    INJECTION, SACROILIAC JOINT Right 11/27/2023    Procedure: INJECTION,SACROILIAC JOINT RIGHT DIRECT REFERRAL  Sooner date;  Surgeon: Brielle José MD;  Location: [facility] PAIN MGT;  Service: Pain Management;  Laterality: Right;    LASER ENUCLEATION OF PROSTATE N/A 3/14/2023    Procedure: ENUCLEATION, PROSTATE, USING LASER;  Surgeon: Cecil Murray MD;  Location: [facility] OR;  Service: Urology;  Laterality: N/A;    LUMBAR FUSION N/A 2/8/2022    Procedure: FUSION, SPINE, LUMBAR;  Surgeon: Alphonse Reed MD;  Location: [address];  Service: Neurosurgery;  Laterality: N/A;  AIRO, L4-S1       Time Tracking:     PT Received On: 05/02/24  PT Start Time: 0928     PT Stop Time: 0949  PT Total Time (min): 21 min     Billable Minutes: Evaluation 10 minutes and Gait Training 11 minutes      05/02/2024

## 2024-05-02 NOTE — SUBJECTIVE & OBJECTIVE
Interval History: NAEON. AFVSS. Back pain improved. HV drains to gravity 2/2 output. Exam stable.     Medications:  Continuous Infusions:  Current Facility-Administered Medications   Medication Dose Route Frequency Last Rate Last Admin     Scheduled Meds:  Current Facility-Administered Medications   Medication Dose Route Frequency    acetaminophen  1,000 mg Oral QID    atorvastatin  40 mg Oral Daily    bisacodyL  10 mg Rectal Daily    ceFAZolin (Ancef) IV (PEDS and ADULTS)  2 g Intravenous Q8H    enoxparin  40 mg Subcutaneous Q24H (prophylaxis, 1700)    gabapentin  300 mg Oral QHS    lisinopriL  5 mg Oral Daily    methocarbamoL  1,000 mg Oral QID    mupirocin   Nasal BID    oxybutynin  10 mg Oral Daily    pantoprazole  40 mg Oral Daily    polyethylene glycol  17 g Oral Daily    tamsulosin  0.4 mg Oral Daily     PRN Meds:  Current Facility-Administered Medications:     aluminum-magnesium hydroxide-simethicone, 30 mL, Oral, Q4H PRN    dextrose 10%, 12.5 g, Intravenous, PRN    dextrose 10%, 25 g, Intravenous, PRN    glucagon (human recombinant), 1 mg, Intramuscular, PRN    glucose, 16 g, Oral, PRN    glucose, 24 g, Oral, PRN    HYDROmorphone, 2 mg, Intravenous, Q3H PRN    insulin aspart U-100, 0-10 Units, Subcutaneous, QID (AC + HS) PRN    linaCLOtide, 145 mcg, Oral, Daily PRN    ondansetron, 8 mg, Oral, Q6H PRN    oxyCODONE, 10 mg, Oral, Q4H PRN    prochlorperazine, 5 mg, Intravenous, Q6H PRN    senna-docusate 8.6-50 mg, 2 tablet, Oral, Nightly PRN     Review of Systems  Objective:     Weight: 93.8 kg (206 lb 12.7 oz)  Body mass index is 31.21 kg/m².  Vital Signs (Most Recent):  Temp: 97.9 °F (36.6 °C) (05/02/24 1527)  Pulse: 100 (05/02/24 1527)  Resp: (P) 17 (05/02/24 1653)  BP: 113/61 (05/02/24 1527)  SpO2: 97 % (05/02/24 1527) Vital Signs (24h Range):  Temp:  [97.9 °F (36.6 °C)-99.1 °F (37.3 °C)] 97.9 °F (36.6 °C)  Pulse:  [] 100  Resp:  [15-20] (P) 17  SpO2:  [94 %-99 %] 97 %  BP: (110-129)/(55-77) 113/61      Date 05/02/24 0700 - 05/03/24 0659   Shift 8185-2138 0945-3337 6882-0379 24 Hour Total   INTAKE   Shift Total(mL/kg)       OUTPUT   Drains 130   130   Other 0   0   Shift Total(mL/kg) 130(1.4)   130(1.4)   Weight (kg) 93.8 93.8 93.8 93.8                            Closed/Suction Drain 05/01/24 1124 Tube - 1 Left Back Accordion 10 Fr. (Active)   Site Description Leaking at site 05/01/24 1945   Dressing Type Transparent (Tegaderm) 05/01/24 1945   Dressing Status Clean;Dry;Intact 05/01/24 1945   Dressing Intervention Integrity maintained 05/01/24 1945   Drainage Bloody 05/01/24 1945   Status Other (Comment) 05/01/24 1945   Output (mL) 90 mL 05/02/24 1133            Closed/Suction Drain 05/01/24 1126 Tube - 2 Right Back Accordion 10 Fr. (Active)   Site Description Leaking at site;Other (Comment) 05/01/24 1945   Dressing Type Transparent (Tegaderm) 05/01/24 1945   Dressing Status Clean;Dry;Intact 05/01/24 1945   Dressing Intervention Integrity maintained 05/01/24 1945   Drainage Bloody 05/01/24 1945   Status Other (Comment) 05/01/24 1945   Output (mL) 40 mL 05/02/24 1133          Physical Exam         Neurosurgery Physical Exam    General: well developed, well nourished, no distress.   Head: normocephalic, atraumatic  Neurologic: Alert and oriented. Thought content appropriate.  GCS: Motor: 6/Verbal: 5/Eyes: 4 GCS Total: 15  Mental Status: Awake, Alert, Oriented x 4  Language: No aphasia  Speech: No dysarthria  Cranial nerves: face symmetric, tongue midline, CN II-XII grossly intact.   Eyes: pupils equal, round, reactive to light with accomodation, EOMI.   Pulmonary: normal respirations, no signs of respiratory distress  Abdomen: soft, non-distended, not tender to palpation  Sensory: intact to light touch throughout  Motor Strength: Moves all extremities spontaneously with good tone.  Full strength upper and lower extremities. No abnormal movements seen.     Strength  Deltoids Triceps Biceps Wrist Extension Wrist  Flexion Hand    Upper: R 5/5 5/5 5/5 5/5 5/5 5/5    L 5/5 5/5 5/5 5/5 5/5 5/5     Iliopsoas Quadriceps Knee  Flexion Tibialis  anterior Gastro- cnemius EHL   Lower: R 5/5 5/5 5/5 5/5 5/5 5/5    L 5/5 5/5 5/5 5/5 5/5 5/5     Skin: Skin is warm, dry and intact.    Incisional WV in place, good seal. Hv x 2 serosanguinous output.      Significant Labs:  Recent Labs   Lab 05/01/24  0629 05/02/24  0525   * 195*    137   K 3.7 3.8    108   CO2 23 22*   BUN 13 16   CREATININE 1.0 0.9   CALCIUM 9.4 8.1*     Recent Labs   Lab 05/01/24  0629 05/02/24  0525   WBC 5.37 10.74   HGB 15.0 10.9*   HCT 45.5 33.2*    173     Recent Labs   Lab 05/01/24  0629   INR 1.0   APTT 27.1     Microbiology Results (last 7 days)       ** No results found for the last 168 hours. **          Recent Lab Results  (Last 5 results in the past 24 hours)        05/02/24  1528   05/02/24  1128   05/02/24  0525   05/02/24  0440   05/02/24  0020        Anion Gap     7           Baso #     0.01           Basophil %     0.1           BUN     16           Calcium     8.1           Chloride     108           CO2     22           Creatinine     0.9           Differential Method     Automated           eGFR     >60.0           Eos #     0.0           Eos %     0.1           Glucose     195           Gran # (ANC)     7.9           Gran %     73.0           Hematocrit     33.2           Hemoglobin     10.9           Immature Grans (Abs)     0.03  Comment: Mild elevation in immature granulocytes is non specific and   can be seen in a variety of conditions including stress response,   acute inflammation, trauma and pregnancy. Correlation with other   laboratory and clinical findings is essential.             Immature Granulocytes     0.3           Lymph #     1.9           Lymph %     18.0           MCH     27.9           MCHC     32.8           MCV     85           Mono #     0.9           Mono %     8.5           MPV     10.9            nRBC     0           Platelet Count     173           POCT Glucose 177   210     215   200       Potassium     3.8           RBC     3.90           RDW     14.5           Sodium     137           WBC     10.74                                All pertinent labs from the last 24 hours have been reviewed.    Significant Diagnostics:  I have reviewed all pertinent imaging results/findings within the past 24 hours.

## 2024-05-02 NOTE — PLAN OF CARE
[facility] (Hospital)  Initial Discharge Assessment       Primary Care Provider: Dio Zarate Jr., MD    Admission Diagnosis: Scoliosis, unspecified scoliosis type, unspecified spinal region [M41.9]  Sagittal plane imbalance [M43.8X9]  Lumbar spondylosis [M47.816]    Admission Date: 5/1/2024  Expected Discharge Date:     Transition of Care Barriers: None    Payor: MEDICAID / Plan: [facility] COMMUNITY PLAN [facility] ([place] MEDICAID) / Product Type: Managed Medicaid /     Extended Emergency Contact Information  Primary Emergency Contact: Alana Castellon   [facility]  Home Phone: [phone]  Relation: Significant other    Discharge Plan A: Rehab  Discharge Plan B: Home with family      [id number] - [place] - [address] [facility]  [address] [facility] [place] [address]  Phone: [phone] Fax: [phone]    [facility] [address]  [facility] [address]  Phone: [phone] Fax: [phone]    CM obtained discharge planning assessment with patient, brother at bedside.  Patient provided with discharge planning booklet in English and [place]uran.  Initial Assessment (most recent)       Adult Discharge Assessment - 05/02/24 1333          Discharge Assessment    Assessment Type Discharge Planning Assessment     Confirmed/corrected address, phone number and insurance Yes     Confirmed Demographics Correct on Facesheet     Source of Information patient     Communicated KELLEY with patient/caregiver Yes     Reason For Admission Lumbar spondylosis     People in Home --   patient states was living with S/O , now will be with brother    Do you expect to return to your current living situation? No   going to brothers    Do you have help at home or someone to help you manage your care at home? No     Prior to hospitilization cognitive status: Alert/Oriented     Current cognitive status: Alert/Oriented     Walking or Climbing Stairs Difficulty  yes     Walking or Climbing Stairs ambulation difficulty, requires equipment     Mobility Management straight cane     Dressing/Bathing Difficulty no     Equipment Currently Used at Home cane, straight     Readmission within 30 days? No     Patient currently being followed by outpatient case management? No     Do you currently have service(s) that help you manage your care at home? No     Do you take prescription medications? Yes     Do you have prescription coverage? Yes     Coverage MEDICAID - [place] COMMUNITY PLAN [facility] MEDICAID     Do you have any problems affording any of your prescribed medications? No     Is the patient taking medications as prescribed? yes     Who is going to help you get home at discharge? family     How do you get to doctors appointments? family or friend will provide     Are you on dialysis? No     Do you take coumadin? No     Discharge Plan A Rehab     Discharge Plan B Home with family     DME Needed Upon Discharge  other (see comments)   tbd    Discharge Plan discussed with: Patient     Transition of Care Barriers None        Physical Activity    On average, how many days per week do you engage in moderate to strenuous exercise (like a brisk walk)? 4 days     On average, how many minutes do you engage in exercise at this level? 10 min        Financial Resource Strain    How hard is it for you to pay for the very basics like food, housing, medical care, and heating? Not hard at all        Housing Stability    In the last 12 months, was there a time when you were not able to pay the mortgage or rent on time? No     At any time in the past 12 months, were you homeless or living in a shelter (including now)? No        Transportation Needs    In the past 12 months, has lack of transportation kept you from medical appointments or from getting medications? No     In the past 12 months, has lack of transportation kept you from meetings, work, or from getting things needed for daily  living? No        Food Insecurity    Within the past 12 months, you worried that your food would run out before you got the money to buy more. Never true     Within the past 12 months, the food you bought just didn't last and you didn't have money to get more. Never true        Stress    Do you feel stress - tense, restless, nervous, or anxious, or unable to sleep at night because your mind is troubled all the time - these days? Not at all        Alcohol Use    Q1: How often do you have a drink containing alcohol? Monthly or less     Q2: How many drinks containing alcohol do you have on a typical day when you are drinking? 1 or 2     Q3: How often do you have six or more drinks on one occasion? Never

## 2024-05-02 NOTE — PLAN OF CARE
Problem: Occupational Therapy  Goal: Occupational Therapy Goal  Description: Goals to be met by: 6/2/24     Patient will increase functional independence with ADLs by performing:    UE Dressing with [place].  LE Dressing with [place].  Grooming while standing with [place].  Toileting from toilet with [place] for hygiene and clothing management.   Toilet transfer to toilet with [place].    Outcome: Progressing

## 2024-05-02 NOTE — PLAN OF CARE
Problem: Physical Therapy  Goal: Physical Therapy Goal  Description: Goals to be met by: 24     Patient will increase functional independence with mobility by performin. Supine to sit with Contact Guard Assistance  2. Sit to supine with Contact Guard Assistance  3. Sit to stand transfer with Contact Guard Assistance  4. Bed to chair transfer with Contact Guard Assistance using Rolling Walker  5. Gait  x 250 feet with Contact Guard Assistance using Rolling Walker.   6. Ascend/descend 12 stair with bilateral Handrails Contact Guard Assistance using No Assistive Device.     Outcome: Progressing   PT eval completed and goals established. Pt will begin PT POC, progressing as tolerated.

## 2024-05-02 NOTE — ANESTHESIA POSTPROCEDURE EVALUATION
Anesthesia Post Evaluation    Patient: Emeka Caballero    Procedure(s) Performed: Procedure(s) (LRB):  **AIRO** L2-pelvis extension and revision of fusion, (N/A)    Final Anesthesia Type: general      Patient location during evaluation: PACU  Patient participation: Yes- Able to Participate  Level of consciousness: awake and alert  Post-procedure vital signs: reviewed and stable  Pain management: adequate  Airway patency: patent    PONV status: None or treated.  Anesthetic complications: no      Cardiovascular status: hemodynamically stable  Respiratory status: spontaneous ventilation  Hydration status: euvolemic  Follow-up not needed.          Vitals Value Taken Time   /66 05/02/24 0702   Temp 36.8 °C (98.3 °F) 05/02/24 0702   Pulse 86 05/02/24 0702   Resp 19 05/02/24 0834   SpO2 98 % 05/02/24 0702         Event Time   Out of Recovery 12:45:00         Pain/Mercy Score: Pain Rating Prior to Med Admin: 8 (5/2/2024  8:34 AM)  Pain Rating Post Med Admin: 0 (5/2/2024  6:32 AM)  Mercy Score: 9 (5/1/2024 12:45 PM)

## 2024-05-02 NOTE — PROGRESS NOTES
[facility] ([facility])  Neurosurgery  Progress Note    Subjective:     History of Present Illness: Emeka Caballero is a 49 y.o. male s/p L4-pelvis fusion and L4-S1 TLIF on 02/08/2022 for s/p L4-pelvis fusion and L4-5, L5-S1 TLIF on 02/08/2022 with Dr. Reed who presents with adjacent segment disease for extension/revision of fusion L2-Pelvis. He is being seen in clinic today to follow-up from his recent ED visit from 12/12/23 where he had fallen and has had worsening right buttock pain that radiates down his right lower extremity. The patient obtained an injection as previously recommended by Dr. Reed about 2 weeks ago in the right SI joint without any relief. He is taking ibuprofen 800 mg with no relief. Reports difficulties with standing up straight. He is utilizing a cane to ambulate. Reports the pain being a burning sensation associated with numbness in the feet. He has also attempted PT without relief.     Post-Op Info:  Procedure(s) (LRB):  **AIRO** L2-pelvis extension and revision of fusion, (N/A)   1 Day Post-Op   Interval History: NAEON. AFVSS. Back pain improved. HV drains to gravity 2/2 output. Exam stable.     Medications:  Continuous Infusions:  Current Facility-Administered Medications   Medication Dose Route Frequency Last Rate Last Admin     Scheduled Meds:  Current Facility-Administered Medications   Medication Dose Route Frequency    acetaminophen  1,000 mg Oral QID    atorvastatin  40 mg Oral Daily    bisacodyL  10 mg Rectal Daily    ceFAZolin (Ancef) IV (PEDS and ADULTS)  2 g Intravenous Q8H    enoxparin  40 mg Subcutaneous Q24H (prophylaxis, 1700)    gabapentin  300 mg Oral QHS    lisinopriL  5 mg Oral Daily    methocarbamoL  1,000 mg Oral QID    mupirocin   Nasal BID    oxybutynin  10 mg Oral Daily    pantoprazole  40 mg Oral Daily    polyethylene glycol  17 g Oral Daily    tamsulosin  0.4 mg Oral Daily     PRN Meds:  Current Facility-Administered Medications:      aluminum-magnesium hydroxide-simethicone, 30 mL, Oral, Q4H PRN    dextrose 10%, 12.5 g, Intravenous, PRN    dextrose 10%, 25 g, Intravenous, PRN    glucagon (human recombinant), 1 mg, Intramuscular, PRN    glucose, 16 g, Oral, PRN    glucose, 24 g, Oral, PRN    HYDROmorphone, 2 mg, Intravenous, Q3H PRN    insulin aspart U-100, 0-10 Units, Subcutaneous, QID (AC + HS) PRN    linaCLOtide, 145 mcg, Oral, Daily PRN    ondansetron, 8 mg, Oral, Q6H PRN    oxyCODONE, 10 mg, Oral, Q4H PRN    prochlorperazine, 5 mg, Intravenous, Q6H PRN    senna-docusate 8.6-50 mg, 2 tablet, Oral, Nightly PRN     Review of Systems  Objective:     Weight: 93.8 kg (206 lb 12.7 oz)  Body mass index is 31.21 kg/m².  Vital Signs (Most Recent):  Temp: 97.9 °F (36.6 °C) (05/02/24 1527)  Pulse: 100 (05/02/24 1527)  Resp: (P) 17 (05/02/24 1653)  BP: 113/61 (05/02/24 1527)  SpO2: 97 % (05/02/24 1527) Vital Signs (24h Range):  Temp:  [97.9 °F (36.6 °C)-99.1 °F (37.3 °C)] 97.9 °F (36.6 °C)  Pulse:  [] 100  Resp:  [15-20] (P) 17  SpO2:  [94 %-99 %] 97 %  BP: (110-129)/(55-77) 113/61     Date 05/02/24 0700 - 05/03/24 0659   Shift 8768-0820 4471-7662 7880-9409 24 Hour Total   INTAKE   Shift Total(mL/kg)       OUTPUT   Drains 130   130   Other 0   0   Shift Total(mL/kg) 130(1.4)   130(1.4)   Weight (kg) 93.8 93.8 93.8 93.8                            Closed/Suction Drain 05/01/24 1124 Tube - 1 Left Back Accordion 10 Fr. (Active)   Site Description Leaking at site 05/01/24 1945   Dressing Type Transparent (Tegaderm) 05/01/24 1945   Dressing Status Clean;Dry;Intact 05/01/24 1945   Dressing Intervention Integrity maintained 05/01/24 1945   Drainage Bloody 05/01/24 1945   Status Other (Comment) 05/01/24 1945   Output (mL) 90 mL 05/02/24 1133            Closed/Suction Drain 05/01/24 1126 Tube - 2 Right Back Accordion 10 Fr. (Active)   Site Description Leaking at site;Other (Comment) 05/01/24 1945   Dressing Type Transparent (Tegaderm) 05/01/24 1945    Dressing Status Clean;Dry;Intact 05/01/24 1945   Dressing Intervention Integrity maintained 05/01/24 1945   Drainage Bloody 05/01/24 1945   Status Other (Comment) 05/01/24 1945   Output (mL) 40 mL 05/02/24 1133          Physical Exam         Neurosurgery Physical Exam    General: well developed, well nourished, no distress.   Head: normocephalic, atraumatic  Neurologic: Alert and oriented. Thought content appropriate.  GCS: Motor: 6/Verbal: 5/Eyes: 4 GCS Total: 15  Mental Status: Awake, Alert, Oriented x 4  Language: No aphasia  Speech: No dysarthria  Cranial nerves: face symmetric, tongue midline, CN II-XII grossly intact.   Eyes: pupils equal, round, reactive to light with accomodation, EOMI.   Pulmonary: normal respirations, no signs of respiratory distress  Abdomen: soft, non-distended, not tender to palpation  Sensory: intact to light touch throughout  Motor Strength: Moves all extremities spontaneously with good tone.  Full strength upper and lower extremities. No abnormal movements seen.     Strength  Deltoids Triceps Biceps Wrist Extension Wrist Flexion Hand    Upper: R 5/5 5/5 5/5 5/5 5/5 5/5    L 5/5 5/5 5/5 5/5 5/5 5/5     Iliopsoas Quadriceps Knee  Flexion Tibialis  anterior Gastro- cnemius EHL   Lower: R 5/5 5/5 5/5 5/5 5/5 5/5    L 5/5 5/5 5/5 5/5 5/5 5/5     Skin: Skin is warm, dry and intact.    Incisional WV in place, good seal. Hv x 2 serosanguinous output.      Significant Labs:  Recent Labs   Lab 05/01/24  0629 05/02/24  0525   * 195*    137   K 3.7 3.8    108   CO2 23 22*   BUN 13 16   CREATININE 1.0 0.9   CALCIUM 9.4 8.1*     Recent Labs   Lab 05/01/24  0629 05/02/24  0525   WBC 5.37 10.74   HGB 15.0 10.9*   HCT 45.5 33.2*    173     Recent Labs   Lab 05/01/24  0629   INR 1.0   APTT 27.1     Microbiology Results (last 7 days)       ** No results found for the last 168 hours. **          Recent Lab Results  (Last 5 results in the past 24 hours)         05/02/24  1528   05/02/24  1128   05/02/24  0525   05/02/24  0440   05/02/24  0020        Anion Gap     7           Baso #     0.01           Basophil %     0.1           BUN     16           Calcium     8.1           Chloride     108           CO2     22           Creatinine     0.9           Differential Method     Automated           eGFR     >60.0           Eos #     0.0           Eos %     0.1           Glucose     195           Gran # (ANC)     7.9           Gran %     73.0           Hematocrit     33.2           Hemoglobin     10.9           Immature Grans (Abs)     0.03  Comment: Mild elevation in immature granulocytes is non specific and   can be seen in a variety of conditions including stress response,   acute inflammation, trauma and pregnancy. Correlation with other   laboratory and clinical findings is essential.             Immature Granulocytes     0.3           Lymph #     1.9           Lymph %     18.0           MCH     27.9           MCHC     32.8           MCV     85           Mono #     0.9           Mono %     8.5           MPV     10.9           nRBC     0           Platelet Count     173           POCT Glucose 177   210     215   200       Potassium     3.8           RBC     3.90           RDW     14.5           Sodium     137           WBC     10.74                                All pertinent labs from the last 24 hours have been reviewed.    Significant Diagnostics:  I have reviewed all pertinent imaging results/findings within the past 24 hours.  Assessment/Plan:     * Lumbar spondylosis  Emeka Caballero is a 49 y.o. male s/p L4-pelvis fusion and L4-S1 TLIF on 02/08/2022 for s/p L4-pelvis fusion and L4-5, L5-S1 TLIF on 02/08/2022 with Dr. Reed presents with adjacent segment disease now s/p extension/revision of fusion L2-Pelvis on 5/1.    - Neurologically stable  - Pain control: continue current regimen   - WV to POD5  - Drains: HV x 2 gravity, abx while drains in place  - Post  op imaging showing satisfactory placement of hardware.   - Brace: TLSO when OOB  - Continue PT/OT/OOB. OOB > 6hrs/day  - GI: Diet as tolerated. Continue bowel regimen.   - Noonan removed. Bladder scan q6h, I/O cath if > 350 cc.  - DVT ppx: LVX, Compression stockings, SCDs  - Atelectasis ppx: IS at bedside. Encourage use every hour while awake.  - Discussed with Dr. Reed    - Dispo: Pending drain removal, pain control    Essential hypertension, benign  Chronic, controlled. Latest blood pressure and vitals reviewed-     Temp:  [97.9 °F (36.6 °C)-99.1 °F (37.3 °C)]   Pulse:  []   Resp:  [15-20]   BP: (110-129)/(55-77)   SpO2:  [94 %-99 %] .   Home meds for hypertension were reviewed and noted below.   Hypertension Medications               lisinopriL (PRINIVIL,ZESTRIL) 5 MG tablet Take 1 tablet (5 mg total) by mouth once daily.            While in the hospital, will manage blood pressure as follows; Continue home antihypertensive regimen    Will utilize p.r.n. blood pressure medication only if patient's blood pressure greater than 160/100 and he develops symptoms such as worsening chest pain or shortness of breath.    Type 2 diabetes mellitus with hyperlipidemia  POCT 4x daily, diabetic diet, ISS      BPH (benign prostatic hyperplasia)  Continue home dose flomax        Ling Malloy PA-C  Neurosurgery  [facility] - Neurosurgery ([facility])

## 2024-05-02 NOTE — ASSESSMENT & PLAN NOTE
Chronic, controlled. Latest blood pressure and vitals reviewed-     Temp:  [97.9 °F (36.6 °C)-99.1 °F (37.3 °C)]   Pulse:  []   Resp:  [15-20]   BP: (110-129)/(55-77)   SpO2:  [94 %-99 %] .   Home meds for hypertension were reviewed and noted below.   Hypertension Medications               lisinopriL (PRINIVIL,ZESTRIL) 5 MG tablet Take 1 tablet (5 mg total) by mouth once daily.            While in the hospital, will manage blood pressure as follows; Continue home antihypertensive regimen    Will utilize p.r.n. blood pressure medication only if patient's blood pressure greater than 160/100 and he develops symptoms such as worsening chest pain or shortness of breath.

## 2024-05-03 ENCOUNTER — PATIENT OUTREACH (OUTPATIENT)
Dept: ADMINISTRATIVE | Facility: HOSPITAL | Age: 49
End: 2024-05-03
Payer: MEDICAID

## 2024-05-03 LAB
ANION GAP SERPL CALC-SCNC: 8 MMOL/L (ref 8–16)
BASOPHILS # BLD AUTO: 0.04 K/UL (ref 0–0.2)
BASOPHILS NFR BLD: 0.4 % (ref 0–1.9)
BUN SERPL-MCNC: 16 MG/DL (ref 6–20)
CALCIUM SERPL-MCNC: 8.4 MG/DL (ref 8.7–10.5)
CHLORIDE SERPL-SCNC: 106 MMOL/L (ref 95–110)
CO2 SERPL-SCNC: 23 MMOL/L (ref 23–29)
CREAT SERPL-MCNC: 0.9 MG/DL (ref 0.5–1.4)
DIFFERENTIAL METHOD BLD: ABNORMAL
EOSINOPHIL # BLD AUTO: 0.1 K/UL (ref 0–0.5)
EOSINOPHIL NFR BLD: 1.1 % (ref 0–8)
ERYTHROCYTE [DISTWIDTH] IN BLOOD BY AUTOMATED COUNT: 14.6 % (ref 11.5–14.5)
EST. GFR  (NO RACE VARIABLE): >60 ML/MIN/1.73 M^2
GLUCOSE SERPL-MCNC: 157 MG/DL (ref 70–110)
HCT VFR BLD AUTO: 33.9 % (ref 40–54)
HGB BLD-MCNC: 11 G/DL (ref 14–18)
IMM GRANULOCYTES # BLD AUTO: 0.03 K/UL (ref 0–0.04)
IMM GRANULOCYTES NFR BLD AUTO: 0.3 % (ref 0–0.5)
LYMPHOCYTES # BLD AUTO: 3.1 K/UL (ref 1–4.8)
LYMPHOCYTES NFR BLD: 32.7 % (ref 18–48)
MCH RBC QN AUTO: 27.9 PG (ref 27–31)
MCHC RBC AUTO-ENTMCNC: 32.4 G/DL (ref 32–36)
MCV RBC AUTO: 86 FL (ref 82–98)
MONOCYTES # BLD AUTO: 0.8 K/UL (ref 0.3–1)
MONOCYTES NFR BLD: 9 % (ref 4–15)
NEUTROPHILS # BLD AUTO: 5.3 K/UL (ref 1.8–7.7)
NEUTROPHILS NFR BLD: 56.5 % (ref 38–73)
NRBC BLD-RTO: 0 /100 WBC
PLATELET # BLD AUTO: 175 K/UL (ref 150–450)
PMV BLD AUTO: 11.1 FL (ref 9.2–12.9)
POCT GLUCOSE: 134 MG/DL (ref 70–110)
POCT GLUCOSE: 139 MG/DL (ref 70–110)
POCT GLUCOSE: 161 MG/DL (ref 70–110)
POCT GLUCOSE: 164 MG/DL (ref 70–110)
POCT GLUCOSE: 188 MG/DL (ref 70–110)
POTASSIUM SERPL-SCNC: 3.8 MMOL/L (ref 3.5–5.1)
RBC # BLD AUTO: 3.94 M/UL (ref 4.6–6.2)
SODIUM SERPL-SCNC: 137 MMOL/L (ref 136–145)
WBC # BLD AUTO: 9.34 K/UL (ref 3.9–12.7)

## 2024-05-03 PROCEDURE — 11000001 HC ACUTE MED/SURG PRIVATE ROOM

## 2024-05-03 PROCEDURE — 99024 POSTOP FOLLOW-UP VISIT: CPT | Mod: ,,, | Performed by: PHYSICIAN ASSISTANT

## 2024-05-03 PROCEDURE — 94799 UNLISTED PULMONARY SVC/PX: CPT

## 2024-05-03 PROCEDURE — 80048 BASIC METABOLIC PNL TOTAL CA: CPT | Performed by: STUDENT IN AN ORGANIZED HEALTH CARE EDUCATION/TRAINING PROGRAM

## 2024-05-03 PROCEDURE — 63600175 PHARM REV CODE 636 W HCPCS: Performed by: STUDENT IN AN ORGANIZED HEALTH CARE EDUCATION/TRAINING PROGRAM

## 2024-05-03 PROCEDURE — 97116 GAIT TRAINING THERAPY: CPT

## 2024-05-03 PROCEDURE — 25000003 PHARM REV CODE 250: Performed by: PHYSICIAN ASSISTANT

## 2024-05-03 PROCEDURE — 99222 1ST HOSP IP/OBS MODERATE 55: CPT | Mod: ,,, | Performed by: NURSE PRACTITIONER

## 2024-05-03 PROCEDURE — 36415 COLL VENOUS BLD VENIPUNCTURE: CPT | Performed by: STUDENT IN AN ORGANIZED HEALTH CARE EDUCATION/TRAINING PROGRAM

## 2024-05-03 PROCEDURE — 85025 COMPLETE CBC W/AUTO DIFF WBC: CPT | Performed by: STUDENT IN AN ORGANIZED HEALTH CARE EDUCATION/TRAINING PROGRAM

## 2024-05-03 PROCEDURE — 63600175 PHARM REV CODE 636 W HCPCS: Performed by: PHYSICIAN ASSISTANT

## 2024-05-03 PROCEDURE — 25000003 PHARM REV CODE 250: Performed by: STUDENT IN AN ORGANIZED HEALTH CARE EDUCATION/TRAINING PROGRAM

## 2024-05-03 RX ORDER — AMOXICILLIN 250 MG
1 CAPSULE ORAL 2 TIMES DAILY
Status: DISCONTINUED | OUTPATIENT
Start: 2024-05-03 | End: 2024-05-06 | Stop reason: HOSPADM

## 2024-05-03 RX ADMIN — OXYCODONE HYDROCHLORIDE 10 MG: 10 TABLET ORAL at 10:05

## 2024-05-03 RX ADMIN — ONDANSETRON 8 MG: 8 TABLET, ORALLY DISINTEGRATING ORAL at 06:05

## 2024-05-03 RX ADMIN — ACETAMINOPHEN 1000 MG: 500 TABLET ORAL at 05:05

## 2024-05-03 RX ADMIN — DOCUSATE SODIUM AND SENNOSIDES 1 TABLET: 8.6; 5 TABLET, FILM COATED ORAL at 10:05

## 2024-05-03 RX ADMIN — ACETAMINOPHEN 1000 MG: 500 TABLET ORAL at 09:05

## 2024-05-03 RX ADMIN — METHOCARBAMOL 1000 MG: 500 TABLET ORAL at 10:05

## 2024-05-03 RX ADMIN — METHOCARBAMOL 1000 MG: 500 TABLET ORAL at 05:05

## 2024-05-03 RX ADMIN — ATORVASTATIN CALCIUM 40 MG: 40 TABLET, FILM COATED ORAL at 10:05

## 2024-05-03 RX ADMIN — POLYETHYLENE GLYCOL 3350 17 G: 17 POWDER, FOR SOLUTION ORAL at 10:05

## 2024-05-03 RX ADMIN — OXYBUTYNIN CHLORIDE 10 MG: 10 TABLET, EXTENDED RELEASE ORAL at 10:05

## 2024-05-03 RX ADMIN — METHOCARBAMOL 1000 MG: 500 TABLET ORAL at 02:05

## 2024-05-03 RX ADMIN — OXYCODONE HYDROCHLORIDE 10 MG: 10 TABLET ORAL at 05:05

## 2024-05-03 RX ADMIN — CEFAZOLIN 2 G: 2 INJECTION, POWDER, FOR SOLUTION INTRAMUSCULAR; INTRAVENOUS at 12:05

## 2024-05-03 RX ADMIN — ACETAMINOPHEN 1000 MG: 500 TABLET ORAL at 02:05

## 2024-05-03 RX ADMIN — METHOCARBAMOL 1000 MG: 500 TABLET ORAL at 09:05

## 2024-05-03 RX ADMIN — PANTOPRAZOLE SODIUM 40 MG: 20 TABLET, DELAYED RELEASE ORAL at 10:05

## 2024-05-03 RX ADMIN — OXYCODONE HYDROCHLORIDE 10 MG: 10 TABLET ORAL at 04:05

## 2024-05-03 RX ADMIN — TAMSULOSIN HYDROCHLORIDE 0.4 MG: 0.4 CAPSULE ORAL at 10:05

## 2024-05-03 RX ADMIN — DOCUSATE SODIUM AND SENNOSIDES 1 TABLET: 8.6; 5 TABLET, FILM COATED ORAL at 09:05

## 2024-05-03 RX ADMIN — ENOXAPARIN SODIUM 40 MG: 40 INJECTION SUBCUTANEOUS at 05:05

## 2024-05-03 RX ADMIN — GABAPENTIN 300 MG: 300 CAPSULE ORAL at 09:05

## 2024-05-03 RX ADMIN — ACETAMINOPHEN 1000 MG: 500 TABLET ORAL at 10:05

## 2024-05-03 RX ADMIN — LISINOPRIL 5 MG: 5 TABLET ORAL at 10:05

## 2024-05-03 NOTE — ASSESSMENT & PLAN NOTE
POCT 4x daily, diabetic diet, ISS. Not on insulin at home. Hold home dose metformin. Will need to continue accuchecks at [facility] with insulin sliding scale as appropriate.

## 2024-05-03 NOTE — PROGRESS NOTES
Portal message reminder sent to collect and return at home Fit kit test. Gap report updated.Immunization's updated/triggered.

## 2024-05-03 NOTE — PLAN OF CARE
Problem: Adult Inpatient Plan of Care  Goal: Absence of Hospital-Acquired Illness or Injury  Outcome: Progressing     Problem: Diabetes Comorbidity  Goal: Blood Glucose Level Within Targeted Range  Outcome: Progressing     Problem: Wound  Goal: Optimal Coping  Outcome: Progressing     Problem: Pain Acute  Goal: Optimal Pain Control and Function  Outcome: Progressing

## 2024-05-03 NOTE — PT/OT/SLP PROGRESS
Physical Therapy Treatment    Patient Name:  Emeka Caballero   MRN:  [id number]    Recommendations:     Discharge Recommendations: High Intensity Therapy  Discharge Equipment Recommendations: walker, rolling, bedside commode  Barriers to discharge: None    Assessment:     Emeka Caballero is a 49 y.o. male admitted with a medical diagnosis of Lumbar spondylosis.  He presents with the following impairments/functional limitations: weakness, impaired functional mobility, gait instability, decreased lower extremity function, pain, orthopedic precautions. Patient with high pain this date however was able to participate in session. He demonstrated improved gait speed and slight improvement in standing posture this date, with continued gait deviations. He navigated 4 stairs with min/modA. Overall patient is making progress toward goals and remains motivated.      Rehab Prognosis: Good; patient would benefit from acute skilled PT services to address these deficits and reach maximum level of function.    Recent Surgery: Procedure(s) (LRB):  **AIRO** L2-pelvis extension and revision of fusion, (N/A) 2 Days Post-Op    Plan:     During this hospitalization, patient to be seen 4 x/week to address the identified rehab impairments via gait training, therapeutic activities, therapeutic exercises, neuromuscular re-education, wheelchair management/training and progress toward the following goals:    Plan of Care Expires:  06/02/24    Subjective     Chief Complaint: pain  Patient/Family Comments/goals: continue progressing  Pain/Comfort:  Pain Rating 1: 9/10  Location - Side 1: Bilateral  Location - Orientation 1: lower  Location 1: back (and abdomen)  Pain Addressed 1: Reposition, Distraction, Nurse notified  Pain Rating Post-Intervention 1:  (not rated)      Objective:     Communicated with ALEKSANDER Lanier prior to session.  Patient found HOB elevated with Other (comments) (hemovac) upon PT entry to room.     General  Precautions: Standard, fall  Orthopedic Precautions: spinal precautions  Braces: TLSO  Respiratory Status: Room air     Functional Mobility:  Bed Mobility:     Rolling Right: contact guard assistance  Scooting: contact guard assistance  Supine to Sit: minimum assistance  Transfers:     Sit to Stand:  minimum assistance with rolling walker  Progressed to CGA  Gait: patient ambulated 80' with 2ww with CGA  Deviations Noted: decreased gait speed, decreased step height R,L, and decreased step length R, L forward posture over walker   Continued decrease in speed with fatigue. Chair follow.   Balance:   Sitting static: independent   Sitting dynamic: SBA  Standing static: CGA with 2ww  Stairs:  Pt ascended/descended 4 stair(s) with No Assistive Device  Ascent: step to R with BUE on RHR, Frances  Descent: step to R with BUE on LHR, modA      AM-PAC 6 CLICK MOBILITY  Turning over in bed (including adjusting bedclothes, sheets and blankets)?: 3  Sitting down on and standing up from a chair with arms (e.g., wheelchair, bedside commode, etc.): 3  Moving from lying on back to sitting on the side of the bed?: 3  Moving to and from a bed to a chair (including a wheelchair)?: 3  Need to walk in hospital room?: 3  Climbing 3-5 steps with a railing?: 2  Basic Mobility Total Score: 17       Treatment & Education:  Patient educated on components of brace, appropriate times to don/doff, verbal cues and [place] guidance for donning/doffing brace.  Cues during ambulation for sequencing, weight shifting, upright posture throughout, appropriate step length and height.  Patient achieved increased step length.   Education and demonstration of safe stair navigation with step to pattern/BUE railing use for improved pain, exercise tolerance, balance, and to address overall safety.    Mobility performed with assistance from rehab tech, under direct supervision and direction of therapist.     Patient left up in chair with all lines intact, call button in  reach, and RN notified.    GOALS:   Multidisciplinary Problems       Physical Therapy Goals          Problem: Physical Therapy    Goal Priority Disciplines Outcome Goal Variances Interventions   Physical Therapy Goal     PT, PT/OT Progressing     Description: Goals to be met by: 24     Patient will increase functional independence with mobility by performin. Supine to sit with Contact Guard Assistance  2. Sit to supine with Contact Guard Assistance  3. Sit to stand transfer with Contact Guard Assistance  4. Bed to chair transfer with Contact Guard Assistance using Rolling Walker  5. Gait  x 250 feet with Contact Guard Assistance using Rolling Walker.   6. Ascend/descend 12 stair with bilateral Handrails Contact Guard Assistance using No Assistive Device.                          Time Tracking:     PT Received On: 24  PT Start Time: 1341     PT Stop Time: 1405  PT Total Time (min): 24 min     Billable Minutes: Gait Training 24 minutes    Treatment Type: Treatment  PT/PTA: PT     Number of PTA visits since last PT visit: 0     2024

## 2024-05-03 NOTE — CONSULTS
Inpatient consult to Physical Medicine Rehab  Consult performed by: Antoinette Caceres NP  Consult ordered by: Alphonse Reed MD  Reason for consult: Rehab      Consult received.     MAGDALENA Joseph, FNP-C  Physical Medicine & Rehabilitation   05/03/2024

## 2024-05-03 NOTE — ASSESSMENT & PLAN NOTE
Patient's anemia is currently controlled. Has not received any PRBCs to date. Etiology likely d/t expected blood loss post-op.  Current CBC reviewed-   Lab Results   Component Value Date    HGB 11.0 (L) 05/03/2024    HCT 33.9 (L) 05/03/2024     Monitor serial CBC and transfuse if patient becomes hemodynamically unstable, symptomatic or H/H drops below 7/21.

## 2024-05-03 NOTE — PLAN OF CARE
Patient accepted by [facility] and PMR in agreement.  Auth to be submitted for.   05/03/24 1455   Post-Acute Status   Post-Acute Authorization Placement   Post-Acute Placement Status Pending payor review/awaiting authorization (if required)

## 2024-05-03 NOTE — PROGRESS NOTES
[facility] - Neurosurgery ([facility])  Neurosurgery  Progress Note    Subjective:     History of Present Illness: Emeka Caballero is a 49 y.o. male s/p L4-pelvis fusion and L4-S1 TLIF on 02/08/2022 for s/p L4-pelvis fusion and L4-5, L5-S1 TLIF on 02/08/2022 with Dr. Reed who presents with adjacent segment disease for extension/revision of fusion L2-Pelvis. He is being seen in clinic today to follow-up from his recent ED visit from 12/12/23 where he had fallen and has had worsening right buttock pain that radiates down his right lower extremity. The patient obtained an injection as previously recommended by Dr. Reed about 2 weeks ago in the right SI joint without any relief. He is taking ibuprofen 800 mg with no relief. Reports difficulties with standing up straight. He is utilizing a cane to ambulate. Reports the pain being a burning sensation associated with numbness in the feet. He has also attempted PT without relief.     Post-Op Info:  Procedure(s) (LRB):  **AIRO** L2-pelvis extension and revision of fusion, (N/A)   2 Days Post-Op   Interval History: NAEON. AFVSS. Endorses back pain, controlled with medication. R HV drain removed. Exam stable. Voiding spontaneously. WV removed after leak indicator alarm on all night, several tegaderms placed without resolution. Aquacel foam dressing applied. Pending placement.    Medications:  Continuous Infusions:  Current Facility-Administered Medications   Medication Dose Route Frequency Last Rate Last Admin     Scheduled Meds:  Current Facility-Administered Medications   Medication Dose Route Frequency    acetaminophen  1,000 mg Oral QID    atorvastatin  40 mg Oral Daily    bisacodyL  10 mg Rectal Daily    enoxparin  40 mg Subcutaneous Q24H (prophylaxis, 1700)    gabapentin  300 mg Oral QHS    lisinopriL  5 mg Oral Daily    methocarbamoL  1,000 mg Oral QID    oxybutynin  10 mg Oral Daily    pantoprazole  40 mg Oral Daily    polyethylene glycol  17 g Oral Daily     senna-docusate 8.6-50 mg  1 tablet Oral BID    tamsulosin  0.4 mg Oral Daily     PRN Meds:  Current Facility-Administered Medications:     aluminum-magnesium hydroxide-simethicone, 30 mL, Oral, Q4H PRN    dextrose 10%, 12.5 g, Intravenous, PRN    dextrose 10%, 25 g, Intravenous, PRN    glucagon (human recombinant), 1 mg, Intramuscular, PRN    glucose, 16 g, Oral, PRN    glucose, 24 g, Oral, PRN    HYDROmorphone, 2 mg, Intravenous, Q3H PRN    insulin aspart U-100, 0-10 Units, Subcutaneous, QID (AC + HS) PRN    linaCLOtide, 145 mcg, Oral, Daily PRN    ondansetron, 8 mg, Oral, Q6H PRN    oxyCODONE, 10 mg, Oral, Q4H PRN    prochlorperazine, 5 mg, Intravenous, Q6H PRN     Review of Systems  Objective:     Weight: 93.8 kg (206 lb 12.7 oz)  Body mass index is 31.21 kg/m².  Vital Signs (Most Recent):  Temp: 98 °F (36.7 °C) (05/03/24 0854)  Pulse: (!) 113 (05/03/24 0854)  Resp: 18 (05/03/24 1005)  BP: 121/65 (05/03/24 0854)  SpO2: 97 % (05/03/24 0854) Vital Signs (24h Range):  Temp:  [97.9 °F (36.6 °C)-99 °F (37.2 °C)] 98 °F (36.7 °C)  Pulse:  [100-113] 113  Resp:  [17-19] 18  SpO2:  [95 %-97 %] 97 %  BP: (106-121)/(57-65) 121/65                                Closed/Suction Drain 05/01/24 1124 Tube - 1 Left Back Accordion 10 Fr. (Active)   Site Description Leaking at site 05/01/24 1945   Dressing Type Transparent (Tegaderm) 05/01/24 1945   Dressing Status Clean;Dry;Intact 05/01/24 1945   Dressing Intervention Integrity maintained 05/01/24 1945   Drainage Bloody 05/01/24 1945   Status Other (Comment) 05/01/24 1945   Output (mL) 90 mL 05/02/24 1133            Closed/Suction Drain 05/01/24 1126 Tube - 2 Right Back Accordion 10 Fr. (Active)   Site Description Leaking at site;Other (Comment) 05/01/24 1945   Dressing Type Transparent (Tegaderm) 05/01/24 1945   Dressing Status Clean;Dry;Intact 05/01/24 1945   Dressing Intervention Integrity maintained 05/01/24 1945   Drainage Bloody 05/01/24 1945   Status Other (Comment) 05/01/24  "1945   Output (mL) 40 mL 05/02/24 1133          Physical Exam         Neurosurgery Physical Exam    General: well developed, well nourished, no distress.   Head: normocephalic, atraumatic  Neurologic: Alert and oriented. Thought content appropriate.  GCS: Motor: 6/Verbal: 5/Eyes: 4 GCS Total: 15  Mental Status: Awake, Alert, Oriented x 4  Language: No aphasia  Speech: No dysarthria  Cranial nerves: face symmetric, tongue midline, CN II-XII grossly intact.   Eyes: pupils equal, round, reactive to light with accomodation, EOMI.   Pulmonary: normal respirations, no signs of respiratory distress  Abdomen: soft, non-distended, not tender to palpation  Sensory: intact to light touch throughout  Motor Strength: Moves all extremities spontaneously with good tone.  Full strength upper and lower extremities. No abnormal movements seen.     Strength  Deltoids Triceps Biceps Wrist Extension Wrist Flexion Hand    Upper: R 5/5 5/5 5/5 5/5 5/5 5/5    L 5/5 5/5 5/5 5/5 5/5 5/5     Iliopsoas Quadriceps Knee  Flexion Tibialis  anterior Gastro- cnemius EHL   Lower: R 5/5 5/5 5/5 5/5 5/5 5/5    L 5/5 5/5 5/5 5/5 5/5 5/5     Skin: Skin is warm, dry and intact.    Incision c/d/I with staples, foam dressing applied. HV with serosanguinous output.       Significant Labs:  Recent Labs   Lab 05/02/24 0525 05/03/24  0436   * 157*    137   K 3.8 3.8    106   CO2 22* 23   BUN 16 16   CREATININE 0.9 0.9   CALCIUM 8.1* 8.4*     Recent Labs   Lab 05/02/24  0525 05/03/24  0436   WBC 10.74 9.34   HGB 10.9* 11.0*   HCT 33.2* 33.9*    175     No results for input(s): "LABPT", "INR", "APTT" in the last 48 hours.    Microbiology Results (last 7 days)       ** No results found for the last 168 hours. **          Recent Lab Results         05/03/24  0509   05/03/24  0436   05/02/24  2041 05/02/24  1528        Anion Gap   8           Baso #   0.04           Basophil %   0.4           BUN   16           Calcium   8.4        "    Chloride   106           CO2   23           Creatinine   0.9           Differential Method   Automated           eGFR   >60.0           Eos #   0.1           Eos %   1.1           Glucose   157           Gran # (ANC)   5.3           Gran %   56.5           Hematocrit   33.9           Hemoglobin   11.0           Immature Grans (Abs)   0.03  Comment: Mild elevation in immature granulocytes is non specific and   can be seen in a variety of conditions including stress response,   acute inflammation, trauma and pregnancy. Correlation with other   laboratory and clinical findings is essential.             Immature Granulocytes   0.3           Lymph #   3.1           Lymph %   32.7           MCH   27.9           MCHC   32.4           MCV   86           Mono #   0.8           Mono %   9.0           MPV   11.1           nRBC   0           Platelet Count   175           POCT Glucose 161     200   177       Potassium   3.8           RBC   3.94           RDW   14.6           Sodium   137           WBC   9.34                 All pertinent labs from the last 24 hours have been reviewed.    Significant Diagnostics:  I have reviewed all pertinent imaging results/findings within the past 24 hours.  Assessment/Plan:     * Lumbar spondylosis  Emeka Caballero is a 49 y.o. male s/p L4-pelvis fusion and L4-S1 TLIF on 02/08/2022 for s/p L4-pelvis fusion and L4-5, L5-S1 TLIF on 02/08/2022 with Dr. Reed presents with adjacent segment disease now s/p extension/revision of fusion L2-Pelvis on 5/1.    - Neurologically stable  - Pain control: continue current regimen   - WV to POD5  - Drains: HV x 2 gravity, abx while drains in place  - Post op imaging showing satisfactory placement of hardware.   - Brace: TLSO when OOB  - Continue PT/OT/OOB. OOB > 6hrs/day  - GI: Diet as tolerated. Continue bowel regimen.   - [place]onan removed. Bladder scan q6h, I/O cath if > 350 cc.  - DVT ppx: LVX, Compression stockings, SCDs  - Atelectasis ppx: IS  at bedside. Encourage use every hour while awake.  - Discussed with Dr. Reed    - Dispo: Pending drain removal, pain control    Acute blood loss anemia  Patient's anemia is currently controlled. Has not received any PRBCs to date. Etiology likely d/t expected blood loss post-op.  Current CBC reviewed-   Lab Results   Component Value Date    HGB 11.0 (L) 05/03/2024    HCT 33.9 (L) 05/03/2024     Monitor serial CBC and transfuse if patient becomes hemodynamically unstable, symptomatic or H/H drops below 7/21.    Essential hypertension, benign  Chronic, controlled. Latest blood pressure and vitals reviewed-     Temp:  [97.9 °F (36.6 °C)-99.1 °F (37.3 °C)]   Pulse:  []   Resp:  [15-20]   BP: (110-129)/(55-77)   SpO2:  [94 %-99 %] .   Home meds for hypertension were reviewed and noted below.   Hypertension Medications               lisinopriL (PRINIVIL,ZESTRIL) 5 MG tablet Take 1 tablet (5 mg total) by mouth once daily.            While in the hospital, will manage blood pressure as follows; Continue home antihypertensive regimen    Will utilize p.r.n. blood pressure medication only if patient's blood pressure greater than 160/100 and he develops symptoms such as worsening chest pain or shortness of breath.    Type 2 diabetes mellitus with hyperlipidemia  POCT 4x daily, diabetic diet, ISS. Not on insulin at home. Hold home dose metformin. Will need to continue accuchecks at IPR with insulin sliding scale as appropriate.      BPH (benign prostatic hyperplasia)  Continue home dose flomax        Ling Malloy PA-C  Neurosurgery  [facility] - Neurosurgery ([facility])

## 2024-05-03 NOTE — PLAN OF CARE
PMR placed and in agreement to Rehab.  [facility] reviewing.     05/03/24 1442   Post-Acute Status   Post-Acute Authorization Placement   Post-Acute Placement Status Referrals Sent

## 2024-05-03 NOTE — SUBJECTIVE & OBJECTIVE
Interval History: NAEON. AFVSS. Endorses back pain, controlled with medication. R HV drain removed. Exam stable. Voiding spontaneously. WV removed after leak indicator alarm on all night, several tegaderms placed without resolution. Aquacel foam dressing applied. Pending placement.    Medications:  Continuous Infusions:  Current Facility-Administered Medications   Medication Dose Route Frequency Last Rate Last Admin     Scheduled Meds:  Current Facility-Administered Medications   Medication Dose Route Frequency    acetaminophen  1,000 mg Oral QID    atorvastatin  40 mg Oral Daily    bisacodyL  10 mg Rectal Daily    enoxparin  40 mg Subcutaneous Q24H (prophylaxis, 1700)    gabapentin  300 mg Oral QHS    lisinopriL  5 mg Oral Daily    methocarbamoL  1,000 mg Oral QID    oxybutynin  10 mg Oral Daily    pantoprazole  40 mg Oral Daily    polyethylene glycol  17 g Oral Daily    senna-docusate 8.6-50 mg  1 tablet Oral BID    tamsulosin  0.4 mg Oral Daily     PRN Meds:  Current Facility-Administered Medications:     aluminum-magnesium hydroxide-simethicone, 30 mL, Oral, Q4H PRN    dextrose 10%, 12.5 g, Intravenous, PRN    dextrose 10%, 25 g, Intravenous, PRN    glucagon (human recombinant), 1 mg, Intramuscular, PRN    glucose, 16 g, Oral, PRN    glucose, 24 g, Oral, PRN    HYDROmorphone, 2 mg, Intravenous, Q3H PRN    insulin aspart U-100, 0-10 Units, Subcutaneous, QID (AC + HS) PRN    linaCLOtide, 145 mcg, Oral, Daily PRN    ondansetron, 8 mg, Oral, Q6H PRN    oxyCODONE, 10 mg, Oral, Q4H PRN    prochlorperazine, 5 mg, Intravenous, Q6H PRN     Review of Systems  Objective:     Weight: 93.8 kg (206 lb 12.7 oz)  Body mass index is 31.21 kg/m².  Vital Signs (Most Recent):  Temp: 98 °F (36.7 °C) (05/03/24 0854)  Pulse: (!) 113 (05/03/24 0854)  Resp: 18 (05/03/24 1005)  BP: 121/65 (05/03/24 0854)  SpO2: 97 % (05/03/24 0854) Vital Signs (24h Range):  Temp:  [97.9 °F (36.6 °C)-99 °F (37.2 °C)] 98 °F (36.7 °C)  Pulse:  [100-113]  113  Resp:  [17-19] 18  SpO2:  [95 %-97 %] 97 %  BP: (106-121)/(57-65) 121/65                                Closed/Suction Drain 05/01/24 1124 Tube - 1 Left Back Accordion 10 Fr. (Active)   Site Description Leaking at site 05/01/24 1945   Dressing Type Transparent (Tegaderm) 05/01/24 1945   Dressing Status Clean;Dry;Intact 05/01/24 1945   Dressing Intervention Integrity maintained 05/01/24 1945   Drainage Bloody 05/01/24 1945   Status Other (Comment) 05/01/24 1945   Output (mL) 90 mL 05/02/24 1133            Closed/Suction Drain 05/01/24 1126 Tube - 2 Right Back Accordion 10 Fr. (Active)   Site Description Leaking at site;Other (Comment) 05/01/24 1945   Dressing Type Transparent (Tegaderm) 05/01/24 1945   Dressing Status Clean;Dry;Intact 05/01/24 1945   Dressing Intervention Integrity maintained 05/01/24 1945   Drainage Bloody 05/01/24 1945   Status Other (Comment) 05/01/24 1945   Output (mL) 40 mL 05/02/24 1133          Physical Exam         Neurosurgery Physical Exam    General: well developed, well nourished, no distress.   Head: normocephalic, atraumatic  Neurologic: Alert and oriented. Thought content appropriate.  GCS: Motor: 6/Verbal: 5/Eyes: 4 GCS Total: 15  Mental Status: Awake, Alert, Oriented x 4  Language: No aphasia  Speech: No dysarthria  Cranial nerves: face symmetric, tongue midline, CN II-XII grossly intact.   Eyes: pupils equal, round, reactive to light with accomodation, EOMI.   Pulmonary: normal respirations, no signs of respiratory distress  Abdomen: soft, non-distended, not tender to palpation  Sensory: intact to light touch throughout  Motor Strength: Moves all extremities spontaneously with good tone.  Full strength upper and lower extremities. No abnormal movements seen.     Strength  Deltoids Triceps Biceps Wrist Extension Wrist Flexion Hand    Upper: R 5/5 5/5 5/5 5/5 5/5 5/5    L 5/5 5/5 5/5 5/5 5/5 5/5     Iliopsoas Quadriceps Knee  Flexion Tibialis  anterior Gastro- cnemius EHL  "  Lower: R 5/5 5/5 5/5 5/5 5/5 5/5    L 5/5 5/5 5/5 5/5 5/5 5/5     Skin: Skin is warm, dry and intact.    Incision c/d/I with staples, foam dressing applied. HV with serosanguinous output.       Significant Labs:  Recent Labs   Lab 05/02/24  0525 05/03/24  0436   * 157*    137   K 3.8 3.8    106   CO2 22* 23   BUN 16 16   CREATININE 0.9 0.9   CALCIUM 8.1* 8.4*     Recent Labs   Lab 05/02/24  0525 05/03/24  0436   WBC 10.74 9.34   HGB 10.9* 11.0*   HCT 33.2* 33.9*    175     No results for input(s): "LABPT", "INR", "APTT" in the last 48 hours.    Microbiology Results (last 7 days)       ** No results found for the last 168 hours. **          Recent Lab Results         05/03/24  0509   05/03/24  0436   05/02/24  2041   05/02/24  1528        Anion Gap   8           Baso #   0.04           Basophil %   0.4           BUN   16           Calcium   8.4           Chloride   106           CO2   23           Creatinine   0.9           Differential Method   Automated           eGFR   >60.0           Eos #   0.1           Eos %   1.1           Glucose   157           Gran # (ANC)   5.3           Gran %   56.5           Hematocrit   33.9           Hemoglobin   11.0           Immature Grans (Abs)   0.03  Comment: Mild elevation in immature granulocytes is non specific and   can be seen in a variety of conditions including stress response,   acute inflammation, trauma and pregnancy. Correlation with other   laboratory and clinical findings is essential.             Immature Granulocytes   0.3           Lymph #   3.1           Lymph %   32.7           MCH   27.9           MCHC   32.4           MCV   86           Mono #   0.8           Mono %   9.0           MPV   11.1           nRBC   0           Platelet Count   175           POCT Glucose 161     200   177       Potassium   3.8           RBC   3.94           RDW   14.6           Sodium   137           WBC   9.34                 All pertinent labs from the " last 24 hours have been reviewed.    Significant Diagnostics:  I have reviewed all pertinent imaging results/findings within the past 24 hours.

## 2024-05-04 LAB
ANION GAP SERPL CALC-SCNC: 5 MMOL/L (ref 8–16)
BASOPHILS # BLD AUTO: 0.04 K/UL (ref 0–0.2)
BASOPHILS NFR BLD: 0.5 % (ref 0–1.9)
BUN SERPL-MCNC: 12 MG/DL (ref 6–20)
CALCIUM SERPL-MCNC: 8.9 MG/DL (ref 8.7–10.5)
CHLORIDE SERPL-SCNC: 103 MMOL/L (ref 95–110)
CO2 SERPL-SCNC: 28 MMOL/L (ref 23–29)
CREAT SERPL-MCNC: 0.9 MG/DL (ref 0.5–1.4)
DIFFERENTIAL METHOD BLD: ABNORMAL
EOSINOPHIL # BLD AUTO: 0.2 K/UL (ref 0–0.5)
EOSINOPHIL NFR BLD: 2.1 % (ref 0–8)
ERYTHROCYTE [DISTWIDTH] IN BLOOD BY AUTOMATED COUNT: 14.1 % (ref 11.5–14.5)
EST. GFR  (NO RACE VARIABLE): >60 ML/MIN/1.73 M^2
GLUCOSE SERPL-MCNC: 125 MG/DL (ref 70–110)
HCT VFR BLD AUTO: 31.9 % (ref 40–54)
HGB BLD-MCNC: 10.4 G/DL (ref 14–18)
IMM GRANULOCYTES # BLD AUTO: 0.05 K/UL (ref 0–0.04)
IMM GRANULOCYTES NFR BLD AUTO: 0.6 % (ref 0–0.5)
LYMPHOCYTES # BLD AUTO: 2.3 K/UL (ref 1–4.8)
LYMPHOCYTES NFR BLD: 27.1 % (ref 18–48)
MCH RBC QN AUTO: 28.1 PG (ref 27–31)
MCHC RBC AUTO-ENTMCNC: 32.6 G/DL (ref 32–36)
MCV RBC AUTO: 86 FL (ref 82–98)
MONOCYTES # BLD AUTO: 0.9 K/UL (ref 0.3–1)
MONOCYTES NFR BLD: 10.4 % (ref 4–15)
NEUTROPHILS # BLD AUTO: 5 K/UL (ref 1.8–7.7)
NEUTROPHILS NFR BLD: 59.3 % (ref 38–73)
NRBC BLD-RTO: 0 /100 WBC
PLATELET # BLD AUTO: 164 K/UL (ref 150–450)
PMV BLD AUTO: 10.2 FL (ref 9.2–12.9)
POCT GLUCOSE: 120 MG/DL (ref 70–110)
POCT GLUCOSE: 141 MG/DL (ref 70–110)
POCT GLUCOSE: 148 MG/DL (ref 70–110)
POCT GLUCOSE: 171 MG/DL (ref 70–110)
POCT GLUCOSE: 203 MG/DL (ref 70–110)
POCT GLUCOSE: 215 MG/DL (ref 70–110)
POTASSIUM SERPL-SCNC: 3.9 MMOL/L (ref 3.5–5.1)
RBC # BLD AUTO: 3.7 M/UL (ref 4.6–6.2)
SODIUM SERPL-SCNC: 136 MMOL/L (ref 136–145)
WBC # BLD AUTO: 8.45 K/UL (ref 3.9–12.7)

## 2024-05-04 PROCEDURE — 11000001 HC ACUTE MED/SURG PRIVATE ROOM

## 2024-05-04 PROCEDURE — 25000003 PHARM REV CODE 250

## 2024-05-04 PROCEDURE — 85025 COMPLETE CBC W/AUTO DIFF WBC: CPT | Performed by: STUDENT IN AN ORGANIZED HEALTH CARE EDUCATION/TRAINING PROGRAM

## 2024-05-04 PROCEDURE — 80048 BASIC METABOLIC PNL TOTAL CA: CPT | Performed by: STUDENT IN AN ORGANIZED HEALTH CARE EDUCATION/TRAINING PROGRAM

## 2024-05-04 PROCEDURE — 25000003 PHARM REV CODE 250: Performed by: STUDENT IN AN ORGANIZED HEALTH CARE EDUCATION/TRAINING PROGRAM

## 2024-05-04 PROCEDURE — 99024 POSTOP FOLLOW-UP VISIT: CPT | Mod: ,,,

## 2024-05-04 PROCEDURE — 36415 COLL VENOUS BLD VENIPUNCTURE: CPT | Performed by: STUDENT IN AN ORGANIZED HEALTH CARE EDUCATION/TRAINING PROGRAM

## 2024-05-04 PROCEDURE — 63600175 PHARM REV CODE 636 W HCPCS: Performed by: PHYSICIAN ASSISTANT

## 2024-05-04 PROCEDURE — 25000003 PHARM REV CODE 250: Performed by: PHYSICIAN ASSISTANT

## 2024-05-04 RX ADMIN — METHOCARBAMOL 1000 MG: 500 TABLET ORAL at 01:05

## 2024-05-04 RX ADMIN — DOCUSATE SODIUM AND SENNOSIDES 1 TABLET: 8.6; 5 TABLET, FILM COATED ORAL at 10:05

## 2024-05-04 RX ADMIN — ACETAMINOPHEN 1000 MG: 500 TABLET ORAL at 05:05

## 2024-05-04 RX ADMIN — OXYCODONE HYDROCHLORIDE 10 MG: 10 TABLET ORAL at 11:05

## 2024-05-04 RX ADMIN — ACETAMINOPHEN 1000 MG: 500 TABLET ORAL at 10:05

## 2024-05-04 RX ADMIN — ENOXAPARIN SODIUM 40 MG: 40 INJECTION SUBCUTANEOUS at 05:05

## 2024-05-04 RX ADMIN — INSULIN ASPART 4 UNITS: 100 INJECTION, SOLUTION INTRAVENOUS; SUBCUTANEOUS at 11:05

## 2024-05-04 RX ADMIN — ACETAMINOPHEN 1000 MG: 500 TABLET ORAL at 09:05

## 2024-05-04 RX ADMIN — OXYBUTYNIN CHLORIDE 10 MG: 10 TABLET, EXTENDED RELEASE ORAL at 09:05

## 2024-05-04 RX ADMIN — ATORVASTATIN CALCIUM 40 MG: 40 TABLET, FILM COATED ORAL at 09:05

## 2024-05-04 RX ADMIN — GABAPENTIN 300 MG: 300 CAPSULE ORAL at 10:05

## 2024-05-04 RX ADMIN — POLYETHYLENE GLYCOL 3350 17 G: 17 POWDER, FOR SOLUTION ORAL at 09:05

## 2024-05-04 RX ADMIN — LISINOPRIL 5 MG: 5 TABLET ORAL at 09:05

## 2024-05-04 RX ADMIN — TAMSULOSIN HYDROCHLORIDE 0.4 MG: 0.4 CAPSULE ORAL at 09:05

## 2024-05-04 RX ADMIN — OXYCODONE HYDROCHLORIDE 10 MG: 10 TABLET ORAL at 10:05

## 2024-05-04 RX ADMIN — DOCUSATE SODIUM AND SENNOSIDES 1 TABLET: 8.6; 5 TABLET, FILM COATED ORAL at 09:05

## 2024-05-04 RX ADMIN — METHOCARBAMOL 1000 MG: 500 TABLET ORAL at 10:05

## 2024-05-04 RX ADMIN — Medication 1 ENEMA: at 05:05

## 2024-05-04 RX ADMIN — METHOCARBAMOL 1000 MG: 500 TABLET ORAL at 09:05

## 2024-05-04 RX ADMIN — PANTOPRAZOLE SODIUM 40 MG: 20 TABLET, DELAYED RELEASE ORAL at 09:05

## 2024-05-04 RX ADMIN — INSULIN ASPART 2 UNITS: 100 INJECTION, SOLUTION INTRAVENOUS; SUBCUTANEOUS at 10:05

## 2024-05-04 RX ADMIN — METHOCARBAMOL 1000 MG: 500 TABLET ORAL at 05:05

## 2024-05-04 NOTE — SUBJECTIVE & OBJECTIVE
Interval History: NAEON. AFVSS. Pain controlled. Strength stable. Observed ambulating hallway this AM. HV with high output, will keep today. Pending rehab.    Medications:  Continuous Infusions:  Current Facility-Administered Medications   Medication Dose Route Frequency Last Rate Last Admin     Scheduled Meds:  Current Facility-Administered Medications   Medication Dose Route Frequency    acetaminophen  1,000 mg Oral QID    atorvastatin  40 mg Oral Daily    bisacodyL  10 mg Rectal Daily    enoxparin  40 mg Subcutaneous Q24H (prophylaxis, 1700)    gabapentin  300 mg Oral QHS    lisinopriL  5 mg Oral Daily    methocarbamoL  1,000 mg Oral QID    oxybutynin  10 mg Oral Daily    pantoprazole  40 mg Oral Daily    polyethylene glycol  17 g Oral Daily    senna-docusate 8.6-50 mg  1 tablet Oral BID    tamsulosin  0.4 mg Oral Daily     PRN Meds:  Current Facility-Administered Medications:     aluminum-magnesium hydroxide-simethicone, 30 mL, Oral, Q4H PRN    dextrose 10%, 12.5 g, Intravenous, PRN    dextrose 10%, 25 g, Intravenous, PRN    glucagon (human recombinant), 1 mg, Intramuscular, PRN    glucose, 16 g, Oral, PRN    glucose, 24 g, Oral, PRN    HYDROmorphone, 2 mg, Intravenous, Q3H PRN    insulin aspart U-100, 0-10 Units, Subcutaneous, QID (AC + HS) PRN    linaCLOtide, 145 mcg, Oral, Daily PRN    ondansetron, 8 mg, Oral, Q6H PRN    oxyCODONE, 10 mg, Oral, Q4H PRN    prochlorperazine, 5 mg, Intravenous, Q6H PRN     Review of Systems  Objective:     Weight: 93.8 kg (206 lb 12.7 oz)  Body mass index is 31.21 kg/m².  Vital Signs (Most Recent):  Temp: 99.9 °F (37.7 °C) (05/04/24 0804)  Pulse: (!) 128 (05/04/24 0804)  Resp: 18 (05/04/24 0804)  BP: 128/75 (05/04/24 0804)  SpO2: 95 % (05/04/24 0804) Vital Signs (24h Range):  Temp:  [98.1 °F (36.7 °C)-99.9 °F (37.7 °C)] 99.9 °F (37.7 °C)  Pulse:  [102-128] 128  Resp:  [18-20] 18  SpO2:  [95 %-96 %] 95 %  BP: (100-128)/(58-75) 128/75                              Closed/Suction  Drain 05/01/24 1124 Tube - 1 Left Back Accordion 10 Fr. (Active)   Site Description Leaking at site 05/01/24 1945   Dressing Type Transparent (Tegaderm) 05/01/24 1945   Dressing Status Clean;Dry;Intact 05/03/24 1800   Dressing Intervention Integrity maintained 05/03/24 1800   Drainage Bloody 05/03/24 1800   Status Other (Comment) 05/03/24 1800   Output (mL) 90 mL 05/02/24 1133            Closed/Suction Drain 05/01/24 1126 Tube - 2 Right Back Accordion 10 Fr. (Active)   Site Description Leaking at site;Other (Comment) 05/01/24 1945   Dressing Type Transparent (Tegaderm) 05/01/24 1945   Dressing Status Clean;Dry;Intact 05/03/24 1800   Dressing Intervention Integrity maintained 05/03/24 1800   Drainage Bloody 05/03/24 1800   Status Other (Comment) 05/01/24 1945   Output (mL) 40 mL 05/02/24 1133       Neurosurgery Physical Exam  General: well developed, well nourished, no distress.   Head: normocephalic, atraumatic  Neurologic: Alert and oriented. Thought content appropriate.  GCS: Motor: 6/Verbal: 5/Eyes: 4 GCS Total: 15  Mental Status: Awake, Alert, Oriented x 4  Language: No aphasia  Speech: No dysarthria  Cranial nerves: face symmetric, tongue midline, CN II-XII grossly intact.   Eyes: pupils equal, round, reactive to light with accomodation, EOMI.   Pulmonary: normal respirations, no signs of respiratory distress  Abdomen: soft, non-distended, not tender to palpation  Sensory: intact to light touch throughout  Motor Strength: Moves all extremities spontaneously with good tone.  Full strength upper and lower extremities. No abnormal movements seen.      Strength   Deltoids Triceps Biceps Wrist Extension Wrist Flexion Hand    Upper: R 5/5 5/5 5/5 5/5 5/5 5/5     L 5/5 5/5 5/5 5/5 5/5 5/5       Iliopsoas Quadriceps Knee  Flexion Tibialis  anterior Gastro- cnemius EHL   Lower: R 5/5 5/5 5/5 5/5 5/5 5/5     L 5/5 5/5 5/5 5/5 5/5 5/5      Skin: Skin is warm, dry and intact.     Incision c/d/I with staples, foam  "dressing applied. HV with serosanguinous output.   Significant Labs:  Recent Labs   Lab 05/03/24  0436 05/04/24  0403   * 125*    136   K 3.8 3.9    103   CO2 23 28   BUN 16 12   CREATININE 0.9 0.9   CALCIUM 8.4* 8.9     Recent Labs   Lab 05/03/24  0436 05/04/24  0403   WBC 9.34 8.45   HGB 11.0* 10.4*   HCT 33.9* 31.9*    164     No results for input(s): "LABPT", "INR", "APTT" in the last 48 hours.  Microbiology Results (last 7 days)       ** No results found for the last 168 hours. **          All pertinent labs from the last 24 hours have been reviewed.    Significant Diagnostics:  I have reviewed and interpreted all pertinent imaging results/findings within the past 24 hours.  "

## 2024-05-04 NOTE — ASSESSMENT & PLAN NOTE
Emeka Caballero is a 49 y.o. male s/p L4-pelvis fusion and L4-S1 TLIF on 02/08/2022 for s/p L4-pelvis fusion and L4-5, L5-S1 TLIF on 02/08/2022 with Dr. Reed presents with adjacent segment disease now s/p extension/revision of fusion L2-Pelvis on 5/1.    - Neurologically stable  - Pain control: continue current regimen   - [place] to POD5  - Drains: HV x 1 gravity, abx while drain in place  - Post op imaging showing satisfactory placement of hardware.   - Brace: TLSO when OOB  - Continue PT/OT/OOB. OOB > 6hrs/day  - GI: Diet as tolerated. Continue bowel regimen.   - Noonan removed. Bladder scan q6h, I/O cath if > 350 cc.  - DVT ppx: LVX, Compression stockings, SCDs  - Atelectasis ppx: IS at bedside. Encourage use every hour while awake.  - Discussed with Dr. Reed    - Dispo: Pending drain removal, pain control

## 2024-05-04 NOTE — ASSESSMENT & PLAN NOTE
Patient's anemia is currently controlled. Has not received any PRBCs to date. Etiology likely d/t expected blood loss post-op.  Current CBC reviewed-   Lab Results   Component Value Date    HGB 10.4 (L) 05/04/2024    HCT 31.9 (L) 05/04/2024     Monitor serial CBC and transfuse if patient becomes hemodynamically unstable, symptomatic or H/H drops below 7/21.

## 2024-05-04 NOTE — ASSESSMENT & PLAN NOTE
Chronic, controlled. Latest blood pressure and vitals reviewed-     Temp:  [98.1 °F (36.7 °C)-99.9 °F (37.7 °C)]   Pulse:  [102-128]   Resp:  [18-20]   BP: (100-128)/(58-75)   SpO2:  [95 %-96 %] .   Home meds for hypertension were reviewed and noted below.   Hypertension Medications               lisinopriL (PRINIVIL,ZESTRIL) 5 MG tablet Take 1 tablet (5 mg total) by mouth once daily.            While in the hospital, will manage blood pressure as follows; Continue home antihypertensive regimen    Will utilize p.r.n. blood pressure medication only if patient's blood pressure greater than 160/100 and he develops symptoms such as worsening chest pain or shortness of breath.

## 2024-05-04 NOTE — PROGRESS NOTES
[facility] - Neurosurgery ([facility])  Neurosurgery  Progress Note    Subjective:     History of Present Illness: Emeka Caballero is a 49 y.o. male s/p L4-pelvis fusion and L4-S1 TLIF on 02/08/2022 for s/p L4-pelvis fusion and L4-5, L5-S1 TLIF on 02/08/2022 with Dr. Reed who presents with adjacent segment disease for extension/revision of fusion L2-Pelvis. He is being seen in clinic today to follow-up from his recent ED visit from 12/12/23 where he had fallen and has had worsening right buttock pain that radiates down his right lower extremity. The patient obtained an injection as previously recommended by Dr. Reed about 2 weeks ago in the right SI joint without any relief. He is taking ibuprofen 800 mg with no relief. Reports difficulties with standing up straight. He is utilizing a cane to ambulate. Reports the pain being a burning sensation associated with numbness in the feet. He has also attempted PT without relief.     Post-Op Info:  Procedure(s) (LRB):  **AIRO** L2-pelvis extension and revision of fusion, (N/A)   3 Days Post-Op   Interval History: NAEON. AFVSS. Pain controlled. Strength stable. Observed ambulating hallway this AM. HV with high output, will keep today. Pending rehab.    Medications:  Continuous Infusions:  Current Facility-Administered Medications   Medication Dose Route Frequency Last Rate Last Admin     Scheduled Meds:  Current Facility-Administered Medications   Medication Dose Route Frequency    acetaminophen  1,000 mg Oral QID    atorvastatin  40 mg Oral Daily    bisacodyL  10 mg Rectal Daily    enoxparin  40 mg Subcutaneous Q24H (prophylaxis, 1700)    gabapentin  300 mg Oral QHS    lisinopriL  5 mg Oral Daily    methocarbamoL  1,000 mg Oral QID    oxybutynin  10 mg Oral Daily    pantoprazole  40 mg Oral Daily    polyethylene glycol  17 g Oral Daily    senna-docusate 8.6-50 mg  1 tablet Oral BID    tamsulosin  0.4 mg Oral Daily     PRN Meds:  Current Facility-Administered  Medications:     aluminum-magnesium hydroxide-simethicone, 30 mL, Oral, Q4H PRN    dextrose 10%, 12.5 g, Intravenous, PRN    dextrose 10%, 25 g, Intravenous, PRN    glucagon (human recombinant), 1 mg, Intramuscular, PRN    glucose, 16 g, Oral, PRN    glucose, 24 g, Oral, PRN    HYDROmorphone, 2 mg, Intravenous, Q3H PRN    insulin aspart U-100, 0-10 Units, Subcutaneous, QID (AC + HS) PRN    linaCLOtide, 145 mcg, Oral, Daily PRN    ondansetron, 8 mg, Oral, Q6H PRN    oxyCODONE, 10 mg, Oral, Q4H PRN    prochlorperazine, 5 mg, Intravenous, Q6H PRN     Review of Systems  Objective:     Weight: 93.8 kg (206 lb 12.7 oz)  Body mass index is 31.21 kg/m².  Vital Signs (Most Recent):  Temp: 99.9 °F (37.7 °C) (05/04/24 0804)  Pulse: (!) 128 (05/04/24 0804)  Resp: 18 (05/04/24 0804)  BP: 128/75 (05/04/24 0804)  SpO2: 95 % (05/04/24 0804) Vital Signs (24h Range):  Temp:  [98.1 °F (36.7 °C)-99.9 °F (37.7 °C)] 99.9 °F (37.7 °C)  Pulse:  [102-128] 128  Resp:  [18-20] 18  SpO2:  [95 %-96 %] 95 %  BP: (100-128)/(58-75) 128/75                              Closed/Suction Drain 05/01/24 1124 Tube - 1 Left Back Accordion 10 Fr. (Active)   Site Description Leaking at site 05/01/24 1945   Dressing Type Transparent (Tegaderm) 05/01/24 1945   Dressing Status Clean;Dry;Intact 05/03/24 1800   Dressing Intervention Integrity maintained 05/03/24 1800   Drainage Bloody 05/03/24 1800   Status Other (Comment) 05/03/24 1800   Output (mL) 90 mL 05/02/24 1133            Closed/Suction Drain 05/01/24 1126 Tube - 2 Right Back Accordion 10 Fr. (Active)   Site Description Leaking at site;Other (Comment) 05/01/24 1945   Dressing Type Transparent (Tegaderm) 05/01/24 1945   Dressing Status Clean;Dry;Intact 05/03/24 1800   Dressing Intervention Integrity maintained 05/03/24 1800   Drainage Bloody 05/03/24 1800   Status Other (Comment) 05/01/24 1945   Output (mL) 40 mL 05/02/24 1133       Neurosurgery Physical Exam  General: well developed, well nourished, no  "distress.   Head: normocephalic, atraumatic  Neurologic: Alert and oriented. Thought content appropriate.  GCS: Motor: 6/Verbal: 5/Eyes: 4 GCS Total: 15  Mental Status: Awake, Alert, Oriented x 4  Language: No aphasia  Speech: No dysarthria  Cranial nerves: face symmetric, tongue midline, CN II-XII grossly intact.   Eyes: pupils equal, round, reactive to light with accomodation, EOMI.   Pulmonary: normal respirations, no signs of respiratory distress  Abdomen: soft, non-distended, not tender to palpation  Sensory: intact to light touch throughout  Motor Strength: Moves all extremities spontaneously with good tone.  Full strength upper and lower extremities. No abnormal movements seen.      Strength   Deltoids Triceps Biceps Wrist Extension Wrist Flexion Hand    Upper: R 5/5 5/5 5/5 5/5 5/5 5/5     L 5/5 5/5 5/5 5/5 5/5 5/5       Iliopsoas Quadriceps Knee  Flexion Tibialis  anterior Gastro- cnemius EHL   Lower: R 5/5 5/5 5/5 5/5 5/5 5/5     L 5/5 5/5 5/5 5/5 5/5 5/5      Skin: Skin is warm, dry and intact.     Incision c/d/I with staples, foam dressing applied. HV with serosanguinous output.   Significant Labs:  Recent Labs   Lab 05/03/24  0436 05/04/24  0403   * 125*    136   K 3.8 3.9    103   CO2 23 28   BUN 16 12   CREATININE 0.9 0.9   CALCIUM 8.4* 8.9     Recent Labs   Lab 05/03/24  0436 05/04/24  0403   WBC 9.34 8.45   HGB 11.0* 10.4*   HCT 33.9* 31.9*    164     No results for input(s): "LABPT", "INR", "APTT" in the last 48 hours.  Microbiology Results (last 7 days)       ** No results found for the last 168 hours. **          All pertinent labs from the last 24 hours have been reviewed.    Significant Diagnostics:  I have reviewed and interpreted all pertinent imaging results/findings within the past 24 hours.  Assessment/Plan:     * Lumbar spondylosis  Emeka Caballero is a 49 y.o. male s/p L4-pelvis fusion and L4-S1 TLIF on 02/08/2022 for s/p L4-pelvis fusion and L4-5, " L5-S1 TLIF on 02/08/2022 with Dr. Reed presents with adjacent segment disease now s/p extension/revision of fusion L2-Pelvis on 5/1.    - Neurologically stable  - Pain control: continue current regimen   - [place] to POD5  - Drains: HV x 1 gravity, abx while drain in place  - Post op imaging showing satisfactory placement of hardware.   - Brace: TLSO when OOB  - Continue PT/OT/OOB. OOB > 6hrs/day  - GI: Diet as tolerated. Continue bowel regimen.   - Noonan removed. Bladder scan q6h, I/O cath if > 350 cc.  - DVT ppx: LVX, Compression stockings, SCDs  - Atelectasis ppx: IS at bedside. Encourage use every hour while awake.  - Discussed with Dr. Reed    - Dispo: Pending drain removal, pain control    Acute blood loss anemia  Patient's anemia is currently controlled. Has not received any PRBCs to date. Etiology likely d/t expected blood loss post-op.  Current CBC reviewed-   Lab Results   Component Value Date    HGB 10.4 (L) 05/04/2024    HCT 31.9 (L) 05/04/2024     Monitor serial CBC and transfuse if patient becomes hemodynamically unstable, symptomatic or H/H drops below 7/21.    Essential hypertension, benign  Chronic, controlled. Latest blood pressure and vitals reviewed-     Temp:  [98.1 °F (36.7 °C)-99.9 °F (37.7 °C)]   Pulse:  [102-128]   Resp:  [18-20]   BP: (100-128)/(58-75)   SpO2:  [95 %-96 %] .   Home meds for hypertension were reviewed and noted below.   Hypertension Medications               lisinopriL (PRINIVIL,ZESTRIL) 5 MG tablet Take 1 tablet (5 mg total) by mouth once daily.            While in the hospital, will manage blood pressure as follows; Continue home antihypertensive regimen    Will utilize p.r.n. blood pressure medication only if patient's blood pressure greater than 160/100 and he develops symptoms such as worsening chest pain or shortness of breath.    Type 2 diabetes mellitus with hyperlipidemia  POCT 4x daily, diabetic diet, ISS. Not on insulin at home. Hold home dose metformin. Will  need to continue accuchecks at IPR with insulin sliding scale as appropriate.      BPH (benign prostatic hyperplasia)  Continue home dose flomax        Janina Denson PA-C  Neurosurgery  [facility] - Neurosurgery ([facility])

## 2024-05-04 NOTE — PLAN OF CARE
Problem: Diabetes Comorbidity  Goal: Blood Glucose Level Within Targeted Range  Outcome: Progressing  Intervention: Monitor and Manage Glycemia  Flowsheets (Taken 5/3/2024 1958)  Glycemic Management: blood glucose monitored

## 2024-05-05 PROBLEM — Z74.09 IMPAIRED MOBILITY: Status: ACTIVE | Noted: 2024-05-05

## 2024-05-05 LAB
ANION GAP SERPL CALC-SCNC: 8 MMOL/L (ref 8–16)
BASOPHILS # BLD AUTO: 0.04 K/UL (ref 0–0.2)
BASOPHILS NFR BLD: 0.4 % (ref 0–1.9)
BLD PROD TYP BPU: NORMAL
BLOOD UNIT EXPIRATION DATE: NORMAL
BLOOD UNIT TYPE CODE: 7300
BLOOD UNIT TYPE: NORMAL
BUN SERPL-MCNC: 14 MG/DL (ref 6–20)
CALCIUM SERPL-MCNC: 8.5 MG/DL (ref 8.7–10.5)
CHLORIDE SERPL-SCNC: 104 MMOL/L (ref 95–110)
CO2 SERPL-SCNC: 25 MMOL/L (ref 23–29)
CODING SYSTEM: NORMAL
CREAT SERPL-MCNC: 0.9 MG/DL (ref 0.5–1.4)
CROSSMATCH INTERPRETATION: NORMAL
DIFFERENTIAL METHOD BLD: ABNORMAL
DISPENSE STATUS: NORMAL
EOSINOPHIL # BLD AUTO: 0.2 K/UL (ref 0–0.5)
EOSINOPHIL NFR BLD: 2.4 % (ref 0–8)
ERYTHROCYTE [DISTWIDTH] IN BLOOD BY AUTOMATED COUNT: 13.9 % (ref 11.5–14.5)
EST. GFR  (NO RACE VARIABLE): >60 ML/MIN/1.73 M^2
GLUCOSE SERPL-MCNC: 169 MG/DL (ref 70–110)
HCT VFR BLD AUTO: 29.7 % (ref 40–54)
HGB BLD-MCNC: 9.6 G/DL (ref 14–18)
IMM GRANULOCYTES # BLD AUTO: 0.03 K/UL (ref 0–0.04)
IMM GRANULOCYTES NFR BLD AUTO: 0.3 % (ref 0–0.5)
LYMPHOCYTES # BLD AUTO: 2.9 K/UL (ref 1–4.8)
LYMPHOCYTES NFR BLD: 32.2 % (ref 18–48)
MCH RBC QN AUTO: 27.7 PG (ref 27–31)
MCHC RBC AUTO-ENTMCNC: 32.3 G/DL (ref 32–36)
MCV RBC AUTO: 86 FL (ref 82–98)
MONOCYTES # BLD AUTO: 0.9 K/UL (ref 0.3–1)
MONOCYTES NFR BLD: 9.8 % (ref 4–15)
NEUTROPHILS # BLD AUTO: 4.9 K/UL (ref 1.8–7.7)
NEUTROPHILS NFR BLD: 54.9 % (ref 38–73)
NRBC BLD-RTO: 0 /100 WBC
PLATELET # BLD AUTO: 197 K/UL (ref 150–450)
PMV BLD AUTO: 10.6 FL (ref 9.2–12.9)
POTASSIUM SERPL-SCNC: 3.9 MMOL/L (ref 3.5–5.1)
RBC # BLD AUTO: 3.46 M/UL (ref 4.6–6.2)
SODIUM SERPL-SCNC: 137 MMOL/L (ref 136–145)
TRANS ERYTHROCYTES VOL PATIENT: NORMAL ML
WBC # BLD AUTO: 8.92 K/UL (ref 3.9–12.7)

## 2024-05-05 PROCEDURE — 25000003 PHARM REV CODE 250: Performed by: STUDENT IN AN ORGANIZED HEALTH CARE EDUCATION/TRAINING PROGRAM

## 2024-05-05 PROCEDURE — 11000001 HC ACUTE MED/SURG PRIVATE ROOM

## 2024-05-05 PROCEDURE — 25000003 PHARM REV CODE 250: Performed by: PHYSICIAN ASSISTANT

## 2024-05-05 PROCEDURE — 63600175 PHARM REV CODE 636 W HCPCS: Performed by: PHYSICIAN ASSISTANT

## 2024-05-05 PROCEDURE — 80048 BASIC METABOLIC PNL TOTAL CA: CPT | Performed by: STUDENT IN AN ORGANIZED HEALTH CARE EDUCATION/TRAINING PROGRAM

## 2024-05-05 PROCEDURE — 36415 COLL VENOUS BLD VENIPUNCTURE: CPT | Performed by: STUDENT IN AN ORGANIZED HEALTH CARE EDUCATION/TRAINING PROGRAM

## 2024-05-05 PROCEDURE — 85025 COMPLETE CBC W/AUTO DIFF WBC: CPT | Performed by: STUDENT IN AN ORGANIZED HEALTH CARE EDUCATION/TRAINING PROGRAM

## 2024-05-05 PROCEDURE — 25000003 PHARM REV CODE 250: Performed by: NEUROLOGICAL SURGERY

## 2024-05-05 RX ORDER — SYRING-NEEDL,DISP,INSUL,0.3 ML 29 G X1/2"
296 SYRINGE, EMPTY DISPOSABLE MISCELLANEOUS ONCE
Status: COMPLETED | OUTPATIENT
Start: 2024-05-05 | End: 2024-05-05

## 2024-05-05 RX ADMIN — METHOCARBAMOL 1000 MG: 500 TABLET ORAL at 09:05

## 2024-05-05 RX ADMIN — ATORVASTATIN CALCIUM 40 MG: 40 TABLET, FILM COATED ORAL at 09:05

## 2024-05-05 RX ADMIN — DOCUSATE SODIUM AND SENNOSIDES 1 TABLET: 8.6; 5 TABLET, FILM COATED ORAL at 09:05

## 2024-05-05 RX ADMIN — PANTOPRAZOLE SODIUM 40 MG: 20 TABLET, DELAYED RELEASE ORAL at 09:05

## 2024-05-05 RX ADMIN — INSULIN ASPART 1 UNITS: 100 INJECTION, SOLUTION INTRAVENOUS; SUBCUTANEOUS at 10:05

## 2024-05-05 RX ADMIN — ACETAMINOPHEN 1000 MG: 500 TABLET ORAL at 05:05

## 2024-05-05 RX ADMIN — MAGNESIUM CITRATE 296 ML: 1.75 LIQUID ORAL at 11:05

## 2024-05-05 RX ADMIN — ENOXAPARIN SODIUM 40 MG: 40 INJECTION SUBCUTANEOUS at 05:05

## 2024-05-05 RX ADMIN — Medication 1 ENEMA: at 11:05

## 2024-05-05 RX ADMIN — METHOCARBAMOL 1000 MG: 500 TABLET ORAL at 05:05

## 2024-05-05 RX ADMIN — TAMSULOSIN HYDROCHLORIDE 0.4 MG: 0.4 CAPSULE ORAL at 09:05

## 2024-05-05 RX ADMIN — DOCUSATE SODIUM AND SENNOSIDES 1 TABLET: 8.6; 5 TABLET, FILM COATED ORAL at 10:05

## 2024-05-05 RX ADMIN — ACETAMINOPHEN 1000 MG: 500 TABLET ORAL at 03:05

## 2024-05-05 RX ADMIN — METHOCARBAMOL 1000 MG: 500 TABLET ORAL at 10:05

## 2024-05-05 RX ADMIN — ACETAMINOPHEN 1000 MG: 500 TABLET ORAL at 09:05

## 2024-05-05 RX ADMIN — OXYCODONE HYDROCHLORIDE 10 MG: 10 TABLET ORAL at 10:05

## 2024-05-05 RX ADMIN — METHOCARBAMOL 1000 MG: 500 TABLET ORAL at 03:05

## 2024-05-05 RX ADMIN — OXYBUTYNIN CHLORIDE 10 MG: 10 TABLET, EXTENDED RELEASE ORAL at 09:05

## 2024-05-05 RX ADMIN — LISINOPRIL 5 MG: 5 TABLET ORAL at 09:05

## 2024-05-05 RX ADMIN — POLYETHYLENE GLYCOL 3350 17 G: 17 POWDER, FOR SOLUTION ORAL at 09:05

## 2024-05-05 RX ADMIN — GABAPENTIN 300 MG: 300 CAPSULE ORAL at 10:05

## 2024-05-05 RX ADMIN — ACETAMINOPHEN 1000 MG: 500 TABLET ORAL at 10:05

## 2024-05-05 NOTE — CLINICAL REVIEW
"RAPID RESPONSE NURSE CHART REVIEW        Chart Reviewed: 05/05/2024, 12:47 PM    MRN: [id number]  Bed: 6/[facility]    Dx: Lumbar spondylosis    Emeka Caballero has a past medical history of Arthritis, Diabetes mellitus, type 2, and Essential hypertension, benign.    Last VS: BP (!) 98/55 (BP Location: Left arm, Patient Position: Lying)   Pulse (!) 117   Temp 99.2 °F (37.3 °C) (Oral)   Resp 16   Ht 5' 8.25" (1.734 m)   Wt 93.8 kg (206 lb 12.7 oz)   SpO2 (!) 93%   BMI 31.21 kg/m²     24H Vital Sign Range:  Temp:  [98.6 °F (37 °C)-101.2 °F (38.4 °C)]   Pulse:  [112-128]   Resp:  [16-18]   BP: ()/(50-75)   SpO2:  [93 %-98 %]     Level of Consciousness (AVPU): alert    Recent Labs     05/03/24  0436 05/04/24  0403 05/05/24  0240   WBC 9.34 8.45 8.92   HGB 11.0* 10.4* 9.6*   HCT 33.9* 31.9* 29.7*    164 197       Recent Labs     05/03/24  0436 05/04/24  0403 05/05/24  0240    136 137   K 3.8 3.9 3.9    103 104   CO2 23 28 25   BUN 16 12 14   CREATININE 0.9 0.9 0.9   * 125* 169*        No results for input(s): "PH", "PCO2", "PO2", "HCO3", "POCSATURATED", "BE" in the last 72 hours.     OXYGEN:  Flow (L/min) (Oxygen Therapy): 2          MEWS score: 3    Rounding completed w/ charge RN Rose Mary. Discussed pt's fever of 101.2F and tachycardia w/ HR in the 120s. 1,000mg of PO acetaminophen given by primary RN per MD orders. WBC within normal limits. Charge RN reports pt ambulating throughout unit w/o issue.   No additional concerns verbalized at this time. Instructed to call [phone] for further concerns or assistance.    Miriam Calhoun RN       "

## 2024-05-05 NOTE — HOSPITAL COURSE
5/2/24: participated w/ PT. Sit to Stand:  minimum assistance with hand-held assist, increased time. Gait: patient ambulated 100' with 2ww with CGA  5/3/24: Participated w/ PT.  patient ambulated 80' with 2ww with CGA  Deviations Noted: decreased gait speed, decreased step height R,L, and decreased step length R, L forward posture over walker

## 2024-05-05 NOTE — PT/OT/SLP PROGRESS
Occupational Therapy      Patient Name:  Emeka Caballero   MRN:  [id number]    Patient not seen today secondary to patient unwilling to participate due to patient stating that he recently went for a walk with his bother and was sitting up at EOB for a while and just return to bed also felt uncomfortable due to constipation. Patient was educated and encourage to try to go for other walk with the assistance from nursing staff to reduce mobility and to promote healing process quickly  . Will follow-up on the next schedule visit accordingly to OT POC.    5/5/2024

## 2024-05-05 NOTE — HPI
Emeka Caballero is a 49-year-old male with PMHx of s/p L4-pelvis fusion and L4-S1 TLIF on 2/8/22. He has been seen in clinic for follow-up from an ED visit from 12/12/23 where he had fallen and has had worsening right buttock pain that radiates down his right lower extremity. Patient now presents with adjacent segment disease for extension/revision of fusion L2-Pelvis. S/p extension/revision of fusion L2-Pelvis on 5/1/24. Post op imaging showing satisfactory placement of hardware. Per NSGY TLSO when OOB.    Functional History: Patient lives with brother in a 2nd floor apartment story home with 12 steps to enter. Prior to admission, I. DME: none.

## 2024-05-05 NOTE — PLAN OF CARE
Problem: Fall Injury Risk  Goal: Absence of Fall and Fall-Related Injury  Outcome: Progressing  Intervention: Promote Injury-Free Environment  Flowsheets (Taken 5/5/2024 2544)  Safety Promotion/Fall Prevention: assistive device/personal item within reach

## 2024-05-05 NOTE — PROGRESS NOTES
[facility] - Neurosurgery ([facility])  Neurosurgery  Progress Note    Subjective:     History of Present Illness: Emeka Caballero is a 49 y.o. male s/p L4-pelvis fusion and L4-S1 TLIF on 02/08/2022 for s/p L4-pelvis fusion and L4-5, L5-S1 TLIF on 02/08/2022 with Dr. Reed who presents with adjacent segment disease for extension/revision of fusion L2-Pelvis. He is being seen in clinic today to follow-up from his recent ED visit from 12/12/23 where he had fallen and has had worsening right buttock pain that radiates down his right lower extremity. The patient obtained an injection as previously recommended by Dr. Reed about 2 weeks ago in the right SI joint without any relief. He is taking ibuprofen 800 mg with no relief. Reports difficulties with standing up straight. He is utilizing a cane to ambulate. Reports the pain being a burning sensation associated with numbness in the feet. He has also attempted PT without relief.     Post-Op Info:  Procedure(s) (LRB):  **AIRO** L2-pelvis extension and revision of fusion, (N/A)   4 Days Post-Op   Interval History: 5/5: drain ouput decreasing, 10 cc, ambulating, pending rehab    Medications:  Continuous Infusions:  Current Facility-Administered Medications   Medication Dose Route Frequency Last Rate Last Admin     Scheduled Meds:  Current Facility-Administered Medications   Medication Dose Route Frequency    acetaminophen  1,000 mg Oral QID    atorvastatin  40 mg Oral Daily    bisacodyL  10 mg Rectal Daily    enoxparin  40 mg Subcutaneous Q24H (prophylaxis, 1700)    gabapentin  300 mg Oral QHS    lisinopriL  5 mg Oral Daily    magnesium citrate  296 mL Oral Once    methocarbamoL  1,000 mg Oral QID    oxybutynin  10 mg Oral Daily    pantoprazole  40 mg Oral Daily    polyethylene glycol  17 g Oral Daily    senna-docusate 8.6-50 mg  1 tablet Oral BID    tamsulosin  0.4 mg Oral Daily     PRN Meds:  Current Facility-Administered Medications:     aluminum-magnesium  hydroxide-simethicone, 30 mL, Oral, Q4H PRN    dextrose 10%, 12.5 g, Intravenous, PRN    dextrose 10%, 25 g, Intravenous, PRN    glucagon (human recombinant), 1 mg, Intramuscular, PRN    glucose, 16 g, Oral, PRN    glucose, 24 g, Oral, PRN    insulin aspart U-100, 0-10 Units, Subcutaneous, QID (AC + HS) PRN    linaCLOtide, 145 mcg, Oral, Daily PRN    ondansetron, 8 mg, Oral, Q6H PRN    oxyCODONE, 10 mg, Oral, Q4H PRN    prochlorperazine, 5 mg, Intravenous, Q6H PRN     Review of Systems  Objective:     Weight: 93.8 kg (206 lb 12.7 oz)  Body mass index is 31.21 kg/m².  Vital Signs (Most Recent):  Temp: (!) 101.2 °F (38.4 °C) (05/05/24 0835)  Pulse: (!) 128 (05/05/24 0835)  Resp: 18 (05/05/24 0835)  BP: 120/75 (05/05/24 0835)  SpO2: 97 % (05/05/24 0835) Vital Signs (24h Range):  Temp:  [99 °F (37.2 °C)-101.2 °F (38.4 °C)] 101.2 °F (38.4 °C)  Pulse:  [112-128] 128  Resp:  [17-18] 18  SpO2:  [93 %-98 %] 97 %  BP: ()/(50-75) 120/75                              Closed/Suction Drain 05/01/24 1124 Tube - 1 Left Back Accordion 10 Fr. (Active)   Site Description Leaking at site 05/01/24 1945   Dressing Type Transparent (Tegaderm) 05/01/24 1945   Dressing Status Clean;Dry;Intact 05/03/24 1800   Dressing Intervention Integrity maintained 05/03/24 1800   Drainage Bloody 05/03/24 1800   Status Other (Comment) 05/03/24 1800   Output (mL) 90 mL 05/02/24 1133            Closed/Suction Drain 05/01/24 1126 Tube - 2 Right Back Accordion 10 Fr. (Active)   Site Description Leaking at site;Other (Comment) 05/01/24 1945   Dressing Type Transparent (Tegaderm) 05/01/24 1945   Dressing Status Clean;Dry;Intact 05/03/24 1800   Dressing Intervention Integrity maintained 05/03/24 1800   Drainage Bloody 05/03/24 1800   Status Other (Comment) 05/01/24 1945   Output (mL) 40 mL 05/02/24 1133       Neurosurgery Physical Exam  General: well developed, well nourished, no distress.   Head: normocephalic, atraumatic  Neurologic: Alert and oriented.  "Thought content appropriate.  GCS: Motor: 6/Verbal: 5/Eyes: 4 GCS Total: 15  Mental Status: Awake, Alert, Oriented x 4  Language: No aphasia  Speech: No dysarthria  Cranial nerves: face symmetric, tongue midline, CN II-XII grossly intact.   Eyes: pupils equal, round, reactive to light with accomodation, EOMI.   Pulmonary: normal respirations, no signs of respiratory distress  Abdomen: soft, non-distended, not tender to palpation  Sensory: intact to light touch throughout  Motor Strength: Moves all extremities spontaneously with good tone.  Full strength upper and lower extremities. No abnormal movements seen.      Strength   Deltoids Triceps Biceps Wrist Extension Wrist Flexion Hand    Upper: R 5/5 5/5 5/5 5/5 5/5 5/5     L 5/5 5/5 5/5 5/5 5/5 5/5       Iliopsoas Quadriceps Knee  Flexion Tibialis  anterior Gastro- cnemius EHL   Lower: R 5/5 5/5 5/5 5/5 5/5 5/5     L 5/5 5/5 5/5 5/5 5/5 5/5      Skin: Skin is warm, dry and intact.     Incision c/d/I with staples, foam dressing applied. HV with serosanguinous output.   Significant Labs:  Recent Labs   Lab 05/04/24  0403 05/05/24  0240   * 169*    137   K 3.9 3.9    104   CO2 28 25   BUN 12 14   CREATININE 0.9 0.9   CALCIUM 8.9 8.5*     Recent Labs   Lab 05/04/24  0403 05/05/24  0240   WBC 8.45 8.92   HGB 10.4* 9.6*   HCT 31.9* 29.7*    197     No results for input(s): "LABPT", "INR", "APTT" in the last 48 hours.  Microbiology Results (last 7 days)       ** No results found for the last 168 hours. **          All pertinent labs from the last 24 hours have been reviewed.    Significant Diagnostics:  I have reviewed and interpreted all pertinent imaging results/findings within the past 24 hours.  Physical Exam  Assessment/Plan:     * Lumbar spondylosis  Emeka Caballero is a 49 y.o. male s/p L4-pelvis fusion and L4-S1 TLIF on 02/08/2022 for s/p L4-pelvis fusion and L4-5, L5-S1 TLIF on 02/08/2022 with Dr. Reed presents with adjacent " segment disease now s/p extension/revision of fusion L2-Pelvis on 5/1.    - Neurologically stable  - Pain control: continue current regimen   - WV removed, wound dressed, dry  - Drains: HV x 1 gravity, abx while drain in place, possible removal today   - Post op imaging showing satisfactory placement of hardware.   - Brace: TLSO when OOB  - Continue PT/OT/OOB. OOB > 6hrs/day  - GI: Diet as tolerated. Continue bowel regimen.   - Noonan removed. Bladder scan q6h, I/O cath if > 350 cc.  - DVT ppx: LVX, Compression stockings, SCDs  - Atelectasis ppx: IS at bedside. Encourage use every hour while awake.  - Discussed with Dr. Reed    - Dispo: Pending drain removal, pain control    Acute blood loss anemia  Patient's anemia is currently controlled. Has not received any PRBCs to date. Etiology likely d/t expected blood loss post-op.  Current CBC reviewed-   Lab Results   Component Value Date    HGB 10.4 (L) 05/04/2024    HCT 31.9 (L) 05/04/2024     Monitor serial CBC and transfuse if patient becomes hemodynamically unstable, symptomatic or H/H drops below 7/21.    Essential hypertension, benign  Chronic, controlled. Latest blood pressure and vitals reviewed-     Temp:  [98.1 °F (36.7 °C)-99.9 °F (37.7 °C)]   Pulse:  [102-128]   Resp:  [18-20]   BP: (100-128)/(58-75)   SpO2:  [95 %-96 %] .   Home meds for hypertension were reviewed and noted below.   Hypertension Medications               lisinopriL (PRINIVIL,ZESTRIL) 5 MG tablet Take 1 tablet (5 mg total) by mouth once daily.            While in the hospital, will manage blood pressure as follows; Continue home antihypertensive regimen    Will utilize p.r.n. blood pressure medication only if patient's blood pressure greater than 160/100 and he develops symptoms such as worsening chest pain or shortness of breath.    Type 2 diabetes mellitus with hyperlipidemia  POCT 4x daily, diabetic diet, ISS. Not on insulin at home. Hold home dose metformin. Will need to continue  accuchecks at IPR with insulin sliding scale as appropriate.      BPH (benign prostatic hyperplasia)  Continue home dose flomax        Alka Taylor MD  Neurosurgery  [facility] - Neurosurgery ([facility])

## 2024-05-05 NOTE — ASSESSMENT & PLAN NOTE
Emeka Caballero is a 49 y.o. male s/p L4-pelvis fusion and L4-S1 TLIF on 02/08/2022 for s/p L4-pelvis fusion and L4-5, L5-S1 TLIF on 02/08/2022 with Dr. Reed presents with adjacent segment disease now s/p extension/revision of fusion L2-Pelvis on 5/1.    - Neurologically stable  - Pain control: continue current regimen   - WV removed, wound dressed, dry  - Drains: HV x 1 gravity, abx while drain in place, possible removal today   - Post op imaging showing satisfactory placement of hardware.   - Brace: TLSO when OOB  - Continue PT/OT/OOB. OOB > 6hrs/day  - GI: Diet as tolerated. Continue bowel regimen.   - Noonan removed. Bladder scan q6h, I/O cath if > 350 cc.  - DVT ppx: LVX, Compression stockings, SCDs  - Atelectasis ppx: IS at bedside. Encourage use every hour while awake.  - Discussed with Dr. Reed    - Dispo: Pending drain removal, pain control

## 2024-05-05 NOTE — CONSULTS
[facility] - Neurosurgery ([facility])  Physical Medicine & Rehab  Consult Note    Patient Name: Emeka Caballero  MRN: [id number]  Admission Date: 5/1/2024  Hospital Length of Stay: 4 days  Attending Physician: Alphonse Reed MD     Inpatient consult to Physical Medicine & Rehabilitation  Consult performed by: Antoinette Caceres NP  Consult requested by:  Alphonse Reed MD    Collaborating Physician: Carina Mcclure MD  Reason for Consult:  Assess rehabilitation needs      Consults  Subjective:     Principal Problem: Lumbar spondylosis    HPI: Emeka Caballero is a 49-year-old male with PMHx of DM2, HTN, and s/p L4-pelvis fusion and L4-S1 TLIF on 2/8/22. He has been seen in clinic for follow-up from an ED visit from 12/12/23 where he had fallen and has had worsening right buttock pain that radiates down his right lower extremity. Patient now presents with adjacent segment disease for extension/revision of fusion L2-Pelvis. S/p extension/revision of fusion L2-Pelvis on 5/1/24. Post op imaging showing satisfactory placement of hardware. Per NSGY TLSO when OOB.    Functional History: Patient lives with brother in a 2nd floor apartment story home with 12 steps to enter. Prior to admission, I. DME: none.     Hospital Course:   5/2/24: participated w/ PT. Sit to Stand:  minimum assistance with hand-held assist, increased time. Gait: patient ambulated 100' with 2ww with CGA    Past Medical History:   Diagnosis Date    Arthritis     Diabetes mellitus, type 2 2/11/2022    Essential hypertension, benign 5/4/2023     Past Surgical History:   Procedure Laterality Date    CATARACT EXTRACTION Right     EPIDURAL STEROID INJECTION N/A 10/21/2020    Procedure: Injection, Steroid, Epidural Caudal;  Surgeon: Vipul Nixon Jr., MD;  Location: [facility] ENDO;  Service: Pain Management;  Laterality: N/A;  Caudal KURT  Arrive @ 1315; No ATC or DM; Needs MD Signature    INJECTION, SACROILIAC JOINT Bilateral 12/27/2022     Procedure: INJECTION,SACROILIAC JOINT, BILATERAL CONTRAST DIRECT REF  ORDERED;  Surgeon: Brielle José MD;  Location: [facility] PAIN MGT;  Service: Pain Management;  Laterality: Bilateral;    INJECTION, SACROILIAC JOINT Right 11/27/2023    Procedure: INJECTION,SACROILIAC JOINT RIGHT DIRECT REFERRAL  Sooner date;  Surgeon: Brielle José MD;  Location: [facility] PAIN MGT;  Service: Pain Management;  Laterality: Right;    LASER ENUCLEATION OF PROSTATE N/A 3/14/2023    Procedure: ENUCLEATION, PROSTATE, USING LASER;  Surgeon: Cecil Murray MD;  Location: [facility] OR;  Service: Urology;  Laterality: N/A;    LUMBAR FUSION N/A 2/8/2022    Procedure: FUSION, SPINE, LUMBAR;  Surgeon: Alphonse Reed MD;  [facility];  Service: Neurosurgery;  Laterality: N/A;  AIRO, L4-S1    LUMBAR FUSION N/A 5/1/2024    Procedure: **AIRO** L2-pelvis extension and revision of fusion,;  Surgeon: Alphonse Reed MD;  [facility];  Service: Neurosurgery;  Laterality: N/A;  L2-pelvis extension and revision of fusion /co case Celestre/AIRO  anesthesia: general  brace: LSO  radiology: C-arm, AIRO  neuro mon: EMG/SEP/MEP  position: prone  bed: [place] [id number] post  headrest: prone view     Review of patient's allergies indicates:  No Known Allergies    Scheduled Medications:   Current Facility-Administered Medications   Medication Dose Route Frequency    acetaminophen  1,000 mg Oral QID    atorvastatin  40 mg Oral Daily    bisacodyL  10 mg Rectal Daily    enoxparin  40 mg Subcutaneous Q24H (prophylaxis, 1700)    gabapentin  300 mg Oral QHS    lisinopriL  5 mg Oral Daily    methocarbamoL  1,000 mg Oral QID    oxybutynin  10 mg Oral Daily    pantoprazole  40 mg Oral Daily    polyethylene glycol  17 g Oral Daily    senna-docusate 8.6-50 mg  1 tablet Oral BID    tamsulosin  0.4 mg Oral Daily       PRN Medications:   Current Facility-Administered Medications:     aluminum-magnesium hydroxide-simethicone, 30 mL, Oral, Q4H PRN    dextrose  10%, 12.5 g, Intravenous, PRN    dextrose 10%, 25 g, Intravenous, PRN    glucagon (human recombinant), 1 mg, Intramuscular, PRN    glucose, 16 g, Oral, PRN    glucose, 24 g, Oral, PRN    insulin aspart U-100, 0-10 Units, Subcutaneous, QID (AC + HS) PRN    linaCLOtide, 145 mcg, Oral, Daily PRN    ondansetron, 8 mg, Oral, Q6H PRN    oxyCODONE, 10 mg, Oral, Q4H PRN    prochlorperazine, 5 mg, Intravenous, Q6H PRN    Family History       Problem Relation (Age of Onset)    Cataracts Mother    No Known Problems Father, Sister, Brother, Maternal Aunt, Maternal Uncle, Paternal Aunt, Paternal Uncle, Maternal Grandmother, Maternal Grandfather, Paternal Grandmother, Paternal Grandfather          Tobacco Use    Smoking status: Some Days     Types: Cigarettes    Smokeless tobacco: Never   Substance and Sexual Activity    Alcohol use: Yes     Comment: socially    Drug use: No    Sexual activity: Not on file     Review of Systems   Constitutional:  Positive for activity change. Negative for fatigue and fever.   HENT:  Negative for trouble swallowing and voice change.    Respiratory:  Negative for cough and shortness of breath.    Cardiovascular:  Negative for chest pain and leg swelling.   Gastrointestinal:  Negative for abdominal distention and abdominal pain.   Genitourinary:  Negative for difficulty urinating and flank pain.   Musculoskeletal:  Positive for gait problem. Negative for back pain.   Skin:  Negative for color change and rash.   Neurological:  Positive for weakness. Negative for speech difficulty and numbness.   Psychiatric/Behavioral:  Negative for agitation and confusion.      Objective:     Vital Signs (Most Recent):  Temp: 99.5 °F (37.5 °C) (05/05/24 0609)  Pulse: (!) 112 (05/05/24 0609)  Resp: 17 (05/05/24 0609)  BP: 132/71 (05/05/24 0609)  SpO2: 96 % (05/05/24 0609)    Vital Signs (24h Range):  Temp:  [99 °F (37.2 °C)-100.1 °F (37.8 °C)] 99.5 °F (37.5 °C)  Pulse:  [112-119] 112  Resp:  [17-18] 17  SpO2:  [93  %-98 %] 96 %  BP: ()/(50-71) 132/71     Body mass index is 31.21 kg/m².     Physical Exam  Vitals and nursing note reviewed.   Constitutional:       Appearance: He is well-developed.   HENT:      Head: Normocephalic and atraumatic.      Nose: Nose normal.   Eyes:      General:         Right eye: No discharge.         Left eye: No discharge.      Pupils: Pupils are equal, round, and reactive to light.   Pulmonary:      Effort: Pulmonary effort is normal. No respiratory distress.   Abdominal:      General: There is no distension.      Palpations: Abdomen is soft.      Tenderness: There is no abdominal tenderness.   Musculoskeletal:         General: No deformity.      Cervical back: Normal range of motion and neck supple.      Comments: BUE and BLE 5/5   Skin:     General: Skin is warm and dry.   Neurological:      Mental Status: He is alert and oriented to person, place, and time.      Sensory: No sensory deficit.      Motor: Weakness present. No abnormal muscle tone.      Gait: Gait abnormal.      Comments: Drains and wound vac   Psychiatric:         Mood and Affect: Mood normal.         Behavior: Behavior normal.     Diagnostic Results:   Labs: Reviewed  ECG: Reviewed  X-Ray: Reviewed    Assessment/Plan:     * Lumbar spondylosis  - S/p extension/revision of fusion L2-Pelvis on 5/1/24.   - Post op imaging showing satisfactory placement of hardware. Per NSGY TLSO when OOB.    Impaired mobility  - Related to prolonged/acute hospital course.     Recommendations  -  Encourage mobility, OOB in chair at least 3 hours per day, and early ambulation as appropriate  -  PT/OT evaluate and treat  -  Pain management  -  Monitor for and prevent skin breakdown and pressure ulcers  Early mobility, repositioning/weight shifting every 20-30 minutes when sitting, turn patient every 2 hours, proper mattress/overlay and chair cushioning, pressure relief/heel protector boots  -  DVT prophylaxis    -  Reviewed discharge options (IP  rehab, SNF, HH therapy, and OP therapy)     PM&R Recommendation:     At this time, the PM&R team has reviewed this patient's ongoing medical case including inpatient diagnosis, medical history, clinical examination, labs, vitals, current social and functional history to provide the post-acute recommendation as follows:     RECOMMENDATIONS: inpatient rehabilitation due to good motivation/participation with therapies, has been determined to tolerate 3 hours of therapy and good potential for recovery.     The patient will be admitted for comprehensive interdisciplinary inpatient rehabilitation to address the impairments due to medical diagnosis of Lumbar Spondylosis. The patient will benefit from an inpatient rehabilitation program to promote functional recovery, implement compensatory strategies and will undergo assessment for needs for durable medical equipment for safe discharge to the community. This patient will benefit from a coordinated interdisciplinary rehabilitation program services that require close monitoring and treatment with 24-hour rehabilitative nursing and physical/occupational therapies for 3 hours/day for 5 days/week.This interdisciplinary program will be performed under the direction of a physiatrist.    MEDICAL STABILITY:     At this time, barriers for post-acute rehab admission include pain control and drain plan.     We will continue to follow.     Thank you for your consult.     Antoinette Caceres NP  Department of Physical Medicine & Rehab  [facility] [place])

## 2024-05-05 NOTE — SUBJECTIVE & OBJECTIVE
Interval History: 5/5: drain ouput decreasing, 10 cc, ambulating, pending rehab    Medications:  Continuous Infusions:  Current Facility-Administered Medications   Medication Dose Route Frequency Last Rate Last Admin     Scheduled Meds:  Current Facility-Administered Medications   Medication Dose Route Frequency    acetaminophen  1,000 mg Oral QID    atorvastatin  40 mg Oral Daily    bisacodyL  10 mg Rectal Daily    enoxparin  40 mg Subcutaneous Q24H (prophylaxis, 1700)    gabapentin  300 mg Oral QHS    lisinopriL  5 mg Oral Daily    magnesium citrate  296 mL Oral Once    methocarbamoL  1,000 mg Oral QID    oxybutynin  10 mg Oral Daily    pantoprazole  40 mg Oral Daily    polyethylene glycol  17 g Oral Daily    senna-docusate 8.6-50 mg  1 tablet Oral BID    tamsulosin  0.4 mg Oral Daily     PRN Meds:  Current Facility-Administered Medications:     aluminum-magnesium hydroxide-simethicone, 30 mL, Oral, Q4H PRN    dextrose 10%, 12.5 g, Intravenous, PRN    dextrose 10%, 25 g, Intravenous, PRN    glucagon (human recombinant), 1 mg, Intramuscular, PRN    glucose, 16 g, Oral, PRN    glucose, 24 g, Oral, PRN    insulin aspart U-100, 0-10 Units, Subcutaneous, QID (AC + HS) PRN    linaCLOtide, 145 mcg, Oral, Daily PRN    ondansetron, 8 mg, Oral, Q6H PRN    oxyCODONE, 10 mg, Oral, Q4H PRN    prochlorperazine, 5 mg, Intravenous, Q6H PRN     Review of Systems  Objective:     Weight: 93.8 kg (206 lb 12.7 oz)  Body mass index is 31.21 kg/m².  Vital Signs (Most Recent):  Temp: (!) 101.2 °F (38.4 °C) (05/05/24 0835)  Pulse: (!) 128 (05/05/24 0835)  Resp: 18 (05/05/24 0835)  BP: 120/75 (05/05/24 0835)  SpO2: 97 % (05/05/24 0835) Vital Signs (24h Range):  Temp:  [99 °F (37.2 °C)-101.2 °F (38.4 °C)] 101.2 °F (38.4 °C)  Pulse:  [112-128] 128  Resp:  [17-18] 18  SpO2:  [93 %-98 %] 97 %  BP: ()/(50-75) 120/75                              Closed/Suction Drain 05/01/24 1124 Tube - 1 Left Back Accordion 10 Fr. (Active)   Site  Description Leaking at site 05/01/24 1945   Dressing Type Transparent (Tegaderm) 05/01/24 1945   Dressing Status Clean;Dry;Intact 05/03/24 1800   Dressing Intervention Integrity maintained 05/03/24 1800   Drainage Bloody 05/03/24 1800   Status Other (Comment) 05/03/24 1800   Output (mL) 90 mL 05/02/24 1133            Closed/Suction Drain 05/01/24 1126 Tube - 2 Right Back Accordion 10 Fr. (Active)   Site Description Leaking at site;Other (Comment) 05/01/24 1945   Dressing Type Transparent (Tegaderm) 05/01/24 1945   Dressing Status Clean;Dry;Intact 05/03/24 1800   Dressing Intervention Integrity maintained 05/03/24 1800   Drainage Bloody 05/03/24 1800   Status Other (Comment) 05/01/24 1945   Output (mL) 40 mL 05/02/24 1133       Neurosurgery Physical Exam  General: well developed, well nourished, no distress.   Head: normocephalic, atraumatic  Neurologic: Alert and oriented. Thought content appropriate.  GCS: Motor: 6/Verbal: 5/Eyes: 4 GCS Total: 15  Mental Status: Awake, Alert, Oriented x 4  Language: No aphasia  Speech: No dysarthria  Cranial nerves: face symmetric, tongue midline, CN II-XII grossly intact.   Eyes: pupils equal, round, reactive to light with accomodation, EOMI.   Pulmonary: normal respirations, no signs of respiratory distress  Abdomen: soft, non-distended, not tender to palpation  Sensory: intact to light touch throughout  Motor Strength: Moves all extremities spontaneously with good tone.  Full strength upper and lower extremities. No abnormal movements seen.      Strength   Deltoids Triceps Biceps Wrist Extension Wrist Flexion Hand    Upper: R 5/5 5/5 5/5 5/5 5/5 5/5     L 5/5 5/5 5/5 5/5 5/5 5/5       Iliopsoas Quadriceps Knee  Flexion Tibialis  anterior Gastro- cnemius EHL   Lower: R 5/5 5/5 5/5 5/5 5/5 5/5     L 5/5 5/5 5/5 5/5 5/5 5/5      Skin: Skin is warm, dry and intact.     Incision c/d/I with staples, foam dressing applied. HV with serosanguinous output.   Significant Labs:  Recent  "Labs   Lab 05/04/24  0403 05/05/24  0240   * 169*    137   K 3.9 3.9    104   CO2 28 25   BUN 12 14   CREATININE 0.9 0.9   CALCIUM 8.9 8.5*     Recent Labs   Lab 05/04/24  0403 05/05/24  0240   WBC 8.45 8.92   HGB 10.4* 9.6*   HCT 31.9* 29.7*    197     No results for input(s): "LABPT", "INR", "APTT" in the last 48 hours.  Microbiology Results (last 7 days)       ** No results found for the last 168 hours. **          All pertinent labs from the last 24 hours have been reviewed.    Significant Diagnostics:  I have reviewed and interpreted all pertinent imaging results/findings within the past 24 hours.  Physical Exam  "

## 2024-05-05 NOTE — ASSESSMENT & PLAN NOTE
- S/p extension/revision of fusion L2-Pelvis on 5/1/24.   - Post op imaging showing satisfactory placement of hardware. Per NSGY TLSO when OOB.

## 2024-05-05 NOTE — PLAN OF CARE
Problem: Adult Inpatient Plan of Care  Goal: Optimal Comfort and Wellbeing  Outcome: Progressing  Intervention: Monitor Pain and Promote Comfort  Flowsheets (Taken 5/5/2024 0541)  Pain Management Interventions:   care clustered   warm blanket provided  Intervention: Provide Person-Centered Care  Flowsheets (Taken 5/5/2024 0541)  Trust Relationship/Rapport:   care explained   choices provided   Oxycodone given x1 for pain. VSS. Bed in lowest position, side rails x 3, call light in use.

## 2024-05-05 NOTE — SUBJECTIVE & OBJECTIVE
Past Medical History:   Diagnosis Date    Arthritis     Diabetes mellitus, type 2 2/11/2022    Essential hypertension, benign 5/4/2023     Past Surgical History:   Procedure Laterality Date    CATARACT EXTRACTION Right     EPIDURAL STEROID INJECTION N/A 10/21/2020    Procedure: Injection, Steroid, Epidural Caudal;  Surgeon: Vipul Nixon Jr., MD;  Location: [facility] ENDO;  Service: Pain Management;  Laterality: N/A;  Caudal KURT  Arrive @ 1315; No ATC or DM; Needs MD Signature    INJECTION, SACROILIAC JOINT Bilateral 12/27/2022    Procedure: INJECTION,SACROILIAC JOINT, BILATERAL CONTRAST DIRECT REF  ORDERED;  Surgeon: Brielle José MD;  Location: [facility] PAIN MGT;  Service: Pain Management;  Laterality: Bilateral;    INJECTION, SACROILIAC JOINT Right 11/27/2023    Procedure: INJECTION,SACROILIAC JOINT RIGHT DIRECT REFERRAL  Sooner date;  Surgeon: Brielle José MD;  Location: [facility] PAIN MGT;  Service: Pain Management;  Laterality: Right;    LASER ENUCLEATION OF PROSTATE N/A 3/14/2023    Procedure: ENUCLEATION, PROSTATE, USING LASER;  Surgeon: Cecil Murray MD;  Location: [facility] OR;  Service: Urology;  Laterality: N/A;    LUMBAR FUSION N/A 2/8/2022    Procedure: FUSION, SPINE, LUMBAR;  Surgeon: Alphonse Reed MD;  [facility];  Service: Neurosurgery;  Laterality: N/A;  AIRO, L4-S1    LUMBAR FUSION N/A 5/1/2024    Procedure: **AIRO** L2-pelvis extension and revision of fusion,;  Surgeon: Alphonse Reed MD;  Location: [facility] OR 2ND FLR;  Service: Neurosurgery;  Laterality: N/A;  L2-pelvis extension and revision of fusion /co case Celestre/AIRO  anesthesia: general  brace: LSO  radiology: C-arm, AIRO  neuro mon: EMG/SEP/MEP  position: prone  bed: Rodman 4 post  headrest: prone view     Review of patient's allergies indicates:  No Known Allergies    Scheduled Medications:   Current Facility-Administered Medications   Medication Dose Route Frequency    acetaminophen  1,000 mg Oral QID    atorvastatin   40 mg Oral Daily    bisacodyL  10 mg Rectal Daily    enoxparin  40 mg Subcutaneous Q24H (prophylaxis, 1700)    gabapentin  300 mg Oral QHS    lisinopriL  5 mg Oral Daily    methocarbamoL  1,000 mg Oral QID    oxybutynin  10 mg Oral Daily    pantoprazole  40 mg Oral Daily    polyethylene glycol  17 g Oral Daily    senna-docusate 8.6-50 mg  1 tablet Oral BID    tamsulosin  0.4 mg Oral Daily       PRN Medications:   Current Facility-Administered Medications:     aluminum-magnesium hydroxide-simethicone, 30 mL, Oral, Q4H PRN    dextrose 10%, 12.5 g, Intravenous, PRN    dextrose 10%, 25 g, Intravenous, PRN    glucagon (human recombinant), 1 mg, Intramuscular, PRN    glucose, 16 g, Oral, PRN    glucose, 24 g, Oral, PRN    insulin aspart U-100, 0-10 Units, Subcutaneous, QID (AC + HS) PRN    linaCLOtide, 145 mcg, Oral, Daily PRN    ondansetron, 8 mg, Oral, Q6H PRN    oxyCODONE, 10 mg, Oral, Q4H PRN    prochlorperazine, 5 mg, Intravenous, Q6H PRN    Family History       Problem Relation (Age of Onset)    Cataracts Mother    No Known Problems Father, Sister, Brother, Maternal Aunt, Maternal Uncle, Paternal Aunt, Paternal Uncle, Maternal Grandmother, Maternal Grandfather, Paternal Grandmother, Paternal Grandfather          Tobacco Use    Smoking status: Some Days     Types: Cigarettes    Smokeless tobacco: Never   Substance and Sexual Activity    Alcohol use: Yes     Comment: socially    Drug use: No    Sexual activity: Not on file     Review of Systems   Constitutional:  Positive for activity change. Negative for fatigue and fever.   HENT:  Negative for trouble swallowing and voice change.    Respiratory:  Negative for cough and shortness of breath.    Cardiovascular:  Negative for chest pain and leg swelling.   Gastrointestinal:  Negative for abdominal distention and abdominal pain.   Genitourinary:  Negative for difficulty urinating and flank pain.   Musculoskeletal:  Positive for gait problem. Negative for back pain.    Skin:  Negative for color change and rash.   Neurological:  Positive for weakness. Negative for speech difficulty and numbness.   Psychiatric/Behavioral:  Negative for agitation and confusion.      Objective:     Vital Signs (Most Recent):  Temp: 99.5 °F (37.5 °C) (05/05/24 0609)  Pulse: (!) 112 (05/05/24 0609)  Resp: 17 (05/05/24 0609)  BP: 132/71 (05/05/24 0609)  SpO2: 96 % (05/05/24 0609)    Vital Signs (24h Range):  Temp:  [99 °F (37.2 °C)-100.1 °F (37.8 °C)] 99.5 °F (37.5 °C)  Pulse:  [112-119] 112  Resp:  [17-18] 17  SpO2:  [93 %-98 %] 96 %  BP: ()/(50-71) 132/71     Body mass index is 31.21 kg/m².     Physical Exam  Vitals and nursing note reviewed.   Constitutional:       Appearance: He is well-developed.   HENT:      Head: Normocephalic and atraumatic.      Nose: Nose normal.   Eyes:      General:         Right eye: No discharge.         Left eye: No discharge.      Pupils: Pupils are equal, round, and reactive to light.   Pulmonary:      Effort: Pulmonary effort is normal. No respiratory distress.   Abdominal:      General: There is no distension.      Palpations: Abdomen is soft.      Tenderness: There is no abdominal tenderness.   Musculoskeletal:         General: No deformity.      Cervical back: Normal range of motion and neck supple.      Comments: BUE and BLE 5/5   Skin:     General: Skin is warm and dry.   Neurological:      Mental Status: He is alert and oriented to person, place, and time.      Sensory: No sensory deficit.      Motor: Weakness present. No abnormal muscle tone.      Gait: Gait abnormal.      Comments: Drains and wound vac   Psychiatric:         Mood and Affect: Mood normal.         Behavior: Behavior normal.          NEUROLOGICAL EXAMINATION:     MENTAL STATUS   Oriented to person, place, and time.     CRANIAL NERVES     CN III, IV, VI   Pupils are equal, round, and reactive to light.      Diagnostic Results: Labs: Reviewed  ECG: Reviewed  X-Ray: Reviewed

## 2024-05-06 VITALS
HEART RATE: 100 BPM | TEMPERATURE: 99 F | SYSTOLIC BLOOD PRESSURE: 118 MMHG | RESPIRATION RATE: 20 BRPM | OXYGEN SATURATION: 97 % | DIASTOLIC BLOOD PRESSURE: 69 MMHG | BODY MASS INDEX: 31.34 KG/M2 | WEIGHT: 206.81 LBS | HEIGHT: 68 IN

## 2024-05-06 LAB
ANION GAP SERPL CALC-SCNC: 8 MMOL/L (ref 8–16)
BASOPHILS # BLD AUTO: 0.03 K/UL (ref 0–0.2)
BASOPHILS NFR BLD: 0.4 % (ref 0–1.9)
BUN SERPL-MCNC: 11 MG/DL (ref 6–20)
CALCIUM SERPL-MCNC: 9 MG/DL (ref 8.7–10.5)
CHLORIDE SERPL-SCNC: 104 MMOL/L (ref 95–110)
CO2 SERPL-SCNC: 26 MMOL/L (ref 23–29)
CREAT SERPL-MCNC: 0.8 MG/DL (ref 0.5–1.4)
DIFFERENTIAL METHOD BLD: ABNORMAL
EOSINOPHIL # BLD AUTO: 0.2 K/UL (ref 0–0.5)
EOSINOPHIL NFR BLD: 2.8 % (ref 0–8)
ERYTHROCYTE [DISTWIDTH] IN BLOOD BY AUTOMATED COUNT: 13.7 % (ref 11.5–14.5)
EST. GFR  (NO RACE VARIABLE): >60 ML/MIN/1.73 M^2
GLUCOSE SERPL-MCNC: 127 MG/DL (ref 70–110)
HCT VFR BLD AUTO: 30.1 % (ref 40–54)
HGB BLD-MCNC: 9.7 G/DL (ref 14–18)
IMM GRANULOCYTES # BLD AUTO: 0.04 K/UL (ref 0–0.04)
IMM GRANULOCYTES NFR BLD AUTO: 0.5 % (ref 0–0.5)
LYMPHOCYTES # BLD AUTO: 2 K/UL (ref 1–4.8)
LYMPHOCYTES NFR BLD: 25.9 % (ref 18–48)
MCH RBC QN AUTO: 28.1 PG (ref 27–31)
MCHC RBC AUTO-ENTMCNC: 32.2 G/DL (ref 32–36)
MCV RBC AUTO: 87 FL (ref 82–98)
MONOCYTES # BLD AUTO: 0.9 K/UL (ref 0.3–1)
MONOCYTES NFR BLD: 11 % (ref 4–15)
NEUTROPHILS # BLD AUTO: 4.7 K/UL (ref 1.8–7.7)
NEUTROPHILS NFR BLD: 59.4 % (ref 38–73)
NRBC BLD-RTO: 0 /100 WBC
PLATELET # BLD AUTO: 243 K/UL (ref 150–450)
PMV BLD AUTO: 10.4 FL (ref 9.2–12.9)
POCT GLUCOSE: 143 MG/DL (ref 70–110)
POCT GLUCOSE: 153 MG/DL (ref 70–110)
POCT GLUCOSE: 158 MG/DL (ref 70–110)
POCT GLUCOSE: 225 MG/DL (ref 70–110)
POTASSIUM SERPL-SCNC: 4 MMOL/L (ref 3.5–5.1)
RBC # BLD AUTO: 3.45 M/UL (ref 4.6–6.2)
SODIUM SERPL-SCNC: 138 MMOL/L (ref 136–145)
WBC # BLD AUTO: 7.85 K/UL (ref 3.9–12.7)

## 2024-05-06 PROCEDURE — 97530 THERAPEUTIC ACTIVITIES: CPT

## 2024-05-06 PROCEDURE — 97110 THERAPEUTIC EXERCISES: CPT

## 2024-05-06 PROCEDURE — 97116 GAIT TRAINING THERAPY: CPT

## 2024-05-06 PROCEDURE — 99024 POSTOP FOLLOW-UP VISIT: CPT | Mod: ,,,

## 2024-05-06 PROCEDURE — 25000003 PHARM REV CODE 250: Performed by: PHYSICIAN ASSISTANT

## 2024-05-06 PROCEDURE — 97112 NEUROMUSCULAR REEDUCATION: CPT

## 2024-05-06 PROCEDURE — 99232 SBSQ HOSP IP/OBS MODERATE 35: CPT | Mod: ,,, | Performed by: NURSE PRACTITIONER

## 2024-05-06 PROCEDURE — 85025 COMPLETE CBC W/AUTO DIFF WBC: CPT | Performed by: STUDENT IN AN ORGANIZED HEALTH CARE EDUCATION/TRAINING PROGRAM

## 2024-05-06 PROCEDURE — 25000003 PHARM REV CODE 250: Performed by: STUDENT IN AN ORGANIZED HEALTH CARE EDUCATION/TRAINING PROGRAM

## 2024-05-06 PROCEDURE — 36415 COLL VENOUS BLD VENIPUNCTURE: CPT | Performed by: STUDENT IN AN ORGANIZED HEALTH CARE EDUCATION/TRAINING PROGRAM

## 2024-05-06 PROCEDURE — 80048 BASIC METABOLIC PNL TOTAL CA: CPT | Performed by: STUDENT IN AN ORGANIZED HEALTH CARE EDUCATION/TRAINING PROGRAM

## 2024-05-06 RX ORDER — METHOCARBAMOL 1000 MG/1
1000 TABLET, COATED ORAL 4 TIMES DAILY
Start: 2024-05-06 | End: 2024-05-16

## 2024-05-06 RX ORDER — OXYCODONE HYDROCHLORIDE 10 MG/1
10 TABLET ORAL EVERY 6 HOURS PRN
Start: 2024-05-06 | End: 2024-06-13

## 2024-05-06 RX ADMIN — DOCUSATE SODIUM AND SENNOSIDES 1 TABLET: 8.6; 5 TABLET, FILM COATED ORAL at 09:05

## 2024-05-06 RX ADMIN — TAMSULOSIN HYDROCHLORIDE 0.4 MG: 0.4 CAPSULE ORAL at 09:05

## 2024-05-06 RX ADMIN — METHOCARBAMOL 1000 MG: 500 TABLET ORAL at 01:05

## 2024-05-06 RX ADMIN — ACETAMINOPHEN 1000 MG: 500 TABLET ORAL at 01:05

## 2024-05-06 RX ADMIN — ACETAMINOPHEN 1000 MG: 500 TABLET ORAL at 09:05

## 2024-05-06 RX ADMIN — PANTOPRAZOLE SODIUM 40 MG: 20 TABLET, DELAYED RELEASE ORAL at 09:05

## 2024-05-06 RX ADMIN — ATORVASTATIN CALCIUM 40 MG: 40 TABLET, FILM COATED ORAL at 09:05

## 2024-05-06 RX ADMIN — METHOCARBAMOL 1000 MG: 500 TABLET ORAL at 09:05

## 2024-05-06 RX ADMIN — LISINOPRIL 5 MG: 5 TABLET ORAL at 09:05

## 2024-05-06 RX ADMIN — POLYETHYLENE GLYCOL 3350 17 G: 17 POWDER, FOR SOLUTION ORAL at 09:05

## 2024-05-06 RX ADMIN — OXYBUTYNIN CHLORIDE 10 MG: 10 TABLET, EXTENDED RELEASE ORAL at 09:05

## 2024-05-06 NOTE — ASSESSMENT & PLAN NOTE
Patient's anemia is currently controlled. Has not received any PRBCs to date. Etiology likely d/t expected blood loss post-op.  Current CBC reviewed-   Lab Results   Component Value Date    HGB 9.7 (L) 05/06/2024    HCT 30.1 (L) 05/06/2024     Monitor serial CBC and transfuse if patient becomes hemodynamically unstable, symptomatic or H/H drops below 7/21.

## 2024-05-06 NOTE — PT/OT/SLP PROGRESS
Occupational Therapy  Co- Treatment    Name: Emeka Caballero  MRN: [id number]  Admitting Diagnosis:  Lumbar spondylosis  5 Days Post-Op    Recommendations:     Discharge Recommendations: Moderate Intensity Therapy  Discharge Equipment Recommendations:  walker, rolling, bedside commode  Barriers to discharge:  None    Assessment:     Emeka Caballero is a 49 y.o. male with a medical diagnosis of Lumbar spondylosis.  He presents with decrease functional status secondary to medical diagnosis. Performance deficits affecting function are weakness, impaired self care skills, impaired functional mobility, gait instability, impaired balance, decreased safety awareness, decreased lower extremity function, decreased upper extremity function, impaired fine motor, decreased coordination.     Rehab Prognosis:  Good; patient would benefit from acute skilled OT services to address these deficits and reach maximum level of function.       Plan:     Patient to be seen 4 x/week to address the above listed problems via self-care/home management, therapeutic activities, therapeutic exercises, neuromuscular re-education  Plan of Care Expires: 06/02/24  Plan of Care Reviewed with: patient    Subjective     Chief Complaint: none at this time   Patient/Family Comments/goals: DC  Pain/Comfort:  Pain Rating 1: 9/10  Location - Side 1: Bilateral  Location - Orientation 1: lower  Location 1: back  Pain Addressed 1: Reposition, Distraction, Nurse notified    Objective:     Communicated with: RN prior to session.  Patient found supine with wound vac, telemetry upon OT entry to room.    General Precautions: Standard, fall    Orthopedic Precautions:spinal precautions  Braces: TLSO  Respiratory Status: Room air     Occupational Performance:     Bed Mobility:     Rolling Left:  minimum assistance  Scooting: contact guard assistance  Supine to Sit: moderate assistance    Functional Mobility/Transfers:  Sit to Stand:  contact guard  "assistance with rolling walker  Gait: patient ambulated 80' with SBA and 2ww  Floor transfer: maximal assistance of 2 with HHA; cushions on floor ~12" height with maximal assistance with HHA, cues for body positioning for safety and spinal precautions. Patient currently sleeping on air mattress; floor transfer attempted in preparation for DC   Pt ascended/descended 10 stair(s) with No Assistive Device with Contact Guard Assistance.     Activities of Daily Living:  Upper Body Dressing: minimum assistance    Lower Body Dressing: minimum assistance        AMPAC 6 Click ADL:      Treatment & Education:  Co-Treatment conducted due to patient medical instability and to promote patient safety. Provided education regarding role of OT, POC, & discharge recommendations.  Pt with no further questions/concerns at this time. OT provided education on home recommendations and fall prevention in preparation for D/C.     Patient left supine with all lines intact and call button in reach    GOALS:   Multidisciplinary Problems       Occupational Therapy Goals          Problem: Occupational Therapy    Goal Priority Disciplines Outcome Interventions   Occupational Therapy Goal     OT, PT/OT Progressing    Description: Goals to be met by: 6/2/24     Patient will increase functional independence with ADLs by performing:    UE Dressing with [place].  LE Dressing with [place].  Grooming while standing with [place].  Toileting from toilet with [place] for hygiene and clothing management.   Toilet transfer to toilet with [place].                         Time Tracking:     OT Date of Treatment: 05/06/24  OT Start Time: 1232  OT Stop Time: 1255  OT Total Time (min): 23 min    Billable Minutes:Therapeutic Activity 12  Neuromuscular Re-education 13    OT/MASON: OT          5/6/2024  "

## 2024-05-06 NOTE — SUBJECTIVE & OBJECTIVE
Interval History 5/6/2024:  Patient is seen for follow-up PM&R evaluation and recommendations: Participated w/ OT yesterday and therapy recommending high intensity. Spiked temp, pending c-xray and dopplers today. Last drain removed.      HPI, Past Medical, Family, and Social History remains the same as documented in the initial encounter.    Scheduled Medications:   Current Facility-Administered Medications   Medication Dose Route Frequency    acetaminophen  1,000 mg Oral QID    atorvastatin  40 mg Oral Daily    bisacodyL  10 mg Rectal Daily    enoxparin  40 mg Subcutaneous Q24H (prophylaxis, 1700)    gabapentin  300 mg Oral QHS    lisinopriL  5 mg Oral Daily    methocarbamoL  1,000 mg Oral QID    oxybutynin  10 mg Oral Daily    pantoprazole  40 mg Oral Daily    polyethylene glycol  17 g Oral Daily    senna-docusate 8.6-50 mg  1 tablet Oral BID    tamsulosin  0.4 mg Oral Daily       Diagnostic Results: Labs: Reviewed  ECG: Reviewed  MRI: Reviewed    PRN Medications:   Current Facility-Administered Medications:     aluminum-magnesium hydroxide-simethicone, 30 mL, Oral, Q4H PRN    dextrose 10%, 12.5 g, Intravenous, PRN    dextrose 10%, 25 g, Intravenous, PRN    glucagon (human recombinant), 1 mg, Intramuscular, PRN    glucose, 16 g, Oral, PRN    glucose, 24 g, Oral, PRN    insulin aspart U-100, 0-10 Units, Subcutaneous, QID (AC + HS) PRN    linaCLOtide, 145 mcg, Oral, Daily PRN    ondansetron, 8 mg, Oral, Q6H PRN    oxyCODONE, 10 mg, Oral, Q4H PRN    prochlorperazine, 5 mg, Intravenous, Q6H PRN    Review of Systems   Constitutional:  Positive for activity change. Negative for fatigue and fever.   HENT:  Negative for trouble swallowing and voice change.    Respiratory:  Negative for cough and shortness of breath.    Cardiovascular:  Negative for chest pain and leg swelling.   Gastrointestinal:  Negative for abdominal distention and abdominal pain.   Genitourinary:  Negative for difficulty urinating and flank pain.    Musculoskeletal:  Positive for gait problem. Negative for back pain.   Skin:  Negative for color change and rash.   Neurological:  Positive for weakness. Negative for speech difficulty and numbness.   Psychiatric/Behavioral:  Negative for agitation and confusion.      Objective:     Vital Signs (Most Recent):  Temp: 98.7 °F (37.1 °C) (05/06/24 1215)  Pulse: (!) 113 (05/06/24 1215)  Resp: 20 (05/06/24 1215)  BP: 111/66 (05/06/24 1215)  SpO2: (!) 94 % (05/06/24 1215)    Vital Signs (24h Range):  Temp:  [98.7 °F (37.1 °C)-99.7 °F (37.6 °C)] 98.7 °F (37.1 °C)  Pulse:  [102-113] 113  Resp:  [16-20] 20  SpO2:  [94 %-98 %] 94 %  BP: (106-132)/(58-76) 111/66         Physical Exam  Vitals and nursing note reviewed.   Constitutional:       Appearance: He is well-developed.   HENT:      Head: Normocephalic and atraumatic.      Nose: Nose normal.   Eyes:      General:         Right eye: No discharge.         Left eye: No discharge.      Pupils: Pupils are equal, round, and reactive to light.   Pulmonary:      Effort: Pulmonary effort is normal. No respiratory distress.   Abdominal:      General: There is no distension.      Palpations: Abdomen is soft.      Tenderness: There is no abdominal tenderness.   Musculoskeletal:         General: No deformity.      Cervical back: Normal range of motion and neck supple.      Comments: BUE and BLE 5/5   Skin:     General: Skin is warm and dry.   Neurological:      Mental Status: He is alert and oriented to person, place, and time.      Sensory: No sensory deficit.      Motor: Weakness present. No abnormal muscle tone.      Gait: Gait abnormal.      Comments: Drains and wound vac   Psychiatric:         Mood and Affect: Mood normal.         Behavior: Behavior normal.          NEUROLOGICAL EXAMINATION:     MENTAL STATUS   Oriented to person, place, and time.     CRANIAL NERVES     CN III, IV, VI   Pupils are equal, round, and reactive to light.

## 2024-05-06 NOTE — SUBJECTIVE & OBJECTIVE
Interval History: NAEON. Febrile to 101.2 overnight. No symptoms. Will order dopplers and CXR. If negative, okay to dc to rehab. HV drain removed without issues.    Medications:  Continuous Infusions:  Current Facility-Administered Medications   Medication Dose Route Frequency Last Rate Last Admin     Scheduled Meds:  Current Facility-Administered Medications   Medication Dose Route Frequency    acetaminophen  1,000 mg Oral QID    atorvastatin  40 mg Oral Daily    bisacodyL  10 mg Rectal Daily    enoxparin  40 mg Subcutaneous Q24H (prophylaxis, 1700)    gabapentin  300 mg Oral QHS    lisinopriL  5 mg Oral Daily    methocarbamoL  1,000 mg Oral QID    oxybutynin  10 mg Oral Daily    pantoprazole  40 mg Oral Daily    polyethylene glycol  17 g Oral Daily    senna-docusate 8.6-50 mg  1 tablet Oral BID    tamsulosin  0.4 mg Oral Daily     PRN Meds:  Current Facility-Administered Medications:     aluminum-magnesium hydroxide-simethicone, 30 mL, Oral, Q4H PRN    dextrose 10%, 12.5 g, Intravenous, PRN    dextrose 10%, 25 g, Intravenous, PRN    glucagon (human recombinant), 1 mg, Intramuscular, PRN    glucose, 16 g, Oral, PRN    glucose, 24 g, Oral, PRN    insulin aspart U-100, 0-10 Units, Subcutaneous, QID (AC + HS) PRN    linaCLOtide, 145 mcg, Oral, Daily PRN    ondansetron, 8 mg, Oral, Q6H PRN    oxyCODONE, 10 mg, Oral, Q4H PRN    prochlorperazine, 5 mg, Intravenous, Q6H PRN     Review of Systems  Objective:     Weight: 93.8 kg (206 lb 12.7 oz)  Body mass index is 31.21 kg/m².  Vital Signs (Most Recent):  Temp: 98.7 °F (37.1 °C) (05/06/24 1215)  Pulse: (!) 113 (05/06/24 1215)  Resp: 20 (05/06/24 1215)  BP: 111/66 (05/06/24 1215)  SpO2: (!) 94 % (05/06/24 1215) Vital Signs (24h Range):  Temp:  [98.7 °F (37.1 °C)-99.7 °F (37.6 °C)] 98.7 °F (37.1 °C)  Pulse:  [102-113] 113  Resp:  [16-20] 20  SpO2:  [94 %-98 %] 94 %  BP: (106-132)/(58-76) 111/66                              Closed/Suction Drain 05/01/24 1124 Tube - 1 Left  Back Accordion 10 Fr. (Active)   Site Description Healing 05/06/24 0400   Dressing Type Transparent (Tegaderm) 05/05/24 1800   Dressing Status Clean;Dry;Intact 05/06/24 0400   Dressing Intervention Integrity maintained 05/06/24 0400   Drainage Bloody 05/05/24 1800   Status Other (Comment) 05/05/24 1800   Output (mL) 10 mL 05/06/24 0500            Closed/Suction Drain 05/01/24 1126 Tube - 2 Right Back Accordion 10 Fr. (Active)   Site Description Leaking at site;Other (Comment) 05/01/24 1945   Dressing Type Transparent (Tegaderm) 05/05/24 1800   Dressing Status Clean;Dry;Intact 05/05/24 1800   Dressing Intervention Integrity maintained 05/05/24 1800   Drainage Bloody 05/05/24 1800   Status Other (Comment) 05/05/24 1800   Output (mL) 40 mL 05/02/24 1133         Neurosurgery Physical Exam  General: well developed, well nourished, no distress.   Head: normocephalic, atraumatic  Neurologic: Alert and oriented. Thought content appropriate.  GCS: Motor: 6/Verbal: 5/Eyes: 4 GCS Total: 15  Mental Status: Awake, Alert, Oriented x 4  Language: No aphasia  Speech: No dysarthria  Cranial nerves: face symmetric, tongue midline, CN II-XII grossly intact.   Eyes: pupils equal, round, reactive to light with accomodation, EOMI.   Pulmonary: normal respirations, no signs of respiratory distress  Abdomen: soft, non-distended, not tender to palpation  Sensory: intact to light touch throughout  Motor Strength: Moves all extremities spontaneously with good tone.  Full strength upper and lower extremities. No abnormal movements seen.      Strength   Deltoids Triceps Biceps Wrist Extension Wrist Flexion Hand    Upper: R 5/5 5/5 5/5 5/5 5/5 5/5     L 5/5 5/5 5/5 5/5 5/5 5/5       Iliopsoas Quadriceps Knee  Flexion Tibialis  anterior Gastro- cnemius EHL   Lower: R 5/5 5/5 5/5 5/5 5/5 5/5     L 5/5 5/5 5/5 5/5 5/5 5/5      Skin: Skin is warm, dry and intact.     Incision c/d/I with staples, foam dressing applied.    Significant  "Labs:  Recent Labs   Lab 05/05/24  0240 05/06/24  0454   * 127*    138   K 3.9 4.0    104   CO2 25 26   BUN 14 11   CREATININE 0.9 0.8   CALCIUM 8.5* 9.0     Recent Labs   Lab 05/05/24  0240 05/06/24  0453   WBC 8.92 7.85   HGB 9.6* 9.7*   HCT 29.7* 30.1*    243     No results for input(s): "LABPT", "INR", "APTT" in the last 48 hours.  Microbiology Results (last 7 days)       ** No results found for the last 168 hours. **          All pertinent labs from the last 24 hours have been reviewed.    Significant Diagnostics:  I have reviewed and interpreted all pertinent imaging results/findings within the past 24 hours.  "

## 2024-05-06 NOTE — PROGRESS NOTES
[facility] - Neurosurgery ([facility])  Neurosurgery  Progress Note    Subjective:     History of Present Illness: Emeka Caballero is a 49 y.o. male s/p L4-pelvis fusion and L4-S1 TLIF on 02/08/2022 for s/p L4-pelvis fusion and L4-5, L5-S1 TLIF on 02/08/2022 with Dr. Reed who presents with adjacent segment disease for extension/revision of fusion L2-Pelvis. He is being seen in clinic today to follow-up from his recent ED visit from 12/12/23 where he had fallen and has had worsening right buttock pain that radiates down his right lower extremity. The patient obtained an injection as previously recommended by Dr. Reed about 2 weeks ago in the right SI joint without any relief. He is taking ibuprofen 800 mg with no relief. Reports difficulties with standing up straight. He is utilizing a cane to ambulate. Reports the pain being a burning sensation associated with numbness in the feet. He has also attempted PT without relief.     Post-Op Info:  Procedure(s) (LRB):  **AIRO** L2-pelvis extension and revision of fusion, (N/A)   5 Days Post-Op   Interval History: NAEON. Febrile to 101.2 overnight. No symptoms. Will order dopplers and CXR. If negative, okay to dc to rehab. HV drain removed without issues.    Medications:  Continuous Infusions:  Current Facility-Administered Medications   Medication Dose Route Frequency Last Rate Last Admin     Scheduled Meds:  Current Facility-Administered Medications   Medication Dose Route Frequency    acetaminophen  1,000 mg Oral QID    atorvastatin  40 mg Oral Daily    bisacodyL  10 mg Rectal Daily    enoxparin  40 mg Subcutaneous Q24H (prophylaxis, 1700)    gabapentin  300 mg Oral QHS    lisinopriL  5 mg Oral Daily    methocarbamoL  1,000 mg Oral QID    oxybutynin  10 mg Oral Daily    pantoprazole  40 mg Oral Daily    polyethylene glycol  17 g Oral Daily    senna-docusate 8.6-50 mg  1 tablet Oral BID    tamsulosin  0.4 mg Oral Daily     PRN Meds:  Current Facility-Administered  Medications:     aluminum-magnesium hydroxide-simethicone, 30 mL, Oral, Q4H PRN    dextrose 10%, 12.5 g, Intravenous, PRN    dextrose 10%, 25 g, Intravenous, PRN    glucagon (human recombinant), 1 mg, Intramuscular, PRN    glucose, 16 g, Oral, PRN    glucose, 24 g, Oral, PRN    insulin aspart U-100, 0-10 Units, Subcutaneous, QID (AC + HS) PRN    linaCLOtide, 145 mcg, Oral, Daily PRN    ondansetron, 8 mg, Oral, Q6H PRN    oxyCODONE, 10 mg, Oral, Q4H PRN    prochlorperazine, 5 mg, Intravenous, Q6H PRN     Review of Systems  Objective:     Weight: 93.8 kg (206 lb 12.7 oz)  Body mass index is 31.21 kg/m².  Vital Signs (Most Recent):  Temp: 98.7 °F (37.1 °C) (05/06/24 1215)  Pulse: (!) 113 (05/06/24 1215)  Resp: 20 (05/06/24 1215)  BP: 111/66 (05/06/24 1215)  SpO2: (!) 94 % (05/06/24 1215) Vital Signs (24h Range):  Temp:  [98.7 °F (37.1 °C)-99.7 °F (37.6 °C)] 98.7 °F (37.1 °C)  Pulse:  [102-113] 113  Resp:  [16-20] 20  SpO2:  [94 %-98 %] 94 %  BP: (106-132)/(58-76) 111/66                              Closed/Suction Drain 05/01/24 1124 Tube - 1 Left Back Accordion 10 Fr. (Active)   Site Description Healing 05/06/24 0400   Dressing Type Transparent (Tegaderm) 05/05/24 1800   Dressing Status Clean;Dry;Intact 05/06/24 0400   Dressing Intervention Integrity maintained 05/06/24 0400   Drainage Bloody 05/05/24 1800   Status Other (Comment) 05/05/24 1800   Output (mL) 10 mL 05/06/24 0500            Closed/Suction Drain 05/01/24 1126 Tube - 2 Right Back Accordion 10 Fr. (Active)   Site Description Leaking at site;Other (Comment) 05/01/24 1945   Dressing Type Transparent (Tegaderm) 05/05/24 1800   Dressing Status Clean;Dry;Intact 05/05/24 1800   Dressing Intervention Integrity maintained 05/05/24 1800   Drainage Bloody 05/05/24 1800   Status Other (Comment) 05/05/24 1800   Output (mL) 40 mL 05/02/24 1133         Neurosurgery Physical Exam  General: well developed, well nourished, no distress.   Head: normocephalic,  "atraumatic  Neurologic: Alert and oriented. Thought content appropriate.  GCS: Motor: 6/Verbal: 5/Eyes: 4 GCS Total: 15  Mental Status: Awake, Alert, Oriented x 4  Language: No aphasia  Speech: No dysarthria  Cranial nerves: face symmetric, tongue midline, CN II-XII grossly intact.   Eyes: pupils equal, round, reactive to light with accomodation, EOMI.   Pulmonary: normal respirations, no signs of respiratory distress  Abdomen: soft, non-distended, not tender to palpation  Sensory: intact to light touch throughout  Motor Strength: Moves all extremities spontaneously with good tone.  Full strength upper and lower extremities. No abnormal movements seen.      Strength   Deltoids Triceps Biceps Wrist Extension Wrist Flexion Hand    Upper: R 5/5 5/5 5/5 5/5 5/5 5/5     L 5/5 5/5 5/5 5/5 5/5 5/5       Iliopsoas Quadriceps Knee  Flexion Tibialis  anterior Gastro- cnemius EHL   Lower: R 5/5 5/5 5/5 5/5 5/5 5/5     L 5/5 5/5 5/5 5/5 5/5 5/5      Skin: Skin is warm, dry and intact.     Incision c/d/I with staples, foam dressing applied.    Significant Labs:  Recent Labs   Lab 05/05/24  0240 05/06/24  0454   * 127*    138   K 3.9 4.0    104   CO2 25 26   BUN 14 11   CREATININE 0.9 0.8   CALCIUM 8.5* 9.0     Recent Labs   Lab 05/05/24  0240 05/06/24  0453   WBC 8.92 7.85   HGB 9.6* 9.7*   HCT 29.7* 30.1*    243     No results for input(s): "LABPT", "INR", "APTT" in the last 48 hours.  Microbiology Results (last 7 days)       ** No results found for the last 168 hours. **          All pertinent labs from the last 24 hours have been reviewed.    Significant Diagnostics:  I have reviewed and interpreted all pertinent imaging results/findings within the past 24 hours.  Assessment/Plan:     * Lumbar spondylosis  Emeka Caballero is a 49 y.o. male s/p L4-pelvis fusion and L4-S1 TLIF on 02/08/2022 for s/p L4-pelvis fusion and L4-5, L5-S1 TLIF on 02/08/2022 with Dr. Reed presents with adjacent " segment disease now s/p extension/revision of fusion L2-Pelvis on 5/1.    - Neurologically stable  - Pain control: continue current regimen   - WV removed, wound dressed, dry  - Drains removed.  - Post op imaging showing satisfactory placement of hardware.   - Brace: TLSO when OOB  - Continue PT/OT/OOB. OOB > 6hrs/day  - GI: Diet as tolerated. Continue bowel regimen.   - Noonan removed. Bladder scan q6h, I/O cath if > 350 cc. Voiding spontaneously.  - DVT ppx: LVX, Compression stockings, SCDs  - Atelectasis ppx: IS at bedside. Encourage use every hour while awake.  - Discussed with Dr. Reed    - Dispo: DC to rehab pending CXR and dopplers    Acute blood loss anemia  Patient's anemia is currently controlled. Has not received any PRBCs to date. Etiology likely d/t expected blood loss post-op.  Current CBC reviewed-   Lab Results   Component Value Date    HGB 9.7 (L) 05/06/2024    HCT 30.1 (L) 05/06/2024     Monitor serial CBC and transfuse if patient becomes hemodynamically unstable, symptomatic or H/H drops below 7/21.    Essential hypertension, benign  Chronic, controlled. Latest blood pressure and vitals reviewed-     Temp:  [98.7 °F (37.1 °C)-99.7 °F (37.6 °C)]   Pulse:  [102-113]   Resp:  [16-20]   BP: (106-132)/(58-76)   SpO2:  [94 %-98 %] .   Home meds for hypertension were reviewed and noted below.   Hypertension Medications               lisinopriL (PRINIVIL,ZESTRIL) 5 MG tablet Take 1 tablet (5 mg total) by mouth once daily.            While in the hospital, will manage blood pressure as follows; Continue home antihypertensive regimen    Will utilize p.r.n. blood pressure medication only if patient's blood pressure greater than 160/100 and he develops symptoms such as worsening chest pain or shortness of breath.    Type 2 diabetes mellitus with hyperlipidemia  POCT 4x daily, diabetic diet, ISS. Not on insulin at home. Hold home dose metformin. Will need to continue accuchecks at IPR with insulin sliding  scale as appropriate.      BPH (benign prostatic hyperplasia)  Continue home dose flomax        Janina Denson PA-C  Neurosurgery  [facility] - Neurosurgery [facility])

## 2024-05-06 NOTE — ASSESSMENT & PLAN NOTE
Emeka Caballero is a 49 y.o. male s/p L4-pelvis fusion and L4-S1 TLIF on 02/08/2022 for s/p L4-pelvis fusion and L4-5, L5-S1 TLIF on 02/08/2022 with Dr. Reed presents with adjacent segment disease now s/p extension/revision of fusion L2-Pelvis on 5/1.    - Neurologically stable  - Pain control: continue current regimen   - WV removed, wound dressed, dry  - Drains removed.  - Post op imaging showing satisfactory placement of hardware.   - Brace: TLSO when OOB  - Continue PT/OT/OOB. OOB > 6hrs/day  - GI: Diet as tolerated. Continue bowel regimen.   - Noonan removed. Bladder scan q6h, I/O cath if > 350 cc. Voiding spontaneously.  - DVT ppx: LVX, Compression stockings, SCDs  - Atelectasis ppx: IS at bedside. Encourage use every hour while awake.  - Discussed with Dr. Reed    - Dispo: DC to rehab pending CXR and dopplers

## 2024-05-06 NOTE — PLAN OF CARE
[facility]    FACILITY TRANSFER ORDERS      Patient Name: Emeka Caballeor  YOB: 1975    PCP: Dio Zarate Jr., MD   PCP Address: [facility] [address]  PCP Phone Number: [phone]  PCP Fax: [phone]    Encounter Date: 05/06/2024    Admit to: rehab    Vital Signs:  Routine    Diagnoses:   Active Hospital Problems    Diagnosis  POA    *Lumbar spondylosis [M47.816]  Yes    Impaired mobility [Z74.09]  Unknown    Acute blood loss anemia [D62]  Yes     EBL  Acute droop 5 points in hemoglobin post op  Close watch with daily cbc       Pain [R52]  Yes     worsening right buttock pain that radiates down his right lower extremity.   Now post op  Multimodal pain management      Essential hypertension, benign [I10]  Yes     Chronic    Mixed dyslipidemia [E78.2]  Yes     Chronic     Lab Results   Component Value Date    CHOL 164 10/25/2023    CHOL 124 04/29/2023    CHOL 137 10/14/2022     Lab Results   Component Value Date    HDL 25 (L) 10/25/2023    HDL 29 (L) 04/29/2023    HDL 26 (L) 10/14/2022     Lab Results   Component Value Date    LDLCALC 72.4 10/25/2023    LDLCALC 65.4 04/29/2023    LDLCALC 81.4 10/14/2022     Lab Results   Component Value Date    TRIG 333 (H) 10/25/2023    TRIG 148 04/29/2023    TRIG 148 10/14/2022     Lab Results   Component Value Date    CHOLHDL 15.2 (L) 10/25/2023    CHOLHDL 23.4 04/29/2023    CHOLHDL 19.0 (L) 10/14/2022        The 10-year ASCVD risk score (Jasper SMART, et al., 2019) is: 28.3%    Values used to calculate the score:      Age: 48 years      Sex: Male      Is Non- : Yes      Diabetic: Yes      Tobacco smoker: Yes      Systolic Blood Pressure: 124 mmHg      Is BP treated: Yes      HDL Cholesterol: 25 mg/dL      Total Cholesterol: 164 mg/dL        BPH (benign prostatic hyperplasia) [N40.0]  Yes     Chronic     3- Holmium laser enucleation of the prostate.  Pathology   Prostate with benign glandular  and stromal hyperplasia      Type 2 diabetes mellitus with hyperlipidemia [E11.69, E78.5]  Yes     Chronic     Lab Results   Component Value Date    HGBA1C 6.6 (H) 10/25/2023    HGBA1C 6.6 (H) 04/29/2023    HGBA1C 6.9 (H) 03/14/2023     Lab Results   Component Value Date    LDLCALC 72.4 10/25/2023     Lab Results   Component Value Date    MICALBCREAT 2.9 10/14/2022           Chronic constipation [K59.09]  Yes     Chronic      Resolved Hospital Problems   No resolved problems to display.       Allergies:Review of patient's allergies indicates:  No Known Allergies    Diet: diabetic diet: 2000 calorie    Activities: Activity as tolerated, Ambulate in leyva, and Bathroom privileges, TLSO brace when out of bed    Goals of Care Treatment Preferences:  Code Status: Full Code      Nursing: TLSO when upright or out of bed; fall precautions; back incision with staples and suture, can keep dressing to incision and change every 2-3 days, monitor for drainage or dehiscence.      Labs: CBC and BMP  per facility protocol      CONSULTS:    Physical Therapy to evaluate and treat.  and Occupational Therapy to evaluate and treat.    MISCELLANEOUS CARE:  Daily Lovenox 40 mg for DVT prophylaxis while inpatient.    WOUND CARE ORDERS  None    Medications: Review discharge medications with patient and family and provide education.      Current Discharge Medication List        START taking these medications    Details   methocarbamoL 1,000 mg Tab Take 1,000 mg by mouth 4 (four) times daily. for 10 days      oxyCODONE (ROXICODONE) 10 mg Tab immediate release tablet Take 1 tablet (10 mg total) by mouth every 6 (six) hours as needed for Pain.    Comments: Quantity prescribed more than 7 day supply? Yes, quantity medically necessary           CONTINUE these medications which have NOT CHANGED    Details   gabapentin (NEURONTIN) 300 MG capsule Take 1 capsule (300 mg total) by mouth every evening.  Qty: 30 capsule, Refills: 11    Associated  Diagnoses: Spinal stenosis, lumbar region, without neurogenic claudication; Lumbar radiculopathy      linaCLOtide (LINZESS) 72 mcg Cap capsule Take 1 capsule (72 mcg total) by mouth daily as needed (constipation).  Qty: 30 capsule, Refills: 5    Associated Diagnoses: Chronic constipation      omega-3 fatty acids-fish oil 340-1,000 mg Cap Take 1 capsule by mouth once daily.  Qty: 90 capsule, Refills: 3    Associated Diagnoses: Mixed dyslipidemia      tadalafiL (CIALIS) 20 MG Tab Take 1 tablet (20 mg total) by mouth daily as needed (Erectile Dysfunction).  Qty: 30 tablet, Refills: 11      atorvastatin (LIPITOR) 40 MG tablet Take 1 tablet (40 mg total) by mouth once daily.  Qty: 90 tablet, Refills: 3    Comments: Please discontinue refills for gemfibrozil  Associated Diagnoses: Mixed dyslipidemia      blood sugar diagnostic Strp To check BG 3 times daily, to use with insurance preferred meter  Qty: 100 each, Refills: 11    Associated Diagnoses: Diabetes mellitus without complication      blood-glucose meter kit To check BG 3 times daily, to use with insurance preferred meter  Qty: 1 each, Refills: 0    Associated Diagnoses: Newly diagnosed diabetes      lancets Misc To check BG 3 times daily, to use with insurance preferred meter  Qty: 100 each, Refills: 11    Associated Diagnoses: Newly diagnosed diabetes      lisinopriL (PRINIVIL,ZESTRIL) 5 MG tablet Take 1 tablet (5 mg total) by mouth once daily.  Qty: 90 tablet, Refills: 1    Comments: .  Associated Diagnoses: Essential hypertension, benign      metFORMIN (GLUCOPHAGE-XR) 500 MG ER 24hr tablet Take 1 tablet (500 mg total) by mouth daily with breakfast.  Qty: 90 tablet, Refills: 3    Associated Diagnoses: Type 2 diabetes mellitus with hyperlipidemia      oxybutynin (DITROPAN-XL) 10 MG 24 hr tablet Take 1 tablet (10 mg total) by mouth once daily.  Qty: 30 tablet, Refills: 11      pantoprazole (PROTONIX) 40 MG tablet Take 1 tablet (40 mg total) by mouth once  daily.  Qty: 30 tablet, Refills: 11    Associated Diagnoses: Gastroesophageal reflux disease with esophagitis without hemorrhage      tamsulosin (FLOMAX) 0.4 mg Cap Take 1 capsule (0.4 mg total) by mouth once daily.  Qty: 30 capsule, Refills: 11    Associated Diagnoses: Benign prostatic hyperplasia, unspecified whether lower urinary tract symptoms present           STOP taking these medications       HYDROcodone-acetaminophen (NORCO) 7.5-325 mg per tablet Comments:   Reason for Stopping:         naproxen (NAPROSYN) 500 MG tablet Comments:   Reason for Stopping:         oxyCODONE-acetaminophen (PERCOCET) 5-325 mg per tablet Comments:   Reason for Stopping:                  Immunizations Administered as of 5/6/2024       Name Date Dose VIS Date Route Exp Date    COVID-19, MRNA, LN-S, PF (Moderna) 10/4/2021 0.5 mL 8/27/2021 Intramuscular --    Site: Left arm     : Modern[employer]     Lot: [id number]     Comment: Adminis     COVID-19, MRNA, LN-S, PF (Moderna) 8/18/2021 0.5 mL 6/24/2021 Intramuscular --    Site: Left arm     : Moderna [employer]     Lot: [id number]     Comment: Adminis             This patient has had both covid vaccinations    Some patients may experience side effects after vaccination.  These may include fever, headache, muscle or joint aches.  Most symptoms resolve with 24-48 hours and do not require urgent medical evaluation unless they persist for more than 72 hours or symptoms are concerning for an unrelated medical condition.          _________________________________  Janina Denson PA-C  05/06/2024

## 2024-05-06 NOTE — PLAN OF CARE
Problem: Adult Inpatient Plan of Care  Goal: Patient-Specific Goal (Individualized)  Description: Pt will maintain sbp below 160  Outcome: Progressing  Flowsheets (Taken 5/6/2024 0248)  Individualized Care Needs: active listening  Anxieties, Fears or Concerns: none  Goal: Absence of Hospital-Acquired Illness or Injury  Outcome: Progressing  Intervention: Identify and Manage Fall Risk  Flowsheets (Taken 5/6/2024 0248)  Safety Promotion/Fall Prevention: bed alarm refused  Intervention: Prevent Skin Injury  Flowsheets (Taken 5/6/2024 0248)  Body Position: position changed independently  Device Skin Pressure Protection:   adhesive use limited   pressure points protected   skin-to-skin areas padded   tubing/devices free from skin contact  Intervention: Prevent Infection  Flowsheets (Taken 5/6/2024 0248)  Infection Prevention: environmental surveillance performed     Oxycodone given x 1 for pain. Closed/ Suction Drain Left Back output 10cc. Incentive spirometry performed. VSS. Bed in lowest position, side rails x3, and call light in use.

## 2024-05-06 NOTE — OP NOTE
DATE OF SURGERY: 5/1/24     PREOPERATIVE DIAGNOSIS:  1. Adjacent segment degenerative disc disease, L2-3 and L3-4 above a prior L4-pelvis fusion     POSTOPERATIVE DIAGNOSIS:  Same.     PROCEDURE PERFORMED:  1. Revision and extension of posterior spinal fusion, L2 to pelvis   2. Removal and reinsertion of posterior spinal hardware, L4-S2AI  3. Posterior segmental spinal fixation, L2-S1 (DePuy Synthes)  4. Posterior lumbar interbody fusion, L3-4  5.  Application of titanium interbody spacer, L3-4 (DePuy Synthes)  6.  L3-4 laminectomy and foraminotomies for severe stenosis  7.  Use of intraoperative fluoroscopy  8.  Use of intraoperative neuro-monitoring with triggered EMG  9. Infuse and local bone grafting  10. 15cm complex wound closure with advancement of myofascial paraspinal flaps     SURGEON: Alphonse Reed M.D.     CO-SURGEON: Tello Cunningham M.D.     ANESTHESIA: GETA     ESTIMATED BLOOD LOSS: 400mL     COMPLICATIONS: None     DRAINS: Two deep Hemovacs and a Prevena incisional woundvac     SPECIMENS SENT: None     FINDINGS: None     INDICATIONS:     49M with history of L4-pelvis fusion with severe adjacent segment degenerative disc disease causing refractory axial low back pain and radicular leg pain. I recommended the above procedure. R/B/A/I/M were reviewed in detail and the patient wished to proceed. All questions were answered and no guarantees were made.  .  PROCEDURE:     The patient was brought into the operating room where he was intubated and placed under general anesthesia without difficulty.  All lines were placed. He was repositioned prone onto the operating room table with appropriate padding all pressure points. The region of interest was localized with AP and lateral x-ray.  The skin was marked and the area was prepped and draped in the usual sterile fashion.  A timeout was performed prior to procedure.  20 mL's of lidocaine with epinephrine were injected in the skin.     A midline linear  incision was made with a 10 blade from approximately L2 to pelvis, incorporating the previous midline scar.  Supra and subfascial midline dissection was carried out with Bovie electrocautery.  Subperiosteal dissection was carried out with Bovie electrocautery and Goodman elevators to expose the posterior elements from L2 to pelvis and the previous hardware from L4 to pelvis. We removed the prior rods and explored the L4-S1 fusion and found it to be solid.  A pedicle finder was placed into the presumed L3 pedicle and confirmed on lateral x-ray. Medial facetectomies were performed bilaterally at L2-3 through L3-4 with the osteotome.       We placed bilateral L2 and L3 pedicle screws using freehand technique. Triggered EMG and xrays confirmed good location. We augmented bilateral L2 screws with cement and observed no significant cement extravasation.     Attention was turned to performance of a posterior lumbar interbody fusion at L3-4 from a right-sided approach. First an L3-4 laminectomy for central stenosis and bilateral L3-4 foraminotoimes for foraminal stenosis was performed in standard fashion to decompress the thecal sac and nerve roots at the L3-4 interspace. THIS DECOMPRESSION WAS MORE THAN WHAT WAS REQUIRED FOR AN INTERBODY FUSION. It was performed in standard fashion with the high speed drill and rongeurs. Adequate decompression of the thecal sac and nerve roots were confirmed with [place] palpation.  Next,  a nerve root retractor was used to retract the thecal sac medially and expose the L3-4 disc space.  Epidural veins were cauterized and divided.  An annulotomy was performed with a 15 blade.  A pituitary rongeur was used to remove disc material.  The endplates were prepared with disc santos, rasps, and curettes. A titanium cage was countersunk into the L3-4 disc space and had snug fit. The L3-4 disc space was pre and post packed with local bone for interbody arthrodesis. Xrays showed good cage  position.    Bilateral titanium rods were sized, contoured and reduced into the tulip heads.  Set screws were tightened.  Final xrays showed excellent position of all hardware.  The wound was copiously irrigated with sterile normal saline and a dilute Betadine solution. Exposed bony surfaces from L2 to sacrum were decorticated bilaterally with a high-speed drill.  Infuse and local bone were placed posteriorly for arthrodesis from L2 to sacrum. Two deep Hemovacs were placed. A 15cm complex wound closure was performed by advancing paraspinal myofascial flaps  A watertight fascial closure was achieved with interrupted 0 Vicryl sutures and a running Stratafix suture.  The soft tissues were closed in layers. Staples and vertical mattress nylon sutures were placed at the skin and a Prevena dressing was applied.     The patient appeared to tolerate the procedure well from a hemodynamic and neuromonitoring standpoint. Dr. Cunningham and I were present for all critical portions of the case, and at the end of the case all counts were correct. The patient was repositioned supine onto the hospital bed where he was extubated and allowed to emerge from anesthesia. He was sent to the PACU in stable condition for recovery.    JUSTIFICATION OF CO-SURGEON: This was a complex revision spinal fusion. Two attending surgeons were indicated to reduce operative times, blood loss, and to improve patient outcomes.

## 2024-05-06 NOTE — PLAN OF CARE
Problem: Diabetes Comorbidity  Goal: Blood Glucose Level Within Targeted Range  Outcome: Progressing  Intervention: Monitor and Manage Glycemia  Flowsheets (Taken 5/6/2024 1725)  Glycemic Management: blood glucose monitored

## 2024-05-06 NOTE — PT/OT/SLP PROGRESS
"Physical Therapy Co-Treatment with OT     Patient Name:  Emeka Caballero   MRN:  [id number]    Co-treatment with OT performed due to patient complexity and deficits, requiring two skilled therapists to appropriately and safely assess patient's strength and endurance while facilitating functional tasks in addition to accommodating for patient's activity tolerance.    Recommendations:     Discharge Recommendations: High Intensity Therapy  Discharge Equipment Recommendations: walker, rolling, bedside commode  Barriers to discharge: None    Assessment:     Emeka Caballero is a 49 y.o. male admitted with a medical diagnosis of Lumbar spondylosis.  He presents with the following impairments/functional limitations: weakness, impaired functional mobility, gait instability, decreased lower extremity function, pain, orthopedic precautions. Patient has progressed with mobility especially standing posture and gait mechanics. He continues to require assistance with bed mobility and require increased time for transfers. This date patient practiced floor transfer to simulate air mattress at home requiring maximal assistance of 2. Overall patient is making progress toward goals and remains motivated.      Rehab Prognosis: Good; patient would benefit from acute skilled PT services to address these deficits and reach maximum level of function.    Recent Surgery: Procedure(s) (LRB):  **AIRO** L2-pelvis extension and revision of fusion, (N/A) 5 Days Post-Op    Plan:     During this hospitalization, patient to be seen 4 x/week to address the identified rehab impairments via gait training, therapeutic activities, therapeutic exercises, neuromuscular re-education, wheelchair management/training and progress toward the following goals:    Plan of Care Expires:  06/02/24    Subjective     Chief Complaint: pain  Patient/Family Comments/goals: "I won't have anywhere to go, my brother's wife won't let me stay there" patient " "discussing living environment.   Pain/Comfort:  Pain Rating 1: 8/10  Location - Side 1: Bilateral  Location - Orientation 1: lower  Location 1: back  Pain Addressed 1: Reposition, Distraction  Pain Rating Post-Intervention 1: 8/10      Objective:     Communicated with RN prior to session.  Patient found HOB elevated with telemetry upon PT entry to room. Brother in room.    General Precautions: Standard, fall  Orthopedic Precautions: spinal precautions  Braces: TLSO  Respiratory Status: Room air     Functional Mobility:  Bed Mobility:     Rolling Left:  minimum assistance  Scooting: contact guard assistance  Supine to Sit: moderate assistance  Transfers:     Sit to Stand:  contact guard assistance with rolling walker  Gait: patient ambulated 80' with SBA and 2ww  Deviations Noted: decreased gait speed and decreased step length R, L forward trunk posture   Floor transfer: maximal assistance of 2 with HHA  Balance:   Sitting static: independent   Sitting dynamic: supervision   Standing static: SBA with 2ww  Standing dynamic: CGA   Stairs:  Pt ascended/descended 10 stair(s) with No Assistive Device with Contact Guard Assistance.   Ascent: step to R, BUE on RHR  Descent: step to R with BUE on LHR      AM-PAC 6 CLICK MOBILITY  Turning over in bed (including adjusting bedclothes, sheets and blankets)?: 3  Sitting down on and standing up from a chair with arms (e.g., wheelchair, bedside commode, etc.): 4  Moving from lying on back to sitting on the side of the bed?: 3  Moving to and from a bed to a chair (including a wheelchair)?: 4  Need to walk in hospital room?: 4  Climbing 3-5 steps with a railing?: 3  Basic Mobility Total Score: 21       Treatment & Education:  Education and demonstration of safe stair navigation with step to pattern/BUE railing use for improved pain, exercise tolerance, balance, and to address overall safety.    Transfer to cushions on floor ~12" height with maximal assistance with HHA, cues for body " positioning for safety and spinal precautions.   Patient educated on components of brace, appropriate times to don/doff, verbal cues and [facility] guidance for donning/doffing brace.     Patient left up in chair with all lines intact, call button in reach, and brother present.    GOALS:   Multidisciplinary Problems       Physical Therapy Goals          Problem: Physical Therapy    Goal Priority Disciplines Outcome Goal Variances Interventions   Physical Therapy Goal     PT, PT/OT Progressing     Description: Goals to be met by: 24     Patient will increase functional independence with mobility by performin. Supine to sit with Contact Guard Assistance  2. Sit to supine with Contact Guard Assistance  3. Sit to stand transfer with Contact Guard Assistance  4. Bed to chair transfer with Contact Guard Assistance using Rolling Walker  5. Gait  x 250 feet with Contact Guard Assistance using Rolling Walker.   6. Ascend/descend 12 stair with bilateral Handrails Contact Guard Assistance using No Assistive Device.                          Time Tracking:     PT Received On: 24  PT Start Time: 1232     PT Stop Time: 1255  PT Total Time (min): 23 min     Billable Minutes: Gait Training 11 minutes and Therapeutic Exercise 12 minutes    Treatment Type: Treatment  PT/PTA: PT     Number of PTA visits since last PT visit: 0     2024

## 2024-05-06 NOTE — ASSESSMENT & PLAN NOTE
Chronic, controlled. Latest blood pressure and vitals reviewed-     Temp:  [98.7 °F (37.1 °C)-99.7 °F (37.6 °C)]   Pulse:  [102-113]   Resp:  [16-20]   BP: (106-132)/(58-76)   SpO2:  [94 %-98 %] .   Home meds for hypertension were reviewed and noted below.   Hypertension Medications               lisinopriL (PRINIVIL,ZESTRIL) 5 MG tablet Take 1 tablet (5 mg total) by mouth once daily.            While in the hospital, will manage blood pressure as follows; Continue home antihypertensive regimen    Will utilize p.r.n. blood pressure medication only if patient's blood pressure greater than 160/100 and he develops symptoms such as worsening chest pain or shortness of breath.

## 2024-05-06 NOTE — PROGRESS NOTES
[facility] ([facility])  Physical Medicine & Rehab  Progress Note    Patient Name: Emeka Caballero  MRN: [id number]  Admission Date: 5/1/2024  Length of Stay: 5 days  Attending Physician: Alphonse Reed MD    Subjective:     Principal Problem:Lumbar spondylosis    Hospital Course:   5/2/24: participated w/ PT. Sit to Stand:  minimum assistance with hand-held assist, increased time. Gait: patient ambulated 100' with 2ww with CGA  5/3/24: Participated w/ PT.  patient ambulated 80' with 2ww with CGA  Deviations Noted: decreased gait speed, decreased step height R,L, and decreased step length R, L forward posture over walker     Interval History 5/6/2024:  Patient is seen for follow-up PM&R evaluation and recommendations: Participated w/ OT yesterday and therapy recommending high intensity. Spiked temp, pending c-xray and dopplers today. Last drain removed.      HPI, Past Medical, Family, and Social History remains the same as documented in the initial encounter.    Scheduled Medications:   Current Facility-Administered Medications   Medication Dose Route Frequency    acetaminophen  1,000 mg Oral QID    atorvastatin  40 mg Oral Daily    bisacodyL  10 mg Rectal Daily    enoxparin  40 mg Subcutaneous Q24H (prophylaxis, 1700)    gabapentin  300 mg Oral QHS    lisinopriL  5 mg Oral Daily    methocarbamoL  1,000 mg Oral QID    oxybutynin  10 mg Oral Daily    pantoprazole  40 mg Oral Daily    polyethylene glycol  17 g Oral Daily    senna-docusate 8.6-50 mg  1 tablet Oral BID    tamsulosin  0.4 mg Oral Daily       Diagnostic Results: Labs: Reviewed  ECG: Reviewed  MRI: Reviewed    PRN Medications:   Current Facility-Administered Medications:     aluminum-magnesium hydroxide-simethicone, 30 mL, Oral, Q4H PRN    dextrose 10%, 12.5 g, Intravenous, PRN    dextrose 10%, 25 g, Intravenous, PRN    glucagon (human recombinant), 1 mg, Intramuscular, PRN    glucose, 16 g, Oral, PRN    glucose, 24 g, Oral, PRN    insulin  aspart U-100, 0-10 Units, Subcutaneous, QID (AC + HS) PRN    linaCLOtide, 145 mcg, Oral, Daily PRN    ondansetron, 8 mg, Oral, Q6H PRN    oxyCODONE, 10 mg, Oral, Q4H PRN    prochlorperazine, 5 mg, Intravenous, Q6H PRN    Review of Systems   Constitutional:  Positive for activity change. Negative for fatigue and fever.   HENT:  Negative for trouble swallowing and voice change.    Respiratory:  Negative for cough and shortness of breath.    Cardiovascular:  Negative for chest pain and leg swelling.   Gastrointestinal:  Negative for abdominal distention and abdominal pain.   Genitourinary:  Negative for difficulty urinating and flank pain.   Musculoskeletal:  Positive for gait problem. Negative for back pain.   Skin:  Negative for color change and rash.   Neurological:  Positive for weakness. Negative for speech difficulty and numbness.   Psychiatric/Behavioral:  Negative for agitation and confusion.      Objective:     Vital Signs (Most Recent):  Temp: 98.7 °F (37.1 °C) (05/06/24 1215)  Pulse: (!) 113 (05/06/24 1215)  Resp: 20 (05/06/24 1215)  BP: 111/66 (05/06/24 1215)  SpO2: (!) 94 % (05/06/24 1215)    Vital Signs (24h Range):  Temp:  [98.7 °F (37.1 °C)-99.7 °F (37.6 °C)] 98.7 °F (37.1 °C)  Pulse:  [102-113] 113  Resp:  [16-20] 20  SpO2:  [94 %-98 %] 94 %  BP: (106-132)/(58-76) 111/66         Physical Exam  Vitals and nursing note reviewed.   Constitutional:       Appearance: He is well-developed.   HENT:      Head: Normocephalic and atraumatic.      Nose: Nose normal.   Eyes:      General:         Right eye: No discharge.         Left eye: No discharge.      Pupils: Pupils are equal, round, and reactive to light.   Pulmonary:      Effort: Pulmonary effort is normal. No respiratory distress.   Abdominal:      General: There is no distension.      Palpations: Abdomen is soft.      Tenderness: There is no abdominal tenderness.   Musculoskeletal:         General: No deformity.      Cervical back: Normal range of motion  and neck supple.      Comments: BUE and BLE 5/5   Skin:     General: Skin is warm and dry.   Neurological:      Mental Status: He is alert and oriented to person, place, and time.      Sensory: No sensory deficit.      Motor: Weakness present. No abnormal muscle tone.      Gait: Gait abnormal.   Psychiatric:         Mood and Affect: Mood normal.         Behavior: Behavior normal.     Assessment/Plan:      * Lumbar spondylosis  - S/p extension/revision of fusion L2-Pelvis on 5/1/24.   - Post op imaging showing satisfactory placement of hardware. Per NSGY TLSO when OOB.    Impaired mobility  - Related to prolonged/acute hospital course.     Recommendations  -  Encourage mobility, OOB in chair at least 3 hours per day, and early ambulation as appropriate  -  PT/OT evaluate and treat  -  Pain management  -  Monitor for and prevent skin breakdown and pressure ulcers  Early mobility, repositioning/weight shifting every 20-30 minutes when sitting, turn patient every 2 hours, proper mattress/overlay and chair cushioning, pressure relief/heel protector boots  -  DVT prophylaxis    -  Reviewed discharge options (IP rehab, SNF, HH therapy, and OP therapy)     PM&R Recommendation:     At this time, the PM&R team has reviewed this patient's ongoing medical case including inpatient diagnosis, medical history, clinical examination, labs, vitals, current social and functional history to provide the post-acute recommendation as follows:     RECOMMENDATIONS: inpatient rehabilitation due to good motivation/participation with therapies, has been determined to tolerate 3 hours of therapy and good potential for recovery.     The patient will be admitted for comprehensive interdisciplinary inpatient rehabilitation to address the impairments due to medical diagnosis of Lumbar spondylosis. The patient will benefit from an inpatient rehabilitation program to promote functional recovery, implement compensatory strategies and will undergo  assessment for needs for durable medical equipment for safe discharge to the community. This patient will benefit from a coordinated interdisciplinary rehabilitation program services that require close monitoring and treatment with 24-hour rehabilitative nursing and physical/occupational therapies for 3 hours/day for 5 days/week.This interdisciplinary program will be performed under the direction of a physiatrist.    MEDICAL STABILITY:     At this time, barriers for post-acute rehab admission include completion of work up for fever.     We will continue to follow.     Antoinette Caceres NP  Department of Physical Medicine & Rehab   [facility])

## 2024-05-06 NOTE — PLAN OF CARE
[facility]surgery (Hospital)  Discharge Final Note    Primary Care Provider: Dio Zarate Jr., MD    Expected Discharge Date: 5/6/2024    [facility] to be discharged to [facility].  Care deferred to [facility].  [facility] to provide transportation.  Neurosurgery clinic to schedule follow up appointment.    Nurse to call report to [phone] or [phone].  Wheelchair requested for 4:15 which is not a guaranteed arrival time.    Future Appointments   Date Time Provider Department Center   5/15/2024 10:00 AM Brandie Doyle, ALEKSANDER [facility] NEUROS8 [facility]   5/27/2024 11:20 AM Santos Rivas MD [facility] CARDIO [facility]   6/13/2024 10:45 AM NOM [facility]   6/13/2024 11:30 AM Yolie Vickers NP [facility] NEUR[facility]        Final Discharge Note (most recent)       Final Note - 05/06/24 1550          Final Note    Assessment Type Final Discharge Note     Anticipated Discharge Disposition Rehab Facility        Post-Acute Status    Post-Acute Authorization Placement     Post-Acute Placement Status Set-up Complete/Auth obtained     Discharge Delays None known at this time                     Important Message from Medicare

## 2024-05-07 NOTE — OP NOTE
DATE OF SURGERY: 5/1/24    SURGEON: Tello Cunningham M.D.      CO-SURGEON: Alphonse Reed M.D., Neurosurgery     PREOPERATIVE DIAGNOSIS:  1. Adjacent segment degenerative disc disease, L2-3 and L3-4 above a prior L4-pelvis fusion     POSTOPERATIVE DIAGNOSIS:  1. Adjacent segment degenerative disc disease, L2-3 and L3-4 above a prior L4-pelvis fusion     PROCEDURE PERFORMED:  1. Revision and extension of posterior spinal fusion, L2 to pelvis   2. Removal and reinsertion of posterior spinal hardware, L4-S2AI  3. Posterior segmental spinal fixation, L2-S1 (DePuy Synthes)  4. Posterior lumbar interbody fusion, L3-4  5.  Application of titanium interbody spacer, L3-4 (DePuy Synthes)  6.  L3-4 laminectomy and foraminotomies for severe stenosis  7.  Infuse and local bone grafting     ANESTHESIA: GETA     ESTIMATED BLOOD LOSS: 400mL     COMPLICATIONS: None     DRAINS: Two deep Hemovacs and a Prevena incisional woundvac     SPECIMENS SENT: None     FINDINGS: None     INDICATIONS:    Please see Dr. Reed' note for a description of the indications of this operation.  .  PROCEDURE:     The patient was brought into the operating room where he was intubated and placed under general anesthesia without difficulty.  All lines were placed. He was repositioned prone onto the operating room table with appropriate padding all pressure points. The region of interest was localized with AP and lateral x-ray.  The skin was marked and the area was prepped and draped in the usual sterile fashion.  A timeout was performed prior to procedure.  20 mL's of lidocaine with epinephrine were injected in the skin.     We then made a midline linear incision with a 10 blade from approximately L2 to pelvis, incorporating the previous midline scar.  Supra and subfascial midline dissection was carried out with Bovie electrocautery.  Subperiosteal dissection was carried out with Bovie electrocautery and Goodman elevators to expose the posterior elements  from L2 to pelvis and the previous hardware from L4 to pelvis. We removed the prior rods and explored the L4-S1 fusion and found it to be solid.  A pedicle finder was placed into the presumed L3 pedicle and confirmed on lateral x-ray. Medial facetectomies were performed bilaterally at L2-3 through L3-4 with the osteotome.       We placed bilateral L2 and L3 pedicle screws using freehand technique. Triggered EMG and xrays confirmed good location. We augmented bilateral L2 screws with cement and observed no significant cement extravasation.     Attention was turned to performance of a posterior lumbar interbody fusion at L3-4 from a right-sided approach. First an L3-4 laminectomy for central stenosis and bilateral L3-4 foraminotoimes for foraminal stenosis was performed in standard fashion to decompress the thecal sac and nerve roots at the L3-4 interspace. THIS DECOMPRESSION WAS MORE THAN WHAT WAS REQUIRED FOR AN INTERBODY FUSION. It was performed in standard fashion with the high speed drill and rongeurs. Adequate decompression of the thecal sac and nerve roots were confirmed with Irma palpation.  Next,  a nerve root retractor was used to retract the thecal sac medially and expose the L3-4 disc space.  Epidural veins were cauterized and divided.  An annulotomy was performed with a 15 blade.  A pituitary rongeur was used to remove disc material.  The endplates were prepared with disc santos, rasps, and curettes. A titanium cage was countersunk into the L3-4 disc space and had snug fit. The L3-4 disc space was pre and post packed with local bone for interbody arthrodesis. Xrays showed good cage position.    Bilateral titanium rods were sized, contoured and reduced into the tulip heads.  Set screws were tightened.  Final xrays showed excellent position of all hardware.  The wound was copiously irrigated with sterile normal saline and a dilute Betadine solution. Exposed bony surfaces from L2 to sacrum were  decorticated bilaterally with a high-speed drill.  Infuse and local bone were placed posteriorly for arthrodesis from L2 to sacrum. Two deep Hemovacs were placed, and the wound was closed by the neurosurgery service.    JUSTIFICATION OF CO-SURGEON: This was a complex revision spinal fusion. Two attending surgeons were indicated to reduce operative times, blood loss, and to improve patient outcomes.

## 2024-05-08 NOTE — DISCHARGE SUMMARY
[facility]rera - Neurosurgery ([facility])  Neurosurgery  Discharge Summary      Patient Name: Emeka Caballero  MRN: [id number]  Admission Date: 5/1/2024  Hospital Length of Stay: 5 days  Discharge Date and Time: 5/6/2024  5:32 PM  Attending Physician: Alphonse Reed MD   Discharging Provider: Janina Denson PA-C  Primary Care Provider: Dio Zarate Jr., MD    HPI:   Emeka Caballero is a 49 y.o. male s/p L4-pelvis fusion and L4-S1 TLIF on 02/08/2022 for s/p L4-pelvis fusion and L4-5, L5-S1 TLIF on 02/08/2022 with Dr. Reed who presents with adjacent segment disease for extension/revision of fusion L2-Pelvis. He is being seen in clinic today to follow-up from his recent ED visit from 12/12/23 where he had fallen and has had worsening right buttock pain that radiates down his right lower extremity. The patient obtained an injection as previously recommended by Dr. Reed about 2 weeks ago in the right SI joint without any relief. He is taking ibuprofen 800 mg with no relief. Reports difficulties with standing up straight. He is utilizing a cane to ambulate. Reports the pain being a burning sensation associated with numbness in the feet. He has also attempted PT without relief.     Procedure(s) (LRB):  **AIRO** L2-pelvis extension and revision of fusion, (N/A)     Hospital Course: 5/2: NAEON. AFVSS. Back pain improved. HV drains to gravity 2/2 output. Exam stable.   5/3: NAEON. AFVSS. Endorses back pain, controlled with medication. R HV drain removed. Exam stable. Voiding spontaneously. WV removed after leak indicator alarm on all night, several tegaderms placed without resolution. Aquacel foam dressing applied. Pending placement.  5/4: NAEON. AFVSS. Pain controlled. Strength stable. Observed ambulating hallway this AM. HV with high output, will keep today. Pending rehab.  5/5: drain ouput decreasing, 10 cc, ambulating, pending rehab  5/6: NAEON. Febrile to 101.2 overnight. No symptoms. Will order dopplers  "and CXR. If negative, okay to dc to rehab. HV drain removed without issues.    Goals of Care Treatment Preferences:  Code Status: Full Code      Consults:   Consults (From admission, onward)          Status Ordering Provider     Inpatient consult to Physical Medicine Rehab  Once        Provider:  (Not yet assigned)    KAY San            Significant Diagnostic Studies: Labs: CMP No results for input(s): "NA", "K", "CL", "CO2", "GLU", "BUN", "CREATININE", "CALCIUM", "PROT", "ALBUMIN", "BILITOT", "ALKPHOS", "AST", "ALT", "ANIONGAP", "ESTGFRAFRICA", "EGFRNONAA" in the last 48 hours., CBC No results for input(s): "WBC", "HGB", "HCT", "PLT" in the last 48 hours., and All labs within the past 24 hours have been reviewed  Radiology: X-Ray:  chest , lumbar spine  Ultrasound: BLE veins    Pending Diagnostic Studies:       None          Final Active Diagnoses:    Diagnosis Date Noted POA    PRINCIPAL PROBLEM:  Lumbar spondylosis [M47.816] 02/17/2020 Yes    Impaired mobility [Z74.09] 05/05/2024 Unknown    Acute blood loss anemia [D62] 05/02/2024 Yes    Pain [R52] 05/01/2024 Yes    Essential hypertension, benign [I10] 05/04/2023 Yes     Chronic    Mixed dyslipidemia [E78.2] 05/04/2023 Yes     Chronic    BPH (benign prostatic hyperplasia) [N40.0] 02/11/2022 Yes     Chronic    Type 2 diabetes mellitus with hyperlipidemia [E11.69, E78.5] 02/11/2022 Yes     Chronic    Chronic constipation [K59.09] 02/11/2022 Yes     Chronic      Problems Resolved During this Admission:      Discharged Condition: good     Disposition: Rehab Facility    Follow Up:    Patient Instructions:      Notify your health care provider if you experience any of the following:  increased confusion or weakness     Notify your health care provider if you experience any of the following:  persistent dizziness, light-headedness, or visual disturbances     Notify your health care provider if you experience any of the following:  worsening rash "     Notify your health care provider if you experience any of the following:  severe persistent headache     Notify your health care provider if you experience any of the following:  difficulty breathing or increased cough     Notify your health care provider if you experience any of the following:  redness, tenderness, or signs of infection (pain, swelling, redness, odor or green/yellow discharge around incision site)     Notify your health care provider if you experience any of the following:  severe uncontrolled pain     Notify your health care provider if you experience any of the following:  persistent nausea and vomiting or diarrhea     Notify your health care provider if you experience any of the following:  temperature >100.4     Activity as tolerated     Medications:  Reconciled Home Medications:      Medication List        START taking these medications      methocarbamoL 1,000 mg Tab  Take 1,000 mg by mouth 4 (four) times daily. for 10 days     oxyCODONE 10 mg Tab immediate release tablet  Commonly known as: ROXICODONE  Take 1 tablet (10 mg total) by mouth every 6 (six) hours as needed for Pain.            CONTINUE taking these medications      atorvastatin 40 MG tablet  Commonly known as: LIPITOR  Take 1 tablet (40 mg total) by mouth once daily.     blood sugar diagnostic Strp  To check BG 3 times daily, to use with insurance preferred meter     blood-glucose meter kit  To check BG 3 times daily, to use with insurance preferred meter     gabapentin 300 MG capsule  Commonly known as: NEURONTIN  Take 1 capsule (300 mg total) by mouth every evening.     lancets Misc  To check BG 3 times daily, to use with insurance preferred meter     linaCLOtide 72 mcg Cap capsule  Commonly known as: LINZESS  Take 1 capsule (72 mcg total) by mouth daily as needed (constipation).     lisinopriL 5 MG tablet  Commonly known as: PRINIVIL,ZESTRIL  Take 1 tablet (5 mg total) by mouth once daily.     metFORMIN 500 MG ER 24hr  tablet  Commonly known as: GLUCOPHAGE-XR  Take 1 tablet (500 mg total) by mouth daily with breakfast.     omega-3 fatty acids-fish oil 340-1,000 mg Cap  Take 1 capsule by mouth once daily.     oxybutynin 10 MG 24 hr tablet  Commonly known as: DITROPAN-XL  Take 1 tablet (10 mg total) by mouth once daily.     pantoprazole 40 MG tablet  Commonly known as: PROTONIX  Take 1 tablet (40 mg total) by mouth once daily.     tadalafiL 20 MG Tab  Commonly known as: CIALIS  Take 1 tablet (20 mg total) by mouth daily as needed (Erectile Dysfunction).     tamsulosin 0.4 mg Cap  Commonly known as: FLOMAX  Take 1 capsule (0.4 mg total) by mouth once daily.            STOP taking these medications      HYDROcodone-acetaminophen 7.5-325 mg per tablet  Commonly known as: NORCO     naproxen 500 MG tablet  Commonly known as: NAPROSYN     oxyCODONE-acetaminophen 5-325 mg per tablet  Commonly known as: PERCOCET              Janina Denson PA-C  Neurosurgery  [facility] - Neurosurgery [facility])

## 2024-05-10 ENCOUNTER — HOSPITAL ENCOUNTER (OUTPATIENT)
Dept: RADIOLOGY | Facility: HOSPITAL | Age: 49
Discharge: HOME | End: 2024-05-10
Attending: PHYSICAL MEDICINE & REHABILITATION
Payer: MEDICAID

## 2024-05-14 ENCOUNTER — TELEPHONE (OUTPATIENT)
Dept: NEUROSURGERY | Facility: CLINIC | Age: 49
End: 2024-05-14
Payer: MEDICAID

## 2024-05-14 NOTE — TELEPHONE ENCOUNTER
----- Message from Yolie Vickers NP sent at 5/14/2024  9:02 AM CDT -----  Regarding: FW: Advise  Contact: [phone]    ----- Message -----  From: Aparna Smith  Sent: 5/14/2024   8:27 AM CDT  To: Alee CADENA Staff  Subject: Adviscory Veras pt's  w [facility] is calling to speak to ALEKSANDER Diego in regards to pt's post op appt scheduled for 5/15/24. Please give Eda a call to further discuss.       Eda :   [phone]

## 2024-05-14 NOTE — TELEPHONE ENCOUNTER
P/c to albertina, explained that if wound looks good the wound care rn can remove staples/sutures.  Gave her pt. Next f/u appt date and time.    Future Appointments   Date Time Provider Department Center   5/15/2024 10:00 AM Brandie Doyle, ALEKSANDER [facility] NEUROSBrayan Herrera   5/27/2024 11:20 AM Santos Rivas MD [facility] CARDIO Gonzalez   6/13/2024 10:45 AM [facility]   6/13/2024 11:30 AM Yolie Vickers NP [facility] NEUROSBrayan Herrera

## 2024-05-16 ENCOUNTER — TELEPHONE (OUTPATIENT)
Dept: FAMILY MEDICINE | Facility: CLINIC | Age: 49
End: 2024-05-16
Payer: MEDICAID

## 2024-05-16 NOTE — TELEPHONE ENCOUNTER
----- Message from Zahraa Turner sent at 5/16/2024  8:40 AM CDT -----  Type:  Sooner Appointment Request    Patient is requesting a sooner appointment.  Patient declined first available appointment listed as well as another facility and provider .  Patient will not accept being placed on the waitlist and is requesting a message be sent to doctor.    Name of Caller:      When is the first available appointment? Medicaid    Symptoms: Hosp Follow up    Would the patient rather a call back or a response via [facility]? call    Best Call Back Number: .[phone] (home)

## 2024-05-17 ENCOUNTER — TELEPHONE (OUTPATIENT)
Dept: FAMILY MEDICINE | Facility: CLINIC | Age: 49
End: 2024-05-17
Payer: MEDICAID

## 2024-05-17 DIAGNOSIS — Z74.09 IMPAIRED MOBILITY AND ADLS: ICD-10-CM

## 2024-05-17 DIAGNOSIS — Z78.9 IMPAIRED MOBILITY AND ADLS: ICD-10-CM

## 2024-05-17 DIAGNOSIS — M48.062 SPINAL STENOSIS OF LUMBAR REGION WITH NEUROGENIC CLAUDICATION: Primary | ICD-10-CM

## 2024-05-17 NOTE — TELEPHONE ENCOUNTER
----- Message from Zahraa Turner sent at 5/16/2024 10:58 AM CDT -----  Type:  Patient Returning Call    Who Called:  self    Who Left Message for Patient:  Alejandra    Does the patient know what this is regarding?: no    Would the patient rather a call back or a response via [facility]?  call    Best Call Back Number: .[phone] (home)

## 2024-05-20 NOTE — PROGRESS NOTES
See plan of care for physical therapy evaluation     Tanvi Terrell, PT, DPT,   Board-Certified Clinical Specialist in Neurologic Physical Therapy   Certified Brain Injury Specialist

## 2024-05-20 NOTE — PROGRESS NOTES
Outpatient Neurological Rehabilitation  Speech and Language Therapy Evaluation    Date: 5/21/2024     Name: Emeka Caballero   MRN: [id number]    Therapy Diagnosis: No diagnosis found.   Physician: Carina Mcclure MD  Physician Orders: [id number] - AMB REFERRAL/CONSULT TO SPEECH THERAPY   Medical Diagnosis from Referral: Spinal stenosis of lumbar region with neurogenic claudication [M48.062], Impaired mobility and ADLs [Z74.09, Z78.9]     Visit #/Visits authorized: 1/ 1  Date of Evaluation:  5/21/2024   Insurance Authorization Period: 5/17/2024 - 5/17/2025    Plan of Care Expiration:  Evaluation only     Time In: 10:30  Time Out: 11:15  Test interpretation time:  Procedure Min.   Swallow and Oral Function Evaluation   45          Precautions:Standard and Fall  Subjective   Date of Onset: Patient reports no difficulties with swallowing at this time   History of Current Condition:   Emeka Caballero is a 49 y.o. male male who presents to [facility] Speech Therapy for evaluation secondary to Spinal stenosis of lumbar region with neurogenic claudication and Impaired mobility and ADLs . Patient was referred to therapy by Carina Mcclure MD. Patient reports no difficulties with swallowing, however he did share feeling of constipation. Patient was not accompanied to the evaluation by anyone.   Past Medical History: Emeka Caballero  has a past medical history of Arthritis, Diabetes mellitus, type 2 (2/11/2022), and Essential hypertension, benign (5/4/2023).  Emeka Caballero  has a past surgical history that includes Epidural steroid injection (N/A, 10/21/2020); Lumbar fusion (N/A, 2/8/2022); Cataract extraction (Right); injection, sacroiliac joint (Bilateral, 12/27/2022); Laser enucleation of prostate (N/A, 3/14/2023); injection, sacroiliac joint (Right, 11/27/2023); and Lumbar fusion (N/A, 5/1/2024).  Medical Hx and Allergies:  Emeka has a current medication list  "which includes the following prescription(s): atorvastatin, blood sugar diagnostic, blood-glucose meter, gabapentin, lancets, linaclotide, lisinopril, metformin, omega-3 fatty acids-fish oil, oxybutynin, oxycodone, pantoprazole, tadalafil, and tamsulosin. Review of patient's allergies indicates:  No Known Allergies  Imaging: No Imaging    Prior Therapy:  one visit from  inpatient  Social History: lives with brother  Prior Level of Function: independent   Current Level of Function: independent  Pain: 9/10  Pain Location / Description: back pain and down left leg  Nutrition:  oral diet, IDDSI 0 (Thin), and IDDSI 7 (Regular)  Patient's Therapy Goals:  "I don't think I need swallow therapy  Objective   Formal Assessment:     Auditory Comprehension:   No concerns reported or observed; WFL for the purpose of assessment and conversation.    Verbal Expression:   No concerns reported or observed; WFL for the purpose of assessment and conversation.     Reading Comprehension:  Did not assess. No concerns reported or observed.        Written Expression:  Did not assess. No concerns reported or observed.        Cognition:    No concerns reported or observed. Pt and alert and cooperative throughout evaluation, was oriented x 4 independently. Pt did not require cues to attend to evaluation tasks throughout session. Pt demonstrated adequate topic maintenance and reasoning skills throughout evaluation. Cognitive-linguistic skills WFL for the purpose of assessment and conversation.     Motor Speech/Fluency/Voice:    Pt's motor speech, speech intelligibility, fluency, and voice were judged to be Swallowing:    No swallowing difficulties reported at this time.     Eating Assessment Tool (EAT-10) is a questionnaire given to the patient to  her swallowing function. They ranked their swallowing function as a 0/40 (If the score is greater than 3 there may be swallowing problems.)    0=No problem  4= severe problem    My swallowing " problem has caused me to lose weight: 0  My swallowing problem interferes with my ability to go out for meals: 0  Swallowing liquid takes extra effort: 0  Swallowing solids takes extra effort: 0  Swallowing pills takes extra effort: 0  Swallowing is painful: 0  The pleasure of my eating is affected by my swallowin  When I swallow food sticks in my throat: 0  I cough when I eat: 0  Swallowing is stressful: 0  Total: 0    Interpretation: Score of greater than 3 is indicative of a swallowing disorder (Omer et al., 2008); higher scores correlate with increased penetration-aspiration  scale scores, and scores greater than 15 results in the patient being 2.2 times more likely to aspirate (Chet et al., 2015)     References:   ATTILA Tello., KURT Lopez., RHEA Warner., AMY Monson., CHRISSY Smallwood, AMY Najera, & MERI Vidal (2008). Validity and reliability of the Eating Assessment Tool (EAT-10). Annals of Otology, Rhinology & Laryngology, 117(12), 919-924. https://doi.org/10.1177/[email or web address]  KURT Rosenberg., EJ Malik., AMY Murry., Juan C, M. A., & ATTILA Tello (2015). The ability of the 10-item eating assessment tool (EAT-10) to predict aspiration risk in persons with dysphagia. Annals of Otology, Rhinology & Laryngology, 124(5), 351-354. https://doi.org/10.1177/6996793259636435    Recommend MBSS: No    Cranial Nerve Examination  Cranial Nerve 5: Trigeminal Nerve  Motor Jaw Posture at rest: Closed  Mandible Elevation/Depression: WFL  Mandible lateralization: WFL  Abnormal movement: absent Interpretation: bilaterally intact   Sensory Forehead: WFL  Cheek: WFL  Jaw: WFL  Facial Pain: None noted Interpretation: bilaterally intact     Cranial Nerve 7: Facial Nerve  Motor Facial Symmetry: WNL  Wrinkle Forehead: WFL  Close eyes tightly: WFL  Labial Protrusion: WFL  Labial Retraction: WFL  Buccal Strength with Labial Seal: WFL  Abnormal movement: absent Interpretation: bilaterally intact   Sensory  Formal testing not completed. Pt denied any changes in taste      Cranial Nerves IX and X: Glossopharyngeal and Vagus Nerves  Motor Palatal Symmetry (Rest): WNL  Palatal Symmetry (Movement): WNL  Cough: Perceptually strong  Voice Prior to PO intake: Clear  Resonance: Normal  Abnormal movement: absent Interpretation: bilaterally intact     Cranial Nerve XII: Hypoglossal Nerve  Motor Tongue at rest: WNL  Lingual Protrusion: WNL  Lingual Protrusion against Resistance: WNL  Lingual Lateralization: WNL  Abnormal movement: absent Interpretation: bilaterally intact     Other information:  Volitional Swallow: Able to palpate laryngeal rise  Mucosal Quality: No abnormal findings  Secretion Management: adequate  Dentition: Good condition for speech and mastication      [place] Swallow Protocol:  Passed  [place] Swallow Protocol dictates pt remain NPO if fail screener; (Svenr et al. 2014) however, as objective swallow assessment is not available for greater than a week, pt will remain on current diet until objective assessment is completed unless otherwise indicated.     *Thicken liquids were not used in this assessment. ORhoda (2018) reported that thickened liquids have no sound evidence as reducing risk of pneumonia in patients with dysphagia and can cause harm by increasing risk of dehydration. It also presents an increased risk of UTI, electrolyte imbalance, constipation, fecal impaction, cognitive impairment, functional decline, and even death (Langmore, 2002; [place], 2016).  This supports the assertion that we should confirm a patient requires thickened liquids with an instrumental swallow study prior to recommending them.    Clinical Swallow Examination:   Pt presented with:   THIN:-  2 oz thin liquids  PUREE:- tsp bites of pudding x2  SOLID: -bite of joni cracker x1     *Thicken liquids were not used in this assessment. ORhoda (2018) reported that thickened liquids have no sound evidence as reducing risk of pneumonia  in patients with dysphagia and can cause harm by increasing risk of dehydration. It also presents an increased risk of UTI, electrolyte imbalance, constipation, fecal impaction, cognitive impairment, functional decline, and even death (Ry, 2002; [place], 2016).  This supports the assertion that we should confirm a patient requires thickened liquids with an instrumental swallow study prior to recommending them.      Interpretation: The patient had no anterior loss of the bolus with adequate closure of the lips around the cup edge and spoon. Minimal residue remained in the oral cavity following the swallow.  Patient without overt clinical signs/symptoms of aspiration on any PO trials given.     Hearing / Vision: No difficulties reported, functional for purpose of evaluation.        Treatment   Total Treatment Time Separate from Evaluation: not applicable   No treatment performed secondary to time to complete evaluation.     Education provided:   -role of Speech Therapy, goals/plan of care, scheduling/cancellations, insurance limitations with patient  -Additional Education provided:   Aspiration precautions    Patient expressed understanding.   Assessment     Emeka presents to [facility] status post medical diagnosis of Spinal stenosis of lumbar region with neurogenic claudication [M48.062], Impaired mobility and ADLs [Z74.09, Z78.9] .     Interpretation of objective assessment:   He presents with no dysphagia.    Demonstrates impairments including limitations as described in the problem list.   Other Recommendations:   Referral to Gastroenterology     Therapist's Name:   KIA Dao-SLP, CCC-SLP   Speech Language Pathologist    5/21/2024

## 2024-05-21 ENCOUNTER — CLINICAL SUPPORT (OUTPATIENT)
Dept: REHABILITATION | Facility: HOSPITAL | Age: 49
End: 2024-05-21
Attending: PHYSICAL MEDICINE & REHABILITATION
Payer: MEDICAID

## 2024-05-21 DIAGNOSIS — Z78.9 IMPAIRED MOBILITY AND ADLS: ICD-10-CM

## 2024-05-21 DIAGNOSIS — M48.062 SPINAL STENOSIS OF LUMBAR REGION WITH NEUROGENIC CLAUDICATION: Primary | ICD-10-CM

## 2024-05-21 DIAGNOSIS — Z74.09 IMPAIRED MOBILITY: Primary | ICD-10-CM

## 2024-05-21 DIAGNOSIS — Z74.09 IMPAIRED MOBILITY AND ADLS: ICD-10-CM

## 2024-05-21 PROCEDURE — 97161 PT EVAL LOW COMPLEX 20 MIN: CPT | Mod: PN | Performed by: PHYSICAL THERAPIST

## 2024-05-21 PROCEDURE — 92610 EVALUATE SWALLOWING FUNCTION: CPT | Mod: PN

## 2024-05-21 PROCEDURE — 97110 THERAPEUTIC EXERCISES: CPT | Mod: PN | Performed by: PHYSICAL THERAPIST

## 2024-05-21 NOTE — PATIENT INSTRUCTIONS
Pelvic Tilt        Flatten back by tightening stomach muscles and buttocks.  Repeat 10 times per set. Do 3 sets per session. Do 1 sessions per day.  https://eMoov.[email or web address].us/[email or web address]   Copyright © [employer]. All rights reserved.   Straight Leg Raise        Tighten stomach and slowly raise locked right leg 12 inches from bed.  Repeat 10 times per set. Do 2 sets per session. Do 1 sessions per day.  [email or web address]://[email or web address].[email or web address].[email or web address]

## 2024-05-21 NOTE — PLAN OF CARE
Outpatient Neurological Rehabilitation  Speech and Language Therapy Evaluation    Date: 5/21/2024     Name: Emeka Caballero   MRN: [id number]    Therapy Diagnosis: No diagnosis found.   Physician: Carina Mcclure MD  Physician Orders: [id number] - AMB REFERRAL/CONSULT TO SPEECH THERAPY   Medical Diagnosis from Referral: Spinal stenosis of lumbar region with neurogenic claudication [M48.062], Impaired mobility and ADLs [Z74.09, Z78.9]     Visit #/Visits authorized: 1/ 1  Date of Evaluation:  5/21/2024   Insurance Authorization Period: 5/17/2024 - 5/17/2025    Plan of Care Expiration:  Evaluation only     Time In: 10:30  Time Out: 11:15  Test interpretation time:  Procedure Min.   Swallow and Oral Function Evaluation   45          Precautions:Standard and Fall  Subjective   Date of Onset: Patient reports no difficulties with swallowing at this time   History of Current Condition:   Emeka Caballero is a 49 y.o. male male who presents to [facility] for evaluation secondary to Spinal stenosis of lumbar region with neurogenic claudication and Impaired mobility and ADLs . Patient was referred to therapy by Carina Mcclure MD. Patient reports no difficulties with swallowing, however he did share feeling of constipation. Patient was not accompanied to the evaluation by anyone.   Past Medical History: Emeka Caballero  has a past medical history of Arthritis, Diabetes mellitus, type 2 (2/11/2022), and Essential hypertension, benign (5/4/2023).  Emeka Caballero  has a past surgical history that includes Epidural steroid injection (N/A, 10/21/2020); Lumbar fusion (N/A, 2/8/2022); Cataract extraction (Right); injection, sacroiliac joint (Bilateral, 12/27/2022); Laser enucleation of prostate (N/A, 3/14/2023); injection, sacroiliac joint (Right, 11/27/2023); and Lumbar fusion (N/A, 5/1/2024).  Medical Hx and Allergies:  Emeka has a current medication list  "which includes the following prescription(s): atorvastatin, blood sugar diagnostic, blood-glucose meter, gabapentin, lancets, linaclotide, lisinopril, metformin, omega-3 fatty acids-fish oil, oxybutynin, oxycodone, pantoprazole, tadalafil, and tamsulosin. Review of patient's allergies indicates:  No Known Allergies  Imaging: No Imaging    Prior Therapy:  one visit from  inpatient  Social History: lives with brother  Prior Level of Function: independent   Current Level of Function: independent  Pain: 9/10  Pain Location / Description: back pain and down left leg  Nutrition:  oral diet, IDDSI 0 (Thin), and IDDSI 7 (Regular)  Patient's Therapy Goals:  "I don't think I need swallow therapy  Objective   Formal Assessment:     Auditory Comprehension:   No concerns reported or observed; WFL for the purpose of assessment and conversation.    Verbal Expression:   No concerns reported or observed; WFL for the purpose of assessment and conversation.     Reading Comprehension:  Did not assess. No concerns reported or observed.        Written Expression:  Did not assess. No concerns reported or observed.        Cognition:    No concerns reported or observed. Pt and alert and cooperative throughout evaluation, was oriented x 4 independently. Pt did not require cues to attend to evaluation tasks throughout session. Pt demonstrated adequate topic maintenance and reasoning skills throughout evaluation. Cognitive-linguistic skills WFL for the purpose of assessment and conversation.     Motor Speech/Fluency/Voice:    Pt's motor speech, speech intelligibility, fluency, and voice were judged to be Swallowing:    No swallowing difficulties reported at this time.     Eating Assessment Tool (EAT-10) is a questionnaire given to the patient to  her swallowing function. They ranked their swallowing function as a 0/40 (If the score is greater than 3 there may be swallowing problems.)    0=No problem  4= severe problem    My swallowing " problem has caused me to lose weight: 0  My swallowing problem interferes with my ability to go out for meals: 0  Swallowing liquid takes extra effort: 0  Swallowing solids takes extra effort: 0  Swallowing pills takes extra effort: 0  Swallowing is painful: 0  The pleasure of my eating is affected by my swallowin  When I swallow food sticks in my throat: 0  I cough when I eat: 0  Swallowing is stressful: 0  Total: 0    Interpretation: Score of greater than 3 is indicative of a swallowing disorder (Omer et al., 2008); higher scores correlate with increased penetration-aspiration  scale scores, and scores greater than 15 results in the patient being 2.2 times more likely to aspirate (Chet et al., 2015)     References:   ATTILA Tello., KURT Lopez., RHEA Warner., AMY Monson., CHRISSY Smallwood, AMY Najera, & MERI Vidal (2008). Validity and reliability of the Eating Assessment Tool (EAT-10). Annals of Otology, Rhinology & Laryngology, 117(12), 919-924. https://doi.org/10.1177/[email or web address]  KURT Rosenberg., EJ Mlaik., AMY Murry., Juan C, M. A., & ATTILA Tello (2015). The ability of the 10-item eating assessment tool (EAT-10) to predict aspiration risk in persons with dysphagia. Annals of Otology, Rhinology & Laryngology, 124(5), 351-354. https://doi.org/10.1177/8058802912014867    Recommend MBSS: No    Cranial Nerve Examination  Cranial Nerve 5: Trigeminal Nerve  Motor Jaw Posture at rest: Closed  Mandible Elevation/Depression: WFL  Mandible lateralization: WFL  Abnormal movement: absent Interpretation: bilaterally intact   Sensory Forehead: WFL  Cheek: WFL  Jaw: WFL  Facial Pain: None noted Interpretation: bilaterally intact     Cranial Nerve 7: Facial Nerve  Motor Facial Symmetry: WNL  Wrinkle Forehead: WFL  Close eyes tightly: WFL  Labial Protrusion: WFL  Labial Retraction: WFL  Buccal Strength with Labial Seal: WFL  Abnormal movement: absent Interpretation: bilaterally intact   Sensory  Formal testing not completed. Pt denied any changes in taste      Cranial Nerves IX and X: Glossopharyngeal and Vagus Nerves  Motor Palatal Symmetry (Rest): WNL  Palatal Symmetry (Movement): WNL  Cough: Perceptually strong  Voice Prior to PO intake: Clear  Resonance: Normal  Abnormal movement: absent Interpretation: bilaterally intact     Cranial Nerve XII: Hypoglossal Nerve  Motor Tongue at rest: WNL  Lingual Protrusion: WNL  Lingual Protrusion against Resistance: WNL  Lingual Lateralization: WNL  Abnormal movement: absent Interpretation: bilaterally intact     Other information:  Volitional Swallow: Able to palpate laryngeal rise  Mucosal Quality: No abnormal findings  Secretion Management: adequate  Dentition: Good condition for speech and mastication      [place] Swallow Protocol:  Passed  [place] Swallow Protocol dictates pt remain NPO if fail screener; (Svenr et al. 2014) however, as objective swallow assessment is not available for greater than a week, pt will remain on current diet until objective assessment is completed unless otherwise indicated.     *Thicken liquids were not used in this assessment. ORhoda (2018) reported that thickened liquids have no sound evidence as reducing risk of pneumonia in patients with dysphagia and can cause harm by increasing risk of dehydration. It also presents an increased risk of UTI, electrolyte imbalance, constipation, fecal impaction, cognitive impairment, functional decline, and even death (Langmore, 2002; [place], 2016).  This supports the assertion that we should confirm a patient requires thickened liquids with an instrumental swallow study prior to recommending them.    Clinical Swallow Examination:   Pt presented with:   THIN:- 2 oz thin liquids  PUREE:- tsp bites of pudding x2  SOLID: -bite of joni cracker x1     *Thicken liquids were not used in this assessment. ORhoda (2018) reported that thickened liquids have no sound evidence as reducing risk of pneumonia in  patients with dysphagia and can cause harm by increasing risk of dehydration. It also presents an increased risk of UTI, electrolyte imbalance, constipation, fecal impaction, cognitive impairment, functional decline, and even death (Ry, 2002; [place], 2016).  This supports the assertion that we should confirm a patient requires thickened liquids with an instrumental swallow study prior to recommending them.      Interpretation: The patient had no anterior loss of the bolus with adequate closure of the lips around the cup edge and spoon. Minimal residue remained in the oral cavity following the swallow.  Patient without overt clinical signs/symptoms of aspiration on any PO trials given.     Hearing / Vision: No difficulties reported, functional for purpose of evaluation.        Treatment   Total Treatment Time Separate from Evaluation: not applicable   No treatment performed secondary to time to complete evaluation.     Education provided:   -role of Speech Therapy, goals/plan of care, scheduling/cancellations, insurance limitations with patient  -Additional Education provided:   Aspiration precautions    Patient expressed understanding.   Assessment     Emeka presents to [facility] status post medical diagnosis of Spinal stenosis of lumbar region with neurogenic claudication [M48.062], Impaired mobility and ADLs [Z74.09, Z78.9] .     Interpretation of objective assessment:   He presents with no dysphagia.    Demonstrates impairments including limitations as described in the problem list.   Other Recommendations:   Referral to Gastroenterology     Therapist's Name:   KIA Dao-SLP, CCC-SLP   Speech Language Pathologist    5/21/2024

## 2024-05-22 ENCOUNTER — OFFICE VISIT (OUTPATIENT)
Dept: FAMILY MEDICINE | Facility: CLINIC | Age: 49
End: 2024-05-22
Payer: MEDICAID

## 2024-05-22 VITALS
HEART RATE: 100 BPM | OXYGEN SATURATION: 99 % | DIASTOLIC BLOOD PRESSURE: 72 MMHG | TEMPERATURE: 98 F | SYSTOLIC BLOOD PRESSURE: 118 MMHG | BODY MASS INDEX: 29.82 KG/M2 | WEIGHT: 208.31 LBS | HEIGHT: 70 IN

## 2024-05-22 DIAGNOSIS — M79.89 PAIN AND SWELLING OF RIGHT LOWER LEG: Primary | ICD-10-CM

## 2024-05-22 DIAGNOSIS — Z23 NEED FOR VACCINATION: ICD-10-CM

## 2024-05-22 DIAGNOSIS — E11.69 TYPE 2 DIABETES MELLITUS WITH HYPERLIPIDEMIA: Chronic | ICD-10-CM

## 2024-05-22 DIAGNOSIS — Z12.12 SCREENING FOR COLORECTAL CANCER: ICD-10-CM

## 2024-05-22 DIAGNOSIS — Z12.11 SCREENING FOR COLORECTAL CANCER: ICD-10-CM

## 2024-05-22 DIAGNOSIS — M79.661 PAIN AND SWELLING OF RIGHT LOWER LEG: Primary | ICD-10-CM

## 2024-05-22 DIAGNOSIS — E78.5 TYPE 2 DIABETES MELLITUS WITH HYPERLIPIDEMIA: Chronic | ICD-10-CM

## 2024-05-22 DIAGNOSIS — M48.061 SPINAL STENOSIS, LUMBAR REGION, WITHOUT NEUROGENIC CLAUDICATION: ICD-10-CM

## 2024-05-22 DIAGNOSIS — N40.0 BENIGN PROSTATIC HYPERPLASIA, UNSPECIFIED WHETHER LOWER URINARY TRACT SYMPTOMS PRESENT: ICD-10-CM

## 2024-05-22 DIAGNOSIS — M54.16 LUMBAR RADICULOPATHY: ICD-10-CM

## 2024-05-22 DIAGNOSIS — K21.00 GASTROESOPHAGEAL REFLUX DISEASE WITH ESOPHAGITIS WITHOUT HEMORRHAGE: ICD-10-CM

## 2024-05-22 DIAGNOSIS — K59.09 CHRONIC CONSTIPATION: Chronic | ICD-10-CM

## 2024-05-22 DIAGNOSIS — E78.2 MIXED DYSLIPIDEMIA: Chronic | ICD-10-CM

## 2024-05-22 DIAGNOSIS — I10 ESSENTIAL HYPERTENSION, BENIGN: Chronic | ICD-10-CM

## 2024-05-22 PROCEDURE — 90677 PCV20 VACCINE IM: CPT | Mod: PBBFAC,PN | Performed by: FAMILY MEDICINE

## 2024-05-22 PROCEDURE — 3078F DIAST BP <80 MM HG: CPT | Mod: CPTII,,, | Performed by: FAMILY MEDICINE

## 2024-05-22 PROCEDURE — 99214 OFFICE O/P EST MOD 30 MIN: CPT | Mod: S$PBB,,, | Performed by: FAMILY MEDICINE

## 2024-05-22 PROCEDURE — 99999PBSHW PR PBB SHADOW TECHNICAL ONLY FILED TO HB: Mod: PBBFAC,,,

## 2024-05-22 PROCEDURE — 1111F DSCHRG MED/CURRENT MED MERGE: CPT | Mod: CPTII,,, | Performed by: FAMILY MEDICINE

## 2024-05-22 PROCEDURE — 3044F HG A1C LEVEL LT 7.0%: CPT | Mod: CPTII,,, | Performed by: FAMILY MEDICINE

## 2024-05-22 PROCEDURE — 99214 OFFICE O/P EST MOD 30 MIN: CPT | Mod: PBBFAC,PN | Performed by: FAMILY MEDICINE

## 2024-05-22 PROCEDURE — 3072F LOW RISK FOR RETINOPATHY: CPT | Mod: CPTII,,, | Performed by: FAMILY MEDICINE

## 2024-05-22 PROCEDURE — 90715 TDAP VACCINE 7 YRS/> IM: CPT | Mod: PBBFAC,PN | Performed by: FAMILY MEDICINE

## 2024-05-22 PROCEDURE — 1159F MED LIST DOCD IN RCRD: CPT | Mod: CPTII,,, | Performed by: FAMILY MEDICINE

## 2024-05-22 PROCEDURE — 3008F BODY MASS INDEX DOCD: CPT | Mod: CPTII,,, | Performed by: FAMILY MEDICINE

## 2024-05-22 PROCEDURE — 3074F SYST BP LT 130 MM HG: CPT | Mod: CPTII,,, | Performed by: FAMILY MEDICINE

## 2024-05-22 PROCEDURE — 99999 PR PBB SHADOW E&M-EST. PATIENT-LVL IV: CPT | Mod: PBBFAC,,, | Performed by: FAMILY MEDICINE

## 2024-05-22 PROCEDURE — 1160F RVW MEDS BY RX/DR IN RCRD: CPT | Mod: CPTII,,, | Performed by: FAMILY MEDICINE

## 2024-05-22 PROCEDURE — 4010F ACE/ARB THERAPY RXD/TAKEN: CPT | Mod: CPTII,,, | Performed by: FAMILY MEDICINE

## 2024-05-22 RX ORDER — LANCETS
EACH MISCELLANEOUS
Qty: 100 EACH | Refills: 11 | Status: SHIPPED | OUTPATIENT
Start: 2024-05-22

## 2024-05-22 RX ORDER — INSULIN PUMP SYRINGE, 3 ML
EACH MISCELLANEOUS
Qty: 1 EACH | Refills: 0 | Status: SHIPPED | OUTPATIENT
Start: 2024-05-22 | End: 2025-05-22

## 2024-05-22 RX ORDER — PANTOPRAZOLE SODIUM 40 MG/1
40 TABLET, DELAYED RELEASE ORAL DAILY
Qty: 30 TABLET | Refills: 11 | Status: SHIPPED | OUTPATIENT
Start: 2024-05-22 | End: 2025-05-22

## 2024-05-22 RX ORDER — LISINOPRIL 5 MG/1
5 TABLET ORAL DAILY
Qty: 90 TABLET | Refills: 1 | Status: SHIPPED | OUTPATIENT
Start: 2024-05-22 | End: 2025-05-22

## 2024-05-22 RX ORDER — ATORVASTATIN CALCIUM 40 MG/1
40 TABLET, FILM COATED ORAL DAILY
Qty: 30 TABLET | Refills: 11 | Status: SHIPPED | OUTPATIENT
Start: 2024-05-22

## 2024-05-22 RX ORDER — TAMSULOSIN HYDROCHLORIDE 0.4 MG/1
0.4 CAPSULE ORAL DAILY
Qty: 30 CAPSULE | Refills: 11 | Status: SHIPPED | OUTPATIENT
Start: 2024-05-22 | End: 2025-05-22

## 2024-05-22 RX ORDER — METFORMIN HYDROCHLORIDE 500 MG/1
500 TABLET, EXTENDED RELEASE ORAL
Qty: 90 TABLET | Refills: 3 | Status: SHIPPED | OUTPATIENT
Start: 2024-05-22

## 2024-05-22 RX ORDER — GABAPENTIN 300 MG/1
300 CAPSULE ORAL NIGHTLY
Qty: 30 CAPSULE | Refills: 11 | Status: SHIPPED | OUTPATIENT
Start: 2024-05-22 | End: 2025-05-22

## 2024-05-22 RX ADMIN — PNEUMOCOCCAL 20-VALENT CONJUGATE VACCINE 0.5 ML
2.2; 2.2; 2.2; 2.2; 2.2; 2.2; 2.2; 2.2; 2.2; 2.2; 2.2; 2.2; 2.2; 2.2; 2.2; 2.2; 4.4; 2.2; 2.2; 2.2 INJECTION, SUSPENSION INTRAMUSCULAR at 08:05

## 2024-05-22 RX ADMIN — TETANUS TOXOID, REDUCED DIPHTHERIA TOXOID AND ACELLULAR PERTUSSIS VACCINE, ADSORBED 0.5 ML: 5; 2.5; 8; 8; 2.5 SUSPENSION INTRAMUSCULAR at 08:05

## 2024-05-22 NOTE — PROGRESS NOTES
"  Patient Name: Emeka Caballero    : 1975  MRN: [id number]      Subjective:     Patient ID: Emeka is a 49 y.o. male    Chief Complaint:  Hospital Follow Up (Back Surgery May 1, 2024)    49 year old male presents for follow-up after having surgery for spinal stenosis two weeks ago.  He reports that last week he developed pain and swelling in his right lower leg which he is concerned about.  He reports taking all his medications for chronic conditions as prescribed.  He has currently not checking his sugars routinely.  However he denies any acute concerns with his chronic conditions.             Review of Systems   Constitutional:  Negative for fever and unexpected weight change.   Eyes:  Negative for visual disturbance.   Respiratory:  Negative for chest tightness, shortness of breath and wheezing.    Cardiovascular:  Negative for chest pain.   Gastrointestinal:  Positive for constipation. Negative for abdominal pain, blood in stool and fecal incontinence.   Endocrine: Negative for polydipsia, polyphagia and polyuria.   Genitourinary:  Positive for erectile dysfunction. Negative for bladder incontinence and dysuria.   Neurological:  Negative for weakness and headaches.        Objective:   /72 (BP Location: Left arm, Patient Position: Sitting, BP Method: Large (Manual))   Pulse 100   Temp 98.3 °F (36.8 °C) (Oral)   Ht 5' 9.9" (1.775 m)   Wt 94.5 kg (208 lb 5.4 oz)   SpO2 99%   BMI 29.98 kg/m²     Physical Exam  Vitals reviewed.   Constitutional:       General: He is not in acute distress.  HENT:      Head: Normocephalic and atraumatic.      Right Ear: Ear canal and external ear normal.      Left Ear: Ear canal and external ear normal.      Nose: Nose normal.      Mouth/Throat:      Mouth: Mucous membranes are moist.      Pharynx: No oropharyngeal exudate or posterior oropharyngeal erythema.   Eyes:      Extraocular Movements: Extraocular movements intact.      Conjunctiva/sclera: " Conjunctivae normal.      Pupils: Pupils are equal, round, and reactive to light.   Cardiovascular:      Rate and Rhythm: Normal rate and regular rhythm.      Heart sounds: No murmur heard.  Pulmonary:      Effort: Pulmonary effort is normal. No respiratory distress.      Breath sounds: Normal breath sounds. No wheezing or rales.   Abdominal:      General: Abdomen is flat. Bowel sounds are normal. There is no distension.      Palpations: Abdomen is soft.      Tenderness: There is no abdominal tenderness. There is no guarding.   Musculoskeletal:         General: Signs of injury present.      Cervical back: Normal range of motion.   Skin:     General: Skin is warm.      Capillary Refill: Capillary refill takes less than 2 seconds.   Neurological:      Mental Status: He is alert and oriented to person, place, and time.      Cranial Nerves: No cranial nerve deficit.      Sensory: No sensory deficit.   Psychiatric:         Mood and Affect: Mood normal.         Behavior: Behavior normal.        Assessment        ICD-10-CM ICD-9-CM   1. Pain and swelling of right lower leg  M79.661 729.5    M79.89 729.81   2. Essential hypertension, benign  I10 401.1   3. Mixed dyslipidemia  E78.2 272.2   4. Type 2 diabetes mellitus with hyperlipidemia  E11.69 250.80    E78.5 272.4   5. Chronic constipation  K59.09 564.00   6. Spinal stenosis, lumbar region, without neurogenic claudication  M48.061 724.02   7. Lumbar radiculopathy  M54.16 724.4   8. Benign prostatic hyperplasia, unspecified whether lower urinary tract symptoms present  N40.0 600.00   9. Gastroesophageal reflux disease with esophagitis without hemorrhage  K21.00 530.81     530.10   10. Screening for colorectal cancer  Z12.11 V76.51    Z12.12 V76.41   11. Need for vaccination  Z23 V05.9         Plan:     1. Pain and swelling of right lower leg  Assessment & Plan:  Will get ultrasound to rule out DVT given recent surgery    Orders:   -     US Lower Extremity Veins Right;  Future; Expected date: 05/22/2024    2. Essential hypertension, benign  Assessment & Plan:  Blood pressure control.  Continue current regimen.    Orders:  -     lisinopriL (PRINIVIL,ZESTRIL) 5 MG tablet; Take 1 tablet (5 mg total) by mouth once daily.  Dispense: 90 tablet; Refill: 1  -     Comprehensive Metabolic Panel; Future; Expected date: 05/22/2024  -     Lipid Panel; Future; Expected date: 05/22/2024  -     CBC Without Differential; Future; Expected date: 10/22/2024    3. Mixed dyslipidemia  Overview:  Lab Results   Component Value Date    CHOL 164 10/25/2023    CHOL 124 04/29/2023    CHOL 137 10/14/2022     Lab Results   Component Value Date    HDL 25 (L) 10/25/2023    HDL 29 (L) 04/29/2023    HDL 26 (L) 10/14/2022     Lab Results   Component Value Date    LDLCALC 72.4 10/25/2023    LDLCALC 65.4 04/29/2023    LDLCALC 81.4 10/14/2022     Lab Results   Component Value Date    TRIG 333 (H) 10/25/2023    TRIG 148 04/29/2023    TRIG 148 10/14/2022     Lab Results   Component Value Date    CHOLHDL 15.2 (L) 10/25/2023    CHOLHDL 23.4 04/29/2023    CHOLHDL 19.0 (L) 10/14/2022        The 10-year ASCVD risk score (Jasper SMART, et al., 2019) is: 27.2%    Values used to calculate the score:      Age: 49 years      Sex: Male      Is Non- : Yes      Diabetic: Yes      Tobacco smoker: Yes      Systolic Blood Pressure: 118 mmHg      Is BP treated: Yes      HDL Cholesterol: 25 mg/dL      Total Cholesterol: 164 mg/dL      Assessment & Plan:  Patient has not been compliant with atorvastatin.  Strongly recommended improve compliance.  Medication refilled.    Orders:  -     atorvastatin (LIPITOR) 40 MG tablet; Take 1 tablet (40 mg total) by mouth once daily.  Dispense: 30 tablet; Refill: 11  -     Comprehensive Metabolic Panel; Future; Expected date: 05/22/2024  -     Lipid Panel; Future; Expected date: 05/22/2024  -     CBC Without Differential; Future; Expected date: 10/22/2024    4. Type 2 diabetes  mellitus with hyperlipidemia  Overview:  Lab Results   Component Value Date    HGBA1C 6.8 (H) 05/01/2024    HGBA1C 6.6 (H) 10/25/2023    HGBA1C 6.6 (H) 04/29/2023     Lab Results   Component Value Date    LDLCALC 72.4 10/25/2023     Lab Results   Component Value Date    MICALBCREAT 2.9 10/14/2022         Assessment & Plan:  A1c is at goal.  Continue current regimen.    Orders:  -     metFORMIN (GLUCOPHAGE-XR) 500 MG ER 24hr tablet; Take 1 tablet (500 mg total) by mouth daily with breakfast.  Dispense: 90 tablet; Refill: 3  -     blood-glucose meter kit; To check BG 3 times daily, to use with insurance preferred meter  Dispense: 1 each; Refill: 0  -     lancets Misc; To check BG 3 times daily, to use with insurance preferred meter  Dispense: 100 each; Refill: 11  -     blood sugar diagnostic Strp; To check BG 3 times daily, to use with insurance preferred meter  Dispense: 100 each; Refill: 11  -     Comprehensive Metabolic Panel; Future; Expected date: 05/22/2024  -     Lipid Panel; Future; Expected date: 05/22/2024  -     Microalbumin/Creatinine Ratio, Urine; Future; Expected date: 05/22/2024  -     Hemoglobin A1C; Future; Expected date: 05/22/2024  -     CBC Without Differential; Future; Expected date: 10/22/2024    5. Chronic constipation  Assessment & Plan:  Linzess renewed    Orders:  -     linaCLOtide (LINZESS) 72 mcg Cap capsule; Take 1 capsule (72 mcg total) by mouth daily as needed (constipation).  Dispense: 30 capsule; Refill: 5    6. Spinal stenosis, lumbar region, without neurogenic claudication/  7. Lumbar radiculopathy  Assessment & Plan:  Patient requests a refill on gabapentin.  Medication refilled.    Orders:  -     gabapentin (NEURONTIN) 300 MG capsule; Take 1 capsule (300 mg total) by mouth every evening.  Dispense: 30 capsule; Refill: 11    8. Benign prostatic hyperplasia, unspecified whether lower urinary tract symptoms present  Overview:  3- Holmium laser enucleation of the  prostate.  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  Will continue.    Orders:  -     tamsulosin (FLOMAX) 0.4 mg Cap; Take 1 capsule (0.4 mg total) by mouth once daily.  Dispense: 30 capsule; Refill: 11  -     PROSTATE SPECIFIC ANTIGEN, DIAGNOSTIC; Future; Expected date: 05/22/2024    9. Gastroesophageal reflux disease with esophagitis without hemorrhage  -     pantoprazole (PROTONIX) 40 MG tablet; Take 1 tablet (40 mg total) by mouth once daily.  Dispense: 30 tablet; Refill: 11    10. Screening for colorectal cancer  Assessment & Plan:  Discussed options for colorectal cancer screening.  We discussed colonoscopy verses stool testing and risks and benefits each.   Patient elected to proceed with FitKit and patient educated on collection method.  Patient informed that if stool testing is positive will need to proceed with colonoscopy, in which case the colonoscopy is no longer considered a screening test but rather a diagnostic procedure for insurance purposes, which may additional associated costs, such as deductible/co-insurance.     Orders:   -     Fecal Immunochemical Test (iFOBT); Future; Expected date: 05/22/2024    11. Need for vaccination  Assessment & Plan:  Pneumococcal and tetanus vaccinations updated    Orders:   -     pneumoc 20-dominique conj-dip cr(PF) (PREVNAR-20 (PF)) injection Syrg 0.5 mL  -     Tdap (BOOSTRIX) vaccine injection 0.5 mL           -Dio Zarate Jr., MD, AAHIVS      This office note has been dictated.  This dictation has been generated using M-Modal Fluency Direct dictation; some phonetic errors may occur.         Patient Instructions         Follow up in about 5 months (around 10/22/2024) for Diabetes, Hypertension, Lipids.   Future Appointments   Date Time Provider Department Center   6/4/2024  5:00 PM Bibiana Vargas PTA [facility] - [facility]   6/7/2024  7:15 AM Tanvi Terrell PT [facility]  [facility]   6/11/2024  1:00 PM Bibiana Vargas, PTA [facility]   6/13/2024 10:45 AM [facility]IC [facility]   6/13/2024 11:30 AM Yolie Vickers, NP [facility] [address]   6/14/2024 11:00 AM AliciaBibiana coulter, PTA [facility]   6/18/2024  8:45 AM Bibiana Vargas, PTA [facility]   6/21/2024  9:00 AM AliciaBibiana, PTA [facility]   6/25/2024  8:45 AM Bibiana Vargas, PTA [facility]   6/27/2024  9:30 AM Tanvi Terrell, PT [facility]   10/3/2024  8:15 AM LAB, [facility] DRAW STATION [facility]   10/3/2024  8:30 AM LAB, [facility] DRAW STATION [facility]   10/8/2024 10:20 AM Dio Zarate Jr., MD [facility]

## 2024-05-23 ENCOUNTER — HOSPITAL ENCOUNTER (OUTPATIENT)
Dept: RADIOLOGY | Facility: HOSPITAL | Age: 49
Discharge: HOME | End: 2024-05-23
Attending: FAMILY MEDICINE
Payer: MEDICAID

## 2024-05-23 ENCOUNTER — CLINICAL SUPPORT (OUTPATIENT)
Dept: REHABILITATION | Facility: HOSPITAL | Age: 49
End: 2024-05-23
Payer: MEDICAID

## 2024-05-23 DIAGNOSIS — M79.661 PAIN AND SWELLING OF RIGHT LOWER LEG: ICD-10-CM

## 2024-05-23 DIAGNOSIS — M79.89 PAIN AND SWELLING OF RIGHT LOWER LEG: ICD-10-CM

## 2024-05-23 DIAGNOSIS — Z74.09 IMPAIRED MOBILITY: Primary | ICD-10-CM

## 2024-05-23 PROCEDURE — 93971 EXTREMITY STUDY: CPT | Mod: 26,RT,, | Performed by: STUDENT IN AN ORGANIZED HEALTH CARE EDUCATION/TRAINING PROGRAM

## 2024-05-23 PROCEDURE — 97110 THERAPEUTIC EXERCISES: CPT | Mod: PN | Performed by: PHYSICAL THERAPIST

## 2024-05-23 PROCEDURE — 93971 EXTREMITY STUDY: CPT | Mod: TC,RT

## 2024-05-23 NOTE — PROGRESS NOTES
Physical Therapy Daily Treatment Note     [facility]  [facility] Number[facility] [id number]    Therapy Diagnosis:   Encounter Diagnosis   Name Primary?    Impaired mobility Yes     Physician: Carina Mcclure MD    Visit Date: 5/23/2024    Physician Orders: PT Eval and Treat 3x/week x 6 weeks  Medical Diagnosis from Referral:   Diagnosis   M48.062 (ICD-10-CM) - Spinal stenosis of lumbar region with neurogenic claudication   Z74.09,Z78.9 (ICD-10-CM) - Impaired mobility and ADLs      Evaluation Date: 5/21/2024  Authorization Period Expiration: 12/31/24  Plan of Care Expiration: 7/16/24  Visit # / Visits authorized: 1/ 20     PN due: 6/21/24    Time In: 0800  Time Out: 0845  Total Billable Time: 45 minutes    Precautions: TLSO when out of bed, no lifting >10lb., no bending     Subjective     Pt reports: going to get an US tomorrow to r/o DVT in right lower extremity   He was compliant with home exercise program.  Response to previous treatment: no adverse effects reported  Functional change: ongoing    Pain: 6/10  Location: low back pain       Objective     Emeka received therapeutic exercises to develop strength, endurance, and core stabilization for 45 minutes including:    Recumbent stepper lower extremity only L4 x 8 mins for improved strength and endurance    Supine:   transverse abdominal activation with march 2 x 10  Hip abduction Green thera band 3 x 10  Double knee to chest with ball 20x  hamstring stretch with belt 2 x 30 sec    Parallel bar:   Hip extension 3 x 10  Hip abduction 3 x 10    Pain at conclusion: 7/10    Home Exercises Provided and Patient Education Provided     Education provided:   - continue with home exercise program     Written Home Exercises Provided: Patient instructed to cont prior HEP.  Exercises were reviewed and Emeka was able to demonstrate them prior to the end of the session.  Emeka demonstrated good  understanding of the education provided.     See EMR under  Patient Instructions for exercises provided prior visit.    Assessment   Emeka tolerated first follow up physical therapy session well. Compliance with home exercise program reported. Core strengthening was advanced during today's session. Standing exercises were also initiated. a slight increase in back pain reported with standing exercises. Patient can benefit from continued skilled physical therapy to improve mobility and core strength.     Emeka Is progressing well towards his goals.   Pt prognosis is Good.     Pt will continue to benefit from skilled outpatient physical therapy to address the deficits listed in the problem list box on initial evaluation, provide pt/family education and to maximize pt's level of independence in the home and community environment.     Pt's spiritual, cultural and educational needs considered and pt agreeable to plan of care and goals.     Anticipated barriers to physical therapy: length of time impairments have been present     Goals:   Short Term Goals: 4 weeks   Patient will report compliance with home exercise program at least 2x/ week to improve speed of progress. ongoing  Patient will demonstrate improved mobility for household ambulation by performing Timed Up and Go with least restrictive AD in 15 sec. ongoing  Patient will demonstrate improved lower extremity strength and endurance by performing 5 times sit<>stand test in 25 sec. ongoing     Long Term Goals: 8 weeks   Patient will demonstrate improved mobility for household ambulation by performing Timed Up and Go with least restrictive AD in 10 sec. ongoing  Patient will demonstrate improved lower extremity strength and endurance by performing 5 times sit<>stand test in 15 secongoing  Patient will demonstrate improved lower extremity strength by 1 grade to enable him to perform job related mobility. ongoing  Patient will ambulate community distances with single point cane to demonstrate a return to PLOF.  ongoing  Patient will report reduced back pain to 2-3/10 to demonstrate improved tolerance to daily mobility. Ongoing    Plan     Outpatient Physical Therapy 3 times weekly to include the following interventions: Electrical Stimulation , Manual Therapy, Moist Heat/ Ice, Neuromuscular Re-ed, Patient Education, Therapeutic Activities, and Therapeutic Exercise.     Continue with core strengthening- may progress home exercise program     Tanvi Terrell, PT, DPT,   Board-Certified Clinical Specialist in Neurologic Physical Therapy   Certified Brain Injury Specialist    5/23/2024

## 2024-05-23 NOTE — PLAN OF CARE
[facility]  Physical Therapy Neurological Rehabilitation Initial Evaluation    Name: Emeka Caballero  Clinic Number: [id number]    Therapy Diagnosis:   Encounter Diagnosis   Name Primary?    Impaired mobility Yes     Physician: Carina Mcclure MD    Physician Orders: PT Eval and Treat 3x/week x 6 weeks  Medical Diagnosis from Referral:   Diagnosis   M48.062 (ICD-10-CM) - Spinal stenosis of lumbar region with neurogenic claudication   Z74.09,Z78.9 (ICD-10-CM) - Impaired mobility and ADLs     Evaluation Date: 5/21/2024  Authorization Period Expiration: 12/31/24  Plan of Care Expiration: 7/16/24  Visit # / Visits authorized: 1/ 1    PN due: 6/21/24    Time In: 0936  Time Out: 1015  Total Billable Time: 39 minutes    Precautions:  TLSO when out of bed, no lifting >10lb., no bending     Subjective   Date of onset: 5/1/2024  History of current condition - per medical record: L4-pelvis fusion and L4-S1 TLIF on 02/08/2022 for s/p L4-pelvis fusion and L4-5, L5-S1 TLIF on 02/08/2022. Presents with adjacent segment disease now s/p extension/revision of fusion L2-Pelvis on 5/1/24.  Emeka reports: I had surgery on my back- 5/1/24. Went to inpatient rehab x 2 weeks. This is second back surgery. Previous back surgery in 2022. Patient reports pain/ weakness in right lower extremity. Currently reports back pain and left anterior hip/ pelvic pain. Patient reporting new onset of right calf pain.  PMHx: DM, high cholesterol.      Medical History:   Past Medical History:   Diagnosis Date    Arthritis     Diabetes mellitus, type 2 2/11/2022    Essential hypertension, benign 5/4/2023       Surgical History:   Emeka Caballero  has a past surgical history that includes Epidural steroid injection (N/A, 10/21/2020); Lumbar fusion (N/A, 2/8/2022); Cataract extraction (Right); injection, sacroiliac joint (Bilateral, 12/27/2022); Laser enucleation of prostate (N/A, 3/14/2023); injection, sacroiliac  "joint (Right, 11/27/2023); and Lumbar fusion (N/A, 5/1/2024).    Medications:   Emeka has a current medication list which includes the following prescription(s): atorvastatin, blood sugar diagnostic, blood-glucose meter, gabapentin, lancets, linaclotide, lisinopril, metformin, omega-3 fatty acids-fish oil, oxybutynin, oxycodone, pantoprazole, tadalafil, and tamsulosin.    Allergies:   Review of patient's allergies indicates:  No Known Allergies     Imaging,  X Ray, lumbar, 5/2/24:   COMPARISON:  08/07/2023, CT 09/06/2023     FINDINGS:  Two views lumbar spine.     Since the most recent exam, there has been interval extension of spinal fusion posteriorly, spinal fusion hardware now extends to L2.  Disc spacing material noted at L3-L4, L4-L5 and L5-S1.  There is minimal anterolisthesis of L3 on L4, stable.  The sacral segments are aligned.  AP spinal alignment is remarkable for mild levo scoliotic curvature.  Please see op note.     Impression:  As above     Electronically signed by:Rahul Montano MD  Date:                                            05/02/2024     Prior Therapy: inpatient rehab x 2 weeks  Social History: lives with their family- brother  Falls: denies  DME: Walker and Straight cane    Home Environment: 2nd floor apartment, no elevator   Exercise Routine / History:  none  Family Present at time of Eval:  none  Occupation: [employer]  Prior Level of Function: single point cane for mobility, driving, working as   Current Level of Function: reports having to "pull up" for stairs, using RW at all times for mobility, cannot stand for long periods    Pain:  Current 9/10, worst 10/10, best 5/10   Location: bilateral back  Description: heavy  Aggravating Factors: Walking and riding in the car  Easing Factors: pain medication    Pts goals: be able to walk right    Objective     - Follows commands: yes   - Speech: no deficits     Mental status: alert, oriented to person, place, and time, normal " mood, behavior, speech, dress, motor activity, and thought processes  Appearance: Casually dressed  Behavior:  calm and cooperative  Attention Span and Concentration:  Normal    Dominant hand:  right     Posture Alignment :no significant deviations noted, patient presents in TLFO    Skin integrity:  Intact    Sensation:  Light Touch: Intact           Proprioception:   not tested     Tone: 1+ Slight increases in muscle tone, manifested by a catch, followed by minimal resistance throughout the remainder (less than half) of the ROM.  Limbs/muscles affected: bilateral lower extremity     Cranial Nerve Assessment:   Not tested     Visual/Auditory: denies changes     Coordination:   - fine motor: not tested   - UE coordination: finger to nose: not tested                        Pronation/ supination: not tested   - LE coordination:  alternating toe taps: not tested                                 Heel to shin: not tested     ROM:   UPPER EXTREMITY--AROM/PROM  (R) UE: WFLs  (L) UE: WFLs           RANGE OF MOTION--LOWER EXTREMITIES  (R) LE Hip: equal bilaterally: tightness in hamstrings but within functional limits    Knee: equal bilaterally   Ankle: equal bilaterally    (L) LE: Hip: equal bilaterally: tightness in hamstrings but within functional limits    Knee: equal bilaterally   Ankle: equal bilaterally    Strength: manual muscle test grades below   Upper Extremity Strength  Not tested     Lower Extremity Strength   RLE LLE   Hip Flexion: 2+/5 4/5   Hip Extension:  4/5 4/5   Hip Abduction: 2+/5 3-/5   Hip Adduction: Not tested  Not tested    Knee Extension: 4+/5 5/5   Knee Flexion: 5/5 5/5   Ankle Dorsiflexion: 4/5 5/5   Ankle Plantarflexion: 4+/5 5/5   Great toe ext 4+/5 4+/5     Abdominal Strength: not tested        Evaluation   Single Limb Stance R LE Not tested   (<10 sec = HIGH FALL RISK)   Single Limb Stance L LE Not tested   (<10 sec = HIGH FALL RISK)   5 times sit-stand 34 seconds w/upper extremity      Greene/  FGA/ Tinetti Not tested      5 times sit<>stand Cutoff scores:  >12 sec= fall risk  PD: >16 seconds= fall risk  Vestibular/balance: 15 seconds over 65 years  CVA: 12 seconds      Postural control:  MCTSIB:  1. Eyes Open/feet together/Firm: 30 seconds  2. Eyes Closed/feet together/Firm: 30 seconds  3. Eyes Open/feet together/Foam: 30 seconds  4. Eyes Closed/feet together/Foam: 30 seconds- minimal sway, slight anterior center of gravity     Gait Assessment:   - AD used: RW  - Assistance: modified independent   - Distance: at least 50 ft  - Curb: not tested   - Ramp:  not tested   - Stairs: not tested       GAIT DEVIATIONS:  Gait component performance: slight scissoring, decreased knee flexion during swing  * Above impairments indicate decreased gait safety and increased fall risk.      Impairments contributing to deviations: impaired motor control, decreased strength     Endurance Deficit: poor for standing      Evaluation   Timed Up and Go 20.3 sec w/RW  > 20 sec safe for independent transfers,     > 30 sec assist required for transfers   6 meter walk test Not tested    6 min walk test Not tested    Timed Up and Go w/RW= 24.2s + 20.7s + 16s    Timed Up and Go fall risk:   Community dwelling older adults >13.5 sec   Chronic CVA >14 sec   Geriatric with h/o falls >15 sec   Frail elderly >32.6 sec    LE amputees >19 sec   PD >7.95- 11.5 sec   Hip OA >10 sec   Vestibular >11.1 sec      Functional Mobility (Bed mobility, transfers)  Bed mobility: Mod I  Supine to sit: Mod I  Sit to supine: Mod I  Rolling: Mod I  Transfers to bed: Mod I  Transfers to toilet: not tested   Sit to stand:  Mod I  Stand pivot:  Mod I  Car transfers: not tested   Wheelchair mobility: not applicable   Floor transfers: not tested        CMS Impairment/Limitation/Restriction for FOTO Balance Survey    Therapist reviewed FOTO scores for Emeka Caballero on 5/21/2024.   FOTO documents entered into [employer] - see Media section.    Limitation Score:  41%    ABC Scale: 20%         TREATMENT   Treatment Time In: 1007  Treatment Time Out: 1016  Total Treatment time separate from Evaluation: 9 minutes    Emeka received therapeutic exercises to develop strength, endurance, and core stabilization for 9 minutes including:    Supine:   Pelvic tilts 10x 3 sec  straight leg raise with transverse abdominal activation 10x      Home Exercises and Patient Education Provided    Education provided:   - role of physical therapy/ plan of care   - review of home exercise program   - patient educated on s/s of DVT (redness, warmth, edema) and instructed to go to urgent care or ED if this occurs. Patient instructed to inform MD at tomorrow's visit. * no redness, warmth, or edema noted during today's visit. Mild pain reported with Angel's    Written Home Exercises Provided: yes.  Exercises were reviewed and Emeka was able to demonstrate them prior to the end of the session.  Emeka demonstrated good  understanding of the education provided.     See EMR under Patient Instructions for exercises provided 5/21/2024.    Assessment   Emeka is a 49 y.o. male referred to outpatient Physical Therapy with a medical diagnosis of  Spinal stenosis of lumbar region with neurogenic claudication and impaired mobility and activities of daily living. Patient is s/p 2nd lumbar fusion. Patient is fused from L2-S1. Patient reports he cannot stand for extended periods of time, uses a rolling walker at all times for mobility, and having increased difficulty with stair negotiation. Patient reports low back pain and left anterior hip pain. Pt presents with weakness in bilateral hips, gross weakness in right lower extremity, and slower gait velocity. Decreased lower extremity strength/ endurance is noted via formal assessment and 5 times sit<>stand test. Patient ambulates with a scissoring pattern and decreased knee flexion during swing. Per 5 times sit<>stand test and Timed Up and Go patient  is at increased risk for falls. Patient requires a more restrictive assistive device for gait (previously using single point cane). Patient is currently unable to work as a  due to impairments. Patient was instructed in home exercise program to address core stability. Patient can benefit from continued skilled physical therapy to improve gait ability and ability to return to work.     Pt prognosis is Good.   Pt will benefit from skilled outpatient Physical Therapy to address the deficits stated above and in the chart below, provide pt/family education, and to maximize pt's level of independence.     Plan of care discussed with patient: Yes  Pt's spiritual, cultural and educational needs considered and patient is agreeable to the plan of care and goals as stated below:     Anticipated Barriers for therapy: length of time impairments have been present    Medical Necessity is demonstrated by the following  History  Co-morbidities and personal factors that may impact the plan of care Co-morbidities:   diabetes    Personal Factors:   no deficits     low   Examination  Body Structures and Functions, activity limitations and participation restrictions that may impact the plan of care Body Regions:   lower extremities    Body Systems:    gross symmetry  ROM  strength  gross coordinated movement  balance  gait  transfers  transitions  motor control  skin integrity    Participation Restrictions:   Difficulty ambulating  Requires AD for ambulation  Decreased standing tolerance  Unable to work    Activity limitations:   Learning and applying knowledge  no deficits    General Tasks and Commands  no deficits    Communication  no deficits    Mobility  lifting and carrying objects  walking  driving (bike, car, motorcycle)    Self care  no deficits    Domestic Life  doing house work (cleaning house, washing dishes, laundry)    Interactions/Relationships  no deficits    Life Areas  employment    Community and Social  Life  community life  recreation and leisure         high   Clinical Presentation stable high   Decision Making/ Complexity Score: low     Goals:  Short Term Goals: 4 weeks   Patient will report compliance with home exercise program at least 2x/ week to improve speed of progress.   Patient will demonstrate improved mobility for household ambulation by performing Timed Up and Go with least restrictive AD in 15 sec.   Patient will demonstrate improved lower extremity strength and endurance by performing 5 times sit<>stand test in 25 sec.     Long Term Goals: 8 weeks   Patient will demonstrate improved mobility for household ambulation by performing Timed Up and Go with least restrictive AD in 10 sec.   Patient will demonstrate improved lower extremity strength and endurance by performing 5 times sit<>stand test in 15 sec  Patient will demonstrate improved lower extremity strength by 1 grade to enable him to perform job related mobility.   Patient will ambulate community distances with single point cane to demonstrate a return to PLOF.   Patient will report reduced back pain to 2-3/10 to demonstrate improved tolerance to daily mobility.     Plan   Plan of care Certification: 5/21/2024 to 7/16/24.    Outpatient Physical Therapy 3 times weekly for 8 weeks to include the following interventions: Electrical Stimulation , Manual Therapy, Moist Heat/ Ice, Neuromuscular Re-ed, Patient Education, Therapeutic Activities, and Therapeutic Exercise.     Tanvi Terrell, PT, DPT,   Board-Certified Clinical Specialist in Neurologic Physical Therapy   Certified Brain Injury Specialist    5/21/2024

## 2024-05-28 ENCOUNTER — CLINICAL SUPPORT (OUTPATIENT)
Dept: REHABILITATION | Facility: HOSPITAL | Age: 49
End: 2024-05-28
Payer: MEDICAID

## 2024-05-28 DIAGNOSIS — Z74.09 IMPAIRED MOBILITY: ICD-10-CM

## 2024-05-28 DIAGNOSIS — M48.062 SPINAL STENOSIS OF LUMBAR REGION WITH NEUROGENIC CLAUDICATION: Primary | ICD-10-CM

## 2024-05-28 PROCEDURE — 97110 THERAPEUTIC EXERCISES: CPT | Mod: PN,CQ

## 2024-05-28 NOTE — PROGRESS NOTES
"Physical Therapy Daily Treatment Note     [id number]    Therapy Diagnosis:   Encounter Diagnoses   Name Primary?    Impaired mobility     Spinal stenosis of lumbar region with neurogenic claudication Yes     Physician: Carina Mcclure MD    Visit Date: 5/28/2024    Physician Orders: PT Eval and Treat 3x/week x 6 weeks  Medical Diagnosis from Referral:   Diagnosis   M48.062 (ICD-10-CM) - Spinal stenosis of lumbar region with neurogenic claudication   Z74.09,Z78.9 (ICD-10-CM) - Impaired mobility and ADLs      Evaluation Date: 5/21/2024  Authorization Period Expiration: 12/31/24  Plan of Care Expiration: 7/16/24  Visit # / Visits authorized: 2/ 20  PTA Visits: 1/5     PN due: 6/21/24    Time In: 0802am  Time Out: 0850am  Total Billable Time: 48 minutes    Precautions: TLSO when out of bed, no lifting >10lb., no bending     Subjective     Pt reports: he doesn't feel much pain this morning, just more stiffness than anything. He's getting the ultrasound on June 13th to r/o DVT, but at this time, patient states no discomfort or pain to legs.   He was compliant with home exercise program.  Response to previous treatment: no adverse effects reported  Functional change: ongoing    Pain: 3-4/10  Location: low back pain       Objective     Emeka received therapeutic exercises to develop strength, endurance, and core stabilization for 48 minutes including:    Recumbent stepper lower extremity only L4 x 8 mins for improved strength and endurance    Supine:   Transverse abdominal activation with march 2 x 10   Hip abduction Green thera band 3 x 10  Double knee to chest with ball 20x  +Supine abdominal press with ball, 2x10 5" holds   hamstring stretch with belt 2 x 30 sec bilateral     Parallel bar:   Hip extension 3 x 10 bilateral (cues for glute squeeze)   Hip abduction 3 x 10 bilateral (cues for abdominal brace when kick out)    Pain at conclusion: 3-4/10      Home Exercises " Provided and Patient Education Provided     Education provided:   - continue with home exercise program     Written Home Exercises Provided: Patient instructed to cont prior HEP.  Exercises were reviewed and Emeka was able to demonstrate them prior to the end of the session.  Emeka demonstrated good  understanding of the education provided.     See EMR under Patient Instructions for exercises provided prior visit.    Assessment   Added one new core engagement exercise in supine this visit using a phsyioball which patient tolerates well. Good core contraction in supine abdominal press exercise. Moderate cues for proper techniques in standing hip abduction and extension exercise. Very mild pain to lower back in all activities today ranging between 3 to 4 out 10 on pain scale. Noted patient display right hamstring tightness than left when performing supine hamstring stretch. Overall, patient is steadily progressing towards his therapy goals. Will continue to work on core strengthening.     Emeka Is progressing well towards his goals.   Pt prognosis is Good.     Pt will continue to benefit from skilled outpatient physical therapy to address the deficits listed in the problem list box on initial evaluation, provide pt/family education and to maximize pt's level of independence in the home and community environment.     Pt's spiritual, cultural and educational needs considered and pt agreeable to plan of care and goals.     Anticipated barriers to physical therapy: length of time impairments have been present     Goals:   Short Term Goals: 4 weeks   Patient will report compliance with home exercise program at least 2x/ week to improve speed of progress. ongoing  Patient will demonstrate improved mobility for household ambulation by performing Timed Up and Go with least restrictive AD in 15 sec. ongoing  Patient will demonstrate improved lower extremity strength and endurance by performing 5 times sit<>stand  test in 25 sec. ongoing     Long Term Goals: 8 weeks   Patient will demonstrate improved mobility for household ambulation by performing Timed Up and Go with least restrictive AD in 10 sec. ongoing  Patient will demonstrate improved lower extremity strength and endurance by performing 5 times sit<>stand test in 15 secongoing  Patient will demonstrate improved lower extremity strength by 1 grade to enable him to perform job related mobility. ongoing  Patient will ambulate community distances with single point cane to demonstrate a return to PLOF. ongoing  Patient will report reduced back pain to 2-3/10 to demonstrate improved tolerance to daily mobility. Ongoing    Plan     Outpatient Physical Therapy 3 times weekly to include the following interventions: Electrical Stimulation , Manual Therapy, Moist Heat/ Ice, Neuromuscular Re-ed, Patient Education, Therapeutic Activities, and Therapeutic Exercise.     Continue with core strengthening- may progress home exercise program     Bibiana Vargas  5/28/2024

## 2024-05-29 ENCOUNTER — CLINICAL SUPPORT (OUTPATIENT)
Dept: REHABILITATION | Facility: HOSPITAL | Age: 49
End: 2024-05-29
Attending: PHYSICAL MEDICINE & REHABILITATION
Payer: MEDICAID

## 2024-05-29 DIAGNOSIS — Z74.09 IMPAIRED MOBILITY AND ADLS: Primary | ICD-10-CM

## 2024-05-29 DIAGNOSIS — Z78.9 IMPAIRED MOBILITY AND ADLS: Primary | ICD-10-CM

## 2024-05-29 PROCEDURE — 97165 OT EVAL LOW COMPLEX 30 MIN: CPT | Mod: PN

## 2024-05-29 NOTE — PROGRESS NOTES
[facility]   Occupational Therapy Initial Neurological Evaluation     [facility]  [facility] Number: [id number]    Therapy Diagnosis:   Encounter Diagnosis   Name Primary?    Impaired mobility and ADLs Yes     Physician: Carina Mcclure MD    Physician Orders: Eval and Treat  Medical Diagnosis: spinal stenosis  Evaluation Date: 5/29/2024  Insurance Authorization Period Expiration: 5/17/2025  Plan of Care Certification Period: 5/29/2024  Date of Return to MD: 6/13/2024  Visit # / Visits authorized: 1 / 1  FOTO:not done. Eval only  Precautions:  TLSO when out of bed, no lifting >10lb., no bending     Time In: 1030  Time Out: 1115  Total Billable Time: 45 minutes    Subjective     Date of Onset: 5/1/2024 S/p extension/revision of fusion L2-Pelvis     History of Current Condition: - per medical record: L4-pelvis fusion and L4-S1 TLIF on 02/08/2022 for s/p L4-pelvis fusion and L4-5, L5-S1 TLIF on 02/08/2022. Presents with adjacent segment disease now s/p extension/revision of fusion L2-Pelvis on 5/1/24.  Emeka reports: I had surgery on my back- 5/1/24. Went to inpatient rehab x 2 weeks. This is second back surgery. Previous back surgery in 2022.   Involved Side: right LE   Dominant Side: Right    Surgical Procedure: 16 screws in back  Imaging: MRI studies see EPIC    Previous Therapy: physical therapy    Pain:  Pain Related Behaviors Observed:  none  Functional Pain Scale Rating 0-10:   5/10 on average  3/10 at best  10/10 at worst  Location: lower back  Description: Hard, squeezing pain/spasm  Aggravating Factors: Sitting and Walking  Easing Factors: pain medication    Occupation:  not working at the moment; previously was   Working presently: unemployed  Duties: bending down, kneeling    Functional Limitations/Social History:    Prior Level of Function: Independent with all ADLs before first surgery. In between first and second surgery, had help from brother.  Brother helped after second surgery with cooking  Current Level of Function: Moderate assist with IADL's    Current Functional Status   Home/Living environment: lives with their family, Has 3 young children that live with mother    - 2 story home 15 steps,  0 steps to enter    - DME: Walker, Straight cane, Hospital bed, Tub bench, and TLSO       Limitation of Functional Status as follows:   ADLs/IADLs:     - Feeding: modified I     - Bathing: modified I    - Dressing: modified I  - Grooming: modified I    - Home Management: mod A from brother    - Driving: modified I; drove for first time to appointment and had no problems      Leisure: currently watch TV, going for walks in morning,and performing therapy exercises. In the past, he enjoyed fishing and playing soccer    Patient's Goals for Therapy: Wants to walk and perform daily activities with no pain, fishing with no pain/limitations    Past Medical History/Physical Systems Review:     Past Medical History:  Emeka Caballero  has a past medical history of Arthritis, Diabetes mellitus, type 2, and Essential hypertension, benign.    Past Surgical History:  Emeka Caballero  has a past surgical history that includes Epidural steroid injection (N/A, 10/21/2020); Lumbar fusion (N/A, 2/8/2022); Cataract extraction (Right); injection, sacroiliac joint (Bilateral, 12/27/2022); Laser enucleation of prostate (N/A, 3/14/2023); injection, sacroiliac joint (Right, 11/27/2023); and Lumbar fusion (N/A, 5/1/2024).    Current Medications:  Emeka has a current medication list which includes the following prescription(s): atorvastatin, blood sugar diagnostic, blood-glucose meter, gabapentin, lancets, linaclotide, lisinopril, metformin, omega-3 fatty acids-fish oil, oxybutynin, oxycodone, pantoprazole, tadalafil, and tamsulosin.    Allergies:  Review of patient's allergies indicates:  No Known Allergies     Other: none    Objective     Cognitive Exam:  WNL  Visual/Perceptual: WNL  Physical Exam:  Postural examination/scapula alignment: WNL  Joint integrity: WNL  Skin integrity: WNL  Edema: none noted   Palpation: no palpable tenderness in upper extremity   Range of motion and strength of upper extremity normal        Strength: (AMITA Dynamometer in lbs.) Average 3 trials, Position II:     5/29/2024 5/29/2024    Right  Left    Rung II 86.7# 91.2#   [norms for men aged 50-54: R=113.6 +/-18.1; L=101.9 +/-17.0 (Mathiowetz et al, 1985)]   Pinch Strength (Measured in psi)     5/29/2024 5/29/2024    Right  Left    Key Pinch 24 psi 24 psi   3pt Pinch 20 psi 20 psi   2pt Pinch 16 psi 16 psi   [Lateral pinch norms for men aged 50-54: right: 26.7+/-4.4; left: 26.1+/-4.2 (Mathiowetz et al, 1985)]    [3-point pinch norms for men aged 50-54: right: 23.8+/-5.4; left: 24.0+/-5.8 (Mathiowetz et al, 1985)]    [2-point pinch norms for men aged 50-54: right: 18.3+/-4.0; left: 17.8+/-3.9 (Mathiowetz et al, 1985)]      Gross motor coordination:   CADEN (Rapid Alternating Movements): WNL  Finger to Nose (5 times): WNL  Finger Flicks (coordination moving from digit flexion to digit extension): WNL    Tone:  Modified Alban Scale:   0 - No increase in muscle tone in UE    Comments: spasms in lower trunk and legs    Sensation:  Emeka  reports bilateral feet tingling but medicine helps  Balance:   Static Sit - GOOD+: Takes MAXIMAL challenges from all directions.    Dynamic sit- GOOD+: Takes MAXIMAL challenges from all directions.      Endurance Deficit: mild                        Treatment     Eval only.    Education provided:   -role of OT, goals for OT, scheduling/cancellations, insurance limitations with patient.  -Additional Education provided: Gradual increase in activity    Assessment     Emeka Caballero is a 49 y.o. male referred to outpatient occupational therapy and presents with a medical diagnosis of post spinal fusion.    Following medical record review it is  determined that pt will not benefit from occupational therapy services in order to maximize pain free and/or functional use of bilateral UE.  Emeka is functional within the limitations of post surgery.    Anticipated barriers to occupational therapy: post surgery precautions    Plan of care discussed with patient: Yes  Patient's spiritual, cultural and educational needs considered and patient is agreeable to the plan of care and goals as stated below:     Medical Necessity is demonstrated by the following  Occupational Profile/History  Co-morbidities and personal factors that may impact the plan of care [x] LOW: Brief chart review  [] MODERATE: Expanded chart review   [] HIGH: Extensive chart review    Moderate / High Support Documentation: brief chart review     Examination  Performance deficits relating to physical, cognitive or psychosocial skills that result in activity limitations and/or participation restrictions  [x] LOW: addressing 1-3 Performance deficits  [] MODERATE: 3-5 Performance deficits  [] HIGH: 5+ Performance deficits (please support below)    Moderate / High Support Documentation:    Physical:  No Deficits    Cognitive:  No Deficits    Psychosocial:    No Deficits     Treatment Options [] LOW: Limited options  [] MODERATE: Several options  [] HIGH: Multiple options      Decision Making/ Complexity Score: low         Plan   Reassess for higher level ADL's and I ADL's once out of TLSO and able to bend for functional stand, reach and bend as well as lift.   Juana De Luna OT        Physician's Signature: _________________________________________ Date: ________________

## 2024-05-29 NOTE — PLAN OF CARE
[facility]   Occupational Therapy Initial Neurological Evaluation     [facility]  [facility] Number: [id number]    Therapy Diagnosis:   Encounter Diagnosis   Name Primary?    Impaired mobility and ADLs Yes     Physician: Carina Mcclure MD    Physician Orders: Eval and Treat  Medical Diagnosis: spinal stenosis  Evaluation Date: 5/29/2024  Insurance Authorization Period Expiration: 5/17/2025  Plan of Care Certification Period: 5/29/2024  Date of Return to MD: 6/13/2024  Visit # / Visits authorized: 1 / 1  FOTO:not done. Eval only  Precautions:  TLSO when out of bed, no lifting >10lb., no bending     Time In: 1030  Time Out: 1115  Total Billable Time: 45 minutes    Subjective     Date of Onset: 5/1/2024 S/p extension/revision of fusion L2-Pelvis     History of Current Condition: - per medical record: L4-pelvis fusion and L4-S1 TLIF on 02/08/2022 for s/p L4-pelvis fusion and L4-5, L5-S1 TLIF on 02/08/2022. Presents with adjacent segment disease now s/p extension/revision of fusion L2-Pelvis on 5/1/24.  Emeka reports: I had surgery on my back- 5/1/24. Went to inpatient rehab x 2 weeks. This is second back surgery. Previous back surgery in 2022.   Involved Side: right LE   Dominant Side: Right    Surgical Procedure: 16 screws in back  Imaging: MRI studies see EPIC    Previous Therapy: physical therapy    Pain:  Pain Related Behaviors Observed:  none  Functional Pain Scale Rating 0-10:   5/10 on average  3/10 at best  10/10 at worst  Location: lower back  Description: Hard, squeezing pain/spasm  Aggravating Factors: Sitting and Walking  Easing Factors: pain medication    Occupation:  not working at the moment; previously was   Working presently: unemployed  Duties: bending down, kneeling    Functional Limitations/Social History:    Prior Level of Function: Independent with all ADLs before first surgery. In between first and second surgery, had help from brother.  Brother helped after second surgery with cooking  Current Level of Function: Moderate assist with IADL's    Current Functional Status   Home/Living environment: lives with their family, Has 3 young children that live with mother    - 2 story home 15 steps,  0 steps to enter    - DME: Walker, Straight cane, Hospital bed, Tub bench, and TLSO       Limitation of Functional Status as follows:   ADLs/IADLs:     - Feeding: modified I     - Bathing: modified I    - Dressing: modified I  - Grooming: modified I    - Home Management: mod A from brother    - Driving: modified I; drove for first time to appointment and had no problems      Leisure: currently watch TV, going for walks in morning,and performing therapy exercises. In the past, he enjoyed fishing and playing soccer    Patient's Goals for Therapy: Wants to walk and perform daily activities with no pain, fishing with no pain/limitations    Past Medical History/Physical Systems Review:     Past Medical History:  Emeka Caballero  has a past medical history of Arthritis, Diabetes mellitus, type 2, and Essential hypertension, benign.    Past Surgical History:  Emeka Caballero  has a past surgical history that includes Epidural steroid injection (N/A, 10/21/2020); Lumbar fusion (N/A, 2/8/2022); Cataract extraction (Right); injection, sacroiliac joint (Bilateral, 12/27/2022); Laser enucleation of prostate (N/A, 3/14/2023); injection, sacroiliac joint (Right, 11/27/2023); and Lumbar fusion (N/A, 5/1/2024).    Current Medications:  Emeka has a current medication list which includes the following prescription(s): atorvastatin, blood sugar diagnostic, blood-glucose meter, gabapentin, lancets, linaclotide, lisinopril, metformin, omega-3 fatty acids-fish oil, oxybutynin, oxycodone, pantoprazole, tadalafil, and tamsulosin.    Allergies:  Review of patient's allergies indicates:  No Known Allergies     Other: none    Objective     Cognitive Exam:  WNL  Visual/Perceptual: WNL  Physical Exam:  Postural examination/scapula alignment: WNL  Joint integrity: WNL  Skin integrity: WNL  Edema: none noted   Palpation: no palpable tenderness in upper extremity   Range of motion and strength of upper extremity normal        Strength: (AMITA Dynamometer in lbs.) Average 3 trials, Position II:     5/29/2024 5/29/2024    Right  Left    Rung II 86.7# 91.2#   [norms for men aged 50-54: R=113.6 +/-18.1; L=101.9 +/-17.0 (Mathiowetz et al, 1985)]   Pinch Strength (Measured in psi)     5/29/2024 5/29/2024    Right  Left    Key Pinch 24 psi 24 psi   3pt Pinch 20 psi 20 psi   2pt Pinch 16 psi 16 psi   [Lateral pinch norms for men aged 50-54: right: 26.7+/-4.4; left: 26.1+/-4.2 (Mathiowetz et al, 1985)]    [3-point pinch norms for men aged 50-54: right: 23.8+/-5.4; left: 24.0+/-5.8 (Mathiowetz et al, 1985)]    [2-point pinch norms for men aged 50-54: right: 18.3+/-4.0; left: 17.8+/-3.9 (Mathiowetz et al, 1985)]      Gross motor coordination:   CADEN (Rapid Alternating Movements): WNL  Finger to Nose (5 times): WNL  Finger Flicks (coordination moving from digit flexion to digit extension): WNL    Tone:  Modified Alban Scale:   0 - No increase in muscle tone in UE    Comments: spasms in lower trunk and legs    Sensation:  Emeka  reports bilateral feet tingling but medicine helps  Balance:   Static Sit - GOOD+: Takes MAXIMAL challenges from all directions.    Dynamic sit- GOOD+: Takes MAXIMAL challenges from all directions.      Endurance Deficit: mild                        Treatment     Eval only.    Education provided:   -role of OT, goals for OT, scheduling/cancellations, insurance limitations with patient.  -Additional Education provided: Gradual increase in activity    Assessment     Emeka Caballero is a 49 y.o. male referred to outpatient occupational therapy and presents with a medical diagnosis of post spinal fusion.    Following medical record review it is  determined that pt will not benefit from occupational therapy services in order to maximize pain free and/or functional use of bilateral UE.  Emeka is functional within the limitations of post surgery.    Anticipated barriers to occupational therapy: post surgery precautions    Plan of care discussed with patient: Yes  Patient's spiritual, cultural and educational needs considered and patient is agreeable to the plan of care and goals as stated below:     Medical Necessity is demonstrated by the following  Occupational Profile/History  Co-morbidities and personal factors that may impact the plan of care [x] LOW: Brief chart review  [] MODERATE: Expanded chart review   [] HIGH: Extensive chart review    Moderate / High Support Documentation: brief chart review     Examination  Performance deficits relating to physical, cognitive or psychosocial skills that result in activity limitations and/or participation restrictions  [x] LOW: addressing 1-3 Performance deficits  [] MODERATE: 3-5 Performance deficits  [] HIGH: 5+ Performance deficits (please support below)    Moderate / High Support Documentation:    Physical:  No Deficits    Cognitive:  No Deficits    Psychosocial:    No Deficits     Treatment Options [] LOW: Limited options  [] MODERATE: Several options  [] HIGH: Multiple options      Decision Making/ Complexity Score: low         Plan   Reassess for higher level ADL's and I ADL's once out of TLSO and able to bend for functional stand, reach and bend as well as lift.   Juana De Luna OT        Physician's Signature: _________________________________________ Date: ________________

## 2024-06-02 NOTE — ASSESSMENT & PLAN NOTE
Patient has not been compliant with atorvastatin.  Strongly recommended improve compliance.  Medication refilled.

## 2024-06-04 ENCOUNTER — CLINICAL SUPPORT (OUTPATIENT)
Dept: REHABILITATION | Facility: HOSPITAL | Age: 49
End: 2024-06-04
Payer: MEDICAID

## 2024-06-04 DIAGNOSIS — Z74.09 IMPAIRED MOBILITY: Primary | ICD-10-CM

## 2024-06-04 DIAGNOSIS — M48.062 SPINAL STENOSIS OF LUMBAR REGION WITH NEUROGENIC CLAUDICATION: ICD-10-CM

## 2024-06-04 PROCEDURE — 97110 THERAPEUTIC EXERCISES: CPT | Mod: PN,CQ

## 2024-06-04 NOTE — PROGRESS NOTES
"Physical Therapy Daily Treatment Note     [id number]    Therapy Diagnosis:   Encounter Diagnoses   Name Primary?    Impaired mobility Yes    Spinal stenosis of lumbar region with neurogenic claudication      Physician: Carina Mcclure MD    Visit Date: 6/4/2024    Physician Orders: PT Eval and Treat 3x/week x 6 weeks  Medical Diagnosis from Referral:   Diagnosis   M48.062 (ICD-10-CM) - Spinal stenosis of lumbar region with neurogenic claudication   Z74.09,Z78.9 (ICD-10-CM) - Impaired mobility and ADLs      Evaluation Date: 5/21/2024  Authorization Period Expiration: 12/31/24  Plan of Care Expiration: 7/16/24  Visit # / Visits authorized: 2/ 20  PTA Visits: 1/5     PN due: 6/21/24    Time In: 0802am  Time Out: 0850am  Total Billable Time: 48 minutes    Precautions: TLSO when out of bed, no lifting >10lb., no bending     Subjective     Pt reports: his hurting today.   He was compliant with home exercise program.  Response to previous treatment: no adverse effects reported  Functional change: ongoing    Pain: 6/10  Location: low back pain       Objective     Emeka received therapeutic exercises to develop strength, endurance, and core stabilization for 48 minutes including:    Recumbent stepper lower extremity only L4 x 8 mins for improved strength and endurance    Supine:   Transverse abdominal activation with march 2 x 10   Hip abduction Green thera band 3 x 10  Double knee to chest with ball 20x  +Supine abdominal press with ball, 2x10 5" holds   hamstring stretch with belt 2 x 30 sec bilateral     Parallel bar:   Hip extension 3 x 10 bilateral (cues for glute squeeze)   Hip abduction 3 x 10 bilateral (cues for abdominal brace when kick out)    Pain at conclusion: /10      Home Exercises Provided and Patient Education Provided     Education provided:   - continue with home exercise program     Written Home Exercises Provided: Patient instructed to cont prior " HEP.  Exercises were reviewed and Emeka was able to demonstrate them prior to the end of the session.  Emeka demonstrated good  understanding of the education provided.     See EMR under Patient Instructions for exercises provided prior visit.    Assessment   Added one new core engagement exercise in supine this visit using a phsyioball which patient tolerates well. Good core contraction in supine abdominal press exercise. Moderate cues for proper techniques in standing hip abduction and extension exercise. Very mild pain to lower back in all activities today ranging between 3 to 4 out 10 on pain scale. Noted patient display right hamstring tightness than left when performing supine hamstring stretch. Overall, patient is steadily progressing towards his therapy goals. Will continue to work on core strengthening.     Emeka Is progressing well towards his goals.   Pt prognosis is Good.     Pt will continue to benefit from skilled outpatient physical therapy to address the deficits listed in the problem list box on initial evaluation, provide pt/family education and to maximize pt's level of independence in the home and community environment.     Pt's spiritual, cultural and educational needs considered and pt agreeable to plan of care and goals.     Anticipated barriers to physical therapy: length of time impairments have been present     Goals:   Short Term Goals: 4 weeks   Patient will report compliance with home exercise program at least 2x/ week to improve speed of progress. ongoing  Patient will demonstrate improved mobility for household ambulation by performing Timed Up and Go with least restrictive AD in 15 sec. ongoing  Patient will demonstrate improved lower extremity strength and endurance by performing 5 times sit<>stand test in 25 sec. ongoing     Long Term Goals: 8 weeks   Patient will demonstrate improved mobility for household ambulation by performing Timed Up and Go with least restrictive  AD in 10 sec. ongoing  Patient will demonstrate improved lower extremity strength and endurance by performing 5 times sit<>stand test in 15 secongoing  Patient will demonstrate improved lower extremity strength by 1 grade to enable him to perform job related mobility. ongoing  Patient will ambulate community distances with single point cane to demonstrate a return to PLOF. ongoing  Patient will report reduced back pain to 2-3/10 to demonstrate improved tolerance to daily mobility. Ongoing    Plan     Outpatient Physical Therapy 3 times weekly to include the following interventions: Electrical Stimulation , Manual Therapy, Moist Heat/ Ice, Neuromuscular Re-ed, Patient Education, Therapeutic Activities, and Therapeutic Exercise.     Continue with core strengthening- may progress home exercise program     Bibiana Vargas  6/4/2024

## 2024-06-04 NOTE — PROGRESS NOTES
"Physical Therapy Daily Treatment Note     [facility]  [id number]    Therapy Diagnosis:   Encounter Diagnoses   Name Primary?    Impaired mobility Yes    Spinal stenosis of lumbar region with neurogenic claudication      Physician: Carina Mcclure MD    Visit Date: 6/4/2024    Physician Orders: PT Eval and Treat 3x/week x 6 weeks  Medical Diagnosis from Referral:   Diagnosis   M48.062 (ICD-10-CM) - Spinal stenosis of lumbar region with neurogenic claudication   Z74.09,Z78.9 (ICD-10-CM) - Impaired mobility and ADLs      Evaluation Date: 5/21/2024  Authorization Period Expiration: 12/31/24  Plan of Care Expiration: 7/16/24  Visit # / Visits authorized: 3/ 20  PTA Visits: 1/5     PN due: 6/21/24    Time In: 0802am  Time Out: 0850am  Total Billable Time: 48 minutes    Precautions: TLSO when out of bed, no lifting >10lb., no bending     Subjective     Pt reports: his hurting today referring to the lower back. Patient states since doing his exercise, he feels tight in the groin/anterior hip region. Also, patient noticed when he walks, he feels a lump in the lower back. He's not sure what's going on, but states that he has a follow up with his doctor on the 13th and will discuss this matter with his MD.   He was compliant with home exercise program.  Response to previous treatment: no adverse effects reported  Functional change: ongoing    Pain: 6/10  Location: low back pain       Objective     Emeka received therapeutic exercises to develop strength, endurance, and core stabilization for 48 minutes including:    Recumbent stepper lower extremity only L4 x 8 mins for improved strength and endurance    Supine:   Transverse abdominal activation with march 2 x 10   Hip abduction Green thera band 3 x 10  Double knee to chest with ball 20x  Supine abdominal press with ball, 2x10 5" holds  Hamstring stretch with belt 2 x 30 sec bilateral     +lateral trunk rotation for 2 minutes     Parallel " bar:   Hip extension 3 x 10  bilateral (cues for glute squeeze) = Not performed this visit due to increase pain   Hip abduction 3 x 10 bilateral (cues for abdominal brace when kick out) = 15 reps only this visit instead of 30 reps   +Standing hip marching x 15     Pain at conclusion: 6/10      Home Exercises Provided and Patient Education Provided     Education provided:   - continue with home exercise program     Written Home Exercises Provided: Patient instructed to cont prior HEP.  Exercises were reviewed and Emeka was able to demonstrate them prior to the end of the session.  Emeka demonstrated good  understanding of the education provided.     See EMR under Patient Instructions for exercises provided prior visit.    Assessment   Patient arrives to therapy clinic with increase back pain today. Added lateral trunk rotation in supine to help eases back pain for patient which patient tolerates fairly. Also, decrease reps in standing exercises due to increase pain. Assess patient lumbar spinal surgical site. No drainage, redness, or opening present. However, noted a slight swelling at superior incision site which patient states he feels a lump in his back sometimes. Advised patient to monitor this area and to contact his doctor if his symptoms becomes worse. At this time, will progress patient as tolerated and monitor patient's symptoms.     Emeka Is progressing well towards his goals.   Pt prognosis is Good.     Pt will continue to benefit from skilled outpatient physical therapy to address the deficits listed in the problem list box on initial evaluation, provide pt/family education and to maximize pt's level of independence in the home and community environment.     Pt's spiritual, cultural and educational needs considered and pt agreeable to plan of care and goals.     Anticipated barriers to physical therapy: length of time impairments have been present     Goals:   Short Term Goals: 4 weeks    Patient will report compliance with home exercise program at least 2x/ week to improve speed of progress. ongoing  Patient will demonstrate improved mobility for household ambulation by performing Timed Up and Go with least restrictive AD in 15 sec. ongoing  Patient will demonstrate improved lower extremity strength and endurance by performing 5 times sit<>stand test in 25 sec. ongoing     Long Term Goals: 8 weeks   Patient will demonstrate improved mobility for household ambulation by performing Timed Up and Go with least restrictive AD in 10 sec. ongoing  Patient will demonstrate improved lower extremity strength and endurance by performing 5 times sit<>stand test in 15 secongoing  Patient will demonstrate improved lower extremity strength by 1 grade to enable him to perform job related mobility. ongoing  Patient will ambulate community distances with single point cane to demonstrate a return to PLOF. ongoing  Patient will report reduced back pain to 2-3/10 to demonstrate improved tolerance to daily mobility. Ongoing    Plan     Outpatient Physical Therapy 3 times weekly to include the following interventions: Electrical Stimulation , Manual Therapy, Moist Heat/ Ice, Neuromuscular Re-ed, Patient Education, Therapeutic Activities, and Therapeutic Exercise.     Continue with core strengthening- may progress home exercise program     Bibiana Vargas  6/4/2024

## 2024-06-07 ENCOUNTER — CLINICAL SUPPORT (OUTPATIENT)
Dept: REHABILITATION | Facility: HOSPITAL | Age: 49
End: 2024-06-07
Payer: MEDICAID

## 2024-06-07 DIAGNOSIS — Z74.09 IMPAIRED MOBILITY: Primary | ICD-10-CM

## 2024-06-07 PROCEDURE — 97110 THERAPEUTIC EXERCISES: CPT | Mod: PN | Performed by: PHYSICAL THERAPIST

## 2024-06-07 NOTE — PROGRESS NOTES
"Physical Therapy Daily Treatment Note     [id number]    Therapy Diagnosis:   Encounter Diagnosis   Name Primary?    Impaired mobility Yes       Physician: Carina Mcclure MD    Visit Date: 6/7/2024    Physician Orders: PT Eval and Treat 3x/week x 6 weeks  Medical Diagnosis from Referral:   Diagnosis   M48.062 (ICD-10-CM) - Spinal stenosis of lumbar region with neurogenic claudication   Z74.09,Z78.9 (ICD-10-CM) - Impaired mobility and ADLs      Evaluation Date: 5/21/2024  Authorization Period Expiration: 12/31/24  Plan of Care Expiration: 7/16/24  Visit # / Visits authorized: 4/ 20  PTA Visits: 1/5     PN due: 6/21/24    Time In: 0716  Time Out: 0801  Total Billable Time: 45 minutes    Precautions: TLSO when out of bed, no lifting >10lb., no bending     Subjective     Pt reports: the upper part of  surgical site fells tight, a "catch" and I can't stand up straight. Patient arrived without TLSO  He was compliant with home exercise program.  Response to previous treatment: no adverse effects reported  Functional change: ongoing    Pain: 0/10  Location: low back pain       Objective     Emeka received therapeutic exercises to develop strength, endurance, and core stabilization for 45 minutes including:    Prone, Graston Technique:   GT 4 to lumbar region: sweeping, fanning. Nudging of erector spinae  GT 3: nudging of erector spinae, QL junction. J stroke erector spinae   Skin rolling laterally along spine to decrease scar tissue  Recumbent stepper lower extremity only L5 x 8 mins for improved strength and endurance    Supine:   Transverse abdominal activation with march 2 x 10   Hip abduction Green thera band 3 x 10  Double knee to chest with ball 20x  Supine abdominal press (opp arm leg) with ball, 2x10 5" holds  Hamstring stretch with belt 2 x 30 sec bilateral     Parallel bar:   Hip extension 3 x 10  bilateral (cues for glute squeeze)   Hip abduction 3 x 10 bilateral " "(cues for abdominal brace when kick out)    Standing hip marching x 15     Pain at conclusion: 0/10      Home Exercises Provided and Patient Education Provided     Education provided:   - continue with home exercise program   - instructed patient he should wear TLSO until next follow up MD visit    Written Home Exercises Provided: Patient instructed to cont prior HEP.  Exercises were reviewed and Emeka was able to demonstrate them prior to the end of the session.  Emeka demonstrated good  understanding of the education provided.     See EMR under Patient Instructions for exercises provided prior visit.    Assessment   Iron arrived without TLSO and without reports of back pain. Patient did indicate that he feels tightness and a "lump"near the superior aspect of surgical incision. Patient denied pain upon palpation. Tool assisted manual therapy performed to address. Patient noted to have decreased fascial mobility in upper area of surgical site left>right. Relief of tightness reported at conclusion of session.improved upright posture noted.  All core strengthening exercises tolerated well. Patient was instructed to wear TLSO until physician clearance. Patient can benefit from continued skilled physical therapy to improve mobility and manage pain.      Emeka Is progressing well towards his goals.   Pt prognosis is Good.     Pt will continue to benefit from skilled outpatient physical therapy to address the deficits listed in the problem list box on initial evaluation, provide pt/family education and to maximize pt's level of independence in the home and community environment.     Pt's spiritual, cultural and educational needs considered and pt agreeable to plan of care and goals.     Anticipated barriers to physical therapy: length of time impairments have been present     Goals:   Short Term Goals: 4 weeks   Patient will report compliance with home exercise program at least 2x/ week to improve speed of " progress. ongoing  Patient will demonstrate improved mobility for household ambulation by performing Timed Up and Go with least restrictive AD in 15 sec. ongoing  Patient will demonstrate improved lower extremity strength and endurance by performing 5 times sit<>stand test in 25 sec. ongoing     Long Term Goals: 8 weeks   Patient will demonstrate improved mobility for household ambulation by performing Timed Up and Go with least restrictive AD in 10 sec. ongoing  Patient will demonstrate improved lower extremity strength and endurance by performing 5 times sit<>stand test in 15 secongoing  Patient will demonstrate improved lower extremity strength by 1 grade to enable him to perform job related mobility. ongoing  Patient will ambulate community distances with single point cane to demonstrate a return to PLOF. ongoing  Patient will report reduced back pain to 2-3/10 to demonstrate improved tolerance to daily mobility. Ongoing    Plan     Outpatient Physical Therapy 3 times weekly to include the following interventions: Electrical Stimulation , Manual Therapy, Moist Heat/ Ice, Neuromuscular Re-ed, Patient Education, Therapeutic Activities, and Therapeutic Exercise.     Add pallof press, manual therapy as needed to address tightness near surgical site    Tanvi Terrell, PT, DPT,   Board-Certified Clinical Specialist in Neurologic Physical Therapy   Certified Brain Injury Specialist    6/7/2024

## 2024-06-11 ENCOUNTER — CLINICAL SUPPORT (OUTPATIENT)
Dept: REHABILITATION | Facility: HOSPITAL | Age: 49
End: 2024-06-11
Payer: MEDICAID

## 2024-06-11 DIAGNOSIS — Z74.09 IMPAIRED MOBILITY: Primary | ICD-10-CM

## 2024-06-11 DIAGNOSIS — M48.062 SPINAL STENOSIS OF LUMBAR REGION WITH NEUROGENIC CLAUDICATION: ICD-10-CM

## 2024-06-11 PROCEDURE — 97110 THERAPEUTIC EXERCISES: CPT | Mod: PN,CQ

## 2024-06-11 NOTE — PROGRESS NOTES
"Physical Therapy Daily Treatment Note     [facility]  [id number]    Therapy Diagnosis:   Encounter Diagnoses   Name Primary?    Impaired mobility Yes    Spinal stenosis of lumbar region with neurogenic claudication        Physician: Carina Mcclure MD    Visit Date: 6/11/2024    Physician Orders: PT Eval and Treat 3x/week x 6 weeks  Medical Diagnosis from Referral:   Diagnosis   M48.062 (ICD-10-CM) - Spinal stenosis of lumbar region with neurogenic claudication   Z74.09,Z78.9 (ICD-10-CM) - Impaired mobility and ADLs      Evaluation Date: 5/21/2024  Authorization Period Expiration: 12/31/24  Plan of Care Expiration: 7/16/24  Visit # / Visits authorized: 5/ 20  PTA Visits: 1/5     PN due: 6/21/24    Time In: 1300  Time Out: 1342  Total Billable Time: 43 minutes    Precautions: TLSO when out of bed, no lifting >10lb., no bending     Subjective     Pt reports: he's doing better. Patient states he felt better after last session and that he has been trying to do a self massage in the back. He has a follow up with the doctor this Thursday and hoping to get rid of the back brace.   He was compliant with home exercise program.  Response to previous treatment: no adverse effects reported  Functional change: ongoing    Pain: 0/10  Location: low back pain       Objective     Emeka received therapeutic exercises to develop strength, endurance, and core stabilization for 43 minutes including:    Recumbent stepper lower extremity only L5 x 8 mins for improved strength and endurance    Supine:   Transverse abdominal activation with march 2 x 10   Hip abduction Blue thera band 3 x 10  Double knee to chest with ball 20x  Supine abdominal press (opp arm leg) with ball, 2x10 5" holds  Hamstring stretch with belt 2 x 30 sec bilateral     Parallel bar:   Hip extension 3 x 10  bilateral (cues for glute squeeze)   Hip abduction 3 x 10 bilateral (cues for abdominal brace when kick out)   Standing hip " marching x 15     Pain at conclusion: 0/10      Home Exercises Provided and Patient Education Provided     Education provided:   - continue with home exercise program   - instructed patient he should wear TLSO until next follow up MD visit    Written Home Exercises Provided: Patient instructed to cont prior HEP.  Exercises were reviewed and Emeka was able to demonstrate them prior to the end of the session.  Emeka demonstrated good  understanding of the education provided.     See EMR under Patient Instructions for exercises provided prior visit.    Assessment   Iron arrived with TLSO back brace donned and with his cane. Focused on core strengthening/stability, endurance, and legs strengthening. Increase resistance from green to blue thera-band this visit due to patient is showing improved legs strength. Provided moderate cues for proper techniques especially in standing hip strengthening exercise. No complaints of increase back pain post therapy. Overall, patient appears to be tolerating well this visit.     Emeka Is progressing well towards his goals.   Pt prognosis is Good.     Pt will continue to benefit from skilled outpatient physical therapy to address the deficits listed in the problem list box on initial evaluation, provide pt/family education and to maximize pt's level of independence in the home and community environment.     Pt's spiritual, cultural and educational needs considered and pt agreeable to plan of care and goals.     Anticipated barriers to physical therapy: length of time impairments have been present     Goals:   Short Term Goals: 4 weeks   Patient will report compliance with home exercise program at least 2x/ week to improve speed of progress. ongoing  Patient will demonstrate improved mobility for household ambulation by performing Timed Up and Go with least restrictive AD in 15 sec. ongoing  Patient will demonstrate improved lower extremity strength and endurance by  performing 5 times sit<>stand test in 25 sec. ongoing     Long Term Goals: 8 weeks   Patient will demonstrate improved mobility for household ambulation by performing Timed Up and Go with least restrictive AD in 10 sec. ongoing  Patient will demonstrate improved lower extremity strength and endurance by performing 5 times sit<>stand test in 15 sec ongoing  Patient will demonstrate improved lower extremity strength by 1 grade to enable him to perform job related mobility. ongoing  Patient will ambulate community distances with single point cane to demonstrate a return to PLOF. ongoing  Patient will report reduced back pain to 2-3/10 to demonstrate improved tolerance to daily mobility. Ongoing    Plan     Outpatient Physical Therapy 3 times weekly to include the following interventions: Electrical Stimulation , Manual Therapy, Moist Heat/ Ice, Neuromuscular Re-ed, Patient Education, Therapeutic Activities, and Therapeutic Exercise.     Add pallof press, manual therapy as needed to address tightness near surgical site    Bibiana Vargas  6/11/2024

## 2024-06-13 ENCOUNTER — TELEPHONE (OUTPATIENT)
Dept: NEUROSURGERY | Facility: CLINIC | Age: 49
End: 2024-06-13

## 2024-06-13 ENCOUNTER — OFFICE VISIT (OUTPATIENT)
Dept: NEUROSURGERY | Facility: CLINIC | Age: 49
End: 2024-06-13
Payer: MEDICAID

## 2024-06-13 ENCOUNTER — HOSPITAL ENCOUNTER (OUTPATIENT)
Dept: RADIOLOGY | Facility: HOSPITAL | Age: 49
Discharge: HOME | End: 2024-06-13
Attending: NEUROLOGICAL SURGERY
Payer: MEDICAID

## 2024-06-13 VITALS
TEMPERATURE: 98 F | WEIGHT: 208.31 LBS | DIASTOLIC BLOOD PRESSURE: 86 MMHG | BODY MASS INDEX: 29.98 KG/M2 | SYSTOLIC BLOOD PRESSURE: 117 MMHG

## 2024-06-13 DIAGNOSIS — Z98.1 S/P LUMBAR SPINAL FUSION: ICD-10-CM

## 2024-06-13 DIAGNOSIS — M48.061 SPINAL STENOSIS OF LUMBAR REGION WITHOUT NEUROGENIC CLAUDICATION: ICD-10-CM

## 2024-06-13 DIAGNOSIS — Z98.1 S/P LUMBAR FUSION: Primary | ICD-10-CM

## 2024-06-13 DIAGNOSIS — M48.07 SPINAL STENOSIS, LUMBOSACRAL REGION: Primary | ICD-10-CM

## 2024-06-13 PROCEDURE — 3044F HG A1C LEVEL LT 7.0%: CPT | Mod: CPTII,,, | Performed by: NURSE PRACTITIONER

## 2024-06-13 PROCEDURE — 3072F LOW RISK FOR RETINOPATHY: CPT | Mod: CPTII,,, | Performed by: NURSE PRACTITIONER

## 2024-06-13 PROCEDURE — 99213 OFFICE O/P EST LOW 20 MIN: CPT | Mod: PBBFAC,25 | Performed by: NURSE PRACTITIONER

## 2024-06-13 PROCEDURE — 3074F SYST BP LT 130 MM HG: CPT | Mod: CPTII,,, | Performed by: NURSE PRACTITIONER

## 2024-06-13 PROCEDURE — 1159F MED LIST DOCD IN RCRD: CPT | Mod: CPTII,,, | Performed by: NURSE PRACTITIONER

## 2024-06-13 PROCEDURE — 1160F RVW MEDS BY RX/DR IN RCRD: CPT | Mod: CPTII,,, | Performed by: NURSE PRACTITIONER

## 2024-06-13 PROCEDURE — 72100 X-RAY EXAM L-S SPINE 2/3 VWS: CPT | Mod: 26,,, | Performed by: RADIOLOGY

## 2024-06-13 PROCEDURE — 3079F DIAST BP 80-89 MM HG: CPT | Mod: CPTII,,, | Performed by: NURSE PRACTITIONER

## 2024-06-13 PROCEDURE — 4010F ACE/ARB THERAPY RXD/TAKEN: CPT | Mod: CPTII,,, | Performed by: NURSE PRACTITIONER

## 2024-06-13 PROCEDURE — 99999 PR PBB SHADOW E&M-EST. PATIENT-LVL III: CPT | Mod: PBBFAC,,, | Performed by: NURSE PRACTITIONER

## 2024-06-13 PROCEDURE — 72100 X-RAY EXAM L-S SPINE 2/3 VWS: CPT | Mod: TC

## 2024-06-13 PROCEDURE — 99024 POSTOP FOLLOW-UP VISIT: CPT | Mod: ,,, | Performed by: NURSE PRACTITIONER

## 2024-06-13 RX ORDER — METHOCARBAMOL 500 MG/1
500 TABLET, FILM COATED ORAL 4 TIMES DAILY
Qty: 40 TABLET | Refills: 0 | Status: SHIPPED | OUTPATIENT
Start: 2024-06-13 | End: 2024-06-23

## 2024-06-13 RX ORDER — OXYCODONE AND ACETAMINOPHEN 5; 325 MG/1; MG/1
1 TABLET ORAL EVERY 6 HOURS PRN
Qty: 28 TABLET | Refills: 0 | Status: SHIPPED | OUTPATIENT
Start: 2024-06-13 | End: 2024-06-20

## 2024-06-13 NOTE — PROGRESS NOTES
"Neurosurgery  Established Patient    SUBJECTIVE:     History of Present Illness: Emeka Caballero is a 49 y.o. male s/p L2-pelvis extension and revision with Dr. Reed on 5/1/24. He is being seen in clinic today for his 6 week post-op evaluation. States that overall he is doing much better since surgery. He is compliant with the LSO brace. He is active with PT. Rates his pain as a 5/10. Pain is worse with standing feels like his feet are numb. He has also noticed more sweating lately and a "knot" in his back that is improved when PT massages it. Denies fever or chills.     Review of patient's allergies indicates:  No Known Allergies    Current Outpatient Medications   Medication Sig Dispense Refill    atorvastatin (LIPITOR) 40 MG tablet Take 1 tablet (40 mg total) by mouth once daily. 30 tablet 11    blood sugar diagnostic Strp To check BG 3 times daily, to use with insurance preferred meter 100 each 11    blood-glucose meter kit To check BG 3 times daily, to use with insurance preferred meter 1 each 0    gabapentin (NEURONTIN) 300 MG capsule Take 1 capsule (300 mg total) by mouth every evening. 30 capsule 11    lancets Misc To check BG 3 times daily, to use with insurance preferred meter 100 each 11    linaCLOtide (LINZESS) 72 mcg Cap capsule Take 1 capsule (72 mcg total) by mouth daily as needed (constipation). 30 capsule 5    lisinopriL (PRINIVIL,ZESTRIL) 5 MG tablet Take 1 tablet (5 mg total) by mouth once daily. 90 tablet 1    metFORMIN (GLUCOPHAGE-XR) 500 MG ER 24hr tablet Take 1 tablet (500 mg total) by mouth daily with breakfast. 90 tablet 3    omega-3 fatty acids-fish oil 340-1,000 mg Cap Take 1 capsule by mouth once daily. 90 capsule 3    oxyCODONE (ROXICODONE) 10 mg Tab immediate release tablet Take 1 tablet (10 mg total) by mouth every 6 (six) hours as needed for Pain.      pantoprazole (PROTONIX) 40 MG tablet Take 1 tablet (40 mg total) by mouth once daily. 30 tablet 11    tadalafiL (CIALIS) 20 " MG Tab Take 1 tablet (20 mg total) by mouth daily as needed (Erectile Dysfunction). 30 tablet 11    tamsulosin (FLOMAX) 0.4 mg Cap Take 1 capsule (0.4 mg total) by mouth once daily. 30 capsule 11    oxybutynin (DITROPAN-XL) 10 MG 24 hr tablet Take 1 tablet (10 mg total) by mouth once daily. 30 tablet 11     No current facility-administered medications for this visit.       Past Medical History:   Diagnosis Date    Arthritis     Diabetes mellitus, type 2 2/11/2022    Essential hypertension, benign 5/4/2023     Past Surgical History:   Procedure Laterality Date    CATARACT EXTRACTION Right     EPIDURAL STEROID INJECTION N/A 10/21/2020    Procedure: Injection, Steroid, Epidural Caudal;  Surgeon: Vipul Nixon Jr., MD;  Location: [facility];  Service: Pain Management;  Laterality: N/A;  Caudal KURT  Arrive @ 1315; No ATC or DM; Needs MD Signature    INJECTION, SACROILIAC JOINT Bilateral 12/27/2022    Procedure: INJECTION,SACROILIAC JOINT, BILATERAL CONTRAST DIRECT REF  ORDERED;  Surgeon: Brielle José MD;  Location: [facility];  Service: Pain Management;  Laterality: Bilateral;    INJECTION, SACROILIAC JOINT Right 11/27/2023    Procedure: INJECTION,SACROILIAC JOINT RIGHT DIRECT REFERRAL  Sooner date;  Surgeon: Brielle José MD;  Location: [facility];  Service: Pain Management;  Laterality: Right;    LASER ENUCLEATION OF PROSTATE N/A 3/14/2023    Procedure: ENUCLEATION, PROSTATE, USING LASER;  Surgeon: Cecil Murray MD;  Location: [facility] OR;  Service: Urology;  Laterality: N/A;    LUMBAR FUSION N/A 2/8/2022    Procedure: FUSION, SPINE, LUMBAR;  Surgeon: Alphonse Reed MD;  Location: [facility] OR 2ND FLR;  Service: Neurosurgery;  Laterality: N/A;  AIRO, L4-S1    LUMBAR FUSION N/A 5/1/2024    Procedure: **AIRO** L2-pelvis extension and revision of fusion,;  Surgeon: Alphonse Reed MD;  Location: [facility] OR 2ND FLR;  Service: Neurosurgery;  Laterality: N/A;  L2-pelvis extension and revision of fusion /co case  Celestre/AIRO  anesthesia: general  brace: LSO  radiology: C-arm, AIRO  neuro mon: EMG/SEP/MEP  position: prone  bed: deepa 4 post  headrest: prone view     Family History       Problem Relation (Age of Onset)    Cataracts Mother    No Known Problems Father, Sister, Brother, Maternal Aunt, Maternal Uncle, Paternal Aunt, Paternal Uncle, Maternal Grandmother, Maternal Grandfather, Paternal Grandmother, Paternal Grandfather          Social History     Socioeconomic History    Marital status: Single   Tobacco Use    Smoking status: Some Days     Types: Cigarettes    Smokeless tobacco: Never   Substance and Sexual Activity    Alcohol use: Yes     Comment: socially    Drug use: No     Social Determinants of Health     Financial Resource Strain: Low Risk  (5/2/2024)    Overall Financial Resource Strain (CARDIA)     Difficulty of Paying Living Expenses: Not hard at all   Food Insecurity: No Food Insecurity (5/2/2024)    Hunger Vital Sign     Worried About Running Out of Food in the Last Year: Never true     Ran Out of Food in the Last Year: Never true   Transportation Needs: No Transportation Needs (5/2/2024)    PRAPARE - Transportation     Lack of Transportation (Medical): No     Lack of Transportation (Non-Medical): No   Physical Activity: Insufficiently Active (5/2/2024)    Exercise Vital Sign     Days of Exercise per Week: 4 days     Minutes of Exercise per Session: 10 min   Stress: No Stress Concern Present (5/2/2024)    [employer] [place] [employer] - Occupational Stress Questionnaire     Feeling of Stress : Not at all   Housing Stability: Low Risk  (5/2/2024)    Housing Stability Vital Sign     Unable to Pay for Housing in the Last Year: No     Homeless in the Last Year: No       Review of Systems    OBJECTIVE:     Vital Signs  Temp: 97.9 °F (36.6 °C)  Pulse: (P) 90  BP: 117/86  Pain Score:   5  Weight: 94.5 kg (208 lb 5.4 oz)  Body mass index is 29.98 kg/m².    Neurosurgery Physical Exam  General: well  developed, well nourished, no distress.   Head: normocephalic, atraumatic  Neurologic: Alert and oriented. Thought content appropriate.  GCS: Motor: 6/Verbal: 5/Eyes: 4 GCS Total: 15  Mental Status: Awake, Alert, Oriented x 4  Language: No aphasia  Speech: No dysarthria  Cranial nerves: face symmetric, tongue midline, CN II-XII grossly intact.   Eyes: pupils equal, round, reactive to light with accomodation, EOMI.   Pulmonary: normal respirations, no signs of respiratory distress  Abdomen: soft, non-distended  Skin: Skin is warm, dry and intact. Incision well healed. No abnormalities noted.   Sensory: intact to light touch throughout  Motor Strength:Moves all extremities spontaneously with good tone.       Diagnostic Results:  I have personally reviewed the x-ray lumbar spine dated 6/13/24 shows L2-pelvis PSF. Hardware in stable position.      ASSESSMENT/PLAN:     Emeka Caballero is a 49 y.o. male  s/p L2-pelvis extension and revision with Dr. Reed on 5/1/24. He was seen in clinic today for his 6 week post-op evaluation. States that overall he is doing much better since surgery. I have ordered inflammatory labs to evaluate for any infectious concerns given his sweat complaints. CT lumbar spine has been ordered to obtain in 6 weeks to assess for bony fusion. His medications were refilled. I would like the patient to follow-up in clinic with Dr. Reed for the 3 month post-op evaluation. I have encouraged him to contact the clinic with any questions, concerns, or adverse clinical changes. He verbalized understanding.      ARMIN David  Neurosurgery  [facility].    Note dictated with voice recognition software, please excuse any grammatical errors.

## 2024-06-14 ENCOUNTER — CLINICAL SUPPORT (OUTPATIENT)
Dept: REHABILITATION | Facility: HOSPITAL | Age: 49
End: 2024-06-14
Payer: MEDICAID

## 2024-06-14 DIAGNOSIS — Z74.09 IMPAIRED MOBILITY: Primary | ICD-10-CM

## 2024-06-14 DIAGNOSIS — M48.062 SPINAL STENOSIS OF LUMBAR REGION WITH NEUROGENIC CLAUDICATION: ICD-10-CM

## 2024-06-14 PROCEDURE — 97110 THERAPEUTIC EXERCISES: CPT | Mod: PN,CQ

## 2024-06-14 NOTE — PROGRESS NOTES
Physical Therapy Daily Treatment Note     [facility]  [facility] Number: [id number]    Therapy Diagnosis:   Encounter Diagnoses   Name Primary?    Impaired mobility Yes    Spinal stenosis of lumbar region with neurogenic claudication        Physician: Carina Mcclure MD    Visit Date: 6/14/2024    Physician Orders: PT Eval and Treat 3x/week x 6 weeks  Medical Diagnosis from Referral:   Diagnosis   M48.062 (ICD-10-CM) - Spinal stenosis of lumbar region with neurogenic claudication   Z74.09,Z78.9 (ICD-10-CM) - Impaired mobility and ADLs      Evaluation Date: 5/21/2024  Authorization Period Expiration: 12/31/24  Plan of Care Expiration: 7/16/24  Visit # / Visits authorized: 6/ 20  PTA Visits: 1/5     PN due: 6/21/24    Time In: 11:00am  Time Out: 11:40am  Total Billable Time: 40 minutes    Precautions: TLSO when out of bed, no lifting >10lb., no bending     Subjective     Pt reports: he recently had a follow up with his doctor for his back and they said to continue using the back brace as stated by patient. Patient also reports that everything seems to went well with at the follow up visit, however, still worries about the loose screw in his back. Patient states he had this problem before where they found a loose screw in his back and had to do surgery which patient states he does not want to get another surgery again.   He was compliant with home exercise program.  Response to previous treatment: no adverse effects reported  Functional change: ongoing    Pain: 3/10  Location: low back pain       Objective     Emeka received therapeutic exercises to develop strength, endurance, and core stabilization for 40 minutes including:    Recumbent stepper lower extremity only L5 x 8 mins for improved strength and endurance    Supine:   Transverse abdominal activation with march 2 x 10   Hip abduction Blue thera band 3 x 10  Double knee to chest with ball 20x  Supine abdominal press (opp arm leg) with ball, 2x10  "5" holds  Hamstring stretch with belt 2 x 30 sec bilateral     Parallel bar:   Hip extension 3 x 10  bilateral (cues for glute squeeze)   Hip abduction 3 x 10 bilateral (cues for abdominal brace when kick out)   Standing hip marching x 15     Pain at conclusion: 0/10      Home Exercises Provided and Patient Education Provided     Education provided:   - continue with home exercise program   - instructed patient he should wear TLSO until next follow up MD visit    Written Home Exercises Provided: Patient instructed to cont prior HEP.  Exercises were reviewed and Emeka was able to demonstrate them prior to the end of the session.  Emeka demonstrated good  understanding of the education provided.     See EMR under Patient Instructions for exercises provided prior visit.    Assessment   Iron arrived with TLSO back brace donned and with his cane. Focused on core strengthening/stability, endurance, and legs strengthening. Increase resistance from green to blue thera-band this visit due to patient is showing improved legs strength. Continues to provide verbal cues for correct body forms specifically in supine abdominal presses. Otherwise, patient tolerates fairly in today's session.     Emeka Is progressing well towards his goals.   Pt prognosis is Good.     Pt will continue to benefit from skilled outpatient physical therapy to address the deficits listed in the problem list box on initial evaluation, provide pt/family education and to maximize pt's level of independence in the home and community environment.     Pt's spiritual, cultural and educational needs considered and pt agreeable to plan of care and goals.     Anticipated barriers to physical therapy: length of time impairments have been present     Goals:   Short Term Goals: 4 weeks   Patient will report compliance with home exercise program at least 2x/ week to improve speed of progress. ongoing  Patient will demonstrate improved mobility for " household ambulation by performing Timed Up and Go with least restrictive AD in 15 sec. ongoing  Patient will demonstrate improved lower extremity strength and endurance by performing 5 times sit<>stand test in 25 sec. ongoing     Long Term Goals: 8 weeks   Patient will demonstrate improved mobility for household ambulation by performing Timed Up and Go with least restrictive AD in 10 sec. ongoing  Patient will demonstrate improved lower extremity strength and endurance by performing 5 times sit<>stand test in 15 sec ongoing  Patient will demonstrate improved lower extremity strength by 1 grade to enable him to perform job related mobility. ongoing  Patient will ambulate community distances with single point cane to demonstrate a return to PLOF. ongoing  Patient will report reduced back pain to 2-3/10 to demonstrate improved tolerance to daily mobility. Ongoing    Plan     Outpatient Physical Therapy 3 times weekly to include the following interventions: Electrical Stimulation , Manual Therapy, Moist Heat/ Ice, Neuromuscular Re-ed, Patient Education, Therapeutic Activities, and Therapeutic Exercise.     Add pallof press, manual therapy as needed to address tightness near surgical site    Bibiana Vargas  6/14/2024

## 2024-06-18 ENCOUNTER — LAB VISIT (OUTPATIENT)
Dept: LAB | Facility: HOSPITAL | Age: 49
End: 2024-06-18
Attending: FAMILY MEDICINE
Payer: MEDICAID

## 2024-06-18 ENCOUNTER — DOCUMENTATION ONLY (OUTPATIENT)
Dept: REHABILITATION | Facility: HOSPITAL | Age: 49
End: 2024-06-18

## 2024-06-18 ENCOUNTER — CLINICAL SUPPORT (OUTPATIENT)
Dept: REHABILITATION | Facility: HOSPITAL | Age: 49
End: 2024-06-18
Payer: MEDICAID

## 2024-06-18 DIAGNOSIS — M48.062 SPINAL STENOSIS OF LUMBAR REGION WITH NEUROGENIC CLAUDICATION: ICD-10-CM

## 2024-06-18 DIAGNOSIS — Z74.09 IMPAIRED MOBILITY: Primary | ICD-10-CM

## 2024-06-18 DIAGNOSIS — Z12.12 SCREENING FOR COLORECTAL CANCER: ICD-10-CM

## 2024-06-18 DIAGNOSIS — Z12.11 SCREENING FOR COLORECTAL CANCER: ICD-10-CM

## 2024-06-18 LAB — HEMOCCULT STL QL IA: POSITIVE

## 2024-06-18 PROCEDURE — 97110 THERAPEUTIC EXERCISES: CPT | Mod: PN,CQ

## 2024-06-18 PROCEDURE — 82274 ASSAY TEST FOR BLOOD FECAL: CPT | Performed by: FAMILY MEDICINE

## 2024-06-18 NOTE — ANESTHESIA PAT ROS NOTE
4/22/2024  Emeka Caballero is a 48 y.o., male with Scoliosis and Sagittal plane imbalance, presents to periop center for anesthesia assessment and preop instructions.      Pre-op Assessment    I have reviewed the Patient Summary Reports.     I have reviewed the Nursing Notes. I have reviewed the NPO Status.   I have reviewed the Medications.     Review of Systems  Anesthesia Hx:   History of prior surgery of interest to airway management or planning:  Previous anesthesia: General 3/14/2023  ENUCLEATION, PROSTATE, USING LASER (Urethra) with general anesthesia.  Procedure performed at an [facility].      Airway issues documented on chart review include GETA     Denies Family Hx of Anesthesia complications.    Denies Personal Hx of Anesthesia complications.                    Social:  Former Smoker, No Alcohol Use       Hematology/Oncology:    Oncology Normal    -- Denies Anemia:                                  EENT/Dental:  denies chronic allergic rhinitis  Eyes: Visual Impairment   Has S/P Extraction - Right Catarract                  Cardiovascular:  Exercise tolerance: poor   Denies Pacemaker. Hypertension, well controlled       Denies Angina.     no hyperlipidemia  Denies GARCIA.    Functional Capacity 3 METS                         Pulmonary:    Denies COPD.  Denies Asthma.   Denies Shortness of breath.  Denies Recent URI.  Denies Sleep Apnea.                Renal/:   Denies Chronic Renal Disease. no renal calculi BPH 3/14/2023  Laser enucleation of prostate (N/A) Dr. Murray  Nocturia                 Hepatic/GI:   Denies PUD.  GERD, well controlled Denies Liver Disease.  Denies Hepatitis. Constipation Denies Hepatic/GI Symptoms           Musculoskeletal:  Arthritis    Musculoskeletal General/Symptoms: neck pain, low back pain. Functional capacity is ambulatory without assistance.     Chronic midline low back pain without sciatica  Decreased range of motion  Muscle  They will call you from the [facility] to schedule your mammogram   Follow up as needed or next year for your yearly female exam.  Thanks for coming to see us today and letting us take care of you!    weakness  Low back pain    Joint Disease:  Arthritis, Osteoarthritis, spine     Spine Disorders:   Scoliosis , Congenital type     Cervical Spine Disorder, Cervical Spine Instability, Cervical Disc Disease Sagittal Plane Imbalance      Lumbar Spine Disorders, Lumbar Disc Disease, Radiculopathy, Spondylolisthesis, S/P Lumbar Fusion 2/8/2022  Lumbar fusion (N/A  Spinal stenosis of lumbar region  Lumbar spondylosis      Neurological:  Denies TIA.  Denies CVA. Neuromuscular Disease,   Denies Seizures.     Neuro Symptoms of pain, weakness    Arthritis, Osteoarthritis, spine, Low Back Pain, Myalgia                           Endocrine:  Diabetes, type 2    Diabetes, Type 2 Diabetes   , controlled by diet, oral hypoglycemics.                 Obesity / BMI > 30  Dermatological:  Skin Normal    Psych:  Psychiatric Normal                    Physical Exam  General: Well nourished, Cooperative, Alert and Oriented    Airway:  Mouth Opening: Normal  TM Distance: Normal    Dental:  Intact    Chest/Lungs:  Clear to auscultation, Normal Respiratory Rate    Heart:  Rate: Normal  Rhythm: Regular Rhythm  Sounds: Normal          Anesthesia Assessment: Preoperative EQUATION    Planned Procedure: Procedure(s) (LRB):  **AIRO** L2-pelvis extension and revision of fusion, co case Celestre (N/A)  Requested Anesthesia Type:General  Surgeon: Alphonse Reed MD  Service: Neurosurgery  Known or anticipated Date of Surgery:5/1/2024    Surgeon notes: reviewed    Electronic QUestionnaire Assessment completed via nurse interview with patient.        Triage considerations:         Previous anesthesia records:GETA and No problems3/14/2023   ENUCLEATION, PROSTATE, USING LASER (Urethra)   Airway:  Mallampati: II   Mouth Opening: Normal  TM Distance: Normal  Airway Placement Date: 03/14/23 Placement Time: 0849 , created via procedure documentation Method of Intubation: Video Laryngoscopy Mask Ventilation: Easy Intubated: Postinduction Blade: Judy #3  Airway Device Size: 7.5 Cuff Inflation: Minimal occlusive pressure Placement Verified By: Capnometry Complicating Factors: None Findings Post-Intubation: Bilateral breath sounds;Atraumatic/Condition of teeth unchanged Secured at: Teeth Complications: None Removal Date: 23 Removal Time: 0950     Last PCP note: 3-6 months ago , within [facility]   Subspecialty notes: Neurosurgery, Spine Service, Urology, ORAL SURGERY , OPTOMETRY    Other important co-morbidities: PER EPIC:DM2, GERD, HLD, HTN, Obesity, Smoker, and SCOLIOSIS, ARTHRITIS, BPH       Tests already available:  Available tests,  6-12 months ago , within [facility] .   2023 CT LUMBAR SPINE W/O CONTRAST, MRI LUMBAR SPINE W/O CONTRAST, 2023 XRAY SPINE SCOLIOSIS AP STANDING , XRAY LUMBAR SPINE  COMPLETE INCLUDING  F&E       Instructions given. (See in Nurse's note)    Optimization:  Anesthesia Preop Clinic Assessment  Indicated    Medical Opinion Indicated           Plan:    Testing:  BMP, CBC, EKG, PT/INR, PTT, T&C, T&S, and UA   Pre-anesthesia  visit       Visit focus: concerns in complex and/or prolonged anesthesia, position other than supine     Consultation:Patient's PCP for re-evaluation     Patient  has previously scheduled Medical Appointment: ED DIANE NP    Navigation: Tests Scheduled. TBD             Consults scheduled.TBD             Results will be tracked by Preop Clinic.  Bindu Talbert RN BSN    2024 MEDICAL EXAM     Expand All Collapse All    Routine Office Visit     Patient Name: Emeka Caballero    : 1975  MRN: [id number]     Chief Complaint:  Preop exam     Subjective:  Emeka is a 48 y.o. male who presents today for:     Preop exam - patient who is known to me with a history of obesity, hypertension, type 2 diabetes reports today for evaluation and preop exam.  Needs clearance for spinal fusion next month.  Reports doing well in his current state of health today.  He reports being mostly compliant with his  "medications.  Denies any acute concerns today.  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  He does not take any blood thinners.  Denies any history of structural heart disease.  Denies any problems with or reactions to anesthesia in the past.  Denies any history of CKD, strokes, or seizures.  Of note, patient declines  for today's visit.     Past Medical History       Past Medical History:   Diagnosis Date    Arthritis      Diabetes mellitus, type 2 2/11/2022    Essential hypertension, benign 5/4/2023         FAllergies   Review of patient's allergies indicates:  No Known Allergies     Review of Systems (Pertinent positives)  Review of Systems   Constitutional: Negative.  Negative for chills and fever.   HENT: Negative.  Negative for congestion, sinus pain and sore throat.    Eyes: Negative.    Respiratory:  Negative for cough, shortness of breath and wheezing.    Cardiovascular:  Negative for chest pain, palpitations, orthopnea and claudication.   Gastrointestinal: Negative.  Negative for abdominal pain, diarrhea, nausea and vomiting.   Genitourinary: Negative.  Negative for dysuria, frequency and urgency.   Musculoskeletal: Negative.  Negative for back pain, joint pain and neck pain.   Skin: Negative.    Neurological: Negative.  Negative for dizziness, tingling, loss of consciousness and headaches.   Endo/Heme/Allergies: Negative.    Psychiatric/Behavioral: Negative.           /84 (BP Location: Right arm, Patient Position: Sitting, BP Method: Large (Manual))   Pulse 93   Temp 98.6 °F (37 °C) (Oral)   Resp 18   Ht 5' 8" (1.727 m)   Wt 94.2 kg (207 lb 10.8 oz)   SpO2 95%   BMI 31.58 kg/m²      Physical Exam  Vitals reviewed.   Constitutional:       General: He is not in acute distress.     Appearance: Normal appearance. He is not ill-appearing, toxic-appearing or diaphoretic.   HENT:      Head: Normocephalic and atraumatic. "   Cardiovascular:      Rate and Rhythm: Normal rate and regular rhythm.      Pulses: Normal pulses.      Heart sounds: Normal heart sounds.   Pulmonary:      Effort: Pulmonary effort is normal. No respiratory distress.      Breath sounds: Normal breath sounds. No wheezing.   Abdominal:      General: Bowel sounds are normal. There is no distension.      Palpations: Abdomen is soft.      Tenderness: There is no abdominal tenderness.   Musculoskeletal:         General: No swelling, tenderness or deformity. Normal range of motion.   Skin:     General: Skin is warm and dry.      Capillary Refill: Capillary refill takes less than 2 seconds.   Neurological:      General: No focal deficit present.      Mental Status: He is alert and oriented to person, place, and time.   Psychiatric:         Mood and Affect: Mood normal.         Behavior: Behavior normal.         Assessment/Plan:  Emeka Caballero is a 48 y.o. male who presents today for :   Emeka was seen today for pre-op exam.   Diagnoses and all orders for this visit:   Pre-op exam  -     Urinalysis; Future  -     PROTIME-INR; Future  -     APTT; Future  -     Ambulatory referral/consult to Cardiology; Future  -     IN OFFICE EKG 12-LEAD (to Muse)  -     X-Ray Chest PA And Lateral; Future   Essential hypertension, benign  -     CBC W/ AUTO DIFFERENTIAL; Future  -     COMPREHENSIVE METABOLIC PANEL; Future   Mixed dyslipidemia   Type 2 diabetes mellitus with hyperlipidemia  -     COMPREHENSIVE METABOLIC PANEL; Future  -     HEMOGLOBIN A1C; Future   Class 1 obesity due to excess calories with serious comorbidity and body mass index (BMI) of 31.0 to 31.9 in adult     - will check labs for preop  - chest x-ray and UA ordered per surgery requirements  - EKG in office does show potential LVH with right bundle branch block.  Per my read RBBB not present on previous EKGs  - given risk factors of diabetes and obesity and hypertension along with abnormal EKG will consult  Cardiology for clearance prior to procedure (CARD VISIT 4/24 w Dr. Rivas)    - all questions answered; will follow-up with lab work               4/22/24 CARDIOLOGY CLEARANCE  Preoperative cardiovascular evaluation: Because the patient's functional capacity is less than 4 Mets feel he needs to undergo preoperative testing.  DSE because he can not ambulate.  Hypertension: Continue current management.  Hyperlipidemia:  Continue current management.  If stress negative patient can proceed at low risk.  Krishna 0.9% risk of myocardial infarction or cardiac arrest, intraoperatively or up to 30 days postop. RCRI 3.9% 30 day risk of death, MI or cardiac arrest.              Santos Rivas MD, MPH, FACC, [facility]

## 2024-06-18 NOTE — PROGRESS NOTES
PT/PTA STAFFING      [id number]    Date of staffin2024      DME/ orders needed: No    Changes to POC: continue with TLSO as recommended by MD. Can perform STM for pain as needed. Continue with core strengthening    Continue with POC: Yes    Tanvi Terrell,DPT  2024

## 2024-06-18 NOTE — PROGRESS NOTES
"Physical Therapy Daily Treatment Note     [id number]    Therapy Diagnosis:   Encounter Diagnoses   Name Primary?    Impaired mobility Yes    Spinal stenosis of lumbar region with neurogenic claudication        Physician: Carina Mcclure MD    Visit Date: 6/18/2024    Physician Orders: PT Eval and Treat 3x/week x 6 weeks  Medical Diagnosis from Referral:   Diagnosis   M48.062 (ICD-10-CM) - Spinal stenosis of lumbar region with neurogenic claudication   Z74.09,Z78.9 (ICD-10-CM) - Impaired mobility and ADLs      Evaluation Date: 5/21/2024  Authorization Period Expiration: 12/31/24  Plan of Care Expiration: 7/16/24  Visit # / Visits authorized: 7/ 20  PTA Visits: 3/5     PN due: 6/21/24    Time In: 0836am  Time Out: 0930am  Total Billable Time: 45 minutes    Precautions: TLSO when out of bed, no lifting >10lb., no bending     Subjective     Pt reports: he does not feel any real pain, but just tightness in his back. He do self massage to the back at home which helped. Patient states he wants to walk better and get rid of the muscles tightness in the back.   He was compliant with home exercise program.  Response to previous treatment: no adverse effects reported  Functional change: ongoing    Pain: 0/10  Location: low back pain       Objective     Emeka received therapeutic exercises to develop strength, endurance, and core stabilization for 45 minutes including:    Recumbent stepper lower extremity only L5 x 8 mins for improved strength and endurance    Supine:   Transverse abdominal activation with march 2 x 10   Hip abduction Blue thera band 3 x 10  Double knee to chest with ball 20x  Supine abdominal press (opp arm leg) with ball, 2x10 5" holds  Hamstring stretch with belt 2 x 30 sec bilateral     Parallel bar:   Hip extension 3 x 10  bilateral (cues for glute squeeze)   Hip abduction 3 x 10 bilateral (cues for abdominal brace when kick out)   Standing hip marching x 15 "   Pallof press with Red tubing band, x20 each side     Prone, Graston Technique:   To lumbar region: sweeping, fanning. Nudging of bilateral erector spinae      Pain at conclusion: 0/10      Home Exercises Provided and Patient Education Provided     Education provided:   - continue with home exercise program   - instructed patient he should wear TLSO until next follow up MD visit    Written Home Exercises Provided: Patient instructed to cont prior HEP.  Exercises were reviewed and Emeka was able to demonstrate them prior to the end of the session.  Emeka demonstrated good  understanding of the education provided.     See EMR under Patient Instructions for exercises provided prior visit.    Assessment   Introduced palloff press exercise for core stability/strengthening this visit which patient responded well. Noted fatigued towards end of closed chain exercises. Ended session with Graston techniques to lower back near incision site at erector spinae muscles for decrease muscle tension. Noted right sided erector spinae muscles is more tight than left specifically at the middle near incision site. At end of session, patient states he felt better after manual therapy techniques. Patient is making steadily progress towards his therapy goals. Will continue to progress patient as per protocols.     Emeka Is progressing well towards his goals.   Pt prognosis is Good.     Pt will continue to benefit from skilled outpatient physical therapy to address the deficits listed in the problem list box on initial evaluation, provide pt/family education and to maximize pt's level of independence in the home and community environment.     Pt's spiritual, cultural and educational needs considered and pt agreeable to plan of care and goals.     Anticipated barriers to physical therapy: length of time impairments have been present     Goals:   Short Term Goals: 4 weeks   Patient will report compliance with home exercise  program at least 2x/ week to improve speed of progress. ongoing  Patient will demonstrate improved mobility for household ambulation by performing Timed Up and Go with least restrictive AD in 15 sec. ongoing  Patient will demonstrate improved lower extremity strength and endurance by performing 5 times sit<>stand test in 25 sec. ongoing     Long Term Goals: 8 weeks   Patient will demonstrate improved mobility for household ambulation by performing Timed Up and Go with least restrictive AD in 10 sec. ongoing  Patient will demonstrate improved lower extremity strength and endurance by performing 5 times sit<>stand test in 15 sec ongoing  Patient will demonstrate improved lower extremity strength by 1 grade to enable him to perform job related mobility. ongoing  Patient will ambulate community distances with single point cane to demonstrate a return to PLOF. ongoing  Patient will report reduced back pain to 2-3/10 to demonstrate improved tolerance to daily mobility. Ongoing    Plan     Outpatient Physical Therapy 3 times weekly to include the following interventions: Electrical Stimulation , Manual Therapy, Moist Heat/ Ice, Neuromuscular Re-ed, Patient Education, Therapeutic Activities, and Therapeutic Exercise.     Add pallof press, manual therapy as needed to address tightness near surgical site    Bibiana Vargas  6/18/2024

## 2024-06-21 ENCOUNTER — TELEPHONE (OUTPATIENT)
Dept: ENDOSCOPY | Facility: HOSPITAL | Age: 49
End: 2024-06-21
Payer: MEDICAID

## 2024-06-21 ENCOUNTER — CLINICAL SUPPORT (OUTPATIENT)
Dept: REHABILITATION | Facility: HOSPITAL | Age: 49
End: 2024-06-21
Payer: MEDICAID

## 2024-06-21 ENCOUNTER — TELEPHONE (OUTPATIENT)
Dept: FAMILY MEDICINE | Facility: CLINIC | Age: 49
End: 2024-06-21
Payer: MEDICAID

## 2024-06-21 VITALS — WEIGHT: 208 LBS | HEIGHT: 70 IN | BODY MASS INDEX: 29.78 KG/M2

## 2024-06-21 DIAGNOSIS — Z12.12 SCREENING FOR COLORECTAL CANCER: ICD-10-CM

## 2024-06-21 DIAGNOSIS — Z12.11 SCREENING FOR COLORECTAL CANCER: ICD-10-CM

## 2024-06-21 DIAGNOSIS — Z74.09 IMPAIRED MOBILITY: Primary | ICD-10-CM

## 2024-06-21 DIAGNOSIS — Z12.11 SPECIAL SCREENING FOR MALIGNANT NEOPLASMS, COLON: Primary | ICD-10-CM

## 2024-06-21 DIAGNOSIS — R19.5 POSITIVE FIT (FECAL IMMUNOCHEMICAL TEST): ICD-10-CM

## 2024-06-21 DIAGNOSIS — M48.062 SPINAL STENOSIS OF LUMBAR REGION WITH NEUROGENIC CLAUDICATION: ICD-10-CM

## 2024-06-21 PROCEDURE — 97110 THERAPEUTIC EXERCISES: CPT | Mod: PN,CQ

## 2024-06-21 NOTE — PROGRESS NOTES
"Physical Therapy Daily Treatment Note     [facility]  [id number]    Therapy Diagnosis:   Encounter Diagnoses   Name Primary?    Impaired mobility Yes    Spinal stenosis of lumbar region with neurogenic claudication        Physician: Carina Mcclure MD    Visit Date: 6/21/2024    Physician Orders: PT Eval and Treat 3x/week x 6 weeks  Medical Diagnosis from Referral:   Diagnosis   M48.062 (ICD-10-CM) - Spinal stenosis of lumbar region with neurogenic claudication   Z74.09,Z78.9 (ICD-10-CM) - Impaired mobility and ADLs      Evaluation Date: 5/21/2024  Authorization Period Expiration: 12/31/24  Plan of Care Expiration: 7/16/24  Visit # / Visits authorized: 8/ 20  PTA Visits: 4/5     PN due: 6/21/24    Time In: 0850am  Time Out: 0950am  Total Treatment Time: 60 minutes   Total Billable Time: 50 minutes    Precautions: TLSO when out of bed, no lifting >10lb., no bending     Subjective     Pt reports: everything is the same, nothing new to report.   He was compliant with home exercise program.  Response to previous treatment: no adverse effects reported  Functional change: ongoing    Pain: 0/10  Location: low back pain       Objective     Emeka received therapeutic exercises to develop strength, endurance, and core stabilization for 50 minutes including:    Recumbent stepper lower extremity only L5 x 8 mins for improved strength and endurance    Supine:   Transverse abdominal activation with march 2 x 10   Bent knee fall out Blue thera band 3 x 10 (1 knee goes out to the side at a time)   Double knee to chest with ball 20x  Supine abdominal press (opp arm leg) with ball, 2x10 5" holds  Hamstring stretch with belt 2 x 30 sec bilateral     Parallel bar:   Hip extension 3 x 10  bilateral (cues for glute squeeze)   Hip abduction 3 x 10 bilateral (cues for abdominal brace when kick out)   Standing hip marching x 15   Pallof press with Red tubing band, x20 each side     Prone, Helen " Technique:   To lumbar region: sweeping, fanning. Nudging of bilateral erector spinae      Pain at conclusion: 0/10      Home Exercises Provided and Patient Education Provided     Education provided:   - continue with home exercise program   - instructed patient he should wear TLSO until next follow up MD visit    Written Home Exercises Provided: Patient instructed to cont prior HEP.  Exercises were reviewed and Emeka was able to demonstrate them prior to the end of the session.  Emeka demonstrated good  understanding of the education provided.     See EMR under Patient Instructions for exercises provided prior visit.    Assessment   Patient tolerates well in all exercises today for core strengthening and to improve lumbar spinal mobility. Still required cues for upright posture in standing exercise. Ended session with Graston techniques for decrease muscle tension and pain which patient responded well. Modified supine hip abduction to bent knee fall out with thera-band today. Patient did well, no concerns of increase pain post therapy. Emeka Is progressing well towards his goals.     Pt prognosis is Good.     Pt will continue to benefit from skilled outpatient physical therapy to address the deficits listed in the problem list box on initial evaluation, provide pt/family education and to maximize pt's level of independence in the home and community environment.     Pt's spiritual, cultural and educational needs considered and pt agreeable to plan of care and goals.     Anticipated barriers to physical therapy: length of time impairments have been present     Goals:   Short Term Goals: 4 weeks   Patient will report compliance with home exercise program at least 2x/ week to improve speed of progress. ongoing  Patient will demonstrate improved mobility for household ambulation by performing Timed Up and Go with least restrictive AD in 15 sec. ongoing  Patient will demonstrate improved lower extremity  strength and endurance by performing 5 times sit<>stand test in 25 sec. ongoing     Long Term Goals: 8 weeks   Patient will demonstrate improved mobility for household ambulation by performing Timed Up and Go with least restrictive AD in 10 sec. ongoing  Patient will demonstrate improved lower extremity strength and endurance by performing 5 times sit<>stand test in 15 sec ongoing  Patient will demonstrate improved lower extremity strength by 1 grade to enable him to perform job related mobility. ongoing  Patient will ambulate community distances with single point cane to demonstrate a return to PLOF. ongoing  Patient will report reduced back pain to 2-3/10 to demonstrate improved tolerance to daily mobility. Ongoing    Plan     Outpatient Physical Therapy 3 times weekly to include the following interventions: Electrical Stimulation , Manual Therapy, Moist Heat/ Ice, Neuromuscular Re-ed, Patient Education, Therapeutic Activities, and Therapeutic Exercise.     Add pallof press, manual therapy as needed to address tightness near surgical site    Bibiana Vargas  6/21/2024

## 2024-06-21 NOTE — TELEPHONE ENCOUNTER
Spoke to patient to schedule procedure(s) Colonoscopy       Physician to perform procedure(s) Dr. KURT Niño  Date of Procedure (s) 8/5/24  Arrival Time 12:00 PM  Time of Procedure(s) 1:00 PM   Location of Procedure(s) [facility] [place] 2nd Floor--Enter at the rear of the building through the emergency department screening station or the Outpatient Registration door, then continue to endoscopy department on the 2nd floor.    Type of Rx Prep sent to patient: PEG extended  Instructions provided to patient via [employer]/Postal mail    Patient was informed on the following information and verbalized understanding. Screening questionnaire reviewed with patient and complete. If procedure requires anesthesia, a responsible adult needs to be present to accompany the patient home, patient cannot drive after receiving anesthesia. Appointment details are tentative, especially check-in time. Patient will receive a prep-op call 7 days prior to confirm check-in time for procedure. If applicable the patient should contact their pharmacy to verify Rx for procedure prep is ready for pick-up. Patient was advised to call the scheduling department at [phone] if pharmacy states no Rx is available. Patient was advised to call the endoscopy scheduling department if any questions or concerns arise.      [facility] Endoscopy Scheduling Department

## 2024-06-21 NOTE — TELEPHONE ENCOUNTER
----- Message from Dio Zarate Jr., MD sent at 6/21/2024  1:10 AM CDT -----  Please let patient know that his stool test showed some blood.  Because of this, we need to do a colonoscopy for further evaluation and make sure there is no evidence of colorectal cancer.  Order placed.  Please assist in scheduling virtual visit with endoscopy schedulers

## 2024-06-22 ENCOUNTER — TELEPHONE (OUTPATIENT)
Dept: ENDOSCOPY | Facility: HOSPITAL | Age: 49
End: 2024-06-22
Payer: MEDICAID

## 2024-06-22 NOTE — TELEPHONE ENCOUNTER
Dear Provider ,    Patient has a scheduled procedure Colonoscopy 8/5/24 and in order to ensure patient safety, we would like to confirm if he/she can be cleared for the procedure.      Thank you for your prompt reply.    [facility] Endoscopy Scheduling

## 2024-06-22 NOTE — TELEPHONE ENCOUNTER
----- Message from Vince Wong RN sent at 2024 10:11 AM CDT -----  Regardin/5 CL  The patient is currently under an internal Neurosurgeon Alphonse Reed MD care and requires a clearance Other for their upcoming scheduled Colonoscopy on 24.       Notes: recent back surgery on 24

## 2024-06-25 ENCOUNTER — CLINICAL SUPPORT (OUTPATIENT)
Dept: REHABILITATION | Facility: HOSPITAL | Age: 49
End: 2024-06-25
Payer: MEDICAID

## 2024-06-25 DIAGNOSIS — Z74.09 IMPAIRED MOBILITY: Primary | ICD-10-CM

## 2024-06-25 DIAGNOSIS — M48.062 SPINAL STENOSIS OF LUMBAR REGION WITH NEUROGENIC CLAUDICATION: ICD-10-CM

## 2024-06-25 PROCEDURE — 97110 THERAPEUTIC EXERCISES: CPT | Mod: PN,CQ

## 2024-06-25 NOTE — PROGRESS NOTES
"Physical Therapy Daily Treatment Note     [id number]    Therapy Diagnosis:   Encounter Diagnoses   Name Primary?    Impaired mobility Yes    Spinal stenosis of lumbar region with neurogenic claudication        Physician: Carina Mcclure MD    Visit Date: 6/25/2024    Physician Orders: PT Eval and Treat 3x/week x 6 weeks  Medical Diagnosis from Referral:   Diagnosis   M48.062 (ICD-10-CM) - Spinal stenosis of lumbar region with neurogenic claudication   Z74.09,Z78.9 (ICD-10-CM) - Impaired mobility and ADLs      Evaluation Date: 5/21/2024  Authorization Period Expiration: 12/31/24  Plan of Care Expiration: 7/16/24  Visit # / Visits authorized: 9/ 20  PTA Visits: 5/5     PN due: 6/21/24, updated to 7/25/2024    Time In: 0845am  Time Out: 0935am  Total Billable Time: 50 minutes    Precautions: TLSO when out of bed, no lifting >10lb., no bending     Subjective     Pt reports: he's back is getting better.   He was compliant with home exercise program.  Response to previous treatment: no adverse effects reported  Functional change: ongoing    Pain: 0/10  Location: low back pain       Objective     Emeka received therapeutic exercises to develop strength, endurance, and core stabilization for 50 minutes including:    Recumbent stepper lower extremity only L5 x 8 mins for improved strength and endurance    Supine:   Transverse abdominal activation with march 2 x 10   Bent knee fall out Blue thera band 3 x 10 (1 knee goes out to the side at a time)   Double knee to chest with ball 20x  Supine abdominal press (opp arm leg) with ball, 2x10 5" holds  Hamstring stretch with belt 2 x 30 sec bilateral     Parallel bar:   Hip extension 3 x 10  bilateral (cues for glute squeeze)   Hip abduction 3 x 10 bilateral (cues for abdominal brace when kick out)   Standing hip marching x 15   Pallof press with Red tubing band, x20 each side     Prone, Graston Technique: (not performed this visit) "   To lumbar region: sweeping, fanning. Nudging of bilateral erector spinae      Pain at conclusion: 0/10      Home Exercises Provided and Patient Education Provided     Education provided:   - continue with home exercise program   - instructed patient he should wear TLSO until next follow up MD visit    Written Home Exercises Provided: Patient instructed to cont prior HEP.  Exercises were reviewed and Emeka was able to demonstrate them prior to the end of the session.  Emeka demonstrated good  understanding of the education provided.     See EMR under Patient Instructions for exercises provided prior visit.    Assessment   Patient back pain appears to be improving with no reports of back pain in the last couple of visits. Continue to work on core strengthening, hip strengthening, and to improve lumbar spinal mobility. Still occasionally required cues for upright posture in standing activities. Patient is wearing the TLSO brace for another 6 weeks for protection since his last follow up with his doctor. Patient has been compliance with his home exercise program and wearing his back brace. Overall, Emeka Is progressing well towards his goals.     Pt prognosis is Good.     Pt will continue to benefit from skilled outpatient physical therapy to address the deficits listed in the problem list box on initial evaluation, provide pt/family education and to maximize pt's level of independence in the home and community environment.     Pt's spiritual, cultural and educational needs considered and pt agreeable to plan of care and goals.     Anticipated barriers to physical therapy: length of time impairments have been present     Goals:   Short Term Goals: 4 weeks   Patient will report compliance with home exercise program at least 2x/ week to improve speed of progress. ongoing  Patient will demonstrate improved mobility for household ambulation by performing Timed Up and Go with least restrictive AD in 15 sec.  ongoing  Patient will demonstrate improved lower extremity strength and endurance by performing 5 times sit<>stand test in 25 sec. ongoing     Long Term Goals: 8 weeks   Patient will demonstrate improved mobility for household ambulation by performing Timed Up and Go with least restrictive AD in 10 sec. ongoing  Patient will demonstrate improved lower extremity strength and endurance by performing 5 times sit<>stand test in 15 sec ongoing  Patient will demonstrate improved lower extremity strength by 1 grade to enable him to perform job related mobility. ongoing  Patient will ambulate community distances with single point cane to demonstrate a return to PLOF. ongoing  Patient will report reduced back pain to 2-3/10 to demonstrate improved tolerance to daily mobility. Ongoing    Plan     Outpatient Physical Therapy 3 times weekly to include the following interventions: Electrical Stimulation , Manual Therapy, Moist Heat/ Ice, Neuromuscular Re-ed, Patient Education, Therapeutic Activities, and Therapeutic Exercise.     Add pallof press, manual therapy as needed to address tightness near surgical site    PT/PTA met face to face to discuss pt's treatment plan and progress towards established goals. Pt will be seen by a physical therapist minimally every 6th visit or every 30 days.    Bibiana Vargas  6/25/2024

## 2024-06-26 ENCOUNTER — TELEPHONE (OUTPATIENT)
Dept: FAMILY MEDICINE | Facility: CLINIC | Age: 49
End: 2024-06-26
Payer: MEDICAID

## 2024-06-27 ENCOUNTER — CLINICAL SUPPORT (OUTPATIENT)
Dept: REHABILITATION | Facility: HOSPITAL | Age: 49
End: 2024-06-27
Payer: MEDICAID

## 2024-06-27 DIAGNOSIS — M48.062 SPINAL STENOSIS OF LUMBAR REGION WITH NEUROGENIC CLAUDICATION: ICD-10-CM

## 2024-06-27 DIAGNOSIS — Z74.09 IMPAIRED MOBILITY: Primary | ICD-10-CM

## 2024-06-27 PROCEDURE — 97110 THERAPEUTIC EXERCISES: CPT | Mod: PN | Performed by: PHYSICAL THERAPIST

## 2024-06-27 NOTE — PROGRESS NOTES
See plan of care for physical therapy progress note and updated plan of care    Tanvi Terrell, PT, DPT,   Board-Certified Clinical Specialist in Neurologic Physical Therapy   Certified Brain Injury Specialist

## 2024-07-03 NOTE — PLAN OF CARE
Physical Therapy Daily Treatment Note     Name: Emeka Caballero  [facility] Number: [id number]    Therapy Diagnosis:   Encounter Diagnoses   Name Primary?    Impaired mobility Yes    Spinal stenosis of lumbar region with neurogenic claudication        Physician: Carina Mcclure MD    Visit Date: 6/27/2024    Physician Orders: PT Eval and Treat 3x/week x 6 weeks  Medical Diagnosis from Referral:   Diagnosis   M48.062 (ICD-10-CM) - Spinal stenosis of lumbar region with neurogenic claudication   Z74.09,Z78.9 (ICD-10-CM) - Impaired mobility and ADLs      Evaluation Date: 5/21/2024  Authorization Period Expiration: 12/31/24  Plan of Care Expiration: 7/16/24   Updated plan of care: 7/17/24 to 9/4/24  Visit # / Visits authorized: 10/ 20  PTA Visits: 0/5     PN due: 7/25/2024    Time In: 0933  Time Out:1019  Total Billable Time: 46 minutes    Precautions: TLSO when out of bed, no lifting >10lb., no bending     Subjective     Pt reports: I don't have much pain. I try to walk a little in the house without the cane  He was compliant with home exercise program.  Response to previous treatment: no adverse effects reported  Functional change: ongoing    Pain: 0/10  Location: low back pain       Objective       Evaluation 6/27/24   Single Limb Stance R LE Not tested   (<10 sec = HIGH FALL RISK) Not tested    Single Limb Stance L LE Not tested   (<10 sec = HIGH FALL RISK) Not tested    5 times sit-stand 34 seconds w/upper extremity     13 sec no UE   Greene/ FGA/ Tinetti Not tested  Not tested       5 times sit<>stand Cutoff scores:  >12 sec= fall risk  PD: >16 seconds= fall risk  Vestibular/balance: 15 seconds over 65 years  CVA: 12 seconds      Evaluation 6/27/24   Timed Up and Go 20.3 sec w/RW  > 20 sec safe for independent transfers,     > 30 sec assist required for transfers 12.7 sec w/SPC   6 meter walk test Not tested  Not tested    6 min walk test Not tested  Not tested    Timed Up and Go w/SPC= 16.5s + 11s + 10.7s      Timed Up and Go fall risk:   Community dwelling older adults >13.5 sec   Chronic CVA >14 sec   Geriatric with h/o falls >15 sec   Frail elderly >32.6 sec    LE amputees >19 sec   PD >7.95- 11.5 sec   Hip OA >10 sec   Vestibular >11.1 sec      Emeka received therapeutic exercises to develop strength, endurance, and core stabilization for 46 minutes including:    Testing as performed above    Recumbent stepper lower extremity only L6 x 8 mins for improved strength and endurance    Supine:   Bridging with Blue thera band 2 x 10  Transverse abdominal activation with march 2# 2 x 10   Bent knee fall out Blue thera band 3 x 10 (1 knee goes out to the side at a time)   Hamstring stretch with belt 2 x 30 sec bilateral   Quad stretch w/ belt 2 x 30 sec bilateral     Parallel bar:   Pallof press with Red tubing band, x20 each side       Pain at conclusion: 5/10- glut and back soreness      Home Exercises Provided and Patient Education Provided     Education provided:   - continue with home exercise program     Written Home Exercises Provided: Patient instructed to cont prior HEP.  Exercises were reviewed and Emeka was able to demonstrate them prior to the end of the session.  Emeka demonstrated good  understanding of the education provided.     See EMR under Patient Instructions for exercises provided prior visit.    Assessment   Assessment period: 5/23/24 to 6/27/2024    Emeka tolerates physical therapy sessions well. He is demonstrating good progress with mobility. Patient reports that he no longer experiences moderate or severe pain. He has been consistently attending physical therapy session with use of single point cane and reports attempting some limited household ambulation without assistive device. a goo improvement in strength and mobility is noted via improvement in 5 tomes sit<>stand test and Timed Up and Go. Patient reports some back and muscle soreness at conclusion of physical therapy sessions.  Some myofascial tension over scar is still noted. Manual therapy may be performed as needed. Patient would benefit from manual therapy to address. Plan of care extended x 8 weeks to allow for continued progress with mobility.     Overall, Emeka Is progressing well towards his goals.     Pt prognosis is Good.     Pt will continue to benefit from skilled outpatient physical therapy to address the deficits listed in the problem list box on initial evaluation, provide pt/family education and to maximize pt's level of independence in the home and community environment.     Pt's spiritual, cultural and educational needs considered and pt agreeable to plan of care and goals.     Anticipated barriers to physical therapy: length of time impairments have been present     Goals:   Status as of 6/27/24  Short Term Goals: 4 weeks   Patient will report compliance with home exercise program at least 2x/ week to improve speed of progress. Met   Patient will demonstrate improved mobility for household ambulation by performing Timed Up and Go with least restrictive AD in 15 sec. Met   Patient will demonstrate improved lower extremity strength and endurance by performing 5 times sit<>stand test in 25 sec. Met      Long Term Goals: 8 weeks   Patient will demonstrate improved mobility for household ambulation by performing Timed Up and Go with least restrictive AD in 10 sec. improved  Patient will demonstrate improved lower extremity strength and endurance by performing 5 times sit<>stand test in 15 sec met   Patient will demonstrate improved lower extremity strength by 1 grade to enable him to perform job related mobility. ongoing  Patient will ambulate community distances with single point cane to demonstrate a return to PLOF. ongoing  Patient will report reduced back pain to 2-3/10 to demonstrate improved tolerance to daily mobility. Ongoing    Plan   Updated plan of care: 7/17/24 to 9/4/24    Outpatient Physical Therapy 2 times  weekly to include the following interventions: Electrical Stimulation , Manual Therapy, Moist Heat/ Ice, Neuromuscular Re-ed, Patient Education, Therapeutic Activities, and Therapeutic Exercise.     manual therapy as needed to address tightness near surgical site      Tanvi Terrell, PT, DPT,   Board-Certified Clinical Specialist in Neurologic Physical Therapy   Certified Brain Injury Specialist    6/27/2024

## 2024-07-09 ENCOUNTER — CLINICAL SUPPORT (OUTPATIENT)
Dept: REHABILITATION | Facility: HOSPITAL | Age: 49
End: 2024-07-09
Payer: MEDICAID

## 2024-07-09 DIAGNOSIS — M48.062 SPINAL STENOSIS OF LUMBAR REGION WITH NEUROGENIC CLAUDICATION: ICD-10-CM

## 2024-07-09 DIAGNOSIS — Z74.09 IMPAIRED MOBILITY: Primary | ICD-10-CM

## 2024-07-09 PROCEDURE — 97110 THERAPEUTIC EXERCISES: CPT | Mod: PN,CQ

## 2024-07-09 NOTE — PROGRESS NOTES
"Physical Therapy Daily Treatment Note     [id number]    Therapy Diagnosis:   Encounter Diagnoses   Name Primary?    Impaired mobility Yes    Spinal stenosis of lumbar region with neurogenic claudication        Physician: Carina Mcclure MD    Visit Date: 7/9/2024    Physician Orders: PT Eval and Treat 3x/week x 6 weeks  Medical Diagnosis from Referral:   Diagnosis   M48.062 (ICD-10-CM) - Spinal stenosis of lumbar region with neurogenic claudication   Z74.09,Z78.9 (ICD-10-CM) - Impaired mobility and ADLs      Evaluation Date: 5/21/2024  Authorization Period Expiration: 12/31/24  Plan of Care Expiration: 7/16/24   Updated plan of care: 7/17/24 to 9/4/24  Visit # / Visits authorized: 11/ 20  PTA Visits: 1/5      PN due: 7/25/2024    Time In: 13:45  Time Out: 1430  Total Billable Time: 45 minutes    Precautions: TLSO when out of bed, no lifting >10lb., no bending     Subjective     Pt reports: he still pain to low back, however, he takes pain medication which helped a bit. He tried walking around the house without the cane. Patient also reports of legs feels tight and he gets spasms from time to time.   He was compliant with home exercise program.  Response to previous treatment: no adverse effects reported  Functional change: ongoing     Pain: 0/10  Location: low back pain       Objective     Emeka received therapeutic exercises to develop strength, endurance, and core stabilization for 45 minutes including:    Recumbent stepper lower extremity only L6 x 8 mins for improved strength and endurance    Supine:   Transverse abdominal activation with march with 3# 2 x 20   Bridging with Blue thera band 2 x 10   Bent knee fall out Blue thera band 3 x 10 (1 knee goes out to the side at a time)   Double knee to chest with ball 3 x 10   Supine abdominal press (opp arm leg) with ball, 2x10 5" holds  Hamstring stretch with belt 2 x 30 sec bilateral   Quad stretch w/ belt 2 x 30 sec " bilateral       Not performed below exercises today due to limited time: will resume next visit.   Parallel bar:   Hip extension 3 x 10  bilateral (cues for glute squeeze)   Hip abduction 3 x 10 bilateral (cues for abdominal brace when kick out)   Standing hip marching x 15   Pallof press with Green tubing band, x20 each side     Prone, Graston Technique: (not performed this visit)   To lumbar region: sweeping, fanning. Nudging of bilateral erector spinae      Pain at conclusion: 0/10      Home Exercises Provided and Patient Education Provided     Education provided:   - continue with home exercise program   - instructed patient he should wear TLSO until next follow up MD visit    Written Home Exercises Provided: Patient instructed to cont prior HEP.  Exercises were reviewed and Emeka was able to demonstrate them prior to the end of the session.  Emeka demonstrated good  understanding of the education provided.     See EMR under Patient Instructions for exercises provided prior visit.    Assessment   Patient is currently 9 weeks s/p extension/revision of fusion L2-Pelvis on 5/1/24. Continue to focus on core strengthening, endurance, and to improve lumbar spinal mobility. Focused on mat exercises this visit for core strengthening and for lumbar spinal mobility which patient tolerates well. Minimal cues were provided in bridges for glute activation upon lifting. Otherwise, no major complaints of pain post therapy. Emeka Is progressing well towards his goals.     Pt prognosis is Good.     Pt will continue to benefit from skilled outpatient physical therapy to address the deficits listed in the problem list box on initial evaluation, provide pt/family education and to maximize pt's level of independence in the home and community environment.     Pt's spiritual, cultural and educational needs considered and pt agreeable to plan of care and goals.     Anticipated barriers to physical therapy: length of time  impairments have been present     Goals:   Status as of 6/27/24  Short Term Goals: 4 weeks   Patient will report compliance with home exercise program at least 2x/ week to improve speed of progress. Met   Patient will demonstrate improved mobility for household ambulation by performing Timed Up and Go with least restrictive AD in 15 sec. Met   Patient will demonstrate improved lower extremity strength and endurance by performing 5 times sit<>stand test in 25 sec. Met      Long Term Goals: 8 weeks   Patient will demonstrate improved mobility for household ambulation by performing Timed Up and Go with least restrictive AD in 10 sec. improved  Patient will demonstrate improved lower extremity strength and endurance by performing 5 times sit<>stand test in 15 sec met   Patient will demonstrate improved lower extremity strength by 1 grade to enable him to perform job related mobility. ongoing  Patient will ambulate community distances with single point cane to demonstrate a return to PLOF. ongoing  Patient will report reduced back pain to 2-3/10 to demonstrate improved tolerance to daily mobility. Ongoing    Plan   Updated plan of care: 7/17/24 to 9/4/24     Outpatient Physical Therapy 2 times weekly to include the following interventions: Electrical Stimulation , Manual Therapy, Moist Heat/ Ice, Neuromuscular Re-ed, Patient Education, Therapeutic Activities, and Therapeutic Exercise.      manual therapy as needed to address tightness near surgical site    Bibiana Payani  7/9/2024

## 2024-07-12 ENCOUNTER — CLINICAL SUPPORT (OUTPATIENT)
Dept: REHABILITATION | Facility: HOSPITAL | Age: 49
End: 2024-07-12
Payer: MEDICAID

## 2024-07-12 DIAGNOSIS — M48.062 SPINAL STENOSIS OF LUMBAR REGION WITH NEUROGENIC CLAUDICATION: ICD-10-CM

## 2024-07-12 DIAGNOSIS — Z74.09 IMPAIRED MOBILITY: Primary | ICD-10-CM

## 2024-07-12 PROCEDURE — 97110 THERAPEUTIC EXERCISES: CPT | Mod: PN,CQ

## 2024-07-15 ENCOUNTER — DOCUMENTATION ONLY (OUTPATIENT)
Dept: REHABILITATION | Facility: HOSPITAL | Age: 49
End: 2024-07-15
Payer: MEDICAID

## 2024-07-15 ENCOUNTER — PATIENT MESSAGE (OUTPATIENT)
Dept: SPINE | Facility: CLINIC | Age: 49
End: 2024-07-15
Payer: MEDICAID

## 2024-07-15 NOTE — PROGRESS NOTES
PT/PTA STAFFING      [id number]    Date of staffin/15/2024      DME/ orders needed: No    Changes to POC: address hip strengthening- especially gluteals. Continue with core strengthening    Continue with POC: Yes    Tanvi Terrell,DPT  7/15/2024

## 2024-07-15 NOTE — PROGRESS NOTES
"Physical Therapy Daily Treatment Note     [facility]  [facility] Number[facility] [id number]    Therapy Diagnosis:   Encounter Diagnoses   Name Primary?    Impaired mobility Yes    Spinal stenosis of lumbar region with neurogenic claudication        Physician: Carina Mcclure MD    Visit Date: 7/12/2024    Physician Orders: PT Eval and Treat 3x/week x 6 weeks  Medical Diagnosis from Referral:   Diagnosis   M48.062 (ICD-10-CM) - Spinal stenosis of lumbar region with neurogenic claudication   Z74.09,Z78.9 (ICD-10-CM) - Impaired mobility and ADLs      Evaluation Date: 5/21/2024  Authorization Period Expiration: 12/31/24  Plan of Care Expiration: 7/16/24   Updated plan of care: 7/17/24 to 9/4/24  Visit # / Visits authorized: 12/ 20  PTA Visits: 1/5      PN due: 7/25/2024    Time In: 15:00  Time Out: 15:50  Total Billable Time: 50 minutes    Precautions: TLSO when out of bed, no lifting >10lb., no bending     Subjective     Pt reports: he's hurting a little today, however, his pain intensity has improved as of late.   He was compliant with home exercise program.  Response to previous treatment: no adverse effects reported  Functional change: ongoing     Pain: 5/10  Location: low back pain       Objective     Emeka received therapeutic exercises to develop strength, endurance, and core stabilization for 50 minutes including:    Recumbent stepper lower extremity only L6 x 8 mins for improved strength and endurance    Supine:   Transverse abdominal activation with march with 3# 2 x 20   Bridging with Blue thera band 2 x 10   Bent knee fall out Blue thera band 3 x 10 (1 knee goes out to the side at a time)   Double knee to chest with ball 3 x 10   Supine abdominal press (opp arm leg) with ball, 2x10 5" holds  Hamstring stretch with belt 2 x 30 sec bilateral   Quad stretch w/ belt 2 x 30 sec bilateral   +FreeMotion machine palloff press 7lb x 20 on each side     Parallel bar:   Hip extension 3 x 10  bilateral (cues " for glute squeeze) - defer today   Hip abduction 3 x 10 bilateral (cues for abdominal brace when kick out)   Standing hip marching x 20   +gait with upright posture, no UE support, in //bars 3 laps (cues for increase step width to reduce scissor-like gait pattern)     Prone, Graston Technique: (not performed this visit)   To lumbar region: sweeping, fanning. Nudging of bilateral erector spinae      Pain at conclusion: 0/10      Home Exercises Provided and Patient Education Provided     Education provided:   - continue with home exercise program   - instructed patient he should wear TLSO until next follow up MD visit    Written Home Exercises Provided: Patient instructed to cont prior HEP.  Exercises were reviewed and Emeka was able to demonstrate them prior to the end of the session.  Emeka demonstrated good  understanding of the education provided.     See EMR under Patient Instructions for exercises provided prior visit.    Assessment   Patient is currently 9 weeks s/p extension/revision of fusion L2-Pelvis on 5/1/24. Continue to focus on core strengthening, endurance, and to improve lumbar spinal mobility. Added freemotion machine palloff press today which patient tolerates well for core stability. Noted in supine quad stretch, patient showed increase quads/hip flexors muscle tightness on left leg than right. Moderate cues and demonstration to decrease scissor-like gait pattern in parallel bars due to patient goals is to be able to walk without his cane. Patient showed fair gait pattern in parallel bars, however, once he returned to using the cane, he displayed increase scissoring gait. Instructed patient to practice proper gait pattern at home and will work on hip strengthening to prevent scissoring gait.     Emeka Is progressing well towards his goals.   Pt prognosis is Good.     Pt will continue to benefit from skilled outpatient physical therapy to address the deficits listed in the problem list  box on initial evaluation, provide pt/family education and to maximize pt's level of independence in the home and community environment.     Pt's spiritual, cultural and educational needs considered and pt agreeable to plan of care and goals.     Anticipated barriers to physical therapy: length of time impairments have been present     Goals:   Status as of 6/27/24  Short Term Goals: 4 weeks   Patient will report compliance with home exercise program at least 2x/ week to improve speed of progress. Met   Patient will demonstrate improved mobility for household ambulation by performing Timed Up and Go with least restrictive AD in 15 sec. Met   Patient will demonstrate improved lower extremity strength and endurance by performing 5 times sit<>stand test in 25 sec. Met      Long Term Goals: 8 weeks   Patient will demonstrate improved mobility for household ambulation by performing Timed Up and Go with least restrictive AD in 10 sec. improved  Patient will demonstrate improved lower extremity strength and endurance by performing 5 times sit<>stand test in 15 sec met   Patient will demonstrate improved lower extremity strength by 1 grade to enable him to perform job related mobility. ongoing  Patient will ambulate community distances with single point cane to demonstrate a return to PLOF. ongoing  Patient will report reduced back pain to 2-3/10 to demonstrate improved tolerance to daily mobility. Ongoing    Plan   Updated plan of care: 7/17/24 to 9/4/24     Outpatient Physical Therapy 2 times weekly to include the following interventions: Electrical Stimulation , Manual Therapy, Moist Heat/ Ice, Neuromuscular Re-ed, Patient Education, Therapeutic Activities, and Therapeutic Exercise.      manual therapy as needed to address tightness near surgical site    Bibiana Payani  7/12/2024

## 2024-07-16 ENCOUNTER — CLINICAL SUPPORT (OUTPATIENT)
Dept: REHABILITATION | Facility: HOSPITAL | Age: 49
End: 2024-07-16
Payer: MEDICAID

## 2024-07-16 DIAGNOSIS — Z74.09 IMPAIRED MOBILITY: Primary | ICD-10-CM

## 2024-07-16 DIAGNOSIS — M48.062 SPINAL STENOSIS OF LUMBAR REGION WITH NEUROGENIC CLAUDICATION: ICD-10-CM

## 2024-07-16 PROCEDURE — 97110 THERAPEUTIC EXERCISES: CPT | Mod: PN | Performed by: PHYSICAL THERAPIST

## 2024-07-16 NOTE — PROGRESS NOTES
See plan of care for physical therapy progress note    Tanvi Terrell, PT, DPT,   Board-Certified Clinical Specialist in Neurologic Physical Therapy   Certified Brain Injury Specialist

## 2024-07-18 ENCOUNTER — CLINICAL SUPPORT (OUTPATIENT)
Dept: REHABILITATION | Facility: HOSPITAL | Age: 49
End: 2024-07-18
Payer: MEDICAID

## 2024-07-18 DIAGNOSIS — Z78.9 IMPAIRED MOBILITY AND ADLS: Primary | ICD-10-CM

## 2024-07-18 DIAGNOSIS — Z74.09 IMPAIRED MOBILITY AND ADLS: Primary | ICD-10-CM

## 2024-07-18 PROCEDURE — 97110 THERAPEUTIC EXERCISES: CPT | Mod: PN

## 2024-07-18 NOTE — PROGRESS NOTES
Physical Therapy Daily Treatment Note/ Progress Note     [facility] [id number]    Therapy Diagnosis:   Encounter Diagnosis   Name Primary?    Impaired mobility and ADLs Yes       Physician: Carina Mcclure MD    Visit Date: 7/18/2024    Physician Orders: PT Eval and Treat 3x/week x 6 weeks  Medical Diagnosis from Referral:   Diagnosis   M48.062 (ICD-10-CM) - Spinal stenosis of lumbar region with neurogenic claudication   Z74.09,Z78.9 (ICD-10-CM) - Impaired mobility and ADLs      Evaluation Date: 5/21/2024  Authorization Period Expiration: 12/31/24  Plan of Care Expiration: 7/16/24   Updated plan of care: 7/17/24 to 9/4/24  Visit # / Visits authorized: 13/ 20  PTA Visits: 0/5      PN due: 8/25/2024    Time In: 09:00  Time Out: 09:54  Total Billable Time: 54 minutes    Precautions: TLSO when out of bed, no lifting >10lb., no bending     Subjective     Pt reports: Having some pain today   He was compliant with home exercise program.  Response to previous treatment: no adverse effects reported  Functional change: ongoing     Pain: 7/10  Location: low back pain       Objective     Emeka received therapeutic exercises to develop strength, endurance, and core stabilization for 54 minutes including:    Recumbent stepper lower extremity only L6 x 8 mins for improved strength and endurance    Supine:   Transverse abdominal activation with march 2 x 20   Bridging with Blue ball for hip add 2 x 10     Seated:  X 5 Sit to stand from black mat, no upper extremity assist       Parallel bar:   2 X 10 step tap onto 4 in step bilateral lower extremity, bilateral lower extremity, 2 upper extremity assist progress to 1 upper extremity   2 X 10 step up onto 4 in step with opposite lower extremity high knee, bilateral lower extremity,progress to 1 upper extremity assist   X 6 lengths forward stepping over hurdles, 1 upper extremity assist   X 4 lengths side stepping over hurdles, 1 upper extremity  assist     On foam pad:  X 10 heel raise   2 X 30 sec feet together with head rotation  2 X 30 sec feet together with head nods  2 X 30 sec feet together with eyes closed     Scifit level 6 x 9 minutes    Pain at conclusion: 8/10      Home Exercises Provided and Patient Education Provided     Education provided:   - continue with home exercise program       Written Home Exercises Provided: Patient instructed to cont prior HEP.  Exercises were reviewed and Emeka was able to demonstrate them prior to the end of the session.  Emeka demonstrated good  understanding of the education provided.     See EMR under Patient Instructions for exercises provided prior visit.    Assessment     Emeka tolerated session fairly well, he continues to have increased pain with activity. Had 2 episodes of pain causing lower extremity buckling, but able to self correct with upper extremity assist.  He demo's good foot clearance of hurdles, forward and sidestepping, with upper extremity assist.  No loss of balance with static balance on foam pad.  He remains appropriate for skilled Physical Therapy.     Emeka Is progressing well towards his goals.   Pt prognosis is Good.     Pt will continue to benefit from skilled outpatient physical therapy to address the deficits listed in the problem list box on initial evaluation, provide pt/family education and to maximize pt's level of independence in the home and community environment.     Pt's spiritual, cultural and educational needs considered and pt agreeable to plan of care and goals.     Anticipated barriers to physical therapy: length of time impairments have been present     Goals:   Status as of 6/27/24  Short Term Goals: 4 weeks   Patient will report compliance with home exercise program at least 2x/ week to improve speed of progress. Met   Patient will demonstrate improved mobility for household ambulation by performing Timed Up and Go with least restrictive AD in 15 sec.  Met   Patient will demonstrate improved lower extremity strength and endurance by performing 5 times sit<>stand test in 25 sec. Met      Long Term Goals: 8 weeks   Patient will demonstrate improved mobility for household ambulation by performing Timed Up and Go with least restrictive AD in 10 sec. improved  Patient will demonstrate improved lower extremity strength and endurance by performing 5 times sit<>stand test in 15 sec met   Patient will demonstrate improved lower extremity strength by 1 grade to enable him to perform job related mobility. ongoing  Patient will ambulate community distances with single point cane to demonstrate a return to PLOF. ongoing  Patient will report reduced back pain to 2-3/10 to demonstrate improved tolerance to daily mobility. Ongoing    Plan      Outpatient Physical Therapy 2 times weekly to include the following interventions: Electrical Stimulation , Manual Therapy, Moist Heat/ Ice, Neuromuscular Re-ed, Patient Education, Therapeutic Activities, and Therapeutic Exercise.      manual therapy as needed to address tightness, decrease resistance for palloff press    Zeinab Beltran, PT, DPT,   Board-Certified Clinical Specialist in Neurologic Physical Therapy   7/18/2024

## 2024-07-19 NOTE — PLAN OF CARE
Physical Therapy Daily Treatment Note/ Progress Note     Name: Emeka Caballero  Clinic Number: [id number]    Therapy Diagnosis:   Encounter Diagnoses   Name Primary?    Impaired mobility Yes    Spinal stenosis of lumbar region with neurogenic claudication        Physician: Carina Mcclure MD    Visit Date: 7/16/2024    Physician Orders: PT Eval and Treat 3x/week x 6 weeks  Medical Diagnosis from Referral:   Diagnosis   M48.062 (ICD-10-CM) - Spinal stenosis of lumbar region with neurogenic claudication   Z74.09,Z78.9 (ICD-10-CM) - Impaired mobility and ADLs      Evaluation Date: 5/21/2024  Authorization Period Expiration: 12/31/24  Plan of Care Expiration: 7/16/24   Updated plan of care: 7/17/24 to 9/4/24  Visit # / Visits authorized: 13/ 20  PTA Visits: 0/5      PN due: 8/16/2024    Time In: 1300  Time Out: 1345  Total Billable Time: 45 minutes    Precautions: TLSO when out of bed, no lifting >10lb., no bending     Subjective     Pt reports: reports increase in back pain. Arrived with TLSO donned and single point cane   He was compliant with home exercise program.  Response to previous treatment: no adverse effects reported  Functional change: ongoing     Pain: 9/10  Location: low back pain       Objective       Lower Extremity Strength    RLE evaluation  RLE 7/16/24 LLE evaluation  LLE 7/16/24   Hip Flexion: 2+/5 4-/5 4/5 4/5   Hip Extension:  4/5 4-/5 4/5 4-/5   Hip Abduction: 2+/5 3-/5 3-/5 3+/5   Hip Adduction: Not tested  Not tested  Not tested  Not tested    Knee Extension: 4+/5 5/5 5/5 5/5   Knee Flexion: 5/5 4-/5 5/5 5/5   Ankle Dorsiflexion: 4/5 4+/5 5/5 5/5   Ankle Plantarflexion: 4+/5 4+/5 5/5 5/5   Great toe ext 4+/5 Not tested  4+/5 Not tested              Evaluation 6/27/24 7/16/24   Single Limb Stance R LE Not tested   (<10 sec = HIGH FALL RISK) Not tested  Not tested    Single Limb Stance L LE Not tested   (<10 sec = HIGH FALL RISK) Not tested  Not tested    5 times sit-stand 34 seconds w/upper  extremity     13 sec no UE 14 sec no UE   Greene/ FGA/ Tinetti Not tested  Not tested  Not tested       5 times sit<>stand Cutoff scores:  >12 sec= fall risk  PD: >16 seconds= fall risk  Vestibular/balance: 15 seconds over 65 years  CVA: 12 seconds       Evaluation 6/27/24 7/16/24   Timed Up and Go 20.3 sec w/RW  > 20 sec safe for independent transfers,     > 30 sec assist required for transfers 12.7 sec w/SPC 12.2 sec w/SPC   6 meter walk test Not tested  Not tested  Not tested    6 min walk test Not tested  Not tested  Not tested    Timed Up and Go w/SPC= 15.3s + 10.9s + 10.5s     Timed Up and Go fall risk:   Community dwelling older adults >13.5 sec   Chronic CVA >14 sec   Geriatric with h/o falls >15 sec   Frail elderly >32.6 sec    LE amputees >19 sec   PD >7.95- 11.5 sec   Hip OA >10 sec   Vestibular >11.1 sec       Emeka received therapeutic exercises to develop strength, endurance, and core stabilization for 45 minutes including:    Recumbent stepper lower extremity only L6 x 8 mins for improved strength and endurance    Supine:   Hamstring stretch with belt 2 x 30 sec bilateral   Quad stretch w/ belt 2 x 30 sec bilateral   Transverse abdominal activation with march with 3# 2 x 20   Bridging with Blue thera band 2 x 10     FreeMotion machine palloff press 7lb x 20 on each side     Parallel bar:   Side stepping Red thera band, bilateral upper extremity support- 3 laps      Moist heat to lumbar region x 10 mins with exercises       Pain at conclusion: 9/10      Home Exercises Provided and Patient Education Provided     Education provided:   - continue with home exercise program       Written Home Exercises Provided: Patient instructed to cont prior HEP.  Exercises were reviewed and Emeka was able to demonstrate them prior to the end of the session.  Emeka demonstrated good  understanding of the education provided.     See EMR under Patient Instructions for exercises provided prior  visit.    Assessment   Assessment period: 6/25/24 to 7/16/2024    Emeka arrived with reports of increased pain x 3 days. Patient reports pain in glutes, QL, and lower abdominal region. Prior to that patient was reporting reduced pain. Today patient presented with significant tightness in bilateral hip flexors and quads. Despite increased pain patient demonstrates improvement in right lower extremity strength. Weakness in bilateral hips is still present. Mobility per Timed Up and Go and endurance per 5 times sit<>stand test is ~same despite an increase in pain. During stretching today patient exhibited significant bilateral hip flexor and quad tightness. patient reported decreased pain during session until palloff press was performed. Resistance for this exercise will be reduced next performance. Patient can benefit from continued skilled physical therapy to progress towards highest functional level possible     Emeka Is progressing well towards his goals.   Pt prognosis is Good.     Pt will continue to benefit from skilled outpatient physical therapy to address the deficits listed in the problem list box on initial evaluation, provide pt/family education and to maximize pt's level of independence in the home and community environment.     Pt's spiritual, cultural and educational needs considered and pt agreeable to plan of care and goals.     Anticipated barriers to physical therapy: length of time impairments have been present     Goals:   Status as of 7/16/24  Short Term Goals: 4 weeks   Patient will report compliance with home exercise program at least 2x/ week to improve speed of progress. Met   Patient will demonstrate improved mobility for household ambulation by performing Timed Up and Go with least restrictive AD in 15 sec. Met   Patient will demonstrate improved lower extremity strength and endurance by performing 5 times sit<>stand test in 25 sec. Met      Long Term Goals: 8 weeks   Patient will  demonstrate improved mobility for household ambulation by performing Timed Up and Go with least restrictive AD in 10 sec. improved  Patient will demonstrate improved lower extremity strength and endurance by performing 5 times sit<>stand test in 15 sec met   Patient will demonstrate improved lower extremity strength by 1 grade to enable him to perform job related mobility. ongoing  Patient will ambulate community distances with single point cane to demonstrate a return to PLOF. ongoing  Patient will report reduced back pain to 2-3/10 to demonstrate improved tolerance to daily mobility. Ongoing    Plan      Outpatient Physical Therapy 2 times weekly to include the following interventions: Electrical Stimulation , Manual Therapy, Moist Heat/ Ice, Neuromuscular Re-ed, Patient Education, Therapeutic Activities, and Therapeutic Exercise.      manual therapy as needed to address tightness decrease resistance for palloff press    Tanvi Terrell, PT, DPT,   Board-Certified Clinical Specialist in Neurologic Physical Therapy   Certified Brain Injury Specialist    7/16/2024

## 2024-07-23 ENCOUNTER — CLINICAL SUPPORT (OUTPATIENT)
Dept: REHABILITATION | Facility: HOSPITAL | Age: 49
End: 2024-07-23
Payer: MEDICAID

## 2024-07-23 DIAGNOSIS — Z98.1 S/P LUMBAR FUSION: Primary | ICD-10-CM

## 2024-07-23 DIAGNOSIS — Z74.09 IMPAIRED MOBILITY: ICD-10-CM

## 2024-07-23 PROCEDURE — 97110 THERAPEUTIC EXERCISES: CPT | Mod: PN,CQ

## 2024-07-23 RX ORDER — METHOCARBAMOL 500 MG/1
500 TABLET, FILM COATED ORAL 4 TIMES DAILY
Qty: 40 TABLET | Refills: 0 | Status: SHIPPED | OUTPATIENT
Start: 2024-07-23 | End: 2024-08-02

## 2024-07-23 NOTE — PROGRESS NOTES
"Physical Therapy Daily Treatment Note     [facility]  [facility] Number[id number]    Therapy Diagnosis:   Encounter Diagnoses   Name Primary?    S/P lumbar fusion Yes    Impaired mobility        Physician: Carina Mcclure MD    Visit Date: 7/23/2024    Physician Orders: PT Eval and Treat 3x/week x 6 weeks  Medical Diagnosis from Referral:   Diagnosis   M48.062 (ICD-10-CM) - Spinal stenosis of lumbar region with neurogenic claudication   Z74.09,Z78.9 (ICD-10-CM) - Impaired mobility and ADLs      Evaluation Date: 5/21/2024  Authorization Period Expiration: 12/31/24  Plan of Care Expiration: 7/16/24   Updated plan of care: 7/17/24 to 9/4/24  Visit # / Visits authorized: 15/ 20  PTA Visits: 1/5      PN due: 8/25/2024    Time In: 14:30  Time Out: 15:20  Total Billable Time: 50 minutes    Precautions: TLSO when out of bed, no lifting >10lb., no bending     Subjective     Pt reports: he was in a lot of pain over the weekend and unsure why. He had to take pain pill to help eases the pain.   He was compliant with home exercise program.  Response to previous treatment: no adverse effects reported  Functional change: ongoing     Pain: 7/10  Location: low back pain       Objective     Emeka received therapeutic exercises to develop strength, endurance, and core stabilization for 50 minutes including:      Recumbent stepper lower extremity only L7 x 8 mins for improved strength and endurance     Supine:   Transverse abdominal activation with march with 3# 2 x 20   Bridging with Blue thera band 2 x 10   Bent knee fall out Blue thera band 3 x 10 (1 knee goes out to the side at a time)   Double knee to chest with ball 3 x 10   Supine abdominal press (opp arm leg) with ball, 2x10 5" holds  Hamstring stretch with belt 2 x 30 sec bilateral   Quad stretch w/ belt 2 x 30 sec bilateral   FreeMotion machine palloff press 7lb x 20 on each side      Parallel bar:   Hip extension 3 x 10  bilateral (cues for glute squeeze) " - defer today   Hip abduction 3 x 10 bilateral (cues for abdominal brace when kick out)   Standing hip marching x 20      Parallel bar:   Side stepping Red thera band, bilateral upper extremity support- 3 laps        Moist heat to lumbar region x 10 mins with exercises        Prone, Graston Technique: (not performed this visit)   To lumbar region: sweeping, fanning. Nudging of bilateral erector spinae      Home Exercises Provided and Patient Education Provided     Education provided:   - continue with home exercise program       Written Home Exercises Provided: Patient instructed to cont prior HEP.  Exercises were reviewed and Emeka was able to demonstrate them prior to the end of the session.  Emeka demonstrated good  understanding of the education provided.     See EMR under Patient Instructions for exercises provided prior visit.    Assessment   Patient arrives with TLSO brace donned, ambulating with a SPC. Patient appears to have experience significant increase back pain over the weekend, therefore, focused on low intensity exercises this visit such as core stability and legs strengthening to help improve his symptoms. Applied thermotherapy to low back while patient is completing his exercises on mat for decrease pain and for muscles relaxation. Patient tolerates well with most exercises today without reproduction of back pain. At this time, will slowly progress patient as tolerated.     Emeka Is progressing well towards his goals.   Pt prognosis is Good.     Pt will continue to benefit from skilled outpatient physical therapy to address the deficits listed in the problem list box on initial evaluation, provide pt/family education and to maximize pt's level of independence in the home and community environment.     Pt's spiritual, cultural and educational needs considered and pt agreeable to plan of care and goals.     Anticipated barriers to physical therapy: length of time impairments have been  present     Goals:   Status as of 6/27/24  Short Term Goals: 4 weeks   Patient will report compliance with home exercise program at least 2x/ week to improve speed of progress. Met   Patient will demonstrate improved mobility for household ambulation by performing Timed Up and Go with least restrictive AD in 15 sec. Met   Patient will demonstrate improved lower extremity strength and endurance by performing 5 times sit<>stand test in 25 sec. Met      Long Term Goals: 8 weeks   Patient will demonstrate improved mobility for household ambulation by performing Timed Up and Go with least restrictive AD in 10 sec. improved  Patient will demonstrate improved lower extremity strength and endurance by performing 5 times sit<>stand test in 15 sec met   Patient will demonstrate improved lower extremity strength by 1 grade to enable him to perform job related mobility. ongoing  Patient will ambulate community distances with single point cane to demonstrate a return to PLOF. ongoing  Patient will report reduced back pain to 2-3/10 to demonstrate improved tolerance to daily mobility. Ongoing    Plan      Outpatient Physical Therapy 2 times weekly to include the following interventions: Electrical Stimulation , Manual Therapy, Moist Heat/ Ice, Neuromuscular Re-ed, Patient Education, Therapeutic Activities, and Therapeutic Exercise.      manual therapy as needed to address tightness, decrease resistance for palloff press    Bibiana Vargas, PTA  7/23/2024

## 2024-07-26 ENCOUNTER — PATIENT MESSAGE (OUTPATIENT)
Dept: NEUROSURGERY | Facility: CLINIC | Age: 49
End: 2024-07-26
Payer: MEDICAID

## 2024-07-26 ENCOUNTER — TELEPHONE (OUTPATIENT)
Dept: NEUROSURGERY | Facility: CLINIC | Age: 49
End: 2024-07-26
Payer: MEDICAID

## 2024-07-26 RX ORDER — HYDROCODONE BITARTRATE AND ACETAMINOPHEN 5; 325 MG/1; MG/1
1 TABLET ORAL EVERY 12 HOURS PRN
Qty: 14 TABLET | Refills: 0 | Status: SHIPPED | OUTPATIENT
Start: 2024-07-26 | End: 2024-08-02

## 2024-07-26 RX ORDER — HYDROCODONE BITARTRATE AND ACETAMINOPHEN 5; 325 MG/1; MG/1
1 TABLET ORAL EVERY 12 HOURS PRN
Qty: 14 TABLET | Refills: 0 | Status: SHIPPED | OUTPATIENT
Start: 2024-07-26 | End: 2024-07-26 | Stop reason: SDUPTHER

## 2024-07-26 NOTE — TELEPHONE ENCOUNTER
Called Lynn at [facility] pharmacy and provided diagnosis code for requested medication

## 2024-07-29 ENCOUNTER — TELEPHONE (OUTPATIENT)
Dept: ENDOSCOPY | Facility: HOSPITAL | Age: 49
End: 2024-07-29
Payer: MEDICAID

## 2024-07-29 DIAGNOSIS — Z12.11 SCREEN FOR COLON CANCER: Primary | ICD-10-CM

## 2024-07-29 NOTE — TELEPHONE ENCOUNTER
Spoke to patient's significant other to schedule Colonoscopy       Physician to perform procedure(s) Dr. MERI Dong  Date of Procedure (s) 8/26/24  Arrival Time 7:00 AM  Time of Procedure(s) 8:00 AM   Location of Procedure(s) [address]   Type of Rx Prep sent to patient's pharmacy: PEG extended  Instructions provided to patient via [facility]    The following information was discussed with patient, and patient verbalized understanding:  Screening questionnaire reviewed with patient and complete. If procedure requires anesthesia, a responsible adult needs to be present to accompany the patient home. Appointment details are tentative, especially check-in time. Patient will receive a pre-op call 7 days prior to appointment to confirm check-in time for procedure. If applicable the patient should contact their pharmacy to verify Rx for procedure prep is ready for pick-up. Patient was instructed to call the scheduling department at [phone] if pharmacy states no Rx is available. Patient was also advised to call the endoscopy scheduling department if any questions or concerns arise.    Message sent to Endoscopy clearance nurse per protocol to submit post op clearance to Alphonse Dillon MD.       Endoscopy Scheduling Department

## 2024-07-30 ENCOUNTER — CLINICAL SUPPORT (OUTPATIENT)
Dept: REHABILITATION | Facility: HOSPITAL | Age: 49
End: 2024-07-30
Payer: MEDICAID

## 2024-07-30 DIAGNOSIS — Z74.09 IMPAIRED MOBILITY: ICD-10-CM

## 2024-07-30 DIAGNOSIS — Z98.1 S/P LUMBAR FUSION: Primary | ICD-10-CM

## 2024-07-30 PROCEDURE — 97110 THERAPEUTIC EXERCISES: CPT | Mod: PN,CQ

## 2024-07-30 NOTE — PROGRESS NOTES
"Physical Therapy Daily Treatment Note     [facility]  [id number]    Therapy Diagnosis:   Encounter Diagnoses   Name Primary?    S/P lumbar fusion Yes    Impaired mobility        Physician: Carina Mcclure MD    Visit Date: 7/30/2024    Physician Orders: PT Eval and Treat 3x/week x 6 weeks  Medical Diagnosis from Referral:   Diagnosis   M48.062 (ICD-10-CM) - Spinal stenosis of lumbar region with neurogenic claudication   Z74.09,Z78.9 (ICD-10-CM) - Impaired mobility and ADLs      Evaluation Date: 5/21/2024  Authorization Period Expiration: 12/31/24  Plan of Care Expiration: 7/16/24   Updated plan of care: 7/17/24 to 9/4/24  Visit # / Visits authorized: 16/ 20  PTA Visits: 1/5      PN due: 8/25/2024    Time In: 16:15  Time Out: 17:00  Total Billable Time: 45 minutes    Precautions: TLSO when out of bed, no lifting >10lb., no bending     Subjective     Pt reports: his hurting in the back today mainly on the right side.   He was compliant with home exercise program.  Response to previous treatment: no adverse effects reported  Functional change: ongoing     Pain: 7/10  Location: low back pain       Objective     Emeka received therapeutic exercises to develop strength, endurance, and core stabilization for 45 minutes including:      Recumbent stepper lower extremity only L7 x 8 mins for improved strength and endurance     Supine:   Transverse abdominal activation with march with +4# 2 x 20   Bridging with Black thera band 2 x 10   Bent knee fall out Black thera band 3 x 10 (1 knee goes out to the side at a time)   Double knee to chest with ball 3 x 10   Supine abdominal press (opp arm leg) with ball, 2x10 5" holds  Hamstring stretch with belt 2 x 30 sec bilateral   Quad stretch w/ belt 2 x 30 sec bilateral   FreeMotion machine palloff press 7lb x 20 on each side        Prone, Graston Technique:   To lumbar region: sweeping, fanning. Nudging of bilateral erector spinae      Moist heat " to lumbar region x 10 mins with exercises      Not performed below this visit:   Parallel bar:   Hip extension 3 x 10  bilateral (cues for glute squeeze) - defer today   Hip abduction 3 x 10 bilateral (cues for abdominal brace when kick out)   Standing hip marching x 20      Parallel bar:   Side stepping Red thera band, bilateral upper extremity support- 3 laps       Home Exercises Provided and Patient Education Provided     Education provided:   - continue with home exercise program       Written Home Exercises Provided: Patient instructed to cont prior HEP.  Exercises were reviewed and Emeka was able to demonstrate them prior to the end of the session.  Emeka demonstrated good  understanding of the education provided.     See EMR under Patient Instructions for exercises provided prior visit.    Assessment   Patient arrives with TLSO brace donned, ambulating with a SPC. Still presents with back pain mainly located on right side of low back. He was able to complete all supine exercises for core stability and to improve lumbar spinal mobility with no major problem. Ended session with manual therapy using Graston techniques to the affected area for pain relief. Patient responded well post therapy. He has a follow up with his MD post-surgical on 8/15/2024.     Emeka Is progressing well towards his goals.   Pt prognosis is Good.     Pt will continue to benefit from skilled outpatient physical therapy to address the deficits listed in the problem list box on initial evaluation, provide pt/family education and to maximize pt's level of independence in the home and community environment.     Pt's spiritual, cultural and educational needs considered and pt agreeable to plan of care and goals.     Anticipated barriers to physical therapy: length of time impairments have been present     Goals:   Status as of 6/27/24  Short Term Goals: 4 weeks   Patient will report compliance with home exercise program at least  2x/ week to improve speed of progress. Met   Patient will demonstrate improved mobility for household ambulation by performing Timed Up and Go with least restrictive AD in 15 sec. Met   Patient will demonstrate improved lower extremity strength and endurance by performing 5 times sit<>stand test in 25 sec. Met      Long Term Goals: 8 weeks   Patient will demonstrate improved mobility for household ambulation by performing Timed Up and Go with least restrictive AD in 10 sec. improved  Patient will demonstrate improved lower extremity strength and endurance by performing 5 times sit<>stand test in 15 sec met   Patient will demonstrate improved lower extremity strength by 1 grade to enable him to perform job related mobility. ongoing  Patient will ambulate community distances with single point cane to demonstrate a return to PLOF. ongoing  Patient will report reduced back pain to 2-3/10 to demonstrate improved tolerance to daily mobility. Ongoing    Plan      Outpatient Physical Therapy 2 times weekly to include the following interventions: Electrical Stimulation , Manual Therapy, Moist Heat/ Ice, Neuromuscular Re-ed, Patient Education, Therapeutic Activities, and Therapeutic Exercise.      manual therapy as needed to address tightness, decrease resistance for palloff press    Bibiana Vargas, PTA  7/30/2024

## 2024-07-31 ENCOUNTER — TELEPHONE (OUTPATIENT)
Dept: ENDOSCOPY | Facility: HOSPITAL | Age: 49
End: 2024-07-31
Payer: MEDICAID

## 2024-07-31 NOTE — TELEPHONE ENCOUNTER
----- Message from Anabel Chavarria RN sent at 7/29/2024  5:52 PM CDT -----  Regarding: post op clearance  The patient is currently under an internal neurosurgeon's care (Alphonse Reed MD) and requires a post op clearance for their upcoming Colonoscopy on 8/26/24. Patient has office visit scheduled with Dr. Reed on 8/15/24.

## 2024-08-02 ENCOUNTER — DOCUMENTATION ONLY (OUTPATIENT)
Dept: REHABILITATION | Facility: HOSPITAL | Age: 49
End: 2024-08-02
Payer: MEDICAID

## 2024-08-02 NOTE — PROGRESS NOTES
Missed Visit/Cancellation      Date: 8/2/2024         Canceled Number: 0  No Show Number: 1                                                                                                                Pt initially had visit scheduled for today for 1300.   Reason for cancellation: no show.    Pt's next scheduled physical therapy visit is 8/6/24.    Tanvi Terrell, PT, DPT  8/2/2024

## 2024-08-06 ENCOUNTER — CLINICAL SUPPORT (OUTPATIENT)
Dept: REHABILITATION | Facility: HOSPITAL | Age: 49
End: 2024-08-06
Payer: MEDICAID

## 2024-08-06 DIAGNOSIS — Z74.09 IMPAIRED MOBILITY: Primary | ICD-10-CM

## 2024-08-06 PROCEDURE — 97110 THERAPEUTIC EXERCISES: CPT | Mod: PN | Performed by: PHYSICAL THERAPIST

## 2024-08-08 ENCOUNTER — NURSE TRIAGE (OUTPATIENT)
Dept: ADMINISTRATIVE | Facility: CLINIC | Age: 49
End: 2024-08-08
Payer: MEDICAID

## 2024-08-08 ENCOUNTER — HOSPITAL ENCOUNTER (EMERGENCY)
Facility: HOSPITAL | Age: 49
Discharge: HOME | End: 2024-08-09
Attending: EMERGENCY MEDICINE
Payer: MEDICAID

## 2024-08-08 DIAGNOSIS — R73.9 HYPERGLYCEMIA: Primary | ICD-10-CM

## 2024-08-08 DIAGNOSIS — R00.0 TACHYCARDIA: ICD-10-CM

## 2024-08-08 LAB
ALBUMIN SERPL BCP-MCNC: 3.8 G/DL (ref 3.5–5.2)
ALLENS TEST: ABNORMAL
ALP SERPL-CCNC: 162 U/L (ref 55–135)
ALT SERPL W/O P-5'-P-CCNC: 16 U/L (ref 10–44)
ANION GAP SERPL CALC-SCNC: 9 MMOL/L (ref 8–16)
AST SERPL-CCNC: 14 U/L (ref 10–40)
B-OH-BUTYR BLD STRIP-SCNC: 0.1 MMOL/L (ref 0–0.5)
BASOPHILS # BLD AUTO: 0.04 K/UL (ref 0–0.2)
BASOPHILS NFR BLD: 0.7 % (ref 0–1.9)
BILIRUB SERPL-MCNC: 0.3 MG/DL (ref 0.1–1)
BUN SERPL-MCNC: 23 MG/DL (ref 6–20)
CALCIUM SERPL-MCNC: 9.6 MG/DL (ref 8.7–10.5)
CHLORIDE SERPL-SCNC: 106 MMOL/L (ref 95–110)
CO2 SERPL-SCNC: 20 MMOL/L (ref 23–29)
CREAT SERPL-MCNC: 1.2 MG/DL (ref 0.5–1.4)
DELSYS: ABNORMAL
DIFFERENTIAL METHOD BLD: ABNORMAL
EOSINOPHIL # BLD AUTO: 0.1 K/UL (ref 0–0.5)
EOSINOPHIL NFR BLD: 2.1 % (ref 0–8)
ERYTHROCYTE [DISTWIDTH] IN BLOOD BY AUTOMATED COUNT: 16.5 % (ref 11.5–14.5)
EST. GFR  (NO RACE VARIABLE): >60 ML/MIN/1.73 M^2
GLUCOSE SERPL-MCNC: 456 MG/DL (ref 70–110)
HCO3 UR-SCNC: 23 MMOL/L (ref 24–28)
HCT VFR BLD AUTO: 40.8 % (ref 40–54)
HGB BLD-MCNC: 13 G/DL (ref 14–18)
IMM GRANULOCYTES # BLD AUTO: 0.01 K/UL (ref 0–0.04)
IMM GRANULOCYTES NFR BLD AUTO: 0.2 % (ref 0–0.5)
LYMPHOCYTES # BLD AUTO: 3.1 K/UL (ref 1–4.8)
LYMPHOCYTES NFR BLD: 50.1 % (ref 18–48)
MCH RBC QN AUTO: 24.1 PG (ref 27–31)
MCHC RBC AUTO-ENTMCNC: 31.9 G/DL (ref 32–36)
MCV RBC AUTO: 76 FL (ref 82–98)
MONOCYTES # BLD AUTO: 0.4 K/UL (ref 0.3–1)
MONOCYTES NFR BLD: 5.9 % (ref 4–15)
NEUTROPHILS # BLD AUTO: 2.5 K/UL (ref 1.8–7.7)
NEUTROPHILS NFR BLD: 41 % (ref 38–73)
NRBC BLD-RTO: 0 /100 WBC
PCO2 BLDA: 36.4 MMHG (ref 35–45)
PH SMN: 7.41 [PH] (ref 7.35–7.45)
PLATELET # BLD AUTO: 235 K/UL (ref 150–450)
PMV BLD AUTO: 11.9 FL (ref 9.2–12.9)
PO2 BLDA: 65 MMHG (ref 40–60)
POC BE: -1 MMOL/L
POC SATURATED O2: 93 % (ref 95–100)
POC TCO2: 24 MMOL/L (ref 24–29)
POCT GLUCOSE: 262 MG/DL (ref 70–110)
POCT GLUCOSE: 376 MG/DL (ref 70–110)
POCT GLUCOSE: 420 MG/DL (ref 70–110)
POTASSIUM SERPL-SCNC: 4 MMOL/L (ref 3.5–5.1)
PROT SERPL-MCNC: 8.1 G/DL (ref 6–8.4)
RBC # BLD AUTO: 5.4 M/UL (ref 4.6–6.2)
SAMPLE: ABNORMAL
SITE: ABNORMAL
SODIUM SERPL-SCNC: 135 MMOL/L (ref 136–145)
WBC # BLD AUTO: 6.15 K/UL (ref 3.9–12.7)

## 2024-08-08 PROCEDURE — 99900035 HC TECH TIME PER 15 MIN (STAT)

## 2024-08-08 PROCEDURE — 96374 THER/PROPH/DIAG INJ IV PUSH: CPT

## 2024-08-08 PROCEDURE — 93010 ELECTROCARDIOGRAM REPORT: CPT | Mod: ,,, | Performed by: INTERNAL MEDICINE

## 2024-08-08 PROCEDURE — 82962 GLUCOSE BLOOD TEST: CPT

## 2024-08-08 PROCEDURE — 81000 URINALYSIS NONAUTO W/SCOPE: CPT | Performed by: EMERGENCY MEDICINE

## 2024-08-08 PROCEDURE — 96361 HYDRATE IV INFUSION ADD-ON: CPT

## 2024-08-08 PROCEDURE — 25000003 PHARM REV CODE 250: Performed by: NURSE PRACTITIONER

## 2024-08-08 PROCEDURE — 99285 EMERGENCY DEPT VISIT HI MDM: CPT | Mod: 25

## 2024-08-08 PROCEDURE — 82803 BLOOD GASES ANY COMBINATION: CPT

## 2024-08-08 PROCEDURE — 80053 COMPREHEN METABOLIC PANEL: CPT | Performed by: EMERGENCY MEDICINE

## 2024-08-08 PROCEDURE — 85025 COMPLETE CBC W/AUTO DIFF WBC: CPT | Performed by: EMERGENCY MEDICINE

## 2024-08-08 PROCEDURE — 93005 ELECTROCARDIOGRAM TRACING: CPT

## 2024-08-08 PROCEDURE — 63600175 PHARM REV CODE 636 W HCPCS: Performed by: EMERGENCY MEDICINE

## 2024-08-08 PROCEDURE — 82010 KETONE BODYS QUAN: CPT | Performed by: EMERGENCY MEDICINE

## 2024-08-08 RX ADMIN — SODIUM CHLORIDE 1000 ML: 9 INJECTION, SOLUTION INTRAVENOUS at 10:08

## 2024-08-08 RX ADMIN — INSULIN HUMAN 5 UNITS: 100 INJECTION, SOLUTION PARENTERAL at 10:08

## 2024-08-09 VITALS
BODY MASS INDEX: 29.78 KG/M2 | RESPIRATION RATE: 19 BRPM | TEMPERATURE: 98 F | DIASTOLIC BLOOD PRESSURE: 89 MMHG | HEART RATE: 91 BPM | HEIGHT: 70 IN | WEIGHT: 208 LBS | OXYGEN SATURATION: 99 % | SYSTOLIC BLOOD PRESSURE: 135 MMHG

## 2024-08-09 LAB
BACTERIA #/AREA URNS HPF: NORMAL /HPF
BILIRUB UR QL STRIP: NEGATIVE
CLARITY UR: CLEAR
COLOR UR: YELLOW
GLUCOSE UR QL STRIP: ABNORMAL
HGB UR QL STRIP: NEGATIVE
KETONES UR QL STRIP: NEGATIVE
LEUKOCYTE ESTERASE UR QL STRIP: NEGATIVE
MICROSCOPIC COMMENT: NORMAL
NITRITE UR QL STRIP: NEGATIVE
OHS QRS DURATION: 102 MS
OHS QTC CALCULATION: 457 MS
PH UR STRIP: 6 [PH] (ref 5–8)
PROT UR QL STRIP: NEGATIVE
RBC #/AREA URNS HPF: 1 /HPF (ref 0–4)
SP GR UR STRIP: >1.03 (ref 1–1.03)
URN SPEC COLLECT METH UR: ABNORMAL
UROBILINOGEN UR STRIP-ACNC: NEGATIVE EU/DL
WBC #/AREA URNS HPF: 1 /HPF (ref 0–5)
YEAST URNS QL MICRO: NORMAL

## 2024-08-09 NOTE — DISCHARGE INSTRUCTIONS
Drink plenty of electrolyte-rich fluids (at least one gallon or more daily).  Limit/avoid caffeine (such as coffee, tea, Coke, Coke Zero, Pepsi, Root Beer, Dr .Pepper, Mountain Dew, energy drinks, pre-workout supplements, and many others.) and alcohol intake while symptoms persist.    Begin taking Metformin 1000 mg daily while hyperglycemia persists and call your doctor in the morning to discuss long term blood sugar management.

## 2024-08-09 NOTE — ED PROVIDER NOTES
Encounter Date: 8/8/2024    SCRIBE #1 NOTE: I, Loraine Li, am scribing for, and in the presence of,  Rommel Crenshaw MD.       History     Chief Complaint   Patient presents with    Hyperglycemia     States glucose has been elevated the last week in the 400-500s despite med compliance. Associated Polyuria, polydispsia, and dizziness.  in triage.     48 yo M w/ DM, HTN,, lumbar fusion presenting to the ED for acute concerns of hyperglycemia for the last 6 days. He reports his blood sugars have been running 450-600s at home despite adherence with Metformin 500 mg ER qd. He also reports additionally taking something that helps lower his blood sugars that he brought from his [place]. He also reports subjective fever, urianry frequency and polyuria, dry mouth, diaphoresis for the past few days.  He also reports lumbar back pain and R thigh pain that has been persistent since his lumbar fusion on 5/1/24. He has been on oxycodone and muscle relaxants daily to alleviate his pain. He further notes constipation stating his last BM was 5-6 days ago. He does not take any stool softeners or laxatives. No other exacerbating or alleviating factors. Denies saddle anesthesia, urianry/bowel incontinence, penile discharge, open sores, sore throat, cough, congestion, chest pain, dyspnea, abdominal pain, or other associated symptoms. No recent abx or steroid use. No sick contacts.     The history is provided by the patient.     Review of patient's allergies indicates:  No Known Allergies  Past Medical History:   Diagnosis Date    Arthritis     Diabetes mellitus, type 2 2/11/2022    Essential hypertension, benign 5/4/2023     Past Surgical History:   Procedure Laterality Date    CATARACT EXTRACTION Right     EPIDURAL STEROID INJECTION N/A 10/21/2020    Procedure: Injection, Steroid, Epidural Caudal;  Surgeon: Vpiul Nixon Jr., MD;  Location: [facility];  Service: Pain Management;  Laterality: N/A;  Caudal KURT  Arrive @  1315; No ATC or DM; Needs MD Signature    INJECTION, SACROILIAC JOINT Bilateral 12/27/2022    Procedure: INJECTION,SACROILIAC JOINT, BILATERAL CONTRAST DIRECT REF  ORDERED;  Surgeon: Brielle José MD;  Location: [facility] PAIN MGT;  Service: Pain Management;  Laterality: Bilateral;    INJECTION, SACROILIAC JOINT Right 11/27/2023    Procedure: INJECTION,SACROILIAC JOINT RIGHT DIRECT REFERRAL  Sooner date;  Surgeon: Brielle José MD;  Location: [facility] PAIN [facility]GT;  Service: Pain Management;  Laterality: Right;    LASER ENUCLEATION OF PROSTATE N/A 3/14/2023    Procedure: ENUCLEATION, PROSTATE, USING LASER;  Surgeon: Cecil Murray MD;  Location: [facility] OR;  Service: Urology;  Laterality: N/A;    LUMBAR FUSION N/A 2/8/2022    Procedure: FUSION, SPINE, LUMBAR;  Surgeon: Alphonse Reed MD;  Location: [facility] OR 2ND FLR;  Service: Neurosurgery;  Laterality: N/A;  AIRO, L4-S1    LUMBAR FUSION N/A 5/1/2024    Procedure: **AIRO** L2-pelvis extension and revision of fusion,;  Surgeon: Alphonse Reed MD;  Location: [facility] OR 2ND FLR;  Service: Neurosurgery;  Laterality: N/A;  L2-pelvis extension and revision of fusion /co case Celestre/AIRO  anesthesia: general  brace: LSO  radiology: C-arm, AIRO  neuro mon: EMG/SEP/MEP  position: prone  bed: [place] [id number] post  headrest: prone view     Family History   Problem Relation Name Age of Onset    Cataracts Mother      No Known Problems Father      No Known Problems Sister      No Known Problems Brother      No Known Problems Maternal Aunt      No Known Problems Maternal Uncle      No Known Problems Paternal Aunt      No Known Problems Paternal Uncle      No Known Problems Maternal Grandmother      No Known Problems Maternal Grandfather      No Known Problems Paternal Grandmother      No Known Problems Paternal Grandfather       Social History     Tobacco Use    Smoking status: Some Days     Types: Cigarettes    Smokeless tobacco: Never   Substance Use Topics    Alcohol use: Yes     Comment:  socially    Drug use: No     Review of Systems   Constitutional:  Positive for diaphoresis and fever.   HENT:  Negative for congestion and sore throat.    Respiratory:  Negative for cough and shortness of breath.    Cardiovascular:  Negative for chest pain.   Gastrointestinal:  Positive for constipation.   Endocrine: Positive for polyuria.   Genitourinary:  Positive for frequency.   Musculoskeletal:  Positive for arthralgias, back pain and myalgias.   All other systems reviewed and are negative.      Physical Exam     Initial Vitals [08/08/24 1935]   BP Pulse Resp Temp SpO2   129/79 (!) 117 18 98.4 °F (36.9 °C) 98 %      MAP       --         Physical Exam    Nursing note and vitals reviewed.  Constitutional: He appears well-developed and well-nourished. He is not diaphoretic. No distress.   HENT:   Head: Normocephalic and atraumatic.   Eyes: EOM are normal.   Neck:   Normal range of motion.  Pulmonary/Chest: No respiratory distress.   Abdominal: Abdomen is soft. There is no abdominal tenderness.   Musculoskeletal:      Cervical back: Normal range of motion.      Lumbar back: Tenderness (bl paraspinal) present.        Back:      Neurological: He is alert and oriented to person, place, and time.   Skin: Skin is dry.         ED Course   Procedures  Labs Reviewed   CBC W/ AUTO DIFFERENTIAL - Abnormal       Result Value    WBC 6.15      RBC 5.40      Hemoglobin 13.0 (*)     Hematocrit 40.8      MCV 76 (*)     MCH 24.1 (*)     MCHC 31.9 (*)     RDW 16.5 (*)     Platelets 235      MPV 11.9      Immature Granulocytes 0.2      Gran # (ANC) 2.5      Immature Grans (Abs) 0.01      Lymph # 3.1      Mono # 0.4      Eos # 0.1      Baso # 0.04      nRBC 0      Gran % 41.0      Lymph % 50.1 (*)     Mono % 5.9      Eosinophil % 2.1      Basophil % 0.7      Differential Method Automated     COMPREHENSIVE METABOLIC PANEL - Abnormal    Sodium 135 (*)     Potassium 4.0      Chloride 106      CO2 20 (*)     Glucose 456 (*)     BUN 23 (*)      Creatinine 1.2      Calcium 9.6      Total Protein 8.1      Albumin 3.8      Total Bilirubin 0.3      Alkaline Phosphatase 162 (*)     AST 14      ALT 16      eGFR >60      Anion Gap 9      Narrative:     GLU  critical result(s) called and verbal readback obtained from   LULA OTT @08/08/24 09:30 PM  by SUMEET 08/08/2024 21:30   URINALYSIS, REFLEX TO URINE CULTURE - Abnormal    Specimen UA Urine, Clean Catch      Color, UA Yellow      Appearance, UA Clear      pH, UA 6.0      Specific Gravity, UA >1.030 (*)     Protein, UA Negative      Glucose, UA 4+ (*)     Ketones, UA Negative      Bilirubin (UA) Negative      Occult Blood UA Negative      Nitrite, UA Negative      Urobilinogen, UA Negative      Leukocytes, UA Negative      Narrative:     Specimen Source->Urine   POCT GLUCOSE - Abnormal    POCT Glucose 420 (*)    ISTAT PROCEDURE - Abnormal    POC PH 7.409      POC PCO2 36.4      POC PO2 65 (*)     POC HCO3 23.0 (*)     POC BE -1      POC SATURATED O2 93      POC TCO2 24      Sample VENOUS      Site Other      Allens Test N/A      DelSys Room Air     POCT GLUCOSE - Abnormal    POCT Glucose 376 (*)    POCT GLUCOSE - Abnormal    POCT Glucose 262 (*)    BETA - HYDROXYBUTYRATE, SERUM    Beta-Hydroxybutyrate 0.1     URINALYSIS MICROSCOPIC    RBC, UA 1      WBC, UA 1      Bacteria None      Yeast, UA None      Microscopic Comment SEE COMMENT      Narrative:     Specimen Source->Urine     EKG Readings: (Independently Interpreted)   Initial Reading: No STEMI. Rhythm: Normal Sinus Rhythm. Heart Rate: 98. Axis: Left Axis Deviation. Clinical Impression: Left Ventricular Hypertrophy (LDH)     ECG Results              EKG 12-lead (Final result)        Collection Time Result Time QRS Duration OHS QTC Calculation    08/08/24 22:19:12 08/09/24 17:44:07 102 457                     Final result by Interface, Lab In [facility] (08/09/24 17:44:13)                   Narrative:    Test Reason : R00.0,    Vent. Rate : 098 BPM      Atrial Rate : 098 BPM     P-R Int : 162 ms          QRS Dur : 102 ms      QT Int : 358 ms       P-R-T Axes : 055 -31 025 degrees     QTc Int : 457 ms    Normal sinus rhythm  Left axis deviation  Moderate voltage criteria for LVH, may be normal variant  Abnormal ECG  When compared with ECG of 25-APR-2024 13:17,  No significant change was found  Confirmed by Vipul Vaca MD ([id number]) on 8/9/2024 5:44:03 PM    Referred By: FERNANDAERR   SELF           Confirmed By:Vipul Vaca MD                                  Imaging Results              X-Ray Chest AP Portable (Final result)  Result time 08/08/24 20:36:19      Final result by Yves Bryant MD (08/08/24 20:36:19)                   Impression:      No acute cardiopulmonary process identified.      Electronically signed by: Yves Bryant MD  Date:    08/08/2024  Time:    20:36               Narrative:    EXAMINATION:  XR CHEST AP PORTABLE    CLINICAL HISTORY:  hyperglycemia;    TECHNIQUE:  Single frontal view of the chest was performed.    COMPARISON:  05/06/2024.    FINDINGS:  Cardiac silhouette is normal in size.  Lungs are symmetrically expanded.  No evidence of focal consolidative process, pneumothorax, or significant pleural effusion.  No acute osseous abnormality identified.                                       Medications   sodium chloride 0.9% bolus 1,000 mL 1,000 mL (0 mLs Intravenous Stopped 8/8/24 7095)   insulin regular injection 5 Units 0.05 mL (5 Units Intravenous Given 8/8/24 2229)     Medical Decision Making  Risk  OTC drugs.            Scribe Attestation:   Scribe #1: I performed the above scribed service and the documentation accurately describes the services I performed. I attest to the accuracy of the note.                   Medical Decision Making:   Differential Diagnosis:   DKA, dehydration, electrolyte abnormality, medication non-compliance, steroid use, sepsis, others  Clinical Tests:   Lab Tests: Ordered and Reviewed  Radiological  Study: Ordered and Reviewed  Medical Tests: Ordered and Reviewed  ED Management:  Labs with hyperglycemia without DKA criteria.  Hyperglycemia improved with IVFs and insulin.  Lab results, imaging results, outpatient management plan, outpatient PCP follow up and ED return precautions discussed with patient with understanding and agreement.            Labs Reviewed  Pertinent lab and imaging findings include:   Urine specific gravity elevated, 4+ glucosuria, negative ketonuria, serum pH 7.4, white count 6.15, H&H 13 and 40, sodium 135, bicarb 20, glucose 456, creatinine 1.2, BUN 23, GFR greater than 60, anion gap nine, beta hydroxybutyrate 0.1, chest x-ray negative for acute abnormality    Admission on 08/08/2024, Discharged on 08/09/2024   Component Date Value Ref Range Status    POCT Glucose 08/08/2024 420 (H)  70 - 110 mg/dL Final    QRS Duration 08/08/2024 102  ms Final    OHS QTC Calculation 08/08/2024 457  ms Final    WBC 08/08/2024 6.15  3.90 - 12.70 K/uL Final    RBC 08/08/2024 5.40  4.60 - 6.20 M/uL Final    Hemoglobin 08/08/2024 13.0 (L)  14.0 - 18.0 g/dL Final    Hematocrit 08/08/2024 40.8  40.0 - 54.0 % Final    MCV 08/08/2024 76 (L)  82 - 98 fL Final    MCH 08/08/2024 24.1 (L)  27.0 - 31.0 pg Final    MCHC 08/08/2024 31.9 (L)  32.0 - 36.0 g/dL Final    RDW 08/08/2024 16.5 (H)  11.5 - 14.5 % Final    Platelets 08/08/2024 235  150 - 450 K/uL Final    MPV 08/08/2024 11.9  9.2 - 12.9 fL Final    Immature Granulocytes 08/08/2024 0.2  0.0 - 0.5 % Final    Gran # (ANC) 08/08/2024 2.5  1.8 - 7.7 K/uL Final    Immature Grans (Abs) 08/08/2024 0.01  0.00 - 0.04 K/uL Final    Comment: Mild elevation in immature granulocytes is non specific and   can be seen in a variety of conditions including stress response,   acute inflammation, trauma and pregnancy. Correlation with other   laboratory and clinical findings is essential.      Lymph # 08/08/2024 3.1  1.0 - 4.8 K/uL Final    Mono # 08/08/2024 0.4  0.3 - 1.0 K/uL  Final    Eos # 08/08/2024 0.1  0.0 - 0.5 K/uL Final    Baso # 08/08/2024 0.04  0.00 - 0.20 K/uL Final    nRBC 08/08/2024 0  0 /100 WBC Final    Gran % 08/08/2024 41.0  38.0 - 73.0 % Final    Lymph % 08/08/2024 50.1 (H)  18.0 - 48.0 % Final    Mono % 08/08/2024 5.9  4.0 - 15.0 % Final    Eosinophil % 08/08/2024 2.1  0.0 - 8.0 % Final    Basophil % 08/08/2024 0.7  0.0 - 1.9 % Final    Differential Method 08/08/2024 Automated   Final    Sodium 08/08/2024 135 (L)  136 - 145 mmol/L Final    Potassium 08/08/2024 4.0  3.5 - 5.1 mmol/L Final    Chloride 08/08/2024 106  95 - 110 mmol/L Final    CO2 08/08/2024 20 (L)  23 - 29 mmol/L Final    Glucose 08/08/2024 456 (HH)  70 - 110 mg/dL Final    Comment: GLU  critical result(s) called and verbal readback obtained from   LULA OTT @08/08/24 09:30 PM  by SUMEET 08/08/2024 21:30      BUN 08/08/2024 23 (H)  6 - 20 mg/dL Final    Creatinine 08/08/2024 1.2  0.5 - 1.4 mg/dL Final    Calcium 08/08/2024 9.6  8.7 - 10.5 mg/dL Final    Total Protein 08/08/2024 8.1  6.0 - 8.4 g/dL Final    Albumin 08/08/2024 3.8  3.5 - 5.2 g/dL Final    Total Bilirubin 08/08/2024 0.3  0.1 - 1.0 mg/dL Final    Comment: For infants and newborns, interpretation of results should be based  on gestational age, weight and in agreement with clinical  observations.    Premature Infant recommended reference ranges:  Up to 24 hours.............<8.0 mg/dL  Up to 48 hours............<12.0 mg/dL  3-5 days..................<15.0 mg/dL  6-29 days.................<15.0 mg/dL      Alkaline Phosphatase 08/08/2024 162 (H)  55 - 135 U/L Final    AST 08/08/2024 14  10 - 40 U/L Final    ALT 08/08/2024 16  10 - 44 U/L Final    eGFR 08/08/2024 >60  >60 mL/min/1.73 m^2 Final    Anion Gap 08/08/2024 9  8 - 16 mmol/L Final    Beta-Hydroxybutyrate 08/08/2024 0.1  0.0 - 0.5 mmol/L Final    Specimen UA 08/08/2024 Urine, Clean Catch   Final    Color, UA 08/08/2024 Yellow  Yellow, Straw, Lucille Final    Appearance, UA 08/08/2024 Clear   Clear Final    pH, UA 08/08/2024 6.0  5.0 - 8.0 Final    Specific Gravity, UA 08/08/2024 >1.030 (A)  1.005 - 1.030 Final    Protein, UA 08/08/2024 Negative  Negative Final    Comment: Recommend a 24 hour urine protein or a urine   protein/creatinine ratio if globulin induced proteinuria is  clinically suspected.      Glucose, UA 08/08/2024 4+ (A)  Negative Final    Ketones, UA 08/08/2024 Negative  Negative Final    Bilirubin (UA) 08/08/2024 Negative  Negative Final    Occult Blood UA 08/08/2024 Negative  Negative Final    Nitrite, UA 08/08/2024 Negative  Negative Final    Urobilinogen, UA 08/08/2024 Negative  <2.0 EU/dL Final    Leukocytes, UA 08/08/2024 Negative  Negative Final    POC PH 08/08/2024 7.409  7.35 - 7.45 Final    POC PCO2 08/08/2024 36.4  35 - 45 mmHg Final    POC PO2 08/08/2024 65 (HH)  40 - 60 mmHg Final    POC HCO3 08/08/2024 23.0 (L)  24 - 28 mmol/L Final    POC BE 08/08/2024 -1  -2 to 2 mmol/L Final    POC SATURATED O2 08/08/2024 93  95 - 100 % Final    POC TCO2 08/08/2024 24  24 - 29 mmol/L Final    Sample 08/08/2024 VENOUS   Final    Site 08/08/2024 Other   Final    Allens Test 08/08/2024 N/A   Final    DelSys 08/08/2024 Room Air   Final    POCT Glucose 08/08/2024 376 (H)  70 - 110 mg/dL Final    POCT Glucose 08/08/2024 262 (H)  70 - 110 mg/dL Final    RBC, UA 08/08/2024 1  0 - 4 /hpf Final    WBC, UA 08/08/2024 1  0 - 5 /hpf Final    Bacteria 08/08/2024 None  None-Occ /hpf Final    Yeast, UA 08/08/2024 None  None Final    Microscopic Comment 08/08/2024 SEE COMMENT   Final    Comment: Other formed elements not mentioned in the report are not   present in the microscopic examination.           Imaging Reviewed    Imaging Results              X-Ray Chest AP Portable (Final result)  Result time 08/08/24 20:36:19      Final result by Yves Bryant MD (08/08/24 20:36:19)                   Impression:      No acute cardiopulmonary process identified.      Electronically signed by: Yves Bryant,  MD  Date:    08/08/2024  Time:    20:36               Narrative:    EXAMINATION:  XR CHEST AP PORTABLE    CLINICAL HISTORY:  hyperglycemia;    TECHNIQUE:  Single frontal view of the chest was performed.    COMPARISON:  05/06/2024.    FINDINGS:  Cardiac silhouette is normal in size.  Lungs are symmetrically expanded.  No evidence of focal consolidative process, pneumothorax, or significant pleural effusion.  No acute osseous abnormality identified.                                      Medications given in ED    Medications   sodium chloride 0.9% bolus 1,000 mL 1,000 mL (0 mLs Intravenous Stopped 8/8/24 8135)   insulin regular injection 5 Units 0.05 mL (5 Units Intravenous Given 8/8/24 2229)         Note was created using voice recognition software. Note may have occasional typographical errors that may not have been identified and edited despite good mirza initial review prior to signing.    I, Rommel Crenshaw MD, personally performed the services described in this documentation. All medical record entries made by the scribe were at my direction and in my presence.  I have reviewed the chart and agree that the record reflects my personal performance and is accurate and complete.        Clinical Impression:  Final diagnoses:  [R00.0] Tachycardia  [R73.9] Hyperglycemia (Primary)          ED Disposition Condition    Discharge Stable          ED Prescriptions    None       Follow-up Information       Follow up With Specialties Details Why Contact Info    The nearest emergency department.  Go to  As needed, If symptoms worsen     Dio Zarate Jr., MD Family Medicine Call today to schedule an appointment, for re-evaluation of today's complaint, and ongoing care [address] [id number]  [phone]               Rommel Crenshaw MD  08/14/24 040

## 2024-08-13 ENCOUNTER — LAB VISIT (OUTPATIENT)
Dept: LAB | Facility: HOSPITAL | Age: 49
End: 2024-08-13
Attending: INTERNAL MEDICINE
Payer: MEDICAID

## 2024-08-13 ENCOUNTER — OFFICE VISIT (OUTPATIENT)
Dept: FAMILY MEDICINE | Facility: CLINIC | Age: 49
End: 2024-08-13
Payer: MEDICAID

## 2024-08-13 ENCOUNTER — CLINICAL SUPPORT (OUTPATIENT)
Dept: REHABILITATION | Facility: HOSPITAL | Age: 49
End: 2024-08-13
Payer: MEDICAID

## 2024-08-13 VITALS
HEART RATE: 97 BPM | HEIGHT: 70 IN | BODY MASS INDEX: 28.53 KG/M2 | DIASTOLIC BLOOD PRESSURE: 74 MMHG | WEIGHT: 199.31 LBS | OXYGEN SATURATION: 98 % | SYSTOLIC BLOOD PRESSURE: 126 MMHG | TEMPERATURE: 98 F

## 2024-08-13 DIAGNOSIS — Z98.1 S/P LUMBAR FUSION: ICD-10-CM

## 2024-08-13 DIAGNOSIS — I10 ESSENTIAL HYPERTENSION, BENIGN: Chronic | ICD-10-CM

## 2024-08-13 DIAGNOSIS — Z74.09 IMPAIRED MOBILITY: Primary | ICD-10-CM

## 2024-08-13 DIAGNOSIS — E11.69 TYPE 2 DIABETES MELLITUS WITH HYPERLIPIDEMIA: Chronic | ICD-10-CM

## 2024-08-13 DIAGNOSIS — E11.69 TYPE 2 DIABETES MELLITUS WITH HYPERLIPIDEMIA: Primary | Chronic | ICD-10-CM

## 2024-08-13 DIAGNOSIS — E78.5 TYPE 2 DIABETES MELLITUS WITH HYPERLIPIDEMIA: Primary | Chronic | ICD-10-CM

## 2024-08-13 DIAGNOSIS — E78.5 TYPE 2 DIABETES MELLITUS WITH HYPERLIPIDEMIA: Chronic | ICD-10-CM

## 2024-08-13 LAB
ALBUMIN SERPL BCP-MCNC: 3.7 G/DL (ref 3.5–5.2)
ALP SERPL-CCNC: 125 U/L (ref 55–135)
ALT SERPL W/O P-5'-P-CCNC: 17 U/L (ref 10–44)
ANION GAP SERPL CALC-SCNC: 11 MMOL/L (ref 8–16)
AST SERPL-CCNC: 13 U/L (ref 10–40)
BILIRUB SERPL-MCNC: 0.4 MG/DL (ref 0.1–1)
BUN SERPL-MCNC: 15 MG/DL (ref 6–20)
CALCIUM SERPL-MCNC: 9.4 MG/DL (ref 8.7–10.5)
CHLORIDE SERPL-SCNC: 107 MMOL/L (ref 95–110)
CO2 SERPL-SCNC: 22 MMOL/L (ref 23–29)
CREAT SERPL-MCNC: 1.2 MG/DL (ref 0.5–1.4)
EST. GFR  (NO RACE VARIABLE): >60 ML/MIN/1.73 M^2
GLUCOSE SERPL-MCNC: 269 MG/DL (ref 70–110)
GLUCOSE SERPL-MCNC: 293 MG/DL (ref 70–110)
POTASSIUM SERPL-SCNC: 3.9 MMOL/L (ref 3.5–5.1)
PROT SERPL-MCNC: 7.7 G/DL (ref 6–8.4)
SODIUM SERPL-SCNC: 140 MMOL/L (ref 136–145)

## 2024-08-13 PROCEDURE — 3044F HG A1C LEVEL LT 7.0%: CPT | Mod: CPTII,,, | Performed by: INTERNAL MEDICINE

## 2024-08-13 PROCEDURE — 80053 COMPREHEN METABOLIC PANEL: CPT | Performed by: INTERNAL MEDICINE

## 2024-08-13 PROCEDURE — 4010F ACE/ARB THERAPY RXD/TAKEN: CPT | Mod: CPTII,,, | Performed by: INTERNAL MEDICINE

## 2024-08-13 PROCEDURE — 99999 PR PBB SHADOW E&M-EST. PATIENT-LVL IV: CPT | Mod: PBBFAC,,, | Performed by: INTERNAL MEDICINE

## 2024-08-13 PROCEDURE — 3078F DIAST BP <80 MM HG: CPT | Mod: CPTII,,, | Performed by: INTERNAL MEDICINE

## 2024-08-13 PROCEDURE — 3008F BODY MASS INDEX DOCD: CPT | Mod: CPTII,,, | Performed by: INTERNAL MEDICINE

## 2024-08-13 PROCEDURE — 1159F MED LIST DOCD IN RCRD: CPT | Mod: CPTII,,, | Performed by: INTERNAL MEDICINE

## 2024-08-13 PROCEDURE — 3074F SYST BP LT 130 MM HG: CPT | Mod: CPTII,,, | Performed by: INTERNAL MEDICINE

## 2024-08-13 PROCEDURE — 36415 COLL VENOUS BLD VENIPUNCTURE: CPT | Mod: PN | Performed by: INTERNAL MEDICINE

## 2024-08-13 PROCEDURE — 97110 THERAPEUTIC EXERCISES: CPT | Mod: PN,CQ

## 2024-08-13 PROCEDURE — 83036 HEMOGLOBIN GLYCOSYLATED A1C: CPT | Performed by: INTERNAL MEDICINE

## 2024-08-13 PROCEDURE — 99214 OFFICE O/P EST MOD 30 MIN: CPT | Mod: PBBFAC,PN | Performed by: INTERNAL MEDICINE

## 2024-08-13 PROCEDURE — 3072F LOW RISK FOR RETINOPATHY: CPT | Mod: CPTII,,, | Performed by: INTERNAL MEDICINE

## 2024-08-13 PROCEDURE — 82962 GLUCOSE BLOOD TEST: CPT | Mod: PBBFAC,PN | Performed by: INTERNAL MEDICINE

## 2024-08-13 PROCEDURE — 99214 OFFICE O/P EST MOD 30 MIN: CPT | Mod: S$PBB,,, | Performed by: INTERNAL MEDICINE

## 2024-08-13 PROCEDURE — 99999PBSHW POCT GLUCOSE, HAND-HELD DEVICE: Mod: PBBFAC,,,

## 2024-08-13 RX ORDER — GLIPIZIDE 5 MG/1
5 TABLET ORAL
Qty: 60 TABLET | Refills: 2 | Status: SHIPPED | OUTPATIENT
Start: 2024-08-13 | End: 2024-11-11

## 2024-08-13 RX ORDER — METFORMIN HYDROCHLORIDE 500 MG/1
1000 TABLET, EXTENDED RELEASE ORAL
Start: 2024-08-13

## 2024-08-13 NOTE — PROGRESS NOTES
"HISTORY OF PRESENT ILLNESS:  Emeka Caballero is a 49 y.o. male who presents to the clinic today for Diabetes (Blood sugar ranging from 289 -500 x1 months)    This is my first encounter with patient.  Recent surgery for spinal stenosis.  Upcoming Colonoscopy to follow up positive FIT test.    His only diabetic medication at present is Metformin ER 1000 mg daily - he notes he was directed at recent ED encounter to increase to two tablets of metformin  mg.  Seen in ED 24 for elevated blood glc.    Notes home glucose readings are still high.  He does not have a log with him today but reports he does have equipment at home for testing.    From his recall:  Fasting AM g-289  Before lunch: 300s  Before dinner: 300s    Used to eat "regular food" but since seeing high sugar he report he is changing his diet.  Avoiding Coke, rice, bread.  Stopped coffee.    Dietary recall for today:  Today Bkfst: two slice wheat bread, turkey slice  Today lunch: chicken, rice broccoli.    He is without any other symptoms today.    Hemoglobin A1C   Date Value Ref Range Status   2024 6.8 (H) 4.0 - 5.6 % Final     Comment:     ADA Screening Guidelines:  5.7-6.4%  Consistent with prediabetes  >or=6.5%  Consistent with diabetes    High levels of fetal hemoglobin interfere with the HbA1C  assay. Heterozygous hemoglobin variants (HbS, HgC, etc)do  not significantly interfere with this assay.   However, presence of multiple variants may affect accuracy.     10/25/2023 6.6 (H) 4.0 - 5.6 % Final     Comment:     ADA Screening Guidelines:  5.7-6.4%  Consistent with prediabetes  >or=6.5%  Consistent with diabetes    High levels of fetal hemoglobin interfere with the HbA1C  assay. Heterozygous hemoglobin variants (HbS, HgC, etc)do  not significantly interfere with this assay.   However, presence of multiple variants may affect accuracy.     2023 6.6 (H) 4.0 - 5.6 % Final     Comment:     ADA Screening " Guidelines:  5.7-6.4%  Consistent with prediabetes  >or=6.5%  Consistent with diabetes    High levels of fetal hemoglobin interfere with the HbA1C  assay. Heterozygous hemoglobin variants (HbS, HgC, etc)do  not significantly interfere with this assay.   However, presence of multiple variants may affect accuracy.       Recent POCT g/8/24: 262, 376, 420     PAST MEDICAL HISTORY:  Past Medical History:   Diagnosis Date    Arthritis     Diabetes mellitus, type 2 2022    Essential hypertension, benign 2023       PAST SURGICAL HISTORY:  Past Surgical History:   Procedure Laterality Date    CATARACT EXTRACTION Right     EPIDURAL STEROID INJECTION N/A 10/21/2020    Procedure: Injection, Steroid, Epidural Caudal;  Surgeon: Vipul Nixon Jr., MD;  Location: [facility] ENDO;  Service: Pain Management;  Laterality: N/A;  Caudal KURT  Arrive @ 1315; No ATC or DM; Needs MD Signature    INJECTION, SACROILIAC JOINT Bilateral 2022    Procedure: INJECTION,SACROILIAC JOINT, BILATERAL CONTRAST DIRECT REF  ORDERED;  Surgeon: Brielle José MD;  Location: [facility], [place] PAIN MGT;  Service: Pain Management;  Laterality: Bilateral;    INJECTION, SACROILIAC JOINT Right 2023    Procedure: INJECTION,SACROILIAC JOINT RIGHT DIRECT REFERRAL  Sooner date;  Surgeon: Brielle José MD;  Location: [facility], [place] PAIN MGT;  Service: Pain Management;  Laterality: Right;    LASER ENUCLEATION OF PROSTATE N/A 3/14/2023    Procedure: ENUCLEATION, PROSTATE, USING LASER;  Surgeon: Cecil Murray MD;  Location: [facility] OR;  Service: Urology;  Laterality: N/A;    LUMBAR FUSION N/A 2022    Procedure: FUSION, SPINE, LUMBAR;  Surgeon: Alphonse Reed MD;  Location: [facility] OR 2ND FLR;  Service: Neurosurgery;  Laterality: N/A;  AIRO, L4-S1    LUMBAR FUSION N/A 2024    Procedure: **AIRO** L2-pelvis extension and revision of fusion,;  Surgeon: Alphonse Reed MD;  Location: [facility] OR 2ND FLR;  Service: Neurosurgery;  Laterality: N/A;  L2-pelvis  extension and revision of fusion /co case Celestre/AIRO  anesthesia: general  brace: LSO  radiology: C-arm, AIRO  neuro mon: EMG/SEP/MEP  position: prone  bed: [place] 4 post  headrest: prone view       SOCIAL HISTORY:  Social History     Socioeconomic History    Marital status: Single   Tobacco Use    Smoking status: Some Days     Types: Cigarettes    Smokeless tobacco: Never   Substance and Sexual Activity    Alcohol use: Yes     Comment: socially    Drug use: No     Social Determinants of Health     Financial Resource Strain: Low Risk  (5/2/2024)    Overall Financial Resource Strain (CARDIA)     Difficulty of Paying Living Expenses: Not hard at all   Food Insecurity: No Food Insecurity (5/2/2024)    Hunger Vital Sign     Worried About Running Out of Food in the Last Year: Never true     Ran Out of Food in the Last Year: Never true   Transportation Needs: No Transportation Needs (5/2/2024)    PRAPARE - Transportation     Lack of Transportation (Medical): No     Lack of Transportation (Non-Medical): No   Physical Activity: Insufficiently Active (5/2/2024)    Exercise Vital Sign     Days of Exercise per Week: 4 days     Minutes of Exercise per Session: 10 min   Stress: No Stress Concern Present (5/2/2024)    [employer] - Occupational Stress Questionnaire     Feeling of Stress : Not at all   Housing Stability: Low Risk  (5/2/2024)    Housing Stability Vital Sign     Unable to Pay for Housing in the Last Year: No     Homeless in the Last Year: No       FAMILY HISTORY:  Family History   Problem Relation Name Age of Onset    Cataracts Mother      No Known Problems Father      No Known Problems Sister      No Known Problems Brother      No Known Problems Maternal Aunt      No Known Problems Maternal Uncle      No Known Problems Paternal Aunt      No Known Problems Paternal Uncle      No Known Problems Maternal Grandmother      No Known Problems Maternal Grandfather      No Known Problems  Paternal Grandmother      No Known Problems Paternal Grandfather         ALLERGIES AND MEDICATIONS: updated and reviewed.  Review of patient's allergies indicates:  No Known Allergies  Medication List with Changes/Refills   Current Medications    ATORVASTATIN (LIPITOR) 40 MG TABLET    Take 1 tablet (40 mg total) by mouth once daily.    BLOOD SUGAR DIAGNOSTIC STRP    To check BG 3 times daily, to use with insurance preferred meter    BLOOD-GLUCOSE METER KIT    To check BG 3 times daily, to use with insurance preferred meter    GABAPENTIN (NEURONTIN) 300 MG CAPSULE    Take 1 capsule (300 mg total) by mouth every evening.    LANCETS MISC    To check BG 3 times daily, to use with insurance preferred meter    LINACLOTIDE (LINZESS) 72 MCG CAP CAPSULE    Take 1 capsule (72 mcg total) by mouth daily as needed (constipation).    LISINOPRIL (PRINIVIL,ZESTRIL) 5 MG TABLET    Take 1 tablet (5 mg total) by mouth once daily.    METFORMIN (GLUCOPHAGE-XR) 500 MG ER 24HR TABLET    Take 1 tablet (500 mg total) by mouth daily with breakfast.    OMEGA-3 FATTY ACIDS-FISH -1,000 MG CAP    Take 1 capsule by mouth once daily.    OXYBUTYNIN (DITROPAN-XL) 10 MG 24 HR TABLET    Take 1 tablet (10 mg total) by mouth once daily.    PANTOPRAZOLE (PROTONIX) 40 MG TABLET    Take 1 tablet (40 mg total) by mouth once daily.    TADALAFIL (CIALIS) 20 MG TAB    Take 1 tablet (20 mg total) by mouth daily as needed (Erectile Dysfunction).    TAMSULOSIN (FLOMAX) 0.4 MG CAP    Take 1 capsule (0.4 mg total) by mouth once daily.          CARE TEAM:  Patient Care Team:  Dio Zarate Jr., MD as PCP - General (Family Medicine)  Katy Allan MA as Care Coordinator  Alberta Pettit MA as Care Coordinator         REVIEW OF SYSTEMS:  Review of Systems   Constitutional:  Negative for chills and fever.   HENT:  Negative for congestion and postnasal drip.    Eyes:  Negative for photophobia and visual disturbance.   Respiratory:  Negative for cough  and shortness of breath.    Cardiovascular:  Negative for chest pain and palpitations.   Gastrointestinal:  Negative for abdominal pain, nausea and vomiting.   Genitourinary:  Negative for dysuria and frequency.   Musculoskeletal:  Positive for back pain and gait problem.   Neurological:  Negative for light-headedness and headaches.   Psychiatric/Behavioral:  Negative for dysphoric mood. The patient is not nervous/anxious.          PHYSICAL EXAM:  Vitals:    08/13/24 1432   BP: 126/74   Pulse: 97   Temp: 97.9 °F (36.6 °C)             Body mass index is 28.6 kg/m².    Physical Examination: General appearance - alert, well appearing, and in no distress and overweight  Mental status - normal mood, behavior, speech, dress, motor activity, and thought processes  Eyes - sclera anicteric, left eye normal, right eye normal  Chest - no tachypnea, retractions or cyanosis  Neurological - alert, oriented, normal speech, no focal findings or movement disorder noted  Musculoskeletal - no joint tenderness, deformity or swelling  Extremities - no pedal edema, no clubbing or cyanosis       ASSESSMENT AND PLAN:  Type 2 diabetes mellitus with hyperlipidemia  -     Hemoglobin A1C; Future; Expected date: 08/13/2024  -     Comprehensive Metabolic Panel; Future; Expected date: 08/13/2024  -     glipiZIDE (GLUCOTROL) 5 MG tablet; Take 1 tablet (5 mg total) by mouth 2 (two) times daily before meals.  Dispense: 60 tablet; Refill: 2  -     POCT Glucose, Hand-Held Device - 269 today  -     Ambulatory referral/consult to Diabetes Education; Future; Expected date: 08/20/2024  -     metFORMIN (GLUCOPHAGE-XR) 500 MG ER 24hr tablet; Take 2 tablets (1,000 mg total) by mouth daily with breakfast.  - Reinforced efforts to avoid all added sugar and foods high in starch.  Patient is being referred to diabetic education as well.  - At this time in setting of self-reported high glucose, high reading in office and at ED - patient advised for continuation  of metformin XR 1000 mg daily and addition of glipizide 5 mg bid with continued qAC monitoring of glucose.  Provided with Blood glc log sheets to record readings and bring to office.  Depending on results of A1C and CMP, will advise of any other changes to medication and next steps in management.    Essential hypertension, benign        - Stable on current medical management.        Follow up 2 months or sooner as needed.

## 2024-08-13 NOTE — PROGRESS NOTES
"Physical Therapy Daily Treatment Note     [facility]  [facility] Number[facility] [id number]    Therapy Diagnosis:   Encounter Diagnoses   Name Primary?    Impaired mobility Yes    S/P lumbar fusion          Physician: Carina Mcclure MD    Visit Date: 8/13/2024    Physician Orders: PT Eval and Treat 3x/week x 6 weeks  Medical Diagnosis from Referral:   Diagnosis   M48.062 (ICD-10-CM) - Spinal stenosis of lumbar region with neurogenic claudication   Z74.09,Z78.9 (ICD-10-CM) - Impaired mobility and ADLs      Evaluation Date: 5/21/2024  Authorization Period Expiration: 12/31/24  Updated plan of care: 7/17/24 to 9/4/24  Visit # / Visits authorized: 18/ 40  PTA Visits: 1/5      PN due: 8/25/2024    Time In: 12:00pm  Time Out: 12:40pm  Total Billable Time: 40 minutes    Precautions: TLSO when out of bed, no lifting >10lb., no bending     Subjective     Pt reports: he has a follow up appointment with his doctor for his back this Thursday. He still has right sided low back pain, no problem on the left side.   He was compliant with home exercise program.  Response to previous treatment: no adverse effects reported  Functional change: ongoing     Pain: 8/10  Location: low back pain       Objective     Emeka received therapeutic exercises to develop strength, endurance, and core stabilization for 40 minutes including:      Recumbent stepper L7 bilateral upper extremity/ lower extremity x 8 mins for improved strength and endurance     Supine:   straight leg raise with transverse abdominal activation 2# 2 x 10  Bridging with Black thera band 2 x 10   Double knee to chest with ball 1 mins x 2   Supine abdominal press (opp arm leg) with ball, 2x10 5" holds  Hamstring stretch with belt 2 x 30 sec bilateral   Quad stretch w/ belt 2 x 30 sec bilateral   +Quadruped bird dog arms only x 10   +Quadruped bird dog legs only x 10      FreeMotion machine palloff press 10lb x 20 on each side      Moist heat to lumbar region x 10 " mins with exercises       Home Exercises Provided and Patient Education Provided     Education provided:   - continue with home exercise program       Written Home Exercises Provided: Patient instructed to cont prior HEP.  Exercises were reviewed and Emeka was able to demonstrate them prior to the end of the session.  Emeka demonstrated good  understanding of the education provided.     See EMR under Patient Instructions for exercises provided prior visit.    Assessment   Introduced quadruped exercises for core/pelvis stability and strengthening this visit. Patient required tactile cues in quadruped bird dog legs only to engage the core muscles and to prevent excessive hip rotations. Patient tolerates fairly with this exercise. Ended session in standing palloff press. Verbal cues for proper breathing techniques - inhale and exhale in punching out to activate the core muscles. Patient had mild discomforts in right sided low back throughout session. Will follow up with patient after his doctor visit.     Emeka Is progressing well towards his goals.   Pt prognosis is Good.     Pt will continue to benefit from skilled outpatient physical therapy to address the deficits listed in the problem list box on initial evaluation, provide pt/family education and to maximize pt's level of independence in the home and community environment.     Pt's spiritual, cultural and educational needs considered and pt agreeable to plan of care and goals.     Anticipated barriers to physical therapy: length of time impairments have been present     Goals:   Status as of 8/6/24  Short Term Goals: 4 weeks   Patient will report compliance with home exercise program at least 2x/ week to improve speed of progress. Met   Patient will demonstrate improved mobility for household ambulation by performing Timed Up and Go with least restrictive AD in 15 sec. Met   Patient will demonstrate improved lower extremity strength and endurance by  performing 5 times sit<>stand test in 25 sec. Met      Long Term Goals: 8 weeks   Patient will demonstrate improved mobility for household ambulation by performing Timed Up and Go with least restrictive AD in 10 sec. improved  Patient will demonstrate improved lower extremity strength and endurance by performing 5 times sit<>stand test in 15 sec met   Patient will demonstrate improved lower extremity strength by 1 grade to enable him to perform job related mobility. ongoing  Patient will ambulate community distances with single point cane to demonstrate a return to PLOF. ongoing  Patient will report reduced back pain to 2-3/10 to demonstrate improved tolerance to daily mobility. Ongoing    Plan      Outpatient Physical Therapy 2 times weekly to include the following interventions: Electrical Stimulation , Manual Therapy, Moist Heat/ Ice, Neuromuscular Re-ed, Patient Education, Therapeutic Activities, and Therapeutic Exercise.      manual therapy as needed to address tightness, begin prone/ quadruped strengthening as tolerated    Bibiana Vargas, PTA  8/13/2024

## 2024-08-14 DIAGNOSIS — E78.5 TYPE 2 DIABETES MELLITUS WITH HYPERLIPIDEMIA: Primary | ICD-10-CM

## 2024-08-14 DIAGNOSIS — E11.69 TYPE 2 DIABETES MELLITUS WITH HYPERLIPIDEMIA: Primary | ICD-10-CM

## 2024-08-14 DIAGNOSIS — E11.65 UNCONTROLLED TYPE 2 DIABETES MELLITUS WITH HYPERGLYCEMIA: Primary | ICD-10-CM

## 2024-08-14 LAB
ESTIMATED AVG GLUCOSE: 260 MG/DL (ref 68–131)
HBA1C MFR BLD: 10.7 % (ref 4–5.6)

## 2024-08-14 NOTE — TELEPHONE ENCOUNTER
Care Due:                  Date            Visit Type   Department     Provider  --------------------------------------------------------------------------------                                [facility]                              PRIMARY      MEDICINE/  Last Visit: 08-      CARE ([facility])   INTERNAL MED   Fito Coffey                              [facility]                              PRIMARY      MEDICINE/  Next Visit: 10-      CARE ([facility])   INTERNAL MED   Dio Zarate                                                            Last  Test          Frequency    Reason                     Performed    Due Date  --------------------------------------------------------------------------------    HBA1C.......  6 months...  glipiZIDE, metFORMIN.....  05-   10-    Lipid Panel.  12 months..  atorvastatin.............  10-   10-    [facility] Embedded Care Due Messages. Reference number: [id number].   8/14/2024 9:14:36 AM CDT

## 2024-08-15 ENCOUNTER — OFFICE VISIT (OUTPATIENT)
Dept: NEUROSURGERY | Facility: CLINIC | Age: 49
End: 2024-08-15
Payer: MEDICAID

## 2024-08-15 ENCOUNTER — HOSPITAL ENCOUNTER (OUTPATIENT)
Dept: RADIOLOGY | Facility: HOSPITAL | Age: 49
Discharge: HOME | End: 2024-08-15
Attending: NURSE PRACTITIONER
Payer: MEDICAID

## 2024-08-15 ENCOUNTER — PATIENT MESSAGE (OUTPATIENT)
Dept: FAMILY MEDICINE | Facility: CLINIC | Age: 49
End: 2024-08-15
Payer: MEDICAID

## 2024-08-15 VITALS — DIASTOLIC BLOOD PRESSURE: 77 MMHG | HEART RATE: 85 BPM | SYSTOLIC BLOOD PRESSURE: 126 MMHG

## 2024-08-15 DIAGNOSIS — E11.65 UNCONTROLLED TYPE 2 DIABETES MELLITUS WITH HYPERGLYCEMIA: Primary | ICD-10-CM

## 2024-08-15 DIAGNOSIS — M48.07 SPINAL STENOSIS, LUMBOSACRAL REGION: ICD-10-CM

## 2024-08-15 DIAGNOSIS — E78.5 TYPE 2 DIABETES MELLITUS WITH HYPERLIPIDEMIA: Primary | Chronic | ICD-10-CM

## 2024-08-15 DIAGNOSIS — E11.69 TYPE 2 DIABETES MELLITUS WITH HYPERLIPIDEMIA: Primary | Chronic | ICD-10-CM

## 2024-08-15 DIAGNOSIS — Z98.1 S/P LUMBAR SPINAL FUSION: ICD-10-CM

## 2024-08-15 DIAGNOSIS — Z98.1 S/P LUMBAR SPINAL FUSION: Primary | ICD-10-CM

## 2024-08-15 PROCEDURE — 3074F SYST BP LT 130 MM HG: CPT | Mod: CPTII,,, | Performed by: NEUROLOGICAL SURGERY

## 2024-08-15 PROCEDURE — 72131 CT LUMBAR SPINE W/O DYE: CPT | Mod: TC

## 2024-08-15 PROCEDURE — 99213 OFFICE O/P EST LOW 20 MIN: CPT | Mod: S$PBB,,, | Performed by: NEUROLOGICAL SURGERY

## 2024-08-15 PROCEDURE — 99999 PR PBB SHADOW E&M-EST. PATIENT-LVL III: CPT | Mod: PBBFAC,,, | Performed by: NEUROLOGICAL SURGERY

## 2024-08-15 PROCEDURE — 3072F LOW RISK FOR RETINOPATHY: CPT | Mod: CPTII,,, | Performed by: NEUROLOGICAL SURGERY

## 2024-08-15 PROCEDURE — 3046F HEMOGLOBIN A1C LEVEL >9.0%: CPT | Mod: CPTII,,, | Performed by: NEUROLOGICAL SURGERY

## 2024-08-15 PROCEDURE — 72131 CT LUMBAR SPINE W/O DYE: CPT | Mod: 26,,, | Performed by: INTERNAL MEDICINE

## 2024-08-15 PROCEDURE — 4010F ACE/ARB THERAPY RXD/TAKEN: CPT | Mod: CPTII,,, | Performed by: NEUROLOGICAL SURGERY

## 2024-08-15 PROCEDURE — 99213 OFFICE O/P EST LOW 20 MIN: CPT | Mod: PBBFAC,25 | Performed by: NEUROLOGICAL SURGERY

## 2024-08-15 PROCEDURE — 3078F DIAST BP <80 MM HG: CPT | Mod: CPTII,,, | Performed by: NEUROLOGICAL SURGERY

## 2024-08-15 NOTE — PROGRESS NOTES
"CHIEF COMPLAINT:  3 month post op    I, Consuelo Lyles, attest that this documentation has been prepared under the direction and in the presence of Alphonse Reed MD.    HPI from 6/13/2024:  Emeka Caballero is a 49 y.o. male s/p L2-pelvis extension and revision with Dr. Reed on 5/1/24. He is being seen in clinic today for his 6 week post-op evaluation. States that overall he is doing much better since surgery. He is compliant with the LSO brace. He is active with PT. Rates his pain as a 5/10. Pain is worse with standing feels like his feet are numb. He has also noticed more sweating lately and a "knot" in his back that is improved when PT massages it. Denies fever or chills.      Interval Hx:   Today the patient presents for 3 month post op s/p L2-pelvis on 5/1/2024. He says he has 8/10 lower back pain on the right side, going down the leg to his knee. He says it's worse when he walks and is at its worst after walking for about 30 minutes. He says standing for a long time also makes the pain worse. He drags his right foot, which is consistent with his pre op status. He ambulates with a cane and says he's unable to walk without it. He denies any pain on the left side. He denies any bowel or bladder problems.     Patient denies any other complaints at this time.    Review of patient's allergies indicates:  No Known Allergies    Past Medical History:   Diagnosis Date    Arthritis     Diabetes mellitus, type 2 2/11/2022    Essential hypertension, benign 5/4/2023     Past Surgical History:   Procedure Laterality Date    CATARACT EXTRACTION Right     EPIDURAL STEROID INJECTION N/A 10/21/2020    Procedure: Injection, Steroid, Epidural Caudal;  Surgeon: Vipul Nixon Jr., MD;  Location: [facility];  Service: Pain Management;  Laterality: N/A;  Caudal KURT  Arrive @ 1315; No ATC or DM; Needs MD Signature    INJECTION, SACROILIAC JOINT Bilateral 12/27/2022    Procedure: INJECTION,SACROILIAC JOINT, BILATERAL " CONTRAST DIRECT REF  ORDERED;  Surgeon: Brielle José MD;  Location: [facility] PAIN MGT;  Service: Pain Management;  Laterality: Bilateral;    INJECTION, SACROILIAC JOINT Right 11/27/2023    Procedure: INJECTION,SACROILIAC JOINT RIGHT DIRECT REFERRAL  Sooner date;  Surgeon: Brielle José MD;  Location: [facility] PAIN MGT;  Service: Pain Management;  Laterality: Right;    LASER ENUCLEATION OF PROSTATE N/A 3/14/2023    Procedure: ENUCLEATION, PROSTATE, USING LASER;  Surgeon: Cecil Murray MD;  Location: [facility] OR;  Service: Urology;  Laterality: N/A;    LUMBAR FUSION N/A 2/8/2022    Procedure: FUSION, SPINE, LUMBAR;  Surgeon: Alphonse Reed MD;  Location: [facility] OR [facility] FLR;  Service: Neurosurgery;  Laterality: N/A;  AIRO, L4-S1    LUMBAR FUSION N/A 5/1/2024    Procedure: **AIRO** L2-pelvis extension and revision of fusion,;  Surgeon: Alphonse Reed MD;  Location: [facility] OR [facility] FLR;  Service: Neurosurgery;  Laterality: N/A;  L2-pelvis extension and revision of fusion /co case Celestre/AIRO  anesthesia: general  brace: LSO  radiology: C-arm, AIRO  neuro mon: EMG/SEP/MEP  position: prone  bed: [place] [id number] post  headrest: prone view     Family History   Problem Relation Name Age of Onset    Cataracts Mother      No Known Problems Father      No Known Problems Sister      No Known Problems Brother      No Known Problems Maternal Aunt      No Known Problems Maternal Uncle      No Known Problems Paternal Aunt      No Known Problems Paternal Uncle      No Known Problems Maternal Grandmother      No Known Problems Maternal Grandfather      No Known Problems Paternal Grandmother      No Known Problems Paternal Grandfather       Social History     Tobacco Use    Smoking status: Some Days     Types: Cigarettes    Smokeless tobacco: Never   Substance Use Topics    Alcohol use: Yes     Comment: socially    Drug use: No        Review of Systems   All other systems reviewed and are negative.      OBJECTIVE:   Vital Signs:       Physical  Exam:    Vital signs: All nursing notes and vital signs reviewed -- afebrile, vital signs stable.  Constitutional: Patient sitting comfortably in chair. Appears well developed and well nourished.  Skin: Exposed areas are intact without abnormal markings, rashes or other lesions. Incision site is dry, clean, and intact.  HEENT: Normocephalic. Normal conjunctivae.  Cardiovascular: Normal rate and regular rhythm.  Respiratory: Chest wall rises and falls symmetrically, without signs of respiratory distress.  Abdomen: Soft and non-tender.  Extremities: Warm and without edema. Calves supple, non-tender.  Psych/Behavior: Normal affect.    Neurological:    Mental status: Alert and oriented. Conversational and appropriate.       Cranial Nerves: VFF to confrontation. PERRL. EOMI without nystagmus. Facial STLT normal and symmetric. Strong, symmetric muscles of mastication. Facial strength full and symmetric. Hearing equal bilaterally to finger rub. Palate and uvula rise and fall normally in midline. Shoulder shrug 5/5 strength. Tongue midline.     Motor:    Upper:  Deltoids Triceps Biceps WE WF     R 5/5 5/5 5/5 5/5 5/5 5/5    L 5/5 5/5 5/5 5/5 5/5 5/5      Lower:  HF KE KF DF PF EHL    R 4+/5 (pain limited) 5/5 5/5 5/5 5/5 5/5    L 5/5 5/5 5/5 5/5 5/5 5/5     Sensory: Intact sensation to light touch in all extremities. Romberg negative.    Reflexes:          DTR: 2+ symmetrically throughout.     Lyons's: Negative.     Babinski's: Negative.     Clonus: Negative.    Cerebellar: Finger-to-nose and rapid alternating movements normal. Gait stable, fluid.    Spine:    Posture: Head well aligned over pelvis in front and side views.  No focal or global spinal deformity visible on inspection. Shoulders and hips even. No obvious leg length discrepancy. No scapula winging.    Bending: Full ROM with forward, back and lateral bending. No rib prominence with forward bend.    Cervical:      ROM: Full with flexion, extension, lateral  rotation and ear-to-shoulder bend.      Midline TTP: Negative.     Spurling's test: Negative.     Lhermitte's: Negative.    Thoracic:     Midline TTP: Negative    Lumbar:     Midline TTP: Negative     Straight Leg Test: Negative     Crossed Straight Leg Test: Negative     Sciatic notch tenderness: Negative.    Other:     SI joint TTP: Negative.     Greater trochanter TTP: Negative.     Tenderness with external/internal hip rotation: Negative.    Diagnostic Results:  All imaging was independently reviewed by me.    CT lumbar spine, dated 8/15/2024:  1. L2-pelvis hardware in place.   2. Arthrodesis anteriorly across L4-5 and posterolaterally from L2-5.   3. Screw lucency bilaterally at L2.      ASSESSMENT/PLAN:     Emeka Caballero is a 49 y.o. male s/p L2-pelvis extension and revision on 5/1/24 being seen today for 3 month post op with marginal improvement in symptoms and adequate fusion mass anteriorly and posterolaterally. He is still complaining of pain down the right lower extremity.     The patient understands and agrees with the plan of care. All questions were answered.     1. Referral to pain management for spinal cord stimulator.   2. Follow up in 1 year.     I, Dr. Alphonse Reed personally performed the services described in this documentation. All medical record entries made by the scribe, Consuelo Lyles, were at my direction and in my presence. I have reviewed the chart and agree that the record reflects my personal performance and is accurate and complete.      Alphonse Reed M.D.  Department of Neurosurgery  [facility]

## 2024-08-16 ENCOUNTER — TELEPHONE (OUTPATIENT)
Dept: ENDOSCOPY | Facility: HOSPITAL | Age: 49
End: 2024-08-16
Payer: MEDICAID

## 2024-08-16 ENCOUNTER — CLINICAL SUPPORT (OUTPATIENT)
Dept: REHABILITATION | Facility: HOSPITAL | Age: 49
End: 2024-08-16
Payer: MEDICAID

## 2024-08-16 DIAGNOSIS — M48.062 SPINAL STENOSIS OF LUMBAR REGION WITH NEUROGENIC CLAUDICATION: ICD-10-CM

## 2024-08-16 DIAGNOSIS — Z74.09 IMPAIRED MOBILITY: Primary | ICD-10-CM

## 2024-08-16 PROCEDURE — 97110 THERAPEUTIC EXERCISES: CPT | Mod: PN | Performed by: PHYSICAL THERAPIST

## 2024-08-16 NOTE — PROGRESS NOTES
Physical Therapy Daily Treatment Note     [email or web address]  [facility] Number: [id number]    Therapy Diagnosis:   Encounter Diagnoses   Name Primary?    Impaired mobility Yes    Spinal stenosis of lumbar region with neurogenic claudication        Physician: Carina Mcclure MD    Visit Date: 8/16/2024    Physician Orders: PT Eval and Treat 3x/week x 6 weeks  Medical Diagnosis from Referral:   Diagnosis   M48.062 (ICD-10-CM) - Spinal stenosis of lumbar region with neurogenic claudication   Z74.09,Z78.9 (ICD-10-CM) - Impaired mobility and ADLs      Evaluation Date: 5/21/2024  Authorization Period Expiration: 12/31/24  Updated plan of care: 7/17/24 to 9/4/24  Visit # / Visits authorized: 19/ 40  PTA Visits: 0/5      PN due: 9/6/2024    Time In: 0847   Time Out: 0931  Total Billable Time: 44 minutes    Precautions: TLSO when out of bed, no lifting >10lb., no bending     Subjective     Pt reports: surgeon referred patient to pain management. Stated that a screw is backing out of surgical site. Patient took pain meds this morning.   He was compliant with home exercise program.  Response to previous treatment: no adverse effects reported  Functional change: ongoing     Pain: 5/10  Location: low back pain       Objective     Emeka received therapeutic exercises to develop strength, endurance, and core stabilization for 44 minutes including:      Recumbent stepper L7 bilateral upper extremity/ lower extremity x 8 mins for improved strength and endurance     Supine:   straight leg raise with transverse abdominal activation 2# 2 x 10  Bridging with Black thera band 2 x 10   Double knee to chest with ball 1 mins x 2   Hamstring stretch with belt 2 x 30 sec bilateral   Quad stretch w/ belt 2 x 30 sec bilateral     Prone:   Hip extension 10x    Quadruped bird dog arms only x 10   Quadruped bird dog legs only x 10      Parallel bar:   Side stepping Blue thera band, bilateral upper extremity- 4 laps    Pain at conclusion:  "5/10, but patient reports "it feels much better"       Home Exercises Provided and Patient Education Provided     Education provided:   - continue with home exercise program       Written Home Exercises Provided: Patient instructed to cont prior HEP.  Exercises were reviewed and Emeka was able to demonstrate them prior to the end of the session.  Emeka demonstrated good  understanding of the education provided.     See EMR under Patient Instructions for exercises provided prior visit.    Assessment   Patient reports that he has been referred to pain management regarding continued low back pain. Per recent surgical follow up bilateral screws at L2 demonstrate lucency. Patient demonstrated a significant challenge with prone and quadruped strengthening. Patient reported same numerical value for low back pain but reported that he felt "much better" at conclusion of session. Patient can benefit from continued skilled physical therapy to decreased pain and improve mobility.       Emeka Is progressing well towards his goals.   Pt prognosis is Good.     Pt will continue to benefit from skilled outpatient physical therapy to address the deficits listed in the problem list box on initial evaluation, provide pt/family education and to maximize pt's level of independence in the home and community environment.     Pt's spiritual, cultural and educational needs considered and pt agreeable to plan of care and goals.     Anticipated barriers to physical therapy: length of time impairments have been present     Goals:   Status as of 8/6/24  Short Term Goals: 4 weeks   Patient will report compliance with home exercise program at least 2x/ week to improve speed of progress. Met   Patient will demonstrate improved mobility for household ambulation by performing Timed Up and Go with least restrictive AD in 15 sec. Met   Patient will demonstrate improved lower extremity strength and endurance by performing 5 times sit<>stand " test in 25 sec. Met      Long Term Goals: 8 weeks   Patient will demonstrate improved mobility for household ambulation by performing Timed Up and Go with least restrictive AD in 10 sec. improved  Patient will demonstrate improved lower extremity strength and endurance by performing 5 times sit<>stand test in 15 sec met   Patient will demonstrate improved lower extremity strength by 1 grade to enable him to perform job related mobility. ongoing  Patient will ambulate community distances with single point cane to demonstrate a return to PLOF. ongoing  Patient will report reduced back pain to 2-3/10 to demonstrate improved tolerance to daily mobility. Ongoing    Plan      Outpatient Physical Therapy 2 times weekly to include the following interventions: Electrical Stimulation , Manual Therapy, Moist Heat/ Ice, Neuromuscular Re-ed, Patient Education, Therapeutic Activities, and Therapeutic Exercise.      manual therapy as needed to address tightness, continue with quadruped/ prone strengthening    Tanvi Terrell, PT, DPT,   Board-Certified Clinical Specialist in Neurologic Physical Therapy   Certified Brain Injury Specialist   8/16/2024

## 2024-08-16 NOTE — TELEPHONE ENCOUNTER
Dear Dr Reed,    Patient has a scheduled procedure Colonoscopy 8/26/24 and in order to ensure patient safety, we would like to confirm if he/she can be cleared for the procedure.      Thank you for your prompt reply.    [facility] Endoscopy Scheduling

## 2024-08-18 RX ORDER — LANCETS 30 GAUGE
EACH MISCELLANEOUS
Qty: 1 EACH | Refills: 0 | Status: SHIPPED | OUTPATIENT
Start: 2024-08-18

## 2024-08-19 ENCOUNTER — TELEPHONE (OUTPATIENT)
Dept: ENDOSCOPY | Facility: HOSPITAL | Age: 49
End: 2024-08-19
Payer: MEDICAID

## 2024-08-19 NOTE — TELEPHONE ENCOUNTER
Spoke to patient's significant other (Alana) for pre-call to confirm scheduled Colonoscopy and Alana verbalized understanding of the following on behalf of the patient:       Date & arrival time of procedure(s) verified 8/26/24, 7:15 AM.  Location of procedure(s) [address]  verified.  NPO status reinforced. Ok to continue clear liquids until 4:15 AM.   Alana denies patient's use of blood thinners, GLP-1 medications, and weight loss medications.  Alana confirmed receipt of prep instructions and Rx prep.  Instructions provided to patient via [facility].  Alana confirmed ride home after procedure if procedure requires anesthesia.   Pre-call screening questionnaire reviewed and completed with Alana.   Appointment details are tentative, including check-in time.  If the patient begins taking any blood thinning medications, injectable weight loss/diabetes medications (other than insulin), or Adipex (phentermine) Alana was instructed to contact the endoscopy scheduling department as soon as possible.  Alana was advised to call the endoscopy scheduling department if any questions or concerns arise.      Endoscopy Scheduling Department

## 2024-08-20 ENCOUNTER — DOCUMENTATION ONLY (OUTPATIENT)
Dept: REHABILITATION | Facility: HOSPITAL | Age: 49
End: 2024-08-20
Payer: MEDICAID

## 2024-08-20 NOTE — PROGRESS NOTES
Missed Visit/Cancellation      Date: 8/20/2024          Canceled Number: 0  No Show Number: 2                                                                                                                Pt initially had visit scheduled for today for 1345.   Reason for cancellation: no show.    Pt's next scheduled physical therapy visit is 8/23/24.     Tanvi Terrell, PT, DPT  8/20/2024

## 2024-08-22 ENCOUNTER — ANESTHESIA EVENT (OUTPATIENT)
Dept: ENDOSCOPY | Facility: HOSPITAL | Age: 49
End: 2024-08-22
Payer: MEDICAID

## 2024-08-23 ENCOUNTER — CLINICAL SUPPORT (OUTPATIENT)
Dept: REHABILITATION | Facility: HOSPITAL | Age: 49
End: 2024-08-23
Payer: MEDICAID

## 2024-08-23 DIAGNOSIS — Z74.09 IMPAIRED MOBILITY: Primary | ICD-10-CM

## 2024-08-23 DIAGNOSIS — M48.062 SPINAL STENOSIS OF LUMBAR REGION WITH NEUROGENIC CLAUDICATION: ICD-10-CM

## 2024-08-23 PROCEDURE — 97110 THERAPEUTIC EXERCISES: CPT | Mod: PN | Performed by: PHYSICAL THERAPIST

## 2024-08-23 NOTE — PROGRESS NOTES
"Physical Therapy Daily Treatment Note     [facility]  [id number]    Therapy Diagnosis:   Encounter Diagnoses   Name Primary?    Impaired mobility Yes    Spinal stenosis of lumbar region with neurogenic claudication        Physician: Carina Mcclure MD    Visit Date: 8/23/2024    Physician Orders: PT Eval and Treat 3x/week x 6 weeks  Medical Diagnosis from Referral:   Diagnosis   M48.062 (ICD-10-CM) - Spinal stenosis of lumbar region with neurogenic claudication   Z74.09,Z78.9 (ICD-10-CM) - Impaired mobility and ADLs      Evaluation Date: 5/21/2024  Authorization Period Expiration: 12/31/24  Updated plan of care: 7/17/24 to 9/4/24  Visit # / Visits authorized: 20/ 40  PTA Visits: 0/5      PN due: 9/6/2024    Time In:  1430  Time Out: 1517  Total Billable Time: 47 minutes    Precautions: no lifting >10lb., no bending     Subjective     Pt reports: Patient took pain meds today   He was compliant with home exercise program.  Response to previous treatment: no adverse effects reported  Functional change: ongoing     Pain: 0/10  Location: low back pain       Objective     Emeka received therapeutic exercises to develop strength, endurance, and core stabilization for 47 minutes including:      Recumbent stepper L7 bilateral upper extremity/ lower extremity x 8 mins for improved strength and endurance     Supine:   straight leg raise with transverse abdominal activation 2# 2 x 10  Bridging with Black thera band 2 x 10   Double knee to chest with ball 1 mins x 2   Hamstring stretch with belt 2 x 30 sec bilateral   Quad stretch w/ belt 2 x 30 sec bilateral     Prone:   Hip extension 10x    Quadruped bird dog arms only x 10   Quadruped bird dog legs only x 10      Dead lift 7.5# kettle bell from 18" box 2 x 10      Side stepping Blue thera band, no upper extremity- 2 x 10 ft   - SBA   - minimal facilitation to improve posture    Pain at conclusion: 9/10, but patient reports decrease in pain " after sitting and resting       Home Exercises Provided and Patient Education Provided     Education provided:   - continue with home exercise program       Written Home Exercises Provided: Patient instructed to cont prior HEP.  Exercises were reviewed and Emeka was able to demonstrate them prior to the end of the session.  Emeka demonstrated good  understanding of the education provided.     See EMR under Patient Instructions for exercises provided prior visit.    Assessment   Emeka arrived without pain but reports taking pain medicine earlier today. Patient was able to tolerate modified dead lifts with light weight and resisted side stepping without upper extremity support. Patient reported a good challenge with modified dead lifts. Patient can benefit from continued skilled physical therapy to improve core and lower extremity strength for improved pain and mobility.     Emeka Is progressing well towards his goals.   Pt prognosis is Good.     Pt will continue to benefit from skilled outpatient physical therapy to address the deficits listed in the problem list box on initial evaluation, provide pt/family education and to maximize pt's level of independence in the home and community environment.     Pt's spiritual, cultural and educational needs considered and pt agreeable to plan of care and goals.     Anticipated barriers to physical therapy: length of time impairments have been present     Goals:   Status as of 8/6/24  Short Term Goals: 4 weeks   Patient will report compliance with home exercise program at least 2x/ week to improve speed of progress. Met   Patient will demonstrate improved mobility for household ambulation by performing Timed Up and Go with least restrictive AD in 15 sec. Met   Patient will demonstrate improved lower extremity strength and endurance by performing 5 times sit<>stand test in 25 sec. Met      Long Term Goals: 8 weeks   Patient will demonstrate improved mobility for  household ambulation by performing Timed Up and Go with least restrictive AD in 10 sec. improved  Patient will demonstrate improved lower extremity strength and endurance by performing 5 times sit<>stand test in 15 sec met   Patient will demonstrate improved lower extremity strength by 1 grade to enable him to perform job related mobility. ongoing  Patient will ambulate community distances with single point cane to demonstrate a return to PLOF. ongoing  Patient will report reduced back pain to 2-3/10 to demonstrate improved tolerance to daily mobility. Ongoing    Plan      Outpatient Physical Therapy 2 times weekly to include the following interventions: Electrical Stimulation , Manual Therapy, Moist Heat/ Ice, Neuromuscular Re-ed, Patient Education, Therapeutic Activities, and Therapeutic Exercise.      manual therapy as needed to address tightness, continue with quadruped/ prone strengthening. Add planks as tolerated    Tanvi Terrell, PT, DPT,   Board-Certified Clinical Specialist in Neurologic Physical Therapy   Certified Brain Injury Specialist   8/23/2024

## 2024-08-24 NOTE — PROGRESS NOTES
"Physical Therapy Daily Treatment Note     [facility]  [id number]    Therapy Diagnosis:   No diagnosis found.      Physician: Carina Mcclure MD    Visit Date: 8/27/2024    Physician Orders: PT Eval and Treat 3x/week x 6 weeks  Medical Diagnosis from Referral:   Diagnosis   M48.062 (ICD-10-CM) - Spinal stenosis of lumbar region with neurogenic claudication   Z74.09,Z78.9 (ICD-10-CM) - Impaired mobility and ADLs      Evaluation Date: 5/21/2024  Authorization Period Expiration: 12/31/24  Updated plan of care: 7/17/24 to 9/4/24  Visit # / Visits authorized: 21/ 40  PTA Visits: 0/5      PN due: 9/6/2024    Time In:  ***  Time Out: ***  Total Billable Time: *** minutes    Precautions: no lifting >10lb., no bending     Subjective     Pt reports: Patient took pain meds today   He was compliant with home exercise program.  Response to previous treatment: no adverse effects reported  Functional change: ongoing     Pain: 0/10  Location: low back pain       Objective     Emeka received therapeutic exercises to develop strength, endurance, and core stabilization for *** minutes including:      Recumbent stepper L7 bilateral upper extremity/ lower extremity x 8 mins for improved strength and endurance     Supine:   straight leg raise with transverse abdominal activation 2# 2 x 10  Bridging with Black thera band 2 x 10   Double knee to chest with ball 1 mins x 2   Hamstring stretch with belt 2 x 30 sec bilateral   Quad stretch w/ belt 2 x 30 sec bilateral     Prone:   Hip extension 10x    Quadruped bird dog arms only x 10   Quadruped bird dog legs only x 10      Dead lift 7.5# kettle bell from 18" box 2 x 10      Side stepping Blue thera band, no upper extremity- 2 x 10 ft   - SBA   - minimal facilitation to improve posture    Pain at conclusion: 9/10, but patient reports decrease in pain after sitting and resting       Home Exercises Provided and Patient Education Provided     Education " provided:   - continue with home exercise program       Written Home Exercises Provided: Patient instructed to cont prior HEP.  Exercises were reviewed and Emeka was able to demonstrate them prior to the end of the session.  Emeka demonstrated good  understanding of the education provided.     See EMR under Patient Instructions for exercises provided prior visit.    Assessment   Emeka arrived without pain but reports taking pain medicine earlier today. Patient was able to tolerate modified dead lifts with light weight and resisted side stepping without upper extremity support. Patient reported a good challenge with modified dead lifts. Patient can benefit from continued skilled physical therapy to improve core and lower extremity strength for improved pain and mobility.     Emeka Is progressing well towards his goals.   Pt prognosis is Good.     Pt will continue to benefit from skilled outpatient physical therapy to address the deficits listed in the problem list box on initial evaluation, provide pt/family education and to maximize pt's level of independence in the home and community environment.     Pt's spiritual, cultural and educational needs considered and pt agreeable to plan of care and goals.     Anticipated barriers to physical therapy: length of time impairments have been present     Goals:   Status as of 8/6/24  Short Term Goals: 4 weeks   Patient will report compliance with home exercise program at least 2x/ week to improve speed of progress. Met   Patient will demonstrate improved mobility for household ambulation by performing Timed Up and Go with least restrictive AD in 15 sec. Met   Patient will demonstrate improved lower extremity strength and endurance by performing 5 times sit<>stand test in 25 sec. Met      Long Term Goals: 8 weeks   Patient will demonstrate improved mobility for household ambulation by performing Timed Up and Go with least restrictive AD in 10 sec.  improved  Patient will demonstrate improved lower extremity strength and endurance by performing 5 times sit<>stand test in 15 sec met   Patient will demonstrate improved lower extremity strength by 1 grade to enable him to perform job related mobility. ongoing  Patient will ambulate community distances with single point cane to demonstrate a return to PLOF. ongoing  Patient will report reduced back pain to 2-3/10 to demonstrate improved tolerance to daily mobility. Ongoing    Plan      Outpatient Physical Therapy 2 times weekly to include the following interventions: Electrical Stimulation , Manual Therapy, Moist Heat/ Ice, Neuromuscular Re-ed, Patient Education, Therapeutic Activities, and Therapeutic Exercise.      manual therapy as needed to address tightness, continue with quadruped/ prone strengthening. Add planks as tolerated    Tanvi Terrell, PT, DPT,   Board-Certified Clinical Specialist in Neurologic Physical Therapy   Certified Brain Injury Specialist   8/27/2024

## 2024-08-26 ENCOUNTER — ANESTHESIA (OUTPATIENT)
Dept: ENDOSCOPY | Facility: HOSPITAL | Age: 49
End: 2024-08-26
Payer: MEDICAID

## 2024-08-26 ENCOUNTER — HOSPITAL ENCOUNTER (OUTPATIENT)
Facility: HOSPITAL | Age: 49
Discharge: HOME | End: 2024-08-26
Attending: STUDENT IN AN ORGANIZED HEALTH CARE EDUCATION/TRAINING PROGRAM | Admitting: STUDENT IN AN ORGANIZED HEALTH CARE EDUCATION/TRAINING PROGRAM
Payer: MEDICAID

## 2024-08-26 VITALS
TEMPERATURE: 98 F | OXYGEN SATURATION: 99 % | SYSTOLIC BLOOD PRESSURE: 124 MMHG | DIASTOLIC BLOOD PRESSURE: 82 MMHG | RESPIRATION RATE: 16 BRPM | HEART RATE: 84 BPM

## 2024-08-26 DIAGNOSIS — Z12.11 COLON CANCER SCREENING: Primary | ICD-10-CM

## 2024-08-26 LAB — POCT GLUCOSE: 126 MG/DL (ref 70–110)

## 2024-08-26 PROCEDURE — 37000009 HC ANESTHESIA EA ADD 15 MINS: Performed by: STUDENT IN AN ORGANIZED HEALTH CARE EDUCATION/TRAINING PROGRAM

## 2024-08-26 PROCEDURE — 37000008 HC ANESTHESIA 1ST 15 MINUTES: Performed by: STUDENT IN AN ORGANIZED HEALTH CARE EDUCATION/TRAINING PROGRAM

## 2024-08-26 PROCEDURE — 45378 DIAGNOSTIC COLONOSCOPY: CPT | Mod: ,,, | Performed by: STUDENT IN AN ORGANIZED HEALTH CARE EDUCATION/TRAINING PROGRAM

## 2024-08-26 PROCEDURE — D9220A PRA ANESTHESIA: Mod: CRNA,,, | Performed by: NURSE ANESTHETIST, CERTIFIED REGISTERED

## 2024-08-26 PROCEDURE — 25000003 PHARM REV CODE 250: Performed by: ANESTHESIOLOGY

## 2024-08-26 PROCEDURE — 82962 GLUCOSE BLOOD TEST: CPT | Performed by: STUDENT IN AN ORGANIZED HEALTH CARE EDUCATION/TRAINING PROGRAM

## 2024-08-26 PROCEDURE — 63600175 PHARM REV CODE 636 W HCPCS: Performed by: NURSE ANESTHETIST, CERTIFIED REGISTERED

## 2024-08-26 PROCEDURE — 25000003 PHARM REV CODE 250: Performed by: NURSE ANESTHETIST, CERTIFIED REGISTERED

## 2024-08-26 PROCEDURE — 45378 DIAGNOSTIC COLONOSCOPY: CPT | Performed by: STUDENT IN AN ORGANIZED HEALTH CARE EDUCATION/TRAINING PROGRAM

## 2024-08-26 PROCEDURE — D9220A PRA ANESTHESIA: Mod: ANES,,, | Performed by: ANESTHESIOLOGY

## 2024-08-26 RX ORDER — LIDOCAINE HYDROCHLORIDE 20 MG/ML
INJECTION INTRAVENOUS
Status: DISCONTINUED | OUTPATIENT
Start: 2024-08-26 | End: 2024-08-26

## 2024-08-26 RX ORDER — LIDOCAINE HYDROCHLORIDE 10 MG/ML
1 INJECTION, SOLUTION EPIDURAL; INFILTRATION; INTRACAUDAL; PERINEURAL ONCE
Status: DISCONTINUED | OUTPATIENT
Start: 2024-08-26 | End: 2024-08-26 | Stop reason: HOSPADM

## 2024-08-26 RX ORDER — PROPOFOL 10 MG/ML
VIAL (ML) INTRAVENOUS
Status: DISCONTINUED | OUTPATIENT
Start: 2024-08-26 | End: 2024-08-26

## 2024-08-26 RX ORDER — PROPOFOL 10 MG/ML
VIAL (ML) INTRAVENOUS
Status: COMPLETED
Start: 2024-08-26 | End: 2024-08-26

## 2024-08-26 RX ORDER — SODIUM CHLORIDE 9 MG/ML
INJECTION, SOLUTION INTRAVENOUS CONTINUOUS
Status: DISCONTINUED | OUTPATIENT
Start: 2024-08-26 | End: 2024-08-26 | Stop reason: HOSPADM

## 2024-08-26 RX ORDER — LIDOCAINE HYDROCHLORIDE 20 MG/ML
INJECTION, SOLUTION EPIDURAL; INFILTRATION; INTRACAUDAL; PERINEURAL
Status: DISCONTINUED
Start: 2024-08-26 | End: 2024-08-26 | Stop reason: HOSPADM

## 2024-08-26 RX ADMIN — PROPOFOL 40 MG: 10 INJECTION, EMULSION INTRAVENOUS at 08:08

## 2024-08-26 RX ADMIN — SODIUM CHLORIDE: 0.9 INJECTION, SOLUTION INTRAVENOUS at 07:08

## 2024-08-26 RX ADMIN — LIDOCAINE HYDROCHLORIDE 100 MG: 20 INJECTION, SOLUTION INTRAVENOUS at 08:08

## 2024-08-26 RX ADMIN — PROPOFOL 80 MG: 10 INJECTION, EMULSION INTRAVENOUS at 08:08

## 2024-08-26 NOTE — H&P
Short Stay Endoscopy History and Physical    PCP - Dio Zarate Jr., MD    Procedure - Colonoscopy  ASA - per anesthesia  Mallampati - per anesthesia  History of Anesthesia problems - no  Family history Anesthesia problems -  no   Plan of anesthesia - General    HPI:  This is a 49 y.o. male here for evaluation of : CRC screening    ROS:  Constitutional: No fevers, chills  CV: No chest pain  Pulm: No shortness of breath  GI: see HPI  Derm: No rash    Medical History:  has a past medical history of Arthritis, Diabetes mellitus, type 2 (2/11/2022), and Essential hypertension, benign (5/4/2023).    Surgical History:  has a past surgical history that includes Epidural steroid injection (N/A, 10/21/2020); Lumbar fusion (N/A, 2/8/2022); Cataract extraction (Right); injection, sacroiliac joint (Bilateral, 12/27/2022); Laser enucleation of prostate (N/A, 3/14/2023); injection, sacroiliac joint (Right, 11/27/2023); and Lumbar fusion (N/A, 5/1/2024).    Family History: family history includes Cataracts in his mother; No Known Problems in his brother, father, maternal aunt, maternal grandfather, maternal grandmother, maternal uncle, paternal aunt, paternal grandfather, paternal grandmother, paternal uncle, and sister.. Otherwise no colon cancer, inflammatory bowel disease, or GI malignancies.    Social History:  reports that he has been smoking cigarettes. He has never used smokeless tobacco. He reports current alcohol use. He reports that he does not use drugs.    Review of patient's allergies indicates:  No Known Allergies    Medications:   Medications Prior to Admission   Medication Sig Dispense Refill Last Dose    atorvastatin (LIPITOR) 40 MG tablet Take 1 tablet (40 mg total) by mouth once daily. 30 tablet 11 Past Week    blood sugar diagnostic Strp To check BG 3 times daily, to use with insurance preferred meter 100 each 11 Past Week    blood-glucose meter kit To check BG 3 times daily, to use with insurance  preferred meter 1 each 0 Past Week    gabapentin (NEURONTIN) 300 MG capsule Take 1 capsule (300 mg total) by mouth every evening. 30 capsule 11 Past Week    glipiZIDE (GLUCOTROL) 5 MG tablet Take 1 tablet (5 mg total) by mouth 2 (two) times daily before meals. 60 tablet 2 Past Week    lancets Misc To check BG 3 times daily, to use with insurance preferred meter 100 each 11 Past Week    linaCLOtide (LINZESS) 72 mcg Cap capsule Take 1 capsule (72 mcg total) by mouth daily as needed (constipation). 30 capsule 5 Past Week    lisinopriL (PRINIVIL,ZESTRIL) 5 MG tablet Take 1 tablet (5 mg total) by mouth once daily. 90 tablet 1 Past Week    metFORMIN (GLUCOPHAGE-XR) 500 MG ER 24hr tablet Take 2 tablets (1,000 mg total) by mouth daily with breakfast.   Past Week    ONETOUCH VERIO FLEX METER Misc USE AS DIRECTED THREE TIMES DAILY TO  CHECK  BLOOD  SUGAR 1 each 0 Past Week    pantoprazole (PROTONIX) 40 MG tablet Take 1 tablet (40 mg total) by mouth once daily. 30 tablet 11 Past Week    tamsulosin (FLOMAX) 0.4 mg Cap Take 1 capsule (0.4 mg total) by mouth once daily. 30 capsule 11 Past Week    omega-3 fatty acids-fish oil 340-1,000 mg Cap Take 1 capsule by mouth once daily. 90 capsule 3     oxybutynin (DITROPAN-XL) 10 MG 24 hr tablet Take 1 tablet (10 mg total) by mouth once daily. 30 tablet 11     tadalafiL (CIALIS) 20 MG Tab Take 1 tablet (20 mg total) by mouth daily as needed (Erectile Dysfunction). 30 tablet 11          Physical Exam:    Vital Signs: There were no vitals filed for this visit.    General Appearance: Well appearing in no acute distress  Eyes:    No scleral icterus  ENT: lips and tongue normal  Lungs: no use of accessory muscles  Heart:  normal rate, regular rhythm  Abdomen: Soft, non-tender, non distended   Extremities: no edema  Skin: No rash      Labs:  Lab Results   Component Value Date    WBC 6.15 08/08/2024    HGB 13.0 (L) 08/08/2024    HCT 40.8 08/08/2024     08/08/2024    CHOL 164 10/25/2023     TRIG 333 (H) 10/25/2023    HDL 25 (L) 10/25/2023    ALT 17 08/13/2024    AST 13 08/13/2024     08/13/2024    K 3.9 08/13/2024     08/13/2024    CREATININE 1.2 08/13/2024    BUN 15 08/13/2024    CO2 22 (L) 08/13/2024    TSH 0.988 10/25/2023    PSA 0.21 10/25/2023    INR 1.0 05/01/2024    HGBA1C 10.7 (H) 08/13/2024       I have explained the risks and benefits of endoscopy procedures to the patient including but not limited to bleeding, perforation, infection, and death.  The patient was asked if they understand and allowed to ask any further questions to their satisfaction.    Harry Rdz MD

## 2024-08-26 NOTE — PROVATION PATIENT INSTRUCTIONS
Discharge Summary/Instructions after an Endoscopic Procedure  Patient Name: Emeka Arroyo  Patient MRN: [id number]  Patient   YOB: 1975 Monday, August 26, 2024  Jarod Dong MD  Dear patient,  As a result of recent federal legislation (The Federal Cures Act), you may   receive lab or pathology results from your procedure in your [facility]   account before your physician is able to contact you. Your physician or   their representative will relay the results to you with their   recommendations at their soonest availability.  Thank you,  RESTRICTIONS:  During your procedure today, you received medications for sedation.  These   medications may affect your judgment, balance and coordination.  Therefore,   for 24 hours, you have the following restrictions:   - DO NOT drive a car, operate machinery, make legal/financial decisions,   sign important papers or drink alcohol.    ACTIVITY:  Today: no heavy lifting, straining or running due to procedural   sedation/anesthesia.  The following day: return to full activity including work.  DIET:  Eat and drink normally unless instructed otherwise.     TREATMENT FOR COMMON SIDE EFFECTS:  - Mild abdominal pain, nausea, belching, bloating or excessive gas:  rest,   eat lightly and use a heating pad.  - Sore Throat: treat with throat lozenges and/or gargle with warm salt   water.  - Because air was used during the procedure, expelling large amounts of air   from your rectum or belching is normal.  - If a bowel prep was taken, you may not have a bowel movement for 1-3 days.    This is normal.  SYMPTOMS TO WATCH FOR AND REPORT TO YOUR PHYSICIAN:  1. Abdominal pain or bloating, other than gas cramps.  2. Chest pain.  3. Back pain.  4. Signs of infection such as: chills or fever occurring within 24 hours   after the procedure.  5. Rectal bleeding, which would show as bright red, maroon, or black stools.   (A tablespoon of blood from the rectum is not serious,  especially if   hemorrhoids are present.)  6. Vomiting.  7. Weakness or dizziness.  GO DIRECTLY TO THE NEAREST EMERGENCY ROOM IF YOU HAVE ANY OF THE FOLLOWING:      Difficulty breathing              Chills and/or fever over 101 F   Persistent vomiting and/or vomiting blood   Severe abdominal pain   Severe chest pain   Black, tarry stools   Bleeding- more than one tablespoon   Any other symptom or condition that you feel may need urgent attention  Your doctor recommends these additional instructions:  If any biopsies were taken, your doctors clinic will contact you in 1 to 2   weeks with any results.  - Patient has a contact number available for emergencies.  The signs and   symptoms of potential delayed complications were discussed with the   patient.  Return to normal activities tomorrow.  Written discharge   instructions were provided to the patient.   - Discharge patient to home (with escort).   - Resume previous diet.   - Continue present medications.   - Repeat colonoscopy in 10 years for screening purposes.   - Return to primary care physician as previously scheduled.  For questions, problems or results please call your physician - Jarod Dong MD at Work:  [phone].  [facility] can be reached at [phone]     IF A COMPLICATION OR EMERGENCY SITUATION ARISES AND YOU ARE UNABLE TO REACH   YOUR PHYSICIAN - GO DIRECTLY TO THE EMERGENCY ROOM.  Jarod Dong MD  8/26/2024 8:56:09 AM  This report has been verified and signed electronically.  Dear patient,  As a result of recent federal legislation (The Federal Cures Act), you may   receive lab or pathology results from your procedure in your [facility]   account before your physician is able to contact you. Your physician or   their representative will relay the results to you with their   recommendations at their soonest availability.  Thank you,  PROVATION

## 2024-08-26 NOTE — ANESTHESIA PREPROCEDURE EVALUATION
08/26/2024  Emeka Caballero is a 49 y.o., male.      Pre-op Assessment    I have reviewed the Patient Summary Reports.     I have reviewed the Nursing Notes.       Review of Systems  Anesthesia Hx:  No problems with previous Anesthesia             Denies Family Hx of Anesthesia complications.    Denies Personal Hx of Anesthesia complications.                    Social:  Smoker       Cardiovascular:     Hypertension   Denies MI.      Denies Dysrhythmias.       hyperlipidemia    04/24 stress: negative for ischemia; EF 55%                         Pulmonary:  Pulmonary Normal                       Renal/:  Renal/ Normal                 Hepatic/GI:  Hepatic/GI Normal                 Musculoskeletal:  Arthritis   Recent spine/pelvis surgery-cleared by neurosurgery       Spine Disorders:             Neurological:    Neuromuscular Disease,                                   Endocrine:  Diabetes               Physical Exam  General: Well nourished, Cooperative, Alert and Oriented    Airway:  Mallampati: II   Mouth Opening: Normal  TM Distance: 4 - 6 cm  Tongue: Large  Neck ROM: Normal ROM    Dental:  Intact    Chest/Lungs:  Normal Respiratory Rate, Clear to auscultation    Heart:  Rate: Normal  Rhythm: Regular Rhythm      Wt Readings from Last 3 Encounters:   08/13/24 90.4 kg (199 lb 4.7 oz)   08/08/24 94.3 kg (208 lb)   06/21/24 94.3 kg (208 lb)     Temp Readings from Last 3 Encounters:   08/13/24 36.6 °C (97.9 °F) (Oral)   08/08/24 36.9 °C (98.4 °F) (Oral)   06/13/24 36.6 °C (97.9 °F) (Temporal)     BP Readings from Last 3 Encounters:   08/15/24 126/77   08/13/24 126/74   08/09/24 135/89     Pulse Readings from Last 3 Encounters:   08/15/24 85   08/13/24 97   08/09/24 91         Anesthesia Plan  Type of Anesthesia, risks & benefits discussed:    Anesthesia Type: Gen Natural Airway, MAC, Gen ETT  Intra-op  Monitoring Plan: Standard ASA Monitors  Post Op Pain Control Plan: multimodal analgesia  Induction:  IV  Informed Consent: Informed consent signed with the Patient and all parties understand the risks and agree with anesthesia plan.  All questions answered.   ASA Score: 3  Day of Surgery Review of History & Physical: H&P Update referred to the surgeon/provider.    Ready For Surgery From Anesthesia Perspective.     .

## 2024-08-26 NOTE — PLAN OF CARE
Procedure and recovery complete. Patient awake and alert. Dr. Dong at bedside, procedure findings and suggestions discussed. Discharge instructions given, patient verbalized understanding of instructions. Gait steady able to ambulate without assistance. Patient walked out accompanied by wife.

## 2024-08-26 NOTE — TRANSFER OF CARE
Anesthesia Transfer of Care Note    Patient: Emeka Caballero    Procedure(s) Performed: Procedure(s) (LRB):  COLONOSCOPY (N/A)    Patient location: GI    Anesthesia Type: general    Transport from OR: Transported from OR on room air with adequate spontaneous ventilation    Post pain: adequate analgesia    Post assessment: no apparent anesthetic complications and tolerated procedure well    Post vital signs: stable    Level of consciousness: sedated    Nausea/Vomiting: no nausea/vomiting    Complications: none    Transfer of care protocol was followed      Last vitals: Visit Vitals  /67 (BP Location: Left arm, Patient Position: Lying)   Pulse 88   Temp 36.7 °C (98.1 °F) (Oral)   Resp 16   SpO2 97%

## 2024-08-26 NOTE — ANESTHESIA POSTPROCEDURE EVALUATION
Anesthesia Post Evaluation    Patient: Emeka Caballero    Procedure(s) Performed: Procedure(s) (LRB):  COLONOSCOPY (N/A)    Final Anesthesia Type: general      Patient location during evaluation: GI PACU  Patient participation: Yes- Able to Participate  Level of consciousness: awake and alert  Post-procedure vital signs: reviewed and stable  Pain management: adequate  Airway patency: patent    PONV status at discharge: No PONV  Anesthetic complications: no      Cardiovascular status: hemodynamically stable  Respiratory status: unassisted and spontaneous ventilation  Hydration status: euvolemic  Follow-up not needed.              Vitals Value Taken Time   /67 08/26/24 0853   Temp 36.7 °C (98.1 °F) 08/26/24 0853   Pulse 88 08/26/24 0853   Resp 16 08/26/24 0853   SpO2 97 % 08/26/24 0853         No case tracking events are documented in the log.      Pain/Mercy Score: Mercy Score: 10 (8/26/2024  9:10 AM)

## 2024-08-27 ENCOUNTER — CLINICAL SUPPORT (OUTPATIENT)
Dept: REHABILITATION | Facility: HOSPITAL | Age: 49
End: 2024-08-27
Payer: MEDICAID

## 2024-08-27 DIAGNOSIS — M48.062 SPINAL STENOSIS OF LUMBAR REGION WITH NEUROGENIC CLAUDICATION: ICD-10-CM

## 2024-08-27 DIAGNOSIS — Z74.09 IMPAIRED MOBILITY: Primary | ICD-10-CM

## 2024-08-27 PROCEDURE — 97110 THERAPEUTIC EXERCISES: CPT | Mod: PN | Performed by: PHYSICAL THERAPIST

## 2024-08-27 NOTE — PROGRESS NOTES
"Physical Therapy Daily Treatment Note     [facility]  Cl[facility] Number: [id number]    Therapy Diagnosis:   Encounter Diagnoses   Name Primary?    Impaired mobility Yes    Spinal stenosis of lumbar region with neurogenic claudication        Physician: Carina Mcclure MD    Visit Date: 8/27/2024    Physician Orders: PT Eval and Treat 3x/week x 6 weeks  Medical Diagnosis from Referral:   Diagnosis   M48.062 (ICD-10-CM) - Spinal stenosis of lumbar region with neurogenic claudication   Z74.09,Z78.9 (ICD-10-CM) - Impaired mobility and ADLs      Evaluation Date: 5/21/2024  Authorization Period Expiration: 12/31/24  Updated plan of care: 7/17/24 to 9/4/24  Visit # / Visits authorized: 21/ 40  PTA Visits: 0/5      PN due: 9/6/2024    Time In:  1115   Time Out: 1200  Total Billable Time: 45 minutes    Precautions: no lifting >10lb., no bending     Subjective     Pt reports: did not take pain meds this morning   He was compliant with home exercise program.  Response to previous treatment: felt great  Functional change: ongoing     Pain: 5/10  Location: low back pain       Objective     Emeka received therapeutic exercises to develop strength, endurance, and core stabilization for 45 minutes including:      Recumbent stepper L7 bilateral upper extremity/ lower extremity x 8 mins for improved strength and endurance     Supine:   straight leg raise with transverse abdominal activation 2# 2 x 10  Bridging with Black thera band 2 x 10   Double knee to chest with ball 1 mins x 2   Hamstring stretch with belt 2 x 30 sec bilateral   Quad stretch w/ belt 2 x 30 sec bilateral     Prone:   Hip extension 10x    Quadruped bird dog arms only x 10   Quadruped bird dog legs only x 10     Modified plank 3 x 15 sec     Dead lift 7.5# kettle bell from 18" box 2 x 10    Side stepping Blue thera band, no upper extremity- 2 x 10 ft   - SBA   - minimal facilitation to improve posture    Pain at conclusion: 0/10       Home Exercises " Provided and Patient Education Provided     Education provided:   - continue with home exercise program       Written Home Exercises Provided: Patient instructed to cont prior HEP.  Exercises were reviewed and Emeka was able to demonstrate them prior to the end of the session.  Emeka demonstrated good  understanding of the education provided.     See EMR under Patient Instructions for exercises provided prior visit.    Assessment   Emeka reports improved low back pain without taking pain medication today. Modified planks added for improved core stability. Patient continues to require verbal cues during standing to decrease forward flexed posture at hips.  Patient can benefit from continued skilled physical therapy to improve core and lower extremity strength for improved pain and mobility.     Emeka Is progressing well towards his goals.   Pt prognosis is Good.     Pt will continue to benefit from skilled outpatient physical therapy to address the deficits listed in the problem list box on initial evaluation, provide pt/family education and to maximize pt's level of independence in the home and community environment.     Pt's spiritual, cultural and educational needs considered and pt agreeable to plan of care and goals.     Anticipated barriers to physical therapy: length of time impairments have been present     Goals:   Status as of 8/6/24  Short Term Goals: 4 weeks   Patient will report compliance with home exercise program at least 2x/ week to improve speed of progress. Met   Patient will demonstrate improved mobility for household ambulation by performing Timed Up and Go with least restrictive AD in 15 sec. Met   Patient will demonstrate improved lower extremity strength and endurance by performing 5 times sit<>stand test in 25 sec. Met      Long Term Goals: 8 weeks   Patient will demonstrate improved mobility for household ambulation by performing Timed Up and Go with least restrictive AD in  10 sec. improved  Patient will demonstrate improved lower extremity strength and endurance by performing 5 times sit<>stand test in 15 sec met   Patient will demonstrate improved lower extremity strength by 1 grade to enable him to perform job related mobility. ongoing  Patient will ambulate community distances with single point cane to demonstrate a return to PLOF. ongoing  Patient will report reduced back pain to 2-3/10 to demonstrate improved tolerance to daily mobility. Ongoing    Plan      Outpatient Physical Therapy 2 times weekly to include the following interventions: Electrical Stimulation , Manual Therapy, Moist Heat/ Ice, Neuromuscular Re-ed, Patient Education, Therapeutic Activities, and Therapeutic Exercise.      manual therapy as needed to address tightness, continue with quadruped/ prone strengthening. Increase repetitions of planks     Tanvi Terrell, PT, DPT,   Board-Certified Clinical Specialist in Neurologic Physical Therapy   Certified Brain Injury Specialist   8/27/2024

## 2024-08-28 NOTE — PROGRESS NOTES
Physical Therapy Daily Treatment Note/ Progress Note     Name: Emeka Caballero  Clinic Number: [id number]    Therapy Diagnosis:   No diagnosis found.      Physician: Carina Mcclure MD    Visit Date: 8/29/2024    Physician Orders: PT Eval and Treat 3x/week x 6 weeks  Medical Diagnosis from Referral:   Diagnosis   M48.062 (ICD-10-CM) - Spinal stenosis of lumbar region with neurogenic claudication   Z74.09,Z78.9 (ICD-10-CM) - Impaired mobility and ADLs      Evaluation Date: 5/21/2024  Authorization Period Expiration: 12/31/24  Updated plan of care: 7/17/24 to 9/4/24***  Visit # / Visits authorized: 22/ 40  PTA Visits: 0/5      PN due: 9/6/2024***    Time In:  ***0936  Time Out: ***  Total Billable Time: *** minutes    Precautions: no lifting >10lb., no bending     Subjective     Pt reports: took pain meds this morning  He was compliant with home exercise program.  Response to previous treatment: felt great  Functional change: ongoing     Pain: 5/10  Location: low back pain       CMS Impairment/Limitation/Restriction for FOTO *** Survey    Therapist reviewed FOTO scores for Emeka Caballero on 8/29/2024.   FOTO documents entered into [employer] - see Media section.    Limitation Score: ***%         Objective        Lower Extremity Strength    RLE eval RLE 7/16/24 RLE  8/6/24 RLE 8/29/24 LLE eval  LLE 7/16/24 LLE 8/6/24 LLE 8/29/24   Hip Flexion: 2+/5 4-/5 4/5 4/5 4/5 4/5 4/5 4+/5   Hip Extension:  4/5 4-/5 4-/5 4-/5 4/5 4-/5 4-/5 4-/5   Hip Abduction: 2+/5 3-/5 3+/5 3+/5 3-/5 3+/5 4-/5 4-/5   Hip Adduction: Not tested  Not tested  Not tested  Not tested  Not tested  Not tested  Not tested  Not tested    Knee Extension: 4+/5 5/5 5/5 5/5 5/5 5/5 5/5 5/5   Knee Flexion: 5/5 4-/5 4/5 4/5 5/5 5/5 5/5 5/5   Ankle Dorsiflexion: 4/5 4+/5 5/5 5/5 5/5 5/5 5/5 5/5   Ankle Plantarflexion: 4+/5 4+/5 5/5 5/5 5/5 5/5 5/5 5/5   Great toe ext 4+/5 Not tested  Not tested  Not tested  4+/5 Not tested  Not tested Not tested         "      Evaluation 6/27/24 7/16/24 8/6/24 8/29/24   Single Limb Stance R LE Not tested   (<10 sec = HIGH FALL RISK) Not tested  Not tested  Not tested  Not tested    Single Limb Stance L LE Not tested   (<10 sec = HIGH FALL RISK) Not tested  Not tested  Not tested  Not tested    5 times sit-stand 34 seconds w/upper extremity     13 sec no UE 14 sec no UE 14 sec 10 sec   Greene/ FGA/ Tinetti Not tested  Not tested  Not tested  Not tested  Not tested       5 times sit<>stand Cutoff scores:  >12 sec= fall risk  PD: >16 seconds= fall risk  Vestibular/balance: 15 seconds over 65 years  CVA: 12 seconds       Evaluation 6/27/24 7/16/24 8/6/24 8/29/24   Timed Up and Go 20.3 sec w/RW  > 20 sec safe for independent transfers,     > 30 sec assist required for transfers 12.7 sec w/SPC 12.2 sec w/SPC 9.1 sec w/SPC 8.8s w/SPC   6 meter walk test Not tested  Not tested  Not tested  Not tested  Not tested    6 min walk test Not tested  Not tested  Not tested  Not tested  Not tested    Timed Up and Go w/SPC= 10.5s + 8.4s + 7.6s     Timed Up and Go fall risk:   Community dwelling older adults >13.5 sec   Chronic CVA >14 sec   Geriatric with h/o falls >15 sec   Frail elderly >32.6 sec    LE amputees >19 sec   PD >7.95- 11.5 sec   Hip OA >10 sec   Vestibular >11.1 sec         Emeka received therapeutic exercises to develop strength, endurance, and core stabilization for *** minutes including:      Supine:   straight leg raise with transverse abdominal activation 3# 2 x 10  Bridging with 1 lower extremity march 10x  Hamstring stretch with belt 2 x 30 sec bilateral   Quad stretch w/ belt 2 x 30 sec bilateral     Prone:   Hip extension 2 x 10  Superman's 2 x 10    Quadruped bird dog arms only x 10   Quadruped bird dog legs only x 10     Modified plank 5 x 15 sec     Dead lift 7.5# kettle bell from 18" box 2 x 10    Side stepping Blue thera band, no upper extremity- 2 x 10 ft   - SBA   - minimal facilitation to improve posture    Pain at " conclusion: 0/10       Home Exercises Provided and Patient Education Provided     Education provided:   - continue with home exercise program, provided with more advanced core strengthening       Written Home Exercises Provided: Patient instructed to cont prior HEP.  Exercises were reviewed and Emeka was able to demonstrate them prior to the end of the session.  Emeka demonstrated good  understanding of the education provided.     See EMR under Patient Instructions for exercises provided prior visit.    Assessment   Assessment period: ***   Iron tolerates physical therapy sessions well. ***  Patient can benefit from continued skilled physical therapy to improve core and lower extremity strength for improved pain and mobility.     Emeka Is progressing well towards his goals.   Pt prognosis is Good.     Pt will continue to benefit from skilled outpatient physical therapy to address the deficits listed in the problem list box on initial evaluation, provide pt/family education and to maximize pt's level of independence in the home and community environment.     Pt's spiritual, cultural and educational needs considered and pt agreeable to plan of care and goals.     Anticipated barriers to physical therapy: length of time impairments have been present     Goals:   Status as of 8/6/24 ***  Short Term Goals: 4 weeks   Patient will report compliance with home exercise program at least 2x/ week to improve speed of progress. Met   Patient will demonstrate improved mobility for household ambulation by performing Timed Up and Go with least restrictive AD in 15 sec. Met   Patient will demonstrate improved lower extremity strength and endurance by performing 5 times sit<>stand test in 25 sec. Met      Long Term Goals: 8 weeks   Patient will demonstrate improved mobility for household ambulation by performing Timed Up and Go with least restrictive AD in 10 sec. Met 8/29/24  Patient will demonstrate improved lower  extremity strength and endurance by performing 5 times sit<>stand test in 15 sec met   Patient will demonstrate improved lower extremity strength by 1 grade to enable him to perform job related mobility. ongoing  Patient will ambulate community distances with single point cane to demonstrate a return to PLOF. ongoing  Patient will report reduced back pain to 2-3/10 to demonstrate improved tolerance to daily mobility. Ongoing    Plan      Outpatient Physical Therapy 2 times weekly to include the following interventions: Electrical Stimulation , Manual Therapy, Moist Heat/ Ice, Neuromuscular Re-ed, Patient Education, Therapeutic Activities, and Therapeutic Exercise.      Focus on hip and core strengthening    Tanvi Terrell, PT, DPT,   Board-Certified Clinical Specialist in Neurologic Physical Therapy   Certified Brain Injury Specialist   8/29/2024

## 2024-08-29 ENCOUNTER — CLINICAL SUPPORT (OUTPATIENT)
Dept: REHABILITATION | Facility: HOSPITAL | Age: 49
End: 2024-08-29
Payer: MEDICAID

## 2024-08-29 DIAGNOSIS — Z74.09 IMPAIRED MOBILITY: Primary | ICD-10-CM

## 2024-08-29 DIAGNOSIS — M48.062 SPINAL STENOSIS OF LUMBAR REGION WITH NEUROGENIC CLAUDICATION: ICD-10-CM

## 2024-08-29 PROCEDURE — 97110 THERAPEUTIC EXERCISES: CPT | Mod: PN | Performed by: PHYSICAL THERAPIST

## 2024-08-30 NOTE — PLAN OF CARE
Physical Therapy Daily Treatment Note/ Progress Note     Name: Emeka Caballero  Clinic Number: [id number]    Therapy Diagnosis:   Encounter Diagnoses   Name Primary?    Impaired mobility Yes    Spinal stenosis of lumbar region with neurogenic claudication        Physician: Carina Mcclure MD    Visit Date: 8/29/2024    Physician Orders: PT Eval and Treat 3x/week x 6 weeks  Medical Diagnosis from Referral:   Diagnosis   M48.062 (ICD-10-CM) - Spinal stenosis of lumbar region with neurogenic claudication   Z74.09,Z78.9 (ICD-10-CM) - Impaired mobility and ADLs      Evaluation Date: 5/21/2024  Authorization Period Expiration: 12/31/24  Updated plan of care: 7/17/24 to 9/4/24  New plan of care: 9/5/24 to 10/17/24  Visit # / Visits authorized: 22/ 40  PTA Visits: 0/5      PN due: 9/29/2024    Time In:  0936  Time Out: 1015  Total Billable Time: 39 minutes    Precautions: no lifting >10lb., no bending     Subjective     Pt reports: took pain meds this morning  He was compliant with home exercise program.  Response to previous treatment: felt great  Functional change: ongoing     Pain: 5/10  Location: low back pain       CMS Impairment/Limitation/Restriction for FOTO Balance Survey    Therapist reviewed FOTO scores for Emeka Caballero on 8/29/2024.   FOTO documents entered into [employer] - see Media section.    Limitation Score: 55%    ABC Scale: 33.1%       Objective        Lower Extremity Strength    RLE eval RLE 7/16/24 RLE  8/6/24 RLE 8/29/24 LLE eval  LLE 7/16/24 LLE 8/6/24 LLE 8/29/24   Hip Flexion: 2+/5 4-/5 4/5 4/5 4/5 4/5 4/5 4+/5   Hip Extension:  4/5 4-/5 4-/5 4-/5 4/5 4-/5 4-/5 4-/5   Hip Abduction: 2+/5 3-/5 3+/5 3+/5 3-/5 3+/5 4-/5 4-/5   Hip Adduction: Not tested  Not tested  Not tested  Not tested  Not tested  Not tested  Not tested  Not tested    Knee Extension: 4+/5 5/5 5/5 5/5 5/5 5/5 5/5 5/5   Knee Flexion: 5/5 4-/5 4/5 4/5 5/5 5/5 5/5 5/5   Ankle Dorsiflexion: 4/5 4+/5 5/5 5/5 5/5 5/5 5/5 5/5    Ankle Plantarflexion: 4+/5 4+/5 5/5 5/5 5/5 5/5 5/5 5/5   Great toe ext 4+/5 Not tested  Not tested  Not tested  4+/5 Not tested  Not tested Not tested              Evaluation 6/27/24 7/16/24 8/6/24 8/29/24   Single Limb Stance R LE Not tested   (<10 sec = HIGH FALL RISK) Not tested  Not tested  Not tested  Not tested    Single Limb Stance L LE Not tested   (<10 sec = HIGH FALL RISK) Not tested  Not tested  Not tested  Not tested    5 times sit-stand 34 seconds w/upper extremity     13 sec no UE 14 sec no UE 14 sec 10 sec   Greene/ FGA/ Tinetti Not tested  Not tested  Not tested  Not tested  Not tested       5 times sit<>stand Cutoff scores:  >12 sec= fall risk  PD: >16 seconds= fall risk  Vestibular/balance: 15 seconds over 65 years  CVA: 12 seconds       Evaluation 6/27/24 7/16/24 8/6/24 8/29/24   Timed Up and Go 20.3 sec w/RW  > 20 sec safe for independent transfers,     > 30 sec assist required for transfers 12.7 sec w/SPC 12.2 sec w/SPC 9.1 sec w/SPC 8.8s w/SPC   6 meter walk test Not tested  Not tested  Not tested  Not tested  Not tested    6 min walk test Not tested  Not tested  Not tested  Not tested  Not tested    Timed Up and Go w/SPC= 10.5s + 8.4s + 7.6s     Timed Up and Go fall risk:   Community dwelling older adults >13.5 sec   Chronic CVA >14 sec   Geriatric with h/o falls >15 sec   Frail elderly >32.6 sec    LE amputees >19 sec   PD >7.95- 11.5 sec   Hip OA >10 sec   Vestibular >11.1 sec         Emeka received therapeutic exercises to develop strength, endurance, and core stabilization for 39 minutes including:      Supine:   straight leg raise with transverse abdominal activation 3# 2 x 10  Bridging with 1 lower extremity march 10x  Hamstring stretch with belt 2 x 30 sec bilateral   Quad stretch w/ belt 2 x 30 sec bilateral     Prone:   Hip extension 2 x 10  Superman's 2 x 10    Quadruped bird dog arms only x 10   Quadruped bird dog legs only x 10     Modified plank 5 x 15 sec     Dead lift 7.5#  "cara bell from 18" box 2 x 10    Side stepping Blue thera band, no upper extremity- 2 x 10 ft   - SBA   - minimal facilitation to improve posture    Pain at conclusion: 0/10       Home Exercises Provided and Patient Education Provided     Education provided:   - continue with home exercise program, provided with more advanced core strengthening       Written Home Exercises Provided: Patient instructed to cont prior HEP.  Exercises were reviewed and Emeka was able to demonstrate them prior to the end of the session.  Emeka demonstrated good  understanding of the education provided.     See EMR under Patient Instructions for exercises provided prior visit.    Assessment   Assessment period: 8/13/24 to 8/29/2024    Iron tolerates physical therapy sessions well. Patient continues to report some low back pain and is taking pain medication to address. Decreased low back pain was reported for last few physical therapy sessions. Patient reports reduction in back pain at conclusion of sessions. Improved lower extremity strength/ endurance noted via 5 times sit<>stand test. Improved mobility is noted via Timed Up and Go. Patient is ambulating in the home without single point cane. He continues to use single point cane for mobility outside of home. Patient continues to ambulate with a narrow Base of support but decreased scissoring is noted. Patient requires intermittent verbal cues during session to improve upright posture. Increased flexion at hips noted in standing and patient attempts to arch back to correct due to decreased core stability. Prone core strengthening has been initiated to improve posterior chain strength for improved upright posture and to decrease pain.  Patient can benefit from continued skilled physical therapy to improve core and lower extremity strength for improved pain and mobility. Plan of care extended x 6 weeks to allow for continued progress with mobility.     Emeka Is " progressing well towards his goals.   Pt prognosis is Good.     Pt will continue to benefit from skilled outpatient physical therapy to address the deficits listed in the problem list box on initial evaluation, provide pt/family education and to maximize pt's level of independence in the home and community environment.     Pt's spiritual, cultural and educational needs considered and pt agreeable to plan of care and goals.     Anticipated barriers to physical therapy: length of time impairments have been present     Goals:   Status as of 8/29/24   Short Term Goals: 4 weeks   Patient will report compliance with home exercise program at least 2x/ week to improve speed of progress. Met   Patient will demonstrate improved mobility for household ambulation by performing Timed Up and Go with least restrictive AD in 15 sec. Met   Patient will demonstrate improved lower extremity strength and endurance by performing 5 times sit<>stand test in 25 sec. Met      Long Term Goals: 8 weeks   Patient will demonstrate improved mobility for household ambulation by performing Timed Up and Go with least restrictive AD in 10 sec. Met 8/29/24  Patient will demonstrate improved lower extremity strength and endurance by performing 5 times sit<>stand test in 15 sec met   Patient will demonstrate improved lower extremity strength by 1 grade to enable him to perform job related mobility. Improved  Patient will ambulate community distances with single point cane to demonstrate a return to PLOF. ongoing  Patient will report reduced back pain to 2-3/10 to demonstrate improved tolerance to daily mobility. Ongoing    Plan      New plan of care: 9/5/24 to 10/17/24    Outpatient Physical Therapy 2 times weekly to include the following interventions: Electrical Stimulation , Manual Therapy, Moist Heat/ Ice, Neuromuscular Re-ed, Patient Education, Therapeutic Activities, and Therapeutic Exercise.      Focus on hip and core strengthening    Tanvi WILLOUGHBY  Nidhi, PT, DPT,   Board-Certified Clinical Specialist in Neurologic Physical Therapy   Certified Brain Injury Specialist   8/29/2024

## 2024-09-09 ENCOUNTER — DOCUMENTATION ONLY (OUTPATIENT)
Dept: REHABILITATION | Facility: HOSPITAL | Age: 49
End: 2024-09-09

## 2024-09-09 NOTE — PROGRESS NOTES
[facility]                                 No Shows Therapy Appointment     Emeka Caballero  MRN: [id number]    Patient no shows to today's therapy appointment on 9/9/2024.    Bibiana Vargas, VANCE  9/9/2024

## 2024-09-17 ENCOUNTER — CLINICAL SUPPORT (OUTPATIENT)
Dept: REHABILITATION | Facility: HOSPITAL | Age: 49
End: 2024-09-17
Payer: MEDICAID

## 2024-09-17 DIAGNOSIS — Z98.1 S/P LUMBAR FUSION: ICD-10-CM

## 2024-09-17 DIAGNOSIS — Z74.09 IMPAIRED MOBILITY: Primary | ICD-10-CM

## 2024-09-17 DIAGNOSIS — M48.062 SPINAL STENOSIS OF LUMBAR REGION WITH NEUROGENIC CLAUDICATION: ICD-10-CM

## 2024-09-17 PROCEDURE — 97110 THERAPEUTIC EXERCISES: CPT | Mod: PN | Performed by: PHYSICAL THERAPIST

## 2024-09-17 NOTE — PROGRESS NOTES
"Physical Therapy Daily Treatment Note/ Progress Note      [id number]     Therapy Diagnosis:        Encounter Diagnoses   Name Primary?    Impaired mobility Yes    Spinal stenosis of lumbar region with neurogenic claudication           Physician: Carina Mcclure MD     Visit Date: 9/17/2024      Physician Orders: PT Eval and Treat 3x/week x 6 weeks  Medical Diagnosis from Referral:   Diagnosis   M48.062 (ICD-10-CM) - Spinal stenosis of lumbar region with neurogenic claudication   Z74.09,Z78.9 (ICD-10-CM) - Impaired mobility and ADLs      Evaluation Date: 5/21/2024  Authorization Period Expiration: 12/31/24  plan of care: 9/5/24 to 10/17/24  Visit # / Visits authorized: 23/ 40  PTA Visits: 0/5      PN due: 9/29/2024     Time In:  1346  Time Out: 1430  Total Billable Time: 44 minutes     Precautions: no lifting >10lb., no bending      Subjective      Pt reports: took pain meds this morning  He was compliant with home exercise program.  Response to previous treatment: felt great  Functional change: ongoing      Pain: 5/10  Location: low back pain        Objective      Emeka received therapeutic exercises to develop strength, endurance, and core stabilization for 44 minutes including:      Supine:   straight leg raise with transverse abdominal activation 3# 2 x 10  Bridging with 1 lower extremity march 10x  Hamstring stretch with belt 2 x 30 sec bilateral   Quad stretch w/ belt 2 x 30 sec bilateral      Prone:   Hip extension 2 x 10  Superman's 2 x 10     Quadruped bird dog arms only x 10   Quadruped bird dog legs only x 10      Modified plank 5 x 15 sec     Dead lift 7.5# kettle bell from 18" box 2 x 10     Side stepping Blue thera band, no upper extremity- 2 x 10 ft              - SBA              - minimal facilitation to improve posture     Pain at conclusion: 0/10        Home Exercises Provided and Patient Education Provided      Education provided:   - continue with " home exercise program, provided with more advanced core strengthening         Written Home Exercises Provided: Patient instructed to cont prior HEP.  Exercises were reviewed and Emeka was able to demonstrate them prior to the end of the session.  mEeka demonstrated good  understanding of the education provided.      See EMR under Patient Instructions for exercises provided prior visit.     Assessment   Iron tolerated physical therapy session well. Less verbal cues required for posture during resisted side stepping. Patient continues to report a good challenge with standing and prone exercises. Patient can benefit from continued skilled physical therapy to improve strength.      Emeka Is progressing well towards his goals.   Pt prognosis is Good.      Pt will continue to benefit from skilled outpatient physical therapy to address the deficits listed in the problem list box on initial evaluation, provide pt/family education and to maximize pt's level of independence in the home and community environment.      Pt's spiritual, cultural and educational needs considered and pt agreeable to plan of care and goals.     Anticipated barriers to physical therapy: length of time impairments have been present      Goals:   Status as of 8/29/24   Short Term Goals: 4 weeks   Patient will report compliance with home exercise program at least 2x/ week to improve speed of progress. Met   Patient will demonstrate improved mobility for household ambulation by performing Timed Up and Go with least restrictive AD in 15 sec. Met   Patient will demonstrate improved lower extremity strength and endurance by performing 5 times sit<>stand test in 25 sec. Met      Long Term Goals: 8 weeks   Patient will demonstrate improved mobility for household ambulation by performing Timed Up and Go with least restrictive AD in 10 sec. Met 8/29/24  Patient will demonstrate improved lower extremity strength and endurance by performing 5  times sit<>stand test in 15 sec met   Patient will demonstrate improved lower extremity strength by 1 grade to enable him to perform job related mobility. Improved  Patient will ambulate community distances with single point cane to demonstrate a return to PLOF. ongoing  Patient will report reduced back pain to 2-3/10 to demonstrate improved tolerance to daily mobility. Ongoing     Plan      Outpatient Physical Therapy 2 times weekly to include the following interventions: Electrical Stimulation , Manual Therapy, Moist Heat/ Ice, Neuromuscular Re-ed, Patient Education, Therapeutic Activities, and Therapeutic Exercise.      Focus on hip and core strengthening     Tanvi Terrell, PT, DPT,   Board-Certified Clinical Specialist in Neurologic Physical Therapy   9/17/2024

## 2024-09-18 ENCOUNTER — DOCUMENTATION ONLY (OUTPATIENT)
Dept: REHABILITATION | Facility: HOSPITAL | Age: 49
End: 2024-09-18
Payer: MEDICAID

## 2024-09-18 NOTE — PROGRESS NOTES
PT/PTA STAFFING      [facility]: [id number]    Date of staffin24      DME/ orders needed: No    Changes to POC: continue with prone strengthening and more advanced core stability     Continue with POC: Yes    Tanvi Terrell,DPT  2024

## 2024-09-19 ENCOUNTER — CLINICAL SUPPORT (OUTPATIENT)
Dept: REHABILITATION | Facility: HOSPITAL | Age: 49
End: 2024-09-19
Payer: MEDICAID

## 2024-09-19 DIAGNOSIS — Z98.1 S/P LUMBAR FUSION: Primary | ICD-10-CM

## 2024-09-19 DIAGNOSIS — Z74.09 IMPAIRED MOBILITY: ICD-10-CM

## 2024-09-19 PROCEDURE — 97110 THERAPEUTIC EXERCISES: CPT | Mod: PN,CQ

## 2024-09-19 NOTE — PROGRESS NOTES
"Physical Therapy Daily Treatment Note      [facility]  [facility] Number: [id number]     Therapy Diagnosis:        Encounter Diagnoses   Name Primary?    Impaired mobility Yes    Spinal stenosis of lumbar region with neurogenic claudication           Physician: Carina Mcclure MD     Visit Date: 9/19/2024      Physician Orders: PT Eval and Treat 3x/week x 6 weeks  Medical Diagnosis from Referral:   Diagnosis   M48.062 (ICD-10-CM) - Spinal stenosis of lumbar region with neurogenic claudication   Z74.09,Z78.9 (ICD-10-CM) - Impaired mobility and ADLs      Evaluation Date: 5/21/2024  Authorization Period Expiration: 12/31/24  plan of care: 9/5/24 to 10/17/24  Visit # / Visits authorized: 24/ 40  PTA Visits: 0/5      PN due: 9/29/2024     Time In: 10:50am  Time Out: 11:38am  Total Billable Time: 48 minutes     Precautions: no lifting >10lb., no bending      Subjective      Pt reports: his back feels ok, not great, but ok.   He was compliant with home exercise program.  Response to previous treatment: no concerns   Functional change: ongoing      Pain: 5/10  Location: low back pain        Objective      Emeka received therapeutic exercises to develop strength, endurance, and core stabilization for 48 minutes including:     Recumbent bike for 8 minutes Level 8.0     Supine:   straight leg raise with transverse abdominal activation 3# 2 x 10  Bridging with 1 lower extremity march 10x  Hamstring stretch with belt 2 x 30 sec bilateral   Quad stretch w/ belt 2 x 30 sec bilateral      Prone:   Hip extension 2 x 10  Superman's 2 x 10     Quadruped bird dog arms only x 10   Quadruped bird dog legs only x 10      Modified plank 5 x 15 sec     Dead lift 10# kettle bell from 18" box 2 x 10     Side stepping Blue thera band, no upper extremity- 2 x 10 ft              - SBA              - minimal facilitation to improve posture     Pain at conclusion: 0/10        Home Exercises Provided and Patient Education Provided "      Education provided:   - continue with home exercise program, provided with more advanced core strengthening         Written Home Exercises Provided: Patient instructed to cont prior HEP.  Exercises were reviewed and Emeka was able to demonstrate them prior to the end of the session.  Emeka demonstrated good  understanding of the education provided.      See EMR under Patient Instructions for exercises provided prior visit.     Assessment   Still required cueing for upright posture in side stepping exercise. Able to increase 7.5lb to 10lb in dead lift this visit. Cues for core activation with these exercise which patient was able to complete without increase discomfort or pain to lower back. At this time, patient is steadily progressing towards his treatment goals.     Emeka Is progressing well towards his goals.   Pt prognosis is Good.      Pt will continue to benefit from skilled outpatient physical therapy to address the deficits listed in the problem list box on initial evaluation, provide pt/family education and to maximize pt's level of independence in the home and community environment.      Pt's spiritual, cultural and educational needs considered and pt agreeable to plan of care and goals.     Anticipated barriers to physical therapy: length of time impairments have been present      Goals:   Status as of 8/29/24   Short Term Goals: 4 weeks   Patient will report compliance with home exercise program at least 2x/ week to improve speed of progress. Met   Patient will demonstrate improved mobility for household ambulation by performing Timed Up and Go with least restrictive AD in 15 sec. Met   Patient will demonstrate improved lower extremity strength and endurance by performing 5 times sit<>stand test in 25 sec. Met      Long Term Goals: 8 weeks   Patient will demonstrate improved mobility for household ambulation by performing Timed Up and Go with least restrictive AD in 10 sec. Met  8/29/24  Patient will demonstrate improved lower extremity strength and endurance by performing 5 times sit<>stand test in 15 sec met   Patient will demonstrate improved lower extremity strength by 1 grade to enable him to perform job related mobility. Improved  Patient will ambulate community distances with single point cane to demonstrate a return to PLOF. ongoing  Patient will report reduced back pain to 2-3/10 to demonstrate improved tolerance to daily mobility. Ongoing     Plan      Outpatient Physical Therapy 2 times weekly to include the following interventions: Electrical Stimulation , Manual Therapy, Moist Heat/ Ice, Neuromuscular Re-ed, Patient Education, Therapeutic Activities, and Therapeutic Exercise.      Focus on hip and core strengthening     Bibiana Vargas, PTA  9/19/2024

## 2024-09-23 ENCOUNTER — DOCUMENTATION ONLY (OUTPATIENT)
Dept: REHABILITATION | Facility: HOSPITAL | Age: 49
End: 2024-09-23
Payer: MEDICAID

## 2024-09-23 NOTE — PROGRESS NOTES
[facility]                                 No Shows Therapy Appointment     Emeka Caballero  MRN: [id number]    Patient no shows to today's therapy appointment on 9/23/2024.    Bibiana Vargas, VANCE  9/23/2024

## 2024-09-27 ENCOUNTER — CLINICAL SUPPORT (OUTPATIENT)
Dept: REHABILITATION | Facility: HOSPITAL | Age: 49
End: 2024-09-27
Payer: MEDICAID

## 2024-09-27 DIAGNOSIS — Z74.09 IMPAIRED MOBILITY: ICD-10-CM

## 2024-09-27 DIAGNOSIS — Z98.1 S/P LUMBAR FUSION: Primary | ICD-10-CM

## 2024-09-27 PROCEDURE — 97110 THERAPEUTIC EXERCISES: CPT | Mod: PN,CQ

## 2024-09-27 NOTE — PROGRESS NOTES
"Physical Therapy Daily Treatment Note      [facility]  [facility] Number[facility] [id number]     Therapy Diagnosis:        Encounter Diagnoses   Name Primary?    Impaired mobility Yes    Spinal stenosis of lumbar region with neurogenic claudication           Physician: Carina Mcclure MD     Visit Date: 9/27/2024      Physician Orders: PT Eval and Treat 3x/week x 6 weeks  Medical Diagnosis from Referral:   Diagnosis   M48.062 (ICD-10-CM) - Spinal stenosis of lumbar region with neurogenic claudication   Z74.09,Z78.9 (ICD-10-CM) - Impaired mobility and ADLs      Evaluation Date: 5/21/2024  Authorization Period Expiration: 12/31/24  plan of care: 9/5/24 to 10/17/24  Visit # / Visits authorized: 25/ 40  PTA Visits: 0/5      PN due: 9/29/2024     Time In: 13:00  Time Out: 13:43  Total Billable Time: 43 minutes     Precautions: no lifting >10lb., no bending      Subjective      Pt reports: he's hurting really bad today. Sometimes he feels like there's something sharp poking him in the back and he feels numb in the right leg.   He was compliant with home exercise program.  Response to previous treatment: no concerns   Functional change: ongoing      Pain: 10/10  Location: low back pain        Objective      Emeka received therapeutic exercises to develop strength, endurance, and core stabilization for 43 minutes including:     Recumbent bike for 8 minutes Level 6.0      Supine: (uses MHP to low back today for decrease pain)  straight leg raise with transverse abdominal activation 3# 2 x 10  Bridging with 1 lower extremity march with 3# 10x  Hamstring stretch with belt 2 x 30 sec bilateral   Quad stretch w/ belt 2 x 30 sec bilateral     Lateral trunk rotation for 2 minutes   Double knee to chest with swiss ball, 2x10   Supine abdominal press (opp arm leg) with ball, 2x10 5" holds    Transverse abdominus contraction, 2x10         Defer exercises below due to patient is in severe pain today.    Prone:   Hip extension " "2 x 10  Superman's 2 x 10     Quadruped bird dog arms only x 10   Quadruped bird dog legs only x 10      Modified plank 5 x 15 sec     Dead lift 10# kettle bell from 18" box 2 x 10     Side stepping Blue thera band, no upper extremity- 2 x 10 ft              - SBA              - minimal facilitation to improve posture     Pain at conclusion: 9/10        Home Exercises Provided and Patient Education Provided      Education provided:   - continue with home exercise program, provided with more advanced core strengthening         Written Home Exercises Provided: Patient instructed to cont prior HEP.  Exercises were reviewed and Emeka was able to demonstrate them prior to the end of the session.  Emeka demonstrated good  understanding of the education provided.      See EMR under Patient Instructions for exercises provided prior visit.     Assessment   Patient was limited in exercises due to severe pain to low back today. Patient did thought about not coming in for his appointment, but wanted to try to come to his therapy. Due to increase pain, defer a few exercises and worked on gentle stretches and core strengthening in supine position with heating pack for decrease pain. Cue for proper core engagement in TA sets. Towards the end of session, patient states that his pain eases a little bit rating 9/10 on pain level.     Emeka Is progressing well towards his goals.   Pt prognosis is Good.      Pt will continue to benefit from skilled outpatient physical therapy to address the deficits listed in the problem list box on initial evaluation, provide pt/family education and to maximize pt's level of independence in the home and community environment.      Pt's spiritual, cultural and educational needs considered and pt agreeable to plan of care and goals.     Anticipated barriers to physical therapy: length of time impairments have been present      Goals:   Status as of 8/29/24   Short Term Goals: 4 weeks "   Patient will report compliance with home exercise program at least 2x/ week to improve speed of progress. Met   Patient will demonstrate improved mobility for household ambulation by performing Timed Up and Go with least restrictive AD in 15 sec. Met   Patient will demonstrate improved lower extremity strength and endurance by performing 5 times sit<>stand test in 25 sec. Met      Long Term Goals: 8 weeks   Patient will demonstrate improved mobility for household ambulation by performing Timed Up and Go with least restrictive AD in 10 sec. Met 8/29/24  Patient will demonstrate improved lower extremity strength and endurance by performing 5 times sit<>stand test in 15 sec met   Patient will demonstrate improved lower extremity strength by 1 grade to enable him to perform job related mobility. Improved  Patient will ambulate community distances with single point cane to demonstrate a return to PLOF. ongoing  Patient will report reduced back pain to 2-3/10 to demonstrate improved tolerance to daily mobility. Ongoing     Plan      Outpatient Physical Therapy 2 times weekly to include the following interventions: Electrical Stimulation , Manual Therapy, Moist Heat/ Ice, Neuromuscular Re-ed, Patient Education, Therapeutic Activities, and Therapeutic Exercise.      Focus on hip and core strengthening     Bibiana Vargas, VANCE  9/27/2024

## 2024-09-30 NOTE — ADDENDUM NOTE
Addended by: COOKIE WHITLOCK on: 3/2/2023 04:08 PM     Modules accepted: Orders     [Follow-Up Visit] : a follow-up visit for [FreeTextEntry2] : Right knee pain

## 2024-10-02 ENCOUNTER — PATIENT OUTREACH (OUTPATIENT)
Dept: ADMINISTRATIVE | Facility: HOSPITAL | Age: 49
End: 2024-10-02
Payer: MEDICAID

## 2024-10-08 ENCOUNTER — LAB VISIT (OUTPATIENT)
Dept: LAB | Facility: HOSPITAL | Age: 49
End: 2024-10-08
Attending: FAMILY MEDICINE
Payer: MEDICAID

## 2024-10-08 ENCOUNTER — OFFICE VISIT (OUTPATIENT)
Dept: FAMILY MEDICINE | Facility: CLINIC | Age: 49
End: 2024-10-08
Payer: MEDICAID

## 2024-10-08 VITALS
BODY MASS INDEX: 29.13 KG/M2 | HEIGHT: 70 IN | WEIGHT: 203.5 LBS | HEART RATE: 82 BPM | DIASTOLIC BLOOD PRESSURE: 70 MMHG | RESPIRATION RATE: 19 BRPM | OXYGEN SATURATION: 98 % | SYSTOLIC BLOOD PRESSURE: 104 MMHG

## 2024-10-08 DIAGNOSIS — E78.2 MIXED DYSLIPIDEMIA: Chronic | ICD-10-CM

## 2024-10-08 DIAGNOSIS — M54.16 LUMBAR RADICULOPATHY: ICD-10-CM

## 2024-10-08 DIAGNOSIS — E11.69 TYPE 2 DIABETES MELLITUS WITH HYPERLIPIDEMIA: Primary | Chronic | ICD-10-CM

## 2024-10-08 DIAGNOSIS — E78.5 TYPE 2 DIABETES MELLITUS WITH HYPERLIPIDEMIA: Primary | Chronic | ICD-10-CM

## 2024-10-08 DIAGNOSIS — M48.061 SPINAL STENOSIS, LUMBAR REGION, WITHOUT NEUROGENIC CLAUDICATION: ICD-10-CM

## 2024-10-08 DIAGNOSIS — N40.0 BENIGN PROSTATIC HYPERPLASIA, UNSPECIFIED WHETHER LOWER URINARY TRACT SYMPTOMS PRESENT: ICD-10-CM

## 2024-10-08 DIAGNOSIS — I10 ESSENTIAL HYPERTENSION, BENIGN: Chronic | ICD-10-CM

## 2024-10-08 DIAGNOSIS — E78.5 TYPE 2 DIABETES MELLITUS WITH HYPERLIPIDEMIA: Chronic | ICD-10-CM

## 2024-10-08 DIAGNOSIS — Z28.21 FLU VACCINE REFUSED: ICD-10-CM

## 2024-10-08 DIAGNOSIS — E11.69 TYPE 2 DIABETES MELLITUS WITH HYPERLIPIDEMIA: Chronic | ICD-10-CM

## 2024-10-08 PROBLEM — D62 ACUTE BLOOD LOSS ANEMIA: Status: RESOLVED | Noted: 2024-05-02 | Resolved: 2024-10-08

## 2024-10-08 LAB
ALBUMIN SERPL BCP-MCNC: 3.6 G/DL (ref 3.5–5.2)
ALP SERPL-CCNC: 117 U/L (ref 55–135)
ALT SERPL W/O P-5'-P-CCNC: 15 U/L (ref 10–44)
ANION GAP SERPL CALC-SCNC: 11 MMOL/L (ref 8–16)
AST SERPL-CCNC: 15 U/L (ref 10–40)
BILIRUB SERPL-MCNC: 0.2 MG/DL (ref 0.1–1)
BUN SERPL-MCNC: 12 MG/DL (ref 6–20)
CALCIUM SERPL-MCNC: 9.2 MG/DL (ref 8.7–10.5)
CHLORIDE SERPL-SCNC: 107 MMOL/L (ref 95–110)
CHOLEST SERPL-MCNC: 187 MG/DL (ref 120–199)
CHOLEST/HDLC SERPL: 6.4 {RATIO} (ref 2–5)
CO2 SERPL-SCNC: 23 MMOL/L (ref 23–29)
CREAT SERPL-MCNC: 0.9 MG/DL (ref 0.5–1.4)
ERYTHROCYTE [DISTWIDTH] IN BLOOD BY AUTOMATED COUNT: 18 % (ref 11.5–14.5)
EST. GFR  (NO RACE VARIABLE): >60 ML/MIN/1.73 M^2
GLUCOSE SERPL-MCNC: 163 MG/DL (ref 70–110)
HCT VFR BLD AUTO: 44.3 % (ref 40–54)
HDLC SERPL-MCNC: 29 MG/DL (ref 40–75)
HDLC SERPL: 15.5 % (ref 20–50)
HGB BLD-MCNC: 13.7 G/DL (ref 14–18)
LDLC SERPL CALC-MCNC: ABNORMAL MG/DL (ref 63–159)
MCH RBC QN AUTO: 25.3 PG (ref 27–31)
MCHC RBC AUTO-ENTMCNC: 30.9 G/DL (ref 32–36)
MCV RBC AUTO: 82 FL (ref 82–98)
NONHDLC SERPL-MCNC: 158 MG/DL
PLATELET # BLD AUTO: 256 K/UL (ref 150–450)
PMV BLD AUTO: 10.1 FL (ref 9.2–12.9)
POTASSIUM SERPL-SCNC: 3.9 MMOL/L (ref 3.5–5.1)
PROT SERPL-MCNC: 7.7 G/DL (ref 6–8.4)
RBC # BLD AUTO: 5.41 M/UL (ref 4.6–6.2)
SODIUM SERPL-SCNC: 141 MMOL/L (ref 136–145)
TRIGL SERPL-MCNC: 424 MG/DL (ref 30–150)
WBC # BLD AUTO: 7.37 K/UL (ref 3.9–12.7)

## 2024-10-08 PROCEDURE — 80061 LIPID PANEL: CPT | Performed by: FAMILY MEDICINE

## 2024-10-08 PROCEDURE — 84153 ASSAY OF PSA TOTAL: CPT | Performed by: FAMILY MEDICINE

## 2024-10-08 PROCEDURE — 85027 COMPLETE CBC AUTOMATED: CPT | Performed by: FAMILY MEDICINE

## 2024-10-08 PROCEDURE — 3008F BODY MASS INDEX DOCD: CPT | Mod: CPTII,,, | Performed by: FAMILY MEDICINE

## 2024-10-08 PROCEDURE — 3074F SYST BP LT 130 MM HG: CPT | Mod: CPTII,,, | Performed by: FAMILY MEDICINE

## 2024-10-08 PROCEDURE — 99214 OFFICE O/P EST MOD 30 MIN: CPT | Mod: S$PBB,,, | Performed by: FAMILY MEDICINE

## 2024-10-08 PROCEDURE — 99213 OFFICE O/P EST LOW 20 MIN: CPT | Mod: PBBFAC,PN | Performed by: FAMILY MEDICINE

## 2024-10-08 PROCEDURE — 99999 PR PBB SHADOW E&M-EST. PATIENT-LVL III: CPT | Mod: PBBFAC,,, | Performed by: FAMILY MEDICINE

## 2024-10-08 PROCEDURE — 1159F MED LIST DOCD IN RCRD: CPT | Mod: CPTII,,, | Performed by: FAMILY MEDICINE

## 2024-10-08 PROCEDURE — G2211 COMPLEX E/M VISIT ADD ON: HCPCS | Mod: S$PBB,,, | Performed by: FAMILY MEDICINE

## 2024-10-08 PROCEDURE — 83036 HEMOGLOBIN GLYCOSYLATED A1C: CPT | Performed by: FAMILY MEDICINE

## 2024-10-08 PROCEDURE — 3046F HEMOGLOBIN A1C LEVEL >9.0%: CPT | Mod: CPTII,,, | Performed by: FAMILY MEDICINE

## 2024-10-08 PROCEDURE — 80053 COMPREHEN METABOLIC PANEL: CPT | Performed by: FAMILY MEDICINE

## 2024-10-08 PROCEDURE — 3072F LOW RISK FOR RETINOPATHY: CPT | Mod: CPTII,,, | Performed by: FAMILY MEDICINE

## 2024-10-08 PROCEDURE — 36415 COLL VENOUS BLD VENIPUNCTURE: CPT | Mod: PN | Performed by: FAMILY MEDICINE

## 2024-10-08 PROCEDURE — 1160F RVW MEDS BY RX/DR IN RCRD: CPT | Mod: CPTII,,, | Performed by: FAMILY MEDICINE

## 2024-10-08 PROCEDURE — 4010F ACE/ARB THERAPY RXD/TAKEN: CPT | Mod: CPTII,,, | Performed by: FAMILY MEDICINE

## 2024-10-08 PROCEDURE — 3078F DIAST BP <80 MM HG: CPT | Mod: CPTII,,, | Performed by: FAMILY MEDICINE

## 2024-10-08 RX ORDER — METFORMIN HYDROCHLORIDE 500 MG/1
1000 TABLET, EXTENDED RELEASE ORAL
Qty: 180 TABLET | Refills: 2 | Status: SHIPPED | OUTPATIENT
Start: 2024-10-08

## 2024-10-08 RX ORDER — METHOCARBAMOL 500 MG/1
500 TABLET, FILM COATED ORAL NIGHTLY
Qty: 90 TABLET | Refills: 2 | Status: SHIPPED | OUTPATIENT
Start: 2024-10-08

## 2024-10-08 RX ORDER — LISINOPRIL 5 MG/1
5 TABLET ORAL DAILY
Qty: 90 TABLET | Refills: 1 | Status: SHIPPED | OUTPATIENT
Start: 2024-10-08 | End: 2025-10-08

## 2024-10-08 RX ORDER — GLIPIZIDE 5 MG/1
5 TABLET ORAL
Qty: 60 TABLET | Refills: 2 | Status: SHIPPED | OUTPATIENT
Start: 2024-10-08 | End: 2025-01-06

## 2024-10-08 RX ORDER — GABAPENTIN 300 MG/1
300 CAPSULE ORAL NIGHTLY
Qty: 90 CAPSULE | Refills: 3 | Status: SHIPPED | OUTPATIENT
Start: 2024-10-08 | End: 2025-10-08

## 2024-10-08 RX ORDER — LANCETS
EACH MISCELLANEOUS
Qty: 100 EACH | Refills: 11 | Status: SHIPPED | OUTPATIENT
Start: 2024-10-08

## 2024-10-08 RX ORDER — ATORVASTATIN CALCIUM 40 MG/1
40 TABLET, FILM COATED ORAL DAILY
Qty: 90 TABLET | Refills: 2 | Status: SHIPPED | OUTPATIENT
Start: 2024-10-08

## 2024-10-08 NOTE — PROGRESS NOTES
"  Patient Name: Emeka Caballero    : 1975  MRN: [id number]      Subjective:     Patient ID: Emeka is a 49 y.o. male    Chief Complaint:  Follow-up    HPI     History of Present Illness              Review of Systems     Objective:   /70 (BP Location: Left arm, Patient Position: Sitting)   Pulse 82   Resp 19   Ht 5' 10" (1.778 m)   Wt 92.3 kg (203 lb 7.8 oz)   SpO2 98%   BMI 29.20 kg/m²     Physical Exam     Physical Exam            Assessment        ICD-10-CM ICD-9-CM   1. Mixed dyslipidemia  E78.2 272.2   2. Spinal stenosis, lumbar region, without neurogenic claudication  M48.061 724.02   3. Lumbar radiculopathy  M54.16 724.4   4. Type 2 diabetes mellitus with hyperlipidemia  E11.69 250.80    E78.5 272.4   5. Essential hypertension, benign  I10 401.1           Assessment & Plan              This note was generated with the assistance of ambient listening technology. Verbal consent was obtained by the patient and accompanying visitor(s) for the recording of patient appointment to facilitate this note. I attest to having reviewed and edited the generated note for accuracy, though some syntax or spelling errors may persist. Please contact the author of this note for any clarification.    Plan:     1. Mixed dyslipidemia  Overview:  Lab Results   Component Value Date    CHOL 164 10/25/2023    CHOL 124 2023    CHOL 137 10/14/2022     Lab Results   Component Value Date    HDL 25 (L) 10/25/2023    HDL 29 (L) 2023    HDL 26 (L) 10/14/2022     Lab Results   Component Value Date    LDLCALC 72.4 10/25/2023    LDLCALC 65.4 2023    LDLCALC 81.4 10/14/2022     Lab Results   Component Value Date    TRIG 333 (H) 10/25/2023    TRIG 148 2023    TRIG 148 10/14/2022     Lab Results   Component Value Date    CHOLHDL 15.2 (L) 10/25/2023    CHOLHDL 23.4 2023    CHOLHDL 19.0 (L) 10/14/2022        The 10-year ASCVD risk score (Jasper SMART, et al., 2019) is: 27.2%    Values used to " calculate the score:      Age: 49 years      Sex: Male      Is Non- : Yes      Diabetic: Yes      Tobacco smoker: Yes      Systolic Blood Pressure: 118 mmHg      Is BP treated: Yes      HDL Cholesterol: 25 mg/dL      Total Cholesterol: 164 mg/dL        2. Spinal stenosis, lumbar region, without neurogenic claudication    3. Lumbar radiculopathy    4. Type 2 diabetes mellitus with hyperlipidemia  Overview:  Lab Results   Component Value Date    HGBA1C 6.8 (H) 05/01/2024    HGBA1C 6.6 (H) 10/25/2023    HGBA1C 6.6 (H) 04/29/2023     Lab Results   Component Value Date    LDLCALC 72.4 10/25/2023     Lab Results   Component Value Date    MICALBCREAT 2.9 10/14/2022           5. Essential hypertension, benign           -Dio Zarate Jr., MD, AAHIVS      This office note has been dictated.  This dictation has been generated using M-Modal Fluency Direct dictation; some phonetic errors may occur.         There are no Patient Instructions on file for this visit.      No follow-ups on file.   Future Appointments   Date Time Provider Department Center   10/15/2024  1:30 PM Bibiana Vargas, PTA [facility]   10/18/2024  1:45 PM NidhiTanvi A., PT [facility]   10/22/2024  1:45 PM Nidhi Tavni A., PT [facility]   10/25/2024  1:45 PM Nidhi Tanvi A., PT [facility]   10/28/2024  1:00 PM Nidhi Tanvi A., PT [facility]   10/30/2024  2:30 PM Nidhi Tanvi A., PT [facility]

## 2024-10-09 LAB
COMPLEXED PSA SERPL-MCNC: 0.25 NG/ML (ref 0–4)
ESTIMATED AVG GLUCOSE: 183 MG/DL (ref 68–131)
HBA1C MFR BLD: 8 % (ref 4–5.6)

## 2024-10-09 NOTE — PROGRESS NOTES
Patient Name: Emeka Caballero    : 1975  MRN: [id number]      Subjective:     Patient ID: Emeka is a 49 y.o. male    History of Present Illness    CHIEF COMPLAINT:  Emeka presents today for follow up on chronic conditions including diabetes, hypertension, hyperlipidemia, and chronic low back pain.    DIABETES MANAGEMENT:  He reports his highest glucose readings were between 130-140 mg/dL when he was able to check at home. However, he has not been able to measure his blood sugar for approximately 12 weeks due to lack of supplies. He continues to take glipizide twice daily and metformin twice daily for diabetes management, stating glipizide has been very helpful in controlling his blood sugar. He reports a recent emergency room visit due to a high blood sugar episode with a glucose level of 600. He occasionally uses dark sugar in his coffee.    NEUROPATHY:  He experiences tingling sensation and jumping in his foot at night, which is being managed with gabapentin. He inquires about the relationship between these symptoms and blood sugar, suggesting concern about diabetic neuropathy.    PAIN MANAGEMENT:  He reports experiencing muscle pain and back pain at night. He takes a muscle relaxant at night to alleviate this discomfort, which helps relieve some of the back pain as well.    CARDIOVASCULAR HEALTH:  He continues to take a statin for cholesterol management and lisinopril for blood pressure. He also takes a fish oil supplement. He denies checking blood pressure at home.    MEDICATIONS:  Current medications include statin, gabapentin, glipizide, lisinopril, metformin, fish oil supplement, and a muscle relaxant. He has discontinued previously prescribed medications for constipation, prostate issues, and acid reflux.    PENDING LABS:  He is scheduled for labs to assess sugar, cholesterol, liver, and kidney function, as well as a urine test. He acknowledges the importance of completing these labs  "before the next appointment.      ROS:  General: -fever, -chills, -fatigue, -weight gain, -weight loss  Eyes: -vision changes, -redness, -discharge  ENT: -ear pain, -nasal congestion, -sore throat  Cardiovascular: -chest pain, -palpitations, -lower extremity edema  Respiratory: -cough, -shortness of breath  Gastrointestinal: -abdominal pain, -nausea, -vomiting, -diarrhea, -constipation, -blood in stool  Genitourinary: -dysuria, -hematuria, -frequency  Musculoskeletal: -joint pain, +muscle pain, +back pain  Skin: -rash, -lesion  Neurological: -headache, -dizziness, -numbness, +tingling  Psychiatric: -anxiety, -depression, -sleep difficulty           Objective:   /70 (BP Location: Left arm, Patient Position: Sitting)   Pulse 82   Resp 19   Ht 5' 10" (1.778 m)   Wt 92.3 kg (203 lb 7.8 oz)   SpO2 98%   BMI 29.20 kg/m²     Physical Exam  Vitals reviewed.   Constitutional:       General: He is not in acute distress.     Comments: Using a cane for ambulation   HENT:      Head: Normocephalic and atraumatic.      Right Ear: Ear canal and external ear normal.      Left Ear: Ear canal and external ear normal.      Nose: Nose normal.      Mouth/Throat:      Mouth: Mucous membranes are moist.      Pharynx: No oropharyngeal exudate or posterior oropharyngeal erythema.   Eyes:      Extraocular Movements: Extraocular movements intact.      Conjunctiva/sclera: Conjunctivae normal.      Pupils: Pupils are equal, round, and reactive to light.   Cardiovascular:      Rate and Rhythm: Normal rate and regular rhythm.      Heart sounds: No murmur heard.  Pulmonary:      Effort: Pulmonary effort is normal. No respiratory distress.      Breath sounds: Normal breath sounds. No wheezing or rales.   Abdominal:      General: Abdomen is flat. Bowel sounds are normal. There is no distension.      Palpations: Abdomen is soft.      Tenderness: There is no abdominal tenderness. There is no guarding.   Musculoskeletal:      Cervical back: " Normal range of motion.   Skin:     General: Skin is warm.      Capillary Refill: Capillary refill takes less than 2 seconds.   Neurological:      Mental Status: He is alert and oriented to person, place, and time.      Cranial Nerves: No cranial nerve deficit.      Sensory: No sensory deficit.   Psychiatric:         Mood and Affect: Mood normal.         Behavior: Behavior normal.        Protective Sensation (w/ 10 gram monofilament):  Right: Intact  Left: Intact    Visual Inspection:  Normal -  Bilateral    Pedal Pulses:   Right: Present  Left: Present    Posterior Tibialis Pulses:   Right:Present  Left: Present     Lab Visit on 10/08/2024   Component Date Value Ref Range Status    Sodium 10/08/2024 141  136 - 145 mmol/L Final    Potassium 10/08/2024 3.9  3.5 - 5.1 mmol/L Final    Chloride 10/08/2024 107  95 - 110 mmol/L Final    CO2 10/08/2024 23  23 - 29 mmol/L Final    Glucose 10/08/2024 163 (H)  70 - 110 mg/dL Final    BUN 10/08/2024 12  6 - 20 mg/dL Final    Creatinine 10/08/2024 0.9  0.5 - 1.4 mg/dL Final    Calcium 10/08/2024 9.2  8.7 - 10.5 mg/dL Final    Total Protein 10/08/2024 7.7  6.0 - 8.4 g/dL Final    Albumin 10/08/2024 3.6  3.5 - 5.2 g/dL Final    Total Bilirubin 10/08/2024 0.2  0.1 - 1.0 mg/dL Final    Comment: For infants and newborns, interpretation of results should be based  on gestational age, weight and in agreement with clinical  observations.    Premature Infant recommended reference ranges:  Up to 24 hours.............<8.0 mg/dL  Up to 48 hours............<12.0 mg/dL  3-5 days..................<15.0 mg/dL  6-29 days.................<15.0 mg/dL      Alkaline Phosphatase 10/08/2024 117  55 - 135 U/L Final    AST 10/08/2024 15  10 - 40 U/L Final    ALT 10/08/2024 15  10 - 44 U/L Final    eGFR 10/08/2024 >60  >60 mL/min/1.73 m^2 Final    Anion Gap 10/08/2024 11  8 - 16 mmol/L Final    Cholesterol 10/08/2024 187  120 - 199 mg/dL Final    Comment: The National Cholesterol Education Program  (NCEP) has set the  following guidelines (reference ranges) for Cholesterol:  Optimal.....................<200 mg/dL  Borderline High.............200-239 mg/dL  High........................> or = 240 mg/dL      Triglycerides 10/08/2024 424 (H)  30 - 150 mg/dL Final    Comment: The National Cholesterol Education Program (NCEP) has set the  following guidelines (reference values) for triglycerides:  Normal......................<150 mg/dL  Borderline High.............150-199 mg/dL  High........................200-499 mg/dL      HDL 10/08/2024 29 (L)  40 - 75 mg/dL Final    Comment: The National Cholesterol Education Program (NCEP) has set the  following guidelines (reference values) for HDL Cholesterol:  Low...............<40 mg/dL  Optimal...........>60 mg/dL      LDL Cholesterol 10/08/2024 Invalid, Trig>400.0  63.0 - 159.0 mg/dL Final    Comment: The National Cholesterol Education Program (NCEP) has set the  following guidelines (reference values) for LDL Cholesterol:  Optimal.......................<130 mg/dL  Borderline High...............130-159 mg/dL  High..........................160-189 mg/dL  Very High.....................>190 mg/dL      HDL/Cholesterol Ratio 10/08/2024 15.5 (L)  20.0 - 50.0 % Final    Total Cholesterol/HDL Ratio 10/08/2024 6.4 (H)  2.0 - 5.0 Final    Non-HDL Cholesterol 10/08/2024 158  mg/dL Final    Comment: Risk category and Non-HDL cholesterol goals:  Coronary heart disease (CHD)or equivalent (10-year risk of CHD >20%):  Non-HDL cholesterol goal     <130 mg/dL  Two or more CHD risk factors and 10-year risk of CHD <= 20%:  Non-HDL cholesterol goal     <160 mg/dL  0 to 1 CHD risk factor:  Non-HDL cholesterol goal     <190 mg/dL      WBC 10/08/2024 7.37  3.90 - 12.70 K/uL Final    RBC 10/08/2024 5.41  4.60 - 6.20 M/uL Final    Hemoglobin 10/08/2024 13.7 (L)  14.0 - 18.0 g/dL Final    Hematocrit 10/08/2024 44.3  40.0 - 54.0 % Final    MCV 10/08/2024 82  82 - 98 fL Final    MCH 10/08/2024 25.3  (L)  27.0 - 31.0 pg Final    MCHC 10/08/2024 30.9 (L)  32.0 - 36.0 g/dL Final    RDW 10/08/2024 18.0 (H)  11.5 - 14.5 % Final    Platelets 10/08/2024 256  150 - 450 K/uL Final    MPV 10/08/2024 10.1  9.2 - 12.9 fL Final      Assessment        ICD-10-CM ICD-9-CM   1. Type 2 diabetes mellitus with hyperlipidemia  E11.69 250.80    E78.5 272.4   2. Essential hypertension, benign  I10 401.1   3. Mixed dyslipidemia  E78.2 272.2   4. Spinal stenosis, lumbar region, without neurogenic claudication  M48.061 724.02   5. Lumbar radiculopathy  M54.16 724.4   6. Flu vaccine refused  Z28.21 V64.06         Plan:   Assessment & Plan    Assessed patient's glucose control based on home monitoring results (130-140 range)  Evaluated current medication regimen for diabetes management, including glipizide and metformin  Considered continuation of current cholesterol and blood pressure medications pending lab results  Addressed patient's muscle pain and neuropathy symptoms, deciding to continue gabapentin and metaxalone    DIABETES:  - Emphasized the importance of regular labs before appointments.  - Informed patient about the potential impact of salt on blood sugar.  - Recommend being cautious with sugar and salt intake.  - Continued glipizide for diabetes management.  - Continued metformin 2 pills daily for diabetes management.    HYPERTENSION:  - Emeka to check blood pressure at home regularly.  - Continued lisinopril for blood pressure management.    HYPERLIPIDEMIA:  - Continued statin for cholesterol management.    BACK PAIN  - Continued gabapentin at night for neuropathic pain.  - Continued methocarbamol at night for muscle relaxation.    VACCINATION  - Patient declines flu vaccination          1. Type 2 diabetes mellitus with hyperlipidemia  Overview:  Lab Results   Component Value Date    HGBA1C 10.7 (H) 08/13/2024    HGBA1C 6.8 (H) 05/01/2024    HGBA1C 6.6 (H) 10/25/2023     Lab Results   Component Value Date    LDLCALC 72.4  10/25/2023     Lab Results   Component Value Date    MICALBCREAT 2.9 10/14/2022         Orders:  -     glipiZIDE (GLUCOTROL) 5 MG tablet; Take 1 tablet (5 mg total) by mouth 2 (two) times daily before meals.  Dispense: 60 tablet; Refill: 2  -     metFORMIN (GLUCOPHAGE-XR) 500 MG ER 24hr tablet; Take 2 tablets (1,000 mg total) by mouth daily with breakfast.  Dispense: 180 tablet; Refill: 2  -     CBC auto differential; Future; Expected date: 04/08/2025  -     Comprehensive metabolic panel; Future; Expected date: 04/08/2025  -     Hemoglobin A1c; Future; Expected date: 04/08/2025  -     Lipid panel; Future; Expected date: 04/08/2025    2. Essential hypertension, benign  -     lisinopriL (PRINIVIL,ZESTRIL) 5 MG tablet; Take 1 tablet (5 mg total) by mouth once daily.  Dispense: 90 tablet; Refill: 1  -     CBC auto differential; Future; Expected date: 04/08/2025  -     Comprehensive metabolic panel; Future; Expected date: 04/08/2025  -     Lipid panel; Future; Expected date: 04/08/2025    3. Mixed dyslipidemia  Overview:  Lab Results   Component Value Date    CHOL 164 10/25/2023    CHOL 124 04/29/2023    CHOL 137 10/14/2022     Lab Results   Component Value Date    HDL 25 (L) 10/25/2023    HDL 29 (L) 04/29/2023    HDL 26 (L) 10/14/2022     Lab Results   Component Value Date    LDLCALC 72.4 10/25/2023    LDLCALC 65.4 04/29/2023    LDLCALC 81.4 10/14/2022     Lab Results   Component Value Date    TRIG 333 (H) 10/25/2023    TRIG 148 04/29/2023    TRIG 148 10/14/2022     Lab Results   Component Value Date    CHOLHDL 15.2 (L) 10/25/2023    CHOLHDL 23.4 04/29/2023    CHOLHDL 19.0 (L) 10/14/2022        The 10-year ASCVD risk score (Jasper SMART, et al., 2019) is: 27.2%    Values used to calculate the score:      Age: 49 years      Sex: Male      Is Non- : Yes      Diabetic: Yes      Tobacco smoker: Yes      Systolic Blood Pressure: 118 mmHg      Is BP treated: Yes      HDL Cholesterol: 25 mg/dL      Total  Cholesterol: 164 mg/dL      Orders:  -     atorvastatin (LIPITOR) 40 MG tablet; Take 1 tablet (40 mg total) by mouth once daily.  Dispense: 90 tablet; Refill: 2  -     omega-3 fatty acids-fish oil 340-1,000 mg Cap; Take 1 capsule by mouth once daily.  Dispense: 90 capsule; Refill: 3  -     CBC auto differential; Future; Expected date: 04/08/2025  -     Comprehensive metabolic panel; Future; Expected date: 04/08/2025  -     Lipid panel; Future; Expected date: 04/08/2025    4. Spinal stenosis, lumbar region, without neurogenic claudication  -     gabapentin (NEURONTIN) 300 MG capsule; Take 1 capsule (300 mg total) by mouth every evening.  Dispense: 90 capsule; Refill: 3  -     methocarbamoL (ROBAXIN) 500 MG Tab; Take 1 tablet (500 mg total) by mouth every evening.  Dispense: 90 tablet; Refill: 2    5. Lumbar radiculopathy  -     gabapentin (NEURONTIN) 300 MG capsule; Take 1 capsule (300 mg total) by mouth every evening.  Dispense: 90 capsule; Refill: 3  -     methocarbamoL (ROBAXIN) 500 MG Tab; Take 1 tablet (500 mg total) by mouth every evening.  Dispense: 90 tablet; Refill: 2    6. Flu vaccine refused             -Dio Zarate Jr., MD, AAHIVS      This note was generated with the assistance of ambient listening technology. Verbal consent was obtained by the patient and accompanying visitor(s) for the recording of patient appointment to facilitate this note. I attest to having reviewed and edited the generated note for accuracy, though some syntax or spelling errors may persist. Please contact the author of this note for any clarification.      There are no Patient Instructions on file for this visit.      Follow up in about 6 months (around 4/8/2025) for Diabetes, Hypertension, Lipids (fasting labs a week prior).   Future Appointments   Date Time Provider Department Center   10/15/2024  1:30 PM Bibiana Vargas PTA [facility]   10/18/2024  1:45 PM Tanvi Terrell, PT [facility]    10/22/2024  1:45 PM Tanvi Terrell, PT [facility]   10/25/2024  1:45 PM Tanvi Terrell, PT [facility]   10/28/2024  1:00 PM Tanvi Terrell, PT [facility]   10/30/2024  2:30 PM Tanvi Terrell, PT [facility]   4/1/2025  9:00 AM LAB, [facility] DRAW STATION [facility]   4/8/2025 10:20 AM Dio Zarate Jr., MD [facility]

## 2024-10-15 ENCOUNTER — CLINICAL SUPPORT (OUTPATIENT)
Dept: REHABILITATION | Facility: HOSPITAL | Age: 49
End: 2024-10-15
Payer: MEDICAID

## 2024-10-15 DIAGNOSIS — M48.062 SPINAL STENOSIS OF LUMBAR REGION WITH NEUROGENIC CLAUDICATION: ICD-10-CM

## 2024-10-15 DIAGNOSIS — Z74.09 IMPAIRED MOBILITY: ICD-10-CM

## 2024-10-15 DIAGNOSIS — Z98.1 S/P LUMBAR FUSION: Primary | ICD-10-CM

## 2024-10-15 PROCEDURE — 97110 THERAPEUTIC EXERCISES: CPT | Mod: PN,CQ

## 2024-10-15 NOTE — PROGRESS NOTES
"Physical Therapy Daily Treatment Note      [facility]  [id number]     Therapy Diagnosis:        Encounter Diagnoses   Name Primary?    Impaired mobility Yes    Spinal stenosis of lumbar region with neurogenic claudication           Physician: Carina Mcclure MD     Visit Date: 10/15/2024      Physician Orders: PT Eval and Treat 3x/week x 6 weeks  Medical Diagnosis from Referral:   Diagnosis   M48.062 (ICD-10-CM) - Spinal stenosis of lumbar region with neurogenic claudication   Z74.09,Z78.9 (ICD-10-CM) - Impaired mobility and ADLs      Evaluation Date: 5/21/2024  Authorization Period Expiration: 12/31/24  plan of care: 9/5/24 to 10/17/24  Visit # / Visits authorized: 25/ 40  PTA Visits: 0/5      PN due: 9/29/2024      Time In: 13:30  Time Out: 14:15  Total Billable Time: 45 minutes     Precautions: no lifting >10lb., no bending      Subjective      Pt reports: he still has pain in the low back, but his main thing is that he wants to walk better. He feels tight in the low back today.   He was compliant with home exercise program.  Response to previous treatment: no concerns   Functional change: ongoing      Pain: 5/10  Location: low back pain        Objective      Emeka received therapeutic exercises to develop strength, endurance, and core stabilization for 45 minutes including:     Recumbent bike for 8 minutes Level 7.0      Supine: (uses MHP to low back today for decrease pain)  straight leg raise with transverse abdominal activation 3# 2 x 10  Bridging with 1 lower extremity march with 3# 10x  Hamstring stretch with belt 2 x 30 sec bilateral   Quad stretch w/ belt 2 x 30 sec bilateral     Lateral trunk rotation for 2 minutes   Double knee to chest with swiss ball, 2x10   Supine abdominal press (opp arm leg) with ball, 2x10 5" holds    Transverse abdominus contraction, 2x10         Defer exercises below due to patient is in severe pain today.    Prone:   Hip extension 2 x " "10  Superman's 2 x 10     Quadruped bird dog arms only x 10   Quadruped bird dog legs only x 10      Modified plank 5 x 15 sec     Dead lift 10# cara bell from 18" box 2 x 10     Side stepping Blue thera band, no upper extremity- 2 x 10 ft              - SBA              - minimal facilitation to improve posture     Pain at conclusion: 9/10        Home Exercises Provided and Patient Education Provided      Education provided:   - continue with home exercise program, provided with more advanced core strengthening         Written Home Exercises Provided: Patient instructed to cont prior HEP.  Exercises were reviewed and Emeka was able to demonstrate them prior to the end of the session.  Emeka demonstrated good  understanding of the education provided.      See EMR under Patient Instructions for exercises provided prior visit.     Assessment   Patient ambulates with SPC, scissor gait pattern, flexed trunk posture. Patient continues to have back pain that limits his ability to perform daily activities. Worked on gentle stretches and core stability to improve his symptoms. Moderate difficulty with single leg bridging march due to weakness. No reports of increase back pain post therapy. At this time, patient is slowly progress towards his therapy goals.     Emeka Is progressing well towards his goals.   Pt prognosis is Good.      Pt will continue to benefit from skilled outpatient physical therapy to address the deficits listed in the problem list box on initial evaluation, provide pt/family education and to maximize pt's level of independence in the home and community environment.      Pt's spiritual, cultural and educational needs considered and pt agreeable to plan of care and goals.     Anticipated barriers to physical therapy: length of time impairments have been present      Goals:   Status as of 8/29/24   Short Term Goals: 4 weeks   Patient will report compliance with home exercise program at least " 2x/ week to improve speed of progress. Met   Patient will demonstrate improved mobility for household ambulation by performing Timed Up and Go with least restrictive AD in 15 sec. Met   Patient will demonstrate improved lower extremity strength and endurance by performing 5 times sit<>stand test in 25 sec. Met      Long Term Goals: 8 weeks   Patient will demonstrate improved mobility for household ambulation by performing Timed Up and Go with least restrictive AD in 10 sec. Met 8/29/24  Patient will demonstrate improved lower extremity strength and endurance by performing 5 times sit<>stand test in 15 sec met   Patient will demonstrate improved lower extremity strength by 1 grade to enable him to perform job related mobility. Improved  Patient will ambulate community distances with single point cane to demonstrate a return to PLOF. ongoing  Patient will report reduced back pain to 2-3/10 to demonstrate improved tolerance to daily mobility. Ongoing     Plan      Outpatient Physical Therapy 2 times weekly to include the following interventions: Electrical Stimulation , Manual Therapy, Moist Heat/ Ice, Neuromuscular Re-ed, Patient Education, Therapeutic Activities, and Therapeutic Exercise.      Focus on hip and core strengthening     Bibiana Vargas, VANCE  10/15/2024

## 2024-10-18 ENCOUNTER — DOCUMENTATION ONLY (OUTPATIENT)
Dept: REHABILITATION | Facility: HOSPITAL | Age: 49
End: 2024-10-18
Payer: MEDICAID

## 2024-10-18 NOTE — PROGRESS NOTES
Missed Visit/Cancellation      Date: 10/18/2024         Canceled Number: 0  No Show Number: 5                                                                                                                Pt initially had visit scheduled for today for 1345.   Reason for cancellation: no show.    Pt's next scheduled physical therapy visit is 10/22/24.     Tanvi Terrell, PT, DPT  10/18/2024

## 2024-10-22 ENCOUNTER — DOCUMENTATION ONLY (OUTPATIENT)
Dept: REHABILITATION | Facility: HOSPITAL | Age: 49
End: 2024-10-22
Payer: MEDICAID

## 2024-10-22 NOTE — PROGRESS NOTES
Missed Visit/Cancellation      Date: 10/22/2024          Canceled Number: 0  No Show Number: 6                                                                                                                Pt initially had visit scheduled for today for 1345.   Reason for cancellation: no show.    Pt's next scheduled physical therapy visit is 10/25/24.     Tanvi Terrell, PT, DPT  10/22/2024

## 2024-10-25 ENCOUNTER — CLINICAL SUPPORT (OUTPATIENT)
Dept: REHABILITATION | Facility: HOSPITAL | Age: 49
End: 2024-10-25
Payer: MEDICAID

## 2024-10-25 DIAGNOSIS — M48.062 SPINAL STENOSIS OF LUMBAR REGION WITH NEUROGENIC CLAUDICATION: ICD-10-CM

## 2024-10-25 DIAGNOSIS — Z74.09 IMPAIRED MOBILITY: Primary | ICD-10-CM

## 2024-10-25 DIAGNOSIS — Z98.1 S/P LUMBAR FUSION: ICD-10-CM

## 2024-10-25 PROCEDURE — 97110 THERAPEUTIC EXERCISES: CPT | Mod: PN | Performed by: PHYSICAL THERAPIST

## 2024-10-28 ENCOUNTER — DOCUMENTATION ONLY (OUTPATIENT)
Dept: REHABILITATION | Facility: HOSPITAL | Age: 49
End: 2024-10-28
Payer: MEDICAID

## 2024-10-30 ENCOUNTER — TELEPHONE (OUTPATIENT)
Dept: REHABILITATION | Facility: HOSPITAL | Age: 49
End: 2024-10-30
Payer: MEDICAID

## 2024-10-30 ENCOUNTER — DOCUMENTATION ONLY (OUTPATIENT)
Dept: REHABILITATION | Facility: HOSPITAL | Age: 49
End: 2024-10-30
Payer: MEDICAID

## 2024-11-04 ENCOUNTER — CLINICAL SUPPORT (OUTPATIENT)
Dept: REHABILITATION | Facility: HOSPITAL | Age: 49
End: 2024-11-04
Payer: MEDICAID

## 2024-11-04 DIAGNOSIS — Z98.1 S/P LUMBAR FUSION: ICD-10-CM

## 2024-11-04 DIAGNOSIS — M62.81 MUSCLE WEAKNESS: ICD-10-CM

## 2024-11-04 DIAGNOSIS — M48.062 SPINAL STENOSIS OF LUMBAR REGION WITH NEUROGENIC CLAUDICATION: ICD-10-CM

## 2024-11-04 DIAGNOSIS — Z74.09 IMPAIRED MOBILITY: Primary | ICD-10-CM

## 2024-11-04 PROCEDURE — 97110 THERAPEUTIC EXERCISES: CPT | Mod: PN | Performed by: PHYSICAL THERAPIST

## 2024-11-04 NOTE — PROGRESS NOTES
"Physical Therapy Daily Treatment Note      [facility]  [facility] Number: [id number]     Therapy Diagnosis:        Encounter Diagnoses   Name Primary?    Impaired mobility Yes    Spinal stenosis of lumbar region with neurogenic claudication           Physician: Carina Mcclure MD     Visit Date: 11/4/2024      Physician Orders: PT Eval and Treat 3x/week x 6 weeks  Medical Diagnosis from Referral:   Diagnosis   M48.062 (ICD-10-CM) - Spinal stenosis of lumbar region with neurogenic claudication   Z74.09,Z78.9 (ICD-10-CM) - Impaired mobility and ADLs      Evaluation Date: 5/21/2024  Authorization Period Expiration: 12/31/24  Extended plan of care: 10/18/24 to 12/13/24   Visit # / Visits authorized: 28/40  PTA Visits: 0/5      PN due: 11/18/2024      Time In: 4:45 pm  Time Out: 5:31 PM  Total Billable Time: 46 minutes     Precautions: no lifting >10lb., no bending      Subjective      Pt reports: tightness upon arrival today and minimal low back pain.  He was compliant with home exercise program.  Response to previous treatment: no concerns, patient reports decreased tightness after last visit  Functional change: ongoing      Pain: 5/10  Location: low back      Objective      Emeka received therapeutic exercises to develop strength, endurance, and core stabilization for 46 minutes including:     Supine:   straight leg raise with transverse abdominal activation 3# 20x  Bridging with 1 lower extremity march with 3# 10x  Hamstring stretch with blue strap 2 x 30 sec bilateral   Quad stretch w/ strap 2 x 30 sec bilateral     Prone:   Hip extension 2 x 10  Superman's 2 x 10  Quadruped bird dog  2 x 10  Modified plank 5 x 15 sec     Dead lift 10# kettle bell from 18" box x 10  Dead lift 10# kettle bell from 12" box x 10     Side stepping Blue thera band, no upper extremity- 2 x 10 ft              - SBA              - minimal facilitation to improve posture    Helen technique:   GT4: sweeping, fanning of " right lumbar region, nudging of erector spinae and QL angle, sweeping right upper glute med.   GT3: nudging of right lumbar erector spinae, QL angle       Pain at conclusion: 5/10        Home Exercises Provided and Patient Education Provided      Education provided:   - continue with home exercise program        Written Home Exercises Provided: Patient instructed to cont prior HEP.  Exercises were reviewed and Emeka was able to demonstrate them prior to the end of the session.  Emeka demonstrated good  understanding of the education provided.      See EMR under Patient Instructions for exercises provided prior visit.     Assessment   Emeka tolerate session well today. He demonstrated decreased hip rotation with prone hip extension exercises. Patient able to tolerate quadraped bird dog with upper extremity and lower extremity. He tolerated decreased height for deadlifts today with no reports of increased back pain. Exercises performed with proper form today. Emeka continues to benefit from skilled physical therapy.    Pt prognosis is Good.      Pt will continue to benefit from skilled outpatient physical therapy to address the deficits listed in the problem list box on initial evaluation, provide pt/family education and to maximize pt's level of independence in the home and community environment.      Pt's spiritual, cultural and educational needs considered and pt agreeable to plan of care and goals.     Anticipated barriers to physical therapy: length of time impairments have been present      Goals:   Status as of 10/25/24   Short Term Goals: 4 weeks   Patient will report compliance with home exercise program at least 2x/ week to improve speed of progress. Met   Patient will demonstrate improved mobility for household ambulation by performing Timed Up and Go with least restrictive AD in 15 sec. Met   Patient will demonstrate improved lower extremity strength and endurance by performing 5 times  sit<>stand test in 25 sec. Met      Long Term Goals: 8 weeks   Patient will demonstrate improved mobility for household ambulation by performing Timed Up and Go with least restrictive AD in 10 sec. Met 8/29/24  Patient will demonstrate improved lower extremity strength and endurance by performing 5 times sit<>stand test in 15 sec met   Patient will demonstrate improved lower extremity strength by 1 grade to enable him to perform job related mobility. Improved  Patient will ambulate community distances with single point cane to demonstrate a return to PLOF. ongoing  Patient will report reduced back pain to 2-3/10 to demonstrate improved tolerance to daily mobility. improved        Plan     Outpatient Physical Therapy 2 times weekly to include the following interventions: Electrical Stimulation , Manual Therapy, Moist Heat/ Ice, Neuromuscular Re-ed, Patient Education, Therapeutic Activities, and Therapeutic Exercise.      Focus on hip and core strengthening    Bhavya Ventura, SPT    I, Tanvi Terrell, DPT, certify that I was present in the room directing the student in service delivery and guiding them using my skilled judgment. As the co-signing therapist I have reviewed the students documentation and am responsible for the treatment, assessment, and plan.     Tanvi Terrell, PT, DPT,   Board-Certified Clinical Specialist in Neurologic Physical Therapy   Certified Brain Injury Specialist    11/4/2024

## 2024-11-06 ENCOUNTER — DOCUMENTATION ONLY (OUTPATIENT)
Dept: REHABILITATION | Facility: HOSPITAL | Age: 49
End: 2024-11-06
Payer: MEDICAID

## 2024-11-06 NOTE — PROGRESS NOTES
Missed Visit/Cancellation      Date: 11/6/2024           Canceled Number: 0  No Show Number: 9                                                                                                                Pt initially had visit scheduled for today for 1430   Reason for cancellation: no show.    Pt's next scheduled physical therapy visit is 11/13/24.     Tanvi Terrell, PT, DPT  11/6/2024

## 2024-11-15 ENCOUNTER — DOCUMENTATION ONLY (OUTPATIENT)
Dept: REHABILITATION | Facility: HOSPITAL | Age: 49
End: 2024-11-15
Payer: MEDICAID

## 2024-11-15 NOTE — PROGRESS NOTES
Missed Visit/Cancellation      Date: 11/15/2024            Canceled Number: 1  No Show Number: 9                                                                                                                Pt initially had visit scheduled for today for 1430   Reason for cancellation: unable to make appointment  Pt's next scheduled physical therapy visit is 11/20/24.     Tanvi Terrell, PT, DPT  11/15/2024

## 2024-11-20 ENCOUNTER — CLINICAL SUPPORT (OUTPATIENT)
Dept: REHABILITATION | Facility: HOSPITAL | Age: 49
End: 2024-11-20
Payer: MEDICAID

## 2024-11-20 DIAGNOSIS — Z74.09 IMPAIRED MOBILITY: Primary | ICD-10-CM

## 2024-11-20 PROCEDURE — 97110 THERAPEUTIC EXERCISES: CPT | Mod: PN

## 2024-11-20 NOTE — PROGRESS NOTES
Physical Therapy Progress Note/Daily Treatment Note      Name: Emeka Caballreo  [facility] Number: [id number]     Therapy Diagnosis:        Encounter Diagnoses   Name Primary?    Impaired mobility Yes    Spinal stenosis of lumbar region with neurogenic claudication           Physician: Carina Mcclure MD     Visit Date: 11/20/2024      Physician Orders: PT Eval and Treat 3x/week x 6 weeks  Medical Diagnosis from Referral:   Diagnosis   M48.062 (ICD-10-CM) - Spinal stenosis of lumbar region with neurogenic claudication   Z74.09,Z78.9 (ICD-10-CM) - Impaired mobility and ADLs      Evaluation Date: 5/21/2024  Authorization Period Expiration: 12/31/24  Extended plan of care: 10/18/24 to 12/13/24   Visit # / Visits authorized: 29/40  PTA Visits: 0/5      PN due: 12/20/2024      Time In: 8:46  Time Out: 9:42  Total Billable Time: 56 minutes     Precautions: no lifting >10lb., no bending      Subjective      Pt reports: minimal tightness and no back pain upon arrival today.  He was compliant with home exercise program.  Response to previous treatment: no concerns, patient reports decreased tightness after last visit  Functional change: ongoing      Pain: 0/10  Location: low back      Objective       Lower Extremity Strength    RLE eval RLE 7/24 RLE  8/6/24 RLE 8/29/24 RLE 10/24 RLE  11/20 LLE eval  LLE 7/16/24 LLE 8/6/24 LLE 8/29/24 LLE 10/24 LLE  11/20   Hip Flexion: 2+/5 4-/5 4/5 4/5 4+/5 4+/5 4/5 4/5 4/5 4+/5 4/5 4+/5   Hip Extension:  4/5 4-/5 4-/5 4-/5 4-/5   Pain  4/5 4/5 4-/5 4-/5 4-/5 4/5 4+/5   Hip Abduction: 2+/5 3-/5 3+/5 3+/5 4-/5 4/5 3-/5 3+/5 4-/5 4-/5 4/5 4+/5       Emeka received therapeutic exercises to develop strength, endurance, and core stabilization for 56 minutes including:   Strength testing    Nustep, bilateral LE/upper extremities, Level 6 - 6 minutes     Supine:   straight leg raise with transverse abdominal activation 3# 20x  Bridging with 1 lower extremity march with 3# 10x  Hamstring  "stretch with blue strap 2 x 30 sec bilateral   Quad stretch w/ strap 2 x 30 sec bilateral     Prone:   Hip extension 2 x 10 (tactile cueing to prevent hip rotation)  Superman's 2 x 10  Quadruped bird dog  2 x 10  Modified plank 5 x 15 sec    Dead lift 10# kettle bell from 12" box 2 x 10   - cues for body mechanics     Side stepping Blue thera band, no upper extremity- 2 x 10 ft              - SBA              - minimal facilitation to improve posture    Helen technique:   GT4: sweeping, fanning of right lumbar region, nudging of erector spinae and QL angle, sweeping right upper glute med.   GT3: nudging of right lumbar erector spinae, QL angle       Pain at conclusion: 0/10        Home Exercises Provided and Patient Education Provided      Education provided:   - continue with home exercise program        Written Home Exercises Provided: Patient instructed to cont prior HEP.  Exercises were reviewed and Emeka was able to demonstrate them prior to the end of the session.  Emeka demonstrated good  understanding of the education provided.      See EMR under Patient Instructions for exercises provided prior visit.     Assessment   Emeka tolerated session well today. Bilateral hip strength improved by 1/2 grade. Although he reports 0/10 low back pain today, patient reports he still experiences pain >3/10 inconsistently on days. He continues to require verbal and tactile cueing for correct form during exercises. Patient reported decreased tightness and discomfort in back post IASTM. He is progressing well towards his goals, reports improvement with therapy, and will continue physical therapy through plan of care. Emeka remains appropriate for skilled physical therapy to decrease pain and return to prior level of function.    Pt prognosis is Good.      Pt will continue to benefit from skilled outpatient physical therapy to address the deficits listed in the problem list box on initial evaluation, provide " pt/family education and to maximize pt's level of independence in the home and community environment.      Pt's spiritual, cultural and educational needs considered and pt agreeable to plan of care and goals.     Anticipated barriers to physical therapy: length of time impairments have been present      Goals:   Status as of 11/20/2024   Short Term Goals: 4 weeks   Patient will report compliance with home exercise program at least 2x/ week to improve speed of progress. Met   Patient will demonstrate improved mobility for household ambulation by performing Timed Up and Go with least restrictive AD in 15 sec. Met   Patient will demonstrate improved lower extremity strength and endurance by performing 5 times sit<>stand test in 25 sec. Met      Long Term Goals: 8 weeks   Patient will demonstrate improved mobility for household ambulation by performing Timed Up and Go with least restrictive AD in 10 sec. Met 8/29/24  Patient will demonstrate improved lower extremity strength and endurance by performing 5 times sit<>stand test in 15 sec Met   Patient will demonstrate improved lower extremity strength by 1 grade to enable him to perform job related mobility. Met 11/20/24  Patient will ambulate community distances with single point cane to demonstrate a return to PLOF. Ongoing  Patient will report reduced back pain to 2-3/10 to demonstrate improved tolerance to daily mobility. Improved        Plan     Outpatient Physical Therapy 2 times weekly to include the following interventions: Electrical Stimulation , Manual Therapy, Moist Heat/ Ice, Neuromuscular Re-ed, Patient Education, Therapeutic Activities, and Therapeutic Exercise.      Continue with plan of care.    Bhavya Ventura, SPT    I, Shashi Taylor, DPT, certify that I was present in the room directing the student in service delivery and guiding them using my skilled judgment. As the co-signing therapist I have reviewed the students documentation and am responsible  for the treatment, assessment, and plan.     Shashi Taylor, PT DPT      11/20/2024

## 2024-11-29 ENCOUNTER — DOCUMENTATION ONLY (OUTPATIENT)
Dept: REHABILITATION | Facility: HOSPITAL | Age: 49
End: 2024-11-29
Payer: MEDICAID

## 2024-11-29 NOTE — PROGRESS NOTES
Missed Visit/Cancellation      Date: 11/29/2024             Canceled Number: 1  No Show Number: 10                                                                                                                Pt initially had visit scheduled for today for 1430   Reason for cancellation: no show  This was patient's last scheduled physical therapy session.     Tanvi Terrell, PT, DPT  11/29/2024

## 2025-02-03 DIAGNOSIS — E11.69 TYPE 2 DIABETES MELLITUS WITH HYPERLIPIDEMIA: Chronic | ICD-10-CM

## 2025-02-03 DIAGNOSIS — E78.5 TYPE 2 DIABETES MELLITUS WITH HYPERLIPIDEMIA: Chronic | ICD-10-CM

## 2025-02-03 NOTE — TELEPHONE ENCOUNTER
Care Due:                  Date            Visit Type   Department     Provider  --------------------------------------------------------------------------------                                [facility]                              PRIMARY      MEDICINE/  Last Visit: 10-      CARE ([facility])   INTERNAL MED   Dio Zarate                              [facility]                              PRIMARY      MEDICINE/  Next Visit: 04-      CARE ([facility])   INTERNAL MED   Dio Zarate                                                            Last  Test          Frequency    Reason                     Performed    Due Date  --------------------------------------------------------------------------------    HBA1C.......  6 months...  glipiZIDE, metFORMIN.....  10-   04-    [facility] Embedded Care Due Messages. Reference number: [id number].   2/03/2025 12:36:48 PM CST

## 2025-02-04 RX ORDER — GLIPIZIDE 5 MG/1
5 TABLET ORAL
Qty: 180 TABLET | Refills: 0 | Status: SHIPPED | OUTPATIENT
Start: 2025-02-04

## 2025-02-04 NOTE — TELEPHONE ENCOUNTER
Refill Decision Note   Emeka Caballero  is requesting a refill authorization.  Brief Assessment and Rationale for Refill:  Approve     Medication Therapy Plan:  FLOS      Pharmacist review requested: Yes   Extended chart review required: Yes   Comments:     Note composed:1:58 AM 02/04/2025

## 2025-03-19 ENCOUNTER — OFFICE VISIT (OUTPATIENT)
Dept: FAMILY MEDICINE | Facility: CLINIC | Age: 50
End: 2025-03-19
Payer: MEDICAID

## 2025-03-19 VITALS
HEIGHT: 70 IN | OXYGEN SATURATION: 97 % | RESPIRATION RATE: 18 BRPM | HEART RATE: 101 BPM | BODY MASS INDEX: 30.14 KG/M2 | TEMPERATURE: 98 F | DIASTOLIC BLOOD PRESSURE: 80 MMHG | SYSTOLIC BLOOD PRESSURE: 118 MMHG | WEIGHT: 210.56 LBS

## 2025-03-19 DIAGNOSIS — I83.813 VARICOSE VEINS OF BOTH LOWER EXTREMITIES WITH PAIN: ICD-10-CM

## 2025-03-19 DIAGNOSIS — M48.061 SPINAL STENOSIS OF LUMBAR REGION, UNSPECIFIED WHETHER NEUROGENIC CLAUDICATION PRESENT: ICD-10-CM

## 2025-03-19 DIAGNOSIS — Z98.1 S/P LUMBAR FUSION: Primary | ICD-10-CM

## 2025-03-19 DIAGNOSIS — R20.2 PARESTHESIA OF LEFT UPPER EXTREMITY: ICD-10-CM

## 2025-03-19 PROCEDURE — 99999 PR PBB SHADOW E&M-EST. PATIENT-LVL V: CPT | Mod: PBBFAC,,, | Performed by: NURSE PRACTITIONER

## 2025-03-19 PROCEDURE — 1160F RVW MEDS BY RX/DR IN RCRD: CPT | Mod: CPTII,,, | Performed by: NURSE PRACTITIONER

## 2025-03-19 PROCEDURE — 99215 OFFICE O/P EST HI 40 MIN: CPT | Mod: PBBFAC,PN | Performed by: NURSE PRACTITIONER

## 2025-03-19 PROCEDURE — 1159F MED LIST DOCD IN RCRD: CPT | Mod: CPTII,,, | Performed by: NURSE PRACTITIONER

## 2025-03-19 PROCEDURE — 4010F ACE/ARB THERAPY RXD/TAKEN: CPT | Mod: CPTII,,, | Performed by: NURSE PRACTITIONER

## 2025-03-19 PROCEDURE — 3074F SYST BP LT 130 MM HG: CPT | Mod: CPTII,,, | Performed by: NURSE PRACTITIONER

## 2025-03-19 PROCEDURE — 3008F BODY MASS INDEX DOCD: CPT | Mod: CPTII,,, | Performed by: NURSE PRACTITIONER

## 2025-03-19 PROCEDURE — 99214 OFFICE O/P EST MOD 30 MIN: CPT | Mod: S$PBB,,, | Performed by: NURSE PRACTITIONER

## 2025-03-19 PROCEDURE — 3079F DIAST BP 80-89 MM HG: CPT | Mod: CPTII,,, | Performed by: NURSE PRACTITIONER

## 2025-03-19 PROCEDURE — G2211 COMPLEX E/M VISIT ADD ON: HCPCS | Mod: S$PBB,,, | Performed by: NURSE PRACTITIONER

## 2025-03-19 NOTE — PROGRESS NOTES
Routine Office Visit    Patient Name: Emeka Caballero    : 1975  MRN: [id number]    Chief Complaint:  Leg swelling, left arm tingling    Subjective:  Emeka is a 49 y.o. male who presents today for:    Leg swelling, left arm tingling - patient who is known to me status post lumbar spinal fusion, uncontrolled diabetes reports today for evaluation.  He states for the last 3 months without any inciting event or injury he has been having some sporadic tingling of the anterior left upper extremity from the elbow to all 5 fingers of the hand.  He denies any hand weakness but feels like he needs to hit his hand to wake his arm up.  He notes that gabapentin does help with the tingling.  He has a occasional neck pain but nothing chronic.  He endorses chronic bilateral leg heaviness as well as a sticking sensation in the muscles of his legs when he ambulates.  In addition, he does get some swelling in his feet and distal bilateral lower extremities in the afternoon which improves with elevation.  He endorses consistent leg pain since the spinal fusion.  He has been referred to pain clinic but has not followed up with them yet.    Past Medical History  Past Medical History:   Diagnosis Date    Arthritis     Diabetes mellitus, type 2 2022    Essential hypertension, benign 2023       Family History  Family History   Problem Relation Name Age of Onset    Cataracts Mother      No Known Problems Father      No Known Problems Sister      No Known Problems Brother      No Known Problems Maternal Aunt      No Known Problems Maternal Uncle      No Known Problems Paternal Aunt      No Known Problems Paternal Uncle      No Known Problems Maternal Grandmother      No Known Problems Maternal Grandfather      No Known Problems Paternal Grandmother      No Known Problems Paternal Grandfather         Current Medications  Medications Ordered Prior to Encounter[1]    Allergies   Review of patient's allergies  "indicates:  No Known Allergies    Review of Systems (Pertinent positives)  Review of Systems   Constitutional:  Negative for chills, fever and weight loss.   HENT: Negative.     Eyes: Negative.    Respiratory: Negative.     Cardiovascular:  Positive for claudication and leg swelling. Negative for chest pain, palpitations and orthopnea.   Genitourinary:  Negative for dysuria, frequency and urgency.   Musculoskeletal:  Positive for back pain. Negative for joint pain, myalgias and neck pain.   Skin: Negative.    Neurological:  Positive for tingling and weakness (BL legs). Negative for dizziness, tremors, sensory change, speech change, focal weakness and headaches.       /80 (BP Location: Left arm)   Pulse 101   Temp 98 °F (36.7 °C) (Oral)   Resp 18   Ht 5' 10" (1.778 m)   Wt 95.5 kg (210 lb 8.6 oz)   SpO2 97%   BMI 30.21 kg/m²     Physical Exam  Vitals reviewed.   Constitutional:       General: He is not in acute distress.     Appearance: Normal appearance. He is not ill-appearing, toxic-appearing or diaphoretic.   HENT:      Head: Normocephalic and atraumatic.   Cardiovascular:      Rate and Rhythm: Normal rate and regular rhythm.      Pulses: Normal pulses.      Heart sounds: Normal heart sounds.      Comments: No lower extremity edema bilaterally.  Some varicosities noted bilateral LE  Pulmonary:      Effort: Pulmonary effort is normal. No respiratory distress.      Breath sounds: Normal breath sounds. No wheezing.   Abdominal:      General: Bowel sounds are normal. There is no distension.      Palpations: Abdomen is soft.      Tenderness: There is no abdominal tenderness.   Musculoskeletal:         General: No swelling, tenderness or deformity.   Skin:     General: Skin is warm and dry.      Capillary Refill: Capillary refill takes less than 2 seconds.   Neurological:      General: No focal deficit present.      Mental Status: He is alert and oriented to person, place, and time.      Motor: No " weakness.      Comments: Left arm with normal  strength and range of motion.   Psychiatric:         Mood and Affect: Mood normal.         Behavior: Behavior normal.            Assessment/Plan:  Emeka Caballero is a 49 y.o. male who presents today for :    Emeka was seen today for diabetes and foot swelling.    Diagnoses and all orders for this visit:    S/P lumbar fusion  -     Ambulatory referral/consult to Pain Clinic; Future    Patient is status post lumbar spinal fusion last year.  Still having right leg pain and chronic leg heaviness as well as potential claudication like symptoms such as sticking muscle pain with walking.  Will consult Pain Clinic for continued evaluation as he has been referred to them by Neurosurgery for consideration of spinal cord stimulator.  He should follow up with Neurosurgery as directed.  Given his history of diabetes as well as varicose veins, will check RAMÓN and vascular ultrasound for insufficiency.  Recommended leg elevation as much as possible.    Spinal stenosis of lumbar region, unspecified whether neurogenic claudication present  -     Ambulatory referral/consult to Pain Clinic; Future    As above.    Paresthesia of left upper extremity  -     Ambulatory referral/consult to Neurology; Future    Good strength left upper extremity.  Unclear etiology of tingling.  Gabapentin does help.  No nighttime awakenings to suggest carpal tunnel syndrome. Pt denies chronic neck pain. Will consult Neurology for evaluation. May need EMG.    Varicose veins of both lower extremities with pain  -     CV US Lower Extremity Veins Bilateral Insufficiency; Future  -     Ankle Brachial Indices (RAMÓN); Future    As above.        This office note has been dictated.  This dictation has been generated using M-Modal Fluency Direct dictation; some phonetic errors may occur.          [1]   Current Outpatient Medications on File Prior to Visit   Medication Sig Dispense Refill    atorvastatin  (LIPITOR) 40 MG tablet Take 1 tablet (40 mg total) by mouth once daily. 90 tablet 2    blood sugar diagnostic Strp To check BG 3 times daily, to use with insurance preferred meter 100 each 11    blood-glucose meter kit To check BG 3 times daily, to use with insurance preferred meter 1 each 0    gabapentin (NEURONTIN) 300 MG capsule Take 1 capsule (300 mg total) by mouth every evening. 90 capsule 3    glipiZIDE (GLUCOTROL) 5 MG tablet TAKE 1 TABLET BY MOUTH TWICE DAILY BEFORE MEAL(S) 180 tablet 0    lancets Misc To check BG 3 times daily, to use with insurance preferred meter 100 each 11    lisinopriL (PRINIVIL,ZESTRIL) 5 MG tablet Take 1 tablet (5 mg total) by mouth once daily. 90 tablet 1    metFORMIN (GLUCOPHAGE-XR) 500 MG ER 24hr tablet Take 2 tablets (1,000 mg total) by mouth daily with breakfast. 180 tablet 2    methocarbamoL (ROBAXIN) 500 MG Tab Take 1 tablet (500 mg total) by mouth every evening. 90 tablet 2    omega-3 fatty acids-fish oil 340-1,000 mg Cap Take 1 capsule by mouth once daily. 90 capsule 3    ONETOUCH VERIO FLEX METER Misc USE AS DIRECTED THREE TIMES DAILY TO  CHECK  BLOOD  SUGAR 1 each 0    oxybutynin (DITROPAN-XL) 10 MG 24 hr tablet Take 1 tablet (10 mg total) by mouth once daily. 30 tablet 11    tadalafiL (CIALIS) 20 MG Tab Take 1 tablet (20 mg total) by mouth daily as needed (Erectile Dysfunction). 30 tablet 11     No current facility-administered medications on file prior to visit.

## 2025-03-25 ENCOUNTER — HOSPITAL ENCOUNTER (OUTPATIENT)
Dept: CARDIOLOGY | Facility: HOSPITAL | Age: 50
Discharge: HOME | End: 2025-03-25
Attending: NURSE PRACTITIONER
Payer: MEDICAID

## 2025-03-25 DIAGNOSIS — I83.813 VARICOSE VEINS OF BOTH LOWER EXTREMITIES WITH PAIN: ICD-10-CM

## 2025-03-25 LAB
IMMEDIATE ARM BP: 133 MMHG
IMMEDIATE LEFT ABI: 0.98
IMMEDIATE LEFT TIBIAL: 130 MMHG
IMMEDIATE RIGHT ABI: 0.92
IMMEDIATE RIGHT TIBIAL: 123 MMHG
LEFT ABI: 1.04
LEFT ARM BP: 125 MMHG
LEFT DORSALIS PEDIS: 130 MMHG
LEFT POSTERIOR TIBIAL: 129 MMHG
LEFT TBI: 0.9
LEFT TOE PRESSURE: 113 MMHG
RIGHT ABI: 1.04
RIGHT ARM BP: 123 MMHG
RIGHT DORSALIS PEDIS: 130 MMHG
RIGHT POSTERIOR TIBIAL: 128 MMHG
RIGHT TBI: 0.94
RIGHT TOE PRESSURE: 117 MMHG

## 2025-03-25 PROCEDURE — 93924 LWR XTR VASC STDY BILAT: CPT | Mod: 26,,, | Performed by: INTERNAL MEDICINE

## 2025-03-25 PROCEDURE — 93924 LWR XTR VASC STDY BILAT: CPT

## 2025-03-26 ENCOUNTER — RESULTS FOLLOW-UP (OUTPATIENT)
Dept: FAMILY MEDICINE | Facility: CLINIC | Age: 50
End: 2025-03-26

## 2025-03-26 ENCOUNTER — TELEPHONE (OUTPATIENT)
Dept: FAMILY MEDICINE | Facility: CLINIC | Age: 50
End: 2025-03-26
Payer: MEDICAID

## 2025-03-26 DIAGNOSIS — R68.89 ABNORMAL ANKLE BRACHIAL INDEX (ABI): Primary | ICD-10-CM

## 2025-03-26 NOTE — TELEPHONE ENCOUNTER
M for patient to contact office to discuss test results.    ----- Message from Mio Juarez NP sent at 3/26/2025  3:54 PM CDT -----  Please call patient and let him know that his testing shows that his blood flow to his legs might be decreased.  Because of his diabetes this can affect his blood vessels.  I am going to refer him to   our vascular specialist for evaluation of this.  Thank you  ----- Message -----  From: Vipul Vaca MD  Sent: 3/25/2025   8:55 PM CDT  To: Mio Juarez NP

## 2025-03-31 ENCOUNTER — TELEPHONE (OUTPATIENT)
Dept: FAMILY MEDICINE | Facility: CLINIC | Age: 50
End: 2025-03-31
Payer: MEDICAID

## 2025-03-31 NOTE — TELEPHONE ENCOUNTER
Patient notified of lab results, verbalized understanding. Instructed to contact office with any questions or concerns. Patient has been given number to referral for updates.   ----- Message from Mio Juarez NP sent at 3/31/2025  7:55 AM CDT -----  Please call patient and let him know that his testing shows that his blood flow to his legs might be decreased. Because of his diabetes this can affect his blood vessels. I am going to refer him to   our vascular specialist for evaluation of this. Thank you   ----- Message -----  From: Vipul Vaca MD  Sent: 3/25/2025   8:55 PM CDT  To: Mio Juarez NP

## 2025-04-01 ENCOUNTER — LAB VISIT (OUTPATIENT)
Dept: LAB | Facility: HOSPITAL | Age: 50
End: 2025-04-01
Attending: FAMILY MEDICINE
Payer: MEDICAID

## 2025-04-01 DIAGNOSIS — E78.2 MIXED DYSLIPIDEMIA: ICD-10-CM

## 2025-04-01 DIAGNOSIS — E11.69 TYPE 2 DIABETES MELLITUS WITH HYPERLIPIDEMIA: ICD-10-CM

## 2025-04-01 DIAGNOSIS — E78.5 TYPE 2 DIABETES MELLITUS WITH HYPERLIPIDEMIA: ICD-10-CM

## 2025-04-01 DIAGNOSIS — I10 ESSENTIAL HYPERTENSION, BENIGN: ICD-10-CM

## 2025-04-01 LAB
ABSOLUTE EOSINOPHIL (OHS): 0.16 K/UL
ABSOLUTE MONOCYTE (OHS): 0.53 K/UL (ref 0.3–1)
ABSOLUTE NEUTROPHIL COUNT (OHS): 2.74 K/UL (ref 1.8–7.7)
ALBUMIN SERPL BCP-MCNC: 3.6 G/DL (ref 3.5–5.2)
ALP SERPL-CCNC: 106 UNIT/L (ref 40–150)
ALT SERPL W/O P-5'-P-CCNC: 29 UNIT/L (ref 10–44)
ANION GAP (OHS): 9 MMOL/L (ref 8–16)
AST SERPL-CCNC: 20 UNIT/L (ref 11–45)
BASOPHILS # BLD AUTO: 0.03 K/UL
BASOPHILS NFR BLD AUTO: 0.5 %
BILIRUB SERPL-MCNC: 0.4 MG/DL (ref 0.1–1)
BUN SERPL-MCNC: 12 MG/DL (ref 6–20)
CALCIUM SERPL-MCNC: 8.8 MG/DL (ref 8.7–10.5)
CHLORIDE SERPL-SCNC: 109 MMOL/L (ref 95–110)
CHOLEST SERPL-MCNC: 141 MG/DL (ref 120–199)
CHOLEST/HDLC SERPL: 5 {RATIO} (ref 2–5)
CO2 SERPL-SCNC: 21 MMOL/L (ref 23–29)
CREAT SERPL-MCNC: 0.8 MG/DL (ref 0.5–1.4)
EAG (OHS): 157 MG/DL (ref 68–131)
ERYTHROCYTE [DISTWIDTH] IN BLOOD BY AUTOMATED COUNT: 15.1 % (ref 11.5–14.5)
GFR SERPLBLD CREATININE-BSD FMLA CKD-EPI: >60 ML/MIN/1.73/M2
GLUCOSE SERPL-MCNC: 123 MG/DL (ref 70–110)
HBA1C MFR BLD: 7.1 % (ref 4–5.6)
HCT VFR BLD AUTO: 45.7 % (ref 40–54)
HDLC SERPL-MCNC: 28 MG/DL (ref 40–75)
HDLC SERPL: 19.9 % (ref 20–50)
HGB BLD-MCNC: 14.9 GM/DL (ref 14–18)
IMM GRANULOCYTES # BLD AUTO: 0.03 K/UL (ref 0–0.04)
IMM GRANULOCYTES NFR BLD AUTO: 0.5 % (ref 0–0.5)
LDLC SERPL CALC-MCNC: 48 MG/DL (ref 63–159)
LYMPHOCYTES # BLD AUTO: 3.08 K/UL (ref 1–4.8)
MCH RBC QN AUTO: 27 PG (ref 27–31)
MCHC RBC AUTO-ENTMCNC: 32.6 G/DL (ref 32–36)
MCV RBC AUTO: 83 FL (ref 82–98)
NONHDLC SERPL-MCNC: 113 MG/DL
NUCLEATED RBC (/100WBC) (OHS): 0 /100 WBC
PLATELET # BLD AUTO: 229 K/UL (ref 150–450)
PMV BLD AUTO: 11.3 FL (ref 9.2–12.9)
POTASSIUM SERPL-SCNC: 4 MMOL/L (ref 3.5–5.1)
PROT SERPL-MCNC: 7.7 GM/DL (ref 6–8.4)
RBC # BLD AUTO: 5.52 M/UL (ref 4.6–6.2)
RELATIVE EOSINOPHIL (OHS): 2.4 %
RELATIVE LYMPHOCYTE (OHS): 46.9 % (ref 18–48)
RELATIVE MONOCYTE (OHS): 8.1 % (ref 4–15)
RELATIVE NEUTROPHIL (OHS): 41.6 % (ref 38–73)
SODIUM SERPL-SCNC: 139 MMOL/L (ref 136–145)
TRIGL SERPL-MCNC: 325 MG/DL (ref 30–150)
WBC # BLD AUTO: 6.57 K/UL (ref 3.9–12.7)

## 2025-04-01 PROCEDURE — 83036 HEMOGLOBIN GLYCOSYLATED A1C: CPT

## 2025-04-01 PROCEDURE — 85025 COMPLETE CBC W/AUTO DIFF WBC: CPT

## 2025-04-01 PROCEDURE — 80061 LIPID PANEL: CPT

## 2025-04-01 PROCEDURE — 80053 COMPREHEN METABOLIC PANEL: CPT

## 2025-04-01 PROCEDURE — 36415 COLL VENOUS BLD VENIPUNCTURE: CPT | Mod: PN

## 2025-04-08 ENCOUNTER — OFFICE VISIT (OUTPATIENT)
Dept: FAMILY MEDICINE | Facility: CLINIC | Age: 50
End: 2025-04-08
Payer: MEDICAID

## 2025-04-08 VITALS
HEIGHT: 70 IN | RESPIRATION RATE: 19 BRPM | SYSTOLIC BLOOD PRESSURE: 122 MMHG | TEMPERATURE: 98 F | WEIGHT: 211.63 LBS | HEART RATE: 76 BPM | DIASTOLIC BLOOD PRESSURE: 80 MMHG | OXYGEN SATURATION: 98 % | BODY MASS INDEX: 30.3 KG/M2

## 2025-04-08 DIAGNOSIS — E78.2 MIXED DYSLIPIDEMIA: Chronic | ICD-10-CM

## 2025-04-08 DIAGNOSIS — I87.2 VENOUS INSUFFICIENCY OF BOTH LOWER EXTREMITIES: ICD-10-CM

## 2025-04-08 DIAGNOSIS — E11.69 TYPE 2 DIABETES MELLITUS WITH HYPERLIPIDEMIA: Primary | Chronic | ICD-10-CM

## 2025-04-08 DIAGNOSIS — F17.200 NEEDS SMOKING CESSATION EDUCATION: ICD-10-CM

## 2025-04-08 DIAGNOSIS — E78.5 TYPE 2 DIABETES MELLITUS WITH HYPERLIPIDEMIA: Primary | Chronic | ICD-10-CM

## 2025-04-08 DIAGNOSIS — M54.16 LUMBAR RADICULOPATHY: ICD-10-CM

## 2025-04-08 DIAGNOSIS — I10 ESSENTIAL HYPERTENSION, BENIGN: Chronic | ICD-10-CM

## 2025-04-08 PROCEDURE — 3008F BODY MASS INDEX DOCD: CPT | Mod: CPTII,,, | Performed by: FAMILY MEDICINE

## 2025-04-08 PROCEDURE — 1160F RVW MEDS BY RX/DR IN RCRD: CPT | Mod: CPTII,,, | Performed by: FAMILY MEDICINE

## 2025-04-08 PROCEDURE — 3051F HG A1C>EQUAL 7.0%<8.0%: CPT | Mod: CPTII,,, | Performed by: FAMILY MEDICINE

## 2025-04-08 PROCEDURE — 99999 PR PBB SHADOW E&M-EST. PATIENT-LVL V: CPT | Mod: PBBFAC,,, | Performed by: FAMILY MEDICINE

## 2025-04-08 PROCEDURE — G2211 COMPLEX E/M VISIT ADD ON: HCPCS | Mod: S$PBB,,, | Performed by: FAMILY MEDICINE

## 2025-04-08 PROCEDURE — 3074F SYST BP LT 130 MM HG: CPT | Mod: CPTII,,, | Performed by: FAMILY MEDICINE

## 2025-04-08 PROCEDURE — 4010F ACE/ARB THERAPY RXD/TAKEN: CPT | Mod: CPTII,,, | Performed by: FAMILY MEDICINE

## 2025-04-08 PROCEDURE — 3079F DIAST BP 80-89 MM HG: CPT | Mod: CPTII,,, | Performed by: FAMILY MEDICINE

## 2025-04-08 PROCEDURE — 99214 OFFICE O/P EST MOD 30 MIN: CPT | Mod: S$PBB,,, | Performed by: FAMILY MEDICINE

## 2025-04-08 PROCEDURE — 1159F MED LIST DOCD IN RCRD: CPT | Mod: CPTII,,, | Performed by: FAMILY MEDICINE

## 2025-04-08 PROCEDURE — 99215 OFFICE O/P EST HI 40 MIN: CPT | Mod: PBBFAC,PN | Performed by: FAMILY MEDICINE

## 2025-04-08 RX ORDER — METFORMIN HYDROCHLORIDE 500 MG/1
1000 TABLET, EXTENDED RELEASE ORAL
Qty: 180 TABLET | Refills: 2 | Status: SHIPPED | OUTPATIENT
Start: 2025-04-08

## 2025-04-08 RX ORDER — GABAPENTIN 300 MG/1
300 CAPSULE ORAL NIGHTLY
Qty: 90 CAPSULE | Refills: 3 | Status: SHIPPED | OUTPATIENT
Start: 2025-04-08 | End: 2026-04-08

## 2025-04-08 RX ORDER — ATORVASTATIN CALCIUM 40 MG/1
40 TABLET, FILM COATED ORAL DAILY
Qty: 90 TABLET | Refills: 2 | Status: SHIPPED | OUTPATIENT
Start: 2025-04-08

## 2025-04-08 RX ORDER — FENOFIBRATE 54 MG/1
54 TABLET ORAL DAILY
Qty: 90 TABLET | Refills: 3 | Status: SHIPPED | OUTPATIENT
Start: 2025-04-08 | End: 2026-04-08

## 2025-04-08 RX ORDER — LISINOPRIL 5 MG/1
5 TABLET ORAL DAILY
Qty: 90 TABLET | Refills: 1 | Status: SHIPPED | OUTPATIENT
Start: 2025-04-08 | End: 2026-04-08

## 2025-04-08 RX ORDER — GLIPIZIDE 5 MG/1
5 TABLET ORAL
Qty: 180 TABLET | Refills: 0 | Status: SHIPPED | OUTPATIENT
Start: 2025-04-08

## 2025-04-08 NOTE — PATIENT INSTRUCTIONS
Llame al [phone] para programar maik carmen para el cuidado de la vista  Llame al equipo de referencias al [phone] para programar referencia al especialista vascular    English  Please call [phone] to schedule your eye care appointment  Please call referral team at [phone] to schedule referral to vascular specialist

## 2025-04-13 NOTE — PROGRESS NOTES
"  Patient Name: Emeka Caballero    : 1975  MRN: [id number]      Subjective:     Patient ID: Emeka is a 49 y.o. male    Chief Complaint:  Follow-up (Pt states that he also been experiencing swelling in his legs, he states that it is very painful, he feels a tingling and burning sensation in both of his legs for about 2 months pt also states that he feels tingling in his left arm for sometimes and the tingling can lead up to him experiencing pain in his chest but no pain right now.)    History of Present Illness    Emeka presents today for follow up of diabetes, blood pressure, and cholesterol.    He reports multiple areas of pain affecting the back and feet. He experiences bilateral finger swelling and discomfort. He has foot swelling that becomes more pronounced with depression of the affected area. He has a history of venous insufficiency.    Blood sugar has improved but remains above target levels. Liver and kidney function tests are normal. Blood count is excellent. Cholesterol panel shows normal LDL levels with elevated triglycerides.    He reports compliance with all his medications.      ROS:  General: -fever, -chills, -fatigue, -weight gain, -weight loss  Eyes: -vision changes, -redness, -discharge  ENT: -ear pain, -nasal congestion, -sore throat  Cardiovascular: -chest pain, -palpitations, +lower extremity edema  Respiratory: -cough, -shortness of breath  Gastrointestinal: -abdominal pain, -nausea, -vomiting, -diarrhea, -constipation, -blood in stool  Genitourinary: -dysuria, -hematuria, -frequency  Musculoskeletal: -joint pain, -muscle pain, +limb pain, +back pain, +limb swelling  Skin: -rash, -lesion  Neurological: -headache, -dizziness, -numbness, -tingling  Psychiatric: -anxiety, -depression, -sleep difficulty         Objective:   /80 (BP Location: Left arm, Patient Position: Sitting)   Pulse 76   Temp 98.3 °F (36.8 °C) (Oral)   Resp 19   Ht 5' 10" (1.778 m)   Wt 96 kg " (211 lb 10.3 oz)   SpO2 98%   BMI 30.37 kg/m²     Physical Exam  Vitals reviewed.   Constitutional:       General: He is not in acute distress.     Comments: Using a cane for ambulation   HENT:      Head: Normocephalic and atraumatic.      Right Ear: Ear canal and external ear normal.      Left Ear: Ear canal and external ear normal.      Nose: Nose normal.      Mouth/Throat:      Mouth: Mucous membranes are moist.      Pharynx: No oropharyngeal exudate or posterior oropharyngeal erythema.   Eyes:      Extraocular Movements: Extraocular movements intact.      Conjunctiva/sclera: Conjunctivae normal.      Pupils: Pupils are equal, round, and reactive to light.   Cardiovascular:      Rate and Rhythm: Normal rate and regular rhythm.      Heart sounds: No murmur heard.  Pulmonary:      Effort: Pulmonary effort is normal. No respiratory distress.      Breath sounds: Normal breath sounds. No wheezing or rales.   Abdominal:      General: Abdomen is flat. Bowel sounds are normal. There is no distension.      Palpations: Abdomen is soft.      Tenderness: There is no abdominal tenderness. There is no guarding.   Musculoskeletal:      Cervical back: Normal range of motion.   Skin:     General: Skin is warm.      Capillary Refill: Capillary refill takes less than 2 seconds.   Neurological:      Mental Status: He is alert and oriented to person, place, and time.      Cranial Nerves: No cranial nerve deficit.      Sensory: No sensory deficit.   Psychiatric:         Mood and Affect: Mood normal.         Behavior: Behavior normal.        Lab Visit on 04/01/2025   Component Date Value Ref Range Status    Sodium 04/01/2025 139  136 - 145 mmol/L Final    Potassium 04/01/2025 4.0  3.5 - 5.1 mmol/L Final    Chloride 04/01/2025 109  95 - 110 mmol/L Final    CO2 04/01/2025 21 (L)  23 - 29 mmol/L Final    Glucose 04/01/2025 123 (H)  70 - 110 mg/dL Final    BUN 04/01/2025 12  6 - 20 mg/dL Final    Creatinine 04/01/2025 0.8  0.5 - 1.4  mg/dL Final    Calcium 04/01/2025 8.8  8.7 - 10.5 mg/dL Final    Protein Total 04/01/2025 7.7  6.0 - 8.4 gm/dL Final    Albumin 04/01/2025 3.6  3.5 - 5.2 g/dL Final    Bilirubin Total 04/01/2025 0.4  0.1 - 1.0 mg/dL Final    For infants and newborns, interpretation of results should be based   on gestational age, weight and in agreement with clinical   observations.    Premature Infant recommended reference ranges:   0-24 hours:  <8.0 mg/dL   24-48 hours: <12.0 mg/dL   3-5 days:    <15.0 mg/dL   6-29 days:   <15.0 mg/dL    ALP 04/01/2025 106  40 - 150 unit/L Final    AST 04/01/2025 20  11 - 45 unit/L Final    ALT 04/01/2025 29  10 - 44 unit/L Final    Anion Gap 04/01/2025 9  8 - 16 mmol/L Final    eGFR 04/01/2025 >60  >60 mL/min/1.73/m2 Final    Estimated GFR calculated using the CKD-EPI creatinine (2021) equation.    Hemoglobin A1c 04/01/2025 7.1 (H)  4.0 - 5.6 % Final    ADA Screening Guidelines:  5.7-6.4%  Consistent with prediabetes  >=6.5%  Consistent with diabetes    High levels of fetal hemoglobin interfere with the HbA1C  assay. Heterozygous hemoglobin variants (HbS, HgC, etc)do  not significantly interfere with this assay.   However, presence of multiple variants may affect accuracy.    Estimated Average Glucose 04/01/2025 157 (H)  68 - 131 mg/dL Final    Cholesterol Total 04/01/2025 141  120 - 199 mg/dL Final    The National Cholesterol Education Program (NCEP) has set the  following guidelines (reference ranges) for Cholesterol:  Optimal.....................<200 mg/dL  Borderline High.............200-239 mg/dL  High........................> or = 240 mg/dL    Triglyceride 04/01/2025 325 (H)  30 - 150 mg/dL Final    The National Cholesterol Education Program (NCEP) has set the  following guidelines (reference values) for triglycerides:  Normal......................<150 mg/dL  Borderline High.............150-199 mg/dL  High........................200-499 mg/dL    HDL Cholesterol 04/01/2025 28 (L)  40 - 75  mg/dL Final    The National Cholesterol Education Program (NCEP) has set the   following guidelines (reference values) for HDL Cholesterol:   Low...............<40 mg/dL   Optimal...........>60 mg/dL    LDL Cholesterol 04/01/2025 48.0 (L)  63.0 - 159.0 mg/dL Final    The National Cholesterol Education Program (NCEP) has set the  following guidelines (reference values) for LDL Cholesterol:  Optimal.......................<130 mg/dL  Borderline High...............130-159 mg/dL  High..........................160-189 mg/dL  Very High.....................>190 mg/dL  LDL calculated using the Friedewald equation.    HDL/Cholesterol Ratio 04/01/2025 19.9 (L)  20.0 - 50.0 % Final    Cholesterol/HDL Ratio 04/01/2025 5.0  2.0 - 5.0 Final    Non HDL Cholesterol 04/01/2025 113  mg/dL Final    Risk category and Non-HDL cholesterol goals:  Coronary heart disease (CHD)or equivalent (10-year risk of CHD >20%):  Non-HDL cholesterol goal     <130 mg/dL  Two or more CHD risk factors and 10-year risk of CHD <= 20%:  Non-HDL cholesterol goal     <160 mg/dL  0 to 1 CHD risk factor:  Non-HDL cholesterol goal     <190 mg/dL    WBC 04/01/2025 6.57  3.90 - 12.70 K/uL Final    RBC 04/01/2025 5.52  4.60 - 6.20 M/uL Final    HGB 04/01/2025 14.9  14.0 - 18.0 gm/dL Final    HCT 04/01/2025 45.7  40.0 - 54.0 % Final    MCV 04/01/2025 83  82 - 98 fL Final    MCH 04/01/2025 27.0  27.0 - 31.0 pg Final    MCHC 04/01/2025 32.6  32.0 - 36.0 g/dL Final    RDW 04/01/2025 15.1 (H)  11.5 - 14.5 % Final    Platelet Count 04/01/2025 229  150 - 450 K/uL Final    MPV 04/01/2025 11.3  9.2 - 12.9 fL Final    Nucleated RBC 04/01/2025 0  <=0 /100 WBC Final    Neut % 04/01/2025 41.6  38 - 73 % Final    Lymph % 04/01/2025 46.9  18 - 48 % Final    Mono % 04/01/2025 8.1  4 - 15 % Final    Eos % 04/01/2025 2.4  <=8 % Final    Basophil % 04/01/2025 0.5  <=1.9 % Final    Imm Grans % 04/01/2025 0.5  0.0 - 0.5 % Final    Neut # 04/01/2025 2.74  1.8 - 7.7 K/uL Final    Lymph #  04/01/2025 3.08  1 - 4.8 K/uL Final    Mono # 04/01/2025 0.53  0.3 - 1 K/uL Final    Eos # 04/01/2025 0.16  <=0.5 K/uL Final    Baso # 04/01/2025 0.03  <=0.2 K/uL Final    Imm Grans # 04/01/2025 0.03  0.00 - 0.04 K/uL Final    Mild elevation in immature granulocytes is non specific and can be seen in a variety of conditions including stress response, acute inflammation, trauma and pregnancy. Correlation with other laboratory and clinical findings is essential.   Hospital Outpatient Visit on 03/25/2025   Component Date Value Ref Range Status    Right arm BP 03/25/2025 123.00  mmHg Final    Right posterior tibial 03/25/2025 128.00  mmHg Final    Right dorsalis pedis 03/25/2025 130.00  mmHg Final    Right RAMÓN 03/25/2025 1.04   Final    Left arm BP 03/25/2025 125.00  mmHg Final    Left posterior tibial 03/25/2025 129.00  mmHg Final    Left dorsalis pedis 03/25/2025 130.00  mmHg Final    Left RAMÓN 03/25/2025 1.04   Final    Right toe pressure 03/25/2025 117.00  mmHg Final    Right TBI 03/25/2025 0.94   Final    Left toe pressure 03/25/2025 113.00  mmHg Final    Left TBI 03/25/2025 0.90   Final    Immediate arm BP 03/25/2025 133.00  mmHg Final    Immediate right tibial 03/25/2025 123.00  mmHg Final    Immediate right RAMÓN 03/25/2025 0.92   Final    Immediate left tibial 03/25/2025 130.00  mmHg Final    Immediate left RAMÓN 03/25/2025 0.98   Final        Assessment        ICD-10-CM ICD-9-CM   1. Type 2 diabetes mellitus with hyperlipidemia  E11.69 250.80    E78.5 272.4   2. Essential hypertension, benign  I10 401.1   3. Mixed dyslipidemia  E78.2 272.2   4. Lumbar radiculopathy  M54.16 724.4   5. Venous insufficiency of both lower extremities  I87.2 459.81         Plan:   Assessment & Plan      1. Type 2 diabetes mellitus with hyperlipidemia  Overview:  Lab Results   Component Value Date    HGBA1C 7.1 (H) 04/01/2025    HGBA1C 8.0 (H) 10/08/2024    HGBA1C 10.7 (H) 08/13/2024     Lab Results   Component Value Date    LDLCALC  Invalid, Trig>400.0 10/08/2024     Lab Results   Component Value Date    MICALBCREAT 4.3 10/08/2024         Orders:  -     glipiZIDE (GLUCOTROL) 5 MG tablet; Take 1 tablet (5 mg total) by mouth 2 (two) times daily before meals.  Dispense: 180 tablet; Refill: 0  -     metFORMIN (GLUCOPHAGE-XR) 500 MG ER 24hr tablet; Take 2 tablets (1,000 mg total) by mouth daily with breakfast.  Dispense: 180 tablet; Refill: 2  -     Ambulatory referral/consult to Optometry; Future; Expected date: 04/15/2025  A1c improving.    Continue metformin 2000 milligrams daily and glipizide 5 milligrams twice daily.  Referral for annual eye exam placed.    2. Essential hypertension, benign  -     lisinopriL (PRINIVIL,ZESTRIL) 5 MG tablet; Take 1 tablet (5 mg total) by mouth once daily.  Dispense: 90 tablet; Refill: 1  Good blood pressure control.    Continue lisinopril 5 milligrams daily.      3. Mixed dyslipidemia  Overview:  Lab Results   Component Value Date    CHOL 141 04/01/2025    CHOL 187 10/08/2024    CHOL 164 10/25/2023     Lab Results   Component Value Date    HDL 28 (L) 04/01/2025    HDL 29 (L) 10/08/2024    HDL 25 (L) 10/25/2023     Lab Results   Component Value Date    LDL 48.0 (L) 04/01/2025     Lab Results   Component Value Date    TRIG 325 (H) 04/01/2025    TRIG 424 (H) 10/08/2024    TRIG 333 (H) 10/25/2023     Lab Results   Component Value Date    CHOLHDL 19.9 (L) 04/01/2025    CHOLHDL 15.5 (L) 10/08/2024    CHOLHDL 15.2 (L) 10/25/2023        The 10-year ASCVD risk score (Jasper SMART, et al., 2019) is: 26.8%    Values used to calculate the score:      Age: 49 years      Sex: Male      Is Non- : Yes      Diabetic: Yes      Tobacco smoker: Yes      Systolic Blood Pressure: 122 mmHg      Is BP treated: Yes      HDL Cholesterol: 28 mg/dL      Total Cholesterol: 141 mg/dL      Orders:  -     fenofibrate (TRICOR) 54 MG tablet; Take 1 tablet (54 mg total) by mouth once daily.  Dispense: 90 tablet; Refill: 3  -      atorvastatin (LIPITOR) 40 MG tablet; Take 1 tablet (40 mg total) by mouth once daily.  Dispense: 90 tablet; Refill: 2  -     Lipid Panel; Future; Expected date: 05/13/2025  -     Comprehensive Metabolic Panel; Future; Expected date: 05/13/2025  LDL at goal but triglycerides continue to be very elevated.    Dietary recommendations discussed.  Will add fenofibrate 54 milligrams daily.  Recheck lipids in four weeks.    4. Lumbar radiculopathy  -     gabapentin (NEURONTIN) 300 MG capsule; Take 1 capsule (300 mg total) by mouth every evening.  Dispense: 90 capsule; Refill: 3  Continue gabapentin.      5. Venous insufficiency of both lower extremities  -     Ambulatory referral/consult to Vascular Surgery; Future; Expected date: 04/15/2025  Patient referred to vascular surgery           -Dio Zarate Jr., MD, AAHIVS      This note was generated with the assistance of ambient listening technology. Verbal consent was obtained by the patient and accompanying visitor(s) for the recording of patient appointment to facilitate this note. I attest to having reviewed and edited the generated note for accuracy, though some syntax or spelling errors may persist. Please contact the author of this note for any clarification.      Patient Instructions   Llame al [phone] para programar maik carmen para el cuidado de la vista  Llame al equipo de referencias al [phone] para programar referencia al especialista vascular    English  Please call [phone] to schedule your eye care appointment  Please call referral team at [phone] to schedule referral to vascular specialist      Follow up in about 6 weeks (around 5/20/2025) for Lipids (fasting labs a week prior).   Future Appointments   Date Time Provider Department Center   4/23/2025 11:00 AM VASCULAR ULTRASOUND, [facility] [place] [facility] [place]   5/13/2025  8:40 AM LAB, [facility] DRAW STATION [facility] LAB [facility]   5/20/2025 11:00 AM Dio Zarate Jr., MD [facility]  [place]   6/10/2025  8:30 AM Clara Garcia OD [facility] [place]

## 2025-04-17 ENCOUNTER — TELEPHONE (OUTPATIENT)
Dept: FAMILY MEDICINE | Facility: CLINIC | Age: 50
End: 2025-04-17
Payer: MEDICAID

## 2025-04-17 NOTE — TELEPHONE ENCOUNTER
Spoke with Vishnu to verify the status of Patient's gabapentin (NEURONTIN) 300 MG capsule.  He confirmed that the medication is covered by Patient's plan but his next refill is not due until 04/21/2025.

## 2025-04-23 ENCOUNTER — PATIENT OUTREACH (OUTPATIENT)
Dept: ADMINISTRATIVE | Facility: HOSPITAL | Age: 50
End: 2025-04-23
Payer: MEDICAID

## 2025-04-23 NOTE — PROGRESS NOTES
Eye exam scheduled for 06/10/25. Immunization's updated/triggered. Gap report updated.  
yes/L3-5 laminectomies/osteotomies, T10-L5 PSF

## 2025-05-02 ENCOUNTER — OFFICE VISIT (OUTPATIENT)
Facility: CLINIC | Age: 50
End: 2025-05-02
Payer: MEDICAID

## 2025-05-02 ENCOUNTER — LAB VISIT (OUTPATIENT)
Dept: LAB | Facility: HOSPITAL | Age: 50
End: 2025-05-02
Payer: MEDICAID

## 2025-05-02 VITALS
SYSTOLIC BLOOD PRESSURE: 127 MMHG | WEIGHT: 211.19 LBS | HEART RATE: 87 BPM | DIASTOLIC BLOOD PRESSURE: 82 MMHG | BODY MASS INDEX: 33.94 KG/M2 | HEIGHT: 66 IN

## 2025-05-02 DIAGNOSIS — E78.5 TYPE 2 DIABETES MELLITUS WITH HYPERLIPIDEMIA: Chronic | ICD-10-CM

## 2025-05-02 DIAGNOSIS — R20.2 PARESTHESIA OF LEFT UPPER EXTREMITY: ICD-10-CM

## 2025-05-02 DIAGNOSIS — R20.2 PARESTHESIA OF LEFT UPPER EXTREMITY: Primary | ICD-10-CM

## 2025-05-02 DIAGNOSIS — R20.2 NUMBNESS AND TINGLING OF BOTH LEGS BELOW KNEES: ICD-10-CM

## 2025-05-02 DIAGNOSIS — E11.69 TYPE 2 DIABETES MELLITUS WITH HYPERLIPIDEMIA: Chronic | ICD-10-CM

## 2025-05-02 DIAGNOSIS — Z74.09 IMPAIRED MOBILITY: ICD-10-CM

## 2025-05-02 DIAGNOSIS — R20.0 NUMBNESS AND TINGLING OF BOTH LEGS BELOW KNEES: ICD-10-CM

## 2025-05-02 DIAGNOSIS — M54.42 CHRONIC BILATERAL LOW BACK PAIN WITH BILATERAL SCIATICA: ICD-10-CM

## 2025-05-02 DIAGNOSIS — G89.29 CHRONIC BILATERAL LOW BACK PAIN WITH BILATERAL SCIATICA: ICD-10-CM

## 2025-05-02 DIAGNOSIS — M54.41 CHRONIC BILATERAL LOW BACK PAIN WITH BILATERAL SCIATICA: ICD-10-CM

## 2025-05-02 LAB
FOLATE SERPL-MCNC: 7.5 NG/ML (ref 4–24)
TSH SERPL-ACNC: 1.4 UIU/ML (ref 0.4–4)

## 2025-05-02 PROCEDURE — 84165 PROTEIN E-PHORESIS SERUM: CPT

## 2025-05-02 PROCEDURE — 83825 ASSAY OF MERCURY: CPT

## 2025-05-02 PROCEDURE — 84425 ASSAY OF VITAMIN B-1: CPT

## 2025-05-02 PROCEDURE — 83521 IG LIGHT CHAINS FREE EACH: CPT

## 2025-05-02 PROCEDURE — 99204 OFFICE O/P NEW MOD 45 MIN: CPT | Mod: S$PBB,,,

## 2025-05-02 PROCEDURE — 99214 OFFICE O/P EST MOD 30 MIN: CPT | Mod: PBBFAC

## 2025-05-02 PROCEDURE — 82607 VITAMIN B-12: CPT

## 2025-05-02 PROCEDURE — 36415 COLL VENOUS BLD VENIPUNCTURE: CPT

## 2025-05-02 PROCEDURE — 99999 PR PBB SHADOW E&M-EST. PATIENT-LVL IV: CPT | Mod: PBBFAC,,,

## 2025-05-02 PROCEDURE — 84443 ASSAY THYROID STIM HORMONE: CPT

## 2025-05-02 PROCEDURE — 84207 ASSAY OF VITAMIN B-6: CPT

## 2025-05-02 PROCEDURE — 86334 IMMUNOFIX E-PHORESIS SERUM: CPT

## 2025-05-02 PROCEDURE — 3051F HG A1C>EQUAL 7.0%<8.0%: CPT | Mod: CPTII,,,

## 2025-05-02 PROCEDURE — 1159F MED LIST DOCD IN RCRD: CPT | Mod: CPTII,,,

## 2025-05-02 PROCEDURE — 4010F ACE/ARB THERAPY RXD/TAKEN: CPT | Mod: CPTII,,,

## 2025-05-02 PROCEDURE — 3008F BODY MASS INDEX DOCD: CPT | Mod: CPTII,,,

## 2025-05-02 PROCEDURE — 83921 ORGANIC ACID SINGLE QUANT: CPT

## 2025-05-02 PROCEDURE — 82746 ASSAY OF FOLIC ACID SERUM: CPT

## 2025-05-02 PROCEDURE — 3079F DIAST BP 80-89 MM HG: CPT | Mod: CPTII,,,

## 2025-05-02 PROCEDURE — 3074F SYST BP LT 130 MM HG: CPT | Mod: CPTII,,,

## 2025-05-02 NOTE — PROGRESS NOTES
"      [facility] - NEUROLOGY 7TH [place] [facility], [place]    Date: 5/2/25  Patient Name: Emeka Caballero   MRN: [id number]   PCP: Dio Zarate Jr.  Referring Provider: Mio Juarez NP    Reason for visit: "Paresthesia of left upper extremity"    Details provided by:    Patient    HISTORY OF PRESENT ILLNESS   Emeka Caballero is a 50 y.o. male with PMHx of HTN, T2D, and chronic low back pain with lumbar radiculopathy s/p L2-pelvis extension and revision of L4-pelvis fusion from 2/8/22 (5/1/24) presenting for evaluation of paresthesia of left upper extremity..    Patient reports having constant numbness and tingling in his L arm that gradually began about 3 months ago. The N/T is present in the entire L arm and present in all 5 fingers. He sometimes has to shake out of his since it feels asleep, but he denies any nighttime awakenings.  He denies any injury or accident preceding the onset of numbness. He reports having neck pain for the past 2-3 years, however, he denies any radiation of his neck pain into his L arm. He denies any N/T or other abnormal sensations in his R arm. He endorses N/T in his B/L legs from his feet up to his knees. This area also has an associated burning sensation, usually present most of the day. He says these symptoms in his legs have been present since having back surgery on 5/1/24, specifically an L2-pelvis extension and revision of a L4-pelvis fusion from 2/8/22.    He stills deals with chronic back pain despite his extensive back surgeries. He states that the back pain often shoots down into both legs and into both feet. He notes that for the past 4 years he has difficulty walking due to his chronic back pain and a heaviness that he feels in both legs. He depends on a cane. He did physical therapy in the past for his low back pain which he reports he got significant relief from. He currently takes gabapentin 300mg at night. He is unsure how much benefit he gets " from the gabapentin since he takes it before sleep. However, it does help him sleep at night.    Patient does not diet. He also does not exercise given his significant low back pain. He does not drink alcohol. He takes an omega-3 supplement. He no longer works but used to work as a .    Chart Review:  EMG/NCV (1/26/24)  This is an abnormal study. There is electrophysiologic evidence of:  Acute or ongoing denervation and chronic denervation in the right L4, L5, and S1 myotomes suggestive of radiculopathy at those levels.  Chronic denervation in the left L4 myotome, through the abnormality was noted in only one muscle representative of that myotome.  Acute or ongoing denervation and chronic denervation in the left S1 myotome suggestive of radiculopathy at that level.    CT Lumbar Spine (6/13/24)  Postoperative changes from revised L2-pelvis decompression and fusion.  Previous canal and foraminal stenosis at L3-4 has improved.  Perihardware lucencies involving both L2 screws, concerning for loosening.  New grade 1 anterolisthesis at L3-4.    MRI Cervical Spine (1/5/24)  Alignment: Normal.  Vertebrae: Normal marrow signal. No fracture.  Discs: Normal height and signal.  Cord: Normal.  Skull base and craniocervical junction: Normal.  Degenerative findings:  C2-C3: There is no focal disc herniation. No significant central canal narrowing. No significant neural foraminal narrowing.  C3-C4: There is no focal disc herniation. No significant central canal narrowing. No significant neural foraminal narrowing.  C4-C5: There is no focal disc herniation. No significant central canal narrowing. No significant neural foraminal narrowing.  C5-C6: Posterior marginal osteophytic disc spur complex effaces the anterior thecal sac margins mild in degree without cord displacementor focal alteration in cord signal intensity. There is no focal disc herniation. Mild central canal narrowing. Uncovertebral joint hypertrophy and facet  "joint arthropathy results in mild neural foraminal narrowing.  C6-C7: Posterior marginal osteophytic disc spur complex effaces the anterior thecal sac margins mild in degree without cord displacementor focal alteration in cord signal intensity. There is no focal disc herniation. Mild central canal narrowing. No significant neural foraminal narrowing.  C7-T1: There is no focal disc herniation. No significant central canal narrowing. Mild left neural foraminal narrowing.  Paraspinal muscles & soft tissues: Unremarkable.    MRI Lumbar Spine (8/3/23)  Postoperative changes of L4-pelvic posterior instrument fusion, as above. The vertebral body and intervertebral disc spacer alignment is stable.  No acute findings.  Lumbar spondylosis at L3-4, contributing to moderate/severe spinal canal and bilateral neural foraminal stenosis, progressed from the prior MRI.    Pertinent work up based on chart review for current condition:  A1C as high as 10.7 (8/13/24)      Lab Results   Component Value Date    WBC 6.57 04/01/2025    HGB 14.9 04/01/2025    HCT 45.7 04/01/2025     04/01/2025    CHOL 141 04/01/2025    TRIG 325 (H) 04/01/2025    HDL 28 (L) 04/01/2025    ALT 29 04/01/2025    AST 20 04/01/2025     04/01/2025    K 4.0 04/01/2025     04/01/2025    CREATININE 0.8 04/01/2025    BUN 12 04/01/2025    CO2 21 (L) 04/01/2025    TSH 1.400 05/02/2025    HGBA1C 7.1 (H) 04/01/2025    FOLATE 7.5 05/02/2025       Review of Systems:  12 system review of systems is negative except for the symptoms mentioned in HPI.     PHYSICAL EXAMINATION     Vitals:    05/02/25 0804   BP: 127/82   Patient Position: Sitting   Pulse: 87   Weight: 95.8 kg (211 lb 3.2 oz)   Height: 5' 6" (1.676 m)     Wt Readings from Last 3 Encounters:   05/02/25 0804 95.8 kg (211 lb 3.2 oz)   04/08/25 0959 96 kg (211 lb 10.3 oz)   03/19/25 0956 95.5 kg (210 lb 8.6 oz)     Body mass index is 34.09 kg/m².     GENERAL/CONSTITUTIONAL/SYSTEMIC:    -Well " appearing; well nourished    HIGHER INTEGRATIVE FUNCTIONS:   -Attention & concentration: Normal   -Orientation: Oriented to person, place & time  -Memory: Normal  -Language: Normal   -Fund of Knowledge: Normal     CRANIAL NERVES:   -CN 2: Visual fields full  -CN 2,3: PERRL  -CN 3,4,6: EOMI  -CN 5: Facial sensation intact bilaterally  -CN 7: Facial strength/movement intact bilaterally  -CN 8: Hearing normal bilaterally  -CN 9,10: Palate elevates symmetrically  -CN 11: Normal shoulder shrug and head turn  -CN 12: Tongue protrudes midline     MOTOR:   -Tone: normal in upper and lower extremities  -UE/LE motor: 5/5 throughout, no pronator drift     SENSATION:   - Light touch impaired in entire L arm and L hand as well as BLE from B/L feet up to just below B/L knees, entire area.  - Pinprick impaired in BLE from B/L feet up to just below B/L knees, entire area. Intact throughout BUE.  - Vibratory sense decreased severely in R toes, moderate-severely in L toes and ankle, moderately in R ankle, and mildly in B/L fingers.    REFLEXES:     RIGHT Reflex   LEFT   2+ Biceps 2+   2+ Brachiorad. 2+   2+ Triceps 2+    Pectoralis     Jaw Jerk    - Lyons's -        3+ Patellar 3+   2+ Ankle 2+    Suprapatellar              Down PLANTAR Down       COORDINATION:   -FNF normal bilaterally    GAIT:   - Very slow, very unsteady gait. Able to stand on heels and toes without difficulty.    Scheduled Follow-up :  Future Appointments   Date Time Provider Department Center   5/13/2025  8:40 AM LAB, [facility] D[facility]   5/20/2025 11:00 AM Dio Zarate Jr., MD [facility]   6/10/2025  8:30 AM Clara Garcia OD [facility]       After Visit Medication List :     Medication List            Accurate as of May 2, 2025 11:59 PM. If you have any questions, ask your nurse or doctor.                CONTINUE taking these medications      atorvastatin 40 MG tablet  Commonly known as: LIPITOR  Take 1 tablet  (40 mg total) by mouth once daily.     blood sugar diagnostic Strp  To check BG 3 times daily, to use with insurance preferred meter     * blood-glucose meter kit  To check BG 3 times daily, to use with insurance preferred meter     * ONETOUCH VERIO FLEX METER Misc  Generic drug: blood-glucose meter  USE AS DIRECTED THREE TIMES DAILY TO  CHECK  BLOOD  SUGAR     fenofibrate 54 MG tablet  Commonly known as: TRICOR  Take 1 tablet (54 mg total) by mouth once daily.     gabapentin 300 MG capsule  Commonly known as: NEURONTIN  Take 1 capsule (300 mg total) by mouth every evening.     glipiZIDE 5 MG tablet  Commonly known as: GLUCOTROL  Take 1 tablet (5 mg total) by mouth 2 (two) times daily before meals.     lancets Mis  To check BG 3 times daily, to use with insurance preferred meter     lisinopriL 5 MG tablet  Commonly known as: PRINIVIL,ZESTRIL  Take 1 tablet (5 mg total) by mouth once daily.     metFORMIN 500 MG ER 24hr tablet  Commonly known as: GLUCOPHAGE-XR  Take 2 tablets (1,000 mg total) by mouth daily with breakfast.     methocarbamoL 500 MG Tab  Commonly known as: Robaxin  Take 1 tablet (500 mg total) by mouth every evening.     omega-3 fatty acids-fish oil 340-1,000 mg Cap  Take 1 capsule by mouth once daily.     oxybutynin 10 MG 24 hr tablet  Commonly known as: DITROPAN-XL  Take 1 tablet (10 mg total) by mouth once daily.     tadalafiL 20 MG Tab  Commonly known as: CIALIS  Take 1 tablet (20 mg total) by mouth daily as needed (Erectile Dysfunction).           * This list has 2 medication(s) that are the same as other medications prescribed for you. Read the directions carefully, and ask your doctor or other care provider to review them with you.                    Assessment/Plan:   Emeka Caballero is a 50 y.o. male with PMHx of HTN, T2D, and chronic low back pain with lumbar radiculopathy s/p L2-pelvis extension and revision of L4-pelvis fusion from 2/8/22 (5/1/24) presenting for evaluation of  paresthesia of left upper extremity..    - Patient having N/T in entire LUE as well as in B/L feet up to B/L knees with associated pain  - Uncleat etiology of N/T in LUE. Considered both carpal tunnel and cubital tunnel syndromes, however, the N/T is present in the entirety of the LUE. It is also constant in nature and does not seem to be positional, so currently low on the differential.  - Will schedule EMG/NCV to assess  - Ordered routine neuropathy labs to help determine etiology  - BLE N/T and pain likely being caused primarily by lumbosacral radiculopathy confirmed on EMG/NCV from 1/26/24. Diabetic neuropathy may also be contributing given that A1C was as high as 10.7 in the past (8/13/24)  - PT referral placed for patient's chronic low back pain with lumbar radiculopathy  - Provided patient with back exercises to do as needed    Follow-up in 3 months.    I discussed with the patient in depth the risk/benefits of the following plan and the patient was amenable. We discussed the following:    Problem List Items Addressed This Visit          Endocrine    Type 2 diabetes mellitus with hyperlipidemia (Chronic)    Overview   Lab Results   Component Value Date    HGBA1C 7.1 (H) 04/01/2025    HGBA1C 8.0 (H) 10/08/2024    HGBA1C 10.7 (H) 08/13/2024     Lab Results   Component Value Date    LDLCALC Invalid, Trig>400.0 10/08/2024     Lab Results   Component Value Date    MICALBCREAT 4.3 10/08/2024                 Orthopedic    Low back pain    Relevant Orders    Ambulatory referral/consult to Physical/Occupational Therapy    EMG W/ ULTRASOUND AND NERVE CONDUCTION TEST 4 Extremities       Other    Impaired mobility    Relevant Orders    Ambulatory referral/consult to Physical/Occupational Therapy     Other Visit Diagnoses         Paresthesia of left upper extremity    -  Primary    Relevant Orders    Folate (Completed)    Heavy Metals Screen, Blood (Quantitative)    Immunofixation, Serum    Immunoglobulin Free LT Chains  Blood    Protein Electrophoresis, Serum    TSH (Completed)    Vitamin B1    Vitamin B12 Deficiency Panel    Vitamin B6    EMG W/ ULTRASOUND AND NERVE CONDUCTION TEST 4 Extremities      Numbness and tingling of both legs below knees        Relevant Orders    Folate (Completed)    Heavy Metals Screen, Blood (Quantitative)    Immunofixation, Serum    Immunoglobulin Free LT Chains Blood    Protein Electrophoresis, Serum    TSH (Completed)    Vitamin B1    Vitamin B12 Deficiency Panel    Vitamin B6    EMG W/ ULTRASOUND AND NERVE CONDUCTION TEST 4 Extremities                 This evaluation was completed in >50  Minutes over 50% of the time spent on education & counseling. This includes face to face time and non-face to face time preparing to see the patient (eg, review of tests), obtaining and/or reviewing separately obtained history, documenting clinical information in the electronic or other health record, independently interpreting results and communicating results to the patient/family/caregiver, or care coordinator.          Marshall J Kellerman, PA-C  Supervising physician Kory Perez MD was available for all questions during this exam.  [facility]  [address] [place] [address].

## 2025-05-03 LAB
ADDRESS: NORMAL
ARSENIC BLD-MCNC: 1 NG/ML
ATTENDING PHYSICIAN NAME: NORMAL
CADMIUM BLD-MCNC: 0.3 NG/ML
COUNTY OF RESIDENCE: NORMAL
EMPLOYER NAME: NORMAL
FACILITY PHONE #: NORMAL
HX OF OCCUPATION: NORMAL
LEAD BLDV-MCNC: 2.9 MCG/DL
M HEALTH CARE PROVIDER PHONE: NORMAL
M PATIENT CITY: NORMAL
MERCURY BLD-MCNC: 1 NG/ML
PHONE #: NORMAL
PROVIDER CITY: NORMAL
PROVIDER POSTAL CODE: NORMAL
PROVIDER STATE: NORMAL
REFER PHYSICIAN ADDR: NORMAL
SPECIMEN SOURCE: NORMAL

## 2025-05-05 ENCOUNTER — RESULTS FOLLOW-UP (OUTPATIENT)
Facility: CLINIC | Age: 50
End: 2025-05-05

## 2025-05-05 LAB
ALBUMIN, SPE (OHS): 4.14 G/DL (ref 3.35–5.55)
ALPHA 1 GLOB (OHS): 0.23 GM/DL (ref 0.17–0.41)
ALPHA 2 GLOB (OHS): 0.59 GM/DL (ref 0.43–0.99)
BETA GLOB (OHS): 0.98 GM/DL (ref 0.5–1.1)
GAMMA GLOBULIN (OHS): 1.47 GM/DL (ref 0.67–1.58)
KAPPA LC FREE SER-MCNC: 1.08 MG/L (ref 0.26–1.65)
KAPPA LC FREE/LAMBDA FREE SER: 2.73 MG/DL (ref 0.33–1.94)
LAMBDA LC FREE SERPL-MCNC: 2.52 MG/DL (ref 0.57–2.63)
PROT SERPL-MCNC: 7.4 GM/DL (ref 6–8.4)
VIT B12 SERPL-MCNC: 271 NG/L (ref 180–914)

## 2025-05-06 DIAGNOSIS — E53.1 VITAMIN B6 DEFICIENCY: ICD-10-CM

## 2025-05-06 DIAGNOSIS — R79.89 LOW VITAMIN B12 LEVEL: Primary | ICD-10-CM

## 2025-05-06 LAB
PATHOLOGIST INTERPRETATION - IFE SERUM (OHS): NORMAL
PATHOLOGIST REVIEW - SPE (OHS): NORMAL
W VITAMIN B6: 4 UG/L

## 2025-05-06 RX ORDER — LANOLIN ALCOHOL/MO/W.PET/CERES
50 CREAM (GRAM) TOPICAL DAILY
Qty: 14 TABLET | Refills: 0 | Status: SHIPPED | OUTPATIENT
Start: 2025-05-06

## 2025-05-06 RX ORDER — MECOBALAMIN 1000 MCG
1000 TABLET,CHEWABLE ORAL DAILY
Qty: 90 TABLET | Refills: 3 | Status: SHIPPED | OUTPATIENT
Start: 2025-05-06

## 2025-05-07 LAB — METHYLMALONATE SERPL-SCNC: 0.13 NMOL/ML

## 2025-05-13 ENCOUNTER — LAB VISIT (OUTPATIENT)
Dept: LAB | Facility: HOSPITAL | Age: 50
End: 2025-05-13
Attending: FAMILY MEDICINE
Payer: MEDICAID

## 2025-05-13 DIAGNOSIS — E78.2 MIXED DYSLIPIDEMIA: Chronic | ICD-10-CM

## 2025-05-13 LAB
ALBUMIN SERPL BCP-MCNC: 3.7 G/DL (ref 3.5–5.2)
ALP SERPL-CCNC: 104 UNIT/L (ref 40–150)
ALT SERPL W/O P-5'-P-CCNC: 28 UNIT/L (ref 10–44)
ANION GAP (OHS): 8 MMOL/L (ref 8–16)
AST SERPL-CCNC: 15 UNIT/L (ref 11–45)
BILIRUB SERPL-MCNC: 0.3 MG/DL (ref 0.1–1)
BUN SERPL-MCNC: 15 MG/DL (ref 6–20)
CALCIUM SERPL-MCNC: 9.1 MG/DL (ref 8.7–10.5)
CHLORIDE SERPL-SCNC: 108 MMOL/L (ref 95–110)
CHOLEST SERPL-MCNC: 138 MG/DL (ref 120–199)
CHOLEST/HDLC SERPL: 3.5 {RATIO} (ref 2–5)
CO2 SERPL-SCNC: 23 MMOL/L (ref 23–29)
CREAT SERPL-MCNC: 0.9 MG/DL (ref 0.5–1.4)
GFR SERPLBLD CREATININE-BSD FMLA CKD-EPI: >60 ML/MIN/1.73/M2
GLUCOSE SERPL-MCNC: 186 MG/DL (ref 70–110)
HDLC SERPL-MCNC: 39 MG/DL (ref 40–75)
HDLC SERPL: 28.3 % (ref 20–50)
LDLC SERPL CALC-MCNC: 64.4 MG/DL (ref 63–159)
NONHDLC SERPL-MCNC: 99 MG/DL
POTASSIUM SERPL-SCNC: 3.8 MMOL/L (ref 3.5–5.1)
PROT SERPL-MCNC: 7.8 GM/DL (ref 6–8.4)
SODIUM SERPL-SCNC: 139 MMOL/L (ref 136–145)
TRIGL SERPL-MCNC: 173 MG/DL (ref 30–150)

## 2025-05-13 PROCEDURE — 36415 COLL VENOUS BLD VENIPUNCTURE: CPT | Mod: PN

## 2025-05-13 PROCEDURE — 82465 ASSAY BLD/SERUM CHOLESTEROL: CPT

## 2025-05-13 PROCEDURE — 84520 ASSAY OF UREA NITROGEN: CPT

## 2025-05-18 ENCOUNTER — RESULTS FOLLOW-UP (OUTPATIENT)
Dept: FAMILY MEDICINE | Facility: CLINIC | Age: 50
End: 2025-05-18
Payer: MEDICAID

## 2025-05-20 ENCOUNTER — OFFICE VISIT (OUTPATIENT)
Dept: FAMILY MEDICINE | Facility: CLINIC | Age: 50
End: 2025-05-20
Payer: MEDICAID

## 2025-05-20 VITALS
OXYGEN SATURATION: 96 % | HEIGHT: 66 IN | WEIGHT: 213.63 LBS | HEART RATE: 88 BPM | DIASTOLIC BLOOD PRESSURE: 84 MMHG | SYSTOLIC BLOOD PRESSURE: 136 MMHG | TEMPERATURE: 98 F | BODY MASS INDEX: 34.33 KG/M2 | RESPIRATION RATE: 19 BRPM

## 2025-05-20 DIAGNOSIS — E78.2 MIXED DYSLIPIDEMIA: Chronic | ICD-10-CM

## 2025-05-20 DIAGNOSIS — E11.69 TYPE 2 DIABETES MELLITUS WITH HYPERLIPIDEMIA: Primary | Chronic | ICD-10-CM

## 2025-05-20 DIAGNOSIS — E78.5 TYPE 2 DIABETES MELLITUS WITH HYPERLIPIDEMIA: Primary | Chronic | ICD-10-CM

## 2025-05-20 DIAGNOSIS — R35.0 URINARY FREQUENCY: ICD-10-CM

## 2025-05-20 LAB
BILIRUB UR QL STRIP.AUTO: NEGATIVE
CLARITY UR: CLEAR
COLOR UR AUTO: YELLOW
GLUCOSE UR QL STRIP: NEGATIVE
HGB UR QL STRIP: NEGATIVE
HOLD SPECIMEN: NORMAL
KETONES UR QL STRIP: NEGATIVE
LEUKOCYTE ESTERASE UR QL STRIP: NEGATIVE
NITRITE UR QL STRIP: NEGATIVE
PH UR STRIP: 6 [PH]
PROT UR QL STRIP: NEGATIVE
SP GR UR STRIP: 1.02
UROBILINOGEN UR STRIP-ACNC: NEGATIVE EU/DL

## 2025-05-20 PROCEDURE — 4010F ACE/ARB THERAPY RXD/TAKEN: CPT | Mod: CPTII,,, | Performed by: FAMILY MEDICINE

## 2025-05-20 PROCEDURE — 99999 PR PBB SHADOW E&M-EST. PATIENT-LVL V: CPT | Mod: PBBFAC,,, | Performed by: FAMILY MEDICINE

## 2025-05-20 PROCEDURE — 81003 URINALYSIS AUTO W/O SCOPE: CPT | Performed by: FAMILY MEDICINE

## 2025-05-20 PROCEDURE — G2211 COMPLEX E/M VISIT ADD ON: HCPCS | Mod: S$PBB,,, | Performed by: FAMILY MEDICINE

## 2025-05-20 PROCEDURE — 3008F BODY MASS INDEX DOCD: CPT | Mod: CPTII,,, | Performed by: FAMILY MEDICINE

## 2025-05-20 PROCEDURE — 3079F DIAST BP 80-89 MM HG: CPT | Mod: CPTII,,, | Performed by: FAMILY MEDICINE

## 2025-05-20 PROCEDURE — 3051F HG A1C>EQUAL 7.0%<8.0%: CPT | Mod: CPTII,,, | Performed by: FAMILY MEDICINE

## 2025-05-20 PROCEDURE — 3075F SYST BP GE 130 - 139MM HG: CPT | Mod: CPTII,,, | Performed by: FAMILY MEDICINE

## 2025-05-20 PROCEDURE — 1160F RVW MEDS BY RX/DR IN RCRD: CPT | Mod: CPTII,,, | Performed by: FAMILY MEDICINE

## 2025-05-20 PROCEDURE — 99214 OFFICE O/P EST MOD 30 MIN: CPT | Mod: S$PBB,,, | Performed by: FAMILY MEDICINE

## 2025-05-20 PROCEDURE — 99215 OFFICE O/P EST HI 40 MIN: CPT | Mod: PBBFAC,PN | Performed by: FAMILY MEDICINE

## 2025-05-20 PROCEDURE — 1159F MED LIST DOCD IN RCRD: CPT | Mod: CPTII,,, | Performed by: FAMILY MEDICINE

## 2025-05-20 RX ORDER — METFORMIN HYDROCHLORIDE 750 MG/1
1500 TABLET, EXTENDED RELEASE ORAL
Qty: 180 TABLET | Refills: 0 | Status: SHIPPED | OUTPATIENT
Start: 2025-05-20

## 2025-05-20 NOTE — PATIENT INSTRUCTIONS
Cambie elaine dosis de metformina de 500 mg a 750 mg. Orr 2 pastillas con el desayuno.  Continúe con glipizida 5 mg dos veces al día.    English  Change your Metformin from 500 mg to 750 mg. Take 2 pills together with breakfast.  Continue the glipizide 5 mg twice a day

## 2025-05-21 ENCOUNTER — TELEPHONE (OUTPATIENT)
Facility: CLINIC | Age: 50
End: 2025-05-21
Payer: MEDICAID

## 2025-05-21 ENCOUNTER — RESULTS FOLLOW-UP (OUTPATIENT)
Dept: FAMILY MEDICINE | Facility: CLINIC | Age: 50
End: 2025-05-21
Payer: MEDICAID

## 2025-05-21 NOTE — TELEPHONE ENCOUNTER
Spoke to patient wife to schedule appointment EMG on 8/11 @ 12pm. Patient verbalized agreement

## 2025-06-02 ENCOUNTER — CLINICAL SUPPORT (OUTPATIENT)
Dept: REHABILITATION | Facility: HOSPITAL | Age: 50
End: 2025-06-02
Payer: MEDICAID

## 2025-06-02 DIAGNOSIS — M54.41 CHRONIC BILATERAL LOW BACK PAIN WITH BILATERAL SCIATICA: Primary | ICD-10-CM

## 2025-06-02 DIAGNOSIS — Z74.09 IMPAIRED MOBILITY: ICD-10-CM

## 2025-06-02 DIAGNOSIS — G89.29 CHRONIC BILATERAL LOW BACK PAIN WITH BILATERAL SCIATICA: Primary | ICD-10-CM

## 2025-06-02 DIAGNOSIS — M54.42 CHRONIC BILATERAL LOW BACK PAIN WITH BILATERAL SCIATICA: Primary | ICD-10-CM

## 2025-06-02 PROCEDURE — 97110 THERAPEUTIC EXERCISES: CPT | Mod: PN

## 2025-06-02 PROCEDURE — 97161 PT EVAL LOW COMPLEX 20 MIN: CPT | Mod: PN

## 2025-06-04 ENCOUNTER — CLINICAL SUPPORT (OUTPATIENT)
Dept: REHABILITATION | Facility: HOSPITAL | Age: 50
End: 2025-06-04
Payer: MEDICAID

## 2025-06-04 DIAGNOSIS — G89.29 CHRONIC LOW BACK PAIN WITHOUT SCIATICA, UNSPECIFIED BACK PAIN LATERALITY: Primary | ICD-10-CM

## 2025-06-04 DIAGNOSIS — Z74.09 IMPAIRED MOBILITY: ICD-10-CM

## 2025-06-04 DIAGNOSIS — M54.50 CHRONIC LOW BACK PAIN WITHOUT SCIATICA, UNSPECIFIED BACK PAIN LATERALITY: Primary | ICD-10-CM

## 2025-06-04 PROCEDURE — 97110 THERAPEUTIC EXERCISES: CPT | Mod: PN,CQ

## 2025-06-09 NOTE — PROGRESS NOTES
"  Outpatient Rehab    Physical Therapy Visit    Patient Name: Emeka Caballero  MRN: [id number]  YOB: 1975  Encounter Date: 6/4/2025    Therapy Diagnosis:   Encounter Diagnoses   Name Primary?    Chronic low back pain without sciatica, unspecified back pain laterality Yes    Impaired mobility      Physician: Kellerman, Marshall J, *    Physician Orders: Eval and Treat  Medical Diagnosis:   Surgical Diagnosis: Not applicable for this Episode   Surgical Date: Not applicable for this Episode    Visit # / Visits Authorized:  1 / 20  Insurance Authorization Period: 6/2/2025 to 12/31/2025  Date of Evaluation: 6/2/2025  Plan of Care Certification: 6/3/2025 to 9/30/2025      PT/PTA: PTA   Number of PTA visits since last PT visit:1  Time In: 1530   Time Out: 1625  Total Time (in minutes): 55   Total Billable Time (in minutes): 55    FOTO:  Intake Score:  %  Survey Score 2:  %  Survey Score 3:  %    Precautions:     Hx of lumbar spinal fusion      Subjective   Patient reports feeling tight and pain to his back, sometimes the pain run down to legs.  Pain reported as 7/10.      Objective            Treatment:  Therapeutic Exercise  TE 2: Lower trunk rotations 2 min x 3 sec hold  TE 3: pelvic tilts 2 min x 3 sec hold  TE 4: double knees to chest 10" holds x 10  TE 5: Hamstring stretch 3x30"  TE 6: Piriformis stretch 3x30"  TE 7: SKTC with towel 5x10" each  TE 8: Sciatic nerve glide 10x each  TE 9: Clamshell RTB 2x10x3"  TE 10: Ball Squeeze 2x10x3"  Other Activities  Activity 1: TE: Dead bug 2x10 each      Time Entry(in minutes):  Therapeutic Exercise Time Entry: 55    Assessment & Plan   Assessment: Patient came for his first therapy treatment after the initial evaluation for LBP, he has history of lumbar spinal fusion. He presents to clinic reporting LBP, stiffness, tightness, and sometimes the pain run down to his legs. He ambulating with SPC jo ann crossing his leg, mild unsteadiness, decreased lumbopelvic " mobility. We focus on improving back/core/BLE strength and ROM for pain relief and improve patient's symptoms. Verbal and tactile cueing for proper tech and muscle activation.He reports feeling a little better post session. Instructed him to work on HEP, he verbalized understanding. Will cont to progress per patient tolerance.  Evaluation/Treatment Tolerance: Patient tolerated treatment well    The patient will continue to benefit from skilled outpatient physical therapy in order to address the deficits listed in the problem list on the initial evaluation, provide patient and family education, and maximize the patients level of independence in the home and community environments.     The patient's spiritual, cultural, and educational needs were considered, and the patient is agreeable to the plan of care and goals.           Plan: Cont to POC    Goals:   Active       Long-Term Goals        Patient will improve FOTO score to >/= to projected outcome (53) to demonstrate improvement in functional abilities         Start:  06/03/25    Expected End:  08/26/25            Patient will improve pain level to </= to 4/10 to improve their ability to walk with less difficulties        Start:  06/03/25    Expected End:  08/26/25            patient will improve TUG score to </= 10s to improve functional mobility       Start:  06/03/25    Expected End:  08/26/25               Short-Term Goals        Patient will be independent with HEP to improve functional mobility and low back pain         Start:  06/03/25    Expected End:  07/15/25            Patient will improve pain level to </= to 6/10 to improve their functional mobility         Start:  06/03/25    Expected End:  07/15/25            Patient will improve their hip flexion MMT score to >/= to 5/5 with minimal back pain to improve strength needed for functional mobility        Start:  06/03/25    Expected End:  07/15/25                Deion To, PTA

## 2025-06-17 ENCOUNTER — OFFICE VISIT (OUTPATIENT)
Dept: FAMILY MEDICINE | Facility: CLINIC | Age: 50
End: 2025-06-17
Payer: MEDICAID

## 2025-06-17 ENCOUNTER — LAB VISIT (OUTPATIENT)
Dept: LAB | Facility: HOSPITAL | Age: 50
End: 2025-06-17
Attending: FAMILY MEDICINE
Payer: MEDICAID

## 2025-06-17 VITALS
SYSTOLIC BLOOD PRESSURE: 134 MMHG | OXYGEN SATURATION: 97 % | HEART RATE: 81 BPM | RESPIRATION RATE: 18 BRPM | TEMPERATURE: 98 F | BODY MASS INDEX: 34.22 KG/M2 | HEIGHT: 66 IN | WEIGHT: 212.94 LBS | DIASTOLIC BLOOD PRESSURE: 80 MMHG

## 2025-06-17 DIAGNOSIS — I10 ESSENTIAL HYPERTENSION, BENIGN: Chronic | ICD-10-CM

## 2025-06-17 DIAGNOSIS — N41.0 ACUTE PROSTATITIS: ICD-10-CM

## 2025-06-17 DIAGNOSIS — E11.69 TYPE 2 DIABETES MELLITUS WITH HYPERLIPIDEMIA: Primary | Chronic | ICD-10-CM

## 2025-06-17 DIAGNOSIS — E78.2 MIXED DYSLIPIDEMIA: Chronic | ICD-10-CM

## 2025-06-17 DIAGNOSIS — E78.5 TYPE 2 DIABETES MELLITUS WITH HYPERLIPIDEMIA: Primary | Chronic | ICD-10-CM

## 2025-06-17 LAB
BACTERIA #/AREA URNS AUTO: NORMAL /HPF
BILIRUB UR QL STRIP.AUTO: NEGATIVE
CLARITY UR: CLEAR
COLOR UR AUTO: YELLOW
GLUCOSE UR QL STRIP: ABNORMAL
HGB UR QL STRIP: NEGATIVE
KETONES UR QL STRIP: NEGATIVE
LEUKOCYTE ESTERASE UR QL STRIP: NEGATIVE
MICROSCOPIC COMMENT: NORMAL
NITRITE UR QL STRIP: NEGATIVE
PH UR STRIP: 6 [PH]
PROT UR QL STRIP: NEGATIVE
SP GR UR STRIP: 1.02
SQUAMOUS #/AREA URNS AUTO: <1 /HPF
UROBILINOGEN UR STRIP-ACNC: NEGATIVE EU/DL
WBC #/AREA URNS AUTO: 2 /HPF (ref 0–5)
YEAST UR QL AUTO: NORMAL /HPF

## 2025-06-17 PROCEDURE — 3008F BODY MASS INDEX DOCD: CPT | Mod: CPTII,,, | Performed by: FAMILY MEDICINE

## 2025-06-17 PROCEDURE — 3079F DIAST BP 80-89 MM HG: CPT | Mod: CPTII,,, | Performed by: FAMILY MEDICINE

## 2025-06-17 PROCEDURE — 99214 OFFICE O/P EST MOD 30 MIN: CPT | Mod: S$PBB,,, | Performed by: FAMILY MEDICINE

## 2025-06-17 PROCEDURE — 81003 URINALYSIS AUTO W/O SCOPE: CPT

## 2025-06-17 PROCEDURE — G2211 COMPLEX E/M VISIT ADD ON: HCPCS | Mod: ,,, | Performed by: FAMILY MEDICINE

## 2025-06-17 PROCEDURE — 3051F HG A1C>EQUAL 7.0%<8.0%: CPT | Mod: CPTII,,, | Performed by: FAMILY MEDICINE

## 2025-06-17 PROCEDURE — 1159F MED LIST DOCD IN RCRD: CPT | Mod: CPTII,,, | Performed by: FAMILY MEDICINE

## 2025-06-17 PROCEDURE — 99999 PR PBB SHADOW E&M-EST. PATIENT-LVL IV: CPT | Mod: PBBFAC,,, | Performed by: FAMILY MEDICINE

## 2025-06-17 PROCEDURE — 3075F SYST BP GE 130 - 139MM HG: CPT | Mod: CPTII,,, | Performed by: FAMILY MEDICINE

## 2025-06-17 PROCEDURE — 4010F ACE/ARB THERAPY RXD/TAKEN: CPT | Mod: CPTII,,, | Performed by: FAMILY MEDICINE

## 2025-06-17 PROCEDURE — 1160F RVW MEDS BY RX/DR IN RCRD: CPT | Mod: CPTII,,, | Performed by: FAMILY MEDICINE

## 2025-06-17 PROCEDURE — 99214 OFFICE O/P EST MOD 30 MIN: CPT | Mod: PBBFAC,PN | Performed by: FAMILY MEDICINE

## 2025-06-17 RX ORDER — DULAGLUTIDE 1.5 MG/.5ML
1.5 INJECTION, SOLUTION SUBCUTANEOUS
Qty: 4 PEN | Refills: 0 | Status: SHIPPED | OUTPATIENT
Start: 2025-07-15 | End: 2025-06-17

## 2025-06-17 RX ORDER — DULAGLUTIDE 1.5 MG/.5ML
1.5 INJECTION, SOLUTION SUBCUTANEOUS
Qty: 4 PEN | Refills: 0 | Status: SHIPPED | OUTPATIENT
Start: 2025-07-15 | End: 2025-08-12

## 2025-06-17 RX ORDER — DULAGLUTIDE 0.75 MG/.5ML
0.75 INJECTION, SOLUTION SUBCUTANEOUS
Qty: 4 PEN | Refills: 0 | Status: SHIPPED | OUTPATIENT
Start: 2025-06-17 | End: 2025-07-15

## 2025-06-17 RX ORDER — LEVOFLOXACIN 500 MG/1
500 TABLET, FILM COATED ORAL DAILY
Qty: 14 TABLET | Refills: 0 | Status: SHIPPED | OUTPATIENT
Start: 2025-06-17 | End: 2025-07-01

## 2025-06-17 RX ORDER — DULAGLUTIDE 1.5 MG/.5ML
1.5 INJECTION, SOLUTION SUBCUTANEOUS
Qty: 4 PEN | Refills: 0 | Status: SHIPPED | OUTPATIENT
Start: 2025-07-16 | End: 2025-06-17

## 2025-06-17 RX ORDER — DULAGLUTIDE 0.75 MG/.5ML
0.75 INJECTION, SOLUTION SUBCUTANEOUS
Qty: 4 PEN | Refills: 0 | Status: SHIPPED | OUTPATIENT
Start: 2025-06-17 | End: 2025-06-17

## 2025-06-18 ENCOUNTER — RESULTS FOLLOW-UP (OUTPATIENT)
Dept: FAMILY MEDICINE | Facility: CLINIC | Age: 50
End: 2025-06-18

## 2025-06-18 NOTE — PROGRESS NOTES
"  Patient Name: Emeka Caballero    : 1975  MRN: [id number]      Subjective:     Patient ID: Emeka is a 50 y.o. male    Chief Complaint:  Diabetes, Dysuria, and Pelvic Pain    History of Present Illness    Emeka presents today for follow up on diabetes and with pain in groin area and urinary symptoms.    He reports a 2-week history of burning sensation during urination and pain below testicles radiating to the back. He describes difficulty with urination and post-void dribbling requiring manual compression of the area to complete voiding. He denies any new sexual partners in recent years and reports being sexually inactive.    Morning fasting glucose ranges between 150-170 mg/dL.    He reports bowel movements every 4 days with significant constipation.    ROS:  General: -fever, -chills, -fatigue, -weight gain, -weight loss  Eyes: -vision changes, -redness, -discharge  ENT: -ear pain, -nasal congestion, -sore throat  Cardiovascular: -chest pain, -palpitations, -lower extremity edema  Respiratory: -cough, -shortness of breath  Gastrointestinal: +abdominal pain, -nausea, -vomiting, -diarrhea, +constipation, -blood in stool  Genitourinary: +dysuria, -hematuria, -frequency  Musculoskeletal: -joint pain, -muscle pain, +back pain  Skin: -rash, -lesion  Neurological: -headache, -dizziness, -numbness, -tingling  Psychiatric: -anxiety, -depression, -sleep difficulty  Male Genitourinary: +difficulty urinating, +painful urination, +perineal pain, +post-urination dribbling       Objective:   /80 (BP Location: Right arm, Patient Position: Sitting)   Pulse 81   Temp 98 °F (36.7 °C) (Oral)   Resp 18   Ht 5' 6" (1.676 m)   Wt 96.6 kg (212 lb 15.4 oz)   SpO2 97%   BMI 34.37 kg/m²     Physical Exam  Vitals reviewed.   Constitutional:       General: He is not in acute distress.     Comments: Using a cane for ambulation   HENT:      Head: Normocephalic and atraumatic.      Right Ear: Ear canal and " external ear normal.      Left Ear: Ear canal and external ear normal.      Nose: Nose normal.      Mouth/Throat:      Mouth: Mucous membranes are moist.      Pharynx: No oropharyngeal exudate or posterior oropharyngeal erythema.   Eyes:      Extraocular Movements: Extraocular movements intact.      Conjunctiva/sclera: Conjunctivae normal.      Pupils: Pupils are equal, round, and reactive to light.   Cardiovascular:      Rate and Rhythm: Normal rate and regular rhythm.      Heart sounds: No murmur heard.  Pulmonary:      Effort: Pulmonary effort is normal. No respiratory distress.      Breath sounds: Normal breath sounds. No wheezing or rales.   Abdominal:      General: Abdomen is flat. Bowel sounds are normal. There is no distension.      Palpations: Abdomen is soft.      Tenderness: There is no abdominal tenderness. There is no guarding.   Musculoskeletal:      Cervical back: Normal range of motion.   Skin:     General: Skin is warm.      Capillary Refill: Capillary refill takes less than 2 seconds.   Neurological:      Mental Status: He is alert and oriented to person, place, and time.      Cranial Nerves: No cranial nerve deficit.      Sensory: No sensory deficit.   Psychiatric:         Mood and Affect: Mood normal.         Behavior: Behavior normal.        Component Date Value Ref Range Status    Color, UA 05/20/2025 Yellow  Straw, Lucille, Yellow, Light-Orange Final    Appearance, UA 05/20/2025 Clear  Clear Final    pH, UA 05/20/2025 6.0  5.0 - 8.0 Final    Spec Grav UA 05/20/2025 1.020  1.005 - 1.030 Final    Protein, UA 05/20/2025 Negative  Negative Final    Recommend a 24 hour urine protein or a urine protein/creatinine ratio if globulin induced proteinuria is clinically suspected.    Glucose, UA 05/20/2025 Negative  Negative Final    Ketones, UA 05/20/2025 Negative  Negative Final    Bilirubin, UA 05/20/2025 Negative  Negative Final    Blood, UA 05/20/2025 Negative  Negative Final    Nitrites, UA 05/20/2025  Negative  Negative Final    Urobilinogen, UA 05/20/2025 Negative  <2.0 EU/dL Final    Leukocyte Esterase, UA 05/20/2025 Negative  Negative Final    Extra Tube 05/20/2025 Hold for add-ons.   Final    Auto resulted.           Assessment        ICD-10-CM ICD-9-CM   1. Type 2 diabetes mellitus with hyperlipidemia  E11.69 250.80    E78.5 272.4   2. Acute prostatitis  N41.0 601.0   3. Essential hypertension, benign  I10 401.1   4. Mixed dyslipidemia  E78.2 272.2         Plan:   Assessment & Plan               1. Type 2 diabetes mellitus with hyperlipidemia  Overview:  Lab Results   Component Value Date    HGBA1C 7.1 (H) 04/01/2025    HGBA1C 8.0 (H) 10/08/2024    HGBA1C 10.7 (H) 08/13/2024     Lab Results   Component Value Date    LDLCALC 64.4 05/13/2025     Lab Results   Component Value Date    MICALBCREAT 4.3 10/08/2024         Orders:  -     dulaglutide (TRULICITY) 0.75 mg/0.5 mL pen injector; Inject 0.75 mg into the skin every 7 days.  Dispense: 4 Pen; Refill: 0  -     dulaglutide (TRULICITY) 1.5 mg/0.5 mL pen injector; Inject 1.5 mg into the skin every 7 days.  Dispense: 4 Pen; Refill: 0  Reported glucose level still above goal.    Recommended starting Trulicity.    Side effects and benefits of medication discussed.    Reviewed with patient how to use injection.    Patient was able to explained back.    Will start patient on 0.75 milligrams weekly x4 weeks and then increase to 1.5 milligrams weekly.    Re-evaluate in 4 to 6 weeks.      2. Acute prostatitis  -     Prostate Specific Antigen, Diagnostic; Future; Expected date: 06/17/2025  -     Cancel: Urinalysis  -     Urine Culture High Risk; Future; Expected date: 06/17/2025  -     levoFLOXacin (LEVAQUIN) 500 MG tablet; Take 1 tablet (500 mg total) by mouth once daily. for 14 days  Dispense: 14 tablet; Refill: 0  -     Urinalysis; Future; Expected date: 06/17/2025  Patient's symptoms consistent with prostatitis.    Will check urine studies.    Will start patient on  "two week course of levofloxacin.    Potential side effects of medication discussed.    Advised patient that if he has episodes where he can not urinate at all, to proceed to ER.    Re-evaluate in 4 to 6 weeks.    Patient states he is unable to follow up sooner than this as he will be going to [place] for a .      3. Essential hypertension, benign  Continue current blood pressure regimen.      4. Mixed dyslipidemia  Overview:  Lab Results   Component Value Date    CHOL 138 2025    CHOL 141 2025    CHOL 187 10/08/2024     Lab Results   Component Value Date    HDL 39 (L) 2025    HDL 28 (L) 2025    HDL 29 (L) 10/08/2024     No results found for: "LDL"    Lab Results   Component Value Date    TRIG 173 (H) 2025    TRIG 325 (H) 2025    TRIG 424 (H) 10/08/2024     Lab Results   Component Value Date    CHOLHDL 28.3 2025    CHOLHDL 19.9 (L) 2025    CHOLHDL 15.5 (L) 10/08/2024        The 10-year ASCVD risk score (Jasper DK, et al., 2019) is: 29.6%    Values used to calculate the score:      Age: 50 years      Sex: Male      Is Non- : Yes      Diabetic: Yes      Tobacco smoker: Yes      Systolic Blood Pressure: 134 mmHg      Is BP treated: Yes      HDL Cholesterol: 39 mg/dL      Total Cholesterol: 138 mg/dL    Continue atorvastatin 40 milligrams daily and fenofibrate 54 milligrams daily.                 -Dio Zarate Jr., MD, AAHIVS      This note was generated with the assistance of ambient listening technology. Verbal consent was obtained by the patient and accompanying visitor(s) for the recording of patient appointment to facilitate this note. I attest to having reviewed and edited the generated note for accuracy, though some syntax or spelling errors may persist. Please contact the author of this note for any clarification.      There are no Patient Instructions on file for this visit.      Follow up in about 6 weeks (around 2025) for " Diabetes, prostatitis.   Future Appointments   Date Time Provider Department Center   6/19/2025  5:00 PM Nate Contreras, PT [facility]   6/24/2025  4:00 PM Nate Cnotreras, PT [facility]   6/26/2025  5:00 PM Nate Contreras, PT [facility]   7/1/2025  5:00 PM Nate Contreras, PT [facility]   7/3/2025  4:00 PM Nate Contreras, PT [facility]   7/8/2025  5:00 PM Nate Contreras, PT [facility] -    7/10/2025  5:00 PM Nate Contreras, PT [facility]   7/15/2025  5:00 PM Nate Contreras, PT [facility] -    7/17/2025  5:00 PM Nate Contreras, PT [facility] -    7/31/2025  2:20 PM Dio Zarate Jr., MD [facility] -    8/11/2025 12:00 PM Kory Perez MD [facility]

## 2025-06-19 ENCOUNTER — CLINICAL SUPPORT (OUTPATIENT)
Dept: REHABILITATION | Facility: HOSPITAL | Age: 50
End: 2025-06-19
Payer: MEDICAID

## 2025-06-19 DIAGNOSIS — Z74.09 IMPAIRED MOBILITY: ICD-10-CM

## 2025-06-19 DIAGNOSIS — M54.50 CHRONIC LOW BACK PAIN WITHOUT SCIATICA, UNSPECIFIED BACK PAIN LATERALITY: Primary | ICD-10-CM

## 2025-06-19 DIAGNOSIS — G89.29 CHRONIC LOW BACK PAIN WITHOUT SCIATICA, UNSPECIFIED BACK PAIN LATERALITY: Primary | ICD-10-CM

## 2025-06-19 PROCEDURE — 97110 THERAPEUTIC EXERCISES: CPT | Mod: PN

## 2025-06-19 NOTE — PROGRESS NOTES
"  Outpatient Rehab    Physical Therapy Visit    Patient Name: Emeka Caballero  MRN: [id number]  YOB: 1975  Encounter Date: 6/19/2025    Therapy Diagnosis:   Encounter Diagnoses   Name Primary?    Chronic low back pain without sciatica, unspecified back pain laterality Yes    Impaired mobility        Physician: Kellerman, Marshall J, *    Physician Orders: Eval and Treat  Medical Diagnosis:   Surgical Diagnosis: Not applicable for this Episode   Surgical Date: Not applicable for this Episode    Visit # / Visits Authorized:  2 / 20  Insurance Authorization Period: 6/2/2025 to 12/31/2025  Date of Evaluation: 6/2/2025  Plan of Care Certification: 6/3/2025 to 9/30/2025      PT/PTA:     Number of PTA visits since last PT visit:   Time In: 1700   Time Out: 1800  Total Time (in minutes): 60   Total Billable Time (in minutes): 60    FOTO:  Intake Score:  %  Survey Score 2:  %  Survey Score 3:  %    Precautions:       Subjective   his back is feeling bad today.         Objective            Treatment:  Therapeutic Exercise  TE 1: nustep for endogenous opiod release and cardiovscular endurance 10min  TE 5: quad and half kneeling hip flexor stretching 10" x 10 ea  TE 6: DL shuttle press cueing posterior pelvic tilt 50# 3 x10 & SL shuttle press 25# 3 x 10 ea  TE 8: Sit to stand 24in and foam pad titan box with dowel for hip hinge cueing 2 x 10        Time Entry(in minutes):  Therapeutic Exercise Time Entry: 60    Assessment & Plan   Assessment: Emeka presented to PT session with increased back which was reduced subjectively with stretching of the quadriceps and hip flexors combines with lower extremity strengthening with glute emphasis to improve upright posture and reduce stress on the lower back. He demonstrates decreased mobility and fatigue and requires appropriate rest breaks in between sets. He should continue to be progressed as tolerated.   Evaluation/Treatment Tolerance: Patient tolerated " treatment well    The patient will continue to benefit from skilled outpatient physical therapy in order to address the deficits listed in the problem list on the initial evaluation, provide patient and family education, and maximize the patients level of independence in the home and community environments.     The patient's spiritual, cultural, and educational needs were considered, and the patient is agreeable to the plan of care and goals.           Plan: Cont to POC    Goals:   Active       Long-Term Goals        Patient will improve FOTO score to >/= to projected outcome (53) to demonstrate improvement in functional abilities         Start:  06/03/25    Expected End:  08/26/25            Patient will improve pain level to </= to 4/10 to improve their ability to walk with less difficulties        Start:  06/03/25    Expected End:  08/26/25            patient will improve TUG score to </= 10s to improve functional mobility       Start:  06/03/25    Expected End:  08/26/25               Short-Term Goals        Patient will be independent with HEP to improve functional mobility and low back pain         Start:  06/03/25    Expected End:  07/15/25            Patient will improve pain level to </= to 6/10 to improve their functional mobility         Start:  06/03/25    Expected End:  07/15/25            Patient will improve their hip flexion MMT score to >/= to 5/5 with minimal back pain to improve strength needed for functional mobility        Start:  06/03/25    Expected End:  07/15/25                  Nate Contreras, PT

## 2025-06-23 ENCOUNTER — TELEPHONE (OUTPATIENT)
Dept: FAMILY MEDICINE | Facility: CLINIC | Age: 50
End: 2025-06-23
Payer: MEDICAID

## 2025-06-24 ENCOUNTER — CLINICAL SUPPORT (OUTPATIENT)
Dept: REHABILITATION | Facility: HOSPITAL | Age: 50
End: 2025-06-24
Payer: MEDICAID

## 2025-06-24 DIAGNOSIS — G89.29 CHRONIC LOW BACK PAIN WITHOUT SCIATICA, UNSPECIFIED BACK PAIN LATERALITY: Primary | ICD-10-CM

## 2025-06-24 DIAGNOSIS — M54.50 CHRONIC LOW BACK PAIN WITHOUT SCIATICA, UNSPECIFIED BACK PAIN LATERALITY: Primary | ICD-10-CM

## 2025-06-24 DIAGNOSIS — Z74.09 IMPAIRED MOBILITY: ICD-10-CM

## 2025-06-24 PROCEDURE — 97110 THERAPEUTIC EXERCISES: CPT | Mod: PN

## 2025-06-24 NOTE — PROGRESS NOTES
GASTROENTEROLOGY PROGRESS NOTE    ASSESSMENT: 54 year old female with a history of acute exacerbation of LUQ abdominal pain that she has been experiencing for 2 months. Pt has a PMH significant for bilateral lung transplant for ILD in 3/18. Imaging has been suggestive of possible acute cholecystitis and possible choledocholithiasis with CBD dilation    # LUQ Abdominal Pain  # Constipation  # Possible acute cholecystitis with possible choledocholithiasis  # Empyema or abscess in the medial left costophrenic angle    Previous imaging had been suggestive of potential acute cholecystitis or choledocholithiasis. MRCP done yesterday showed pericholecystic fluid, gallstones and a 12 mm gallbladder, but no choledocholithiasis or other obstruction. Possibility remains that the patient had choledocholithiasis and that the stone passed, but given the lack of definitive signs of current issues on MRCP and the clinical picture that is inconsistent with biliary pain, a biliary etiology seems unlikely to be the primary cause of pain. LUQ pain likely secondary to left possible empyema noted on MRCP.         RECOMMENDATIONS:  --Continue miralax and senna   --No further intervention from GI until course of action for fluid collection is established  --Surgery to follow for timing of cholecystectomy, if that's the case given CBD dilation we will recommend IOC and if positive please call for ERCP   --We will sign off at this point, please call if questions         Patient discussed with Dr. Silver Sharma MD  GI Fellow  [phone]        _______________________________________________________________  S: No acute overnight events. Pt states that she is feeling considerably better than she did yesterday. She was given an enema last night and had a large, loose bowel movement. She reports that this has helped the abdominal pain. Pain is still present, but she says that it is minimal when she takes    Outpatient Rehab    Physical Therapy Visit    Patient Name: Emeka Caballero  MRN: [id number]  YOB: 1975  Encounter Date: 6/24/2025    Therapy Diagnosis:   Encounter Diagnoses   Name Primary?    Chronic low back pain without sciatica, unspecified back pain laterality Yes    Impaired mobility          Physician: Kellerman, Marshall J, *    Physician Orders: Eval and Treat  Medical Diagnosis: M54.42,M54.41,G89.29 (ICD-10-CM) - Chronic bilateral low back pain with bilateral sciatica Z74.09 (ICD-10-CM) - Impaired mobility   Surgical Diagnosis: Not applicable for this Episode   Surgical Date: Not applicable for this Episode    Visit # / Visits Authorized:  3 / 20  Insurance Authorization Period: 6/2/2025 to 12/31/2025  Date of Evaluation: 6/2/2025  Plan of Care Certification: 6/3/2025 to 9/30/2025      PT/PTA: PT   Number of PTA visits since last PT visit:   Time In: 1600   Time Out: 1700  Total Time (in minutes): 60   Total Billable Time (in minutes): 60    FOTO:  Intake Score: 40%  Survey Score 2:  %  Survey Score 3:  %    Precautions:       Subjective   he is doing so-so today..  Pain reported as 8/10.      Objective            Treatment:  Therapeutic Exercise  TE 1: nustep for endogenous opiod release and cardiovscular endurance 10min  TE 2: Lower trunk rotations  with feet on ball 4 min  3 sec hold  TE 3: Half kneeling hip flexor stretching 10s holds x 15 ea  TE 4: ball roll outs 3-5s hold 3 x 15  TE 8: Sit to stand 24in and foam pad titan box with dowel for hip hinge cueing 2 x 10(hi/lo mat today even with mid posterior thight)  TE 9: Therapist resisted hip extension cueing posterior pelvic tilt and glute activation x 5 ea          Time Entry(in minutes):  Therapeutic Exercise Time Entry: 60    Assessment & Plan   Assessment: Emeka continues to present to therapy with increased low back pain that we are able to subjectively reduce through hip flexor stretching, lumbar neuromuscular  "oxycodone.    Pt denies any recent n/v. Tolerating food well except for early satiety. Endorses minimal appetite. Denies any recent fevers or chills.    O:  Blood pressure 138/78, pulse 88, temperature 98.8  F (37.1  C), temperature source Oral, resp. rate 18, height 1.626 m (5' 4\"), weight 55.6 kg (122 lb 9.6 oz), last menstrual period 06/07/2014, SpO2 96 %.    Gen: Pleasant and responsive. Not dyspneic/diaphoretic, no acute distress  Abd: Non-tender, no peritoneal signs present.  Skin: Warm, perfused, no jaundice  MS: No gross deformities  Neuro: AAO X 3  Psych: Normal affect      LABS:  BMP  Recent Labs   Lab 09/13/19  0536 09/11/19  2325    133   POTASSIUM 4.5 4.2   CHLORIDE 105 100   CHELSEY 8.5 9.2   CO2 22 22   BUN 31* 24   CR 1.51* 1.28*   * 76     CBC  Recent Labs   Lab 09/13/19  0536 09/11/19  2325   WBC 5.0 6.1   RBC 2.91* 3.28*   HGB 8.0* 9.1*   HCT 26.8* 30.8*   MCV 92 94   MCH 27.5 27.7   MCHC 29.9* 29.5*   RDW 15.6* 15.5*    189     INR  Recent Labs   Lab 09/11/19  2325   INR 1.06     LFTs  Recent Labs   Lab 09/13/19  0536 09/11/19  2325   ALKPHOS 212* 255*   AST 21 29   ALT 14 17   BILITOTAL 0.3 0.4   PROTTOTAL 7.0 8.3   ALBUMIN 2.3* 2.8*      PANC  Recent Labs   Lab 09/11/19  2325   LIPASE 127     Imaging  MRCP 9/12/19  IMPRESSION:   1. Rim-enhancing fluid-filled structure in the posterior left  hemithorax measuring 4.5 x 3.0 cm (series 22 image 14 and series 9  image 27). Findings are concerning for abscess versus empyema.  Follow-up is recommended using chest CT until resolution.  2. Distended gallbladder with pericholecystic fluid, gallstones, and  dilated common bile duct at 12 mm. No choledocholithiasis or  obstructing mass.  " re-education, and lumbar extensor stretching. Progress as tolerated.   Evaluation/Treatment Tolerance: Patient tolerated treatment well    The patient will continue to benefit from skilled outpatient physical therapy in order to address the deficits listed in the problem list on the initial evaluation, provide patient and family education, and maximize the patients level of independence in the home and community environments.     The patient's spiritual, cultural, and educational needs were considered, and the patient is agreeable to the plan of care and goals.           Plan: Cont to POC    Goals:   Active       Long-Term Goals        Patient will improve FOTO score to >/= to projected outcome (53) to demonstrate improvement in functional abilities   (Progressing)       Start:  06/03/25    Expected End:  08/26/25            Patient will improve pain level to </= to 4/10 to improve their ability to walk with less difficulties  (Progressing)       Start:  06/03/25    Expected End:  08/26/25            patient will improve TUG score to </= 10s to improve functional mobility (Progressing)       Start:  06/03/25    Expected End:  08/26/25               Short-Term Goals        Patient will be independent with HEP to improve functional mobility and low back pain   (Progressing)       Start:  06/03/25    Expected End:  07/15/25            Patient will improve pain level to </= to 6/10 to improve their functional mobility   (Progressing)       Start:  06/03/25    Expected End:  07/15/25            Patient will improve their hip flexion MMT score to >/= to 5/5 with minimal back pain to improve strength needed for functional mobility  (Progressing)       Start:  06/03/25    Expected End:  07/15/25                    Nate Contreras, PT

## 2025-06-26 DIAGNOSIS — E11.69 TYPE 2 DIABETES MELLITUS WITH HYPERLIPIDEMIA: Chronic | ICD-10-CM

## 2025-06-26 DIAGNOSIS — E78.5 TYPE 2 DIABETES MELLITUS WITH HYPERLIPIDEMIA: Chronic | ICD-10-CM

## 2025-07-31 ENCOUNTER — OFFICE VISIT (OUTPATIENT)
Dept: FAMILY MEDICINE | Facility: CLINIC | Age: 50
End: 2025-07-31
Payer: MEDICAID

## 2025-07-31 VITALS
HEART RATE: 105 BPM | WEIGHT: 210.13 LBS | BODY MASS INDEX: 33.77 KG/M2 | TEMPERATURE: 99 F | DIASTOLIC BLOOD PRESSURE: 80 MMHG | OXYGEN SATURATION: 99 % | SYSTOLIC BLOOD PRESSURE: 122 MMHG | HEIGHT: 66 IN

## 2025-07-31 DIAGNOSIS — N50.89 TESTICULAR MASS: Primary | ICD-10-CM

## 2025-07-31 DIAGNOSIS — I10 ESSENTIAL HYPERTENSION, BENIGN: Chronic | ICD-10-CM

## 2025-07-31 DIAGNOSIS — M54.16 LUMBAR RADICULOPATHY: ICD-10-CM

## 2025-07-31 DIAGNOSIS — E78.5 TYPE 2 DIABETES MELLITUS WITH HYPERLIPIDEMIA: Chronic | ICD-10-CM

## 2025-07-31 DIAGNOSIS — E11.69 TYPE 2 DIABETES MELLITUS WITH HYPERLIPIDEMIA: Chronic | ICD-10-CM

## 2025-07-31 DIAGNOSIS — E78.2 MIXED DYSLIPIDEMIA: Chronic | ICD-10-CM

## 2025-07-31 PROCEDURE — G2211 COMPLEX E/M VISIT ADD ON: HCPCS | Mod: ,,, | Performed by: FAMILY MEDICINE

## 2025-07-31 PROCEDURE — 99999 PR PBB SHADOW E&M-EST. PATIENT-LVL IV: CPT | Mod: PBBFAC,,, | Performed by: FAMILY MEDICINE

## 2025-07-31 PROCEDURE — 3008F BODY MASS INDEX DOCD: CPT | Mod: CPTII,,, | Performed by: FAMILY MEDICINE

## 2025-07-31 PROCEDURE — 99214 OFFICE O/P EST MOD 30 MIN: CPT | Mod: S$PBB,,, | Performed by: FAMILY MEDICINE

## 2025-07-31 PROCEDURE — 3074F SYST BP LT 130 MM HG: CPT | Mod: CPTII,,, | Performed by: FAMILY MEDICINE

## 2025-07-31 PROCEDURE — 99214 OFFICE O/P EST MOD 30 MIN: CPT | Mod: PBBFAC,PN | Performed by: FAMILY MEDICINE

## 2025-07-31 PROCEDURE — 1160F RVW MEDS BY RX/DR IN RCRD: CPT | Mod: CPTII,,, | Performed by: FAMILY MEDICINE

## 2025-07-31 PROCEDURE — 4010F ACE/ARB THERAPY RXD/TAKEN: CPT | Mod: CPTII,,, | Performed by: FAMILY MEDICINE

## 2025-07-31 PROCEDURE — 1159F MED LIST DOCD IN RCRD: CPT | Mod: CPTII,,, | Performed by: FAMILY MEDICINE

## 2025-07-31 PROCEDURE — 3079F DIAST BP 80-89 MM HG: CPT | Mod: CPTII,,, | Performed by: FAMILY MEDICINE

## 2025-07-31 PROCEDURE — 3051F HG A1C>EQUAL 7.0%<8.0%: CPT | Mod: CPTII,,, | Performed by: FAMILY MEDICINE

## 2025-07-31 RX ORDER — DULAGLUTIDE 0.75 MG/.5ML
0.75 INJECTION, SOLUTION SUBCUTANEOUS
Qty: 4 PEN | Refills: 2 | Status: SHIPPED | OUTPATIENT
Start: 2025-07-31

## 2025-07-31 RX ORDER — LISINOPRIL 5 MG/1
5 TABLET ORAL DAILY
Qty: 90 TABLET | Refills: 1 | Status: SHIPPED | OUTPATIENT
Start: 2025-07-31 | End: 2026-07-31

## 2025-07-31 RX ORDER — FENOFIBRATE 54 MG/1
54 TABLET ORAL DAILY
Qty: 90 TABLET | Refills: 3 | Status: SHIPPED | OUTPATIENT
Start: 2025-07-31 | End: 2026-07-31

## 2025-07-31 RX ORDER — METHOCARBAMOL 500 MG/1
500 TABLET, FILM COATED ORAL NIGHTLY
Qty: 90 TABLET | Refills: 2 | Status: SHIPPED | OUTPATIENT
Start: 2025-07-31

## 2025-07-31 RX ORDER — METFORMIN HYDROCHLORIDE 750 MG/1
1500 TABLET, EXTENDED RELEASE ORAL
Qty: 180 TABLET | Refills: 1 | Status: SHIPPED | OUTPATIENT
Start: 2025-07-31

## 2025-07-31 RX ORDER — GABAPENTIN 300 MG/1
300 CAPSULE ORAL NIGHTLY
Qty: 90 CAPSULE | Refills: 3 | Status: SHIPPED | OUTPATIENT
Start: 2025-07-31 | End: 2026-07-31

## 2025-07-31 RX ORDER — ATORVASTATIN CALCIUM 40 MG/1
40 TABLET, FILM COATED ORAL DAILY
Qty: 90 TABLET | Refills: 2 | Status: SHIPPED | OUTPATIENT
Start: 2025-07-31

## 2025-07-31 RX ORDER — GLIPIZIDE 5 MG/1
5 TABLET ORAL
Qty: 180 TABLET | Refills: 0 | Status: SHIPPED | OUTPATIENT
Start: 2025-07-31

## 2025-08-03 NOTE — PROGRESS NOTES
"  Patient Name: Emeka Caballero    : 1975  MRN: [id number]      Subjective:     Patient ID: Emeka is a 50 y.o. male    Chief Complaint:  Follow-up, Medication Refill, Bumps (Painfull bumps in scrotum ), and Back Pain    History of Present Illness    Emeka presents today for follow up of diabetes, white blood cell count, and prostatitis.    He reports running out of diabetes medications while traveling in [place] and has not resumed his medication regimen since returning on the . He is currently not taking any diabetes medications. Recent self-monitored glucose readings include 110 mg/dL yesterday afternoon, 140 mg/dL, and 165 mg/dL on separate occasions. A weekly diabetes injection was initiated but has not yet been obtained from the pharmacy due to medication availability issues.    He discovered a small bump on the right testicle 4 days ago that feels like a "little ball" when touched. The lesion is painful upon palpation and causes him concern about potential serious underlying conditions. He denies hematuria.    His prior prostatitis symptoms have resolved with antibiotic treatment.      ROS:  General: -fever, -chills, -fatigue, -weight gain, -weight loss  Eyes: -vision changes, -redness, -discharge  ENT: -ear pain, -nasal congestion, -sore throat  Cardiovascular: -chest pain, -palpitations, -lower extremity edema  Respiratory: -cough, -shortness of breath  Gastrointestinal: -abdominal pain, -nausea, -vomiting, -diarrhea, -constipation, -blood in stool  Genitourinary: -dysuria, -hematuria, -frequency  Musculoskeletal: -joint pain, -muscle pain, +back pain  Skin: -rash, -lesion  Neurological: -headache, -dizziness, -numbness, -tingling  Psychiatric: -anxiety, -depression, +sleep difficulty  Male Genitourinary: +testicular pain, +testicular mass, +erectile dysfunction         Objective:   /80 (BP Location: Right arm, Patient Position: Sitting)   Pulse 105   Temp 98.5 °F (36.9 °C) " "(Oral)   Ht 5' 6" (1.676 m)   Wt 95.3 kg (210 lb 1.6 oz)   SpO2 99%   BMI 33.91 kg/m²     Physical Exam  Vitals reviewed.   Constitutional:       General: He is not in acute distress.     Comments: Using a cane for ambulation   HENT:      Head: Normocephalic and atraumatic.      Right Ear: Ear canal and external ear normal.      Left Ear: Ear canal and external ear normal.      Nose: Nose normal.      Mouth/Throat:      Mouth: Mucous membranes are moist.      Pharynx: No oropharyngeal exudate or posterior oropharyngeal erythema.   Eyes:      Extraocular Movements: Extraocular movements intact.      Conjunctiva/sclera: Conjunctivae normal.      Pupils: Pupils are equal, round, and reactive to light.   Cardiovascular:      Rate and Rhythm: Normal rate and regular rhythm.      Heart sounds: No murmur heard.  Pulmonary:      Effort: Pulmonary effort is normal. No respiratory distress.      Breath sounds: Normal breath sounds. No wheezing or rales.   Abdominal:      General: Abdomen is flat. Bowel sounds are normal. There is no distension.      Palpations: Abdomen is soft.      Tenderness: There is no abdominal tenderness. There is no guarding.   Genitourinary:     Penis: Normal and uncircumcised.       Testes:         Left: Mass present.   Musculoskeletal:      Cervical back: Normal range of motion.   Skin:     General: Skin is warm.      Capillary Refill: Capillary refill takes less than 2 seconds.   Neurological:      Mental Status: He is alert and oriented to person, place, and time.      Cranial Nerves: No cranial nerve deficit.      Sensory: No sensory deficit.   Psychiatric:         Mood and Affect: Mood normal.         Behavior: Behavior normal.            Assessment        ICD-10-CM ICD-9-CM   1. Testicular mass  N50.89 608.89   2. Type 2 diabetes mellitus with hyperlipidemia  E11.69 250.80    E78.5 272.4   3. Mixed dyslipidemia  E78.2 272.2   4. Lumbar radiculopathy  M54.16 724.4   5. Essential " hypertension, benign  I10 401.1         Plan:   Assessment & Plan          1. Testicular mass  -     US Scrotum And Testicles; Future; Expected date: 07/31/2025  Will check ultrasound of scrotum/testicles.      2. Type 2 diabetes mellitus with hyperlipidemia  Overview:  Lab Results   Component Value Date    HGBA1C 7.1 (H) 04/01/2025    HGBA1C 8.0 (H) 10/08/2024    HGBA1C 10.7 (H) 08/13/2024     Lab Results   Component Value Date    LDLCALC 64.4 05/13/2025     Lab Results   Component Value Date    MICALBCREAT 4.3 10/08/2024         Orders:  -     glipiZIDE (GLUCOTROL) 5 MG tablet; Take 1 tablet (5 mg total) by mouth 2 (two) times daily before meals.  Dispense: 180 tablet; Refill: 0  -     metFORMIN (GLUCOPHAGE-XR) 750 MG ER 24hr tablet; Take 2 tablets (1,500 mg total) by mouth daily with breakfast.  Dispense: 180 tablet; Refill: 1  -     Microalbumin/creatinine urine ratio; Future; Expected date: 10/09/2025  -     CBC auto differential; Future; Expected date: 10/09/2025  -     Comprehensive metabolic panel; Future; Expected date: 10/09/2025  -     Hemoglobin A1c; Future; Expected date: 10/09/2025  -     Lipid panel; Future; Expected date: 10/09/2025  -     dulaglutide (TRULICITY) 0.75 mg/0.5 mL pen injector; Inject 0.75 mg into the skin every 7 days.  Dispense: 4 Pen; Refill: 2  Continue current diabetic management.      3. Mixed dyslipidemia  Overview:  Lab Results   Component Value Date    CHOL 138 05/13/2025    CHOL 141 04/01/2025    CHOL 187 10/08/2024     Lab Results   Component Value Date    HDL 39 (L) 05/13/2025    HDL 28 (L) 04/01/2025    HDL 29 (L) 10/08/2024     Lab Results   Component Value Date    LDLCALC 64.4 05/13/2025    LDLCALC 48.0 (L) 04/01/2025    LDLCALC Invalid, Trig>400.0 10/08/2024     Lab Results   Component Value Date    TRIG 173 (H) 05/13/2025    TRIG 325 (H) 04/01/2025    TRIG 424 (H) 10/08/2024     Lab Results   Component Value Date    CHOLHDL 28.3 05/13/2025    CHOLHDL 19.9 (L) 04/01/2025     CHOLHDL 15.5 (L) 10/08/2024      The 10-year ASCVD risk score (Jasper SMART, et al., 2019) is: 25.4%    Values used to calculate the score:      Age: 50 years      Sex: Male      Is Non- : Yes      Diabetic: Yes      Tobacco smoker: Yes      Systolic Blood Pressure: 122 mmHg      Is BP treated: Yes      HDL Cholesterol: 39 mg/dL      Total Cholesterol: 138 mg/dL      Orders:  -     fenofibrate (TRICOR) 54 MG tablet; Take 1 tablet (54 mg total) by mouth once daily.  Dispense: 90 tablet; Refill: 3  -     atorvastatin (LIPITOR) 40 MG tablet; Take 1 tablet (40 mg total) by mouth once daily.  Dispense: 90 tablet; Refill: 2  -     Comprehensive metabolic panel; Future; Expected date: 10/09/2025  -     Lipid panel; Future; Expected date: 10/09/2025  Continue atorvastatin and fenofibrate.    4. Lumbar radiculopathy  -     gabapentin (NEURONTIN) 300 MG capsule; Take 1 capsule (300 mg total) by mouth every evening.  Dispense: 90 capsule; Refill: 3  -     methocarbamoL (ROBAXIN) 500 MG Tab; Take 1 tablet (500 mg total) by mouth every evening.  Dispense: 90 tablet; Refill: 2  Good results with current management.    Gabapentin and methocarbamol refilled.      5. Essential hypertension, benign  -     lisinopriL (PRINIVIL,ZESTRIL) 5 MG tablet; Take 1 tablet (5 mg total) by mouth once daily.  Dispense: 90 tablet; Refill: 1  -     Comprehensive metabolic panel; Future; Expected date: 10/09/2025  -     Lipid panel; Future; Expected date: 10/09/2025  Continue current blood pressure regimen.            -Dio Zarate Jr., MD, AAHIVS      This note was generated with the assistance of ambient listening technology. Verbal consent was obtained by the patient and accompanying visitor(s) for the recording of patient appointment to facilitate this note. I attest to having reviewed and edited the generated note for accuracy, though some syntax or spelling errors may persist. Please contact the author of this note  for any clarification.      There are no Patient Instructions on file for this visit.      Follow up in about 10 weeks (around 10/9/2025) for Diabetes, Hypertension, Lipids (fasting labs a week prior).   Future Appointments   Date Time Provider Department Center   8/11/2025  4:30 PM [facility] ROOM 4 [facility]   8/18/2025  1:30 PM Kory Perez MD [facility]   8/20/2025  2:00 PM Chris Sanz MD [facility]   10/2/2025  8:45 AM LAB, [facility] DRAW STATION [facility]   10/2/2025  9:15 AM LAB, [facility] D[facility] STATION [facility]   10/9/2025  2:20 PM Dio Zarate Jr., MD [facility]

## 2025-08-07 ENCOUNTER — TELEPHONE (OUTPATIENT)
Dept: FAMILY MEDICINE | Facility: CLINIC | Age: 50
End: 2025-08-07
Payer: MEDICAID

## 2025-08-07 NOTE — TELEPHONE ENCOUNTER
Spoke with Ivonne to verify the status of Patient's gabapentin (NEURONTIN) 300 MG capsule. She confirmed that no PA is needed for this medication but it may only be filed at 30 tablets for a 30 days supply.

## 2025-08-11 ENCOUNTER — HOSPITAL ENCOUNTER (OUTPATIENT)
Dept: RADIOLOGY | Facility: HOSPITAL | Age: 50
Discharge: HOME | End: 2025-08-11
Attending: FAMILY MEDICINE
Payer: MEDICAID

## 2025-08-11 DIAGNOSIS — N50.89 TESTICULAR MASS: ICD-10-CM

## 2025-08-11 PROCEDURE — 76870 US EXAM SCROTUM: CPT | Mod: TC

## 2025-08-11 PROCEDURE — 76870 US EXAM SCROTUM: CPT | Mod: 26,,, | Performed by: RADIOLOGY

## 2025-08-18 ENCOUNTER — LAB VISIT (OUTPATIENT)
Dept: LAB | Facility: HOSPITAL | Age: 50
End: 2025-08-18
Payer: MEDICAID

## 2025-08-18 ENCOUNTER — PROCEDURE VISIT (OUTPATIENT)
Facility: CLINIC | Age: 50
End: 2025-08-18
Payer: MEDICAID

## 2025-08-18 DIAGNOSIS — R20.2 PARESTHESIA OF LEFT UPPER EXTREMITY: ICD-10-CM

## 2025-08-18 DIAGNOSIS — M54.42 CHRONIC BILATERAL LOW BACK PAIN WITH BILATERAL SCIATICA: ICD-10-CM

## 2025-08-18 DIAGNOSIS — R20.0 NUMBNESS AND TINGLING OF BOTH LEGS BELOW KNEES: ICD-10-CM

## 2025-08-18 DIAGNOSIS — G56.03 BILATERAL CARPAL TUNNEL SYNDROME: Primary | ICD-10-CM

## 2025-08-18 DIAGNOSIS — R20.2 NUMBNESS AND TINGLING OF BOTH LEGS BELOW KNEES: ICD-10-CM

## 2025-08-18 DIAGNOSIS — G56.03 BILATERAL CARPAL TUNNEL SYNDROME: ICD-10-CM

## 2025-08-18 DIAGNOSIS — M54.41 CHRONIC BILATERAL LOW BACK PAIN WITH BILATERAL SCIATICA: ICD-10-CM

## 2025-08-18 DIAGNOSIS — G89.29 CHRONIC BILATERAL LOW BACK PAIN WITH BILATERAL SCIATICA: ICD-10-CM

## 2025-08-18 LAB — PREALB SERPL-MCNC: 26 MG/DL (ref 20–43)

## 2025-08-18 PROCEDURE — 95886 MUSC TEST DONE W/N TEST COMP: CPT | Mod: 26,S$PBB,, | Performed by: PSYCHIATRY & NEUROLOGY

## 2025-08-18 PROCEDURE — 95886 MUSC TEST DONE W/N TEST COMP: CPT | Mod: PBBFAC | Performed by: PSYCHIATRY & NEUROLOGY

## 2025-08-18 PROCEDURE — 84134 ASSAY OF PREALBUMIN: CPT

## 2025-08-18 PROCEDURE — 95913 NRV CNDJ TEST 13/> STUDIES: CPT | Mod: 26,S$PBB,, | Performed by: PSYCHIATRY & NEUROLOGY

## 2025-08-18 PROCEDURE — 36415 COLL VENOUS BLD VENIPUNCTURE: CPT

## 2025-08-18 PROCEDURE — 95913 NRV CNDJ TEST 13/> STUDIES: CPT | Mod: PBBFAC | Performed by: PSYCHIATRY & NEUROLOGY

## (undated) DEVICE — DRESSING AQUACEL FOAM 3 X 3

## (undated) DEVICE — TUBING 4-LEAD ARTHOSCOPY

## (undated) DEVICE — STRAP CATH ELASTIC HOOK&LOOP

## (undated) DEVICE — NDL SPINAL 18GX3.5 SPINOCAN

## (undated) DEVICE — CAUTERY BOVIE PENCIL

## (undated) DEVICE — TIP YANKAUERS BULB NO VENT

## (undated) DEVICE — DRESSING AQUACEL SACRAL 9 X 9

## (undated) DEVICE — CANISTER INFOV.A.C WOUND 500ML

## (undated) DEVICE — DRAPE C-ARMOR EQUIPMENT COVER

## (undated) DEVICE — KIT SURGIFLO HEMOSTATIC MATRIX

## (undated) DEVICE — KIT SPINAL PATIENT CARE JACK

## (undated) DEVICE — CHLORAPREP W TINT 26ML APPL

## (undated) DEVICE — DRAPE STERI INSTRUMENT 1018

## (undated) DEVICE — SYR 50ML CATH TIP

## (undated) DEVICE — COVER BACK TBL HD 2-TIER 72IN

## (undated) DEVICE — SUT SILK 3-0 BLK BR SH 30IN

## (undated) DEVICE — KIT CONFIDENCE W/O NEEDLES

## (undated) DEVICE — ROD SPINAL 95MM PRELORDOSED
Type: IMPLANTABLE DEVICE | Site: SPINE LUMBAR | Status: NON-FUNCTIONAL
Removed: 2022-02-08

## (undated) DEVICE — CATH URETERAL LASER 7.1FR 40CM

## (undated) DEVICE — KIT EVACUATOR 3-SPRING 1/8 DRN

## (undated) DEVICE — JELLY LUBRICANT STERILE 4 OZ

## (undated) DEVICE — SCREW EXPEDIUM FEN STRL 6X45MM: Type: IMPLANTABLE DEVICE | Status: NON-FUNCTIONAL

## (undated) DEVICE — RASP ELITE HELICOIDAL

## (undated) DEVICE — CORD BIPOLAR 12 FOOT

## (undated) DEVICE — TUBE FRAZIER 5MM 2FT SOFT TIP

## (undated) DEVICE — SPONGE COTTON TRAY 4X4IN

## (undated) DEVICE — DRESSING AQUACEL FOAM 4 X 12

## (undated) DEVICE — BLADE 4IN EDGE INSULATED

## (undated) DEVICE — SYR 10CC LUER LOCK

## (undated) DEVICE — SPONGE GAUZE 16PLY 4X4

## (undated) DEVICE — DRESSING TRANS 4X4 TEGADERM

## (undated) DEVICE — CLIP MED TICALL

## (undated) DEVICE — ELECTRODE REM PLYHSV RETURN 9

## (undated) DEVICE — DRAPE STERI-DRAPE 1000 17X11IN

## (undated) DEVICE — MARKER SKIN RULER STERILE

## (undated) DEVICE — BAG DRAIN URINARY CONT IRRG

## (undated) DEVICE — KIT PREVENA PLUS

## (undated) DEVICE — DRAPE INCISE IOBAN 2 23X17IN

## (undated) DEVICE — SEE MEDLINE ITEM 157150

## (undated) DEVICE — BAG LINGEMAN DRAIN UROLOGY

## (undated) DEVICE — SPHERE MARKER REFLECTIVE DISP

## (undated) DEVICE — TRAY NEURO OMC

## (undated) DEVICE — SUT STRATAFIX PDS PLUS VIO

## (undated) DEVICE — TAP 5MM SPINAL POWER

## (undated) DEVICE — TRAY CATH 1-LYR URIMTR 16FR

## (undated) DEVICE — SOL IRR NACL .9% 3000ML

## (undated) DEVICE — SPONGE NEURO 1/4X1/4

## (undated) DEVICE — CONTAINER SPEC NS 16OZ

## (undated) DEVICE — GLOVE BIOGEL SKINSENSE PI 7.0

## (undated) DEVICE — DRESSING MEPILEX BORDER 4 X 4

## (undated) DEVICE — SEE MEDLINE ITEM 156905

## (undated) DEVICE — SCREW SCHANZ 4.0 X150
Type: IMPLANTABLE DEVICE | Site: SPINE LUMBAR | Status: NON-FUNCTIONAL
Removed: 2022-02-08

## (undated) DEVICE — FRAZIER 18FR

## (undated) DEVICE — CANNULA EXPEDIUM OPN 16GX160MM

## (undated) DEVICE — GAUZE SPONGE 4X4 12PLY

## (undated) DEVICE — SEE MEDLINE ITEM 157131

## (undated) DEVICE — DRESSING SURGICAL 1/2X1/2

## (undated) DEVICE — SEALER AQUAMANTYS 2.3 BIPOLAR

## (undated) DEVICE — SET IRR URLGY 2LINE UNIV SPIKE

## (undated) DEVICE — DRAPE C-ARM ELAS CLIP 42X120IN

## (undated) DEVICE — BUR BONE CUT MICRO TPS 3X3.8MM

## (undated) DEVICE — SYR ONLY LUER LOCK 20CC

## (undated) DEVICE — DRESSING AQUACEL FOAM 5 X 5

## (undated) DEVICE — DRAPE TOP 53X102IN

## (undated) DEVICE — DRAPE T CYSTOSCOPY STERILE

## (undated) DEVICE — GOWN POLY REINF X-LONG 2XL

## (undated) DEVICE — DRAPE LEGGINGS CUFF 31X48IN

## (undated) DEVICE — SCRUB DYNA-HEX LIQ 4% CHG 4OZ

## (undated) DEVICE — SUT CTD VICRYL 2-0 CR/CT-2

## (undated) DEVICE — SUT VICRYL+ 1 CT1 18IN

## (undated) DEVICE — TRAY FOLEY 16FR INFECTION CONT

## (undated) DEVICE — Device

## (undated) DEVICE — COVER BACK TABLE 72X21

## (undated) DEVICE — PIRANHA BLADES

## (undated) DEVICE — BLADE MILL BONE MEDIUM

## (undated) DEVICE — TOWEL OR DISP STRL BLUE 4/PK

## (undated) DEVICE — TAP 8MM SPINAL POWER

## (undated) DEVICE — DRAPE ABDOMINAL TIBURON 14X11

## (undated) DEVICE — MARKER SKIN STND TIP BLUE BARR

## (undated) DEVICE — SPONGE LAP 4X18 PREWASHED

## (undated) DEVICE — STAPLER SKIN PROXIMATE WIDE

## (undated) DEVICE — SCREW BONE SPINAL 5.5 6 X 45MM
Type: IMPLANTABLE DEVICE | Site: BACK | Status: NON-FUNCTIONAL
Removed: 2024-05-01

## (undated) DEVICE — DRAPE C ARM 42 X 120 10/BX

## (undated) DEVICE — SYR IRRIGATION BULB STER 60ML